# Patient Record
Sex: FEMALE | Race: BLACK OR AFRICAN AMERICAN | NOT HISPANIC OR LATINO | Employment: OTHER | RURAL
[De-identification: names, ages, dates, MRNs, and addresses within clinical notes are randomized per-mention and may not be internally consistent; named-entity substitution may affect disease eponyms.]

---

## 2018-02-01 ENCOUNTER — HISTORICAL (OUTPATIENT)
Dept: ADMINISTRATIVE | Facility: HOSPITAL | Age: 73
End: 2018-02-01

## 2018-02-02 LAB
LAB AP CLINICAL INFORMATION: NORMAL
LAB AP DIAGNOSIS - HISTORICAL: NORMAL
LAB AP GROSS PATHOLOGY - HISTORICAL: NORMAL
LAB AP SPECIMEN SUBMITTED - HISTORICAL: NORMAL

## 2020-03-09 ENCOUNTER — HISTORICAL (OUTPATIENT)
Dept: ADMINISTRATIVE | Facility: HOSPITAL | Age: 75
End: 2020-03-09

## 2020-03-19 ENCOUNTER — HISTORICAL (OUTPATIENT)
Dept: ADMINISTRATIVE | Facility: HOSPITAL | Age: 75
End: 2020-03-19

## 2020-06-02 ENCOUNTER — HISTORICAL (OUTPATIENT)
Dept: ADMINISTRATIVE | Facility: HOSPITAL | Age: 75
End: 2020-06-02

## 2020-06-02 LAB
BASOPHILS # BLD AUTO: 0.04 X10E3/UL (ref 0–0.2)
BASOPHILS NFR BLD AUTO: 0.5 % (ref 0–1)
CRP SERPL-MCNC: 1.89 MG/DL (ref 0–0.8)
EOSINOPHIL # BLD AUTO: 0.14 X10E3/UL (ref 0–0.5)
EOSINOPHIL NFR BLD AUTO: 1.8 % (ref 1–4)
ERYTHROCYTE [DISTWIDTH] IN BLOOD BY AUTOMATED COUNT: 15.8 % (ref 11.5–14.5)
ERYTHROCYTE [SEDIMENTATION RATE] IN BLOOD BY WESTERGREN METHOD: 37 MM/HR (ref 0–30)
HCT VFR BLD AUTO: 38.8 % (ref 38–47)
HGB BLD-MCNC: 12.2 G/DL (ref 12–16)
IMM GRANULOCYTES # BLD AUTO: 0.02 X10E3/UL (ref 0–0.04)
IMM GRANULOCYTES NFR BLD: 0.3 % (ref 0–0.4)
LYMPHOCYTES # BLD AUTO: 2.62 X10E3/UL (ref 1–4.8)
LYMPHOCYTES NFR BLD AUTO: 33.6 % (ref 27–41)
MCH RBC QN AUTO: 26.4 PG (ref 27–31)
MCHC RBC AUTO-ENTMCNC: 31.4 G/DL (ref 32–36)
MCV RBC AUTO: 84 FL (ref 80–96)
MONOCYTES # BLD AUTO: 0.63 X10E3/UL (ref 0–0.8)
MONOCYTES NFR BLD AUTO: 8.1 % (ref 2–6)
MPC BLD CALC-MCNC: 10.1 FL (ref 9.4–12.4)
NEUTROPHILS # BLD AUTO: 4.34 X10E3/UL (ref 1.8–7.7)
NEUTROPHILS NFR BLD AUTO: 55.7 % (ref 53–65)
NRBC # BLD AUTO: 0 X10E3/UL (ref 0–0)
NRBC, AUTO (.00): 0 /100 (ref 0–0)
PLATELET # BLD AUTO: 317 X10E3/UL (ref 150–400)
RBC # BLD AUTO: 4.62 X10E6/UL (ref 4.2–5.4)
WBC # BLD AUTO: 7.79 X10E3/UL (ref 4.5–11)

## 2020-06-18 ENCOUNTER — HISTORICAL (OUTPATIENT)
Dept: ADMINISTRATIVE | Facility: HOSPITAL | Age: 75
End: 2020-06-18

## 2020-06-18 LAB — GLUCOSE SERPL-MCNC: 140 MG/DL (ref 70–105)

## 2020-06-19 LAB
INSULIN SERPL-ACNC: NORMAL U[IU]/ML
LAB AP CLINICAL INFORMATION: NORMAL
LAB AP COMMENTS: NORMAL
LAB AP DIAGNOSIS - HISTORICAL: NORMAL
LAB AP GROSS PATHOLOGY - HISTORICAL: NORMAL
LAB AP SPECIMEN SUBMITTED - HISTORICAL: NORMAL

## 2020-10-05 ENCOUNTER — HISTORICAL (OUTPATIENT)
Dept: ADMINISTRATIVE | Facility: HOSPITAL | Age: 75
End: 2020-10-05

## 2020-10-05 LAB — GLUCOSE SERPL-MCNC: 108 MG/DL (ref 70–105)

## 2020-11-03 ENCOUNTER — HISTORICAL (OUTPATIENT)
Dept: ADMINISTRATIVE | Facility: HOSPITAL | Age: 75
End: 2020-11-03

## 2020-11-30 ENCOUNTER — HISTORICAL (OUTPATIENT)
Dept: ADMINISTRATIVE | Facility: HOSPITAL | Age: 75
End: 2020-11-30

## 2020-12-02 ENCOUNTER — HISTORICAL (OUTPATIENT)
Dept: ADMINISTRATIVE | Facility: HOSPITAL | Age: 75
End: 2020-12-02

## 2020-12-02 LAB — GLUCOSE SERPL-MCNC: 111 MG/DL (ref 70–105)

## 2020-12-03 LAB

## 2021-03-23 RX ORDER — LEVOTHYROXINE SODIUM 50 UG/1
50 TABLET ORAL
COMMUNITY
End: 2022-01-19 | Stop reason: SDUPTHER

## 2021-03-23 RX ORDER — GLIPIZIDE 10 MG/1
10 TABLET ORAL DAILY
COMMUNITY
End: 2021-05-19 | Stop reason: CLARIF

## 2021-03-23 RX ORDER — HYDROCODONE BITARTRATE AND ACETAMINOPHEN 10; 325 MG/1; MG/1
1 TABLET ORAL EVERY 12 HOURS
COMMUNITY
End: 2021-06-02

## 2021-03-23 RX ORDER — METOPROLOL SUCCINATE 25 MG/1
50 TABLET, EXTENDED RELEASE ORAL DAILY
COMMUNITY
End: 2022-01-19

## 2021-03-23 RX ORDER — MECLIZINE HCL 12.5 MG 12.5 MG/1
1-2 TABLET ORAL EVERY 6 HOURS
COMMUNITY
End: 2022-01-19

## 2021-03-23 RX ORDER — AMLODIPINE BESYLATE 10 MG/1
10 TABLET ORAL DAILY
COMMUNITY
End: 2021-06-08 | Stop reason: SDUPTHER

## 2021-03-23 RX ORDER — GABAPENTIN 300 MG/1
300 CAPSULE ORAL 3 TIMES DAILY
COMMUNITY
End: 2021-03-25 | Stop reason: SDUPTHER

## 2021-03-25 ENCOUNTER — HOSPITAL ENCOUNTER (OUTPATIENT)
Dept: RADIOLOGY | Facility: HOSPITAL | Age: 76
Discharge: HOME | End: 2021-03-25
Attending: PHYSICIAN ASSISTANT
Payer: COMMERCIAL

## 2021-03-25 ENCOUNTER — OFFICE VISIT (OUTPATIENT)
Dept: PAIN MEDICINE | Facility: HOSPITAL | Age: 76
End: 2021-03-25
Payer: COMMERCIAL

## 2021-03-25 VITALS
WEIGHT: 179 LBS | SYSTOLIC BLOOD PRESSURE: 140 MMHG | DIASTOLIC BLOOD PRESSURE: 65 MMHG | BODY MASS INDEX: 27.13 KG/M2 | RESPIRATION RATE: 18 BRPM | HEIGHT: 68 IN | HEART RATE: 65 BPM

## 2021-03-25 DIAGNOSIS — E11.40 DIABETIC NEUROPATHY WITH NEUROLOGIC COMPLICATION: Chronic | ICD-10-CM

## 2021-03-25 DIAGNOSIS — M54.17 LUMBOSACRAL RADICULOPATHY: Primary | Chronic | ICD-10-CM

## 2021-03-25 DIAGNOSIS — M79.672 LEFT FOOT PAIN: Chronic | ICD-10-CM

## 2021-03-25 DIAGNOSIS — M54.9 DORSALGIA, UNSPECIFIED: ICD-10-CM

## 2021-03-25 DIAGNOSIS — M25.552 LEFT HIP PAIN: ICD-10-CM

## 2021-03-25 DIAGNOSIS — E11.49 DIABETIC NEUROPATHY WITH NEUROLOGIC COMPLICATION: Chronic | ICD-10-CM

## 2021-03-25 DIAGNOSIS — Z79.899 ENCOUNTER FOR LONG-TERM (CURRENT) USE OF OTHER MEDICATIONS: ICD-10-CM

## 2021-03-25 DIAGNOSIS — G89.4 CHRONIC PAIN SYNDROME: Chronic | ICD-10-CM

## 2021-03-25 DIAGNOSIS — M79.671 RIGHT FOOT PAIN: Chronic | ICD-10-CM

## 2021-03-25 LAB
CTP QC/QA: YES
POC (AMP) AMPHETAMINE: NEGATIVE
POC (BAR) BARBITURATES: NEGATIVE
POC (BUP) BUPRENORPHINE: NEGATIVE
POC (BZO) BENZODIAZEPINES: NEGATIVE
POC (COC) COCAINE: NEGATIVE
POC (MDMA) METHYLENEDIOXYMETHAMPHETAMINE 3,4: NEGATIVE
POC (MET) METHAMPHETAMINE: NEGATIVE
POC (MOP) OPIATES: ABNORMAL
POC (MTD) METHADONE: NEGATIVE
POC (OXY) OXYCODONE: NEGATIVE
POC (PCP) PHENCYCLIDINE: NEGATIVE
POC (TCA) TRICYCLIC ANTIDEPRESSANTS: NEGATIVE
POC TEMPERATURE (URINE): 94
POC THC: NEGATIVE

## 2021-03-25 PROCEDURE — 99999 PR PBB SHADOW E&M-EST. PATIENT-LVL V: ICD-10-PCS | Mod: PBBFAC,,, | Performed by: PHYSICIAN ASSISTANT

## 2021-03-25 PROCEDURE — 99214 PR OFFICE/OUTPT VISIT, EST, LEVL IV, 30-39 MIN: ICD-10-PCS | Mod: S$PBB,,, | Performed by: PHYSICIAN ASSISTANT

## 2021-03-25 PROCEDURE — 73521 XR HIPS BILATERAL 2 VIEW INCL AP PELVIS: ICD-10-PCS | Mod: 26,,, | Performed by: RADIOLOGY

## 2021-03-25 PROCEDURE — 96372 THER/PROPH/DIAG INJ SC/IM: CPT | Mod: PBBFAC | Performed by: PHYSICIAN ASSISTANT

## 2021-03-25 PROCEDURE — 99999 PR PBB SHADOW E&M-EST. PATIENT-LVL V: CPT | Mod: PBBFAC,,, | Performed by: PHYSICIAN ASSISTANT

## 2021-03-25 PROCEDURE — 72110 XR LUMBAR SPINE 5 VIEW WITH FLEX AND EXT: ICD-10-PCS | Mod: 26,,, | Performed by: RADIOLOGY

## 2021-03-25 PROCEDURE — 73521 X-RAY EXAM HIPS BI 2 VIEWS: CPT | Mod: 26,,, | Performed by: RADIOLOGY

## 2021-03-25 PROCEDURE — 73521 X-RAY EXAM HIPS BI 2 VIEWS: CPT | Mod: TC

## 2021-03-25 PROCEDURE — 72110 X-RAY EXAM L-2 SPINE 4/>VWS: CPT | Mod: TC

## 2021-03-25 PROCEDURE — 72110 X-RAY EXAM L-2 SPINE 4/>VWS: CPT | Mod: 26,,, | Performed by: RADIOLOGY

## 2021-03-25 PROCEDURE — 99214 OFFICE O/P EST MOD 30 MIN: CPT | Mod: S$PBB,,, | Performed by: PHYSICIAN ASSISTANT

## 2021-03-25 PROCEDURE — 99215 OFFICE O/P EST HI 40 MIN: CPT | Mod: PBBFAC,25 | Performed by: PHYSICIAN ASSISTANT

## 2021-03-25 RX ORDER — HYDROCODONE BITARTRATE AND ACETAMINOPHEN 10; 325 MG/1; MG/1
1 TABLET ORAL EVERY 8 HOURS
Qty: 90 TABLET | Refills: 0 | Status: SHIPPED | OUTPATIENT
Start: 2021-04-29 | End: 2021-05-29

## 2021-03-25 RX ORDER — HYDROCODONE BITARTRATE AND ACETAMINOPHEN 10; 325 MG/1; MG/1
1 TABLET ORAL EVERY 8 HOURS
Qty: 90 TABLET | Refills: 0 | Status: SHIPPED | OUTPATIENT
Start: 2021-03-30 | End: 2021-04-29

## 2021-03-25 RX ORDER — GABAPENTIN 300 MG/1
300 CAPSULE ORAL 3 TIMES DAILY
Qty: 90 CAPSULE | Refills: 2 | Status: SHIPPED | OUTPATIENT
Start: 2021-03-25 | End: 2021-06-02 | Stop reason: SDUPTHER

## 2021-03-25 RX ORDER — HYDROCODONE BITARTRATE AND ACETAMINOPHEN 10; 325 MG/1; MG/1
1 TABLET ORAL EVERY 8 HOURS
Qty: 90 TABLET | Refills: 0 | Status: SHIPPED | OUTPATIENT
Start: 2021-05-29 | End: 2021-06-17

## 2021-03-25 RX ORDER — KETOROLAC TROMETHAMINE 30 MG/ML
60 INJECTION, SOLUTION INTRAMUSCULAR; INTRAVENOUS
Status: COMPLETED | OUTPATIENT
Start: 2021-03-25 | End: 2021-03-25

## 2021-03-25 RX ADMIN — KETOROLAC TROMETHAMINE 60 MG: 30 INJECTION, SOLUTION INTRAMUSCULAR at 09:03

## 2021-04-06 ENCOUNTER — OFFICE VISIT (OUTPATIENT)
Dept: FAMILY MEDICINE | Facility: CLINIC | Age: 76
End: 2021-04-06
Payer: COMMERCIAL

## 2021-04-06 VITALS
HEIGHT: 68 IN | TEMPERATURE: 99 F | BODY MASS INDEX: 27.46 KG/M2 | DIASTOLIC BLOOD PRESSURE: 88 MMHG | RESPIRATION RATE: 16 BRPM | SYSTOLIC BLOOD PRESSURE: 161 MMHG | OXYGEN SATURATION: 100 % | HEART RATE: 72 BPM | WEIGHT: 181.19 LBS

## 2021-04-06 DIAGNOSIS — N32.89 BLADDER SPASM: ICD-10-CM

## 2021-04-06 DIAGNOSIS — R35.0 URINARY FREQUENCY: Primary | ICD-10-CM

## 2021-04-06 LAB
BILIRUB SERPL-MCNC: NEGATIVE MG/DL
BLOOD URINE, POC: NEGATIVE
COLOR, POC UA: YELLOW
GLUCOSE UR QL STRIP: 100
KETONES UR QL STRIP: NEGATIVE
LEUKOCYTE ESTERASE URINE, POC: NEGATIVE
NITRITE, POC UA: NEGATIVE
PH, POC UA: 6.5
PROTEIN, POC: NEGATIVE
SPECIFIC GRAVITY, POC UA: 1.02
UROBILINOGEN, POC UA: 0.2

## 2021-04-06 PROCEDURE — 81003 URINALYSIS AUTO W/O SCOPE: CPT | Mod: QW,,, | Performed by: NURSE PRACTITIONER

## 2021-04-06 PROCEDURE — 81003 POCT URINALYSIS W/O SCOPE: ICD-10-PCS | Mod: QW,,, | Performed by: NURSE PRACTITIONER

## 2021-04-06 PROCEDURE — 96372 PR INJECTION,THERAP/PROPH/DIAG2ST, IM OR SUBCUT: ICD-10-PCS | Mod: ,,, | Performed by: NURSE PRACTITIONER

## 2021-04-06 PROCEDURE — 99213 PR OFFICE/OUTPT VISIT, EST, LEVL III, 20-29 MIN: ICD-10-PCS | Mod: 25,,, | Performed by: NURSE PRACTITIONER

## 2021-04-06 PROCEDURE — 96372 THER/PROPH/DIAG INJ SC/IM: CPT | Mod: ,,, | Performed by: NURSE PRACTITIONER

## 2021-04-06 PROCEDURE — 99213 OFFICE O/P EST LOW 20 MIN: CPT | Mod: 25,,, | Performed by: NURSE PRACTITIONER

## 2021-04-06 RX ORDER — GLIPIZIDE 10 MG/1
TABLET, FILM COATED, EXTENDED RELEASE ORAL DAILY
COMMUNITY
Start: 2021-02-02 | End: 2021-05-19 | Stop reason: CLARIF

## 2021-04-06 RX ORDER — OXYBUTYNIN CHLORIDE 5 MG/1
TABLET ORAL
COMMUNITY
Start: 2021-01-07 | End: 2021-04-06 | Stop reason: SDUPTHER

## 2021-04-06 RX ORDER — KETOROLAC TROMETHAMINE 30 MG/ML
60 INJECTION, SOLUTION INTRAMUSCULAR; INTRAVENOUS
Status: COMPLETED | OUTPATIENT
Start: 2021-04-06 | End: 2021-04-06

## 2021-04-06 RX ORDER — LANCETS 33 GAUGE
EACH MISCELLANEOUS
COMMUNITY
Start: 2021-01-04 | End: 2021-06-08 | Stop reason: SDUPTHER

## 2021-04-06 RX ORDER — LEVOCETIRIZINE DIHYDROCHLORIDE 5 MG/1
5 TABLET, FILM COATED ORAL NIGHTLY
Qty: 30 TABLET | Refills: 11 | Status: SHIPPED | OUTPATIENT
Start: 2021-04-06 | End: 2021-06-17 | Stop reason: SDUPTHER

## 2021-04-06 RX ORDER — METHENAMINE HIPPURATE 1000 MG/1
1 TABLET ORAL DAILY
COMMUNITY
Start: 2021-03-01 | End: 2021-06-17

## 2021-04-06 RX ORDER — OXYBUTYNIN CHLORIDE 5 MG/1
TABLET ORAL
Qty: 60 TABLET | Refills: 5 | Status: SHIPPED | OUTPATIENT
Start: 2021-04-06 | End: 2022-02-08

## 2021-04-06 RX ORDER — CEFDINIR 300 MG/1
300 CAPSULE ORAL 2 TIMES DAILY
Qty: 20 CAPSULE | Refills: 0 | Status: SHIPPED | OUTPATIENT
Start: 2021-04-06 | End: 2021-05-05 | Stop reason: CLARIF

## 2021-04-06 RX ADMIN — KETOROLAC TROMETHAMINE 60 MG: 30 INJECTION, SOLUTION INTRAMUSCULAR; INTRAVENOUS at 06:04

## 2021-04-08 ENCOUNTER — CLINICAL SUPPORT (OUTPATIENT)
Dept: FAMILY MEDICINE | Facility: CLINIC | Age: 76
End: 2021-04-08
Payer: COMMERCIAL

## 2021-04-08 DIAGNOSIS — R53.83 FATIGUE, UNSPECIFIED TYPE: Primary | ICD-10-CM

## 2021-04-08 PROCEDURE — 96372 THER/PROPH/DIAG INJ SC/IM: CPT | Mod: ,,, | Performed by: NURSE PRACTITIONER

## 2021-04-08 PROCEDURE — 96372 PR INJECTION,THERAP/PROPH/DIAG2ST, IM OR SUBCUT: ICD-10-PCS | Mod: ,,, | Performed by: NURSE PRACTITIONER

## 2021-04-08 RX ORDER — CYANOCOBALAMIN 1000 UG/ML
1000 INJECTION, SOLUTION INTRAMUSCULAR; SUBCUTANEOUS
Status: COMPLETED | OUTPATIENT
Start: 2021-04-08 | End: 2021-04-08

## 2021-04-08 RX ADMIN — CYANOCOBALAMIN 1000 MCG: 1000 INJECTION, SOLUTION INTRAMUSCULAR; SUBCUTANEOUS at 12:04

## 2021-04-19 ENCOUNTER — OFFICE VISIT (OUTPATIENT)
Dept: PAIN MEDICINE | Facility: CLINIC | Age: 76
End: 2021-04-19
Payer: COMMERCIAL

## 2021-04-19 VITALS
BODY MASS INDEX: 27.58 KG/M2 | WEIGHT: 182 LBS | SYSTOLIC BLOOD PRESSURE: 152 MMHG | HEIGHT: 68 IN | HEART RATE: 84 BPM | DIASTOLIC BLOOD PRESSURE: 75 MMHG | RESPIRATION RATE: 18 BRPM

## 2021-04-19 DIAGNOSIS — M79.671 RIGHT FOOT PAIN: Chronic | ICD-10-CM

## 2021-04-19 DIAGNOSIS — M54.17 LUMBOSACRAL RADICULOPATHY: Primary | Chronic | ICD-10-CM

## 2021-04-19 DIAGNOSIS — E11.49 DIABETIC NEUROPATHY WITH NEUROLOGIC COMPLICATION: Chronic | ICD-10-CM

## 2021-04-19 DIAGNOSIS — B37.31 YEAST INFECTION INVOLVING THE VAGINA AND SURROUNDING AREA: ICD-10-CM

## 2021-04-19 DIAGNOSIS — G89.4 CHRONIC PAIN SYNDROME: Chronic | ICD-10-CM

## 2021-04-19 DIAGNOSIS — M79.672 LEFT FOOT PAIN: Chronic | ICD-10-CM

## 2021-04-19 DIAGNOSIS — E11.40 DIABETIC NEUROPATHY WITH NEUROLOGIC COMPLICATION: Chronic | ICD-10-CM

## 2021-04-19 PROCEDURE — 99215 OFFICE O/P EST HI 40 MIN: CPT | Mod: PBBFAC,25 | Performed by: PHYSICIAN ASSISTANT

## 2021-04-19 PROCEDURE — 99214 PR OFFICE/OUTPT VISIT, EST, LEVL IV, 30-39 MIN: ICD-10-PCS | Mod: S$PBB,,, | Performed by: PHYSICIAN ASSISTANT

## 2021-04-19 PROCEDURE — 99999 PR PBB SHADOW E&M-EST. PATIENT-LVL V: ICD-10-PCS | Mod: PBBFAC,,, | Performed by: PHYSICIAN ASSISTANT

## 2021-04-19 PROCEDURE — 99999 PR PBB SHADOW E&M-EST. PATIENT-LVL V: CPT | Mod: PBBFAC,,, | Performed by: PHYSICIAN ASSISTANT

## 2021-04-19 PROCEDURE — 99214 OFFICE O/P EST MOD 30 MIN: CPT | Mod: S$PBB,,, | Performed by: PHYSICIAN ASSISTANT

## 2021-04-19 PROCEDURE — 96372 THER/PROPH/DIAG INJ SC/IM: CPT | Mod: PBBFAC | Performed by: PHYSICIAN ASSISTANT

## 2021-04-19 RX ORDER — FLUCONAZOLE 150 MG/1
150 TABLET ORAL DAILY
Qty: 2 TABLET | Refills: 2 | Status: SHIPPED | OUTPATIENT
Start: 2021-04-19 | End: 2021-04-21

## 2021-04-19 RX ORDER — CLOTRIMAZOLE AND BETAMETHASONE DIPROPIONATE 10; .64 MG/G; MG/G
CREAM TOPICAL 2 TIMES DAILY
Qty: 1 TUBE | Refills: 2 | Status: SHIPPED | OUTPATIENT
Start: 2021-04-19 | End: 2021-05-19

## 2021-04-19 RX ORDER — KETOROLAC TROMETHAMINE 30 MG/ML
60 INJECTION, SOLUTION INTRAMUSCULAR; INTRAVENOUS
Status: COMPLETED | OUTPATIENT
Start: 2021-04-19 | End: 2021-04-19

## 2021-04-19 RX ADMIN — KETOROLAC TROMETHAMINE 60 MG: 30 INJECTION, SOLUTION INTRAMUSCULAR; INTRAVENOUS at 02:04

## 2021-05-05 ENCOUNTER — OFFICE VISIT (OUTPATIENT)
Dept: FAMILY MEDICINE | Facility: CLINIC | Age: 76
End: 2021-05-05
Payer: COMMERCIAL

## 2021-05-05 VITALS
DIASTOLIC BLOOD PRESSURE: 86 MMHG | WEIGHT: 180 LBS | SYSTOLIC BLOOD PRESSURE: 117 MMHG | TEMPERATURE: 98 F | BODY MASS INDEX: 27.28 KG/M2 | HEART RATE: 104 BPM | HEIGHT: 68 IN | RESPIRATION RATE: 17 BRPM | OXYGEN SATURATION: 100 %

## 2021-05-05 DIAGNOSIS — R30.0 DYSURIA: Primary | ICD-10-CM

## 2021-05-05 DIAGNOSIS — N32.89 BLADDER SPASMS: ICD-10-CM

## 2021-05-05 PROCEDURE — 96372 THER/PROPH/DIAG INJ SC/IM: CPT | Mod: ,,, | Performed by: NURSE PRACTITIONER

## 2021-05-05 PROCEDURE — 99213 PR OFFICE/OUTPT VISIT, EST, LEVL III, 20-29 MIN: ICD-10-PCS | Mod: 25,,, | Performed by: NURSE PRACTITIONER

## 2021-05-05 PROCEDURE — 99213 OFFICE O/P EST LOW 20 MIN: CPT | Mod: 25,,, | Performed by: NURSE PRACTITIONER

## 2021-05-05 PROCEDURE — 96372 PR INJECTION,THERAP/PROPH/DIAG2ST, IM OR SUBCUT: ICD-10-PCS | Mod: ,,, | Performed by: NURSE PRACTITIONER

## 2021-05-05 RX ORDER — CEPHALEXIN 500 MG/1
500 CAPSULE ORAL EVERY 12 HOURS
Qty: 14 CAPSULE | Refills: 0 | Status: SHIPPED | OUTPATIENT
Start: 2021-05-05 | End: 2021-06-17

## 2021-05-05 RX ORDER — AMITRIPTYLINE HYDROCHLORIDE 10 MG/1
10 TABLET, FILM COATED ORAL NIGHTLY PRN
Qty: 30 TABLET | Refills: 0 | Status: SHIPPED | OUTPATIENT
Start: 2021-05-05 | End: 2021-06-06

## 2021-05-05 RX ORDER — KETOROLAC TROMETHAMINE 30 MG/ML
30 INJECTION, SOLUTION INTRAMUSCULAR; INTRAVENOUS
Status: COMPLETED | OUTPATIENT
Start: 2021-05-05 | End: 2021-05-05

## 2021-05-05 RX ADMIN — KETOROLAC TROMETHAMINE 30 MG: 30 INJECTION, SOLUTION INTRAMUSCULAR; INTRAVENOUS at 05:05

## 2021-05-07 ENCOUNTER — HOSPITAL ENCOUNTER (OUTPATIENT)
Dept: RADIOLOGY | Facility: HOSPITAL | Age: 76
Discharge: HOME | End: 2021-05-07
Attending: OTOLARYNGOLOGY
Payer: COMMERCIAL

## 2021-05-07 DIAGNOSIS — J32.9 CHRONIC SINUSITIS: ICD-10-CM

## 2021-05-07 PROCEDURE — 70486 CT MAXILLOFACIAL W/O DYE: CPT | Mod: TC

## 2021-05-07 PROCEDURE — 70486 CT MAXILLOFACIAL W/O DYE: CPT | Mod: 26,,, | Performed by: RADIOLOGY

## 2021-05-07 PROCEDURE — 70486 CT MAXILLOFACIAL WITHOUT CONTRAST: ICD-10-PCS | Mod: 26,,, | Performed by: RADIOLOGY

## 2021-05-19 DIAGNOSIS — E11.9 DIABETES MELLITUS WITHOUT COMPLICATION: Primary | ICD-10-CM

## 2021-05-19 RX ORDER — GLIPIZIDE 10 MG/1
10 TABLET, FILM COATED, EXTENDED RELEASE ORAL DAILY
Qty: 90 TABLET | Refills: 1 | Status: SHIPPED | OUTPATIENT
Start: 2021-05-19 | End: 2021-11-29 | Stop reason: SDUPTHER

## 2021-05-29 ENCOUNTER — OFFICE VISIT (OUTPATIENT)
Dept: FAMILY MEDICINE | Facility: CLINIC | Age: 76
End: 2021-05-29
Payer: COMMERCIAL

## 2021-05-29 VITALS
HEART RATE: 72 BPM | OXYGEN SATURATION: 99 % | BODY MASS INDEX: 26.98 KG/M2 | WEIGHT: 178 LBS | HEIGHT: 68 IN | TEMPERATURE: 99 F | RESPIRATION RATE: 18 BRPM | SYSTOLIC BLOOD PRESSURE: 142 MMHG | DIASTOLIC BLOOD PRESSURE: 88 MMHG

## 2021-05-29 DIAGNOSIS — M79.672 BILATERAL FOOT PAIN: Primary | ICD-10-CM

## 2021-05-29 DIAGNOSIS — M79.671 BILATERAL FOOT PAIN: Primary | ICD-10-CM

## 2021-05-29 PROCEDURE — 99213 OFFICE O/P EST LOW 20 MIN: CPT | Mod: 25,,, | Performed by: NURSE PRACTITIONER

## 2021-05-29 PROCEDURE — 96372 THER/PROPH/DIAG INJ SC/IM: CPT | Mod: ,,, | Performed by: NURSE PRACTITIONER

## 2021-05-29 PROCEDURE — 99213 PR OFFICE/OUTPT VISIT, EST, LEVL III, 20-29 MIN: ICD-10-PCS | Mod: 25,,, | Performed by: NURSE PRACTITIONER

## 2021-05-29 PROCEDURE — 96372 PR INJECTION,THERAP/PROPH/DIAG2ST, IM OR SUBCUT: ICD-10-PCS | Mod: ,,, | Performed by: NURSE PRACTITIONER

## 2021-05-29 RX ORDER — DEXAMETHASONE SODIUM PHOSPHATE 4 MG/ML
4 INJECTION, SOLUTION INTRA-ARTICULAR; INTRALESIONAL; INTRAMUSCULAR; INTRAVENOUS; SOFT TISSUE
Status: COMPLETED | OUTPATIENT
Start: 2021-05-29 | End: 2021-05-29

## 2021-05-29 RX ORDER — KETOROLAC TROMETHAMINE 30 MG/ML
30 INJECTION, SOLUTION INTRAMUSCULAR; INTRAVENOUS
Status: COMPLETED | OUTPATIENT
Start: 2021-05-29 | End: 2021-05-29

## 2021-05-29 RX ADMIN — KETOROLAC TROMETHAMINE 30 MG: 30 INJECTION, SOLUTION INTRAMUSCULAR; INTRAVENOUS at 09:05

## 2021-05-29 RX ADMIN — DEXAMETHASONE SODIUM PHOSPHATE 4 MG: 4 INJECTION, SOLUTION INTRA-ARTICULAR; INTRALESIONAL; INTRAMUSCULAR; INTRAVENOUS; SOFT TISSUE at 09:05

## 2021-06-02 ENCOUNTER — OFFICE VISIT (OUTPATIENT)
Dept: PAIN MEDICINE | Facility: CLINIC | Age: 76
End: 2021-06-02
Payer: COMMERCIAL

## 2021-06-02 VITALS
HEART RATE: 74 BPM | RESPIRATION RATE: 18 BRPM | WEIGHT: 180.81 LBS | DIASTOLIC BLOOD PRESSURE: 74 MMHG | SYSTOLIC BLOOD PRESSURE: 131 MMHG | HEIGHT: 68 IN | BODY MASS INDEX: 27.4 KG/M2

## 2021-06-02 DIAGNOSIS — M79.671 BILATERAL FOOT PAIN: ICD-10-CM

## 2021-06-02 DIAGNOSIS — E11.40 DIABETIC NEUROPATHY WITH NEUROLOGIC COMPLICATION: Primary | Chronic | ICD-10-CM

## 2021-06-02 DIAGNOSIS — M54.17 LUMBOSACRAL RADICULOPATHY: Chronic | ICD-10-CM

## 2021-06-02 DIAGNOSIS — Z79.899 ENCOUNTER FOR LONG-TERM (CURRENT) USE OF OTHER MEDICATIONS: ICD-10-CM

## 2021-06-02 DIAGNOSIS — M79.672 BILATERAL FOOT PAIN: ICD-10-CM

## 2021-06-02 DIAGNOSIS — E11.49 DIABETIC NEUROPATHY WITH NEUROLOGIC COMPLICATION: Primary | Chronic | ICD-10-CM

## 2021-06-02 PROCEDURE — 80305 DRUG TEST PRSMV DIR OPT OBS: CPT | Mod: PBBFAC | Performed by: PHYSICIAN ASSISTANT

## 2021-06-02 PROCEDURE — 99214 PR OFFICE/OUTPT VISIT, EST, LEVL IV, 30-39 MIN: ICD-10-PCS | Mod: S$PBB,25,, | Performed by: PHYSICIAN ASSISTANT

## 2021-06-02 PROCEDURE — 96372 THER/PROPH/DIAG INJ SC/IM: CPT | Mod: PBBFAC | Performed by: PHYSICIAN ASSISTANT

## 2021-06-02 PROCEDURE — 99214 OFFICE O/P EST MOD 30 MIN: CPT | Mod: S$PBB,25,, | Performed by: PHYSICIAN ASSISTANT

## 2021-06-02 PROCEDURE — 99215 OFFICE O/P EST HI 40 MIN: CPT | Mod: PBBFAC,25 | Performed by: PHYSICIAN ASSISTANT

## 2021-06-02 RX ORDER — GABAPENTIN 400 MG/1
400 CAPSULE ORAL EVERY 8 HOURS
Qty: 90 CAPSULE | Refills: 2 | Status: SHIPPED | OUTPATIENT
Start: 2021-06-02 | End: 2021-08-30 | Stop reason: SDUPTHER

## 2021-06-02 RX ORDER — DULOXETIN HYDROCHLORIDE 30 MG/1
30 CAPSULE, DELAYED RELEASE ORAL DAILY
Qty: 30 CAPSULE | Refills: 2 | Status: SHIPPED | OUTPATIENT
Start: 2021-06-02 | End: 2021-06-17

## 2021-06-02 RX ORDER — HYDROCODONE BITARTRATE AND ACETAMINOPHEN 10; 325 MG/1; MG/1
1 TABLET ORAL EVERY 8 HOURS
Qty: 90 TABLET | Refills: 0 | Status: SHIPPED | OUTPATIENT
Start: 2021-08-27 | End: 2021-06-17 | Stop reason: SDUPTHER

## 2021-06-02 RX ORDER — HYDROCODONE BITARTRATE AND ACETAMINOPHEN 10; 325 MG/1; MG/1
1 TABLET ORAL EVERY 8 HOURS
Qty: 90 TABLET | Refills: 0 | Status: SHIPPED | OUTPATIENT
Start: 2021-06-28 | End: 2021-07-28

## 2021-06-02 RX ORDER — KETOROLAC TROMETHAMINE 30 MG/ML
60 INJECTION, SOLUTION INTRAMUSCULAR; INTRAVENOUS
Status: COMPLETED | OUTPATIENT
Start: 2021-06-02 | End: 2021-06-02

## 2021-06-02 RX ORDER — HYDROCODONE BITARTRATE AND ACETAMINOPHEN 10; 325 MG/1; MG/1
1 TABLET ORAL EVERY 8 HOURS
Qty: 90 TABLET | Refills: 0 | Status: SHIPPED | OUTPATIENT
Start: 2021-07-28 | End: 2021-06-17 | Stop reason: SDUPTHER

## 2021-06-02 RX ADMIN — KETOROLAC TROMETHAMINE 60 MG: 30 INJECTION, SOLUTION INTRAMUSCULAR at 10:06

## 2021-06-08 DIAGNOSIS — E11.9 DIABETES MELLITUS WITHOUT COMPLICATION: ICD-10-CM

## 2021-06-08 DIAGNOSIS — I10 HYPERTENSION, UNSPECIFIED TYPE: Primary | ICD-10-CM

## 2021-06-08 RX ORDER — AMLODIPINE BESYLATE 10 MG/1
10 TABLET ORAL DAILY
Qty: 30 TABLET | Refills: 5 | Status: SHIPPED | OUTPATIENT
Start: 2021-06-08 | End: 2022-02-08

## 2021-06-08 RX ORDER — LANCETS 33 GAUGE
EACH MISCELLANEOUS
Qty: 100 EACH | Refills: 11 | Status: SHIPPED | OUTPATIENT
Start: 2021-06-08 | End: 2022-06-15 | Stop reason: SDUPTHER

## 2021-06-09 ENCOUNTER — OFFICE VISIT (OUTPATIENT)
Dept: UROLOGY | Facility: CLINIC | Age: 76
End: 2021-06-09
Payer: COMMERCIAL

## 2021-06-09 VITALS
OXYGEN SATURATION: 97 % | DIASTOLIC BLOOD PRESSURE: 80 MMHG | BODY MASS INDEX: 29.99 KG/M2 | TEMPERATURE: 99 F | HEIGHT: 65 IN | WEIGHT: 180 LBS | SYSTOLIC BLOOD PRESSURE: 140 MMHG

## 2021-06-09 DIAGNOSIS — N32.81 OVERACTIVE BLADDER: Primary | ICD-10-CM

## 2021-06-09 DIAGNOSIS — Z87.440 HISTORY OF UTI: ICD-10-CM

## 2021-06-09 LAB
BACTERIA #/AREA URNS HPF: ABNORMAL /HPF
BILIRUB UR QL STRIP: NEGATIVE
CLARITY UR: CLEAR
COLOR UR: YELLOW
GLUCOSE UR STRIP-MCNC: 500 MG/DL
KETONES UR STRIP-SCNC: NEGATIVE MG/DL
LEUKOCYTE ESTERASE UR QL STRIP: NEGATIVE
NITRITE UR QL STRIP: NEGATIVE
PH UR STRIP: 7 PH UNITS
PROT UR QL STRIP: NEGATIVE
RBC # UR STRIP: ABNORMAL /UL
RBC #/AREA URNS HPF: ABNORMAL /HPF
SP GR UR STRIP: 1.01
SQUAMOUS #/AREA URNS LPF: ABNORMAL /LPF
UROBILINOGEN UR STRIP-ACNC: 1 MG/DL
WBC #/AREA URNS HPF: ABNORMAL /HPF

## 2021-06-09 PROCEDURE — 81001 URINALYSIS AUTO W/SCOPE: CPT | Mod: ,,, | Performed by: CLINICAL MEDICAL LABORATORY

## 2021-06-09 PROCEDURE — 81001 URINALYSIS: ICD-10-PCS | Mod: ,,, | Performed by: CLINICAL MEDICAL LABORATORY

## 2021-06-09 PROCEDURE — 99213 OFFICE O/P EST LOW 20 MIN: CPT | Mod: S$PBB,,, | Performed by: UROLOGY

## 2021-06-09 PROCEDURE — 99215 OFFICE O/P EST HI 40 MIN: CPT | Mod: PBBFAC | Performed by: UROLOGY

## 2021-06-09 PROCEDURE — 87077 CULTURE, URINE: ICD-10-PCS | Mod: QW,,, | Performed by: CLINICAL MEDICAL LABORATORY

## 2021-06-09 PROCEDURE — 87086 URINE CULTURE/COLONY COUNT: CPT | Mod: GZ,,, | Performed by: CLINICAL MEDICAL LABORATORY

## 2021-06-09 PROCEDURE — 87186 CULTURE, URINE: ICD-10-PCS | Mod: ,,, | Performed by: CLINICAL MEDICAL LABORATORY

## 2021-06-09 PROCEDURE — 87186 SC STD MICRODIL/AGAR DIL: CPT | Mod: ,,, | Performed by: CLINICAL MEDICAL LABORATORY

## 2021-06-09 PROCEDURE — 87086 CULTURE, URINE: ICD-10-PCS | Mod: GZ,,, | Performed by: CLINICAL MEDICAL LABORATORY

## 2021-06-09 PROCEDURE — 87077 CULTURE AEROBIC IDENTIFY: CPT | Mod: QW,,, | Performed by: CLINICAL MEDICAL LABORATORY

## 2021-06-09 PROCEDURE — 99213 PR OFFICE/OUTPT VISIT, EST, LEVL III, 20-29 MIN: ICD-10-PCS | Mod: S$PBB,,, | Performed by: UROLOGY

## 2021-06-10 ENCOUNTER — TELEPHONE (OUTPATIENT)
Dept: UROLOGY | Facility: CLINIC | Age: 76
End: 2021-06-10

## 2021-06-10 RX ORDER — FLUCONAZOLE 150 MG/1
TABLET ORAL
COMMUNITY
Start: 2021-05-29 | End: 2022-05-11 | Stop reason: ALTCHOICE

## 2021-06-11 ENCOUNTER — TELEPHONE (OUTPATIENT)
Dept: UROLOGY | Facility: CLINIC | Age: 76
End: 2021-06-11

## 2021-06-11 LAB — UA COMPLETE W REFLEX CULTURE PNL UR: ABNORMAL

## 2021-06-14 ENCOUNTER — OFFICE VISIT (OUTPATIENT)
Dept: FAMILY MEDICINE | Facility: CLINIC | Age: 76
End: 2021-06-14
Payer: COMMERCIAL

## 2021-06-14 VITALS
HEART RATE: 63 BPM | SYSTOLIC BLOOD PRESSURE: 146 MMHG | RESPIRATION RATE: 20 BRPM | HEIGHT: 65 IN | DIASTOLIC BLOOD PRESSURE: 83 MMHG | WEIGHT: 178.19 LBS | OXYGEN SATURATION: 98 % | TEMPERATURE: 97 F | BODY MASS INDEX: 29.69 KG/M2

## 2021-06-14 DIAGNOSIS — N32.81 OVERACTIVE BLADDER: Primary | ICD-10-CM

## 2021-06-14 DIAGNOSIS — E11.9 TYPE 2 DIABETES MELLITUS WITHOUT COMPLICATION, WITHOUT LONG-TERM CURRENT USE OF INSULIN: ICD-10-CM

## 2021-06-14 DIAGNOSIS — I10 HYPERTENSION, UNSPECIFIED TYPE: ICD-10-CM

## 2021-06-14 DIAGNOSIS — Z00.00 WELLNESS EXAMINATION: Primary | ICD-10-CM

## 2021-06-14 LAB
25(OH)D3 SERPL-MCNC: 61.8 NG/ML
ALBUMIN SERPL BCP-MCNC: 3.5 G/DL (ref 3.5–5)
ALBUMIN/GLOB SERPL: 0.7 {RATIO}
ALP SERPL-CCNC: 127 U/L (ref 55–142)
ALT SERPL W P-5'-P-CCNC: 22 U/L (ref 13–56)
ANION GAP SERPL CALCULATED.3IONS-SCNC: 9 MMOL/L (ref 7–16)
AST SERPL W P-5'-P-CCNC: 13 U/L (ref 15–37)
BASOPHILS # BLD AUTO: 0.04 K/UL (ref 0–0.2)
BASOPHILS NFR BLD AUTO: 0.5 % (ref 0–1)
BILIRUB SERPL-MCNC: 0.3 MG/DL (ref 0–1.2)
BUN SERPL-MCNC: 10 MG/DL (ref 7–18)
BUN/CREAT SERPL: 11 (ref 6–20)
CALCIUM SERPL-MCNC: 9.5 MG/DL (ref 8.5–10.1)
CHLORIDE SERPL-SCNC: 107 MMOL/L (ref 98–107)
CHOLEST SERPL-MCNC: 229 MG/DL (ref 0–200)
CHOLEST/HDLC SERPL: 4.2 {RATIO}
CO2 SERPL-SCNC: 29 MMOL/L (ref 21–32)
CREAT SERPL-MCNC: 0.87 MG/DL (ref 0.55–1.02)
CREAT UR-MCNC: 97 MG/DL (ref 28–219)
DIFFERENTIAL METHOD BLD: ABNORMAL
EOSINOPHIL # BLD AUTO: 0.16 K/UL (ref 0–0.5)
EOSINOPHIL NFR BLD AUTO: 2 % (ref 1–4)
ERYTHROCYTE [DISTWIDTH] IN BLOOD BY AUTOMATED COUNT: 15.8 % (ref 11.5–14.5)
EST. AVERAGE GLUCOSE BLD GHB EST-MCNC: 144 MG/DL
GLOBULIN SER-MCNC: 4.7 G/DL (ref 2–4)
GLUCOSE SERPL-MCNC: 76 MG/DL (ref 74–106)
HBA1C MFR BLD HPLC: 6.9 % (ref 4.5–6.6)
HCT VFR BLD AUTO: 39.1 % (ref 38–47)
HDLC SERPL-MCNC: 55 MG/DL (ref 40–60)
HGB BLD-MCNC: 12.3 G/DL (ref 12–16)
IMM GRANULOCYTES # BLD AUTO: 0.02 K/UL (ref 0–0.04)
IMM GRANULOCYTES NFR BLD: 0.3 % (ref 0–0.4)
LDLC SERPL CALC-MCNC: 151 MG/DL
LDLC/HDLC SERPL: 2.7 {RATIO}
LYMPHOCYTES # BLD AUTO: 3.35 K/UL (ref 1–4.8)
LYMPHOCYTES NFR BLD AUTO: 42.3 % (ref 27–41)
MAGNESIUM SERPL-MCNC: 2.3 MG/DL (ref 1.7–2.3)
MCH RBC QN AUTO: 26.2 PG (ref 27–31)
MCHC RBC AUTO-ENTMCNC: 31.5 G/DL (ref 32–36)
MCV RBC AUTO: 83.4 FL (ref 80–96)
MICROALBUMIN UR-MCNC: 1 MG/DL (ref 0–2.8)
MICROALBUMIN/CREAT RATIO PNL UR: 10.3 MG/G (ref 0–30)
MONOCYTES # BLD AUTO: 0.55 K/UL (ref 0–0.8)
MONOCYTES NFR BLD AUTO: 6.9 % (ref 2–6)
MPC BLD CALC-MCNC: 10.2 FL (ref 9.4–12.4)
NEUTROPHILS # BLD AUTO: 3.8 K/UL (ref 1.8–7.7)
NEUTROPHILS NFR BLD AUTO: 48 % (ref 53–65)
NONHDLC SERPL-MCNC: 174 MG/DL
NRBC # BLD AUTO: 0 X10E3/UL
NRBC, AUTO (.00): 0 %
PLATELET # BLD AUTO: 368 K/UL (ref 150–400)
POTASSIUM SERPL-SCNC: 4.2 MMOL/L (ref 3.5–5.1)
PROT SERPL-MCNC: 8.2 G/DL (ref 6.4–8.2)
RBC # BLD AUTO: 4.69 M/UL (ref 4.2–5.4)
SODIUM SERPL-SCNC: 141 MMOL/L (ref 136–145)
T4 FREE SERPL-MCNC: 0.94 NG/DL (ref 0.76–1.46)
TRIGL SERPL-MCNC: 115 MG/DL (ref 35–150)
TSH SERPL DL<=0.005 MIU/L-ACNC: 1.45 UIU/ML (ref 0.36–3.74)
VLDLC SERPL-MCNC: 23 MG/DL
WBC # BLD AUTO: 7.92 K/UL (ref 4.5–11)

## 2021-06-14 PROCEDURE — 84443 ASSAY THYROID STIM HORMONE: CPT | Mod: QW,,, | Performed by: CLINICAL MEDICAL LABORATORY

## 2021-06-14 PROCEDURE — 83735 ASSAY OF MAGNESIUM: CPT | Mod: ,,, | Performed by: CLINICAL MEDICAL LABORATORY

## 2021-06-14 PROCEDURE — 82570 MICROALBUMIN / CREATININE RATIO URINE: ICD-10-PCS | Mod: QW,,, | Performed by: CLINICAL MEDICAL LABORATORY

## 2021-06-14 PROCEDURE — 80061 LIPID PANEL: CPT | Mod: QW,,, | Performed by: CLINICAL MEDICAL LABORATORY

## 2021-06-14 PROCEDURE — 82306 VITAMIN D 25 HYDROXY: CPT | Mod: GZ,,, | Performed by: CLINICAL MEDICAL LABORATORY

## 2021-06-14 PROCEDURE — 83036 HEMOGLOBIN GLYCOSYLATED A1C: CPT | Mod: QW,,, | Performed by: CLINICAL MEDICAL LABORATORY

## 2021-06-14 PROCEDURE — 80048 COMPREHENSIVE METABOLIC PANEL: ICD-10-PCS | Mod: QW,,, | Performed by: CLINICAL MEDICAL LABORATORY

## 2021-06-14 PROCEDURE — 80061 LIPID PANEL: ICD-10-PCS | Mod: QW,,, | Performed by: CLINICAL MEDICAL LABORATORY

## 2021-06-14 PROCEDURE — 99214 PR OFFICE/OUTPT VISIT, EST, LEVL IV, 30-39 MIN: ICD-10-PCS | Mod: ,,, | Performed by: FAMILY MEDICINE

## 2021-06-14 PROCEDURE — 82306 VITAMIN D: ICD-10-PCS | Mod: GZ,,, | Performed by: CLINICAL MEDICAL LABORATORY

## 2021-06-14 PROCEDURE — 82043 UR ALBUMIN QUANTITATIVE: CPT | Mod: QW,,, | Performed by: CLINICAL MEDICAL LABORATORY

## 2021-06-14 PROCEDURE — 82043 MICROALBUMIN / CREATININE RATIO URINE: ICD-10-PCS | Mod: QW,,, | Performed by: CLINICAL MEDICAL LABORATORY

## 2021-06-14 PROCEDURE — 80048 BASIC METABOLIC PNL TOTAL CA: CPT | Mod: QW,,, | Performed by: CLINICAL MEDICAL LABORATORY

## 2021-06-14 PROCEDURE — 84439 ASSAY OF FREE THYROXINE: CPT | Mod: ,,, | Performed by: CLINICAL MEDICAL LABORATORY

## 2021-06-14 PROCEDURE — 83036 HEMOGLOBIN A1C: ICD-10-PCS | Mod: QW,,, | Performed by: CLINICAL MEDICAL LABORATORY

## 2021-06-14 PROCEDURE — 84443 TSH: ICD-10-PCS | Mod: QW,,, | Performed by: CLINICAL MEDICAL LABORATORY

## 2021-06-14 PROCEDURE — 82570 ASSAY OF URINE CREATININE: CPT | Mod: QW,,, | Performed by: CLINICAL MEDICAL LABORATORY

## 2021-06-14 PROCEDURE — 99214 OFFICE O/P EST MOD 30 MIN: CPT | Mod: ,,, | Performed by: FAMILY MEDICINE

## 2021-06-14 PROCEDURE — 83735 MAGNESIUM: ICD-10-PCS | Mod: ,,, | Performed by: CLINICAL MEDICAL LABORATORY

## 2021-06-14 PROCEDURE — 84439 T4, FREE: ICD-10-PCS | Mod: ,,, | Performed by: CLINICAL MEDICAL LABORATORY

## 2021-06-17 ENCOUNTER — HOSPITAL ENCOUNTER (EMERGENCY)
Facility: HOSPITAL | Age: 76
Discharge: HOME | End: 2021-06-17
Payer: COMMERCIAL

## 2021-06-17 VITALS
RESPIRATION RATE: 18 BRPM | HEART RATE: 83 BPM | WEIGHT: 179 LBS | DIASTOLIC BLOOD PRESSURE: 77 MMHG | TEMPERATURE: 98 F | BODY MASS INDEX: 27.13 KG/M2 | HEIGHT: 68 IN | SYSTOLIC BLOOD PRESSURE: 135 MMHG | OXYGEN SATURATION: 98 %

## 2021-06-17 DIAGNOSIS — M79.671 BILATERAL FOOT PAIN: ICD-10-CM

## 2021-06-17 DIAGNOSIS — D12.4 ADENOMATOUS POLYP OF DESCENDING COLON: ICD-10-CM

## 2021-06-17 DIAGNOSIS — N32.89 BLADDER SPASMS: ICD-10-CM

## 2021-06-17 DIAGNOSIS — E11.40 DIABETIC NEUROPATHY WITH NEUROLOGIC COMPLICATION: Chronic | ICD-10-CM

## 2021-06-17 DIAGNOSIS — M79.672 BILATERAL FOOT PAIN: ICD-10-CM

## 2021-06-17 DIAGNOSIS — I10 HYPERTENSION: ICD-10-CM

## 2021-06-17 DIAGNOSIS — G89.4 CHRONIC PAIN SYNDROME: Chronic | ICD-10-CM

## 2021-06-17 DIAGNOSIS — M54.17 LUMBOSACRAL RADICULOPATHY: Chronic | ICD-10-CM

## 2021-06-17 DIAGNOSIS — Z00.00 WELLNESS EXAMINATION: ICD-10-CM

## 2021-06-17 DIAGNOSIS — K62.5 RECTAL BLEEDING: Primary | ICD-10-CM

## 2021-06-17 DIAGNOSIS — E11.9 TYPE 2 DIABETES MELLITUS WITHOUT COMPLICATION, WITHOUT LONG-TERM CURRENT USE OF INSULIN: ICD-10-CM

## 2021-06-17 DIAGNOSIS — K64.4 EXTERNAL HEMORRHOID: ICD-10-CM

## 2021-06-17 DIAGNOSIS — K62.5 GASTROINTESTINAL HEMORRHAGE ASSOCIATED WITH ANORECTAL SOURCE: ICD-10-CM

## 2021-06-17 DIAGNOSIS — M25.552 LEFT HIP PAIN: ICD-10-CM

## 2021-06-17 DIAGNOSIS — D12.2 ADENOMATOUS POLYP OF ASCENDING COLON: ICD-10-CM

## 2021-06-17 DIAGNOSIS — R30.0 DYSURIA: ICD-10-CM

## 2021-06-17 DIAGNOSIS — E11.49 DIABETIC NEUROPATHY WITH NEUROLOGIC COMPLICATION: Chronic | ICD-10-CM

## 2021-06-17 DIAGNOSIS — K57.30 COLON, DIVERTICULOSIS: ICD-10-CM

## 2021-06-17 DIAGNOSIS — B37.31 YEAST INFECTION INVOLVING THE VAGINA AND SURROUNDING AREA: ICD-10-CM

## 2021-06-17 DIAGNOSIS — K62.5 RECTAL BLEED: Primary | ICD-10-CM

## 2021-06-17 DIAGNOSIS — M79.671 RIGHT FOOT PAIN: Chronic | ICD-10-CM

## 2021-06-17 LAB
ALBUMIN SERPL BCP-MCNC: 3.1 G/DL (ref 3.5–5)
ALBUMIN/GLOB SERPL: 0.7 {RATIO}
ALP SERPL-CCNC: 116 U/L (ref 55–142)
ALT SERPL W P-5'-P-CCNC: 14 U/L (ref 13–56)
ANION GAP SERPL CALCULATED.3IONS-SCNC: 10 MMOL/L (ref 7–16)
APTT PPP: 33.3 SECONDS (ref 25.2–37.3)
AST SERPL W P-5'-P-CCNC: 13 U/L (ref 15–37)
BASOPHILS # BLD AUTO: 0.03 K/UL (ref 0–0.2)
BASOPHILS NFR BLD AUTO: 0.5 % (ref 0–1)
BILIRUB SERPL-MCNC: 0.3 MG/DL (ref 0–1.2)
BUN SERPL-MCNC: 14 MG/DL (ref 7–18)
BUN/CREAT SERPL: 19 (ref 6–20)
CALCIUM SERPL-MCNC: 8.9 MG/DL (ref 8.5–10.1)
CHLORIDE SERPL-SCNC: 107 MMOL/L (ref 98–107)
CO2 SERPL-SCNC: 29 MMOL/L (ref 21–32)
CREAT SERPL-MCNC: 0.72 MG/DL (ref 0.55–1.02)
DIFFERENTIAL METHOD BLD: ABNORMAL
EOSINOPHIL # BLD AUTO: 0.11 K/UL (ref 0–0.5)
EOSINOPHIL NFR BLD AUTO: 1.9 % (ref 1–4)
ERYTHROCYTE [DISTWIDTH] IN BLOOD BY AUTOMATED COUNT: 15.3 % (ref 11.5–14.5)
GLOBULIN SER-MCNC: 4.3 G/DL (ref 2–4)
GLUCOSE SERPL-MCNC: 173 MG/DL (ref 74–106)
HCT VFR BLD AUTO: 35.4 % (ref 38–47)
HGB BLD-MCNC: 11.4 G/DL (ref 12–16)
IMM GRANULOCYTES # BLD AUTO: 0.01 K/UL (ref 0–0.04)
IMM GRANULOCYTES NFR BLD: 0.2 % (ref 0–0.4)
INR BLD: 1.04 (ref 0.9–1.1)
LYMPHOCYTES # BLD AUTO: 1.96 K/UL (ref 1–4.8)
LYMPHOCYTES NFR BLD AUTO: 34.2 % (ref 27–41)
MCH RBC QN AUTO: 26.5 PG (ref 27–31)
MCHC RBC AUTO-ENTMCNC: 32.2 G/DL (ref 32–36)
MCV RBC AUTO: 82.1 FL (ref 80–96)
MONOCYTES # BLD AUTO: 0.4 K/UL (ref 0–0.8)
MONOCYTES NFR BLD AUTO: 7 % (ref 2–6)
MPC BLD CALC-MCNC: 9.2 FL (ref 9.4–12.4)
NEUTROPHILS # BLD AUTO: 3.22 K/UL (ref 1.8–7.7)
NEUTROPHILS NFR BLD AUTO: 56.2 % (ref 53–65)
NRBC # BLD AUTO: 0 X10E3/UL
NRBC, AUTO (.00): 0 %
PLATELET # BLD AUTO: 264 K/UL (ref 150–400)
POTASSIUM SERPL-SCNC: 4.3 MMOL/L (ref 3.5–5.1)
PROT SERPL-MCNC: 7.4 G/DL (ref 6.4–8.2)
PROTHROMBIN TIME: 13.1 SECONDS (ref 11.7–14.7)
RBC # BLD AUTO: 4.31 M/UL (ref 4.2–5.4)
SODIUM SERPL-SCNC: 142 MMOL/L (ref 136–145)
WBC # BLD AUTO: 5.73 K/UL (ref 4.5–11)

## 2021-06-17 PROCEDURE — 85610 PROTHROMBIN TIME: CPT | Performed by: NURSE PRACTITIONER

## 2021-06-17 PROCEDURE — 36415 COLL VENOUS BLD VENIPUNCTURE: CPT | Performed by: NURSE PRACTITIONER

## 2021-06-17 PROCEDURE — 85730 THROMBOPLASTIN TIME PARTIAL: CPT | Performed by: NURSE PRACTITIONER

## 2021-06-17 PROCEDURE — 99283 EMERGENCY DEPT VISIT LOW MDM: CPT | Mod: ,,, | Performed by: NURSE PRACTITIONER

## 2021-06-17 PROCEDURE — 99283 PR EMERGENCY DEPT VISIT,LEVEL III: ICD-10-PCS | Mod: ,,, | Performed by: NURSE PRACTITIONER

## 2021-06-17 PROCEDURE — 99283 EMERGENCY DEPT VISIT LOW MDM: CPT | Performed by: NURSE PRACTITIONER

## 2021-06-17 PROCEDURE — 80053 COMPREHEN METABOLIC PANEL: CPT | Performed by: NURSE PRACTITIONER

## 2021-06-17 PROCEDURE — 85025 COMPLETE CBC W/AUTO DIFF WBC: CPT | Performed by: NURSE PRACTITIONER

## 2021-06-17 RX ORDER — ASPIRIN 81 MG/1
81 TABLET ORAL DAILY
COMMUNITY
Start: 2021-06-10 | End: 2024-02-22

## 2021-06-17 RX ORDER — OLMESARTAN MEDOXOMIL 5 MG/1
10 TABLET ORAL DAILY
COMMUNITY
Start: 2021-06-10

## 2021-06-18 ENCOUNTER — TELEPHONE (OUTPATIENT)
Dept: UROLOGY | Facility: CLINIC | Age: 76
End: 2021-06-18

## 2021-06-21 LAB — POC OCCULT BLOOD STOOL: POSITIVE

## 2021-06-22 ENCOUNTER — ANESTHESIA EVENT (OUTPATIENT)
Dept: GASTROENTEROLOGY | Facility: HOSPITAL | Age: 76
End: 2021-06-22
Payer: COMMERCIAL

## 2021-06-22 ENCOUNTER — ANESTHESIA (OUTPATIENT)
Dept: GASTROENTEROLOGY | Facility: HOSPITAL | Age: 76
End: 2021-06-22
Payer: COMMERCIAL

## 2021-06-22 ENCOUNTER — HOSPITAL ENCOUNTER (OUTPATIENT)
Dept: GASTROENTEROLOGY | Facility: HOSPITAL | Age: 76
Discharge: HOME | End: 2021-06-22
Attending: INTERNAL MEDICINE
Payer: COMMERCIAL

## 2021-06-22 VITALS
BODY MASS INDEX: 27.22 KG/M2 | OXYGEN SATURATION: 100 % | WEIGHT: 179 LBS | RESPIRATION RATE: 15 BRPM | TEMPERATURE: 97 F | DIASTOLIC BLOOD PRESSURE: 67 MMHG | SYSTOLIC BLOOD PRESSURE: 115 MMHG | HEART RATE: 53 BPM

## 2021-06-22 DIAGNOSIS — K64.4 EXTERNAL HEMORRHOID: ICD-10-CM

## 2021-06-22 DIAGNOSIS — K57.30 COLON, DIVERTICULOSIS: ICD-10-CM

## 2021-06-22 DIAGNOSIS — D12.2 ADENOMATOUS POLYP OF ASCENDING COLON: ICD-10-CM

## 2021-06-22 DIAGNOSIS — D12.4 ADENOMATOUS POLYP OF DESCENDING COLON: ICD-10-CM

## 2021-06-22 DIAGNOSIS — K62.5 GASTROINTESTINAL HEMORRHAGE ASSOCIATED WITH ANORECTAL SOURCE: ICD-10-CM

## 2021-06-22 DIAGNOSIS — K62.5 RECTAL BLEED: ICD-10-CM

## 2021-06-22 LAB
GLUCOSE SERPL-MCNC: 129 MG/DL (ref 70–105)
GLUCOSE SERPL-MCNC: 129 MG/DL (ref 70–110)

## 2021-06-22 PROCEDURE — 88305 TISSUE EXAM BY PATHOLOGIST: CPT | Mod: 26,,, | Performed by: PATHOLOGY

## 2021-06-22 PROCEDURE — 37000009 HC ANESTHESIA EA ADD 15 MINS

## 2021-06-22 PROCEDURE — D9220A PRA ANESTHESIA: Mod: ,,, | Performed by: NURSE ANESTHETIST, CERTIFIED REGISTERED

## 2021-06-22 PROCEDURE — 88305 SURGICAL PATHOLOGY: ICD-10-PCS | Mod: 26,,, | Performed by: PATHOLOGY

## 2021-06-22 PROCEDURE — 27201423 OPTIME MED/SURG SUP & DEVICES STERILE SUPPLY

## 2021-06-22 PROCEDURE — 63600175 PHARM REV CODE 636 W HCPCS: Performed by: NURSE ANESTHETIST, CERTIFIED REGISTERED

## 2021-06-22 PROCEDURE — 45380 COLONOSCOPY AND BIOPSY: CPT

## 2021-06-22 PROCEDURE — 25000003 PHARM REV CODE 250: Performed by: NURSE ANESTHETIST, CERTIFIED REGISTERED

## 2021-06-22 PROCEDURE — 37000008 HC ANESTHESIA 1ST 15 MINUTES

## 2021-06-22 PROCEDURE — 00811 ANES LWR INTST NDSC NOS: CPT

## 2021-06-22 PROCEDURE — 82962 GLUCOSE BLOOD TEST: CPT

## 2021-06-22 PROCEDURE — D9220A PRA ANESTHESIA: ICD-10-PCS | Mod: ,,, | Performed by: NURSE ANESTHETIST, CERTIFIED REGISTERED

## 2021-06-22 PROCEDURE — 88305 TISSUE EXAM BY PATHOLOGIST: CPT | Mod: SUR | Performed by: INTERNAL MEDICINE

## 2021-06-22 PROCEDURE — C1889 IMPLANT/INSERT DEVICE, NOC: HCPCS

## 2021-06-22 RX ORDER — SODIUM CHLORIDE 0.9 % (FLUSH) 0.9 %
10 SYRINGE (ML) INJECTION
Status: DISCONTINUED | OUTPATIENT
Start: 2021-06-22 | End: 2021-06-23 | Stop reason: HOSPADM

## 2021-06-22 RX ORDER — LIDOCAINE HYDROCHLORIDE 20 MG/ML
INJECTION INTRAVENOUS
Status: DISCONTINUED | OUTPATIENT
Start: 2021-06-22 | End: 2021-06-22

## 2021-06-22 RX ORDER — PROPOFOL 10 MG/ML
VIAL (ML) INTRAVENOUS
Status: DISCONTINUED | OUTPATIENT
Start: 2021-06-22 | End: 2021-06-22

## 2021-06-22 RX ADMIN — PROPOFOL 50 MG: 10 INJECTION, EMULSION INTRAVENOUS at 08:06

## 2021-06-22 RX ADMIN — PROPOFOL 100 MG: 10 INJECTION, EMULSION INTRAVENOUS at 08:06

## 2021-06-22 RX ADMIN — LIDOCAINE HYDROCHLORIDE 100 MG: 20 INJECTION, SOLUTION INTRAVENOUS at 08:06

## 2021-06-23 ENCOUNTER — TELEPHONE (OUTPATIENT)
Dept: UROLOGY | Facility: CLINIC | Age: 76
End: 2021-06-23

## 2021-06-23 LAB
ESTROGEN SERPL-MCNC: NORMAL PG/ML
LAB AP GROSS DESCRIPTION: NORMAL
LAB AP LABORATORY NOTES: NORMAL
T3RU NFR SERPL: NORMAL %

## 2021-06-24 ENCOUNTER — HOSPITAL ENCOUNTER (OUTPATIENT)
Facility: HOSPITAL | Age: 76
Discharge: HOME | End: 2021-06-24
Attending: UROLOGY | Admitting: UROLOGY
Payer: COMMERCIAL

## 2021-06-24 ENCOUNTER — ANESTHESIA EVENT (OUTPATIENT)
Dept: SURGERY | Facility: HOSPITAL | Age: 76
End: 2021-06-24
Payer: COMMERCIAL

## 2021-06-24 ENCOUNTER — ANESTHESIA (OUTPATIENT)
Dept: SURGERY | Facility: HOSPITAL | Age: 76
End: 2021-06-24
Payer: COMMERCIAL

## 2021-06-24 VITALS
HEART RATE: 70 BPM | SYSTOLIC BLOOD PRESSURE: 144 MMHG | RESPIRATION RATE: 16 BRPM | DIASTOLIC BLOOD PRESSURE: 73 MMHG | WEIGHT: 178 LBS | HEIGHT: 68 IN | OXYGEN SATURATION: 99 % | TEMPERATURE: 97 F | BODY MASS INDEX: 26.98 KG/M2

## 2021-06-24 DIAGNOSIS — G89.4 CHRONIC PAIN SYNDROME: Primary | ICD-10-CM

## 2021-06-24 DIAGNOSIS — N32.89 BLADDER SPASMS: ICD-10-CM

## 2021-06-24 DIAGNOSIS — I10 HYPERTENSION: ICD-10-CM

## 2021-06-24 LAB
GLUCOSE SERPL-MCNC: 106 MG/DL (ref 70–105)
GLUCOSE SERPL-MCNC: 111 MG/DL (ref 70–105)
GLUCOSE SERPL-MCNC: 147 MG/DL (ref 70–105)

## 2021-06-24 PROCEDURE — 25000003 PHARM REV CODE 250: Performed by: NURSE ANESTHETIST, CERTIFIED REGISTERED

## 2021-06-24 PROCEDURE — 52260 PR CYSTOSCOPY,DIL BLADDER,GEN ANESTH: ICD-10-PCS | Mod: ,,, | Performed by: UROLOGY

## 2021-06-24 PROCEDURE — 93010 EKG 12-LEAD: ICD-10-PCS | Mod: ,,, | Performed by: INTERNAL MEDICINE

## 2021-06-24 PROCEDURE — 36000706: Performed by: UROLOGY

## 2021-06-24 PROCEDURE — 27201423 OPTIME MED/SURG SUP & DEVICES STERILE SUPPLY: Performed by: UROLOGY

## 2021-06-24 PROCEDURE — 25000003 PHARM REV CODE 250: Performed by: ANESTHESIOLOGY

## 2021-06-24 PROCEDURE — 71000015 HC POSTOP RECOV 1ST HR: Performed by: UROLOGY

## 2021-06-24 PROCEDURE — D9220A PRA ANESTHESIA: Mod: CRNA,,, | Performed by: NURSE ANESTHETIST, CERTIFIED REGISTERED

## 2021-06-24 PROCEDURE — 93005 ELECTROCARDIOGRAM TRACING: CPT | Mod: 59

## 2021-06-24 PROCEDURE — 27000177 HC AIRWAY, LARYNGEAL MASK: Performed by: ANESTHESIOLOGY

## 2021-06-24 PROCEDURE — D9220A PRA ANESTHESIA: ICD-10-PCS | Mod: CRNA,,, | Performed by: NURSE ANESTHETIST, CERTIFIED REGISTERED

## 2021-06-24 PROCEDURE — 63600175 PHARM REV CODE 636 W HCPCS: Performed by: NURSE ANESTHETIST, CERTIFIED REGISTERED

## 2021-06-24 PROCEDURE — 52260 CYSTOSCOPY AND TREATMENT: CPT | Mod: ,,, | Performed by: UROLOGY

## 2021-06-24 PROCEDURE — 71000016 HC POSTOP RECOV ADDL HR: Performed by: UROLOGY

## 2021-06-24 PROCEDURE — 36000707: Performed by: UROLOGY

## 2021-06-24 PROCEDURE — 71000033 HC RECOVERY, INTIAL HOUR: Performed by: UROLOGY

## 2021-06-24 PROCEDURE — 37000008 HC ANESTHESIA 1ST 15 MINUTES: Performed by: UROLOGY

## 2021-06-24 PROCEDURE — D9220A PRA ANESTHESIA: ICD-10-PCS | Mod: ANES,,, | Performed by: ANESTHESIOLOGY

## 2021-06-24 PROCEDURE — C1729 CATH, DRAINAGE: HCPCS | Performed by: UROLOGY

## 2021-06-24 PROCEDURE — 37000009 HC ANESTHESIA EA ADD 15 MINS: Performed by: UROLOGY

## 2021-06-24 PROCEDURE — 27000284 HC CANNULA NASAL: Performed by: ANESTHESIOLOGY

## 2021-06-24 PROCEDURE — D9220A PRA ANESTHESIA: Mod: ANES,,, | Performed by: ANESTHESIOLOGY

## 2021-06-24 PROCEDURE — 93010 ELECTROCARDIOGRAM REPORT: CPT | Mod: ,,, | Performed by: INTERNAL MEDICINE

## 2021-06-24 PROCEDURE — 82962 GLUCOSE BLOOD TEST: CPT

## 2021-06-24 PROCEDURE — 27000655: Performed by: ANESTHESIOLOGY

## 2021-06-24 PROCEDURE — 27000716 HC OXISENSOR PROBE, ANY SIZE: Performed by: ANESTHESIOLOGY

## 2021-06-24 RX ORDER — ONDANSETRON 2 MG/ML
INJECTION INTRAMUSCULAR; INTRAVENOUS
Status: DISCONTINUED | OUTPATIENT
Start: 2021-06-24 | End: 2021-06-24

## 2021-06-24 RX ORDER — FENTANYL CITRATE 50 UG/ML
INJECTION, SOLUTION INTRAMUSCULAR; INTRAVENOUS
Status: DISCONTINUED | OUTPATIENT
Start: 2021-06-24 | End: 2021-06-24

## 2021-06-24 RX ORDER — MEPERIDINE HYDROCHLORIDE 25 MG/ML
25 INJECTION INTRAMUSCULAR; INTRAVENOUS; SUBCUTANEOUS EVERY 10 MIN PRN
Status: DISCONTINUED | OUTPATIENT
Start: 2021-06-24 | End: 2021-06-24 | Stop reason: HOSPADM

## 2021-06-24 RX ORDER — HYDROMORPHONE HYDROCHLORIDE 2 MG/ML
0.5 INJECTION, SOLUTION INTRAMUSCULAR; INTRAVENOUS; SUBCUTANEOUS EVERY 5 MIN PRN
Status: DISCONTINUED | OUTPATIENT
Start: 2021-06-24 | End: 2021-06-24 | Stop reason: HOSPADM

## 2021-06-24 RX ORDER — SODIUM CHLORIDE 9 MG/ML
INJECTION, SOLUTION INTRAVENOUS CONTINUOUS
Status: DISCONTINUED | OUTPATIENT
Start: 2021-06-24 | End: 2021-06-24 | Stop reason: HOSPADM

## 2021-06-24 RX ORDER — PROPOFOL 10 MG/ML
INJECTION, EMULSION INTRAVENOUS
Status: DISCONTINUED | OUTPATIENT
Start: 2021-06-24 | End: 2021-06-24

## 2021-06-24 RX ORDER — DIPHENHYDRAMINE HYDROCHLORIDE 50 MG/ML
25 INJECTION INTRAMUSCULAR; INTRAVENOUS EVERY 6 HOURS PRN
Status: DISCONTINUED | OUTPATIENT
Start: 2021-06-24 | End: 2021-06-24 | Stop reason: HOSPADM

## 2021-06-24 RX ORDER — ONDANSETRON 2 MG/ML
4 INJECTION INTRAMUSCULAR; INTRAVENOUS DAILY PRN
Status: DISCONTINUED | OUTPATIENT
Start: 2021-06-24 | End: 2021-06-24 | Stop reason: HOSPADM

## 2021-06-24 RX ORDER — MORPHINE SULFATE 10 MG/ML
4 INJECTION INTRAMUSCULAR; INTRAVENOUS; SUBCUTANEOUS EVERY 5 MIN PRN
Status: DISCONTINUED | OUTPATIENT
Start: 2021-06-24 | End: 2021-06-24 | Stop reason: HOSPADM

## 2021-06-24 RX ORDER — PHENYLEPHRINE HYDROCHLORIDE 10 MG/ML
INJECTION INTRAVENOUS
Status: DISCONTINUED | OUTPATIENT
Start: 2021-06-24 | End: 2021-06-24

## 2021-06-24 RX ORDER — LIDOCAINE HYDROCHLORIDE 20 MG/ML
INJECTION, SOLUTION EPIDURAL; INFILTRATION; INTRACAUDAL; PERINEURAL
Status: DISCONTINUED | OUTPATIENT
Start: 2021-06-24 | End: 2021-06-24

## 2021-06-24 RX ORDER — OXYBUTYNIN CHLORIDE 5 MG/1
5 TABLET, EXTENDED RELEASE ORAL DAILY
Qty: 30 TABLET | Refills: 0 | Status: SHIPPED | OUTPATIENT
Start: 2021-06-24 | End: 2021-06-24

## 2021-06-24 RX ORDER — KETOROLAC TROMETHAMINE 10 MG/1
10 TABLET, FILM COATED ORAL EVERY 6 HOURS PRN
Qty: 15 TABLET | Refills: 0 | Status: SHIPPED | OUTPATIENT
Start: 2021-06-24 | End: 2021-06-29

## 2021-06-24 RX ORDER — OXYCODONE HYDROCHLORIDE 5 MG/1
5 TABLET ORAL
Status: DISCONTINUED | OUTPATIENT
Start: 2021-06-24 | End: 2021-06-24 | Stop reason: HOSPADM

## 2021-06-24 RX ADMIN — PROPOFOL 130 MG: 10 INJECTION, EMULSION INTRAVENOUS at 10:06

## 2021-06-24 RX ADMIN — ONDANSETRON 4 MG: 2 INJECTION INTRAMUSCULAR; INTRAVENOUS at 10:06

## 2021-06-24 RX ADMIN — PHENYLEPHRINE HYDROCHLORIDE 100 MCG: 10 INJECTION INTRAVENOUS at 10:06

## 2021-06-24 RX ADMIN — LIDOCAINE HYDROCHLORIDE 100 MG: 20 INJECTION, SOLUTION INTRAVENOUS at 10:06

## 2021-06-24 RX ADMIN — FENTANYL CITRATE 25 MCG: 50 INJECTION INTRAMUSCULAR; INTRAVENOUS at 10:06

## 2021-06-24 RX ADMIN — CEFAZOLIN 2 G: 1 INJECTION, POWDER, FOR SOLUTION INTRAMUSCULAR; INTRAVENOUS; PARENTERAL at 10:06

## 2021-06-24 RX ADMIN — SODIUM CHLORIDE: 9 INJECTION, SOLUTION INTRAVENOUS at 09:06

## 2021-06-24 RX ADMIN — FENTANYL CITRATE 50 MCG: 50 INJECTION INTRAMUSCULAR; INTRAVENOUS at 10:06

## 2021-06-28 ENCOUNTER — TELEPHONE (OUTPATIENT)
Dept: UROLOGY | Facility: CLINIC | Age: 76
End: 2021-06-28

## 2021-06-30 ENCOUNTER — TELEPHONE (OUTPATIENT)
Dept: GASTROENTEROLOGY | Facility: CLINIC | Age: 76
End: 2021-06-30

## 2021-06-30 ENCOUNTER — OFFICE VISIT (OUTPATIENT)
Dept: NEUROLOGY | Facility: CLINIC | Age: 76
End: 2021-06-30
Payer: COMMERCIAL

## 2021-06-30 VITALS — DIASTOLIC BLOOD PRESSURE: 82 MMHG | SYSTOLIC BLOOD PRESSURE: 138 MMHG

## 2021-06-30 DIAGNOSIS — M54.16 LUMBAR RADICULOPATHY: ICD-10-CM

## 2021-06-30 DIAGNOSIS — R93.7 ABNORMAL MRI, LUMBAR SPINE: ICD-10-CM

## 2021-06-30 DIAGNOSIS — E11.42 DIABETIC POLYNEUROPATHY ASSOCIATED WITH TYPE 2 DIABETES MELLITUS: Primary | ICD-10-CM

## 2021-06-30 PROCEDURE — 99203 OFFICE O/P NEW LOW 30 MIN: CPT | Mod: S$PBB,,, | Performed by: NURSE PRACTITIONER

## 2021-06-30 PROCEDURE — 99203 PR OFFICE/OUTPT VISIT, NEW, LEVL III, 30-44 MIN: ICD-10-PCS | Mod: S$PBB,,, | Performed by: NURSE PRACTITIONER

## 2021-06-30 PROCEDURE — 99214 OFFICE O/P EST MOD 30 MIN: CPT | Mod: PBBFAC | Performed by: NURSE PRACTITIONER

## 2021-06-30 RX ORDER — DULOXETIN HYDROCHLORIDE 30 MG/1
CAPSULE, DELAYED RELEASE ORAL
Qty: 30 CAPSULE | Refills: 2 | Status: SHIPPED | OUTPATIENT
Start: 2021-06-30 | End: 2021-08-31 | Stop reason: SDUPTHER

## 2021-07-06 ENCOUNTER — TELEPHONE (OUTPATIENT)
Dept: GASTROENTEROLOGY | Facility: CLINIC | Age: 76
End: 2021-07-06

## 2021-07-09 DIAGNOSIS — M54.9 DORSALGIA, UNSPECIFIED: ICD-10-CM

## 2021-07-12 ENCOUNTER — HOSPITAL ENCOUNTER (EMERGENCY)
Facility: HOSPITAL | Age: 76
Discharge: HOME | End: 2021-07-13
Attending: EMERGENCY MEDICINE
Payer: COMMERCIAL

## 2021-07-12 DIAGNOSIS — R00.0 TACHYCARDIA: ICD-10-CM

## 2021-07-12 DIAGNOSIS — D12.4 ADENOMATOUS POLYP OF DESCENDING COLON: ICD-10-CM

## 2021-07-12 DIAGNOSIS — M79.671 BILATERAL FOOT PAIN: ICD-10-CM

## 2021-07-12 DIAGNOSIS — D12.2 ADENOMATOUS POLYP OF ASCENDING COLON: ICD-10-CM

## 2021-07-12 DIAGNOSIS — R91.8 PULMONARY NODULES: Primary | ICD-10-CM

## 2021-07-12 DIAGNOSIS — M79.672 BILATERAL FOOT PAIN: ICD-10-CM

## 2021-07-12 DIAGNOSIS — J02.9 ACUTE PHARYNGITIS: ICD-10-CM

## 2021-07-12 DIAGNOSIS — R05.9 COUGH: ICD-10-CM

## 2021-07-12 DIAGNOSIS — N32.89 BLADDER SPASMS: ICD-10-CM

## 2021-07-12 DIAGNOSIS — E04.1 THYROID NODULE: ICD-10-CM

## 2021-07-12 DIAGNOSIS — G89.4 CHRONIC PAIN SYNDROME: Chronic | ICD-10-CM

## 2021-07-12 LAB
ALBUMIN SERPL BCP-MCNC: 3.4 G/DL (ref 3.5–5)
ALBUMIN/GLOB SERPL: 0.7 {RATIO}
ALP SERPL-CCNC: 140 U/L (ref 55–142)
ALT SERPL W P-5'-P-CCNC: 20 U/L (ref 13–56)
ANION GAP SERPL CALCULATED.3IONS-SCNC: 13 MMOL/L (ref 7–16)
AST SERPL W P-5'-P-CCNC: 22 U/L (ref 15–37)
BACTERIA #/AREA URNS HPF: ABNORMAL /HPF
BASOPHILS # BLD AUTO: 0.03 K/UL (ref 0–0.2)
BASOPHILS NFR BLD AUTO: 0.2 % (ref 0–1)
BILIRUB SERPL-MCNC: 0.6 MG/DL (ref 0–1.2)
BILIRUB UR QL STRIP: NEGATIVE
BUN SERPL-MCNC: 9 MG/DL (ref 7–18)
BUN/CREAT SERPL: 11 (ref 6–20)
CALCIUM SERPL-MCNC: 9.2 MG/DL (ref 8.5–10.1)
CHLORIDE SERPL-SCNC: 103 MMOL/L (ref 98–107)
CLARITY UR: CLEAR
CO2 SERPL-SCNC: 24 MMOL/L (ref 21–32)
COLOR UR: YELLOW
CREAT SERPL-MCNC: 0.85 MG/DL (ref 0.55–1.02)
DIFFERENTIAL METHOD BLD: ABNORMAL
EOSINOPHIL # BLD AUTO: 0 K/UL (ref 0–0.5)
EOSINOPHIL NFR BLD AUTO: 0 % (ref 1–4)
ERYTHROCYTE [DISTWIDTH] IN BLOOD BY AUTOMATED COUNT: 15.9 % (ref 11.5–14.5)
FLUAV AG UPPER RESP QL IA.RAPID: NEGATIVE
FLUBV AG UPPER RESP QL IA.RAPID: NEGATIVE
GLOBULIN SER-MCNC: 5.1 G/DL (ref 2–4)
GLUCOSE SERPL-MCNC: 181 MG/DL (ref 74–106)
GLUCOSE UR STRIP-MCNC: 500 MG/DL
HCT VFR BLD AUTO: 41.4 % (ref 38–47)
HETEROPH AB SER QL LA: NEGATIVE
HGB BLD-MCNC: 13.2 G/DL (ref 12–16)
IMM GRANULOCYTES # BLD AUTO: 0.07 K/UL (ref 0–0.04)
IMM GRANULOCYTES NFR BLD: 0.5 % (ref 0–0.4)
KETONES UR STRIP-SCNC: 15 MG/DL
LEUKOCYTE ESTERASE UR QL STRIP: NEGATIVE
LYMPHOCYTES # BLD AUTO: 1.32 K/UL (ref 1–4.8)
LYMPHOCYTES NFR BLD AUTO: 8.9 % (ref 27–41)
MCH RBC QN AUTO: 26.8 PG (ref 27–31)
MCHC RBC AUTO-ENTMCNC: 31.9 G/DL (ref 32–36)
MCV RBC AUTO: 84 FL (ref 80–96)
MONOCYTES # BLD AUTO: 0.76 K/UL (ref 0–0.8)
MONOCYTES NFR BLD AUTO: 5.1 % (ref 2–6)
MPC BLD CALC-MCNC: 9.4 FL (ref 9.4–12.4)
MUCOUS THREADS #/AREA URNS HPF: ABNORMAL /HPF
NEUTROPHILS # BLD AUTO: 12.6 K/UL (ref 1.8–7.7)
NEUTROPHILS NFR BLD AUTO: 85.3 % (ref 53–65)
NITRITE UR QL STRIP: NEGATIVE
NRBC # BLD AUTO: 0 X10E3/UL
NRBC, AUTO (.00): 0 %
PH UR STRIP: 6 PH UNITS
PLATELET # BLD AUTO: 283 K/UL (ref 150–400)
POTASSIUM SERPL-SCNC: 3.6 MMOL/L (ref 3.5–5.1)
PROT SERPL-MCNC: 8.5 G/DL (ref 6.4–8.2)
PROT UR QL STRIP: 30
RAPID GROUP A STREP: NEGATIVE
RBC # BLD AUTO: 4.93 M/UL (ref 4.2–5.4)
RBC # UR STRIP: ABNORMAL /UL
RBC #/AREA URNS HPF: ABNORMAL /HPF
SARS-COV+SARS-COV-2 AG RESP QL IA.RAPID: NEGATIVE
SODIUM SERPL-SCNC: 136 MMOL/L (ref 136–145)
SP GR UR STRIP: 1.02
SQUAMOUS #/AREA URNS LPF: ABNORMAL /LPF
UROBILINOGEN UR STRIP-ACNC: 1 MG/DL
WBC # BLD AUTO: 14.78 K/UL (ref 4.5–11)
WBC #/AREA URNS HPF: ABNORMAL /HPF

## 2021-07-12 PROCEDURE — 80053 COMPREHEN METABOLIC PANEL: CPT | Performed by: EMERGENCY MEDICINE

## 2021-07-12 PROCEDURE — 85025 COMPLETE CBC W/AUTO DIFF WBC: CPT | Performed by: EMERGENCY MEDICINE

## 2021-07-12 PROCEDURE — 99284 PR EMERGENCY DEPT VISIT,LEVEL IV: ICD-10-PCS | Mod: ,,, | Performed by: EMERGENCY MEDICINE

## 2021-07-12 PROCEDURE — 96360 HYDRATION IV INFUSION INIT: CPT | Mod: GZ | Performed by: EMERGENCY MEDICINE

## 2021-07-12 PROCEDURE — 99284 EMERGENCY DEPT VISIT MOD MDM: CPT | Mod: ,,, | Performed by: EMERGENCY MEDICINE

## 2021-07-12 PROCEDURE — 87428 SARSCOV & INF VIR A&B AG IA: CPT | Performed by: EMERGENCY MEDICINE

## 2021-07-12 PROCEDURE — 86308 HETEROPHILE ANTIBODY SCREEN: CPT | Performed by: EMERGENCY MEDICINE

## 2021-07-12 PROCEDURE — 93010 ELECTROCARDIOGRAM REPORT: CPT | Mod: ,,, | Performed by: INTERNAL MEDICINE

## 2021-07-12 PROCEDURE — 81003 URINALYSIS AUTO W/O SCOPE: CPT | Performed by: EMERGENCY MEDICINE

## 2021-07-12 PROCEDURE — 63600175 PHARM REV CODE 636 W HCPCS: Performed by: EMERGENCY MEDICINE

## 2021-07-12 PROCEDURE — 25000003 PHARM REV CODE 250: Performed by: EMERGENCY MEDICINE

## 2021-07-12 PROCEDURE — 36415 COLL VENOUS BLD VENIPUNCTURE: CPT | Performed by: EMERGENCY MEDICINE

## 2021-07-12 PROCEDURE — 93005 ELECTROCARDIOGRAM TRACING: CPT

## 2021-07-12 PROCEDURE — 93010 EKG 12-LEAD: ICD-10-PCS | Mod: ,,, | Performed by: INTERNAL MEDICINE

## 2021-07-12 PROCEDURE — 81001 URINALYSIS AUTO W/SCOPE: CPT | Performed by: EMERGENCY MEDICINE

## 2021-07-12 PROCEDURE — 99285 EMERGENCY DEPT VISIT HI MDM: CPT | Mod: 25 | Performed by: EMERGENCY MEDICINE

## 2021-07-12 PROCEDURE — 25500020 PHARM REV CODE 255: Performed by: EMERGENCY MEDICINE

## 2021-07-12 PROCEDURE — 96361 HYDRATE IV INFUSION ADD-ON: CPT | Mod: GZ | Performed by: EMERGENCY MEDICINE

## 2021-07-12 PROCEDURE — 87880 STREP A ASSAY W/OPTIC: CPT | Performed by: EMERGENCY MEDICINE

## 2021-07-12 RX ORDER — ACETAMINOPHEN 500 MG
1000 TABLET ORAL
Status: COMPLETED | OUTPATIENT
Start: 2021-07-12 | End: 2021-07-12

## 2021-07-12 RX ADMIN — ACETAMINOPHEN 1000 MG: 500 TABLET ORAL at 09:07

## 2021-07-12 RX ADMIN — IOPAMIDOL 100 ML: 755 INJECTION, SOLUTION INTRAVENOUS at 11:07

## 2021-07-12 RX ADMIN — SODIUM CHLORIDE, POTASSIUM CHLORIDE, SODIUM LACTATE AND CALCIUM CHLORIDE 1000 ML: 600; 310; 30; 20 INJECTION, SOLUTION INTRAVENOUS at 10:07

## 2021-07-13 ENCOUNTER — OFFICE VISIT (OUTPATIENT)
Dept: FAMILY MEDICINE | Facility: CLINIC | Age: 76
End: 2021-07-13
Payer: COMMERCIAL

## 2021-07-13 VITALS
SYSTOLIC BLOOD PRESSURE: 158 MMHG | DIASTOLIC BLOOD PRESSURE: 95 MMHG | HEART RATE: 100 BPM | WEIGHT: 172.63 LBS | OXYGEN SATURATION: 95 % | TEMPERATURE: 98 F | RESPIRATION RATE: 24 BRPM | BODY MASS INDEX: 26.16 KG/M2 | HEIGHT: 68 IN

## 2021-07-13 VITALS
OXYGEN SATURATION: 97 % | SYSTOLIC BLOOD PRESSURE: 164 MMHG | HEART RATE: 87 BPM | BODY MASS INDEX: 26.98 KG/M2 | RESPIRATION RATE: 19 BRPM | TEMPERATURE: 98 F | WEIGHT: 178 LBS | DIASTOLIC BLOOD PRESSURE: 85 MMHG | HEIGHT: 68 IN

## 2021-07-13 DIAGNOSIS — J02.9 ACUTE PHARYNGITIS, UNSPECIFIED ETIOLOGY: Primary | ICD-10-CM

## 2021-07-13 DIAGNOSIS — D73.89 LESION OF SPLEEN: ICD-10-CM

## 2021-07-13 PROCEDURE — 99213 OFFICE O/P EST LOW 20 MIN: CPT | Mod: 25,,, | Performed by: NURSE PRACTITIONER

## 2021-07-13 PROCEDURE — 99213 PR OFFICE/OUTPT VISIT, EST, LEVL III, 20-29 MIN: ICD-10-PCS | Mod: 25,,, | Performed by: NURSE PRACTITIONER

## 2021-07-13 PROCEDURE — 96372 PR INJECTION,THERAP/PROPH/DIAG2ST, IM OR SUBCUT: ICD-10-PCS | Mod: ,,, | Performed by: NURSE PRACTITIONER

## 2021-07-13 PROCEDURE — 96372 THER/PROPH/DIAG INJ SC/IM: CPT | Mod: ,,, | Performed by: NURSE PRACTITIONER

## 2021-07-13 RX ORDER — CEFTRIAXONE 1 G/1
1 INJECTION, POWDER, FOR SOLUTION INTRAMUSCULAR; INTRAVENOUS
Status: COMPLETED | OUTPATIENT
Start: 2021-07-13 | End: 2021-07-13

## 2021-07-13 RX ORDER — AMOXICILLIN 400 MG/5ML
400 POWDER, FOR SUSPENSION ORAL EVERY 12 HOURS
Qty: 100 ML | Refills: 0 | Status: SHIPPED | OUTPATIENT
Start: 2021-07-13 | End: 2021-08-03 | Stop reason: ALTCHOICE

## 2021-07-13 RX ADMIN — CEFTRIAXONE 1 G: 1 INJECTION, POWDER, FOR SOLUTION INTRAMUSCULAR; INTRAVENOUS at 02:07

## 2021-07-14 ENCOUNTER — HOSPITAL ENCOUNTER (OUTPATIENT)
Dept: RADIOLOGY | Facility: HOSPITAL | Age: 76
Discharge: HOME | End: 2021-07-14
Attending: ORTHOPAEDIC SURGERY
Payer: COMMERCIAL

## 2021-07-14 ENCOUNTER — OFFICE VISIT (OUTPATIENT)
Dept: SPINE | Facility: CLINIC | Age: 76
End: 2021-07-14
Payer: COMMERCIAL

## 2021-07-14 DIAGNOSIS — M54.9 DORSALGIA, UNSPECIFIED: ICD-10-CM

## 2021-07-14 DIAGNOSIS — R93.7 ABNORMAL MRI, LUMBAR SPINE: ICD-10-CM

## 2021-07-14 PROCEDURE — 72110 X-RAY EXAM L-2 SPINE 4/>VWS: CPT | Mod: 26,,, | Performed by: ORTHOPAEDIC SURGERY

## 2021-07-14 PROCEDURE — 72110 X-RAY EXAM L-2 SPINE 4/>VWS: CPT | Mod: TC

## 2021-07-14 PROCEDURE — 72110 XR LUMBAR SPINE 5 VIEW WITH FLEX AND EXT: ICD-10-PCS | Mod: 26,,, | Performed by: ORTHOPAEDIC SURGERY

## 2021-07-14 PROCEDURE — 99204 PR OFFICE/OUTPT VISIT, NEW, LEVL IV, 45-59 MIN: ICD-10-PCS | Mod: S$PBB,,, | Performed by: ORTHOPAEDIC SURGERY

## 2021-07-14 PROCEDURE — 99204 OFFICE O/P NEW MOD 45 MIN: CPT | Mod: S$PBB,,, | Performed by: ORTHOPAEDIC SURGERY

## 2021-07-14 PROCEDURE — 99214 OFFICE O/P EST MOD 30 MIN: CPT | Mod: PBBFAC | Performed by: ORTHOPAEDIC SURGERY

## 2021-07-21 ENCOUNTER — CLINICAL SUPPORT (OUTPATIENT)
Dept: REHABILITATION | Facility: HOSPITAL | Age: 76
End: 2021-07-21
Attending: ORTHOPAEDIC SURGERY
Payer: COMMERCIAL

## 2021-07-21 DIAGNOSIS — R68.89 DECREASED FUNCTIONAL ACTIVITY TOLERANCE: ICD-10-CM

## 2021-07-21 DIAGNOSIS — R93.7 ABNORMAL MRI, LUMBAR SPINE: ICD-10-CM

## 2021-07-21 DIAGNOSIS — R29.898 WEAKNESS OF BOTH LOWER EXTREMITIES: ICD-10-CM

## 2021-07-21 PROCEDURE — 97161 PT EVAL LOW COMPLEX 20 MIN: CPT

## 2021-07-28 ENCOUNTER — OFFICE VISIT (OUTPATIENT)
Dept: UROLOGY | Facility: CLINIC | Age: 76
End: 2021-07-28
Payer: COMMERCIAL

## 2021-07-28 VITALS
WEIGHT: 177 LBS | SYSTOLIC BLOOD PRESSURE: 160 MMHG | OXYGEN SATURATION: 98 % | TEMPERATURE: 99 F | HEART RATE: 81 BPM | DIASTOLIC BLOOD PRESSURE: 80 MMHG | HEIGHT: 69 IN | BODY MASS INDEX: 26.22 KG/M2

## 2021-07-28 DIAGNOSIS — G89.29 CHRONIC PELVIC PAIN IN FEMALE: Primary | Chronic | ICD-10-CM

## 2021-07-28 DIAGNOSIS — Z87.440 PERSONAL HISTORY UTI: ICD-10-CM

## 2021-07-28 DIAGNOSIS — R10.2 CHRONIC PELVIC PAIN IN FEMALE: Primary | Chronic | ICD-10-CM

## 2021-07-28 PROCEDURE — 99213 OFFICE O/P EST LOW 20 MIN: CPT | Mod: PBBFAC | Performed by: NURSE PRACTITIONER

## 2021-07-28 PROCEDURE — 99214 OFFICE O/P EST MOD 30 MIN: CPT | Mod: S$PBB,,, | Performed by: NURSE PRACTITIONER

## 2021-07-28 PROCEDURE — 99214 PR OFFICE/OUTPT VISIT, EST, LEVL IV, 30-39 MIN: ICD-10-PCS | Mod: S$PBB,,, | Performed by: NURSE PRACTITIONER

## 2021-08-02 ENCOUNTER — CLINICAL SUPPORT (OUTPATIENT)
Dept: REHABILITATION | Facility: HOSPITAL | Age: 76
End: 2021-08-02
Payer: COMMERCIAL

## 2021-08-02 DIAGNOSIS — R29.898 WEAKNESS OF BOTH LOWER EXTREMITIES: ICD-10-CM

## 2021-08-02 DIAGNOSIS — R68.89 DECREASED FUNCTIONAL ACTIVITY TOLERANCE: ICD-10-CM

## 2021-08-02 PROCEDURE — 97012 MECHANICAL TRACTION THERAPY: CPT

## 2021-08-02 PROCEDURE — 97110 THERAPEUTIC EXERCISES: CPT

## 2021-08-03 ENCOUNTER — OFFICE VISIT (OUTPATIENT)
Dept: FAMILY MEDICINE | Facility: CLINIC | Age: 76
End: 2021-08-03
Payer: COMMERCIAL

## 2021-08-03 VITALS
OXYGEN SATURATION: 99 % | BODY MASS INDEX: 26.83 KG/M2 | HEART RATE: 86 BPM | WEIGHT: 177 LBS | RESPIRATION RATE: 17 BRPM | DIASTOLIC BLOOD PRESSURE: 91 MMHG | HEIGHT: 68 IN | SYSTOLIC BLOOD PRESSURE: 144 MMHG

## 2021-08-03 DIAGNOSIS — D73.89 LESION OF SPLEEN: Primary | ICD-10-CM

## 2021-08-03 DIAGNOSIS — K76.9 LIVER DISEASE, UNSPECIFIED: ICD-10-CM

## 2021-08-03 DIAGNOSIS — E04.1 THYROID NODULE: ICD-10-CM

## 2021-08-03 LAB
CREAT SERPL-MCNC: 0.62 MG/DL (ref 0.55–1.02)
T4 FREE SERPL-MCNC: 0.95 NG/DL (ref 0.76–1.46)
TSH SERPL DL<=0.005 MIU/L-ACNC: 1.35 UIU/ML (ref 0.36–3.74)

## 2021-08-03 PROCEDURE — 82565 ASSAY OF CREATININE: CPT | Mod: ,,, | Performed by: CLINICAL MEDICAL LABORATORY

## 2021-08-03 PROCEDURE — 99213 PR OFFICE/OUTPT VISIT, EST, LEVL III, 20-29 MIN: ICD-10-PCS | Mod: ,,, | Performed by: INTERNAL MEDICINE

## 2021-08-03 PROCEDURE — 84443 TSH: ICD-10-PCS | Mod: ,,, | Performed by: CLINICAL MEDICAL LABORATORY

## 2021-08-03 PROCEDURE — 84439 ASSAY OF FREE THYROXINE: CPT | Mod: ,,, | Performed by: CLINICAL MEDICAL LABORATORY

## 2021-08-03 PROCEDURE — 84439 T4, FREE: ICD-10-PCS | Mod: ,,, | Performed by: CLINICAL MEDICAL LABORATORY

## 2021-08-03 PROCEDURE — 99213 OFFICE O/P EST LOW 20 MIN: CPT | Mod: ,,, | Performed by: INTERNAL MEDICINE

## 2021-08-03 PROCEDURE — 84443 ASSAY THYROID STIM HORMONE: CPT | Mod: ,,, | Performed by: CLINICAL MEDICAL LABORATORY

## 2021-08-03 PROCEDURE — 82565 CREATININE, SERUM: ICD-10-PCS | Mod: ,,, | Performed by: CLINICAL MEDICAL LABORATORY

## 2021-08-03 RX ORDER — LEVOCETIRIZINE DIHYDROCHLORIDE 5 MG/1
TABLET, FILM COATED ORAL
COMMUNITY
Start: 2021-07-05 | End: 2022-01-19

## 2021-08-03 RX ORDER — HYDROCODONE BITARTRATE AND ACETAMINOPHEN 10; 325 MG/1; MG/1
1 TABLET ORAL EVERY 8 HOURS
COMMUNITY
Start: 2021-07-28 | End: 2021-08-30 | Stop reason: SDUPTHER

## 2021-08-04 ENCOUNTER — CLINICAL SUPPORT (OUTPATIENT)
Dept: REHABILITATION | Facility: HOSPITAL | Age: 76
End: 2021-08-04
Payer: COMMERCIAL

## 2021-08-04 DIAGNOSIS — R68.89 DECREASED FUNCTIONAL ACTIVITY TOLERANCE: ICD-10-CM

## 2021-08-04 DIAGNOSIS — R29.898 WEAKNESS OF BOTH LOWER EXTREMITIES: ICD-10-CM

## 2021-08-04 PROCEDURE — 97112 NEUROMUSCULAR REEDUCATION: CPT

## 2021-08-04 PROCEDURE — 97110 THERAPEUTIC EXERCISES: CPT

## 2021-08-10 ENCOUNTER — CLINICAL SUPPORT (OUTPATIENT)
Dept: REHABILITATION | Facility: HOSPITAL | Age: 76
End: 2021-08-10
Payer: COMMERCIAL

## 2021-08-10 DIAGNOSIS — R29.898 WEAKNESS OF BOTH LOWER EXTREMITIES: ICD-10-CM

## 2021-08-10 DIAGNOSIS — R68.89 DECREASED FUNCTIONAL ACTIVITY TOLERANCE: ICD-10-CM

## 2021-08-10 PROCEDURE — 97110 THERAPEUTIC EXERCISES: CPT | Mod: CQ

## 2021-08-13 ENCOUNTER — CLINICAL SUPPORT (OUTPATIENT)
Dept: REHABILITATION | Facility: HOSPITAL | Age: 76
End: 2021-08-13
Payer: COMMERCIAL

## 2021-08-13 DIAGNOSIS — R29.898 WEAKNESS OF BOTH LOWER EXTREMITIES: ICD-10-CM

## 2021-08-13 DIAGNOSIS — R68.89 DECREASED FUNCTIONAL ACTIVITY TOLERANCE: ICD-10-CM

## 2021-08-13 PROCEDURE — 97012 MECHANICAL TRACTION THERAPY: CPT

## 2021-08-16 ENCOUNTER — HOSPITAL ENCOUNTER (OUTPATIENT)
Dept: RADIOLOGY | Facility: HOSPITAL | Age: 76
Discharge: HOME | End: 2021-08-16
Attending: INTERNAL MEDICINE
Payer: COMMERCIAL

## 2021-08-16 ENCOUNTER — CLINICAL SUPPORT (OUTPATIENT)
Dept: REHABILITATION | Facility: HOSPITAL | Age: 76
End: 2021-08-16
Payer: COMMERCIAL

## 2021-08-16 DIAGNOSIS — R29.898 WEAKNESS OF BOTH LOWER EXTREMITIES: ICD-10-CM

## 2021-08-16 DIAGNOSIS — E04.1 THYROID NODULE: ICD-10-CM

## 2021-08-16 DIAGNOSIS — R68.89 DECREASED FUNCTIONAL ACTIVITY TOLERANCE: ICD-10-CM

## 2021-08-16 PROCEDURE — 76536 US EXAM OF HEAD AND NECK: CPT | Mod: 26,,, | Performed by: RADIOLOGY

## 2021-08-16 PROCEDURE — 76536 US EXAM OF HEAD AND NECK: CPT | Mod: TC

## 2021-08-16 PROCEDURE — 76536 US SOFT TISSUE HEAD NECK THYROID: ICD-10-PCS | Mod: 26,,, | Performed by: RADIOLOGY

## 2021-08-16 PROCEDURE — 97110 THERAPEUTIC EXERCISES: CPT | Mod: CQ

## 2021-08-16 PROCEDURE — 97012 MECHANICAL TRACTION THERAPY: CPT | Mod: CQ

## 2021-08-18 ENCOUNTER — CLINICAL SUPPORT (OUTPATIENT)
Dept: REHABILITATION | Facility: HOSPITAL | Age: 76
End: 2021-08-18
Payer: COMMERCIAL

## 2021-08-18 DIAGNOSIS — R68.89 DECREASED FUNCTIONAL ACTIVITY TOLERANCE: ICD-10-CM

## 2021-08-18 DIAGNOSIS — R29.898 WEAKNESS OF BOTH LOWER EXTREMITIES: ICD-10-CM

## 2021-08-18 PROCEDURE — 97112 NEUROMUSCULAR REEDUCATION: CPT

## 2021-08-18 PROCEDURE — 97012 MECHANICAL TRACTION THERAPY: CPT

## 2021-08-18 PROCEDURE — 97110 THERAPEUTIC EXERCISES: CPT

## 2021-08-23 ENCOUNTER — OFFICE VISIT (OUTPATIENT)
Dept: FAMILY MEDICINE | Facility: CLINIC | Age: 76
End: 2021-08-23
Payer: COMMERCIAL

## 2021-08-23 ENCOUNTER — TELEPHONE (OUTPATIENT)
Dept: PAIN MEDICINE | Facility: CLINIC | Age: 76
End: 2021-08-23

## 2021-08-23 VITALS
BODY MASS INDEX: 27.03 KG/M2 | SYSTOLIC BLOOD PRESSURE: 182 MMHG | RESPIRATION RATE: 16 BRPM | TEMPERATURE: 98 F | WEIGHT: 178.38 LBS | HEIGHT: 68 IN | HEART RATE: 58 BPM | DIASTOLIC BLOOD PRESSURE: 90 MMHG | OXYGEN SATURATION: 99 %

## 2021-08-23 DIAGNOSIS — I10 ESSENTIAL HYPERTENSION: Primary | ICD-10-CM

## 2021-08-23 DIAGNOSIS — D73.89 LESION OF SPLEEN: ICD-10-CM

## 2021-08-23 PROCEDURE — 99212 OFFICE O/P EST SF 10 MIN: CPT | Mod: ,,, | Performed by: NURSE PRACTITIONER

## 2021-08-23 PROCEDURE — 99212 PR OFFICE/OUTPT VISIT, EST, LEVL II, 10-19 MIN: ICD-10-PCS | Mod: ,,, | Performed by: NURSE PRACTITIONER

## 2021-08-24 ENCOUNTER — OFFICE VISIT (OUTPATIENT)
Dept: FAMILY MEDICINE | Facility: CLINIC | Age: 76
End: 2021-08-24
Payer: COMMERCIAL

## 2021-08-24 VITALS
SYSTOLIC BLOOD PRESSURE: 155 MMHG | BODY MASS INDEX: 26.98 KG/M2 | HEIGHT: 68 IN | DIASTOLIC BLOOD PRESSURE: 85 MMHG | RESPIRATION RATE: 17 BRPM | WEIGHT: 178 LBS | OXYGEN SATURATION: 98 % | HEART RATE: 78 BPM

## 2021-08-24 DIAGNOSIS — K76.89 LIVER CYST: ICD-10-CM

## 2021-08-24 DIAGNOSIS — E04.2 MULTIPLE THYROID NODULES: ICD-10-CM

## 2021-08-24 DIAGNOSIS — D73.89 LESION OF SPLEEN: Primary | ICD-10-CM

## 2021-08-24 PROCEDURE — 99213 OFFICE O/P EST LOW 20 MIN: CPT | Mod: ,,, | Performed by: INTERNAL MEDICINE

## 2021-08-24 PROCEDURE — 99213 PR OFFICE/OUTPT VISIT, EST, LEVL III, 20-29 MIN: ICD-10-PCS | Mod: ,,, | Performed by: INTERNAL MEDICINE

## 2021-08-31 ENCOUNTER — OFFICE VISIT (OUTPATIENT)
Dept: PAIN MEDICINE | Facility: CLINIC | Age: 76
End: 2021-08-31
Payer: COMMERCIAL

## 2021-08-31 VITALS
WEIGHT: 180.38 LBS | HEART RATE: 84 BPM | RESPIRATION RATE: 18 BRPM | SYSTOLIC BLOOD PRESSURE: 156 MMHG | BODY MASS INDEX: 26.72 KG/M2 | DIASTOLIC BLOOD PRESSURE: 79 MMHG | HEIGHT: 69 IN

## 2021-08-31 DIAGNOSIS — Z79.899 ENCOUNTER FOR LONG-TERM (CURRENT) USE OF OTHER MEDICATIONS: Primary | ICD-10-CM

## 2021-08-31 DIAGNOSIS — E11.42 DIABETIC POLYNEUROPATHY ASSOCIATED WITH TYPE 2 DIABETES MELLITUS: ICD-10-CM

## 2021-08-31 DIAGNOSIS — E11.40 DIABETIC NEUROPATHY WITH NEUROLOGIC COMPLICATION: Chronic | ICD-10-CM

## 2021-08-31 DIAGNOSIS — M79.672 BILATERAL FOOT PAIN: ICD-10-CM

## 2021-08-31 DIAGNOSIS — E11.49 DIABETIC NEUROPATHY WITH NEUROLOGIC COMPLICATION: Chronic | ICD-10-CM

## 2021-08-31 DIAGNOSIS — M54.17 LUMBOSACRAL RADICULOPATHY: Chronic | ICD-10-CM

## 2021-08-31 DIAGNOSIS — M79.671 BILATERAL FOOT PAIN: ICD-10-CM

## 2021-08-31 PROCEDURE — 99214 PR OFFICE/OUTPT VISIT, EST, LEVL IV, 30-39 MIN: ICD-10-PCS | Mod: S$PBB,25,, | Performed by: PHYSICIAN ASSISTANT

## 2021-08-31 PROCEDURE — 80305 DRUG TEST PRSMV DIR OPT OBS: CPT | Mod: PBBFAC | Performed by: PHYSICIAN ASSISTANT

## 2021-08-31 PROCEDURE — 99214 OFFICE O/P EST MOD 30 MIN: CPT | Mod: S$PBB,25,, | Performed by: PHYSICIAN ASSISTANT

## 2021-08-31 PROCEDURE — 99214 OFFICE O/P EST MOD 30 MIN: CPT | Mod: PBBFAC | Performed by: PHYSICIAN ASSISTANT

## 2021-08-31 PROCEDURE — 96372 THER/PROPH/DIAG INJ SC/IM: CPT | Mod: PBBFAC | Performed by: PHYSICIAN ASSISTANT

## 2021-08-31 RX ORDER — HYDROCODONE BITARTRATE AND ACETAMINOPHEN 10; 325 MG/1; MG/1
1 TABLET ORAL EVERY 8 HOURS
Qty: 90 TABLET | Refills: 0 | Status: SHIPPED | OUTPATIENT
Start: 2021-10-30 | End: 2021-11-19 | Stop reason: SDUPTHER

## 2021-08-31 RX ORDER — DULOXETIN HYDROCHLORIDE 60 MG/1
60 CAPSULE, DELAYED RELEASE ORAL DAILY
Qty: 30 CAPSULE | Refills: 2 | Status: SHIPPED | OUTPATIENT
Start: 2021-08-31 | End: 2021-11-19 | Stop reason: SDUPTHER

## 2021-08-31 RX ORDER — KETOROLAC TROMETHAMINE 30 MG/ML
60 INJECTION, SOLUTION INTRAMUSCULAR; INTRAVENOUS
Status: COMPLETED | OUTPATIENT
Start: 2021-08-31 | End: 2021-08-31

## 2021-08-31 RX ORDER — GABAPENTIN 400 MG/1
400 CAPSULE ORAL EVERY 8 HOURS
Qty: 90 CAPSULE | Refills: 2 | Status: SHIPPED | OUTPATIENT
Start: 2021-08-31 | End: 2021-11-19 | Stop reason: SDUPTHER

## 2021-08-31 RX ORDER — HYDROCODONE BITARTRATE AND ACETAMINOPHEN 10; 325 MG/1; MG/1
1 TABLET ORAL EVERY 8 HOURS
Qty: 90 TABLET | Refills: 0 | Status: SHIPPED | OUTPATIENT
Start: 2021-09-30 | End: 2021-10-30

## 2021-08-31 RX ORDER — HYDROCODONE BITARTRATE AND ACETAMINOPHEN 10; 325 MG/1; MG/1
1 TABLET ORAL EVERY 8 HOURS
Qty: 90 TABLET | Refills: 0 | Status: SHIPPED | OUTPATIENT
Start: 2021-08-31 | End: 2021-09-30

## 2021-08-31 RX ADMIN — KETOROLAC TROMETHAMINE 60 MG: 30 INJECTION, SOLUTION INTRAMUSCULAR; INTRAVENOUS at 01:08

## 2021-09-13 DIAGNOSIS — E11.9 TYPE 2 DIABETES MELLITUS WITHOUT COMPLICATION, WITHOUT LONG-TERM CURRENT USE OF INSULIN: Primary | ICD-10-CM

## 2021-09-13 PROBLEM — Z00.00 WELLNESS EXAMINATION: Status: RESOLVED | Noted: 2021-06-14 | Resolved: 2021-09-13

## 2021-09-16 ENCOUNTER — DOCUMENTATION ONLY (OUTPATIENT)
Dept: REHABILITATION | Facility: HOSPITAL | Age: 76
End: 2021-09-16

## 2021-09-16 PROBLEM — R29.898 WEAKNESS OF BOTH LOWER EXTREMITIES: Status: RESOLVED | Noted: 2021-07-21 | Resolved: 2021-09-16

## 2021-09-16 PROBLEM — R68.89 DECREASED FUNCTIONAL ACTIVITY TOLERANCE: Status: RESOLVED | Noted: 2021-07-21 | Resolved: 2021-09-16

## 2021-09-28 ENCOUNTER — OFFICE VISIT (OUTPATIENT)
Dept: SURGERY | Facility: CLINIC | Age: 76
End: 2021-09-28
Attending: STUDENT IN AN ORGANIZED HEALTH CARE EDUCATION/TRAINING PROGRAM
Payer: COMMERCIAL

## 2021-09-28 ENCOUNTER — TELEPHONE (OUTPATIENT)
Dept: SURGERY | Facility: CLINIC | Age: 76
End: 2021-09-28

## 2021-09-28 VITALS — WEIGHT: 180 LBS | BODY MASS INDEX: 27.28 KG/M2 | HEIGHT: 68 IN

## 2021-09-28 DIAGNOSIS — D73.89 LESION OF SPLEEN: ICD-10-CM

## 2021-09-28 DIAGNOSIS — R59.1 LYMPHADENOPATHY: Primary | ICD-10-CM

## 2021-09-28 DIAGNOSIS — E04.1 THYROID NODULE: ICD-10-CM

## 2021-09-28 PROCEDURE — 99215 OFFICE O/P EST HI 40 MIN: CPT | Mod: S$PBB,,, | Performed by: STUDENT IN AN ORGANIZED HEALTH CARE EDUCATION/TRAINING PROGRAM

## 2021-09-28 PROCEDURE — 99215 PR OFFICE/OUTPT VISIT, EST, LEVL V, 40-54 MIN: ICD-10-PCS | Mod: S$PBB,,, | Performed by: STUDENT IN AN ORGANIZED HEALTH CARE EDUCATION/TRAINING PROGRAM

## 2021-09-28 PROCEDURE — 99215 OFFICE O/P EST HI 40 MIN: CPT | Mod: PBBFAC | Performed by: STUDENT IN AN ORGANIZED HEALTH CARE EDUCATION/TRAINING PROGRAM

## 2021-09-29 ENCOUNTER — OFFICE VISIT (OUTPATIENT)
Dept: FAMILY MEDICINE | Facility: CLINIC | Age: 76
End: 2021-09-29
Payer: COMMERCIAL

## 2021-09-29 VITALS
DIASTOLIC BLOOD PRESSURE: 80 MMHG | BODY MASS INDEX: 27.22 KG/M2 | SYSTOLIC BLOOD PRESSURE: 148 MMHG | TEMPERATURE: 98 F | OXYGEN SATURATION: 99 % | WEIGHT: 179 LBS | HEART RATE: 76 BPM

## 2021-09-29 DIAGNOSIS — J32.9 SINUSITIS, UNSPECIFIED CHRONICITY, UNSPECIFIED LOCATION: ICD-10-CM

## 2021-09-29 DIAGNOSIS — R53.83 FATIGUE, UNSPECIFIED TYPE: Primary | ICD-10-CM

## 2021-09-29 PROCEDURE — 99213 OFFICE O/P EST LOW 20 MIN: CPT | Mod: 25,,, | Performed by: FAMILY MEDICINE

## 2021-09-29 PROCEDURE — 99051 PR MEDICAL SERVICES, EVE/WKEND/HOLIDAY: ICD-10-PCS | Mod: ,,, | Performed by: FAMILY MEDICINE

## 2021-09-29 PROCEDURE — 96372 PR INJECTION,THERAP/PROPH/DIAG2ST, IM OR SUBCUT: ICD-10-PCS | Mod: ,,, | Performed by: FAMILY MEDICINE

## 2021-09-29 PROCEDURE — 96372 THER/PROPH/DIAG INJ SC/IM: CPT | Mod: ,,, | Performed by: FAMILY MEDICINE

## 2021-09-29 PROCEDURE — 99213 PR OFFICE/OUTPT VISIT, EST, LEVL III, 20-29 MIN: ICD-10-PCS | Mod: 25,,, | Performed by: FAMILY MEDICINE

## 2021-09-29 PROCEDURE — 99051 MED SERV EVE/WKEND/HOLIDAY: CPT | Mod: ,,, | Performed by: FAMILY MEDICINE

## 2021-09-29 RX ORDER — PREDNISONE 10 MG/1
10 TABLET ORAL DAILY
Qty: 5 TABLET | Refills: 0 | Status: SHIPPED | OUTPATIENT
Start: 2021-09-29 | End: 2021-10-04

## 2021-09-29 RX ORDER — DEXAMETHASONE SODIUM PHOSPHATE 4 MG/ML
3 INJECTION, SOLUTION INTRA-ARTICULAR; INTRALESIONAL; INTRAMUSCULAR; INTRAVENOUS; SOFT TISSUE
Status: COMPLETED | OUTPATIENT
Start: 2021-09-29 | End: 2021-09-29

## 2021-09-29 RX ORDER — CEFTRIAXONE 1 G/1
1 INJECTION, POWDER, FOR SOLUTION INTRAMUSCULAR; INTRAVENOUS
Status: COMPLETED | OUTPATIENT
Start: 2021-09-29 | End: 2021-09-29

## 2021-09-29 RX ORDER — AMOXICILLIN 500 MG/1
500 CAPSULE ORAL 2 TIMES DAILY
Qty: 20 CAPSULE | Refills: 0 | Status: SHIPPED | OUTPATIENT
Start: 2021-09-29 | End: 2021-10-09

## 2021-09-29 RX ADMIN — CEFTRIAXONE 1 G: 1 INJECTION, POWDER, FOR SOLUTION INTRAMUSCULAR; INTRAVENOUS at 07:09

## 2021-09-29 RX ADMIN — DEXAMETHASONE SODIUM PHOSPHATE 3 MG: 4 INJECTION, SOLUTION INTRA-ARTICULAR; INTRALESIONAL; INTRAMUSCULAR; INTRAVENOUS; SOFT TISSUE at 07:09

## 2021-09-30 LAB
ALBUMIN SERPL BCP-MCNC: 3.4 G/DL (ref 3.5–5)
ALBUMIN/GLOB SERPL: 0.7 {RATIO}
ALP SERPL-CCNC: 109 U/L (ref 55–142)
ALT SERPL W P-5'-P-CCNC: 19 U/L (ref 13–56)
ANION GAP SERPL CALCULATED.3IONS-SCNC: 7 MMOL/L (ref 7–16)
AST SERPL W P-5'-P-CCNC: 12 U/L (ref 15–37)
BILIRUB SERPL-MCNC: 0.2 MG/DL (ref 0–1.2)
BUN SERPL-MCNC: 17 MG/DL (ref 7–18)
BUN/CREAT SERPL: 22 (ref 6–20)
CALCIUM SERPL-MCNC: 10 MG/DL (ref 8.5–10.1)
CHLORIDE SERPL-SCNC: 107 MMOL/L (ref 98–107)
CO2 SERPL-SCNC: 30 MMOL/L (ref 21–32)
CREAT SERPL-MCNC: 0.78 MG/DL (ref 0.55–1.02)
GLOBULIN SER-MCNC: 4.8 G/DL (ref 2–4)
GLUCOSE SERPL-MCNC: 80 MG/DL (ref 74–106)
POTASSIUM SERPL-SCNC: 3.9 MMOL/L (ref 3.5–5.1)
PROT SERPL-MCNC: 8.2 G/DL (ref 6.4–8.2)
SODIUM SERPL-SCNC: 140 MMOL/L (ref 136–145)

## 2021-09-30 PROCEDURE — 80053 COMPREHENSIVE METABOLIC PANEL: ICD-10-PCS | Mod: ,,, | Performed by: CLINICAL MEDICAL LABORATORY

## 2021-09-30 PROCEDURE — 80053 COMPREHEN METABOLIC PANEL: CPT | Mod: ,,, | Performed by: CLINICAL MEDICAL LABORATORY

## 2021-09-30 PROCEDURE — 36415 PR COLLECTION VENOUS BLOOD,VENIPUNCTURE: ICD-10-PCS | Mod: ,,, | Performed by: CLINICAL MEDICAL LABORATORY

## 2021-09-30 PROCEDURE — 36415 COLL VENOUS BLD VENIPUNCTURE: CPT | Mod: ,,, | Performed by: CLINICAL MEDICAL LABORATORY

## 2021-10-01 ENCOUNTER — TELEPHONE (OUTPATIENT)
Dept: FAMILY MEDICINE | Facility: CLINIC | Age: 76
End: 2021-10-01

## 2021-10-03 ENCOUNTER — TELEPHONE (OUTPATIENT)
Dept: FAMILY MEDICINE | Facility: CLINIC | Age: 76
End: 2021-10-03

## 2021-10-03 DIAGNOSIS — I10 ESSENTIAL HYPERTENSION: Primary | ICD-10-CM

## 2021-10-03 LAB
BASOPHILS # BLD AUTO: 0.03 K/UL (ref 0–0.2)
BASOPHILS NFR BLD AUTO: 0.3 % (ref 0–1)
DIFFERENTIAL METHOD BLD: ABNORMAL
EOSINOPHIL # BLD AUTO: 0.05 K/UL (ref 0–0.5)
EOSINOPHIL NFR BLD AUTO: 0.6 % (ref 1–4)
ERYTHROCYTE [DISTWIDTH] IN BLOOD BY AUTOMATED COUNT: 15.9 % (ref 11.5–14.5)
HCT VFR BLD AUTO: 35.2 % (ref 38–47)
HGB BLD-MCNC: 11.5 G/DL (ref 12–16)
IMM GRANULOCYTES # BLD AUTO: 0.04 K/UL (ref 0–0.04)
IMM GRANULOCYTES NFR BLD: 0.4 % (ref 0–0.4)
LYMPHOCYTES # BLD AUTO: 1.84 K/UL (ref 1–4.8)
LYMPHOCYTES NFR BLD AUTO: 20.3 % (ref 27–41)
MCH RBC QN AUTO: 26.9 PG (ref 27–31)
MCHC RBC AUTO-ENTMCNC: 32.7 G/DL (ref 32–36)
MCV RBC AUTO: 82.4 FL (ref 80–96)
MONOCYTES # BLD AUTO: 0.39 K/UL (ref 0–0.8)
MONOCYTES NFR BLD AUTO: 4.3 % (ref 2–6)
MPC BLD CALC-MCNC: 10.5 FL (ref 9.4–12.4)
NEUTROPHILS # BLD AUTO: 6.73 K/UL (ref 1.8–7.7)
NEUTROPHILS NFR BLD AUTO: 74.1 % (ref 53–65)
NRBC # BLD AUTO: 0 X10E3/UL
NRBC, AUTO (.00): 0 %
PLATELET # BLD AUTO: 333 K/UL (ref 150–400)
RBC # BLD AUTO: 4.27 M/UL (ref 4.2–5.4)
WBC # BLD AUTO: 9.08 K/UL (ref 4.5–11)

## 2021-10-03 PROCEDURE — 85025 COMPLETE CBC W/AUTO DIFF WBC: CPT | Mod: ,,, | Performed by: CLINICAL MEDICAL LABORATORY

## 2021-10-03 PROCEDURE — 85025 CBC WITH DIFFERENTIAL: ICD-10-PCS | Mod: ,,, | Performed by: CLINICAL MEDICAL LABORATORY

## 2021-10-04 ENCOUNTER — HOSPITAL ENCOUNTER (OUTPATIENT)
Dept: RADIOLOGY | Facility: HOSPITAL | Age: 76
Discharge: HOME | End: 2021-10-04
Attending: STUDENT IN AN ORGANIZED HEALTH CARE EDUCATION/TRAINING PROGRAM
Payer: COMMERCIAL

## 2021-10-04 DIAGNOSIS — R59.1 LYMPHADENOPATHY: ICD-10-CM

## 2021-10-04 PROCEDURE — 76882 US LMTD JT/FCL EVL NVASC XTR: CPT | Mod: TC,RT

## 2021-10-04 PROCEDURE — 76536 US EXAM OF HEAD AND NECK: CPT | Mod: 26,,,

## 2021-10-04 PROCEDURE — 76882 US SOFT TISSUE, GROIN LEFT: ICD-10-PCS | Mod: 26,59,LT,

## 2021-10-04 PROCEDURE — 76536 US SOFT TISSUE HEAD NECK THYROID: ICD-10-PCS | Mod: 26,,,

## 2021-10-04 PROCEDURE — 76536 US EXAM OF HEAD AND NECK: CPT | Mod: TC

## 2021-10-04 PROCEDURE — 76882 US LMTD JT/FCL EVL NVASC XTR: CPT | Mod: TC,LT

## 2021-10-04 PROCEDURE — 76882 US LMTD JT/FCL EVL NVASC XTR: CPT | Mod: 26,59,RT,

## 2021-10-04 PROCEDURE — 76882 US LMTD JT/FCL EVL NVASC XTR: CPT | Mod: 26,LT,,

## 2021-10-04 PROCEDURE — 76882 US LMTD JT/FCL EVL NVASC XTR: CPT | Mod: 26,RT,,

## 2021-10-04 PROCEDURE — 76882 US LMTD JT/FCL EVL NVASC XTR: CPT | Mod: 26,59,LT,

## 2021-10-07 ENCOUNTER — TELEPHONE (OUTPATIENT)
Dept: SURGERY | Facility: CLINIC | Age: 76
End: 2021-10-07

## 2021-10-07 DIAGNOSIS — R59.1 LYMPHADENOPATHY: Primary | ICD-10-CM

## 2021-10-12 ENCOUNTER — HOSPITAL ENCOUNTER (OUTPATIENT)
Dept: RADIOLOGY | Facility: HOSPITAL | Age: 76
Discharge: HOME | End: 2021-10-12
Attending: STUDENT IN AN ORGANIZED HEALTH CARE EDUCATION/TRAINING PROGRAM
Payer: COMMERCIAL

## 2021-10-12 VITALS — RESPIRATION RATE: 18 BRPM | OXYGEN SATURATION: 98 % | HEART RATE: 93 BPM

## 2021-10-12 DIAGNOSIS — R59.1 LYMPHADENOPATHY: ICD-10-CM

## 2021-10-12 PROCEDURE — 88305 TISSUE EXAM BY PATHOLOGIST: CPT | Mod: SUR | Performed by: STUDENT IN AN ORGANIZED HEALTH CARE EDUCATION/TRAINING PROGRAM

## 2021-10-12 PROCEDURE — 88300 SURGICAL PATHOLOGY: ICD-10-PCS | Mod: 26,,, | Performed by: PATHOLOGY

## 2021-10-12 PROCEDURE — 10005 FNA BX W/US GDN 1ST LES: CPT

## 2021-10-12 PROCEDURE — 10005 US FINE NEEDLE ASPIRATION BIOPSY, FIRST LESION: ICD-10-PCS | Mod: ,,, | Performed by: STUDENT IN AN ORGANIZED HEALTH CARE EDUCATION/TRAINING PROGRAM

## 2021-10-12 PROCEDURE — 88341 IMHCHEM/IMCYTCHM EA ADD ANTB: CPT | Mod: 26,,, | Performed by: PATHOLOGY

## 2021-10-12 PROCEDURE — 88342 IMHCHEM/IMCYTCHM 1ST ANTB: CPT | Mod: 26,,, | Performed by: PATHOLOGY

## 2021-10-12 PROCEDURE — 88342 SURGICAL PATHOLOGY: ICD-10-PCS | Mod: 26,,, | Performed by: PATHOLOGY

## 2021-10-12 PROCEDURE — 88185 FLOWCYTOMETRY/TC ADD-ON: CPT | Mod: 90,59

## 2021-10-12 PROCEDURE — 88184 FLOWCYTOMETRY/ TC 1 MARKER: CPT | Mod: 90

## 2021-10-12 PROCEDURE — 88305 SURGICAL PATHOLOGY: ICD-10-PCS | Mod: 26,,, | Performed by: PATHOLOGY

## 2021-10-12 PROCEDURE — 88188 FLOWCYTOMETRY/READ 9-15: CPT | Mod: 90

## 2021-10-12 PROCEDURE — 10005 FNA BX W/US GDN 1ST LES: CPT | Mod: ,,, | Performed by: STUDENT IN AN ORGANIZED HEALTH CARE EDUCATION/TRAINING PROGRAM

## 2021-10-12 PROCEDURE — 88305 TISSUE EXAM BY PATHOLOGIST: CPT | Mod: 26,,, | Performed by: PATHOLOGY

## 2021-10-12 PROCEDURE — 88341 SURGICAL PATHOLOGY: ICD-10-PCS | Mod: 26,,, | Performed by: PATHOLOGY

## 2021-10-12 PROCEDURE — 88300 SURGICAL PATH GROSS: CPT | Mod: 26,,, | Performed by: PATHOLOGY

## 2021-10-14 LAB
M LLPT RESULT: NORMAL
PATH REPORT.FINAL DX SPEC: NORMAL
PATH REPORT.MICROSCOPIC SPEC OTHER STN: NORMAL
RELEVANT DIAGNOSTIC TESTS/LABORATORY DATA NOTE: NORMAL

## 2021-10-15 LAB
DHEA SERPL-MCNC: NORMAL
ESTROGEN SERPL-MCNC: NORMAL PG/ML
LAB AP CLINICAL INFORMATION: NORMAL
LAB AP GROSS DESCRIPTION: NORMAL
LAB AP LABORATORY NOTES: NORMAL
T3RU NFR SERPL: NORMAL %

## 2021-10-25 ENCOUNTER — OFFICE VISIT (OUTPATIENT)
Dept: SPINE | Facility: CLINIC | Age: 76
End: 2021-10-25
Payer: COMMERCIAL

## 2021-10-25 ENCOUNTER — TELEPHONE (OUTPATIENT)
Dept: SURGERY | Facility: CLINIC | Age: 76
End: 2021-10-25
Payer: COMMERCIAL

## 2021-10-25 DIAGNOSIS — M51.36 DDD (DEGENERATIVE DISC DISEASE), LUMBAR: Primary | ICD-10-CM

## 2021-10-25 DIAGNOSIS — E04.1 THYROID NODULE: Primary | ICD-10-CM

## 2021-10-25 DIAGNOSIS — M54.16 LUMBAR RADICULOPATHY: ICD-10-CM

## 2021-10-25 DIAGNOSIS — M41.56 SCOLIOSIS OF LUMBAR REGION DUE TO DEGENERATIVE DISEASE OF SPINE IN ADULT: ICD-10-CM

## 2021-10-25 PROCEDURE — 99214 PR OFFICE/OUTPT VISIT, EST, LEVL IV, 30-39 MIN: ICD-10-PCS | Mod: S$PBB,,, | Performed by: ORTHOPAEDIC SURGERY

## 2021-10-25 PROCEDURE — 99214 OFFICE O/P EST MOD 30 MIN: CPT | Mod: S$PBB,,, | Performed by: ORTHOPAEDIC SURGERY

## 2021-10-25 PROCEDURE — 99213 OFFICE O/P EST LOW 20 MIN: CPT | Mod: PBBFAC | Performed by: ORTHOPAEDIC SURGERY

## 2021-11-02 ENCOUNTER — HOSPITAL ENCOUNTER (OUTPATIENT)
Dept: RADIOLOGY | Facility: HOSPITAL | Age: 76
Discharge: HOME | End: 2021-11-02
Attending: STUDENT IN AN ORGANIZED HEALTH CARE EDUCATION/TRAINING PROGRAM
Payer: COMMERCIAL

## 2021-11-02 DIAGNOSIS — E04.1 THYROID NODULE: ICD-10-CM

## 2021-11-02 PROCEDURE — 88305 TISSUE EXAM BY PATHOLOGIST: CPT | Mod: 26,,, | Performed by: PATHOLOGY

## 2021-11-02 PROCEDURE — 10005 US FINE NEEDLE ASPIRATION BIOPSY, FIRST LESION: ICD-10-PCS | Mod: ,,, | Performed by: STUDENT IN AN ORGANIZED HEALTH CARE EDUCATION/TRAINING PROGRAM

## 2021-11-02 PROCEDURE — 88173 CYTOPATH EVAL FNA REPORT: CPT | Mod: 26,,, | Performed by: PATHOLOGY

## 2021-11-02 PROCEDURE — 88305 TISSUE EXAM BY PATHOLOGIST: CPT | Mod: MCY | Performed by: STUDENT IN AN ORGANIZED HEALTH CARE EDUCATION/TRAINING PROGRAM

## 2021-11-02 PROCEDURE — 10005 FNA BX W/US GDN 1ST LES: CPT | Mod: ,,, | Performed by: STUDENT IN AN ORGANIZED HEALTH CARE EDUCATION/TRAINING PROGRAM

## 2021-11-02 PROCEDURE — 88305 CYTOLOGY, FNA: ICD-10-PCS | Mod: 26,,, | Performed by: PATHOLOGY

## 2021-11-02 PROCEDURE — 10005 FNA BX W/US GDN 1ST LES: CPT

## 2021-11-02 PROCEDURE — 88173 CYTOLOGY, FNA: ICD-10-PCS | Mod: 26,,, | Performed by: PATHOLOGY

## 2021-11-03 DIAGNOSIS — E04.1 THYROID NODULE: Primary | ICD-10-CM

## 2021-11-03 LAB
ESTROGEN SERPL-MCNC: NORMAL PG/ML
LAB AP CLINICAL INFORMATION: NORMAL
T3RU NFR SERPL: NORMAL %

## 2021-11-22 ENCOUNTER — OFFICE VISIT (OUTPATIENT)
Dept: UROLOGY | Facility: CLINIC | Age: 76
End: 2021-11-22
Payer: COMMERCIAL

## 2021-11-22 VITALS
OXYGEN SATURATION: 99 % | BODY MASS INDEX: 26.66 KG/M2 | TEMPERATURE: 99 F | SYSTOLIC BLOOD PRESSURE: 140 MMHG | HEIGHT: 69 IN | DIASTOLIC BLOOD PRESSURE: 100 MMHG | HEART RATE: 70 BPM | WEIGHT: 180 LBS

## 2021-11-22 DIAGNOSIS — R10.2 CHRONIC PELVIC PAIN IN FEMALE: Chronic | ICD-10-CM

## 2021-11-22 DIAGNOSIS — N32.89 BLADDER SPASMS: ICD-10-CM

## 2021-11-22 DIAGNOSIS — N35.92 STRICTURE OF FEMALE URETHRA, UNSPECIFIED STRICTURE TYPE: Primary | ICD-10-CM

## 2021-11-22 DIAGNOSIS — G62.9 POLYNEUROPATHY: ICD-10-CM

## 2021-11-22 DIAGNOSIS — R10.2 PELVIC PAIN: ICD-10-CM

## 2021-11-22 DIAGNOSIS — G89.29 CHRONIC PELVIC PAIN IN FEMALE: Chronic | ICD-10-CM

## 2021-11-22 PROBLEM — N35.919 URETHRAL MEATAL STENOSIS: Status: ACTIVE | Noted: 2018-07-11

## 2021-11-22 LAB
BACTERIA #/AREA URNS HPF: ABNORMAL /HPF
BILIRUB UR QL STRIP: NEGATIVE
CLARITY UR: CLEAR
COLOR UR: YELLOW
GLUCOSE UR STRIP-MCNC: NEGATIVE MG/DL
KETONES UR STRIP-SCNC: NEGATIVE MG/DL
LEUKOCYTE ESTERASE UR QL STRIP: NEGATIVE
NITRITE UR QL STRIP: NEGATIVE
PH UR STRIP: 6 PH UNITS
PROT UR QL STRIP: NEGATIVE
RBC # UR STRIP: ABNORMAL /UL
RBC #/AREA URNS HPF: ABNORMAL /HPF
SP GR UR STRIP: 1.02
SQUAMOUS #/AREA URNS LPF: ABNORMAL /LPF
UROBILINOGEN UR STRIP-ACNC: 0.2 MG/DL
WBC #/AREA URNS HPF: ABNORMAL /HPF

## 2021-11-22 PROCEDURE — 51798 US URINE CAPACITY MEASURE: CPT | Mod: PBBFAC | Performed by: NURSE PRACTITIONER

## 2021-11-22 PROCEDURE — 99214 OFFICE O/P EST MOD 30 MIN: CPT | Mod: PBBFAC | Performed by: NURSE PRACTITIONER

## 2021-11-22 PROCEDURE — 81001 URINALYSIS AUTO W/SCOPE: CPT | Mod: ,,, | Performed by: CLINICAL MEDICAL LABORATORY

## 2021-11-22 PROCEDURE — 81001 URINALYSIS, REFLEX TO URINE CULTURE: ICD-10-PCS | Mod: ,,, | Performed by: CLINICAL MEDICAL LABORATORY

## 2021-11-22 PROCEDURE — 99214 OFFICE O/P EST MOD 30 MIN: CPT | Mod: S$PBB,,, | Performed by: NURSE PRACTITIONER

## 2021-11-22 PROCEDURE — 99214 PR OFFICE/OUTPT VISIT, EST, LEVL IV, 30-39 MIN: ICD-10-PCS | Mod: S$PBB,,, | Performed by: NURSE PRACTITIONER

## 2021-11-29 ENCOUNTER — OFFICE VISIT (OUTPATIENT)
Dept: PAIN MEDICINE | Facility: CLINIC | Age: 76
End: 2021-11-29
Payer: COMMERCIAL

## 2021-11-29 VITALS
WEIGHT: 182 LBS | SYSTOLIC BLOOD PRESSURE: 157 MMHG | DIASTOLIC BLOOD PRESSURE: 81 MMHG | RESPIRATION RATE: 17 BRPM | HEART RATE: 87 BPM | BODY MASS INDEX: 27.58 KG/M2 | HEIGHT: 68 IN

## 2021-11-29 DIAGNOSIS — Z79.899 ENCOUNTER FOR LONG-TERM (CURRENT) USE OF OTHER MEDICATIONS: ICD-10-CM

## 2021-11-29 DIAGNOSIS — M79.672 BILATERAL FOOT PAIN: ICD-10-CM

## 2021-11-29 DIAGNOSIS — E11.49 DIABETIC NEUROPATHY WITH NEUROLOGIC COMPLICATION: Chronic | ICD-10-CM

## 2021-11-29 DIAGNOSIS — M54.17 LUMBOSACRAL RADICULOPATHY: Primary | Chronic | ICD-10-CM

## 2021-11-29 DIAGNOSIS — E11.40 DIABETIC NEUROPATHY WITH NEUROLOGIC COMPLICATION: Chronic | ICD-10-CM

## 2021-11-29 DIAGNOSIS — E11.9 DIABETES MELLITUS WITHOUT COMPLICATION: ICD-10-CM

## 2021-11-29 DIAGNOSIS — M79.671 BILATERAL FOOT PAIN: ICD-10-CM

## 2021-11-29 DIAGNOSIS — E11.42 DIABETIC POLYNEUROPATHY ASSOCIATED WITH TYPE 2 DIABETES MELLITUS: ICD-10-CM

## 2021-11-29 LAB
CTP QC/QA: YES
POC (AMP) AMPHETAMINE: NEGATIVE
POC (BAR) BARBITURATES: NEGATIVE
POC (BUP) BUPRENORPHINE: NEGATIVE
POC (BZO) BENZODIAZEPINES: NEGATIVE
POC (COC) COCAINE: NEGATIVE
POC (MDMA) METHYLENEDIOXYMETHAMPHETAMINE 3,4: NEGATIVE
POC (MET) METHAMPHETAMINE: NEGATIVE
POC (MOP) OPIATES: ABNORMAL
POC (MTD) METHADONE: NEGATIVE
POC (OXY) OXYCODONE: NEGATIVE
POC (PCP) PHENCYCLIDINE: NEGATIVE
POC (TCA) TRICYCLIC ANTIDEPRESSANTS: NEGATIVE
POC TEMPERATURE (URINE): 96
POC THC: NEGATIVE

## 2021-11-29 PROCEDURE — 96372 THER/PROPH/DIAG INJ SC/IM: CPT | Mod: PBBFAC | Performed by: PHYSICIAN ASSISTANT

## 2021-11-29 PROCEDURE — 80305 DRUG TEST PRSMV DIR OPT OBS: CPT | Mod: PBBFAC | Performed by: PHYSICIAN ASSISTANT

## 2021-11-29 PROCEDURE — 99214 OFFICE O/P EST MOD 30 MIN: CPT | Mod: S$PBB,25,, | Performed by: PHYSICIAN ASSISTANT

## 2021-11-29 PROCEDURE — 99214 PR OFFICE/OUTPT VISIT, EST, LEVL IV, 30-39 MIN: ICD-10-PCS | Mod: S$PBB,25,, | Performed by: PHYSICIAN ASSISTANT

## 2021-11-29 PROCEDURE — 99214 OFFICE O/P EST MOD 30 MIN: CPT | Mod: PBBFAC,25 | Performed by: PHYSICIAN ASSISTANT

## 2021-11-29 RX ORDER — HYDROCODONE BITARTRATE AND ACETAMINOPHEN 10; 325 MG/1; MG/1
1 TABLET ORAL EVERY 8 HOURS
Qty: 90 TABLET | Refills: 0 | Status: SHIPPED | OUTPATIENT
Start: 2022-01-28 | End: 2021-12-06 | Stop reason: SDUPTHER

## 2021-11-29 RX ORDER — GABAPENTIN 400 MG/1
400 CAPSULE ORAL EVERY 8 HOURS
Qty: 90 CAPSULE | Refills: 2 | Status: SHIPPED | OUTPATIENT
Start: 2021-11-29 | End: 2022-02-22

## 2021-11-29 RX ORDER — HYDROCODONE BITARTRATE AND ACETAMINOPHEN 10; 325 MG/1; MG/1
1 TABLET ORAL EVERY 8 HOURS
Qty: 90 TABLET | Refills: 0 | Status: SHIPPED | OUTPATIENT
Start: 2021-12-29 | End: 2021-12-06 | Stop reason: SDUPTHER

## 2021-11-29 RX ORDER — DULOXETIN HYDROCHLORIDE 60 MG/1
60 CAPSULE, DELAYED RELEASE ORAL DAILY
Qty: 30 CAPSULE | Refills: 2 | Status: SHIPPED | OUTPATIENT
Start: 2021-11-29 | End: 2022-02-22

## 2021-11-29 RX ORDER — HYDROCODONE BITARTRATE AND ACETAMINOPHEN 10; 325 MG/1; MG/1
1 TABLET ORAL EVERY 8 HOURS
Qty: 90 TABLET | Refills: 0 | Status: SHIPPED | OUTPATIENT
Start: 2021-11-29 | End: 2021-12-06 | Stop reason: SDUPTHER

## 2021-11-29 RX ORDER — KETOROLAC TROMETHAMINE 30 MG/ML
60 INJECTION, SOLUTION INTRAMUSCULAR; INTRAVENOUS
Status: COMPLETED | OUTPATIENT
Start: 2021-11-29 | End: 2021-11-29

## 2021-11-29 RX ORDER — GLIPIZIDE 10 MG/1
10 TABLET, FILM COATED, EXTENDED RELEASE ORAL DAILY
Qty: 90 TABLET | Refills: 1 | Status: SHIPPED | OUTPATIENT
Start: 2021-11-29 | End: 2021-12-08 | Stop reason: SDUPTHER

## 2021-11-29 RX ADMIN — KETOROLAC TROMETHAMINE 60 MG: 30 INJECTION, SOLUTION INTRAMUSCULAR at 01:11

## 2021-12-02 ENCOUNTER — TELEPHONE (OUTPATIENT)
Dept: UROLOGY | Facility: CLINIC | Age: 76
End: 2021-12-02
Payer: COMMERCIAL

## 2021-12-06 ENCOUNTER — HOSPITAL ENCOUNTER (EMERGENCY)
Facility: HOSPITAL | Age: 76
Discharge: HOME | End: 2021-12-06
Attending: EMERGENCY MEDICINE
Payer: COMMERCIAL

## 2021-12-06 ENCOUNTER — OFFICE VISIT (OUTPATIENT)
Dept: FAMILY MEDICINE | Facility: CLINIC | Age: 76
End: 2021-12-06
Payer: COMMERCIAL

## 2021-12-06 VITALS
SYSTOLIC BLOOD PRESSURE: 174 MMHG | DIASTOLIC BLOOD PRESSURE: 92 MMHG | OXYGEN SATURATION: 98 % | RESPIRATION RATE: 15 BRPM | BODY MASS INDEX: 27.28 KG/M2 | WEIGHT: 180 LBS | HEIGHT: 68 IN | HEART RATE: 75 BPM

## 2021-12-06 VITALS
DIASTOLIC BLOOD PRESSURE: 110 MMHG | RESPIRATION RATE: 18 BRPM | OXYGEN SATURATION: 100 % | HEART RATE: 81 BPM | WEIGHT: 180 LBS | BODY MASS INDEX: 27.28 KG/M2 | TEMPERATURE: 98 F | SYSTOLIC BLOOD PRESSURE: 180 MMHG | HEIGHT: 68 IN

## 2021-12-06 DIAGNOSIS — M79.671 RIGHT FOOT PAIN: Chronic | ICD-10-CM

## 2021-12-06 DIAGNOSIS — E11.49 DIABETIC NEUROPATHY WITH NEUROLOGIC COMPLICATION: Chronic | ICD-10-CM

## 2021-12-06 DIAGNOSIS — K64.4 EXTERNAL HEMORRHOID: ICD-10-CM

## 2021-12-06 DIAGNOSIS — N32.89 BLADDER SPASMS: ICD-10-CM

## 2021-12-06 DIAGNOSIS — R03.0 ELEVATED BLOOD PRESSURE READING: ICD-10-CM

## 2021-12-06 DIAGNOSIS — Z87.440 PERSONAL HISTORY UTI: ICD-10-CM

## 2021-12-06 DIAGNOSIS — G89.29 CHRONIC PELVIC PAIN IN FEMALE: Chronic | ICD-10-CM

## 2021-12-06 DIAGNOSIS — E11.40 DIABETIC NEUROPATHY WITH NEUROLOGIC COMPLICATION: Chronic | ICD-10-CM

## 2021-12-06 DIAGNOSIS — M54.17 LUMBOSACRAL RADICULOPATHY: Chronic | ICD-10-CM

## 2021-12-06 DIAGNOSIS — G89.4 CHRONIC PAIN SYNDROME: Chronic | ICD-10-CM

## 2021-12-06 DIAGNOSIS — K57.30 COLON, DIVERTICULOSIS: ICD-10-CM

## 2021-12-06 DIAGNOSIS — R42 DIZZINESS: ICD-10-CM

## 2021-12-06 DIAGNOSIS — D73.89 LESION OF SPLEEN: ICD-10-CM

## 2021-12-06 DIAGNOSIS — R73.9 HYPERGLYCEMIA: ICD-10-CM

## 2021-12-06 DIAGNOSIS — M79.671 BILATERAL FOOT PAIN: ICD-10-CM

## 2021-12-06 DIAGNOSIS — R30.0 DYSURIA: ICD-10-CM

## 2021-12-06 DIAGNOSIS — D12.2 ADENOMATOUS POLYP OF ASCENDING COLON: ICD-10-CM

## 2021-12-06 DIAGNOSIS — M25.552 LEFT HIP PAIN: ICD-10-CM

## 2021-12-06 DIAGNOSIS — M79.672 BILATERAL FOOT PAIN: ICD-10-CM

## 2021-12-06 DIAGNOSIS — D12.4 ADENOMATOUS POLYP OF DESCENDING COLON: ICD-10-CM

## 2021-12-06 DIAGNOSIS — K62.5 GASTROINTESTINAL HEMORRHAGE ASSOCIATED WITH ANORECTAL SOURCE: ICD-10-CM

## 2021-12-06 DIAGNOSIS — G62.9 POLYNEUROPATHY: ICD-10-CM

## 2021-12-06 DIAGNOSIS — R10.2 CHRONIC PELVIC PAIN IN FEMALE: Chronic | ICD-10-CM

## 2021-12-06 DIAGNOSIS — I10 HYPERTENSION: Primary | ICD-10-CM

## 2021-12-06 DIAGNOSIS — E11.9 TYPE 2 DIABETES MELLITUS WITHOUT COMPLICATION, WITHOUT LONG-TERM CURRENT USE OF INSULIN: ICD-10-CM

## 2021-12-06 DIAGNOSIS — N35.919 URETHRAL MEATAL STENOSIS: ICD-10-CM

## 2021-12-06 DIAGNOSIS — J02.9 ACUTE PHARYNGITIS: ICD-10-CM

## 2021-12-06 DIAGNOSIS — R73.9 HYPERGLYCEMIA: Primary | ICD-10-CM

## 2021-12-06 DIAGNOSIS — B37.31 YEAST INFECTION INVOLVING THE VAGINA AND SURROUNDING AREA: ICD-10-CM

## 2021-12-06 LAB
ANION GAP SERPL CALCULATED.3IONS-SCNC: 10 MMOL/L (ref 7–16)
BASOPHILS # BLD AUTO: 0.03 K/UL (ref 0–0.2)
BASOPHILS NFR BLD AUTO: 0.4 % (ref 0–1)
BILIRUB UR QL STRIP: NEGATIVE
BUN SERPL-MCNC: 8 MG/DL (ref 7–18)
BUN/CREAT SERPL: 10 (ref 6–20)
CALCIUM SERPL-MCNC: 9 MG/DL (ref 8.5–10.1)
CHLORIDE SERPL-SCNC: 107 MMOL/L (ref 98–107)
CLARITY UR: CLEAR
CO2 SERPL-SCNC: 28 MMOL/L (ref 21–32)
COLOR UR: YELLOW
CREAT SERPL-MCNC: 0.81 MG/DL (ref 0.55–1.02)
DIFFERENTIAL METHOD BLD: ABNORMAL
EOSINOPHIL # BLD AUTO: 0.13 K/UL (ref 0–0.5)
EOSINOPHIL NFR BLD AUTO: 1.7 % (ref 1–4)
ERYTHROCYTE [DISTWIDTH] IN BLOOD BY AUTOMATED COUNT: 15.1 % (ref 11.5–14.5)
GLUCOSE SERPL-MCNC: 199 MG/DL (ref 74–106)
GLUCOSE SERPL-MCNC: 214 MG/DL (ref 70–105)
GLUCOSE SERPL-MCNC: 250 MG/DL (ref 70–110)
GLUCOSE UR STRIP-MCNC: 500 MG/DL
HCT VFR BLD AUTO: 37.6 % (ref 38–47)
HGB BLD-MCNC: 12.2 G/DL (ref 12–16)
IMM GRANULOCYTES # BLD AUTO: 0.03 K/UL (ref 0–0.04)
IMM GRANULOCYTES NFR BLD: 0.4 % (ref 0–0.4)
KETONES UR STRIP-SCNC: NEGATIVE MG/DL
LEUKOCYTE ESTERASE UR QL STRIP: NEGATIVE
LYMPHOCYTES # BLD AUTO: 2.48 K/UL (ref 1–4.8)
LYMPHOCYTES NFR BLD AUTO: 33.3 % (ref 27–41)
MCH RBC QN AUTO: 26.9 PG (ref 27–31)
MCHC RBC AUTO-ENTMCNC: 32.4 G/DL (ref 32–36)
MCV RBC AUTO: 82.8 FL (ref 80–96)
MONOCYTES # BLD AUTO: 0.48 K/UL (ref 0–0.8)
MONOCYTES NFR BLD AUTO: 6.5 % (ref 2–6)
MPC BLD CALC-MCNC: 9.7 FL (ref 9.4–12.4)
NEUTROPHILS # BLD AUTO: 4.29 K/UL (ref 1.8–7.7)
NEUTROPHILS NFR BLD AUTO: 57.7 % (ref 53–65)
NITRITE UR QL STRIP: NEGATIVE
NRBC # BLD AUTO: 0 X10E3/UL
NRBC, AUTO (.00): 0 %
PH UR STRIP: 7.5 PH UNITS
PLATELET # BLD AUTO: 258 K/UL (ref 150–400)
POTASSIUM SERPL-SCNC: 4.2 MMOL/L (ref 3.5–5.1)
PROT UR QL STRIP: NEGATIVE
RBC # BLD AUTO: 4.54 M/UL (ref 4.2–5.4)
RBC # UR STRIP: NEGATIVE /UL
SODIUM SERPL-SCNC: 141 MMOL/L (ref 136–145)
SP GR UR STRIP: 1.02
TROPONIN I SERPL HS-MCNC: 4.3 PG/ML
UROBILINOGEN UR STRIP-ACNC: 0.2 MG/DL
WBC # BLD AUTO: 7.44 K/UL (ref 4.5–11)

## 2021-12-06 PROCEDURE — 93005 ELECTROCARDIOGRAM TRACING: CPT

## 2021-12-06 PROCEDURE — 36415 COLL VENOUS BLD VENIPUNCTURE: CPT | Performed by: EMERGENCY MEDICINE

## 2021-12-06 PROCEDURE — 80048 BASIC METABOLIC PNL TOTAL CA: CPT | Performed by: EMERGENCY MEDICINE

## 2021-12-06 PROCEDURE — 93010 ELECTROCARDIOGRAM REPORT: CPT | Mod: ,,, | Performed by: HOSPITALIST

## 2021-12-06 PROCEDURE — 81003 URINALYSIS AUTO W/O SCOPE: CPT | Performed by: EMERGENCY MEDICINE

## 2021-12-06 PROCEDURE — 4010F PR ACE/ARB THEARPY RXD/TAKEN: ICD-10-PCS | Mod: ,,, | Performed by: NURSE PRACTITIONER

## 2021-12-06 PROCEDURE — 99212 OFFICE O/P EST SF 10 MIN: CPT | Mod: ,,, | Performed by: NURSE PRACTITIONER

## 2021-12-06 PROCEDURE — 3066F PR DOCUMENTATION OF TREATMENT FOR NEPHROPATHY: ICD-10-PCS | Mod: ,,, | Performed by: NURSE PRACTITIONER

## 2021-12-06 PROCEDURE — 4010F ACE/ARB THERAPY RXD/TAKEN: CPT | Mod: ,,, | Performed by: NURSE PRACTITIONER

## 2021-12-06 PROCEDURE — 82962 GLUCOSE BLOOD TEST: CPT

## 2021-12-06 PROCEDURE — 99284 EMERGENCY DEPT VISIT MOD MDM: CPT

## 2021-12-06 PROCEDURE — 3061F NEG MICROALBUMINURIA REV: CPT | Mod: ,,, | Performed by: NURSE PRACTITIONER

## 2021-12-06 PROCEDURE — 99212 PR OFFICE/OUTPT VISIT, EST, LEVL II, 10-19 MIN: ICD-10-PCS | Mod: ,,, | Performed by: NURSE PRACTITIONER

## 2021-12-06 PROCEDURE — 25000003 PHARM REV CODE 250: Performed by: EMERGENCY MEDICINE

## 2021-12-06 PROCEDURE — 99283 PR EMERGENCY DEPT VISIT,LEVEL III: ICD-10-PCS | Mod: ,,, | Performed by: EMERGENCY MEDICINE

## 2021-12-06 PROCEDURE — 3066F NEPHROPATHY DOC TX: CPT | Mod: ,,, | Performed by: NURSE PRACTITIONER

## 2021-12-06 PROCEDURE — 85025 COMPLETE CBC W/AUTO DIFF WBC: CPT | Performed by: EMERGENCY MEDICINE

## 2021-12-06 PROCEDURE — 93010 EKG 12-LEAD: ICD-10-PCS | Mod: ,,, | Performed by: HOSPITALIST

## 2021-12-06 PROCEDURE — 84484 ASSAY OF TROPONIN QUANT: CPT | Performed by: EMERGENCY MEDICINE

## 2021-12-06 PROCEDURE — 3061F PR NEG MICROALBUMINURIA RESULT DOCUMENTED/REVIEW: ICD-10-PCS | Mod: ,,, | Performed by: NURSE PRACTITIONER

## 2021-12-06 PROCEDURE — 99283 EMERGENCY DEPT VISIT LOW MDM: CPT | Mod: ,,, | Performed by: EMERGENCY MEDICINE

## 2021-12-06 RX ORDER — CLONIDINE HYDROCHLORIDE 0.1 MG/1
0.1 TABLET ORAL
Status: COMPLETED | OUTPATIENT
Start: 2021-12-06 | End: 2021-12-06

## 2021-12-06 RX ADMIN — CLONIDINE HYDROCHLORIDE 0.1 MG: 0.1 TABLET ORAL at 04:12

## 2021-12-08 ENCOUNTER — OFFICE VISIT (OUTPATIENT)
Dept: FAMILY MEDICINE | Facility: CLINIC | Age: 76
End: 2021-12-08
Payer: COMMERCIAL

## 2021-12-08 VITALS
WEIGHT: 179 LBS | HEIGHT: 68 IN | DIASTOLIC BLOOD PRESSURE: 85 MMHG | OXYGEN SATURATION: 99 % | RESPIRATION RATE: 20 BRPM | SYSTOLIC BLOOD PRESSURE: 151 MMHG | BODY MASS INDEX: 27.13 KG/M2 | HEART RATE: 93 BPM

## 2021-12-08 DIAGNOSIS — G89.4 CHRONIC PAIN SYNDROME: Chronic | ICD-10-CM

## 2021-12-08 DIAGNOSIS — E11.9 DIABETES MELLITUS WITHOUT COMPLICATION: Chronic | ICD-10-CM

## 2021-12-08 DIAGNOSIS — E11.9 TYPE 2 DIABETES MELLITUS WITHOUT COMPLICATION, WITHOUT LONG-TERM CURRENT USE OF INSULIN: Chronic | ICD-10-CM

## 2021-12-08 DIAGNOSIS — I10 ESSENTIAL HYPERTENSION: Primary | Chronic | ICD-10-CM

## 2021-12-08 LAB
EST. AVERAGE GLUCOSE BLD GHB EST-MCNC: 170 MG/DL
HBA1C MFR BLD HPLC: 7.7 % (ref 4.5–6.6)

## 2021-12-08 PROCEDURE — 4010F ACE/ARB THERAPY RXD/TAKEN: CPT | Mod: ,,, | Performed by: INTERNAL MEDICINE

## 2021-12-08 PROCEDURE — 99214 OFFICE O/P EST MOD 30 MIN: CPT | Mod: ,,, | Performed by: INTERNAL MEDICINE

## 2021-12-08 PROCEDURE — 83036 HEMOGLOBIN A1C: ICD-10-PCS | Mod: ,,, | Performed by: CLINICAL MEDICAL LABORATORY

## 2021-12-08 PROCEDURE — 3066F NEPHROPATHY DOC TX: CPT | Mod: ,,, | Performed by: INTERNAL MEDICINE

## 2021-12-08 PROCEDURE — 4010F PR ACE/ARB THEARPY RXD/TAKEN: ICD-10-PCS | Mod: ,,, | Performed by: INTERNAL MEDICINE

## 2021-12-08 PROCEDURE — 3061F NEG MICROALBUMINURIA REV: CPT | Mod: ,,, | Performed by: INTERNAL MEDICINE

## 2021-12-08 PROCEDURE — 3066F PR DOCUMENTATION OF TREATMENT FOR NEPHROPATHY: ICD-10-PCS | Mod: ,,, | Performed by: INTERNAL MEDICINE

## 2021-12-08 PROCEDURE — 3061F PR NEG MICROALBUMINURIA RESULT DOCUMENTED/REVIEW: ICD-10-PCS | Mod: ,,, | Performed by: INTERNAL MEDICINE

## 2021-12-08 PROCEDURE — 83036 HEMOGLOBIN GLYCOSYLATED A1C: CPT | Mod: ,,, | Performed by: CLINICAL MEDICAL LABORATORY

## 2021-12-08 PROCEDURE — 99214 PR OFFICE/OUTPT VISIT, EST, LEVL IV, 30-39 MIN: ICD-10-PCS | Mod: ,,, | Performed by: INTERNAL MEDICINE

## 2021-12-08 RX ORDER — GLIPIZIDE 10 MG/1
10 TABLET, FILM COATED, EXTENDED RELEASE ORAL
Qty: 30 TABLET | Refills: 3 | Status: SHIPPED | OUTPATIENT
Start: 2021-12-08 | End: 2022-03-02 | Stop reason: SDUPTHER

## 2021-12-08 RX ORDER — HYDROCODONE BITARTRATE AND ACETAMINOPHEN 10; 325 MG/1; MG/1
1 TABLET ORAL EVERY 6 HOURS PRN
COMMUNITY
End: 2022-02-21 | Stop reason: SDUPTHER

## 2021-12-08 RX ORDER — METOPROLOL SUCCINATE 50 MG/1
50 TABLET, EXTENDED RELEASE ORAL DAILY
Qty: 30 TABLET | Refills: 3 | Status: SHIPPED | OUTPATIENT
Start: 2021-12-08 | End: 2022-04-04

## 2021-12-08 RX ORDER — GLIPIZIDE 5 MG/1
5 TABLET, FILM COATED, EXTENDED RELEASE ORAL NIGHTLY
Qty: 30 TABLET | Refills: 3 | Status: SHIPPED | OUTPATIENT
Start: 2021-12-08 | End: 2022-02-08

## 2021-12-08 RX ORDER — INSULIN PUMP SYRINGE, 3 ML
EACH MISCELLANEOUS
Qty: 1 EACH | Refills: 0 | Status: SHIPPED | OUTPATIENT
Start: 2021-12-08

## 2021-12-14 ENCOUNTER — OFFICE VISIT (OUTPATIENT)
Dept: DERMATOLOGY | Facility: CLINIC | Age: 76
End: 2021-12-14
Payer: COMMERCIAL

## 2021-12-14 VITALS — HEIGHT: 68 IN | WEIGHT: 179 LBS | RESPIRATION RATE: 18 BRPM | BODY MASS INDEX: 27.13 KG/M2

## 2021-12-14 DIAGNOSIS — D48.9 NEOPLASM OF UNCERTAIN BEHAVIOR: Primary | ICD-10-CM

## 2021-12-14 DIAGNOSIS — L82.1 DERMATOSIS PAPULOSA NIGRA: ICD-10-CM

## 2021-12-14 PROCEDURE — 11102 PR TANGENTIAL BIOPSY, SKIN, SINGLE LESION: ICD-10-PCS | Mod: ,,, | Performed by: STUDENT IN AN ORGANIZED HEALTH CARE EDUCATION/TRAINING PROGRAM

## 2021-12-14 PROCEDURE — 88305 TISSUE EXAM BY PATHOLOGIST: CPT | Mod: SUR | Performed by: STUDENT IN AN ORGANIZED HEALTH CARE EDUCATION/TRAINING PROGRAM

## 2021-12-14 PROCEDURE — 99202 OFFICE O/P NEW SF 15 MIN: CPT | Mod: 25,,, | Performed by: STUDENT IN AN ORGANIZED HEALTH CARE EDUCATION/TRAINING PROGRAM

## 2021-12-14 PROCEDURE — 3061F NEG MICROALBUMINURIA REV: CPT | Mod: CPTII,,, | Performed by: STUDENT IN AN ORGANIZED HEALTH CARE EDUCATION/TRAINING PROGRAM

## 2021-12-14 PROCEDURE — 99202 PR OFFICE/OUTPT VISIT, NEW, LEVL II, 15-29 MIN: ICD-10-PCS | Mod: 25,,, | Performed by: STUDENT IN AN ORGANIZED HEALTH CARE EDUCATION/TRAINING PROGRAM

## 2021-12-14 PROCEDURE — 4010F PR ACE/ARB THEARPY RXD/TAKEN: ICD-10-PCS | Mod: CPTII,,, | Performed by: STUDENT IN AN ORGANIZED HEALTH CARE EDUCATION/TRAINING PROGRAM

## 2021-12-14 PROCEDURE — 88305 PATHOLOGY, DERMATOLOGY: ICD-10-PCS | Mod: 26,,, | Performed by: PATHOLOGY

## 2021-12-14 PROCEDURE — 11102 TANGNTL BX SKIN SINGLE LES: CPT | Mod: ,,, | Performed by: STUDENT IN AN ORGANIZED HEALTH CARE EDUCATION/TRAINING PROGRAM

## 2021-12-14 PROCEDURE — 3061F PR NEG MICROALBUMINURIA RESULT DOCUMENTED/REVIEW: ICD-10-PCS | Mod: CPTII,,, | Performed by: STUDENT IN AN ORGANIZED HEALTH CARE EDUCATION/TRAINING PROGRAM

## 2021-12-14 PROCEDURE — 4010F ACE/ARB THERAPY RXD/TAKEN: CPT | Mod: CPTII,,, | Performed by: STUDENT IN AN ORGANIZED HEALTH CARE EDUCATION/TRAINING PROGRAM

## 2021-12-14 PROCEDURE — 88305 TISSUE EXAM BY PATHOLOGIST: CPT | Mod: 26,,, | Performed by: PATHOLOGY

## 2021-12-14 PROCEDURE — 3066F PR DOCUMENTATION OF TREATMENT FOR NEPHROPATHY: ICD-10-PCS | Mod: CPTII,,, | Performed by: STUDENT IN AN ORGANIZED HEALTH CARE EDUCATION/TRAINING PROGRAM

## 2021-12-14 PROCEDURE — 3066F NEPHROPATHY DOC TX: CPT | Mod: CPTII,,, | Performed by: STUDENT IN AN ORGANIZED HEALTH CARE EDUCATION/TRAINING PROGRAM

## 2021-12-16 ENCOUNTER — OFFICE VISIT (OUTPATIENT)
Dept: UROLOGY | Facility: CLINIC | Age: 76
End: 2021-12-16
Payer: COMMERCIAL

## 2021-12-16 VITALS
BODY MASS INDEX: 27.58 KG/M2 | WEIGHT: 182 LBS | HEART RATE: 105 BPM | DIASTOLIC BLOOD PRESSURE: 84 MMHG | HEIGHT: 68 IN | SYSTOLIC BLOOD PRESSURE: 116 MMHG

## 2021-12-16 DIAGNOSIS — G89.29 CHRONIC PELVIC PAIN IN FEMALE: ICD-10-CM

## 2021-12-16 DIAGNOSIS — N34.3 URETHRAL SYNDROME: Primary | ICD-10-CM

## 2021-12-16 DIAGNOSIS — R10.2 CHRONIC PELVIC PAIN IN FEMALE: ICD-10-CM

## 2021-12-16 DIAGNOSIS — N39.0 URINARY TRACT INFECTION WITH HEMATURIA, SITE UNSPECIFIED: ICD-10-CM

## 2021-12-16 DIAGNOSIS — N32.81 OVERACTIVE BLADDER: ICD-10-CM

## 2021-12-16 DIAGNOSIS — R31.9 URINARY TRACT INFECTION WITH HEMATURIA, SITE UNSPECIFIED: ICD-10-CM

## 2021-12-16 LAB
ESTROGEN SERPL-MCNC: NORMAL PG/ML
LAB AP GROSS DESCRIPTION: NORMAL
LAB AP LABORATORY NOTES: NORMAL
LAB AP SPEC A DDX: NORMAL
LAB AP SPEC A MORPHOLOGY: NORMAL
LAB AP SPEC A PROCEDURE: NORMAL
T3RU NFR SERPL: NORMAL %

## 2021-12-16 PROCEDURE — 87086 CULTURE, URINE: ICD-10-PCS | Mod: ,,, | Performed by: CLINICAL MEDICAL LABORATORY

## 2021-12-16 PROCEDURE — 4010F PR ACE/ARB THEARPY RXD/TAKEN: ICD-10-PCS | Mod: CPTII,,, | Performed by: UROLOGY

## 2021-12-16 PROCEDURE — 53660 DILATION OF URETHRA: CPT | Mod: S$PBB,,, | Performed by: UROLOGY

## 2021-12-16 PROCEDURE — 53660 DILATION OF URETHRA: CPT | Mod: PBBFAC | Performed by: UROLOGY

## 2021-12-16 PROCEDURE — 3066F NEPHROPATHY DOC TX: CPT | Mod: CPTII,,, | Performed by: UROLOGY

## 2021-12-16 PROCEDURE — 3061F NEG MICROALBUMINURIA REV: CPT | Mod: CPTII,,, | Performed by: UROLOGY

## 2021-12-16 PROCEDURE — 3061F PR NEG MICROALBUMINURIA RESULT DOCUMENTED/REVIEW: ICD-10-PCS | Mod: CPTII,,, | Performed by: UROLOGY

## 2021-12-16 PROCEDURE — 53660 PR DIL URETHRA,FEMALE,INITIAL: ICD-10-PCS | Mod: S$PBB,,, | Performed by: UROLOGY

## 2021-12-16 PROCEDURE — 25000003 PHARM REV CODE 250

## 2021-12-16 PROCEDURE — 4010F ACE/ARB THERAPY RXD/TAKEN: CPT | Mod: CPTII,,, | Performed by: UROLOGY

## 2021-12-16 PROCEDURE — 99214 PR OFFICE/OUTPT VISIT, EST, LEVL IV, 30-39 MIN: ICD-10-PCS | Mod: S$PBB,25,, | Performed by: UROLOGY

## 2021-12-16 PROCEDURE — 99215 OFFICE O/P EST HI 40 MIN: CPT | Mod: PBBFAC | Performed by: UROLOGY

## 2021-12-16 PROCEDURE — 87086 URINE CULTURE/COLONY COUNT: CPT | Mod: ,,, | Performed by: CLINICAL MEDICAL LABORATORY

## 2021-12-16 PROCEDURE — 3066F PR DOCUMENTATION OF TREATMENT FOR NEPHROPATHY: ICD-10-PCS | Mod: CPTII,,, | Performed by: UROLOGY

## 2021-12-16 PROCEDURE — 99214 OFFICE O/P EST MOD 30 MIN: CPT | Mod: S$PBB,25,, | Performed by: UROLOGY

## 2021-12-18 LAB — UA COMPLETE W REFLEX CULTURE PNL UR: NO GROWTH

## 2022-01-19 ENCOUNTER — OFFICE VISIT (OUTPATIENT)
Dept: FAMILY MEDICINE | Facility: CLINIC | Age: 77
End: 2022-01-19
Payer: COMMERCIAL

## 2022-01-19 VITALS
HEART RATE: 77 BPM | WEIGHT: 182 LBS | HEIGHT: 68 IN | OXYGEN SATURATION: 98 % | SYSTOLIC BLOOD PRESSURE: 149 MMHG | DIASTOLIC BLOOD PRESSURE: 93 MMHG | BODY MASS INDEX: 27.58 KG/M2 | RESPIRATION RATE: 18 BRPM | TEMPERATURE: 97 F

## 2022-01-19 DIAGNOSIS — E11.9 TYPE 2 DIABETES MELLITUS WITHOUT COMPLICATION, WITHOUT LONG-TERM CURRENT USE OF INSULIN: Primary | Chronic | ICD-10-CM

## 2022-01-19 DIAGNOSIS — I10 PRIMARY HYPERTENSION: Chronic | ICD-10-CM

## 2022-01-19 LAB
ANION GAP SERPL CALCULATED.3IONS-SCNC: 8 MMOL/L (ref 7–16)
BUN SERPL-MCNC: 13 MG/DL (ref 7–18)
BUN/CREAT SERPL: 16 (ref 6–20)
CALCIUM SERPL-MCNC: 8.8 MG/DL (ref 8.5–10.1)
CHLORIDE SERPL-SCNC: 104 MMOL/L (ref 98–107)
CO2 SERPL-SCNC: 28 MMOL/L (ref 21–32)
CREAT SERPL-MCNC: 0.79 MG/DL (ref 0.55–1.02)
GLUCOSE SERPL-MCNC: 174 MG/DL (ref 74–106)
POTASSIUM SERPL-SCNC: 4 MMOL/L (ref 3.5–5.1)
SODIUM SERPL-SCNC: 136 MMOL/L (ref 136–145)

## 2022-01-19 PROCEDURE — 3288F FALL RISK ASSESSMENT DOCD: CPT | Mod: ,,, | Performed by: INTERNAL MEDICINE

## 2022-01-19 PROCEDURE — 1101F PR PT FALLS ASSESS DOC 0-1 FALLS W/OUT INJ PAST YR: ICD-10-PCS | Mod: ,,, | Performed by: INTERNAL MEDICINE

## 2022-01-19 PROCEDURE — 3288F PR FALLS RISK ASSESSMENT DOCUMENTED: ICD-10-PCS | Mod: ,,, | Performed by: INTERNAL MEDICINE

## 2022-01-19 PROCEDURE — 3051F PR MOST RECENT HEMOGLOBIN A1C LEVEL 7.0 - < 8.0%: ICD-10-PCS | Mod: ,,, | Performed by: INTERNAL MEDICINE

## 2022-01-19 PROCEDURE — 1101F PT FALLS ASSESS-DOCD LE1/YR: CPT | Mod: ,,, | Performed by: INTERNAL MEDICINE

## 2022-01-19 PROCEDURE — 1159F PR MEDICATION LIST DOCUMENTED IN MEDICAL RECORD: ICD-10-PCS | Mod: ,,, | Performed by: INTERNAL MEDICINE

## 2022-01-19 PROCEDURE — 3051F HG A1C>EQUAL 7.0%<8.0%: CPT | Mod: ,,, | Performed by: INTERNAL MEDICINE

## 2022-01-19 PROCEDURE — 80048 BASIC METABOLIC PNL TOTAL CA: CPT | Mod: ,,, | Performed by: CLINICAL MEDICAL LABORATORY

## 2022-01-19 PROCEDURE — 3077F SYST BP >= 140 MM HG: CPT | Mod: ,,, | Performed by: INTERNAL MEDICINE

## 2022-01-19 PROCEDURE — 80048 BASIC METABOLIC PANEL: ICD-10-PCS | Mod: ,,, | Performed by: CLINICAL MEDICAL LABORATORY

## 2022-01-19 PROCEDURE — 3080F PR MOST RECENT DIASTOLIC BLOOD PRESSURE >= 90 MM HG: ICD-10-PCS | Mod: ,,, | Performed by: INTERNAL MEDICINE

## 2022-01-19 PROCEDURE — 99214 PR OFFICE/OUTPT VISIT, EST, LEVL IV, 30-39 MIN: ICD-10-PCS | Mod: ,,, | Performed by: INTERNAL MEDICINE

## 2022-01-19 PROCEDURE — 1159F MED LIST DOCD IN RCRD: CPT | Mod: ,,, | Performed by: INTERNAL MEDICINE

## 2022-01-19 PROCEDURE — 3077F PR MOST RECENT SYSTOLIC BLOOD PRESSURE >= 140 MM HG: ICD-10-PCS | Mod: ,,, | Performed by: INTERNAL MEDICINE

## 2022-01-19 PROCEDURE — 99214 OFFICE O/P EST MOD 30 MIN: CPT | Mod: ,,, | Performed by: INTERNAL MEDICINE

## 2022-01-19 PROCEDURE — 3080F DIAST BP >= 90 MM HG: CPT | Mod: ,,, | Performed by: INTERNAL MEDICINE

## 2022-01-19 PROCEDURE — 1160F RVW MEDS BY RX/DR IN RCRD: CPT | Mod: ,,, | Performed by: INTERNAL MEDICINE

## 2022-01-19 PROCEDURE — 1160F PR REVIEW ALL MEDS BY PRESCRIBER/CLIN PHARMACIST DOCUMENTED: ICD-10-PCS | Mod: ,,, | Performed by: INTERNAL MEDICINE

## 2022-01-19 RX ORDER — LEVOTHYROXINE SODIUM 50 UG/1
50 TABLET ORAL
Qty: 90 TABLET | Refills: 1 | Status: SHIPPED | OUTPATIENT
Start: 2022-01-19 | End: 2022-01-26

## 2022-01-19 NOTE — PATIENT INSTRUCTIONS
Lorraine was seen today for follow-up, hypertension and medication refill.    Diagnoses and all orders for this visit:    Type 2 diabetes mellitus without complication, without long-term current use of insulin  Comments:  Referral and consult.  Orders:  -     Ambulatory referral/consult to Diabetes Education; Future    Primary hypertension  Comments:  Discussed importance of diet and exercise.  Orders:  -     Basic Metabolic Panel; Future  -     Basic Metabolic Panel    Other orders  The following orders have not been finalized:  -     levothyroxine (SYNTHROID) 50 MCG tablet

## 2022-01-19 NOTE — PROGRESS NOTES
Subjective:       Patient ID: Lorraine De Jesus is a 77 y.o. female.    Chief Complaint: Follow-up, Hypertension, and Medication Refill    Patient is here for follow up evaluation. Blood pressure is 149/93 today. Discussed importance of diet and exercise. Patient states she feels ok and at home blood sugars have been around 200 with it being 159 yesterday morning. Last A1C was 7.7. Will refer to Dr Morse. Will order in house labs today. Will print diabetic diet guidelines for patient. Follow up in 6 weeks.       Current Medications:    Current Outpatient Medications:     amLODIPine (NORVASC) 10 MG tablet, Take 1 tablet (10 mg total) by mouth once daily. (Patient not taking: Reported on 12/16/2021), Disp: 30 tablet, Rfl: 5    aspirin (ECOTRIN) 81 MG EC tablet, Take 81 mg by mouth once daily., Disp: , Rfl:     blood sugar diagnostic (ONETOUCH VERIO TEST STRIPS) Strp, 1 strip by Misc.(Non-Drug; Combo Route) route 2 (two) times daily., Disp: 200 strip, Rfl: 4    blood sugar diagnostic Strp, 1 strip by Misc.(Non-Drug; Combo Route) route 3 (three) times daily., Disp: 200 strip, Rfl: 3    blood-glucose meter kit, Use as instructed, Disp: 1 each, Rfl: 0    DULoxetine (CYMBALTA) 60 MG capsule, Take 1 capsule (60 mg total) by mouth once daily. Take one capsule at bedtime, Disp: 30 capsule, Rfl: 2    fluconazole (DIFLUCAN) 150 MG Tab, , Disp: , Rfl:     gabapentin (NEURONTIN) 400 MG capsule, Take 1 capsule (400 mg total) by mouth every 8 (eight) hours., Disp: 90 capsule, Rfl: 2    glipiZIDE (GLUCOTROL) 10 MG TR24, Take 1 tablet (10 mg total) by mouth daily with breakfast., Disp: 30 tablet, Rfl: 3    glipiZIDE (GLUCOTROL) 5 MG TR24, Take 1 tablet (5 mg total) by mouth every evening. (Patient not taking: Reported on 12/16/2021), Disp: 30 tablet, Rfl: 3    HYDROcodone-acetaminophen (NORCO)  mg per tablet, Take 1 tablet by mouth every 6 (six) hours as needed., Disp: , Rfl:     lancets (ACCU-CHEK FASTCLIX LANCET DRUM  MISC), Take 1 each by mouth once daily., Disp: , Rfl:     levothyroxine (SYNTHROID) 50 MCG tablet, Take 50 mcg by mouth before breakfast., Disp: , Rfl:     metoprolol succinate (TOPROL-XL) 50 MG 24 hr tablet, Take 1 tablet (50 mg total) by mouth once daily., Disp: 30 tablet, Rfl: 3    olmesartan (BENICAR) 5 MG Tab, Take 5 mg by mouth once daily., Disp: , Rfl:     ONETOUCH DELICA PLUS LANCET 33 gauge Misc, Check blood sugar TID, Disp: 100 each, Rfl: 11    oxybutynin (DITROPAN) 5 MG Tab, TAKE 1 TABLET BY MOUTH TWICE DAILY AS NEEDED FOR BLADDER SPASMS, Disp: 60 tablet, Rfl: 5    oxybutynin (DITROPAN-XL) 5 MG TR24, TAKE 1 TABLET(5 MG) BY MOUTH EVERY DAY, Disp: 90 tablet, Rfl: 3    Last Labs:     No visits with results within 1 Month(s) from this visit.   Latest known visit with results is:   Office Visit on 12/16/2021   Component Date Value    Culture, Urine 12/16/2021 No Growth        Last Imaging:  US FNA Biopsy w/ Imaging 1st Lesion  Narrative: EXAMINATION:  US FINE NEEDLE ASPIRATION BIOPSY, FIRST LESION    CLINICAL HISTORY:  thyroid nodule;Nontoxic single thyroid nodule    TECHNIQUE:  US FINE NEEDLE ASPIRATION BIOPSY, FIRST LESION    COMPARISON:  Comparisons were reviewed, if available.    FINDINGS:  FNA right thyroid    Performed by Dr.J Pina    A formal timeout was called all staff present agreed to patient and procedure.  The neck was prepped with ChloraPrep and sterile field was established.  1% lidocaine was used as local anesthetic.  Under ultrasound guidance 6 fine-needle aspirations were taken from the right thyroid nodule.  The puncture site was then cleaned and bandaged.    The patient tolerated the procedure well there no immediate postprocedure complications.  Impression: As above.    Electronically signed by: Louie Pina  Date:    11/02/2021  Time:    09:55         Review of Systems   All other systems reviewed and are negative.        Objective:      Physical Exam  Vitals reviewed.    Constitutional:       Appearance: Normal appearance. She is normal weight.   HENT:      Right Ear: Tympanic membrane normal.      Left Ear: Tympanic membrane normal.      Nose: Nose normal.      Mouth/Throat:      Mouth: Mucous membranes are moist.      Pharynx: Oropharynx is clear.   Cardiovascular:      Rate and Rhythm: Normal rate and regular rhythm.      Pulses: Normal pulses.      Heart sounds: Normal heart sounds.   Pulmonary:      Effort: Pulmonary effort is normal.   Abdominal:      General: Abdomen is flat. Bowel sounds are normal.      Palpations: Abdomen is soft.   Musculoskeletal:         General: Normal range of motion.      Cervical back: Normal range of motion and neck supple.   Skin:     General: Skin is warm and dry.   Neurological:      General: No focal deficit present.      Mental Status: She is alert and oriented to person, place, and time. Mental status is at baseline.         Assessment:       1. Type 2 diabetes mellitus without complication, without long-term current use of insulin  Ambulatory referral/consult to Diabetes Education    Referral and consult.   2. Primary hypertension  Basic Metabolic Panel    Basic Metabolic Panel    Discussed importance of diet and exercise.        Plan:         Lorraine was seen today for follow-up, hypertension and medication refill.    Diagnoses and all orders for this visit:    Type 2 diabetes mellitus without complication, without long-term current use of insulin  Comments:  Referral and consult.  Orders:  -     Ambulatory referral/consult to Diabetes Education; Future    Primary hypertension  Comments:  Discussed importance of diet and exercise.  Orders:  -     Basic Metabolic Panel; Future  -     Basic Metabolic Panel    Other orders  The following orders have not been finalized:  -     levothyroxine (SYNTHROID) 50 MCG tablet    Follow up in 6 weeks.

## 2022-01-26 RX ORDER — LEVOTHYROXINE SODIUM 75 UG/1
75 TABLET ORAL
Qty: 90 TABLET | Refills: 1 | Status: SHIPPED | OUTPATIENT
Start: 2022-01-26 | End: 2022-06-15 | Stop reason: SDUPTHER

## 2022-01-26 NOTE — TELEPHONE ENCOUNTER
Called to inform pt that synthroid has been increased to 75 mcg daily per order from Dr. Chen, that she can take one and a half tablet to make her 75 mcg dose. voiced understanding.

## 2022-02-08 ENCOUNTER — OFFICE VISIT (OUTPATIENT)
Dept: DIABETES SERVICES | Facility: CLINIC | Age: 77
End: 2022-02-08
Payer: COMMERCIAL

## 2022-02-08 VITALS
BODY MASS INDEX: 27.56 KG/M2 | SYSTOLIC BLOOD PRESSURE: 130 MMHG | RESPIRATION RATE: 16 BRPM | HEIGHT: 68 IN | HEART RATE: 93 BPM | WEIGHT: 181.81 LBS | DIASTOLIC BLOOD PRESSURE: 76 MMHG | OXYGEN SATURATION: 95 %

## 2022-02-08 DIAGNOSIS — I10 ESSENTIAL HYPERTENSION: ICD-10-CM

## 2022-02-08 DIAGNOSIS — E11.9 TYPE 2 DIABETES MELLITUS WITHOUT COMPLICATION, WITHOUT LONG-TERM CURRENT USE OF INSULIN: Primary | ICD-10-CM

## 2022-02-08 DIAGNOSIS — G89.4 CHRONIC PAIN SYNDROME: Chronic | ICD-10-CM

## 2022-02-08 LAB — GLUCOSE SERPL-MCNC: 164 MG/DL (ref 70–110)

## 2022-02-08 PROCEDURE — 3051F HG A1C>EQUAL 7.0%<8.0%: CPT | Mod: CPTII,,, | Performed by: NURSE PRACTITIONER

## 2022-02-08 PROCEDURE — 3078F DIAST BP <80 MM HG: CPT | Mod: CPTII,,, | Performed by: NURSE PRACTITIONER

## 2022-02-08 PROCEDURE — 3288F FALL RISK ASSESSMENT DOCD: CPT | Mod: CPTII,,, | Performed by: NURSE PRACTITIONER

## 2022-02-08 PROCEDURE — 3051F PR MOST RECENT HEMOGLOBIN A1C LEVEL 7.0 - < 8.0%: ICD-10-PCS | Mod: CPTII,,, | Performed by: NURSE PRACTITIONER

## 2022-02-08 PROCEDURE — 1160F RVW MEDS BY RX/DR IN RCRD: CPT | Mod: CPTII,,, | Performed by: NURSE PRACTITIONER

## 2022-02-08 PROCEDURE — 3078F PR MOST RECENT DIASTOLIC BLOOD PRESSURE < 80 MM HG: ICD-10-PCS | Mod: CPTII,,, | Performed by: NURSE PRACTITIONER

## 2022-02-08 PROCEDURE — 1159F PR MEDICATION LIST DOCUMENTED IN MEDICAL RECORD: ICD-10-PCS | Mod: CPTII,,, | Performed by: NURSE PRACTITIONER

## 2022-02-08 PROCEDURE — 1159F MED LIST DOCD IN RCRD: CPT | Mod: CPTII,,, | Performed by: NURSE PRACTITIONER

## 2022-02-08 PROCEDURE — 1101F PT FALLS ASSESS-DOCD LE1/YR: CPT | Mod: CPTII,,, | Performed by: NURSE PRACTITIONER

## 2022-02-08 PROCEDURE — 3075F PR MOST RECENT SYSTOLIC BLOOD PRESS GE 130-139MM HG: ICD-10-PCS | Mod: CPTII,,, | Performed by: NURSE PRACTITIONER

## 2022-02-08 PROCEDURE — 82962 GLUCOSE BLOOD TEST: CPT | Mod: PBBFAC | Performed by: NURSE PRACTITIONER

## 2022-02-08 PROCEDURE — 99213 OFFICE O/P EST LOW 20 MIN: CPT | Mod: S$PBB,,, | Performed by: NURSE PRACTITIONER

## 2022-02-08 PROCEDURE — 3075F SYST BP GE 130 - 139MM HG: CPT | Mod: CPTII,,, | Performed by: NURSE PRACTITIONER

## 2022-02-08 PROCEDURE — 1101F PR PT FALLS ASSESS DOC 0-1 FALLS W/OUT INJ PAST YR: ICD-10-PCS | Mod: CPTII,,, | Performed by: NURSE PRACTITIONER

## 2022-02-08 PROCEDURE — 1160F PR REVIEW ALL MEDS BY PRESCRIBER/CLIN PHARMACIST DOCUMENTED: ICD-10-PCS | Mod: CPTII,,, | Performed by: NURSE PRACTITIONER

## 2022-02-08 PROCEDURE — 99215 OFFICE O/P EST HI 40 MIN: CPT | Mod: PBBFAC | Performed by: NURSE PRACTITIONER

## 2022-02-08 PROCEDURE — 99213 PR OFFICE/OUTPT VISIT, EST, LEVL III, 20-29 MIN: ICD-10-PCS | Mod: S$PBB,,, | Performed by: NURSE PRACTITIONER

## 2022-02-08 PROCEDURE — 3288F PR FALLS RISK ASSESSMENT DOCUMENTED: ICD-10-PCS | Mod: CPTII,,, | Performed by: NURSE PRACTITIONER

## 2022-02-08 RX ORDER — CLONIDINE HYDROCHLORIDE 0.1 MG/1
TABLET ORAL
COMMUNITY
Start: 2021-12-14

## 2022-02-08 RX ORDER — DAPAGLIFLOZIN 10 MG/1
10 TABLET, FILM COATED ORAL DAILY
Qty: 90 TABLET | Refills: 1 | Status: SHIPPED | OUTPATIENT
Start: 2022-02-08 | End: 2022-08-04

## 2022-02-08 NOTE — PATIENT INSTRUCTIONS
Pt is advised to monitor and document glucose fasting when you wake up before you eat and 2 hours after meal and bring in meter to next visit.      Ensure to take medications as directed.      Follow diabetic diet as directed.      Work to achieve normal body weight.     Ensure to exercise 4-5 times per week for 20 minutes. Snacks with 0-5 grams carbs   Hard Boiled Egg   Crystal Light, Vitamin Water, Powerade Zero   Herbal tea, unsweetened   8 oz unsweetened almond milk   2 tsp peanut butter on celery   ½ cup sugar-free Jell-O   1 sugar-free popsicle   Non starchy vegetables such as carrots or celery sticks with lowfat dressing   ½ oz lowfat cheese or string cheese   1 closed handful of nuts or tbsp of seeds, unsalted    Snacks with 15 gram carbs  . 1 small piece of fruit or . banana or . cup light canned fruit  . 3 georges cracker squares  . 3 cups popcorn  . 5 Vanilla Wafers  . 1/2 cup low fat, no added sugar ice cream or frozen yogurt  . 1/2 turkey, ham, or chicken sandwich  . 1.2 cup fruit with 1/2 cup of cottage cheese  . 4-6 unsalted wheat crackers with 1 oz low fat cheese or 1 tbsp peanut butter  . 30 goldfish crackers  . 7-8 mini rice cakes  . 1/3 cup hummus dip with raw vegetables  . 1/2 whole wheat bella, 1 tbsp hummus  . Mini pizza (. whole wheat English muffin, low-fat cheese, tomato sauce)  . 100 calorie snack pack  . 4-6 oz light yogurt  . 1/2 cup sugar-free pudding

## 2022-02-08 NOTE — PROGRESS NOTES
"Subjective:       Patient ID: Lorraine De Jesus is a 77 y.o. female.    Chief Complaint: Diabetes Mellitus (Pt here for follow up visit, reports her sugars have been going up, checks sugar BID.)    Here today for reevaluation of DM.  Has been lost to follow up since 2/25/19.  She has lost her  of 58 years in the last few years.  She is seeing dr dumont now and was seen in December and he increased glipizide from 5mg to 10mg. She states that she was seeing Dr Manzo and he told her "not to see me, that he would do the checking"    Review of Systems   Constitutional: Negative for activity change, appetite change, diaphoresis and fatigue.   HENT: Negative for nasal congestion, facial swelling and sinus pressure/congestion.    Eyes: Negative for visual disturbance.   Respiratory: Negative for shortness of breath and wheezing.    Cardiovascular: Negative for chest pain and leg swelling.   Gastrointestinal: Negative for constipation, diarrhea, nausea and vomiting.   Endocrine: Negative for polydipsia, polyphagia and polyuria.   Genitourinary: Negative for dysuria, frequency and urgency.   Musculoskeletal: Negative for gait problem and myalgias.   Integumentary:  Negative for color change, rash and wound.   Neurological: Negative for dizziness, syncope, weakness, headaches, disturbances in coordination and coordination difficulties.   Hematological: Does not bruise/bleed easily.   Psychiatric/Behavioral: Negative for self-injury, sleep disturbance and suicidal ideas. The patient is not nervous/anxious.          Objective:      Physical Exam  Vitals and nursing note reviewed.   Constitutional:       Appearance: Normal appearance.   HENT:      Head: Normocephalic.   Cardiovascular:      Rate and Rhythm: Normal rate.   Pulmonary:      Effort: Pulmonary effort is normal.   Musculoskeletal:         General: Normal range of motion.   Skin:     General: Skin is warm and dry.   Neurological:      General: No focal deficit " present.      Mental Status: She is alert and oriented to person, place, and time.   Psychiatric:         Mood and Affect: Mood normal.         Behavior: Behavior normal.         Thought Content: Thought content normal.         Judgment: Judgment normal.         Assessment:       Problem List Items Addressed This Visit        Neuro    Chronic pain syndrome (Chronic)       Cardiac/Vascular    Essential hypertension       Endocrine    Type 2 diabetes mellitus without complication, without long-term current use of insulin - Primary    Relevant Orders    POCT Glucose, Hand-Held Device (Completed)          Plan:       Problem List Items Addressed This Visit        Neuro    Chronic pain syndrome (Chronic)       Cardiac/Vascular    Essential hypertension       Endocrine    Type 2 diabetes mellitus without complication, without long-term current use of insulin - Primary    Relevant Orders    POCT Glucose, Hand-Held Device (Completed)             Start farxiga 10mg daily.  Check glucose daily alternating between fasting and 2 hours pp.

## 2022-02-21 NOTE — PROGRESS NOTES
Disclaimer:  This note has been generated using voice recognition software.  There may be type of graft focal areas that have been missed during a proof reading      Subjective:      Patient ID: Lorraine De Jesus is a 77 y.o. female.    Chief Complaint: Back Pain and Foot Pain      Pain  This is a chronic problem. The current episode started more than 1 year ago. The problem occurs daily. The problem has been waxing and waning. Associated symptoms include arthralgias and neck pain. Pertinent negatives include no change in bowel habit, chest pain, coughing, diaphoresis, fatigue, rash, sore throat, vertigo or vomiting.     Review of Systems   Constitutional: Negative for activity change, appetite change, diaphoresis, fatigue and unexpected weight change.   HENT: Negative for drooling, ear discharge, ear pain, facial swelling, nosebleeds, sore throat, trouble swallowing, voice change and goiter.    Eyes: Negative for photophobia, pain, discharge, redness and visual disturbance.   Respiratory: Negative for apnea, cough, choking, chest tightness, shortness of breath, wheezing and stridor.    Cardiovascular: Negative for chest pain, palpitations and leg swelling.   Gastrointestinal: Negative for abdominal distention, change in bowel habit, diarrhea, rectal pain, vomiting, fecal incontinence and change in bowel habit.   Endocrine: Negative for cold intolerance, heat intolerance, polydipsia, polyphagia and polyuria.   Genitourinary: Negative for bladder incontinence, dysuria, flank pain, frequency and hot flashes.   Musculoskeletal: Positive for arthralgias, back pain, leg pain and neck pain.   Integumentary:  Negative for color change, pallor and rash.   Allergic/Immunologic: Negative for immunocompromised state.   Neurological: Negative for dizziness, vertigo, seizures, syncope, facial asymmetry, speech difficulty, light-headedness, disturbances in coordination, memory loss and coordination difficulties.   Hematological:  "Negative for adenopathy. Does not bruise/bleed easily.   Psychiatric/Behavioral: Negative for agitation, behavioral problems, confusion, decreased concentration, dysphoric mood, hallucinations, self-injury and suicidal ideas. The patient is not nervous/anxious and is not hyperactive.             Objective:  Vitals:    02/22/22 1245   BP: (!) 168/78   Pulse: 81   Resp: 18   SpO2: 98%   Weight: 81.2 kg (179 lb)   Height: 5' 8" (1.727 m)   PainSc:   7         Physical Exam  Vitals and nursing note reviewed. Exam conducted with a chaperone present.   Constitutional:       General: She is awake.      Appearance: Normal appearance. She is not ill-appearing or diaphoretic.   HENT:      Head: Normocephalic and atraumatic.      Nose: Nose normal.      Mouth/Throat:      Mouth: Mucous membranes are moist.      Pharynx: Oropharynx is clear.   Eyes:      Conjunctiva/sclera: Conjunctivae normal.      Pupils: Pupils are equal, round, and reactive to light.   Cardiovascular:      Rate and Rhythm: Normal rate.   Pulmonary:      Effort: Pulmonary effort is normal. No respiratory distress.   Abdominal:      Palpations: Abdomen is soft.   Musculoskeletal:      Cervical back: Normal range of motion and neck supple.      Thoracic back: Tenderness present.      Lumbar back: Tenderness present. Decreased range of motion.   Skin:     General: Skin is warm and dry.   Neurological:      General: No focal deficit present.      Mental Status: She is alert and oriented to person, place, and time. Mental status is at baseline.      Cranial Nerves: Cranial nerves are intact. No cranial nerve deficit (II-XII).   Psychiatric:         Mood and Affect: Mood normal.         Behavior: Behavior normal. Behavior is cooperative.         Thought Content: Thought content normal.           Orders Placed This Encounter   Procedures    POCT Urine Drug Screen Presump     Interpretive Information:     Negative:  No drug detected at the cut off level. "   Positive:  This result represents presumptive positive for the   tested drug, other substances may yield a positive response other   than the analyte of interest. This result should be utilized for   diagnostic purpose only. Confirmation testing will be performed upon physician request only.           US FNA Biopsy w/ Imaging 1st Lesion  Narrative: EXAMINATION:  US FINE NEEDLE ASPIRATION BIOPSY, FIRST LESION    CLINICAL HISTORY:  thyroid nodule;Nontoxic single thyroid nodule    TECHNIQUE:  US FINE NEEDLE ASPIRATION BIOPSY, FIRST LESION    COMPARISON:  Comparisons were reviewed, if available.    FINDINGS:  FNA right thyroid    Performed by Dr.J Pina    A formal timeout was called all staff present agreed to patient and procedure.  The neck was prepped with ChloraPrep and sterile field was established.  1% lidocaine was used as local anesthetic.  Under ultrasound guidance 6 fine-needle aspirations were taken from the right thyroid nodule.  The puncture site was then cleaned and bandaged.    The patient tolerated the procedure well there no immediate postprocedure complications.  Impression: As above.    Electronically signed by: Louie Pina  Date:    11/02/2021  Time:    09:55       Office Visit on 02/08/2022   Component Date Value Ref Range Status    POC Glucose 02/08/2022 164 (A) 70 - 110 MG/DL Final   Office Visit on 01/19/2022   Component Date Value Ref Range Status    Sodium 01/19/2022 136  136 - 145 mmol/L Final    Potassium 01/19/2022 4.0  3.5 - 5.1 mmol/L Final    Chloride 01/19/2022 104  98 - 107 mmol/L Final    CO2 01/19/2022 28  21 - 32 mmol/L Final    Anion Gap 01/19/2022 8  7 - 16 mmol/L Final    Glucose 01/19/2022 174 (A) 74 - 106 mg/dL Final    BUN 01/19/2022 13  7 - 18 mg/dL Final    Creatinine 01/19/2022 0.79  0.55 - 1.02 mg/dL Final    BUN/Creatinine Ratio 01/19/2022 16  6 - 20 Final    Calcium 01/19/2022 8.8  8.5 - 10.1 mg/dL Final    eGFR 01/19/2022 75  >=60 mL/min/1.73m²  Final   Office Visit on 12/16/2021   Component Date Value Ref Range Status    Culture, Urine 12/16/2021 No Growth   Final   Office Visit on 12/14/2021   Component Date Value Ref Range Status    Final Diagnosis 12/14/2021    Final                    Value:This result contains rich text formatting which cannot be displayed here.    Specimen 1 Procedure 12/14/2021 Shave Biopsy   Final    Specimen 1 Morphology 12/14/2021 Brown papule   Final    Specimen 1 DDx 12/14/2021 DPN   Final    Microscopic Description 12/14/2021    Final                    Value:This result contains rich text formatting which cannot be displayed here.    Gross Description 12/14/2021    Final                    Value:This result contains rich text formatting which cannot be displayed here.    Laboratory Notes 12/14/2021    Final                    Value:This result contains rich text formatting which cannot be displayed here.   Office Visit on 12/08/2021   Component Date Value Ref Range Status    Hemoglobin A1C 12/08/2021 7.7 (A) 4.5 - 6.6 % Final    Estimated Average Glucose 12/08/2021 170  mg/dL Final   Admission on 12/06/2021, Discharged on 12/06/2021   Component Date Value Ref Range Status    Sodium 12/06/2021 141  136 - 145 mmol/L Final    Potassium 12/06/2021 4.2  3.5 - 5.1 mmol/L Final    Chloride 12/06/2021 107  98 - 107 mmol/L Final    CO2 12/06/2021 28  21 - 32 mmol/L Final    Anion Gap 12/06/2021 10  7 - 16 mmol/L Final    Glucose 12/06/2021 199 (A) 74 - 106 mg/dL Final    BUN 12/06/2021 8  7 - 18 mg/dL Final    Creatinine 12/06/2021 0.81  0.55 - 1.02 mg/dL Final    BUN/Creatinine Ratio 12/06/2021 10  6 - 20 Final    Calcium 12/06/2021 9.0  8.5 - 10.1 mg/dL Final    eGFR  12/06/2021 89  >=60 mL/min/1.73m² Final    Troponin I High Sensitivity 12/06/2021 4.3  <=60.4 pg/mL Final    Color, UA 12/06/2021 Yellow  Colorless, Straw, Yellow, Dark Yellow Final    Clarity, UA 12/06/2021 Clear  Clear Final     pH, UA 12/06/2021 7.5  5.0, 5.5, 6.0, 6.5, 7.0, 7.5, 8.0 pH Units Final    Leukocytes, UA 12/06/2021 Negative  Negative Final    Nitrites, UA 12/06/2021 Negative  Negative Final    Protein, UA 12/06/2021 Negative  Negative Final    Glucose, UA 12/06/2021 500  (A) Negative mg/dL Final    Ketones, UA 12/06/2021 Negative  Negative, Trace mg/dL Final    Urobilinogen, UA 12/06/2021 0.2  0.2, 1.0 mg/dL Final    Bilirubin, UA 12/06/2021 Negative  Negative Final    Blood, UA 12/06/2021 Negative  Negative Final    Specific [place], UA 12/06/2021 1.020  <=1.005, 1.010, 1.015, 1.020, 1.025, 1.030 Final    WBC 12/06/2021 7.44  4.50 - 11.00 K/uL Final    RBC 12/06/2021 4.54  4.20 - 5.40 M/uL Final    Hemoglobin 12/06/2021 12.2  12.0 - 16.0 g/dL Final    Hematocrit 12/06/2021 37.6 (A) 38.0 - 47.0 % Final    MCV 12/06/2021 82.8  80.0 - 96.0 fL Final    MCH 12/06/2021 26.9 (A) 27.0 - 31.0 pg Final    MCHC 12/06/2021 32.4  32.0 - 36.0 g/dL Final    RDW 12/06/2021 15.1 (A) 11.5 - 14.5 % Final    Platelet Count 12/06/2021 258  150 - 400 K/uL Final    MPV 12/06/2021 9.7  9.4 - 12.4 fL Final    Neutrophils % 12/06/2021 57.7  53.0 - 65.0 % Final    Lymphocytes % 12/06/2021 33.3  27.0 - 41.0 % Final    Monocytes % 12/06/2021 6.5 (A) 2.0 - 6.0 % Final    Eosinophils % 12/06/2021 1.7  1.0 - 4.0 % Final    Basophils % 12/06/2021 0.4  0.0 - 1.0 % Final    Immature Granulocytes % 12/06/2021 0.4  0.0 - 0.4 % Final    nRBC, Auto 12/06/2021 0.0  <=0.0 % Final    Neutrophils, Abs 12/06/2021 4.29  1.80 - 7.70 K/uL Final    Lymphocytes, Absolute 12/06/2021 2.48  1.00 - 4.80 K/uL Final    Monocytes, Absolute 12/06/2021 0.48  0.00 - 0.80 K/uL Final    Eosinophils, Absolute 12/06/2021 0.13  0.00 - 0.50 K/uL Final    Basophils, Absolute 12/06/2021 0.03  0.00 - 0.20 K/uL Final    Immature Granulocytes, Absolute 12/06/2021 0.03  0.00 - 0.04 K/uL Final    nRBC, Absolute 12/06/2021 0.00  <=0.00 x10e3/uL Final    Diff  Type 12/06/2021 Auto   Final    POC Glucose 12/06/2021 214 (A) 70 - 105 mg/dL Final   Office Visit on 12/06/2021   Component Date Value Ref Range Status    POC Glucose 12/06/2021 250 (A) 70 - 110 MG/DL Final   Office Visit on 11/29/2021   Component Date Value Ref Range Status    POC Amphetamines 11/29/2021 Negative  Negative, Inconclusive Final    POC Barbiturates 11/29/2021 Negative  Negative, Inconclusive Final    POC Benzodiazepines 11/29/2021 Negative  Negative, Inconclusive Final    POC Cocaine 11/29/2021 Negative  Negative, Inconclusive Final    POC THC 11/29/2021 Negative  Negative, Inconclusive Final    POC Methadone 11/29/2021 Negative  Negative, Inconclusive Final    POC Methamphetamine 11/29/2021 Negative  Negative, Inconclusive Final    POC Opiates 11/29/2021 Presumptive Positive (A) Negative, Inconclusive Final    POC Oxycodone 11/29/2021 Negative  Negative, Inconclusive Final    POC Phencyclidine 11/29/2021 Negative  Negative, Inconclusive Final    POC Methylenedioxymethamphetamine * 11/29/2021 Negative  Negative, Inconclusive Final    POC Tricyclic Antidepressants 11/29/2021 Negative  Negative, Inconclusive Final    POC Buprenorphine 11/29/2021 Negative   Final     Acceptable 11/29/2021 Yes   Final    POC Temperature (Urine) 11/29/2021 96   Final   Office Visit on 11/22/2021   Component Date Value Ref Range Status    Color, UA 11/22/2021 Yellow  Colorless, Straw, Yellow, Dark Yellow Final    Clarity, UA 11/22/2021 Clear  Clear Final    pH, UA 11/22/2021 6.0  5.0, 5.5, 6.0, 6.5, 7.0, 7.5, 8.0 pH Units Final    Leukocytes, UA 11/22/2021 Negative  Negative Final    Nitrites, UA 11/22/2021 Negative  Negative Final    Protein, UA 11/22/2021 Negative  Negative Final    Glucose, UA 11/22/2021 Negative  Negative mg/dL Final    Ketones, UA 11/22/2021 Negative  Negative, Trace mg/dL Final    Urobilinogen, UA 11/22/2021 0.2  0.2, 1.0 mg/dL Final    Bilirubin, UA  11/22/2021 Negative  Negative Final    Blood, UA 11/22/2021 Large (A) Negative Final    Specific [place], UA 11/22/2021 1.025  <=1.005, 1.010, 1.015, 1.020, 1.025, 1.030 Final    WBC, UA 11/22/2021 0-5  None Seen, 0-5 /hpf Final    RBC, UA 11/22/2021 25-50 (A) None Seen, 0-3 /hpf Final    Bacteria, UA 11/22/2021 Few (A) None Seen, None Seen To Occasional, Rare /hpf Final    Squamous Epithelial Cells, UA 11/22/2021 Few (A) None Seen, Rare, None Seen To Occasional /lpf Final   Hospital Outpatient Visit on 11/02/2021   Component Date Value Ref Range Status    Final Diagnosis 11/02/2021    Final                    Value:This result contains rich text formatting which cannot be displayed here.    Clinical Information 11/02/2021    Final                    Value:This result contains rich text formatting which cannot be displayed here.    Microscopic Description 11/02/2021    Final                    Value:This result contains rich text formatting which cannot be displayed here.   There may be more visits with results that are not included.         Assessment:      1. Lumbosacral radiculopathy    2. Diabetic neuropathy with neurologic complication    3. Bilateral foot pain    4. Encounter for long-term (current) use of other medications            A's of Opioid Risk Assessment  Activity:Patient can perform ADL.   Analgesia:Patients pain is partially controlled by current medication. Patient has tried OTC medications such as Tylenol and Ibuprofen with out relief.   Adverse Effects: Patient denies constipation or sedation.  Aberrant Behavior:  reviewed with no aberrant drug seeking/taking behavior.  Overdose reversal drug naloxone discussed    Drug screen reviewed      Requested Prescriptions     Signed Prescriptions Disp Refills    gabapentin (NEURONTIN) 400 MG capsule 90 capsule 2     Sig: Take 1 capsule (400 mg total) by mouth every 8 (eight) hours.    DULoxetine (CYMBALTA) 60 MG capsule 30 capsule 2      Sig: Take 1 capsule (60 mg total) by mouth once daily. Take one capsule at bedtime    HYDROcodone-acetaminophen (NORCO)  mg per tablet 120 tablet 0     Sig: Take 1 tablet by mouth every 6 (six) hours.    HYDROcodone-acetaminophen (NORCO)  mg per tablet 120 tablet 0     Sig: Take 1 tablet by mouth every 6 (six) hours.    HYDROcodone-acetaminophen (NORCO)  mg per tablet 120 tablet 0     Sig: Take 1 tablet by mouth every 6 (six) hours.         Plan:    Follows Neurology [facility]    Complaint increasing neuropathy type symptoms lower extremities    She states Cymbalta is helping    Again today she has complaint of back and joint pain    She states she is considering lumbar procedures    Would like to continue with conservative management current medications for now    Continue activity as directed    Follow-up 3 months    Dr. Brown, August 2021    Bring original prescription medication bottles/container/box with labels to each visit

## 2022-02-22 ENCOUNTER — OFFICE VISIT (OUTPATIENT)
Dept: PAIN MEDICINE | Facility: CLINIC | Age: 77
End: 2022-02-22
Payer: COMMERCIAL

## 2022-02-22 VITALS
DIASTOLIC BLOOD PRESSURE: 78 MMHG | OXYGEN SATURATION: 98 % | SYSTOLIC BLOOD PRESSURE: 168 MMHG | BODY MASS INDEX: 27.13 KG/M2 | WEIGHT: 179 LBS | HEART RATE: 81 BPM | RESPIRATION RATE: 18 BRPM | HEIGHT: 68 IN

## 2022-02-22 DIAGNOSIS — M79.672 BILATERAL FOOT PAIN: ICD-10-CM

## 2022-02-22 DIAGNOSIS — M79.671 BILATERAL FOOT PAIN: ICD-10-CM

## 2022-02-22 DIAGNOSIS — E11.40 DIABETIC NEUROPATHY WITH NEUROLOGIC COMPLICATION: Chronic | ICD-10-CM

## 2022-02-22 DIAGNOSIS — Z79.899 ENCOUNTER FOR LONG-TERM (CURRENT) USE OF OTHER MEDICATIONS: ICD-10-CM

## 2022-02-22 DIAGNOSIS — M54.17 LUMBOSACRAL RADICULOPATHY: Primary | Chronic | ICD-10-CM

## 2022-02-22 DIAGNOSIS — E11.49 DIABETIC NEUROPATHY WITH NEUROLOGIC COMPLICATION: Chronic | ICD-10-CM

## 2022-02-22 PROCEDURE — 99214 OFFICE O/P EST MOD 30 MIN: CPT | Mod: S$PBB,,, | Performed by: PHYSICIAN ASSISTANT

## 2022-02-22 PROCEDURE — 3078F DIAST BP <80 MM HG: CPT | Mod: CPTII,,, | Performed by: PHYSICIAN ASSISTANT

## 2022-02-22 PROCEDURE — 3288F FALL RISK ASSESSMENT DOCD: CPT | Mod: CPTII,,, | Performed by: PHYSICIAN ASSISTANT

## 2022-02-22 PROCEDURE — 1159F MED LIST DOCD IN RCRD: CPT | Mod: CPTII,,, | Performed by: PHYSICIAN ASSISTANT

## 2022-02-22 PROCEDURE — 1125F AMNT PAIN NOTED PAIN PRSNT: CPT | Mod: CPTII,,, | Performed by: PHYSICIAN ASSISTANT

## 2022-02-22 PROCEDURE — 80305 DRUG TEST PRSMV DIR OPT OBS: CPT | Mod: PBBFAC | Performed by: PHYSICIAN ASSISTANT

## 2022-02-22 PROCEDURE — 3051F HG A1C>EQUAL 7.0%<8.0%: CPT | Mod: CPTII,,, | Performed by: PHYSICIAN ASSISTANT

## 2022-02-22 PROCEDURE — 3078F PR MOST RECENT DIASTOLIC BLOOD PRESSURE < 80 MM HG: ICD-10-PCS | Mod: CPTII,,, | Performed by: PHYSICIAN ASSISTANT

## 2022-02-22 PROCEDURE — 3288F PR FALLS RISK ASSESSMENT DOCUMENTED: ICD-10-PCS | Mod: CPTII,,, | Performed by: PHYSICIAN ASSISTANT

## 2022-02-22 PROCEDURE — 1159F PR MEDICATION LIST DOCUMENTED IN MEDICAL RECORD: ICD-10-PCS | Mod: CPTII,,, | Performed by: PHYSICIAN ASSISTANT

## 2022-02-22 PROCEDURE — 3077F SYST BP >= 140 MM HG: CPT | Mod: CPTII,,, | Performed by: PHYSICIAN ASSISTANT

## 2022-02-22 PROCEDURE — 3051F PR MOST RECENT HEMOGLOBIN A1C LEVEL 7.0 - < 8.0%: ICD-10-PCS | Mod: CPTII,,, | Performed by: PHYSICIAN ASSISTANT

## 2022-02-22 PROCEDURE — 1101F PR PT FALLS ASSESS DOC 0-1 FALLS W/OUT INJ PAST YR: ICD-10-PCS | Mod: CPTII,,, | Performed by: PHYSICIAN ASSISTANT

## 2022-02-22 PROCEDURE — 99215 OFFICE O/P EST HI 40 MIN: CPT | Mod: PBBFAC | Performed by: PHYSICIAN ASSISTANT

## 2022-02-22 PROCEDURE — 99214 PR OFFICE/OUTPT VISIT, EST, LEVL IV, 30-39 MIN: ICD-10-PCS | Mod: S$PBB,,, | Performed by: PHYSICIAN ASSISTANT

## 2022-02-22 PROCEDURE — 1125F PR PAIN SEVERITY QUANTIFIED, PAIN PRESENT: ICD-10-PCS | Mod: CPTII,,, | Performed by: PHYSICIAN ASSISTANT

## 2022-02-22 PROCEDURE — 3077F PR MOST RECENT SYSTOLIC BLOOD PRESSURE >= 140 MM HG: ICD-10-PCS | Mod: CPTII,,, | Performed by: PHYSICIAN ASSISTANT

## 2022-02-22 PROCEDURE — 1101F PT FALLS ASSESS-DOCD LE1/YR: CPT | Mod: CPTII,,, | Performed by: PHYSICIAN ASSISTANT

## 2022-02-22 RX ORDER — DULOXETIN HYDROCHLORIDE 60 MG/1
60 CAPSULE, DELAYED RELEASE ORAL DAILY
Qty: 30 CAPSULE | Refills: 2 | Status: SHIPPED | OUTPATIENT
Start: 2022-02-22 | End: 2022-05-18 | Stop reason: SDUPTHER

## 2022-02-22 RX ORDER — HYDROCODONE BITARTRATE AND ACETAMINOPHEN 10; 325 MG/1; MG/1
1 TABLET ORAL EVERY 6 HOURS
Qty: 120 TABLET | Refills: 0 | Status: SHIPPED | OUTPATIENT
Start: 2022-03-29 | End: 2022-03-02

## 2022-02-22 RX ORDER — HYDROCODONE BITARTRATE AND ACETAMINOPHEN 10; 325 MG/1; MG/1
1 TABLET ORAL EVERY 6 HOURS
Qty: 120 TABLET | Refills: 0 | Status: SHIPPED | OUTPATIENT
Start: 2022-02-27 | End: 2022-03-02

## 2022-02-22 RX ORDER — HYDROCODONE BITARTRATE AND ACETAMINOPHEN 10; 325 MG/1; MG/1
1 TABLET ORAL EVERY 6 HOURS
Qty: 120 TABLET | Refills: 0 | Status: SHIPPED | OUTPATIENT
Start: 2022-04-28 | End: 2022-07-13 | Stop reason: SDUPTHER

## 2022-02-22 RX ORDER — GABAPENTIN 400 MG/1
400 CAPSULE ORAL EVERY 8 HOURS
Qty: 90 CAPSULE | Refills: 2 | Status: SHIPPED | OUTPATIENT
Start: 2022-02-22 | End: 2022-03-02

## 2022-03-02 ENCOUNTER — OFFICE VISIT (OUTPATIENT)
Dept: FAMILY MEDICINE | Facility: CLINIC | Age: 77
End: 2022-03-02
Payer: COMMERCIAL

## 2022-03-02 VITALS
SYSTOLIC BLOOD PRESSURE: 144 MMHG | DIASTOLIC BLOOD PRESSURE: 82 MMHG | OXYGEN SATURATION: 96 % | RESPIRATION RATE: 18 BRPM | WEIGHT: 178.63 LBS | HEART RATE: 72 BPM | HEIGHT: 68 IN | BODY MASS INDEX: 27.07 KG/M2 | TEMPERATURE: 98 F

## 2022-03-02 DIAGNOSIS — J02.9 SORE THROAT: Primary | ICD-10-CM

## 2022-03-02 DIAGNOSIS — J02.0 STREP THROAT: ICD-10-CM

## 2022-03-02 DIAGNOSIS — E11.9 DIABETES MELLITUS WITHOUT COMPLICATION: ICD-10-CM

## 2022-03-02 DIAGNOSIS — J06.9 UPPER RESPIRATORY TRACT INFECTION, UNSPECIFIED TYPE: ICD-10-CM

## 2022-03-02 DIAGNOSIS — R53.83 OTHER FATIGUE: ICD-10-CM

## 2022-03-02 LAB
ANION GAP SERPL CALCULATED.3IONS-SCNC: 7 MMOL/L (ref 7–16)
BASOPHILS # BLD AUTO: 0.04 K/UL (ref 0–0.2)
BASOPHILS NFR BLD AUTO: 0.8 % (ref 0–1)
BUN SERPL-MCNC: 17 MG/DL (ref 7–18)
BUN/CREAT SERPL: 20 (ref 6–20)
CALCIUM SERPL-MCNC: 9.3 MG/DL (ref 8.5–10.1)
CHLORIDE SERPL-SCNC: 107 MMOL/L (ref 98–107)
CO2 SERPL-SCNC: 28 MMOL/L (ref 21–32)
CREAT SERPL-MCNC: 0.86 MG/DL (ref 0.55–1.02)
CTP QC/QA: YES
DIFFERENTIAL METHOD BLD: ABNORMAL
EOSINOPHIL # BLD AUTO: 0.11 K/UL (ref 0–0.5)
EOSINOPHIL NFR BLD AUTO: 2.2 % (ref 1–4)
ERYTHROCYTE [DISTWIDTH] IN BLOOD BY AUTOMATED COUNT: 15.3 % (ref 11.5–14.5)
EST. AVERAGE GLUCOSE BLD GHB EST-MCNC: 154 MG/DL
GLUCOSE SERPL-MCNC: 140 MG/DL (ref 74–106)
HBA1C MFR BLD HPLC: 7.2 % (ref 4.5–6.6)
HCT VFR BLD AUTO: 42.4 % (ref 38–47)
HGB BLD-MCNC: 13.3 G/DL (ref 12–16)
IMM GRANULOCYTES # BLD AUTO: 0.01 K/UL (ref 0–0.04)
IMM GRANULOCYTES NFR BLD: 0.2 % (ref 0–0.4)
LYMPHOCYTES # BLD AUTO: 1.73 K/UL (ref 1–4.8)
LYMPHOCYTES NFR BLD AUTO: 34.2 % (ref 27–41)
MCH RBC QN AUTO: 26.9 PG (ref 27–31)
MCHC RBC AUTO-ENTMCNC: 31.4 G/DL (ref 32–36)
MCV RBC AUTO: 85.7 FL (ref 80–96)
MONOCYTES # BLD AUTO: 0.33 K/UL (ref 0–0.8)
MONOCYTES NFR BLD AUTO: 6.5 % (ref 2–6)
MPC BLD CALC-MCNC: 10.1 FL (ref 9.4–12.4)
NEUTROPHILS # BLD AUTO: 2.84 K/UL (ref 1.8–7.7)
NEUTROPHILS NFR BLD AUTO: 56.1 % (ref 53–65)
NRBC # BLD AUTO: 0 X10E3/UL
NRBC, AUTO (.00): 0 %
PLATELET # BLD AUTO: 313 K/UL (ref 150–400)
POTASSIUM SERPL-SCNC: 4 MMOL/L (ref 3.5–5.1)
RBC # BLD AUTO: 4.95 M/UL (ref 4.2–5.4)
S PYO RRNA THROAT QL PROBE: NEGATIVE
SODIUM SERPL-SCNC: 138 MMOL/L (ref 136–145)
TSH SERPL DL<=0.005 MIU/L-ACNC: 0.72 UIU/ML (ref 0.36–3.74)
VIT B12 SERPL-MCNC: 1051 PG/ML (ref 193–986)
WBC # BLD AUTO: 5.06 K/UL (ref 4.5–11)

## 2022-03-02 PROCEDURE — 3079F DIAST BP 80-89 MM HG: CPT | Mod: ,,, | Performed by: INTERNAL MEDICINE

## 2022-03-02 PROCEDURE — 1160F PR REVIEW ALL MEDS BY PRESCRIBER/CLIN PHARMACIST DOCUMENTED: ICD-10-PCS | Mod: ,,, | Performed by: INTERNAL MEDICINE

## 2022-03-02 PROCEDURE — 3077F SYST BP >= 140 MM HG: CPT | Mod: ,,, | Performed by: INTERNAL MEDICINE

## 2022-03-02 PROCEDURE — 87880 POCT RAPID STREP A: ICD-10-PCS | Mod: QW,,, | Performed by: INTERNAL MEDICINE

## 2022-03-02 PROCEDURE — 4010F ACE/ARB THERAPY RXD/TAKEN: CPT | Mod: ,,, | Performed by: INTERNAL MEDICINE

## 2022-03-02 PROCEDURE — 3051F PR MOST RECENT HEMOGLOBIN A1C LEVEL 7.0 - < 8.0%: ICD-10-PCS | Mod: ,,, | Performed by: INTERNAL MEDICINE

## 2022-03-02 PROCEDURE — 85025 COMPLETE CBC W/AUTO DIFF WBC: CPT | Mod: ,,, | Performed by: CLINICAL MEDICAL LABORATORY

## 2022-03-02 PROCEDURE — 1159F MED LIST DOCD IN RCRD: CPT | Mod: ,,, | Performed by: INTERNAL MEDICINE

## 2022-03-02 PROCEDURE — 3288F PR FALLS RISK ASSESSMENT DOCUMENTED: ICD-10-PCS | Mod: ,,, | Performed by: INTERNAL MEDICINE

## 2022-03-02 PROCEDURE — 80048 BASIC METABOLIC PANEL: ICD-10-PCS | Mod: ,,, | Performed by: CLINICAL MEDICAL LABORATORY

## 2022-03-02 PROCEDURE — 85025 CBC WITH DIFFERENTIAL: ICD-10-PCS | Mod: ,,, | Performed by: CLINICAL MEDICAL LABORATORY

## 2022-03-02 PROCEDURE — 4010F PR ACE/ARB THEARPY RXD/TAKEN: ICD-10-PCS | Mod: ,,, | Performed by: INTERNAL MEDICINE

## 2022-03-02 PROCEDURE — 83036 HEMOGLOBIN GLYCOSYLATED A1C: CPT | Mod: ,,, | Performed by: CLINICAL MEDICAL LABORATORY

## 2022-03-02 PROCEDURE — 99214 OFFICE O/P EST MOD 30 MIN: CPT | Mod: ,,, | Performed by: INTERNAL MEDICINE

## 2022-03-02 PROCEDURE — 3051F HG A1C>EQUAL 7.0%<8.0%: CPT | Mod: ,,, | Performed by: INTERNAL MEDICINE

## 2022-03-02 PROCEDURE — 99214 PR OFFICE/OUTPT VISIT, EST, LEVL IV, 30-39 MIN: ICD-10-PCS | Mod: ,,, | Performed by: INTERNAL MEDICINE

## 2022-03-02 PROCEDURE — 84443 TSH: ICD-10-PCS | Mod: ,,, | Performed by: CLINICAL MEDICAL LABORATORY

## 2022-03-02 PROCEDURE — 82607 VITAMIN B12: ICD-10-PCS | Mod: GZ,,, | Performed by: CLINICAL MEDICAL LABORATORY

## 2022-03-02 PROCEDURE — 3288F FALL RISK ASSESSMENT DOCD: CPT | Mod: ,,, | Performed by: INTERNAL MEDICINE

## 2022-03-02 PROCEDURE — 3077F PR MOST RECENT SYSTOLIC BLOOD PRESSURE >= 140 MM HG: ICD-10-PCS | Mod: ,,, | Performed by: INTERNAL MEDICINE

## 2022-03-02 PROCEDURE — 82607 VITAMIN B-12: CPT | Mod: GZ,,, | Performed by: CLINICAL MEDICAL LABORATORY

## 2022-03-02 PROCEDURE — 1159F PR MEDICATION LIST DOCUMENTED IN MEDICAL RECORD: ICD-10-PCS | Mod: ,,, | Performed by: INTERNAL MEDICINE

## 2022-03-02 PROCEDURE — 83036 HEMOGLOBIN A1C: ICD-10-PCS | Mod: ,,, | Performed by: CLINICAL MEDICAL LABORATORY

## 2022-03-02 PROCEDURE — 84443 ASSAY THYROID STIM HORMONE: CPT | Mod: ,,, | Performed by: CLINICAL MEDICAL LABORATORY

## 2022-03-02 PROCEDURE — 1160F RVW MEDS BY RX/DR IN RCRD: CPT | Mod: ,,, | Performed by: INTERNAL MEDICINE

## 2022-03-02 PROCEDURE — 3079F PR MOST RECENT DIASTOLIC BLOOD PRESSURE 80-89 MM HG: ICD-10-PCS | Mod: ,,, | Performed by: INTERNAL MEDICINE

## 2022-03-02 PROCEDURE — 80048 BASIC METABOLIC PNL TOTAL CA: CPT | Mod: ,,, | Performed by: CLINICAL MEDICAL LABORATORY

## 2022-03-02 PROCEDURE — 1101F PT FALLS ASSESS-DOCD LE1/YR: CPT | Mod: ,,, | Performed by: INTERNAL MEDICINE

## 2022-03-02 PROCEDURE — 1101F PR PT FALLS ASSESS DOC 0-1 FALLS W/OUT INJ PAST YR: ICD-10-PCS | Mod: ,,, | Performed by: INTERNAL MEDICINE

## 2022-03-02 PROCEDURE — 87880 STREP A ASSAY W/OPTIC: CPT | Mod: QW,,, | Performed by: INTERNAL MEDICINE

## 2022-03-02 NOTE — PROGRESS NOTES
Subjective:       Patient ID: Lorraine De Jesus is a 77 y.o. female.    Chief Complaint: Fatigue (Also decrease in appetite), Medication Refill, and Diabetes (Already seeing LORIN Morse)    Patient is here today for check up evaluation. Patient is complaining of lack of energy. Advised patient to take multivitamin a day and she states she does. Will order lab work to evaluation fatigue. Patient pressure is increased today. States her pressure is high at home as well. Patient is currently taking Toprol XL, Benicar, and takes Clonidine PRN. Recommended to increased Benicar but patient requesting to wait. Will recheck pressure in 3 weeks and advised patient to keep pressure diary. Diet and exercise discussed. Patient also endorses a sore throat. Denies coughing and is a non smoker. Redness seen on exam. Will check for strep. Will prescribe Amoxil 500 mg PO BID for 5 days.       Current Medications:    Current Outpatient Medications:     aspirin (ECOTRIN) 81 MG EC tablet, Take 81 mg by mouth once daily., Disp: , Rfl:     blood sugar diagnostic (ONETOUCH VERIO TEST STRIPS) Strp, 1 strip by Misc.(Non-Drug; Combo Route) route 2 (two) times daily., Disp: 200 strip, Rfl: 4    blood sugar diagnostic Strp, 1 strip by Misc.(Non-Drug; Combo Route) route 3 (three) times daily., Disp: 200 strip, Rfl: 3    blood-glucose meter kit, Use as instructed, Disp: 1 each, Rfl: 0    cloNIDine (CATAPRES) 0.1 MG tablet, TAKE 1 TABLET BY MOUTH EVERY 4 HOURS AS NEEDED FOR BLOOD PRESSURE GREATER THAN 170/90, Disp: , Rfl:     dapagliflozin (FARXIGA) 10 mg tablet, Take 1 tablet (10 mg total) by mouth once daily., Disp: 90 tablet, Rfl: 1    DULoxetine (CYMBALTA) 60 MG capsule, Take 1 capsule (60 mg total) by mouth once daily. Take one capsule at bedtime, Disp: 30 capsule, Rfl: 2    DULoxetine (CYMBALTA) 60 MG capsule, TAKE 1 CAPSULE(60 MG) BY MOUTH EVERY DAY AT BEDTIME, Disp: 30 capsule, Rfl: 2    fluconazole (DIFLUCAN) 150 MG Tab, , Disp: , Rfl:      gabapentin (NEURONTIN) 400 MG capsule, TAKE 1 CAPSULE(400 MG) BY MOUTH EVERY 8 HOURS, Disp: 90 capsule, Rfl: 2    glipiZIDE (GLUCOTROL) 10 MG TR24, Take 1 tablet (10 mg total) by mouth daily with breakfast., Disp: 30 tablet, Rfl: 3    [START ON 4/28/2022] HYDROcodone-acetaminophen (NORCO)  mg per tablet, Take 1 tablet by mouth every 6 (six) hours., Disp: 120 tablet, Rfl: 0    lancets (ACCU-CHEK FASTCLIX LANCET DRUM MISC), Take 1 each by mouth once daily., Disp: , Rfl:     levothyroxine (SYNTHROID) 75 MCG tablet, Take 1 tablet (75 mcg total) by mouth before breakfast., Disp: 90 tablet, Rfl: 1    metoprolol succinate (TOPROL-XL) 50 MG 24 hr tablet, Take 1 tablet (50 mg total) by mouth once daily., Disp: 30 tablet, Rfl: 3    olmesartan (BENICAR) 5 MG Tab, Take 5 mg by mouth once daily., Disp: , Rfl:     ONETOUCH DELICA PLUS LANCET 33 gauge Misc, Check blood sugar TID, Disp: 100 each, Rfl: 11    oxybutynin (DITROPAN-XL) 5 MG TR24, TAKE 1 TABLET(5 MG) BY MOUTH EVERY DAY, Disp: 90 tablet, Rfl: 3    Last Labs:     Office Visit on 03/02/2022   Component Date Value    Rapid Strep A Screen 03/02/2022 Negative      Acceptab* 03/02/2022 Yes     Sodium 03/02/2022 138     Potassium 03/02/2022 4.0     Chloride 03/02/2022 107     CO2 03/02/2022 28     Anion Gap 03/02/2022 7     Glucose 03/02/2022 140 (A)    BUN 03/02/2022 17     Creatinine 03/02/2022 0.86     BUN/Creatinine Ratio 03/02/2022 20     Calcium 03/02/2022 9.3     eGFR 03/02/2022 68     Hemoglobin A1C 03/02/2022 7.2 (A)    Estimated Average Glucose 03/02/2022 154     Vitamin B12 03/02/2022 1,051 (A)    TSH 03/02/2022 0.722     WBC 03/02/2022 5.06     RBC 03/02/2022 4.95     Hemoglobin 03/02/2022 13.3     Hematocrit 03/02/2022 42.4     MCV 03/02/2022 85.7     MCH 03/02/2022 26.9 (A)    MCHC 03/02/2022 31.4 (A)    RDW 03/02/2022 15.3 (A)    Platelet Count 03/02/2022 313     MPV 03/02/2022 10.1     Neutrophils %  03/02/2022 56.1     Lymphocytes % 03/02/2022 34.2     Monocytes % 03/02/2022 6.5 (A)    Eosinophils % 03/02/2022 2.2     Basophils % 03/02/2022 0.8     Immature Granulocytes % 03/02/2022 0.2     nRBC, Auto 03/02/2022 0.0     Neutrophils, Abs 03/02/2022 2.84     Lymphocytes, Absolute 03/02/2022 1.73     Monocytes, Absolute 03/02/2022 0.33     Eosinophils, Absolute 03/02/2022 0.11     Basophils, Absolute 03/02/2022 0.04     Immature Granulocytes, A* 03/02/2022 0.01     nRBC, Absolute 03/02/2022 0.00     Diff Type 03/02/2022 Auto    Office Visit on 02/22/2022   Component Date Value    POC Amphetamines 02/22/2022 Negative     POC Barbiturates 02/22/2022 Negative     POC Benzodiazepines 02/22/2022 Negative     POC Cocaine 02/22/2022 Negative     POC THC 02/22/2022 Negative     POC Methadone 02/22/2022 Negative     POC Methamphetamine 02/22/2022 Negative     POC Opiates 02/22/2022 Presumptive Positive (A)    POC Oxycodone 02/22/2022 Negative     POC Phencyclidine 02/22/2022 Negative     POC Methylenedioxymetham* 02/22/2022 Negative     POC Tricyclic Antidepres* 02/22/2022 Negative     POC Buprenorphine 02/22/2022 Negative      Acceptab* 02/22/2022 Yes     POC Temperature (Urine) 02/22/2022 96    Office Visit on 02/08/2022   Component Date Value    POC Glucose 02/08/2022 164 (A)       Last Imaging:  US FNA Biopsy w/ Imaging 1st Lesion  Narrative: EXAMINATION:  US FINE NEEDLE ASPIRATION BIOPSY, FIRST LESION    CLINICAL HISTORY:  thyroid nodule;Nontoxic single thyroid nodule    TECHNIQUE:  US FINE NEEDLE ASPIRATION BIOPSY, FIRST LESION    COMPARISON:  Comparisons were reviewed, if available.    FINDINGS:  FNA right thyroid    Performed by Dr.J Pina    A formal timeout was called all staff present agreed to patient and procedure.  The neck was prepped with ChloraPrep and sterile field was established.  1% lidocaine was used as local anesthetic.  Under ultrasound guidance 6  fine-needle aspirations were taken from the right thyroid nodule.  The puncture site was then cleaned and bandaged.    The patient tolerated the procedure well there no immediate postprocedure complications.  Impression: As above.    Electronically signed by: Louie Pina  Date:    11/02/2021  Time:    09:55         Review of Systems   Constitutional: Positive for activity change and fatigue.   HENT: Positive for sore throat.    All other systems reviewed and are negative.        Objective:      Physical Exam  Vitals reviewed.   Constitutional:       Appearance: Normal appearance.   HENT:      Mouth/Throat:      Tonsils: Tonsillar exudate present.   Cardiovascular:      Rate and Rhythm: Normal rate and regular rhythm.      Pulses: Normal pulses.      Heart sounds: Normal heart sounds.   Pulmonary:      Effort: Pulmonary effort is normal.      Breath sounds: Normal breath sounds.   Abdominal:      General: Abdomen is flat. Bowel sounds are normal.      Palpations: Abdomen is soft.   Musculoskeletal:         General: Normal range of motion.      Cervical back: Normal range of motion and neck supple.   Skin:     General: Skin is warm and dry.   Neurological:      General: No focal deficit present.      Mental Status: She is alert and oriented to person, place, and time. Mental status is at baseline.         Assessment:       1. Sore throat  POCT rapid strep A   2. Diabetes mellitus without complication  Basic Metabolic Panel    CBC Auto Differential    Hemoglobin A1C    Basic Metabolic Panel    CBC Auto Differential    Hemoglobin A1C    A1C drawn today   3. Strep throat     4. Upper respiratory tract infection, unspecified type     5. Other fatigue  Vitamin B12    TSH    Vitamin B12    TSH        Plan:         Lorraine was seen today for fatigue, medication refill and diabetes.    Diagnoses and all orders for this visit:    Sore throat  -     POCT rapid strep A    Diabetes mellitus without complication  Comments:  A1C  drawn today  Orders:  -     Basic Metabolic Panel; Future  -     CBC Auto Differential; Future  -     Hemoglobin A1C; Future  -     Basic Metabolic Panel  -     CBC Auto Differential  -     Hemoglobin A1C    Strep throat    Upper respiratory tract infection, unspecified type    Other fatigue  -     Vitamin B12; Future  -     TSH; Future  -     Vitamin B12  -     TSH    Other orders  The following orders have not been finalized:  -     glipiZIDE (GLUCOTROL) 10 MG TR24  -     amoxicillin (AMOXIL) 500 MG Tab

## 2022-03-02 NOTE — PATIENT INSTRUCTIONS
Lorraine was seen today for fatigue, medication refill and diabetes.    Diagnoses and all orders for this visit:    Sore throat  -     POCT rapid strep A    Diabetes mellitus without complication  Comments:  A1C drawn today  Orders:  -     Basic Metabolic Panel; Future  -     CBC Auto Differential; Future  -     Hemoglobin A1C; Future  -     Basic Metabolic Panel  -     CBC Auto Differential  -     Hemoglobin A1C    Strep throat    Upper respiratory tract infection, unspecified type    Other fatigue  -     Vitamin B12; Future  -     TSH; Future  -     Vitamin B12  -     TSH    Other orders  The following orders have not been finalized:  -     glipiZIDE (GLUCOTROL) 10 MG TR24  -     amoxicillin (AMOXIL) 500 MG Tab

## 2022-03-03 RX ORDER — GLIPIZIDE 10 MG/1
10 TABLET, FILM COATED, EXTENDED RELEASE ORAL
Qty: 30 TABLET | Refills: 3 | Status: SHIPPED | OUTPATIENT
Start: 2022-03-03 | End: 2022-05-11 | Stop reason: SDUPTHER

## 2022-03-03 RX ORDER — AMOXICILLIN 500 MG/1
500 TABLET, FILM COATED ORAL EVERY 12 HOURS
Qty: 10 TABLET | Refills: 0 | Status: SHIPPED | OUTPATIENT
Start: 2022-03-03 | End: 2022-03-08

## 2022-03-15 ENCOUNTER — OFFICE VISIT (OUTPATIENT)
Dept: FAMILY MEDICINE | Facility: CLINIC | Age: 77
End: 2022-03-15
Payer: COMMERCIAL

## 2022-03-15 VITALS
WEIGHT: 180.81 LBS | OXYGEN SATURATION: 100 % | BODY MASS INDEX: 27.4 KG/M2 | DIASTOLIC BLOOD PRESSURE: 81 MMHG | RESPIRATION RATE: 18 BRPM | TEMPERATURE: 98 F | HEART RATE: 83 BPM | SYSTOLIC BLOOD PRESSURE: 137 MMHG | HEIGHT: 68 IN

## 2022-03-15 DIAGNOSIS — M54.17 LUMBOSACRAL RADICULOPATHY: Chronic | ICD-10-CM

## 2022-03-15 DIAGNOSIS — M79.672 BILATERAL FOOT PAIN: ICD-10-CM

## 2022-03-15 DIAGNOSIS — G62.9 NEUROPATHY: ICD-10-CM

## 2022-03-15 DIAGNOSIS — E11.49 DIABETIC NEUROPATHY WITH NEUROLOGIC COMPLICATION: Chronic | ICD-10-CM

## 2022-03-15 DIAGNOSIS — I10 PRIMARY HYPERTENSION: Primary | ICD-10-CM

## 2022-03-15 DIAGNOSIS — M79.671 BILATERAL FOOT PAIN: ICD-10-CM

## 2022-03-15 DIAGNOSIS — E11.40 DIABETIC NEUROPATHY WITH NEUROLOGIC COMPLICATION: Chronic | ICD-10-CM

## 2022-03-15 PROCEDURE — 3288F FALL RISK ASSESSMENT DOCD: CPT | Mod: ,,, | Performed by: INTERNAL MEDICINE

## 2022-03-15 PROCEDURE — 4010F ACE/ARB THERAPY RXD/TAKEN: CPT | Mod: ,,, | Performed by: INTERNAL MEDICINE

## 2022-03-15 PROCEDURE — 1160F RVW MEDS BY RX/DR IN RCRD: CPT | Mod: ,,, | Performed by: INTERNAL MEDICINE

## 2022-03-15 PROCEDURE — 1125F AMNT PAIN NOTED PAIN PRSNT: CPT | Mod: ,,, | Performed by: INTERNAL MEDICINE

## 2022-03-15 PROCEDURE — 1159F MED LIST DOCD IN RCRD: CPT | Mod: ,,, | Performed by: INTERNAL MEDICINE

## 2022-03-15 PROCEDURE — 3079F PR MOST RECENT DIASTOLIC BLOOD PRESSURE 80-89 MM HG: ICD-10-PCS | Mod: ,,, | Performed by: INTERNAL MEDICINE

## 2022-03-15 PROCEDURE — 3075F PR MOST RECENT SYSTOLIC BLOOD PRESS GE 130-139MM HG: ICD-10-PCS | Mod: ,,, | Performed by: INTERNAL MEDICINE

## 2022-03-15 PROCEDURE — 3075F SYST BP GE 130 - 139MM HG: CPT | Mod: ,,, | Performed by: INTERNAL MEDICINE

## 2022-03-15 PROCEDURE — 1160F PR REVIEW ALL MEDS BY PRESCRIBER/CLIN PHARMACIST DOCUMENTED: ICD-10-PCS | Mod: ,,, | Performed by: INTERNAL MEDICINE

## 2022-03-15 PROCEDURE — 3288F PR FALLS RISK ASSESSMENT DOCUMENTED: ICD-10-PCS | Mod: ,,, | Performed by: INTERNAL MEDICINE

## 2022-03-15 PROCEDURE — 1101F PT FALLS ASSESS-DOCD LE1/YR: CPT | Mod: ,,, | Performed by: INTERNAL MEDICINE

## 2022-03-15 PROCEDURE — 3051F HG A1C>EQUAL 7.0%<8.0%: CPT | Mod: ,,, | Performed by: INTERNAL MEDICINE

## 2022-03-15 PROCEDURE — 99214 OFFICE O/P EST MOD 30 MIN: CPT | Mod: ,,, | Performed by: INTERNAL MEDICINE

## 2022-03-15 PROCEDURE — 1159F PR MEDICATION LIST DOCUMENTED IN MEDICAL RECORD: ICD-10-PCS | Mod: ,,, | Performed by: INTERNAL MEDICINE

## 2022-03-15 PROCEDURE — 3051F PR MOST RECENT HEMOGLOBIN A1C LEVEL 7.0 - < 8.0%: ICD-10-PCS | Mod: ,,, | Performed by: INTERNAL MEDICINE

## 2022-03-15 PROCEDURE — 1101F PR PT FALLS ASSESS DOC 0-1 FALLS W/OUT INJ PAST YR: ICD-10-PCS | Mod: ,,, | Performed by: INTERNAL MEDICINE

## 2022-03-15 PROCEDURE — 1125F PR PAIN SEVERITY QUANTIFIED, PAIN PRESENT: ICD-10-PCS | Mod: ,,, | Performed by: INTERNAL MEDICINE

## 2022-03-15 PROCEDURE — 4010F PR ACE/ARB THEARPY RXD/TAKEN: ICD-10-PCS | Mod: ,,, | Performed by: INTERNAL MEDICINE

## 2022-03-15 PROCEDURE — 3079F DIAST BP 80-89 MM HG: CPT | Mod: ,,, | Performed by: INTERNAL MEDICINE

## 2022-03-15 PROCEDURE — 99214 PR OFFICE/OUTPT VISIT, EST, LEVL IV, 30-39 MIN: ICD-10-PCS | Mod: ,,, | Performed by: INTERNAL MEDICINE

## 2022-03-16 RX ORDER — GABAPENTIN 400 MG/1
CAPSULE ORAL
Qty: 90 CAPSULE | Refills: 2 | Status: SHIPPED | OUTPATIENT
Start: 2022-03-16 | End: 2022-05-18 | Stop reason: SDUPTHER

## 2022-03-16 NOTE — PROGRESS NOTES
Subjective:       Patient ID: Lorraine De Jesus is a 77 y.o. female.    Chief Complaint: Follow-up (Abn Labs)    Patient is here today for check up evaluation. Patient recent Vit B level checked and is high. Glucose level is high but A1C is decreased from 7.7 to 7.2. All other labs reviewed and normal. Patient reports no new complaints and is doing well. Will follow in 3 months.       Current Medications:    Current Outpatient Medications:     aspirin (ECOTRIN) 81 MG EC tablet, Take 81 mg by mouth once daily., Disp: , Rfl:     blood sugar diagnostic (ONETOUCH VERIO TEST STRIPS) Strp, 1 strip by Misc.(Non-Drug; Combo Route) route 2 (two) times daily., Disp: 200 strip, Rfl: 4    blood sugar diagnostic Strp, 1 strip by Misc.(Non-Drug; Combo Route) route 3 (three) times daily., Disp: 200 strip, Rfl: 3    blood-glucose meter kit, Use as instructed, Disp: 1 each, Rfl: 0    cloNIDine (CATAPRES) 0.1 MG tablet, TAKE 1 TABLET BY MOUTH EVERY 4 HOURS AS NEEDED FOR BLOOD PRESSURE GREATER THAN 170/90, Disp: , Rfl:     dapagliflozin (FARXIGA) 10 mg tablet, Take 1 tablet (10 mg total) by mouth once daily., Disp: 90 tablet, Rfl: 1    DULoxetine (CYMBALTA) 60 MG capsule, Take 1 capsule (60 mg total) by mouth once daily. Take one capsule at bedtime, Disp: 30 capsule, Rfl: 2    DULoxetine (CYMBALTA) 60 MG capsule, TAKE 1 CAPSULE(60 MG) BY MOUTH EVERY DAY AT BEDTIME, Disp: 30 capsule, Rfl: 2    fluconazole (DIFLUCAN) 150 MG Tab, , Disp: , Rfl:     gabapentin (NEURONTIN) 400 MG capsule, TAKE 1 CAPSULE(400 MG) BY MOUTH EVERY 8 HOURS, Disp: 90 capsule, Rfl: 2    glipiZIDE (GLUCOTROL) 10 MG TR24, Take 1 tablet (10 mg total) by mouth daily with breakfast., Disp: 30 tablet, Rfl: 3    [START ON 4/28/2022] HYDROcodone-acetaminophen (NORCO)  mg per tablet, Take 1 tablet by mouth every 6 (six) hours., Disp: 120 tablet, Rfl: 0    lancets (ACCU-CHEK FASTCLIX LANCET DRUM MISC), Take 1 each by mouth once daily., Disp: , Rfl:      levothyroxine (SYNTHROID) 75 MCG tablet, Take 1 tablet (75 mcg total) by mouth before breakfast., Disp: 90 tablet, Rfl: 1    metoprolol succinate (TOPROL-XL) 50 MG 24 hr tablet, Take 1 tablet (50 mg total) by mouth once daily., Disp: 30 tablet, Rfl: 3    olmesartan (BENICAR) 5 MG Tab, Take 5 mg by mouth once daily., Disp: , Rfl:     ONETOUCH DELICA PLUS LANCET 33 gauge Misc, Check blood sugar TID, Disp: 100 each, Rfl: 11    oxybutynin (DITROPAN-XL) 5 MG TR24, TAKE 1 TABLET(5 MG) BY MOUTH EVERY DAY, Disp: 90 tablet, Rfl: 3    Last Labs:     Office Visit on 03/02/2022   Component Date Value    Rapid Strep A Screen 03/02/2022 Negative      Acceptab* 03/02/2022 Yes     Sodium 03/02/2022 138     Potassium 03/02/2022 4.0     Chloride 03/02/2022 107     CO2 03/02/2022 28     Anion Gap 03/02/2022 7     Glucose 03/02/2022 140 (A)    BUN 03/02/2022 17     Creatinine 03/02/2022 0.86     BUN/Creatinine Ratio 03/02/2022 20     Calcium 03/02/2022 9.3     eGFR 03/02/2022 68     Hemoglobin A1C 03/02/2022 7.2 (A)    Estimated Average Glucose 03/02/2022 154     Vitamin B12 03/02/2022 1,051 (A)    TSH 03/02/2022 0.722     WBC 03/02/2022 5.06     RBC 03/02/2022 4.95     Hemoglobin 03/02/2022 13.3     Hematocrit 03/02/2022 42.4     MCV 03/02/2022 85.7     MCH 03/02/2022 26.9 (A)    MCHC 03/02/2022 31.4 (A)    RDW 03/02/2022 15.3 (A)    Platelet Count 03/02/2022 313     MPV 03/02/2022 10.1     Neutrophils % 03/02/2022 56.1     Lymphocytes % 03/02/2022 34.2     Monocytes % 03/02/2022 6.5 (A)    Eosinophils % 03/02/2022 2.2     Basophils % 03/02/2022 0.8     Immature Granulocytes % 03/02/2022 0.2     nRBC, Auto 03/02/2022 0.0     Neutrophils, Abs 03/02/2022 2.84     Lymphocytes, Absolute 03/02/2022 1.73     Monocytes, Absolute 03/02/2022 0.33     Eosinophils, Absolute 03/02/2022 0.11     Basophils, Absolute 03/02/2022 0.04     Immature Granulocytes, A* 03/02/2022 0.01     nRBC,  Absolute 03/02/2022 0.00     Diff Type 03/02/2022 Auto    Office Visit on 02/22/2022   Component Date Value    POC Amphetamines 02/22/2022 Negative     POC Barbiturates 02/22/2022 Negative     POC Benzodiazepines 02/22/2022 Negative     POC Cocaine 02/22/2022 Negative     POC THC 02/22/2022 Negative     POC Methadone 02/22/2022 Negative     POC Methamphetamine 02/22/2022 Negative     POC Opiates 02/22/2022 Presumptive Positive (A)    POC Oxycodone 02/22/2022 Negative     POC Phencyclidine 02/22/2022 Negative     POC Methylenedioxymetham* 02/22/2022 Negative     POC Tricyclic Antidepres* 02/22/2022 Negative     POC Buprenorphine 02/22/2022 Negative      Acceptab* 02/22/2022 Yes     POC Temperature (Urine) 02/22/2022 96        Last Imaging:  US FNA Biopsy w/ Imaging 1st Lesion  Narrative: EXAMINATION:  US FINE NEEDLE ASPIRATION BIOPSY, FIRST LESION    CLINICAL HISTORY:  thyroid nodule;Nontoxic single thyroid nodule    TECHNIQUE:  US FINE NEEDLE ASPIRATION BIOPSY, FIRST LESION    COMPARISON:  Comparisons were reviewed, if available.    FINDINGS:  FNA right thyroid    Performed by Dr.J Pina    A formal timeout was called all staff present agreed to patient and procedure.  The neck was prepped with ChloraPrep and sterile field was established.  1% lidocaine was used as local anesthetic.  Under ultrasound guidance 6 fine-needle aspirations were taken from the right thyroid nodule.  The puncture site was then cleaned and bandaged.    The patient tolerated the procedure well there no immediate postprocedure complications.  Impression: As above.    Electronically signed by: Louie Pina  Date:    11/02/2021  Time:    09:55         Review of Systems   All other systems reviewed and are negative.        Objective:      Physical Exam  Vitals reviewed.   Constitutional:       Appearance: Normal appearance.   Cardiovascular:      Rate and Rhythm: Normal rate and regular rhythm.      Pulses:  Normal pulses.      Heart sounds: Normal heart sounds.   Pulmonary:      Effort: Pulmonary effort is normal.      Breath sounds: Normal breath sounds.   Abdominal:      General: Abdomen is flat. Bowel sounds are normal.      Palpations: Abdomen is soft.   Musculoskeletal:         General: Normal range of motion.      Cervical back: Normal range of motion and neck supple.   Skin:     General: Skin is warm and dry.   Neurological:      General: No focal deficit present.      Mental Status: She is alert and oriented to person, place, and time. Mental status is at baseline.         Assessment:       1. Primary hypertension     2. Diabetic neuropathy with neurologic complication  gabapentin (NEURONTIN) 400 MG capsule   3. Lumbosacral radiculopathy  gabapentin (NEURONTIN) 400 MG capsule   4. Bilateral foot pain  gabapentin (NEURONTIN) 400 MG capsule   5. Neuropathy          Plan:         Lorraine was seen today for follow-up.    Diagnoses and all orders for this visit:    Primary hypertension    Diabetic neuropathy with neurologic complication  -     gabapentin (NEURONTIN) 400 MG capsule; TAKE 1 CAPSULE(400 MG) BY MOUTH EVERY 8 HOURS    Lumbosacral radiculopathy  -     gabapentin (NEURONTIN) 400 MG capsule; TAKE 1 CAPSULE(400 MG) BY MOUTH EVERY 8 HOURS    Bilateral foot pain  -     gabapentin (NEURONTIN) 400 MG capsule; TAKE 1 CAPSULE(400 MG) BY MOUTH EVERY 8 HOURS    Neuropathy

## 2022-03-16 NOTE — PATIENT INSTRUCTIONS
Lorraine was seen today for follow-up.    Diagnoses and all orders for this visit:    Primary hypertension    Diabetic neuropathy with neurologic complication  -     gabapentin (NEURONTIN) 400 MG capsule; TAKE 1 CAPSULE(400 MG) BY MOUTH EVERY 8 HOURS    Lumbosacral radiculopathy  -     gabapentin (NEURONTIN) 400 MG capsule; TAKE 1 CAPSULE(400 MG) BY MOUTH EVERY 8 HOURS    Bilateral foot pain  -     gabapentin (NEURONTIN) 400 MG capsule; TAKE 1 CAPSULE(400 MG) BY MOUTH EVERY 8 HOURS    Neuropathy

## 2022-05-11 ENCOUNTER — OFFICE VISIT (OUTPATIENT)
Dept: DIABETES SERVICES | Facility: CLINIC | Age: 77
End: 2022-05-11
Payer: COMMERCIAL

## 2022-05-11 VITALS
BODY MASS INDEX: 26.65 KG/M2 | HEART RATE: 79 BPM | OXYGEN SATURATION: 95 % | HEIGHT: 68 IN | WEIGHT: 175.81 LBS | DIASTOLIC BLOOD PRESSURE: 72 MMHG | SYSTOLIC BLOOD PRESSURE: 130 MMHG | RESPIRATION RATE: 16 BRPM

## 2022-05-11 DIAGNOSIS — E11.49 DIABETIC NEUROPATHY WITH NEUROLOGIC COMPLICATION: Chronic | ICD-10-CM

## 2022-05-11 DIAGNOSIS — E11.9 TYPE 2 DIABETES MELLITUS WITHOUT COMPLICATION, WITHOUT LONG-TERM CURRENT USE OF INSULIN: Primary | ICD-10-CM

## 2022-05-11 DIAGNOSIS — E11.40 DIABETIC NEUROPATHY WITH NEUROLOGIC COMPLICATION: Chronic | ICD-10-CM

## 2022-05-11 DIAGNOSIS — I10 ESSENTIAL HYPERTENSION: ICD-10-CM

## 2022-05-11 DIAGNOSIS — G89.4 CHRONIC PAIN SYNDROME: Chronic | ICD-10-CM

## 2022-05-11 LAB — GLUCOSE SERPL-MCNC: 148 MG/DL (ref 70–110)

## 2022-05-11 PROCEDURE — 4010F PR ACE/ARB THEARPY RXD/TAKEN: ICD-10-PCS | Mod: CPTII,,, | Performed by: NURSE PRACTITIONER

## 2022-05-11 PROCEDURE — 4010F ACE/ARB THERAPY RXD/TAKEN: CPT | Mod: CPTII,,, | Performed by: NURSE PRACTITIONER

## 2022-05-11 PROCEDURE — 1160F PR REVIEW ALL MEDS BY PRESCRIBER/CLIN PHARMACIST DOCUMENTED: ICD-10-PCS | Mod: CPTII,,, | Performed by: NURSE PRACTITIONER

## 2022-05-11 PROCEDURE — 99215 OFFICE O/P EST HI 40 MIN: CPT | Mod: PBBFAC | Performed by: NURSE PRACTITIONER

## 2022-05-11 PROCEDURE — 1159F PR MEDICATION LIST DOCUMENTED IN MEDICAL RECORD: ICD-10-PCS | Mod: CPTII,,, | Performed by: NURSE PRACTITIONER

## 2022-05-11 PROCEDURE — 99213 PR OFFICE/OUTPT VISIT, EST, LEVL III, 20-29 MIN: ICD-10-PCS | Mod: S$PBB,,, | Performed by: NURSE PRACTITIONER

## 2022-05-11 PROCEDURE — 1160F RVW MEDS BY RX/DR IN RCRD: CPT | Mod: CPTII,,, | Performed by: NURSE PRACTITIONER

## 2022-05-11 PROCEDURE — 82962 GLUCOSE BLOOD TEST: CPT | Mod: PBBFAC | Performed by: NURSE PRACTITIONER

## 2022-05-11 PROCEDURE — 1159F MED LIST DOCD IN RCRD: CPT | Mod: CPTII,,, | Performed by: NURSE PRACTITIONER

## 2022-05-11 PROCEDURE — 1101F PT FALLS ASSESS-DOCD LE1/YR: CPT | Mod: CPTII,,, | Performed by: NURSE PRACTITIONER

## 2022-05-11 PROCEDURE — 3288F FALL RISK ASSESSMENT DOCD: CPT | Mod: CPTII,,, | Performed by: NURSE PRACTITIONER

## 2022-05-11 PROCEDURE — 3051F PR MOST RECENT HEMOGLOBIN A1C LEVEL 7.0 - < 8.0%: ICD-10-PCS | Mod: CPTII,,, | Performed by: NURSE PRACTITIONER

## 2022-05-11 PROCEDURE — 1101F PR PT FALLS ASSESS DOC 0-1 FALLS W/OUT INJ PAST YR: ICD-10-PCS | Mod: CPTII,,, | Performed by: NURSE PRACTITIONER

## 2022-05-11 PROCEDURE — 3051F HG A1C>EQUAL 7.0%<8.0%: CPT | Mod: CPTII,,, | Performed by: NURSE PRACTITIONER

## 2022-05-11 PROCEDURE — 3075F SYST BP GE 130 - 139MM HG: CPT | Mod: CPTII,,, | Performed by: NURSE PRACTITIONER

## 2022-05-11 PROCEDURE — 99213 OFFICE O/P EST LOW 20 MIN: CPT | Mod: S$PBB,,, | Performed by: NURSE PRACTITIONER

## 2022-05-11 PROCEDURE — 3078F DIAST BP <80 MM HG: CPT | Mod: CPTII,,, | Performed by: NURSE PRACTITIONER

## 2022-05-11 PROCEDURE — 3288F PR FALLS RISK ASSESSMENT DOCUMENTED: ICD-10-PCS | Mod: CPTII,,, | Performed by: NURSE PRACTITIONER

## 2022-05-11 PROCEDURE — 3078F PR MOST RECENT DIASTOLIC BLOOD PRESSURE < 80 MM HG: ICD-10-PCS | Mod: CPTII,,, | Performed by: NURSE PRACTITIONER

## 2022-05-11 PROCEDURE — 3075F PR MOST RECENT SYSTOLIC BLOOD PRESS GE 130-139MM HG: ICD-10-PCS | Mod: CPTII,,, | Performed by: NURSE PRACTITIONER

## 2022-05-11 RX ORDER — AMLODIPINE BESYLATE 10 MG/1
10 TABLET ORAL DAILY
COMMUNITY
Start: 2022-03-06 | End: 2022-08-10

## 2022-05-11 RX ORDER — GLIPIZIDE 10 MG/1
10 TABLET, FILM COATED, EXTENDED RELEASE ORAL
Qty: 90 TABLET | Refills: 1 | Status: SHIPPED | OUTPATIENT
Start: 2022-05-11 | End: 2022-12-29

## 2022-05-11 NOTE — PATIENT INSTRUCTIONS
Pt is advised to monitor and document glucose fasting when you wake up before you eat and 2 hours after meal and bring in meter to next visit.      Ensure to take medications as directed.      Follow diabetic diet as directed.      Work to achieve normal body weight.     Ensure to exercise 4-5 times per week for 20 minutes.  Snacks with 0-5 grams carbs   Hard Boiled Egg   Crystal Light, Vitamin Water, Powerade Zero   Herbal tea, unsweetened   8 oz unsweetened almond milk   2 tsp peanut butter on celery   ½ cup sugar-free Jell-O   1 sugar-free popsicle   Non starchy vegetables such as carrots or celery sticks with lowfat dressing   ½ oz lowfat cheese or string cheese   1 closed handful of nuts or tbsp of seeds, unsalted    Snacks with 15 gram carbs  . 1 small piece of fruit or . banana or . cup light canned fruit  . 3 georges cracker squares  . 3 cups popcorn  . 5 Vanilla Wafers  . 1/2 cup low fat, no added sugar ice cream or frozen yogurt  . 1/2 turkey, ham, or chicken sandwich  . 1.2 cup fruit with 1/2 cup of cottage cheese  . 4-6 unsalted wheat crackers with 1 oz low fat cheese or 1 tbsp peanut butter  . 30 goldfish crackers  . 7-8 mini rice cakes  . 1/3 cup hummus dip with raw vegetables  . 1/2 whole wheat bella, 1 tbsp hummus  . Mini pizza (. whole wheat English muffin, low-fat cheese, tomato sauce)  . 100 calorie snack pack  . 4-6 oz light yogurt  . 1/2 cup sugar-free pudding

## 2022-05-11 NOTE — PROGRESS NOTES
Subjective:       Patient ID: Lorraine De Jesus is a 77 y.o. female.    Chief Complaint: No chief complaint on file.    Pt is here today for reevaluation of DM.  Lab Results       Component                Value               Date                       HGBA1C                   7.2 (H)             03/02/2022            CMP  Sodium       Date                     Value               Ref Range           Status                03/02/2022               138                 136 - 145 mmol*     Final            ----------  Potassium       Date                     Value               Ref Range           Status                03/02/2022               4.0                 3.5 - 5.1 mmol*     Final            ----------  Chloride       Date                     Value               Ref Range           Status                03/02/2022               107                 98 - 107 mmol/L     Final            ----------  CO2       Date                     Value               Ref Range           Status                03/02/2022               28                  21 - 32 mmol/L      Final            ----------  Glucose       Date                     Value               Ref Range           Status                03/02/2022               140 (H)             74 - 106 mg/dL      Final            ----------  BUN       Date                     Value               Ref Range           Status                03/02/2022               17                  7 - 18 mg/dL        Final            ----------  Creatinine       Date                     Value               Ref Range           Status                03/02/2022               0.86                0.55 - 1.02 mg*     Final            ----------  Calcium       Date                     Value               Ref Range           Status                03/02/2022               9.3                 8.5 - 10.1 mg/*     Final            ----------  Total Protein       Date                     Value               Ref  Range           Status                09/30/2021               8.2                 6.4 - 8.2 g/dL      Final            ----------  Albumin       Date                     Value               Ref Range           Status                09/30/2021               3.4 (L)             3.5 - 5.0 g/dL      Final            ----------  Bilirubin, Total       Date                     Value               Ref Range           Status                09/30/2021               0.2                 >0.0 - 1.2 mg/*     Final            ----------  Alk Phos       Date                     Value               Ref Range           Status                09/30/2021               109                 55 - 142 U/L        Final            ----------  AST       Date                     Value               Ref Range           Status                09/30/2021               12 (L)              15 - 37 U/L         Final            ----------  ALT       Date                     Value               Ref Range           Status                09/30/2021               19                  13 - 56 U/L         Final            ----------  Anion Gap       Date                     Value               Ref Range           Status                03/02/2022               7                   7 - 16 mmol/L       Final            ----------  eGFR        Date                     Value               Ref Range           Status                12/06/2021               89                  >=60 mL/min/1.*     Final            ----------  eGFR       Date                     Value               Ref Range           Status                03/02/2022               68                  >=60 mL/min/1.*     Final            ----------  Lab Results       Component                Value               Date                       CHOL                     229 (H)             06/14/2021            Lab Results       Component                Value               Date                        HDL                      55                  06/14/2021            Lab Results       Component                Value               Date                       LDLCALC                  151                 06/14/2021            Lab Results       Component                Value               Date                       TRIG                     115                 06/14/2021            Lab Results       Component                Value               Date                       CHOLHDL                  4.2                 06/14/2021            Lab Results       Component                Value               Date                       MICROALBUR               1.0                 06/14/2021                Review of Systems   Constitutional: Negative for activity change, appetite change, diaphoresis and fatigue.   HENT: Negative for nasal congestion, facial swelling and sinus pressure/congestion.    Eyes: Negative for visual disturbance.   Respiratory: Negative for shortness of breath and wheezing.    Cardiovascular: Negative for chest pain and leg swelling.   Gastrointestinal: Negative for constipation, diarrhea, nausea and vomiting.   Endocrine: Negative for polydipsia, polyphagia and polyuria.   Genitourinary: Negative for dysuria, frequency and urgency.   Musculoskeletal: Negative for gait problem and myalgias.   Integumentary:  Negative for color change, rash and wound.   Neurological: Negative for dizziness, syncope, weakness, headaches, disturbances in coordination and coordination difficulties.   Hematological: Does not bruise/bleed easily.   Psychiatric/Behavioral: Negative for self-injury, sleep disturbance and suicidal ideas. The patient is not nervous/anxious.          Objective:      Physical Exam  Vitals and nursing note reviewed.   Constitutional:       Appearance: Normal appearance.   HENT:      Head: Normocephalic.   Cardiovascular:      Rate and Rhythm: Normal rate.   Pulmonary:      Effort: Pulmonary effort is  normal.   Musculoskeletal:         General: Normal range of motion.   Skin:     General: Skin is warm and dry.   Neurological:      General: No focal deficit present.      Mental Status: She is alert and oriented to person, place, and time.   Psychiatric:         Mood and Affect: Mood normal.         Behavior: Behavior normal.         Thought Content: Thought content normal.         Judgment: Judgment normal.         Assessment:       Problem List Items Addressed This Visit        Neuro    Chronic pain syndrome (Chronic)    Diabetic neuropathy with neurologic complication (Chronic)       Cardiac/Vascular    Essential hypertension       Endocrine    Type 2 diabetes mellitus without complication, without long-term current use of insulin - Primary          Plan:       Problem List Items Addressed This Visit        Neuro    Chronic pain syndrome (Chronic)    Diabetic neuropathy with neurologic complication (Chronic)       Cardiac/Vascular    Essential hypertension       Endocrine    Type 2 diabetes mellitus without complication, without long-term current use of insulin - Primary             Ensure to take glipizde 10mg daily, continue farxiga 10mg daily and decrease carbs in diet. Exercise 5 times a week 20min each.

## 2022-05-18 ENCOUNTER — OFFICE VISIT (OUTPATIENT)
Dept: PAIN MEDICINE | Facility: CLINIC | Age: 77
End: 2022-05-18
Payer: COMMERCIAL

## 2022-05-18 VITALS
HEART RATE: 79 BPM | OXYGEN SATURATION: 98 % | DIASTOLIC BLOOD PRESSURE: 91 MMHG | BODY MASS INDEX: 26.37 KG/M2 | SYSTOLIC BLOOD PRESSURE: 155 MMHG | RESPIRATION RATE: 18 BRPM | WEIGHT: 174 LBS | HEIGHT: 68 IN

## 2022-05-18 DIAGNOSIS — Z79.899 ENCOUNTER FOR LONG-TERM (CURRENT) USE OF OTHER MEDICATIONS: ICD-10-CM

## 2022-05-18 DIAGNOSIS — M79.671 BILATERAL FOOT PAIN: ICD-10-CM

## 2022-05-18 DIAGNOSIS — M79.672 BILATERAL FOOT PAIN: ICD-10-CM

## 2022-05-18 DIAGNOSIS — M54.17 LUMBOSACRAL RADICULOPATHY: Chronic | ICD-10-CM

## 2022-05-18 DIAGNOSIS — E11.49 DIABETIC NEUROPATHY WITH NEUROLOGIC COMPLICATION: Chronic | ICD-10-CM

## 2022-05-18 DIAGNOSIS — M54.17 LUMBOSACRAL RADICULOPATHY: Primary | Chronic | ICD-10-CM

## 2022-05-18 DIAGNOSIS — E11.40 DIABETIC NEUROPATHY WITH NEUROLOGIC COMPLICATION: Chronic | ICD-10-CM

## 2022-05-18 PROCEDURE — 99212 PR OFFICE/OUTPT VISIT, EST, LEVL II, 10-19 MIN: ICD-10-PCS | Mod: S$PBB,,, | Performed by: PAIN MEDICINE

## 2022-05-18 PROCEDURE — 3288F FALL RISK ASSESSMENT DOCD: CPT | Mod: CPTII,,, | Performed by: PAIN MEDICINE

## 2022-05-18 PROCEDURE — 1159F PR MEDICATION LIST DOCUMENTED IN MEDICAL RECORD: ICD-10-PCS | Mod: CPTII,,, | Performed by: PAIN MEDICINE

## 2022-05-18 PROCEDURE — 3080F PR MOST RECENT DIASTOLIC BLOOD PRESSURE >= 90 MM HG: ICD-10-PCS | Mod: CPTII,,, | Performed by: PAIN MEDICINE

## 2022-05-18 PROCEDURE — 1159F MED LIST DOCD IN RCRD: CPT | Mod: CPTII,,, | Performed by: PAIN MEDICINE

## 2022-05-18 PROCEDURE — 3077F PR MOST RECENT SYSTOLIC BLOOD PRESSURE >= 140 MM HG: ICD-10-PCS | Mod: CPTII,,, | Performed by: PAIN MEDICINE

## 2022-05-18 PROCEDURE — 1101F PR PT FALLS ASSESS DOC 0-1 FALLS W/OUT INJ PAST YR: ICD-10-PCS | Mod: CPTII,,, | Performed by: PAIN MEDICINE

## 2022-05-18 PROCEDURE — 4010F PR ACE/ARB THEARPY RXD/TAKEN: ICD-10-PCS | Mod: CPTII,,, | Performed by: PAIN MEDICINE

## 2022-05-18 PROCEDURE — 1125F AMNT PAIN NOTED PAIN PRSNT: CPT | Mod: CPTII,,, | Performed by: PAIN MEDICINE

## 2022-05-18 PROCEDURE — 1101F PT FALLS ASSESS-DOCD LE1/YR: CPT | Mod: CPTII,,, | Performed by: PAIN MEDICINE

## 2022-05-18 PROCEDURE — 99215 OFFICE O/P EST HI 40 MIN: CPT | Mod: PBBFAC | Performed by: PAIN MEDICINE

## 2022-05-18 PROCEDURE — 3080F DIAST BP >= 90 MM HG: CPT | Mod: CPTII,,, | Performed by: PAIN MEDICINE

## 2022-05-18 PROCEDURE — 3077F SYST BP >= 140 MM HG: CPT | Mod: CPTII,,, | Performed by: PAIN MEDICINE

## 2022-05-18 PROCEDURE — 80305 DRUG TEST PRSMV DIR OPT OBS: CPT | Mod: PBBFAC | Performed by: PAIN MEDICINE

## 2022-05-18 PROCEDURE — 3051F PR MOST RECENT HEMOGLOBIN A1C LEVEL 7.0 - < 8.0%: ICD-10-PCS | Mod: CPTII,,, | Performed by: PAIN MEDICINE

## 2022-05-18 PROCEDURE — 3288F PR FALLS RISK ASSESSMENT DOCUMENTED: ICD-10-PCS | Mod: CPTII,,, | Performed by: PAIN MEDICINE

## 2022-05-18 PROCEDURE — 99212 OFFICE O/P EST SF 10 MIN: CPT | Mod: S$PBB,,, | Performed by: PAIN MEDICINE

## 2022-05-18 PROCEDURE — 4010F ACE/ARB THERAPY RXD/TAKEN: CPT | Mod: CPTII,,, | Performed by: PAIN MEDICINE

## 2022-05-18 PROCEDURE — 1125F PR PAIN SEVERITY QUANTIFIED, PAIN PRESENT: ICD-10-PCS | Mod: CPTII,,, | Performed by: PAIN MEDICINE

## 2022-05-18 PROCEDURE — 3051F HG A1C>EQUAL 7.0%<8.0%: CPT | Mod: CPTII,,, | Performed by: PAIN MEDICINE

## 2022-05-18 RX ORDER — HYDROCODONE BITARTRATE AND ACETAMINOPHEN 10; 325 MG/1; MG/1
1 TABLET ORAL EVERY 6 HOURS PRN
Qty: 120 TABLET | Refills: 0 | Status: SHIPPED | OUTPATIENT
Start: 2022-05-28 | End: 2022-06-27

## 2022-05-18 RX ORDER — DULOXETIN HYDROCHLORIDE 60 MG/1
60 CAPSULE, DELAYED RELEASE ORAL DAILY
Qty: 30 CAPSULE | Refills: 2 | Status: SHIPPED | OUTPATIENT
Start: 2022-05-18 | End: 2023-06-07

## 2022-05-18 RX ORDER — GABAPENTIN 400 MG/1
CAPSULE ORAL
Qty: 90 CAPSULE | Refills: 2 | Status: SHIPPED | OUTPATIENT
Start: 2022-05-18 | End: 2022-07-13 | Stop reason: SDUPTHER

## 2022-05-18 RX ORDER — HYDROCODONE BITARTRATE AND ACETAMINOPHEN 10; 325 MG/1; MG/1
1 TABLET ORAL EVERY 6 HOURS PRN
Qty: 120 TABLET | Refills: 0 | Status: SHIPPED | OUTPATIENT
Start: 2022-06-27 | End: 2022-07-13 | Stop reason: SDUPTHER

## 2022-05-18 RX ORDER — HYDROCODONE BITARTRATE AND ACETAMINOPHEN 10; 325 MG/1; MG/1
1 TABLET ORAL EVERY 6 HOURS
Qty: 120 TABLET | Refills: 0 | Status: CANCELLED | OUTPATIENT
Start: 2022-05-18

## 2022-05-18 NOTE — PROGRESS NOTES
She Disclaimer: This note has been generated using voice-recognition software. There may be typographical errors that have been missed during proof-reading        Patient ID: Lorraine De Jesus is a 77 y.o. female.      Chief Complaint: No chief complaint on file.      77-year-old female returns for re-evaluation of bilateral lower extremity neuropathy.  Patient suffers from chronic pain for several years.  Lumbar sympathetic blocks in the past provided transient relief.  She has deferred a spinal cord stimulator.  She has escalated on her opiates over the years and now requires Norco 4 times a day along with gabapentin and Cymbalta.  She remains hesitant to receive repeat nerve block injections or proceed with the spinal cord stimulator trial is recommended for her intractable pain.  She returns today for medication refills                 A's of Opioid Risk Assessment  Activity:Patient can npartially perform ADL.   Analgesia:Patients pain is not controlled by current medication.   Adverse Effects: Patient denies constipation or sedation.  Aberrant Behavior:  reviewed with no aberrant drug seeking/taking behavior.      Patient denies any suicidal or homicidal ideations    Physical Therapy/Home Exercise: not applicable      US FNA Biopsy w/ Imaging 1st Lesion  Narrative: EXAMINATION:  US FINE NEEDLE ASPIRATION BIOPSY, FIRST LESION    CLINICAL HISTORY:  thyroid nodule;Nontoxic single thyroid nodule    TECHNIQUE:  US FINE NEEDLE ASPIRATION BIOPSY, FIRST LESION    COMPARISON:  Comparisons were reviewed, if available.    FINDINGS:  FNA right thyroid    Performed by Dr.J Pina    A formal timeout was called all staff present agreed to patient and procedure.  The neck was prepped with ChloraPrep and sterile field was established.  1% lidocaine was used as local anesthetic.  Under ultrasound guidance 6 fine-needle aspirations were taken from the right thyroid nodule.  The puncture site was then cleaned and  bandaged.    The patient tolerated the procedure well there no immediate postprocedure complications.  Impression: As above.    Electronically signed by: Louie Pina  Date:    2021  Time:    09:55      Review of Systems   Constitutional: Negative.    HENT: Negative.    Eyes: Negative.    Respiratory: Negative.    Cardiovascular: Negative.    Gastrointestinal: Negative.    Endocrine: Negative.    Genitourinary: Negative.    Musculoskeletal: Negative.    Integumentary:  Negative.   Neurological: Positive for numbness (BLE).   Hematological: Negative.    Psychiatric/Behavioral: Negative.              Past Medical History:   Diagnosis Date    Adenomatous polyp of ascending colon 2021    Adenomatous polyp of descending colon 2021    Age related osteoporosis 10/28/2020    Benign paroxysmal vertigo     Chronic pain syndrome     Chronic pelvic pain in female 2021    Ditropan XL 5mg    Colon, diverticulosis 2021    Depressive disorder 2019    Essential hypertension 2012    External hemorrhoid 2021    Gastrointestinal hemorrhage associated with anorectal source 2021    GERD (gastroesophageal reflux disease) 2013    Hyperlipidemia 2012    Hypothyroidism 2019    Joint pain     Nonrheumatic tricuspid (valve) insufficiency 2018    Personal history UTI 2021    Controlled on Hiprex    Polyneuropathy 2018    PVD (peripheral vascular disease) 10/30/2018    Temporal arteritis     Type 2 diabetes mellitus 2014    Urethral meatal stenosis 2018    history of known urethral stenosis, and was taught to perform monthly self dilation at home.     Past Surgical History:   Procedure Laterality Date     left lumbar sympathetic nerve block Left 2020    X3  DR LANDEROS    CARDIAC CATHETERIZATION  10/14/2009     SECTION      x 2    COLONOSCOPY  2009    CYSTOSCOPY WITH HYDRODISTENSION OF BLADDER N/A 2021     Procedure: CYSTOSCOPY, WITH BLADDER HYDRODISTENSION;  Surgeon: Dequan Recio MD;  Location: [facility];  Service: Urology;  Laterality: N/A;    CYSTOSCOPY WITH URETHRAL DILATION  02/25/2021    Dr Recio    HYSTERECTOMY  1980's    LUMBAR SYMPATHETIC NERVE BLOCK Left 10/05/2020    Left Sympathetic Nerve Block - Dr Brown - 10/5/20, 9/21/20, 1/20/20. 1/6/20, 10/9/19, 9/25/19 and 7/11/18    right lumbar sympathetic nerve block Right 2020    X4 DR BROWN    THYROIDECTOMY      1990's     Social History     Socioeconomic History    Marital status:    Tobacco Use    Smoking status: Never Smoker    Smokeless tobacco: Never Used   Substance and Sexual Activity    Alcohol use: Never    Drug use: Never    Sexual activity: Not Currently     Family History   Problem Relation Age of Onset    Cancer Mother     Hypertension Mother     Cancer Father     Cancer Sister     Diabetes Sister     Hyperlipidemia Sister     Hypertension Sister     Cancer Brother     Diabetes Sister     No Known Problems Sister     Cancer Brother     No Known Problems Brother     Cancer Brother      Review of patient's allergies indicates:   Allergen Reactions    Lipitor [atorvastatin] Other (See Comments)     Muscle aching    Pravachol [pravastatin] Other (See Comments)     Muscle aching     has a current medication list which includes the following prescription(s): amlodipine, aspirin, blood sugar diagnostic, blood sugar diagnostic, blood-glucose meter, clonidine, farxiga, duloxetine, gabapentin, glipizide, hydrocodone-acetaminophen, [START ON 5/28/2022] hydrocodone-acetaminophen, [START ON 6/27/2022] hydrocodone-acetaminophen, lancets, levocetirizine, levothyroxine, metoprolol succinate, olmesartan, onetouch delica plus lancet, and oxybutynin.      Objective:  There were no vitals filed for this visit.     Physical Exam  Vitals and nursing note reviewed.   Constitutional:       General: She is not in acute distress.      Appearance: Normal appearance. She is not ill-appearing, toxic-appearing or diaphoretic.   HENT:      Head: Normocephalic and atraumatic.      Nose: Nose normal.      Mouth/Throat:      Mouth: Mucous membranes are moist.   Eyes:      Extraocular Movements: Extraocular movements intact.      Pupils: Pupils are equal, round, and reactive to light.   Cardiovascular:      Rate and Rhythm: Normal rate and regular rhythm.      Heart sounds: Normal heart sounds.   Pulmonary:      Effort: Pulmonary effort is normal. No respiratory distress.      Breath sounds: Normal breath sounds. No stridor. No wheezing or rhonchi.   Abdominal:      General: Bowel sounds are normal.      Palpations: Abdomen is soft.   Musculoskeletal:         General: No swelling, tenderness or deformity. Normal range of motion.      Cervical back: Normal and normal range of motion. No spasms or tenderness. No pain with movement. Normal range of motion.      Thoracic back: Normal.      Lumbar back: No spasms, tenderness or bony tenderness. Normal range of motion. Negative right straight leg raise test and negative left straight leg raise test. No scoliosis.      Right lower leg: No edema.      Left lower leg: No edema.   Skin:     General: Skin is warm.   Neurological:      General: No focal deficit present.      Mental Status: She is alert and oriented to person, place, and time. Mental status is at baseline.      Cranial Nerves: Cranial nerves are intact. No cranial nerve deficit.      Sensory: Sensation is intact. No sensory deficit.      Motor: No weakness.      Coordination: Coordination normal.      Gait: Gait normal.      Deep Tendon Reflexes: Reflexes are normal and symmetric.   Psychiatric:         Mood and Affect: Mood normal.         Behavior: Behavior normal.           Assessment:      1. Diabetic neuropathy with neurologic complication    2. Lumbosacral radiculopathy    3. Bilateral foot pain          Plan:  1. reviewed  2.Addiction,  Dependency, Tolerance, Opioid abuse-misuse, Death, Diversion Discussed. Overdose reversal drug Naloxone discussed.  3.Refill/Continue medications for pain control and function       Requested Prescriptions     Signed Prescriptions Disp Refills    HYDROcodone-acetaminophen (NORCO)  mg per tablet 120 tablet 0     Sig: Take 1 tablet by mouth every 6 (six) hours as needed for Pain.    HYDROcodone-acetaminophen (NORCO)  mg per tablet 120 tablet 0     Sig: Take 1 tablet by mouth every 6 (six) hours as needed for Pain.    gabapentin (NEURONTIN) 400 MG capsule 90 capsule 2     Sig: TAKE 1 CAPSULE(400 MG) BY MOUTH EVERY 8 HOURS    DULoxetine (CYMBALTA) 60 MG capsule 30 capsule 2     Sig: Take 1 capsule (60 mg total) by mouth once daily. Take one capsule at bedtime     4.Patient defers nerve block injections, physical therapy or surgical consultation     5.Follow with MARYBEL Bustillo in 2 months for re-evaluation and medication refill         report:  Reviewed and consistent with medication use as prescribed.      The total time spent for evaluation and management on 05/18/2022 including reviewing separately obtained history, performing a medically appropriate exam and evaluation, documenting clinical information in the health record, independently interpreting results and communicating them to the patient/family/caregiver, and ordering medications/tests/procedures was between 15-29 minutes.    The above plan and management options were discussed at length with patient. Patient is in agreement with the above and verbalized understanding. It will be communicated with the referring physician via electronic record, fax, or mail.

## 2022-05-19 NOTE — PROGRESS NOTES
She Disclaimer: This note has been generated using voice-recognition software. There may be typographical errors that have been missed during proof-reading        Patient ID: Lorraine De Jesus is a 77 y.o. female.      Chief Complaint: Leg Pain      77-year-old female returns  for re-evaluation of chronic bilateral lower extremity neuropathy.  Her pain has worsened over time.  She received lumbar sympathetic nerve blocks in the past with transient relief.  She has refused a trial spinal cord stimulator and has remained on opiates for pain.  She returns today for re-evaluation and medication refill.  She will consider lumbar sympathetic blocks in the future if her pain remains intractable.  She still remains somewhat hesitant to receive a stimulator trial.          Pain Assessment  Pain Assessment: 0-10  Pain Score:   7  Pain Location: Leg  Pain Orientation: Right, Left  Pain Descriptors: Aching  Pain Intervention(s): Home medication, Rest      A's of Opioid Risk Assessment  Activity:Patient can not perform ADL.   Analgesia:Patients pain is not controlled by current medication.   Adverse Effects: Patient denies constipation or sedation.  Aberrant Behavior:  reviewed with no aberrant drug seeking/taking behavior.      Patient denies any suicidal or homicidal ideations              Review of Systems   Constitutional: Negative.    HENT: Negative.    Eyes: Negative.    Respiratory: Negative.    Cardiovascular: Negative.    Gastrointestinal: Negative.    Endocrine: Negative.    Genitourinary: Negative.    Musculoskeletal: Negative.    Integumentary:  Negative.   Neurological: Positive for numbness (BLE).   Hematological: Negative.    Psychiatric/Behavioral: Negative.              Past Medical History:   Diagnosis Date    Adenomatous polyp of ascending colon 6/22/2021    Adenomatous polyp of descending colon 6/22/2021    Age related osteoporosis 10/28/2020    Benign paroxysmal vertigo     Chronic pain syndrome      Chronic pelvic pain in female 2021    Ditropan XL 5mg    Colon, diverticulosis 2021    Depressive disorder 2019    Essential hypertension 2012    External hemorrhoid 2021    Gastrointestinal hemorrhage associated with anorectal source 2021    GERD (gastroesophageal reflux disease) 2013    Hyperlipidemia 2012    Hypothyroidism 2019    Joint pain     Nonrheumatic tricuspid (valve) insufficiency 2018    Personal history UTI 2021    Controlled on Hiprex    Polyneuropathy 2018    PVD (peripheral vascular disease) 10/30/2018    Temporal arteritis     Type 2 diabetes mellitus 2014    Urethral meatal stenosis 2018    history of known urethral stenosis, and was taught to perform monthly self dilation at home.     Past Surgical History:   Procedure Laterality Date     left lumbar sympathetic nerve block Left 2020    X3  DR BROWN    CARDIAC CATHETERIZATION  10/14/2009     SECTION      x 2    COLONOSCOPY  2009    CYSTOSCOPY WITH HYDRODISTENSION OF BLADDER N/A 2021    Procedure: CYSTOSCOPY, WITH BLADDER HYDRODISTENSION;  Surgeon: Dequan Recio MD;  Location: [facility];  Service: Urology;  Laterality: N/A;    CYSTOSCOPY WITH URETHRAL DILATION  2021    Dr Recio    HYSTERECTOMY      LUMBAR SYMPATHETIC NERVE BLOCK Left 10/05/2020    Left Sympathetic Nerve Block - Dr Brown - 10/5/20, 20, 20. 20, 10/9/19, 19 and 18    right lumbar sympathetic nerve block Right 2020    X4 DR BROWN    THYROIDECTOMY           Social History     Socioeconomic History    Marital status:    Tobacco Use    Smoking status: Never Smoker    Smokeless tobacco: Never Used   Substance and Sexual Activity    Alcohol use: Never    Drug use: Never    Sexual activity: Not Currently     Family History   Problem Relation Age of Onset    Cancer Mother     Hypertension Mother      Cancer Father     Cancer Sister     Diabetes Sister     Hyperlipidemia Sister     Hypertension Sister     Cancer Brother     Diabetes Sister     No Known Problems Sister     Cancer Brother     No Known Problems Brother     Cancer Brother      Review of patient's allergies indicates:   Allergen Reactions    Lipitor [atorvastatin] Other (See Comments)     Muscle aching    Pravachol [pravastatin] Other (See Comments)     Muscle aching     has a current medication list which includes the following prescription(s): amlodipine, aspirin, blood sugar diagnostic, blood sugar diagnostic, blood-glucose meter, clonidine, farxiga, glipizide, hydrocodone-acetaminophen, lancets, levocetirizine, levothyroxine, metoprolol succinate, olmesartan, onetouch delica plus lancet, oxybutynin, duloxetine, gabapentin, [START ON 5/28/2022] hydrocodone-acetaminophen, and [START ON 6/27/2022] hydrocodone-acetaminophen.      Objective:  Vitals:    05/18/22 1256   BP: (!) 155/91   Pulse: 79   Resp: 18        Physical Exam  Vitals and nursing note reviewed.   Constitutional:       General: She is not in acute distress.     Appearance: Normal appearance. She is not ill-appearing, toxic-appearing or diaphoretic.   HENT:      Head: Normocephalic and atraumatic.      Nose: Nose normal.      Mouth/Throat:      Mouth: Mucous membranes are moist.   Eyes:      Extraocular Movements: Extraocular movements intact.      Pupils: Pupils are equal, round, and reactive to light.   Cardiovascular:      Rate and Rhythm: Normal rate and regular rhythm.      Heart sounds: Normal heart sounds.   Pulmonary:      Effort: Pulmonary effort is normal. No respiratory distress.      Breath sounds: Normal breath sounds. No stridor. No wheezing or rhonchi.   Abdominal:      General: Bowel sounds are normal.      Palpations: Abdomen is soft.   Musculoskeletal:         General: No swelling, tenderness or deformity. Normal range of motion.      Cervical back: Normal  and normal range of motion. No spasms or tenderness. No pain with movement. Normal range of motion.      Thoracic back: Normal.      Lumbar back: No spasms, tenderness or bony tenderness. Normal range of motion. Negative right straight leg raise test and negative left straight leg raise test. No scoliosis.      Right lower leg: No edema.      Left lower leg: No edema.   Skin:     General: Skin is warm.   Neurological:      General: No focal deficit present.      Mental Status: She is alert and oriented to person, place, and time. Mental status is at baseline.      Cranial Nerves: Cranial nerves are intact. No cranial nerve deficit.      Sensory: Sensation is intact. No sensory deficit.      Motor: No weakness.      Coordination: Coordination normal.      Gait: Gait normal.      Deep Tendon Reflexes: Reflexes are normal and symmetric.   Psychiatric:         Mood and Affect: Mood normal.         Behavior: Behavior normal.      Comments: Patient moderate discomfort           Assessment:      1. Lumbosacral radiculopathy    2. Diabetic neuropathy with neurologic complication    3. Bilateral foot pain    4. Encounter for long-term (current) use of other medications          Plan:  1. reviewed  2.Addiction, Dependency, Tolerance, Opioid abuse-misuse, Death, Diversion Discussed. Overdose reversal drug Naloxone discussed.  3.Refill/Continue medications for pain control and function       Requested Prescriptions      No prescriptions requested or ordered in this encounter     4.Urine drug screen point of care obtained and consistent with prescribed medications and medication refill date    Orders Placed This Encounter   Procedures    POCT Urine Drug Screen Presump     Interpretive Information:     Negative:  No drug detected at the cut off level.   Positive:  This result represents presumptive positive for the   tested drug, other substances may yield a positive response other   than the analyte of interest. This result  should be utilized for   diagnostic purpose only. Confirmation testing will be performed upon physician request only.         5.Follow with MARYBEL Bustillo in 2 months for re-evaluation and medication refill  6. Patient defers nerve block injections, physical therapy or surgical consultation           report:  Reviewed and consistent with medication use as prescribed.      The total time spent for evaluation and management on 05/19/2022 including reviewing separately obtained history, performing a medically appropriate exam and evaluation, documenting clinical information in the health record, independently interpreting results and communicating them to the patient/family/caregiver, and ordering medications/tests/procedures was between 15-29 minutes.    The above plan and management options were discussed at length with patient. Patient is in agreement with the above and verbalized understanding. It will be communicated with the referring physician via electronic record, fax, or mail.

## 2022-06-15 ENCOUNTER — OFFICE VISIT (OUTPATIENT)
Dept: FAMILY MEDICINE | Facility: CLINIC | Age: 77
End: 2022-06-15
Payer: COMMERCIAL

## 2022-06-15 VITALS
WEIGHT: 176.19 LBS | RESPIRATION RATE: 20 BRPM | SYSTOLIC BLOOD PRESSURE: 134 MMHG | HEART RATE: 87 BPM | OXYGEN SATURATION: 98 % | BODY MASS INDEX: 26.7 KG/M2 | TEMPERATURE: 97 F | DIASTOLIC BLOOD PRESSURE: 90 MMHG | HEIGHT: 68 IN

## 2022-06-15 DIAGNOSIS — R10.12 CONTINUOUS LEFT UPPER QUADRANT PAIN: ICD-10-CM

## 2022-06-15 DIAGNOSIS — R16.1 SPLENOMEGALY: ICD-10-CM

## 2022-06-15 DIAGNOSIS — R10.9 CONTINUOUS SEVERE ABDOMINAL PAIN: ICD-10-CM

## 2022-06-15 DIAGNOSIS — J30.89 SEASONAL ALLERGIC RHINITIS DUE TO OTHER ALLERGIC TRIGGER: Primary | ICD-10-CM

## 2022-06-15 DIAGNOSIS — J35.1 ENLARGED TONSILS: ICD-10-CM

## 2022-06-15 DIAGNOSIS — E11.9 DIABETES MELLITUS WITHOUT COMPLICATION: ICD-10-CM

## 2022-06-15 DIAGNOSIS — E04.2 MULTIPLE THYROID NODULES: ICD-10-CM

## 2022-06-15 PROCEDURE — 3288F FALL RISK ASSESSMENT DOCD: CPT | Mod: ,,, | Performed by: INTERNAL MEDICINE

## 2022-06-15 PROCEDURE — 3080F PR MOST RECENT DIASTOLIC BLOOD PRESSURE >= 90 MM HG: ICD-10-PCS | Mod: ,,, | Performed by: INTERNAL MEDICINE

## 2022-06-15 PROCEDURE — 1101F PR PT FALLS ASSESS DOC 0-1 FALLS W/OUT INJ PAST YR: ICD-10-PCS | Mod: ,,, | Performed by: INTERNAL MEDICINE

## 2022-06-15 PROCEDURE — 3080F DIAST BP >= 90 MM HG: CPT | Mod: ,,, | Performed by: INTERNAL MEDICINE

## 2022-06-15 PROCEDURE — 4010F ACE/ARB THERAPY RXD/TAKEN: CPT | Mod: ,,, | Performed by: INTERNAL MEDICINE

## 2022-06-15 PROCEDURE — 3075F SYST BP GE 130 - 139MM HG: CPT | Mod: ,,, | Performed by: INTERNAL MEDICINE

## 2022-06-15 PROCEDURE — 4010F PR ACE/ARB THEARPY RXD/TAKEN: ICD-10-PCS | Mod: ,,, | Performed by: INTERNAL MEDICINE

## 2022-06-15 PROCEDURE — 1159F PR MEDICATION LIST DOCUMENTED IN MEDICAL RECORD: ICD-10-PCS | Mod: ,,, | Performed by: INTERNAL MEDICINE

## 2022-06-15 PROCEDURE — 1160F RVW MEDS BY RX/DR IN RCRD: CPT | Mod: ,,, | Performed by: INTERNAL MEDICINE

## 2022-06-15 PROCEDURE — 99214 OFFICE O/P EST MOD 30 MIN: CPT | Mod: ,,, | Performed by: INTERNAL MEDICINE

## 2022-06-15 PROCEDURE — 3051F HG A1C>EQUAL 7.0%<8.0%: CPT | Mod: ,,, | Performed by: INTERNAL MEDICINE

## 2022-06-15 PROCEDURE — 3051F PR MOST RECENT HEMOGLOBIN A1C LEVEL 7.0 - < 8.0%: ICD-10-PCS | Mod: ,,, | Performed by: INTERNAL MEDICINE

## 2022-06-15 PROCEDURE — 3288F PR FALLS RISK ASSESSMENT DOCUMENTED: ICD-10-PCS | Mod: ,,, | Performed by: INTERNAL MEDICINE

## 2022-06-15 PROCEDURE — 1101F PT FALLS ASSESS-DOCD LE1/YR: CPT | Mod: ,,, | Performed by: INTERNAL MEDICINE

## 2022-06-15 PROCEDURE — 3075F PR MOST RECENT SYSTOLIC BLOOD PRESS GE 130-139MM HG: ICD-10-PCS | Mod: ,,, | Performed by: INTERNAL MEDICINE

## 2022-06-15 PROCEDURE — 1160F PR REVIEW ALL MEDS BY PRESCRIBER/CLIN PHARMACIST DOCUMENTED: ICD-10-PCS | Mod: ,,, | Performed by: INTERNAL MEDICINE

## 2022-06-15 PROCEDURE — 99214 PR OFFICE/OUTPT VISIT, EST, LEVL IV, 30-39 MIN: ICD-10-PCS | Mod: ,,, | Performed by: INTERNAL MEDICINE

## 2022-06-15 PROCEDURE — 1159F MED LIST DOCD IN RCRD: CPT | Mod: ,,, | Performed by: INTERNAL MEDICINE

## 2022-06-15 RX ORDER — FLUTICASONE PROPIONATE 50 MCG
2 SPRAY, SUSPENSION (ML) NASAL DAILY
Qty: 18.2 ML | Refills: 1 | Status: SHIPPED | OUTPATIENT
Start: 2022-06-15 | End: 2022-06-15

## 2022-06-15 RX ORDER — LANCETS 33 GAUGE
EACH MISCELLANEOUS
Qty: 100 EACH | Refills: 11 | Status: SHIPPED | OUTPATIENT
Start: 2022-06-15

## 2022-06-15 RX ORDER — LEVOTHYROXINE SODIUM 75 UG/1
75 TABLET ORAL
Qty: 90 TABLET | Refills: 1 | Status: SHIPPED | OUTPATIENT
Start: 2022-06-15 | End: 2022-07-21

## 2022-06-15 RX ORDER — LORATADINE 10 MG/1
10 TABLET ORAL DAILY
Qty: 30 TABLET | Refills: 0 | Status: SHIPPED | OUTPATIENT
Start: 2022-06-15 | End: 2022-12-05

## 2022-06-15 NOTE — PATIENT INSTRUCTIONS
Lorraine was seen today for follow-up, medication refill and hypertension.    Diagnoses and all orders for this visit:    Seasonal allergic rhinitis due to other allergic trigger  -     Ambulatory referral/consult to ENT; Future    Diabetes mellitus without complication  The following orders have not been finalized:  -     ONETOUCH DELICA PLUS LANCET 33 gauge Misc    Splenomegaly  -     CT Abdomen Pelvis  Without Contrast; Future    Multiple thyroid nodules  -     US Soft Tissue Head Neck Thyroid; Future    Enlarged tonsils    Other orders  The following orders have not been finalized:  -     levothyroxine (SYNTHROID) 75 MCG tablet  -     fluticasone propionate (FLONASE) 50 mcg/actuation nasal spray  -     loratadine (CLARITIN) 10 mg tablet

## 2022-06-15 NOTE — PROGRESS NOTES
Subjective:       Patient ID: Lorraine De Jesus is a 77 y.o. female.    Chief Complaint: Follow-up, Medication Refill, and Hypertension    Patient is here today for check up evaluation. Patient is complaining of a sore throat and drainage. States has been using Flonase 1 spray per nostril daily. Advised to increase to 2 sprays per nostril daily. Tonsils enlarged and red on exam. Will also prescribe Claritin 10 mg PO QD #30 and refer to ENT. Patient is concerned of her thyroid nodules. Last US done in August 2021 and would like a repeat. Will order. Patient also has tenderness of her LUQ with known history of Splenomegaly. Will order CT of abdomen/pelvis. Will follow in 1 month.    Follow-up  Associated symptoms include abdominal pain, congestion and a sore throat.   Medication Refill  Associated symptoms include abdominal pain, congestion and a sore throat.   Hypertension        Current Medications:    Current Outpatient Medications:     amLODIPine (NORVASC) 10 MG tablet, Take 10 mg by mouth once daily., Disp: , Rfl:     aspirin (ECOTRIN) 81 MG EC tablet, Take 81 mg by mouth once daily., Disp: , Rfl:     blood sugar diagnostic Strp, 1 strip by Misc.(Non-Drug; Combo Route) route 3 (three) times daily., Disp: 200 strip, Rfl: 3    blood-glucose meter kit, Use as instructed, Disp: 1 each, Rfl: 0    cloNIDine (CATAPRES) 0.1 MG tablet, TAKE 1 TABLET BY MOUTH EVERY 4 HOURS AS NEEDED FOR BLOOD PRESSURE GREATER THAN 170/90, Disp: , Rfl:     dapagliflozin (FARXIGA) 10 mg tablet, Take 1 tablet (10 mg total) by mouth once daily., Disp: 90 tablet, Rfl: 1    DULoxetine (CYMBALTA) 60 MG capsule, Take 1 capsule (60 mg total) by mouth once daily. Take one capsule at bedtime, Disp: 30 capsule, Rfl: 2    gabapentin (NEURONTIN) 400 MG capsule, TAKE 1 CAPSULE(400 MG) BY MOUTH EVERY 8 HOURS, Disp: 90 capsule, Rfl: 2    glipiZIDE (GLUCOTROL) 10 MG TR24, Take 1 tablet (10 mg total) by mouth daily with breakfast., Disp: 90 tablet, Rfl:  1    HYDROcodone-acetaminophen (NORCO)  mg per tablet, Take 1 tablet by mouth every 6 (six) hours., Disp: 120 tablet, Rfl: 0    HYDROcodone-acetaminophen (NORCO)  mg per tablet, Take 1 tablet by mouth every 6 (six) hours as needed for Pain., Disp: 120 tablet, Rfl: 0    [START ON 6/27/2022] HYDROcodone-acetaminophen (NORCO)  mg per tablet, Take 1 tablet by mouth every 6 (six) hours as needed for Pain., Disp: 120 tablet, Rfl: 0    lancets (ACCU-CHEK FASTCLIX LANCET DRUM MIS), Take 1 each by mouth once daily., Disp: , Rfl:     levocetirizine (XYZAL) 5 MG tablet, TAKE 1 TABLET(5 MG) BY MOUTH EVERY EVENING, Disp: 30 tablet, Rfl: 11    levothyroxine (SYNTHROID) 75 MCG tablet, Take 1 tablet (75 mcg total) by mouth before breakfast., Disp: 90 tablet, Rfl: 1    metoprolol succinate (TOPROL-XL) 50 MG 24 hr tablet, TAKE 1 TABLET(50 MG) BY MOUTH EVERY DAY, Disp: 30 tablet, Rfl: 3    olmesartan (BENICAR) 5 MG Tab, Take 5 mg by mouth once daily., Disp: , Rfl:     ONETOUCH DELICA PLUS LANCET 33 gauge Misc, Check blood sugar TID, Disp: 100 each, Rfl: 11    ONETOUCH VERIO TEST STRIPS Strp, TEST BLOOD GLUCOSE LEVELS TWICE DAILY, Disp: 200 strip, Rfl: 4    oxybutynin (DITROPAN-XL) 5 MG TR24, TAKE 1 TABLET(5 MG) BY MOUTH EVERY DAY, Disp: 90 tablet, Rfl: 3    Last Labs:     Office Visit on 05/18/2022   Component Date Value    POC Amphetamines 05/18/2022 Negative     POC Barbiturates 05/18/2022 Negative     POC Benzodiazepines 05/18/2022 Negative     POC Cocaine 05/18/2022 Negative     POC THC 05/18/2022 Negative     POC Methadone 05/18/2022 Negative     POC Methamphetamine 05/18/2022 Negative     POC Opiates 05/18/2022 Presumptive Positive (A)    POC Oxycodone 05/18/2022 Negative     POC Phencyclidine 05/18/2022 Negative     POC Methylenedioxymetham* 05/18/2022 Negative     POC Tricyclic Antidepres* 05/18/2022 Negative     POC Buprenorphine 05/18/2022 Negative      Acceptab*  05/18/2022 Yes     POC Temperature (Urine) 05/18/2022 94        Last Imaging:  US FNA Biopsy w/ Imaging 1st Lesion  Narrative: EXAMINATION:  US FINE NEEDLE ASPIRATION BIOPSY, FIRST LESION    CLINICAL HISTORY:  thyroid nodule;Nontoxic single thyroid nodule    TECHNIQUE:  US FINE NEEDLE ASPIRATION BIOPSY, FIRST LESION    COMPARISON:  Comparisons were reviewed, if available.    FINDINGS:  FNA right thyroid    Performed by Dr.J Pina    A formal timeout was called all staff present agreed to patient and procedure.  The neck was prepped with ChloraPrep and sterile field was established.  1% lidocaine was used as local anesthetic.  Under ultrasound guidance 6 fine-needle aspirations were taken from the right thyroid nodule.  The puncture site was then cleaned and bandaged.    The patient tolerated the procedure well there no immediate postprocedure complications.  Impression: As above.    Electronically signed by: Louie Pina  Date:    11/02/2021  Time:    09:55         Review of Systems   HENT: Positive for nasal congestion, postnasal drip, sinus pressure/congestion and sore throat.    Gastrointestinal: Positive for abdominal pain.   All other systems reviewed and are negative.        Objective:      Physical Exam  Vitals reviewed.   Constitutional:       Appearance: Normal appearance.   HENT:      Mouth/Throat:      Pharynx: Posterior oropharyngeal erythema present.   Cardiovascular:      Rate and Rhythm: Normal rate and regular rhythm.      Pulses: Normal pulses.      Heart sounds: Normal heart sounds.   Pulmonary:      Effort: Pulmonary effort is normal.      Breath sounds: Normal breath sounds.   Abdominal:      General: Abdomen is flat. Bowel sounds are normal.      Palpations: Abdomen is soft.      Tenderness: There is abdominal tenderness in the left upper quadrant.   Musculoskeletal:         General: Normal range of motion.      Cervical back: Normal range of motion and neck supple.   Skin:     General:  Skin is warm and dry.   Neurological:      General: No focal deficit present.      Mental Status: She is alert and oriented to person, place, and time. Mental status is at baseline.         Assessment:       1. Seasonal allergic rhinitis due to other allergic trigger  Ambulatory referral/consult to ENT   2. Diabetes mellitus without complication     3. Splenomegaly  CT Abdomen Pelvis  Without Contrast   4. Multiple thyroid nodules  US Soft Tissue Head Neck Thyroid   5. Enlarged tonsils          Plan:         Lorraine was seen today for follow-up, medication refill and hypertension.    Diagnoses and all orders for this visit:    Seasonal allergic rhinitis due to other allergic trigger  -     Ambulatory referral/consult to ENT; Future    Diabetes mellitus without complication  The following orders have not been finalized:  -     ONETOUCH DELICA PLUS LANCET 33 gauge Misc    Splenomegaly  -     CT Abdomen Pelvis  Without Contrast; Future    Multiple thyroid nodules  -     US Soft Tissue Head Neck Thyroid; Future    Enlarged tonsils    Other orders  The following orders have not been finalized:  -     levothyroxine (SYNTHROID) 75 MCG tablet  -     fluticasone propionate (FLONASE) 50 mcg/actuation nasal spray  -     loratadine (CLARITIN) 10 mg tablet

## 2022-06-22 ENCOUNTER — HOSPITAL ENCOUNTER (OUTPATIENT)
Dept: RADIOLOGY | Facility: HOSPITAL | Age: 77
Discharge: HOME | End: 2022-06-22
Attending: INTERNAL MEDICINE
Payer: COMMERCIAL

## 2022-06-22 DIAGNOSIS — E04.2 MULTIPLE THYROID NODULES: ICD-10-CM

## 2022-06-22 PROCEDURE — 76536 US EXAM OF HEAD AND NECK: CPT | Mod: TC

## 2022-06-22 PROCEDURE — 76536 US SOFT TISSUE HEAD NECK THYROID: ICD-10-PCS | Mod: 26,,, | Performed by: STUDENT IN AN ORGANIZED HEALTH CARE EDUCATION/TRAINING PROGRAM

## 2022-06-22 PROCEDURE — 76536 US EXAM OF HEAD AND NECK: CPT | Mod: 26,,, | Performed by: STUDENT IN AN ORGANIZED HEALTH CARE EDUCATION/TRAINING PROGRAM

## 2022-06-23 ENCOUNTER — TELEPHONE (OUTPATIENT)
Dept: FAMILY MEDICINE | Facility: CLINIC | Age: 77
End: 2022-06-23
Payer: COMMERCIAL

## 2022-06-23 NOTE — TELEPHONE ENCOUNTER
----- Message from Chelsie Harden sent at 6/23/2022  3:49 PM CDT -----  Regarding: Test results  Pt wanting results from her test yesterday.     [phone]    Called patient re: above message. Patient says she had a US of neck yesterday. Informed her Dr. Chen not here today but when he checks results, he'll let us know if she needs to come in to go over results. Voiced understanding.

## 2022-06-25 ENCOUNTER — OFFICE VISIT (OUTPATIENT)
Dept: FAMILY MEDICINE | Facility: CLINIC | Age: 77
End: 2022-06-25
Payer: COMMERCIAL

## 2022-06-25 ENCOUNTER — HOSPITAL ENCOUNTER (EMERGENCY)
Facility: HOSPITAL | Age: 77
Discharge: HOME | End: 2022-06-25
Payer: COMMERCIAL

## 2022-06-25 VITALS
DIASTOLIC BLOOD PRESSURE: 100 MMHG | HEART RATE: 97 BPM | HEIGHT: 68 IN | TEMPERATURE: 99 F | OXYGEN SATURATION: 99 % | SYSTOLIC BLOOD PRESSURE: 159 MMHG | RESPIRATION RATE: 20 BRPM | BODY MASS INDEX: 25.43 KG/M2 | WEIGHT: 167.81 LBS

## 2022-06-25 VITALS
TEMPERATURE: 99 F | RESPIRATION RATE: 20 BRPM | DIASTOLIC BLOOD PRESSURE: 79 MMHG | BODY MASS INDEX: 25.31 KG/M2 | HEART RATE: 80 BPM | OXYGEN SATURATION: 100 % | HEIGHT: 68 IN | SYSTOLIC BLOOD PRESSURE: 160 MMHG | WEIGHT: 167 LBS

## 2022-06-25 DIAGNOSIS — R42 LIGHTHEADED: ICD-10-CM

## 2022-06-25 DIAGNOSIS — R51.9 NONINTRACTABLE HEADACHE, UNSPECIFIED CHRONICITY PATTERN, UNSPECIFIED HEADACHE TYPE: ICD-10-CM

## 2022-06-25 DIAGNOSIS — R53.83 FATIGUE, UNSPECIFIED TYPE: Primary | ICD-10-CM

## 2022-06-25 DIAGNOSIS — E86.0 DEHYDRATION: Primary | ICD-10-CM

## 2022-06-25 LAB
ANION GAP SERPL CALCULATED.3IONS-SCNC: 20 MMOL/L (ref 7–16)
ANISOCYTOSIS BLD QL SMEAR: ABNORMAL
BASOPHILS # BLD AUTO: 0.02 K/UL (ref 0–0.2)
BASOPHILS NFR BLD AUTO: 0.3 % (ref 0–1)
BILIRUB UR QL STRIP: NEGATIVE
BUN SERPL-MCNC: 17 MG/DL (ref 7–18)
BUN/CREAT SERPL: 22 (ref 6–20)
CALCIUM SERPL-MCNC: 9.3 MG/DL (ref 8.5–10.1)
CHLORIDE SERPL-SCNC: 98 MMOL/L (ref 98–107)
CLARITY UR: CLEAR
CO2 SERPL-SCNC: 21 MMOL/L (ref 21–32)
COLOR UR: ABNORMAL
CREAT SERPL-MCNC: 0.76 MG/DL (ref 0.55–1.02)
DIFFERENTIAL METHOD BLD: ABNORMAL
EOSINOPHIL # BLD AUTO: 0 K/UL (ref 0–0.5)
EOSINOPHIL NFR BLD AUTO: 0 % (ref 1–4)
ERYTHROCYTE [DISTWIDTH] IN BLOOD BY AUTOMATED COUNT: 15.3 % (ref 11.5–14.5)
GLUCOSE SERPL-MCNC: 176 MG/DL (ref 74–106)
GLUCOSE UR STRIP-MCNC: 500 MG/DL
HCT VFR BLD AUTO: 41.8 % (ref 38–47)
HGB BLD-MCNC: 14.2 G/DL (ref 12–16)
IMM GRANULOCYTES # BLD AUTO: 0.03 K/UL (ref 0–0.04)
IMM GRANULOCYTES NFR BLD: 0.4 % (ref 0–0.4)
KETONES UR STRIP-SCNC: 40 MG/DL
LEUKOCYTE ESTERASE UR QL STRIP: NEGATIVE
LYMPHOCYTES # BLD AUTO: 0.51 K/UL (ref 1–4.8)
LYMPHOCYTES NFR BLD AUTO: 6.8 % (ref 27–41)
LYMPHOCYTES NFR BLD MANUAL: 8 % (ref 27–41)
MCH RBC QN AUTO: 26.8 PG (ref 27–31)
MCHC RBC AUTO-ENTMCNC: 34 G/DL (ref 32–36)
MCV RBC AUTO: 78.9 FL (ref 80–96)
MONOCYTES # BLD AUTO: 0.46 K/UL (ref 0–0.8)
MONOCYTES NFR BLD AUTO: 6.2 % (ref 2–6)
MONOCYTES NFR BLD MANUAL: 6 % (ref 2–6)
MPC BLD CALC-MCNC: 9.2 FL (ref 9.4–12.4)
NEUTROPHILS # BLD AUTO: 6.44 K/UL (ref 1.8–7.7)
NEUTROPHILS NFR BLD AUTO: 86.3 % (ref 53–65)
NEUTS BAND NFR BLD MANUAL: 4 % (ref 1–5)
NEUTS SEG NFR BLD MANUAL: 82 % (ref 50–62)
NITRITE UR QL STRIP: NEGATIVE
NRBC # BLD AUTO: 0 X10E3/UL
NRBC, AUTO (.00): 0 %
PH UR STRIP: 5 PH UNITS
PLATELET # BLD AUTO: 263 K/UL (ref 150–400)
PLATELET MORPHOLOGY: NORMAL
POTASSIUM SERPL-SCNC: 4.3 MMOL/L (ref 3.5–5.1)
PROT UR QL STRIP: ABNORMAL
RBC # BLD AUTO: 5.3 M/UL (ref 4.2–5.4)
RBC # UR STRIP: NEGATIVE /UL
SODIUM SERPL-SCNC: 135 MMOL/L (ref 136–145)
SP GR UR STRIP: 1.02
UROBILINOGEN UR STRIP-ACNC: 0.2 MG/DL
WBC # BLD AUTO: 7.46 K/UL (ref 4.5–11)

## 2022-06-25 PROCEDURE — 1160F PR REVIEW ALL MEDS BY PRESCRIBER/CLIN PHARMACIST DOCUMENTED: ICD-10-PCS | Mod: ,,, | Performed by: FAMILY MEDICINE

## 2022-06-25 PROCEDURE — 3051F HG A1C>EQUAL 7.0%<8.0%: CPT | Mod: ,,, | Performed by: FAMILY MEDICINE

## 2022-06-25 PROCEDURE — 36415 COLL VENOUS BLD VENIPUNCTURE: CPT | Performed by: NURSE PRACTITIONER

## 2022-06-25 PROCEDURE — 99283 PR EMERGENCY DEPT VISIT,LEVEL III: ICD-10-PCS | Mod: ,,, | Performed by: NURSE PRACTITIONER

## 2022-06-25 PROCEDURE — 1160F RVW MEDS BY RX/DR IN RCRD: CPT | Mod: ,,, | Performed by: FAMILY MEDICINE

## 2022-06-25 PROCEDURE — 3080F DIAST BP >= 90 MM HG: CPT | Mod: ,,, | Performed by: FAMILY MEDICINE

## 2022-06-25 PROCEDURE — 3051F PR MOST RECENT HEMOGLOBIN A1C LEVEL 7.0 - < 8.0%: ICD-10-PCS | Mod: ,,, | Performed by: FAMILY MEDICINE

## 2022-06-25 PROCEDURE — 93010 EKG 12-LEAD: ICD-10-PCS | Mod: ,,, | Performed by: STUDENT IN AN ORGANIZED HEALTH CARE EDUCATION/TRAINING PROGRAM

## 2022-06-25 PROCEDURE — 80048 BASIC METABOLIC PNL TOTAL CA: CPT | Performed by: NURSE PRACTITIONER

## 2022-06-25 PROCEDURE — 99285 EMERGENCY DEPT VISIT HI MDM: CPT | Mod: 25

## 2022-06-25 PROCEDURE — 96360 HYDRATION IV INFUSION INIT: CPT

## 2022-06-25 PROCEDURE — 96361 HYDRATE IV INFUSION ADD-ON: CPT

## 2022-06-25 PROCEDURE — 1159F PR MEDICATION LIST DOCUMENTED IN MEDICAL RECORD: ICD-10-PCS | Mod: ,,, | Performed by: FAMILY MEDICINE

## 2022-06-25 PROCEDURE — 99213 OFFICE O/P EST LOW 20 MIN: CPT | Mod: ,,, | Performed by: FAMILY MEDICINE

## 2022-06-25 PROCEDURE — 4010F PR ACE/ARB THEARPY RXD/TAKEN: ICD-10-PCS | Mod: ,,, | Performed by: FAMILY MEDICINE

## 2022-06-25 PROCEDURE — 99213 PR OFFICE/OUTPT VISIT, EST, LEVL III, 20-29 MIN: ICD-10-PCS | Mod: ,,, | Performed by: FAMILY MEDICINE

## 2022-06-25 PROCEDURE — 1159F MED LIST DOCD IN RCRD: CPT | Mod: ,,, | Performed by: FAMILY MEDICINE

## 2022-06-25 PROCEDURE — 81003 URINALYSIS AUTO W/O SCOPE: CPT | Performed by: NURSE PRACTITIONER

## 2022-06-25 PROCEDURE — 85025 COMPLETE CBC W/AUTO DIFF WBC: CPT | Performed by: NURSE PRACTITIONER

## 2022-06-25 PROCEDURE — 93010 ELECTROCARDIOGRAM REPORT: CPT | Mod: ,,, | Performed by: STUDENT IN AN ORGANIZED HEALTH CARE EDUCATION/TRAINING PROGRAM

## 2022-06-25 PROCEDURE — 3080F PR MOST RECENT DIASTOLIC BLOOD PRESSURE >= 90 MM HG: ICD-10-PCS | Mod: ,,, | Performed by: FAMILY MEDICINE

## 2022-06-25 PROCEDURE — 3077F SYST BP >= 140 MM HG: CPT | Mod: ,,, | Performed by: FAMILY MEDICINE

## 2022-06-25 PROCEDURE — 4010F ACE/ARB THERAPY RXD/TAKEN: CPT | Mod: ,,, | Performed by: FAMILY MEDICINE

## 2022-06-25 PROCEDURE — 93005 ELECTROCARDIOGRAM TRACING: CPT

## 2022-06-25 PROCEDURE — 99283 EMERGENCY DEPT VISIT LOW MDM: CPT | Mod: ,,, | Performed by: NURSE PRACTITIONER

## 2022-06-25 PROCEDURE — 25000003 PHARM REV CODE 250: Performed by: NURSE PRACTITIONER

## 2022-06-25 PROCEDURE — 3077F PR MOST RECENT SYSTOLIC BLOOD PRESSURE >= 140 MM HG: ICD-10-PCS | Mod: ,,, | Performed by: FAMILY MEDICINE

## 2022-06-25 RX ORDER — ACETAMINOPHEN 325 MG/1
650 TABLET ORAL
Status: COMPLETED | OUTPATIENT
Start: 2022-06-25 | End: 2022-06-25

## 2022-06-25 RX ADMIN — SODIUM CHLORIDE 1000 ML: 9 INJECTION, SOLUTION INTRAVENOUS at 11:06

## 2022-06-25 RX ADMIN — ACETAMINOPHEN 650 MG: 325 TABLET ORAL at 02:06

## 2022-06-25 RX ADMIN — SODIUM CHLORIDE 1000 ML: 9 INJECTION, SOLUTION INTRAVENOUS at 10:06

## 2022-06-25 NOTE — DISCHARGE INSTRUCTIONS
Drink plenty of fluids. Follow up with your primary care provider in 2 days. Return to the emergency department for any increase in symptoms or for any other new or worrisome symptoms.

## 2022-06-25 NOTE — ED PROVIDER NOTES
Encounter Date: 6/25/2022       History     Chief Complaint   Patient presents with    Dizziness    Headache     77 year old female presents to the emergency department to be evaluated for headache, feeling lightheaded when she stands, nausea. Denies any vomiting, diarrhea, abdominal pain, constipation, chest pain, shortness of breath, palpitations. Denies any recent fall or head injury.     The history is provided by the patient.   Dizziness  This is a new problem. Associated symptoms include headaches. Pertinent negatives include no chest pain, no abdominal pain and no shortness of breath.   Headache   This is a new problem. The current episode started yesterday. The pain is located in the frontal region. Associated symptoms include dizziness. Pertinent negatives include no abdominal pain, abnormal behavior, anorexia, back pain, blurred vision, coughing, drainage, facial sweating, fever, hearing loss, insomnia, loss of balance, muscle aches, nausea, neck pain, numbness, phonophobia, photophobia, rhinorrhea, scalp tenderness, seizures, sinus pressure, sore throat, swollen glands, tingling, tinnitus, visual change, vomiting, weakness or weight loss.     Review of patient's allergies indicates:   Allergen Reactions    Lipitor [atorvastatin] Other (See Comments)     Muscle aching    Pravachol [pravastatin] Other (See Comments)     Muscle aching     Past Medical History:   Diagnosis Date    Adenomatous polyp of ascending colon 6/22/2021    Adenomatous polyp of descending colon 6/22/2021    Age related osteoporosis 10/28/2020    Benign paroxysmal vertigo     Chronic pain syndrome     Chronic pelvic pain in female 7/28/2021    Ditropan XL 5mg    Colon, diverticulosis 6/22/2021    Depressive disorder 08/12/2019    Essential hypertension 01/16/2012    External hemorrhoid 6/22/2021    Gastrointestinal hemorrhage associated with anorectal source 6/22/2021    GERD (gastroesophageal reflux disease) 11/21/2013     Hyperlipidemia 2012    Hypothyroidism 2019    Joint pain     Nonrheumatic tricuspid (valve) insufficiency 2018    Personal history UTI 2021    Controlled on [employer]    Polyneuropathy 2018    PVD (peripheral vascular disease) 10/30/2018    Temporal arteritis     Type 2 diabetes mellitus 2014    Urethral meatal stenosis 2018    history of known urethral stenosis, and was taught to perform monthly self dilation at home.     Past Surgical History:   Procedure Laterality Date     left lumbar sympathetic nerve block Left 2020    X3  DR BROWN    CARDIAC CATHETERIZATION  10/14/2009     SECTION      x 2    COLONOSCOPY  2009    CYSTOSCOPY WITH HYDRODISTENSION OF BLADDER N/A 2021    Procedure: CYSTOSCOPY, WITH BLADDER HYDRODISTENSION;  Surgeon: Dequan Recio MD;  Location: [facility];  Service: Urology;  Laterality: N/A;    CYSTOSCOPY WITH URETHRAL DILATION  2021    Dr Recio    HYSTERECTOMY      LUMBAR SYMPATHETIC NERVE BLOCK Left 10/05/2020    Left Sympathetic Nerve Block - Dr Brown - 10/5/20, 20, 20. 20, 10/9/19, 19 and 18    right lumbar sympathetic nerve block Right 2020    X4 DR BROWN    THYROIDECTOMY           Family History   Problem Relation Age of Onset    Cancer Mother     Hypertension Mother     Cancer Father     Cancer Sister     Diabetes Sister     Hyperlipidemia Sister     Hypertension Sister     Cancer Brother     Diabetes Sister     No Known Problems Sister     Cancer Brother     No Known Problems Brother     Cancer Brother      Social History     Tobacco Use    Smoking status: Never Smoker    Smokeless tobacco: Never Used   Substance Use Topics    Alcohol use: Never    Drug use: Never     Review of Systems   Constitutional: Negative for fever and weight loss.   HENT: Negative for hearing loss, rhinorrhea, sinus pressure, sore throat and tinnitus.    Eyes: Negative  for blurred vision and photophobia.   Respiratory: Negative for cough and shortness of breath.    Cardiovascular: Negative for chest pain.   Gastrointestinal: Negative for abdominal pain, anorexia, nausea and vomiting.   Musculoskeletal: Negative for back pain and neck pain.   Neurological: Positive for dizziness and headaches. Negative for tingling, seizures, weakness, numbness and loss of balance.   Psychiatric/Behavioral: The patient does not have insomnia.    All other systems reviewed and are negative.      Physical Exam     Initial Vitals [06/25/22 0943]   BP Pulse Resp Temp SpO2   139/85 98 16 98.8 °F (37.1 °C) 95 %      MAP       --         Physical Exam    Vitals reviewed.  Constitutional: She appears well-developed and well-nourished.   HENT:   Head: Normocephalic and atraumatic.   Eyes: EOM are normal. Pupils are equal, round, and reactive to light.   Neck: Neck supple.   Cardiovascular: Normal rate and regular rhythm.   Pulmonary/Chest: Breath sounds normal.   Abdominal: Abdomen is soft. Bowel sounds are normal.   Musculoskeletal:         General: Normal range of motion.      Cervical back: Neck supple.     Neurological: She is alert and oriented to person, place, and time. She has normal strength. No cranial nerve deficit or sensory deficit. GCS score is 15. GCS eye subscore is 4. GCS verbal subscore is 5. GCS motor subscore is 6.   Skin: Skin is warm and dry. Capillary refill takes less than 2 seconds.   Psychiatric: She has a normal mood and affect.         Medical Screening Exam   See Full Note    ED Course   Procedures  Labs Reviewed   BASIC METABOLIC PANEL - Abnormal; Notable for the following components:       Result Value    Sodium 135 (*)     Anion Gap 20 (*)     Glucose 176 (*)     BUN/Creatinine Ratio 22 (*)     All other components within normal limits   URINALYSIS - Abnormal; Notable for the following components:    Protein, UA Trace (*)     Glucose,   (*)     Ketones, UA 40  (*)      All other components within normal limits   CBC WITH DIFFERENTIAL - Abnormal; Notable for the following components:    MCV 78.9 (*)     MCH 26.8 (*)     RDW 15.3 (*)     MPV 9.2 (*)     Neutrophils % 86.3 (*)     Lymphocytes % 6.8 (*)     Monocytes % 6.2 (*)     Eosinophils % 0.0 (*)     Lymphocytes, Absolute 0.51 (*)     All other components within normal limits   MANUAL DIFFERENTIAL - Abnormal; Notable for the following components:    Segmented Neutrophils, Man % 82 (*)     Lymphocytes, Man % 8 (*)     All other components within normal limits   CBC W/ AUTO DIFFERENTIAL    Narrative:     The following orders were created for panel order CBC auto differential.  Procedure                               Abnormality         Status                     ---------                               -----------         ------                     CBC with Differential[[id number]]        Abnormal            Final result               Manual Differential[[id number]]          Abnormal            Final result                 Please view results for these tests on the individual orders.   EXTRA TUBES    Narrative:     The following orders were created for panel order EXTRA TUBES.  Procedure                               Abnormality         Status                     ---------                               -----------         ------                     Light Blue Top Hold[[id number]]                              In process                 Light Green Top Hold[[id number]]                             In process                   Please view results for these tests on the individual orders.   LIGHT BLUE TOP HOLD   LIGHT GREEN TOP HOLD          Imaging Results          CT Head Without Contrast (Final result)  Result time 06/25/22 14:13:57    Final result by Louie Pina DO (06/25/22 14:13:57)                 Impression:      No acute intracranial findings.      Electronically signed by: Louie  Silvana  Date:    06/25/2022  Time:    14:13             Narrative:    EXAMINATION:  CT HEAD WITHOUT CONTRAST    CLINICAL HISTORY:  headache;    TECHNIQUE:  Multiplanar CT imaging from the vertex to skull the skull base was performed without contrast.    COMPARISON:  2019    FINDINGS:  Global brain parenchymal volume loss.    There are similar appearing hypoattenuations scattered throughout the periventricular white matter of the frontal and parietal lobes, which are nonspecific although can be seen in chronic small vessel ischemic change    There is no CT evidence of acute intracranial hemorrhage or large territorial infarct. No epidural/subdural hematomas.    There is no evidence of hydrocephalus, midline shift or mass effect.    Scalp, paranasal sinuses, and mastoid air cells are normal.                                 Medications   acetaminophen tablet 650 mg (has no administration in time range)   sodium chloride 0.9% bolus 1,000 mL (0 mLs Intravenous Stopped 6/25/22 1115)   sodium chloride 0.9% bolus 1,000 mL (0 mLs Intravenous Stopped 6/25/22 1253)                       Clinical Impression:   Final diagnoses:  [R42] Lightheaded  [E86.0] Dehydration (Primary)  [R51.9] Nonintractable headache, unspecified chronicity pattern, unspecified headache type          ED Disposition Condition    Discharge Stable        ED Prescriptions     None        Follow-up Information    None          OLGA Alaniz  06/25/22 6068

## 2022-06-25 NOTE — PROGRESS NOTES
Subjective:       Patient ID: Lorraine De Jesus is a 77 y.o. female.    Chief Complaint: Headache and Fatigue (Started yesterday had biopsy on throat wed. )    Pt with headache, generalized fatigue and weakness that started yesterday. No chest pain but reports near syncopal episodes.     Review of Systems   Constitutional: Positive for fatigue. Negative for activity change, appetite change, chills, diaphoresis, fever and unexpected weight change.   HENT: Negative for nasal congestion, dental problem, drooling, ear discharge, ear pain, facial swelling, hearing loss, mouth sores, nosebleeds, postnasal drip, rhinorrhea, sinus pressure/congestion, sneezing, sore throat, tinnitus, trouble swallowing, voice change and goiter.    Eyes: Negative for photophobia, discharge, itching and visual disturbance.   Respiratory: Negative for apnea, cough, choking, chest tightness, shortness of breath, wheezing and stridor.    Cardiovascular: Negative for chest pain, palpitations, leg swelling and claudication.   Gastrointestinal: Negative for abdominal distention, abdominal pain, anal bleeding, blood in stool, change in bowel habit, constipation, diarrhea, nausea, vomiting and change in bowel habit.   Endocrine: Negative for cold intolerance, heat intolerance, polydipsia, polyphagia and polyuria.   Genitourinary: Negative for bladder incontinence, decreased urine volume, difficulty urinating, dysuria, enuresis, flank pain, frequency, hematuria, nocturia, pelvic pain and urgency.   Musculoskeletal: Negative for arthralgias, back pain, gait problem, joint swelling, leg pain, myalgias, neck pain, neck stiffness and joint deformity.   Integumentary:  Negative for pallor, rash, wound, breast mass and breast tenderness.   Allergic/Immunologic: Negative for environmental allergies, food allergies and immunocompromised state.   Neurological: Positive for light-headedness and headaches. Negative for dizziness, vertigo, tremors, seizures, syncope,  facial asymmetry, speech difficulty, weakness, numbness, disturbances in coordination, memory loss and coordination difficulties.   Hematological: Negative for adenopathy. Does not bruise/bleed easily.   Psychiatric/Behavioral: Negative for agitation, behavioral problems, confusion, decreased concentration, dysphoric mood, hallucinations, self-injury, sleep disturbance and suicidal ideas. The patient is not nervous/anxious and is not hyperactive.    Breast: Negative for mass and tenderness        Objective:      Physical Exam  Vitals reviewed.   Constitutional:       General: She is in acute distress.      Appearance: Normal appearance. She is ill-appearing and diaphoretic.   HENT:      Head: Normocephalic and atraumatic.      Right Ear: Tympanic membrane, ear canal and external ear normal.      Left Ear: Tympanic membrane, ear canal and external ear normal.      Nose: Nose normal.      Mouth/Throat:      Mouth: Mucous membranes are moist.      Pharynx: Oropharynx is clear.   Eyes:      Extraocular Movements: Extraocular movements intact.      Conjunctiva/sclera: Conjunctivae normal.      Pupils: Pupils are equal, round, and reactive to light.   Cardiovascular:      Rate and Rhythm: Normal rate and regular rhythm.      Pulses: Normal pulses.      Heart sounds: Normal heart sounds.   Pulmonary:      Effort: Pulmonary effort is normal.      Breath sounds: Normal breath sounds.   Abdominal:      General: Bowel sounds are normal.      Palpations: Abdomen is soft.   Musculoskeletal:         General: Normal range of motion.      Cervical back: Normal range of motion and neck supple.   Skin:     General: Skin is warm.   Neurological:      General: No focal deficit present.      Mental Status: She is alert. Mental status is at baseline.   Psychiatric:         Mood and Affect: Mood normal.         Behavior: Behavior normal.         Thought Content: Thought content normal.         Judgment: Judgment normal.          Assessment:       1. Fatigue, unspecified type        Plan:     Fatigue, unspecified type       Will send to ED due to near syncopal episodes and weakness.

## 2022-06-27 ENCOUNTER — TELEPHONE (OUTPATIENT)
Dept: EMERGENCY MEDICINE | Facility: HOSPITAL | Age: 77
End: 2022-06-27
Payer: COMMERCIAL

## 2022-06-28 ENCOUNTER — OFFICE VISIT (OUTPATIENT)
Dept: OTOLARYNGOLOGY | Facility: CLINIC | Age: 77
End: 2022-06-28
Payer: COMMERCIAL

## 2022-06-28 VITALS — HEIGHT: 68 IN | BODY MASS INDEX: 25.31 KG/M2 | WEIGHT: 167 LBS

## 2022-06-28 DIAGNOSIS — J02.9 PHARYNGITIS, UNSPECIFIED ETIOLOGY: ICD-10-CM

## 2022-06-28 DIAGNOSIS — J35.1 ENLARGED TONSILS: Primary | ICD-10-CM

## 2022-06-28 PROCEDURE — 99214 PR OFFICE/OUTPT VISIT, EST, LEVL IV, 30-39 MIN: ICD-10-PCS | Mod: S$PBB,,, | Performed by: OTOLARYNGOLOGY

## 2022-06-28 PROCEDURE — 1159F PR MEDICATION LIST DOCUMENTED IN MEDICAL RECORD: ICD-10-PCS | Mod: CPTII,,, | Performed by: OTOLARYNGOLOGY

## 2022-06-28 PROCEDURE — 4010F ACE/ARB THERAPY RXD/TAKEN: CPT | Mod: CPTII,,, | Performed by: OTOLARYNGOLOGY

## 2022-06-28 PROCEDURE — 3051F PR MOST RECENT HEMOGLOBIN A1C LEVEL 7.0 - < 8.0%: ICD-10-PCS | Mod: CPTII,,, | Performed by: OTOLARYNGOLOGY

## 2022-06-28 PROCEDURE — 1159F MED LIST DOCD IN RCRD: CPT | Mod: CPTII,,, | Performed by: OTOLARYNGOLOGY

## 2022-06-28 PROCEDURE — 99214 OFFICE O/P EST MOD 30 MIN: CPT | Mod: PBBFAC | Performed by: OTOLARYNGOLOGY

## 2022-06-28 PROCEDURE — 4010F PR ACE/ARB THEARPY RXD/TAKEN: ICD-10-PCS | Mod: CPTII,,, | Performed by: OTOLARYNGOLOGY

## 2022-06-28 PROCEDURE — 99214 OFFICE O/P EST MOD 30 MIN: CPT | Mod: S$PBB,,, | Performed by: OTOLARYNGOLOGY

## 2022-06-28 PROCEDURE — 1160F RVW MEDS BY RX/DR IN RCRD: CPT | Mod: CPTII,,, | Performed by: OTOLARYNGOLOGY

## 2022-06-28 PROCEDURE — 3051F HG A1C>EQUAL 7.0%<8.0%: CPT | Mod: CPTII,,, | Performed by: OTOLARYNGOLOGY

## 2022-06-28 PROCEDURE — 1160F PR REVIEW ALL MEDS BY PRESCRIBER/CLIN PHARMACIST DOCUMENTED: ICD-10-PCS | Mod: CPTII,,, | Performed by: OTOLARYNGOLOGY

## 2022-06-28 RX ORDER — CLINDAMYCIN HYDROCHLORIDE 300 MG/1
300 CAPSULE ORAL 2 TIMES DAILY
Qty: 28 CAPSULE | Refills: 0 | Status: SHIPPED | OUTPATIENT
Start: 2022-06-28 | End: 2022-08-10 | Stop reason: ALTCHOICE

## 2022-06-28 NOTE — PROGRESS NOTES
Subjective:       Patient ID: Lorraine De Jesus is a 77 y.o. female.    Chief Complaint: Sore Throat (Patient is here for enlarged tonsils.)  hurts to swallow   HPI  Review of Systems   HENT: Positive for ear pain, sore throat and trouble swallowing.    All other systems reviewed and are negative.      Objective:      Physical Exam  General: NAD  Head: Normocephalic, atraumatic, no facial asymmetry/normal strength,  Ears: Both auricules normal in appearance, w/o deformities tympanic membranes normal external auditory canals normal  Nose: External nose w/o deformities normal turbinates no drainage or inflammation  Oral Cavity: Lips, gums, floor of mouth, tongue hard palate, and buccal mucosa without mass/lesion  Oropharynx: Mucosa pink and moist, soft palate, posterior pharynx and oropharyngeal wall red swollen   Neck: Supple, symmetric, trachea midline, no palpable mass/lesion, no palpable cervical lymphadenopathy  Skin: Warm and dry, no concerning lesions  Respiratory: Respirations even, unlabored    Assessment:       1. Enlarged tonsils    2. Pharyngitis, unspecified etiology        Plan:       Cleocin f/u 2 weeks does not need tonsillectomy

## 2022-07-13 ENCOUNTER — OFFICE VISIT (OUTPATIENT)
Dept: PAIN MEDICINE | Facility: CLINIC | Age: 77
End: 2022-07-13
Payer: COMMERCIAL

## 2022-07-13 ENCOUNTER — HOSPITAL ENCOUNTER (OUTPATIENT)
Dept: RADIOLOGY | Facility: HOSPITAL | Age: 77
Discharge: HOME | End: 2022-07-13
Attending: PHYSICIAN ASSISTANT
Payer: COMMERCIAL

## 2022-07-13 VITALS
WEIGHT: 173 LBS | HEIGHT: 68 IN | SYSTOLIC BLOOD PRESSURE: 135 MMHG | HEART RATE: 63 BPM | OXYGEN SATURATION: 98 % | DIASTOLIC BLOOD PRESSURE: 63 MMHG | BODY MASS INDEX: 26.22 KG/M2 | RESPIRATION RATE: 18 BRPM

## 2022-07-13 DIAGNOSIS — E11.40 DIABETIC NEUROPATHY WITH NEUROLOGIC COMPLICATION: Chronic | ICD-10-CM

## 2022-07-13 DIAGNOSIS — G89.29 CHRONIC PAIN OF LEFT KNEE: ICD-10-CM

## 2022-07-13 DIAGNOSIS — M25.562 CHRONIC PAIN OF LEFT KNEE: Primary | Chronic | ICD-10-CM

## 2022-07-13 DIAGNOSIS — G89.29 CHRONIC PAIN OF LEFT KNEE: Primary | Chronic | ICD-10-CM

## 2022-07-13 DIAGNOSIS — M25.562 CHRONIC PAIN OF LEFT KNEE: ICD-10-CM

## 2022-07-13 DIAGNOSIS — M79.671 BILATERAL FOOT PAIN: ICD-10-CM

## 2022-07-13 DIAGNOSIS — M79.672 BILATERAL FOOT PAIN: ICD-10-CM

## 2022-07-13 DIAGNOSIS — M54.17 LUMBOSACRAL RADICULOPATHY: Chronic | ICD-10-CM

## 2022-07-13 DIAGNOSIS — E11.49 DIABETIC NEUROPATHY WITH NEUROLOGIC COMPLICATION: Chronic | ICD-10-CM

## 2022-07-13 PROCEDURE — 99215 OFFICE O/P EST HI 40 MIN: CPT | Mod: PBBFAC,25 | Performed by: PHYSICIAN ASSISTANT

## 2022-07-13 PROCEDURE — 73560 XR KNEE 1 OR 2 VIEW LEFT: ICD-10-PCS | Mod: 26,LT,, | Performed by: RADIOLOGY

## 2022-07-13 PROCEDURE — 1101F PT FALLS ASSESS-DOCD LE1/YR: CPT | Mod: CPTII,,, | Performed by: PHYSICIAN ASSISTANT

## 2022-07-13 PROCEDURE — 4010F ACE/ARB THERAPY RXD/TAKEN: CPT | Mod: CPTII,,, | Performed by: PHYSICIAN ASSISTANT

## 2022-07-13 PROCEDURE — 3051F HG A1C>EQUAL 7.0%<8.0%: CPT | Mod: CPTII,,, | Performed by: PHYSICIAN ASSISTANT

## 2022-07-13 PROCEDURE — 99214 OFFICE O/P EST MOD 30 MIN: CPT | Mod: 25,S$PBB,, | Performed by: PHYSICIAN ASSISTANT

## 2022-07-13 PROCEDURE — 20610 LARGE JOINT ASPIRATION/INJECTION: L KNEE: ICD-10-PCS | Mod: S$PBB,LT,, | Performed by: PHYSICIAN ASSISTANT

## 2022-07-13 PROCEDURE — 1125F PR PAIN SEVERITY QUANTIFIED, PAIN PRESENT: ICD-10-PCS | Mod: CPTII,,, | Performed by: PHYSICIAN ASSISTANT

## 2022-07-13 PROCEDURE — 4010F PR ACE/ARB THEARPY RXD/TAKEN: ICD-10-PCS | Mod: CPTII,,, | Performed by: PHYSICIAN ASSISTANT

## 2022-07-13 PROCEDURE — 3078F PR MOST RECENT DIASTOLIC BLOOD PRESSURE < 80 MM HG: ICD-10-PCS | Mod: CPTII,,, | Performed by: PHYSICIAN ASSISTANT

## 2022-07-13 PROCEDURE — 73560 X-RAY EXAM OF KNEE 1 OR 2: CPT | Mod: 26,LT,, | Performed by: RADIOLOGY

## 2022-07-13 PROCEDURE — 99214 PR OFFICE/OUTPT VISIT, EST, LEVL IV, 30-39 MIN: ICD-10-PCS | Mod: 25,S$PBB,, | Performed by: PHYSICIAN ASSISTANT

## 2022-07-13 PROCEDURE — 3288F PR FALLS RISK ASSESSMENT DOCUMENTED: ICD-10-PCS | Mod: CPTII,,, | Performed by: PHYSICIAN ASSISTANT

## 2022-07-13 PROCEDURE — 73560 X-RAY EXAM OF KNEE 1 OR 2: CPT | Mod: TC,LT

## 2022-07-13 PROCEDURE — 3075F SYST BP GE 130 - 139MM HG: CPT | Mod: CPTII,,, | Performed by: PHYSICIAN ASSISTANT

## 2022-07-13 PROCEDURE — 1159F MED LIST DOCD IN RCRD: CPT | Mod: CPTII,,, | Performed by: PHYSICIAN ASSISTANT

## 2022-07-13 PROCEDURE — 1101F PR PT FALLS ASSESS DOC 0-1 FALLS W/OUT INJ PAST YR: ICD-10-PCS | Mod: CPTII,,, | Performed by: PHYSICIAN ASSISTANT

## 2022-07-13 PROCEDURE — 3075F PR MOST RECENT SYSTOLIC BLOOD PRESS GE 130-139MM HG: ICD-10-PCS | Mod: CPTII,,, | Performed by: PHYSICIAN ASSISTANT

## 2022-07-13 PROCEDURE — 3051F PR MOST RECENT HEMOGLOBIN A1C LEVEL 7.0 - < 8.0%: ICD-10-PCS | Mod: CPTII,,, | Performed by: PHYSICIAN ASSISTANT

## 2022-07-13 PROCEDURE — 20610 DRAIN/INJ JOINT/BURSA W/O US: CPT | Mod: PBBFAC | Performed by: PHYSICIAN ASSISTANT

## 2022-07-13 PROCEDURE — 3078F DIAST BP <80 MM HG: CPT | Mod: CPTII,,, | Performed by: PHYSICIAN ASSISTANT

## 2022-07-13 PROCEDURE — 1159F PR MEDICATION LIST DOCUMENTED IN MEDICAL RECORD: ICD-10-PCS | Mod: CPTII,,, | Performed by: PHYSICIAN ASSISTANT

## 2022-07-13 PROCEDURE — 3288F FALL RISK ASSESSMENT DOCD: CPT | Mod: CPTII,,, | Performed by: PHYSICIAN ASSISTANT

## 2022-07-13 PROCEDURE — 1125F AMNT PAIN NOTED PAIN PRSNT: CPT | Mod: CPTII,,, | Performed by: PHYSICIAN ASSISTANT

## 2022-07-13 RX ORDER — HYDROCODONE BITARTRATE AND ACETAMINOPHEN 10; 325 MG/1; MG/1
1 TABLET ORAL EVERY 6 HOURS
Qty: 120 TABLET | Refills: 0 | Status: SHIPPED | OUTPATIENT
Start: 2022-08-27 | End: 2022-07-13

## 2022-07-13 RX ORDER — HYDROCODONE BITARTRATE AND ACETAMINOPHEN 10; 325 MG/1; MG/1
1 TABLET ORAL EVERY 6 HOURS PRN
Qty: 120 TABLET | Refills: 0 | Status: SHIPPED | OUTPATIENT
Start: 2022-09-25 | End: 2022-10-25

## 2022-07-13 RX ORDER — HYDROCODONE BITARTRATE AND ACETAMINOPHEN 10; 325 MG/1; MG/1
1 TABLET ORAL EVERY 6 HOURS PRN
Qty: 120 TABLET | Refills: 0 | Status: SHIPPED | OUTPATIENT
Start: 2022-08-26 | End: 2022-09-25

## 2022-07-13 RX ORDER — BUPIVACAINE HYDROCHLORIDE 2.5 MG/ML
2 INJECTION, SOLUTION EPIDURAL; INFILTRATION; INTRACAUDAL
Status: DISCONTINUED | OUTPATIENT
Start: 2022-07-13 | End: 2022-07-13 | Stop reason: HOSPADM

## 2022-07-13 RX ORDER — TRIAMCINOLONE ACETONIDE 40 MG/ML
40 INJECTION, SUSPENSION INTRA-ARTICULAR; INTRAMUSCULAR
Status: DISCONTINUED | OUTPATIENT
Start: 2022-07-13 | End: 2022-07-13 | Stop reason: HOSPADM

## 2022-07-13 RX ORDER — HYDROCODONE BITARTRATE AND ACETAMINOPHEN 10; 325 MG/1; MG/1
1 TABLET ORAL EVERY 6 HOURS
Qty: 120 TABLET | Refills: 0 | Status: SHIPPED | OUTPATIENT
Start: 2022-07-27 | End: 2022-09-29 | Stop reason: SDUPTHER

## 2022-07-13 RX ORDER — HYDROCODONE BITARTRATE AND ACETAMINOPHEN 10; 325 MG/1; MG/1
1 TABLET ORAL EVERY 6 HOURS PRN
Qty: 120 TABLET | Refills: 0 | Status: SHIPPED | OUTPATIENT
Start: 2022-08-27 | End: 2022-07-13

## 2022-07-13 RX ORDER — GABAPENTIN 400 MG/1
CAPSULE ORAL
Qty: 90 CAPSULE | Refills: 2 | Status: SHIPPED | OUTPATIENT
Start: 2022-07-13 | End: 2022-09-29 | Stop reason: SDUPTHER

## 2022-07-13 RX ORDER — HYDROCODONE BITARTRATE AND ACETAMINOPHEN 10; 325 MG/1; MG/1
1 TABLET ORAL EVERY 6 HOURS PRN
Qty: 120 TABLET | Refills: 0 | Status: SHIPPED | OUTPATIENT
Start: 2022-09-25 | End: 2022-07-13

## 2022-07-13 RX ADMIN — BUPIVACAINE HYDROCHLORIDE 2 ML: 2.5 INJECTION, SOLUTION EPIDURAL; INFILTRATION; INTRACAUDAL; PERINEURAL at 01:07

## 2022-07-13 RX ADMIN — TRIAMCINOLONE ACETONIDE 40 MG: 40 INJECTION, SUSPENSION INTRA-ARTICULAR; INTRAMUSCULAR at 01:07

## 2022-07-13 NOTE — PROGRESS NOTES
Subjective:      Patient ID: Lorraine De Jesus is a 77 y.o. female.    Chief Complaint: Foot Pain, Joint Pain, Back Pain, and Knee Pain      Pain  This is a chronic problem. The current episode started more than 1 year ago. The problem occurs daily. The problem has been unchanged. Associated symptoms include arthralgias and neck pain. Pertinent negatives include no change in bowel habit, chest pain, coughing, diaphoresis, fatigue, rash, sore throat, vertigo or vomiting.     Review of Systems   Constitutional: Negative for activity change, appetite change, diaphoresis, fatigue and unexpected weight change.   HENT: Negative for drooling, ear discharge, ear pain, facial swelling, nosebleeds, sore throat, trouble swallowing, voice change and goiter.    Eyes: Negative for photophobia, pain, discharge, redness and visual disturbance.   Respiratory: Negative for apnea, cough, choking, chest tightness, shortness of breath, wheezing and stridor.    Cardiovascular: Negative for chest pain, palpitations and leg swelling.   Gastrointestinal: Negative for abdominal distention, change in bowel habit, diarrhea, rectal pain, vomiting, fecal incontinence and change in bowel habit.   Endocrine: Negative for cold intolerance, heat intolerance, polydipsia, polyphagia and polyuria.   Genitourinary: Negative for bladder incontinence, dysuria, flank pain, frequency and hot flashes.   Musculoskeletal: Positive for arthralgias, back pain, leg pain and neck pain.   Integumentary:  Negative for color change, pallor and rash.   Allergic/Immunologic: Negative for immunocompromised state.   Neurological: Negative for dizziness, vertigo, seizures, syncope, facial asymmetry, speech difficulty, light-headedness, disturbances in coordination, memory loss and coordination difficulties.   Hematological: Negative for adenopathy. Does not bruise/bleed easily.   Psychiatric/Behavioral: Negative for agitation, behavioral problems, confusion, decreased  "concentration, dysphoric mood, hallucinations, self-injury and suicidal ideas. The patient is not nervous/anxious and is not hyperactive.             Objective:  Vitals:    07/13/22 1327   BP: 135/63   Pulse: 63   Resp: 18   SpO2: 98%   Weight: 78.5 kg (173 lb)   Height: 5' 8" (1.727 m)   PainSc:   7         Physical Exam  Vitals and nursing note reviewed. Exam conducted with a chaperone present.   Constitutional:       General: She is awake.      Appearance: Normal appearance. She is not ill-appearing or diaphoretic.   HENT:      Head: Normocephalic and atraumatic.      Nose: Nose normal.      Mouth/Throat:      Mouth: Mucous membranes are moist.      Pharynx: Oropharynx is clear.   Eyes:      Conjunctiva/sclera: Conjunctivae normal.      Pupils: Pupils are equal, round, and reactive to light.   Cardiovascular:      Rate and Rhythm: Normal rate.   Pulmonary:      Effort: Pulmonary effort is normal. No respiratory distress.   Abdominal:      Palpations: Abdomen is soft.   Musculoskeletal:      Cervical back: Normal range of motion and neck supple.      Thoracic back: Tenderness present.      Lumbar back: Tenderness present. Decreased range of motion.   Skin:     General: Skin is warm and dry.   Neurological:      General: No focal deficit present.      Mental Status: She is alert and oriented to person, place, and time. Mental status is at baseline.      Cranial Nerves: No cranial nerve deficit (II-XII).   Psychiatric:         Mood and Affect: Mood normal.         Behavior: Behavior normal. Behavior is cooperative.         Thought Content: Thought content normal.           Orders Placed This Encounter   Procedures    Large Joint Aspiration/Injection: L knee     This order was created via procedure documentation    X-Ray Knee 3 View Left     Standing Status:   Future     Standing Expiration Date:   9/13/2022     Order Specific Question:   May the Radiologist modify the order per protocol to meet the clinical needs " of the patient?     Answer:   Yes     Order Specific Question:   Release to patient     Answer:   Immediate        CT Head Without Contrast  Narrative: EXAMINATION:  CT HEAD WITHOUT CONTRAST    CLINICAL HISTORY:  headache;    TECHNIQUE:  Multiplanar CT imaging from the vertex to skull the skull base was performed without contrast.    COMPARISON:  2019    FINDINGS:  Global brain parenchymal volume loss.    There are similar appearing hypoattenuations scattered throughout the periventricular white matter of the frontal and parietal lobes, which are nonspecific although can be seen in chronic small vessel ischemic change    There is no CT evidence of acute intracranial hemorrhage or large territorial infarct. No epidural/subdural hematomas.    There is no evidence of hydrocephalus, midline shift or mass effect.    Scalp, paranasal sinuses, and mastoid air cells are normal.  Impression: No acute intracranial findings.    Electronically signed by: Louie Pina  Date:    06/25/2022  Time:    14:13       Admission on 06/25/2022, Discharged on 06/25/2022   Component Date Value Ref Range Status    Sodium 06/25/2022 135 (A) 136 - 145 mmol/L Final    Potassium 06/25/2022 4.3  3.5 - 5.1 mmol/L Final    Chloride 06/25/2022 98  98 - 107 mmol/L Final    CO2 06/25/2022 21  21 - 32 mmol/L Final    Anion Gap 06/25/2022 20 (A) 7 - 16 mmol/L Final    Glucose 06/25/2022 176 (A) 74 - 106 mg/dL Final    BUN 06/25/2022 17  7 - 18 mg/dL Final    Creatinine 06/25/2022 0.76  0.55 - 1.02 mg/dL Final    BUN/Creatinine Ratio 06/25/2022 22 (A) 6 - 20 Final    Calcium 06/25/2022 9.3  8.5 - 10.1 mg/dL Final    eGFR 06/25/2022 79  >=60 mL/min/1.73m² Final    Color, UA 06/25/2022 Straw  Colorless, Straw, Yellow, Dark Yellow Final    Clarity, UA 06/25/2022 Clear  Clear Final    pH, UA 06/25/2022 5.0  5.0, 5.5, 6.0, 6.5, 7.0, 7.5, 8.0 pH Units Final    Leukocytes, UA 06/25/2022 Negative  Negative Final    Nitrites, UA 06/25/2022  Negative  Negative Final    Protein, UA 06/25/2022 Trace (A) Negative Final    Glucose, UA 06/25/2022 500  (A) Negative mg/dL Final    Ketones, UA 06/25/2022 40  (A) Negative, Trace mg/dL Final    Urobilinogen, UA 06/25/2022 0.2  0.2, 1.0 mg/dL Final    Bilirubin, UA 06/25/2022 Negative  Negative Final    Blood, UA 06/25/2022 Negative  Negative Final    Specific Gravity, UA 06/25/2022 1.025  <=1.005, 1.010, 1.015, 1.020, 1.025, 1.030 Final    WBC 06/25/2022 7.46  4.50 - 11.00 K/uL Final    RBC 06/25/2022 5.30  4.20 - 5.40 M/uL Final    Hemoglobin 06/25/2022 14.2  12.0 - 16.0 g/dL Final    Hematocrit 06/25/2022 41.8  38.0 - 47.0 % Final    MCV 06/25/2022 78.9 (A) 80.0 - 96.0 fL Final    MCH 06/25/2022 26.8 (A) 27.0 - 31.0 pg Final    MCHC 06/25/2022 34.0  32.0 - 36.0 g/dL Final    RDW 06/25/2022 15.3 (A) 11.5 - 14.5 % Final    Platelet Count 06/25/2022 263  150 - 400 K/uL Final    MPV 06/25/2022 9.2 (A) 9.4 - 12.4 fL Final    Neutrophils % 06/25/2022 86.3 (A) 53.0 - 65.0 % Final    Lymphocytes % 06/25/2022 6.8 (A) 27.0 - 41.0 % Final    Monocytes % 06/25/2022 6.2 (A) 2.0 - 6.0 % Final    Eosinophils % 06/25/2022 0.0 (A) 1.0 - 4.0 % Final    Basophils % 06/25/2022 0.3  0.0 - 1.0 % Final    Immature Granulocytes % 06/25/2022 0.4  0.0 - 0.4 % Final    nRBC, Auto 06/25/2022 0.0  <=0.0 % Final    Neutrophils, Abs 06/25/2022 6.44  1.80 - 7.70 K/uL Final    Lymphocytes, Absolute 06/25/2022 0.51 (A) 1.00 - 4.80 K/uL Final    Monocytes, Absolute 06/25/2022 0.46  0.00 - 0.80 K/uL Final    Eosinophils, Absolute 06/25/2022 0.00  0.00 - 0.50 K/uL Final    Basophils, Absolute 06/25/2022 0.02  0.00 - 0.20 K/uL Final    Immature Granulocytes, Absolute 06/25/2022 0.03  0.00 - 0.04 K/uL Final    nRBC, Absolute 06/25/2022 0.00  <=0.00 x10e3/uL Final    Diff Type 06/25/2022 Manual   Final    Segmented Neutrophils, Man % 06/25/2022 82 (A) 50 - 62 % Final    Bands, Man % 06/25/2022 4  1 - 5 % Final     Lymphocytes, Man % 06/25/2022 8 (A) 27 - 41 % Final    Monocytes, Man % 06/25/2022 6  2 - 6 % Final    Platelet Morphology 06/25/2022 Normal  Normal Final    Anisocytosis 06/25/2022 1+   Final   Office Visit on 05/18/2022   Component Date Value Ref Range Status    POC Amphetamines 05/18/2022 Negative  Negative, Inconclusive Final    POC Barbiturates 05/18/2022 Negative  Negative, Inconclusive Final    POC Benzodiazepines 05/18/2022 Negative  Negative, Inconclusive Final    POC Cocaine 05/18/2022 Negative  Negative, Inconclusive Final    POC THC 05/18/2022 Negative  Negative, Inconclusive Final    POC Methadone 05/18/2022 Negative  Negative, Inconclusive Final    POC Methamphetamine 05/18/2022 Negative  Negative, Inconclusive Final    POC Opiates 05/18/2022 Presumptive Positive (A) Negative, Inconclusive Final    POC Oxycodone 05/18/2022 Negative  Negative, Inconclusive Final    POC Phencyclidine 05/18/2022 Negative  Negative, Inconclusive Final    POC Methylenedioxymethamphetamine * 05/18/2022 Negative  Negative, Inconclusive Final    POC Tricyclic Antidepressants 05/18/2022 Negative  Negative, Inconclusive Final    POC Buprenorphine 05/18/2022 Negative   Final     Acceptable 05/18/2022 Yes   Final    POC Temperature (Urine) 05/18/2022 94   Final   Office Visit on 05/11/2022   Component Date Value Ref Range Status    POC Glucose 05/11/2022 148 (A) 70 - 110 MG/DL Final   Office Visit on 03/02/2022   Component Date Value Ref Range Status    Rapid Strep A Screen 03/02/2022 Negative  Negative Final     Acceptable 03/02/2022 Yes   Final    Sodium 03/02/2022 138  136 - 145 mmol/L Final    Potassium 03/02/2022 4.0  3.5 - 5.1 mmol/L Final    Chloride 03/02/2022 107  98 - 107 mmol/L Final    CO2 03/02/2022 28  21 - 32 mmol/L Final    Anion Gap 03/02/2022 7  7 - 16 mmol/L Final    Glucose 03/02/2022 140 (A) 74 - 106 mg/dL Final    BUN 03/02/2022 17  7 - 18 mg/dL Final     Creatinine 03/02/2022 0.86  0.55 - 1.02 mg/dL Final    BUN/Creatinine Ratio 03/02/2022 20  6 - 20 Final    Calcium 03/02/2022 9.3  8.5 - 10.1 mg/dL Final    eGFR 03/02/2022 68  >=60 mL/min/1.73m² Final    Hemoglobin A1C 03/02/2022 7.2 (A) 4.5 - 6.6 % Final    Estimated Average Glucose 03/02/2022 154  mg/dL Final    Vitamin B12 03/02/2022 1,051 (A) 193 - 986 pg/mL Final    TSH 03/02/2022 0.722  0.358 - 3.740 uIU/mL Final    WBC 03/02/2022 5.06  4.50 - 11.00 K/uL Final    RBC 03/02/2022 4.95  4.20 - 5.40 M/uL Final    Hemoglobin 03/02/2022 13.3  12.0 - 16.0 g/dL Final    Hematocrit 03/02/2022 42.4  38.0 - 47.0 % Final    MCV 03/02/2022 85.7  80.0 - 96.0 fL Final    MCH 03/02/2022 26.9 (A) 27.0 - 31.0 pg Final    MCHC 03/02/2022 31.4 (A) 32.0 - 36.0 g/dL Final    RDW 03/02/2022 15.3 (A) 11.5 - 14.5 % Final    Platelet Count 03/02/2022 313  150 - 400 K/uL Final    MPV 03/02/2022 10.1  9.4 - 12.4 fL Final    Neutrophils % 03/02/2022 56.1  53.0 - 65.0 % Final    Lymphocytes % 03/02/2022 34.2  27.0 - 41.0 % Final    Monocytes % 03/02/2022 6.5 (A) 2.0 - 6.0 % Final    Eosinophils % 03/02/2022 2.2  1.0 - 4.0 % Final    Basophils % 03/02/2022 0.8  0.0 - 1.0 % Final    Immature Granulocytes % 03/02/2022 0.2  0.0 - 0.4 % Final    nRBC, Auto 03/02/2022 0.0  <=0.0 % Final    Neutrophils, Abs 03/02/2022 2.84  1.80 - 7.70 K/uL Final    Lymphocytes, Absolute 03/02/2022 1.73  1.00 - 4.80 K/uL Final    Monocytes, Absolute 03/02/2022 0.33  0.00 - 0.80 K/uL Final    Eosinophils, Absolute 03/02/2022 0.11  0.00 - 0.50 K/uL Final    Basophils, Absolute 03/02/2022 0.04  0.00 - 0.20 K/uL Final    Immature Granulocytes, Absolute 03/02/2022 0.01  0.00 - 0.04 K/uL Final    nRBC, Absolute 03/02/2022 0.00  <=0.00 x10e3/uL Final    Diff Type 03/02/2022 Auto   Final   Office Visit on 02/22/2022   Component Date Value Ref Range Status    POC Amphetamines 02/22/2022 Negative  Negative, Inconclusive Final    POC  Barbiturates 02/22/2022 Negative  Negative, Inconclusive Final    POC Benzodiazepines 02/22/2022 Negative  Negative, Inconclusive Final    POC Cocaine 02/22/2022 Negative  Negative, Inconclusive Final    POC THC 02/22/2022 Negative  Negative, Inconclusive Final    POC Methadone 02/22/2022 Negative  Negative, Inconclusive Final    POC Methamphetamine 02/22/2022 Negative  Negative, Inconclusive Final    POC Opiates 02/22/2022 Presumptive Positive (A) Negative, Inconclusive Final    POC Oxycodone 02/22/2022 Negative  Negative, Inconclusive Final    POC Phencyclidine 02/22/2022 Negative  Negative, Inconclusive Final    POC Methylenedioxymethamphetamine * 02/22/2022 Negative  Negative, Inconclusive Final    POC Tricyclic Antidepressants 02/22/2022 Negative  Negative, Inconclusive Final    POC Buprenorphine 02/22/2022 Negative   Final     Acceptable 02/22/2022 Yes   Final    POC Temperature (Urine) 02/22/2022 96   Final   Office Visit on 02/08/2022   Component Date Value Ref Range Status    POC Glucose 02/08/2022 164 (A) 70 - 110 MG/DL Final   Office Visit on 01/19/2022   Component Date Value Ref Range Status    Sodium 01/19/2022 136  136 - 145 mmol/L Final    Potassium 01/19/2022 4.0  3.5 - 5.1 mmol/L Final    Chloride 01/19/2022 104  98 - 107 mmol/L Final    CO2 01/19/2022 28  21 - 32 mmol/L Final    Anion Gap 01/19/2022 8  7 - 16 mmol/L Final    Glucose 01/19/2022 174 (A) 74 - 106 mg/dL Final    BUN 01/19/2022 13  7 - 18 mg/dL Final    Creatinine 01/19/2022 0.79  0.55 - 1.02 mg/dL Final    BUN/Creatinine Ratio 01/19/2022 16  6 - 20 Final    Calcium 01/19/2022 8.8  8.5 - 10.1 mg/dL Final    eGFR 01/19/2022 75  >=60 mL/min/1.73m² Final         Assessment:      1. Chronic pain of left knee    2. Diabetic neuropathy with neurologic complication    3. Lumbosacral radiculopathy    4. Bilateral foot pain            A's of Opioid Risk Assessment  Activity:Patient can perform ADL.    Analgesia:Patients pain is partially controlled by current medication. Patient has tried OTC medications such as Tylenol and Ibuprofen with out relief.   Adverse Effects: Patient denies constipation or sedation.  Aberrant Behavior:  reviewed with no aberrant drug seeking/taking behavior.  Overdose reversal drug naloxone discussed    Drug screen reviewed      Requested Prescriptions     Signed Prescriptions Disp Refills    gabapentin (NEURONTIN) 400 MG capsule 90 capsule 2     Sig: TAKE 1 CAPSULE(400 MG) BY MOUTH EVERY 8 HOURS    HYDROcodone-acetaminophen (NORCO)  mg per tablet 120 tablet 0     Sig: Take 1 tablet by mouth every 6 (six) hours.    HYDROcodone-acetaminophen (NORCO)  mg per tablet 120 tablet 0     Sig: Take 1 tablet by mouth every 6 (six) hours as needed for Pain.    HYDROcodone-acetaminophen (NORCO)  mg per tablet 120 tablet 0     Sig: Take 1 tablet by mouth every 6 (six) hours as needed for Pain.         Plan:    Follows Neurology [facility] neuropathy symptoms lower extremities    Complaint left knee pain requesting left knee injection she states she has had these injections in the past it does help with her discomfort    Otherwise she states current medications helping control her chronic back and joint pain    X-ray left knee today    Would like to continue with conservative management otherwise    Continue home exercise program as directed    Continue current medication    Follow-up 3 months    Dr. Brown, May 2023    Bring original prescription medication bottles/container/box with labels to each visit    Large Joint Aspiration/Injection: L knee    Date/Time: 7/13/2022 1:30 PM  Performed by: MARYBEL Cortes  Authorized by: MARYBEL Cortes     Consent Done?:  Yes (Written)  Indications:  Arthritis and pain  Site marked: the procedure site was marked    Timeout: prior to procedure the correct patient, procedure, and site was verified    Prep: patient was prepped  and draped in usual sterile fashion      Details:  Needle Size:  22 G  Approach:  Lateral  Location:  Knee  Site:  L knee  Medications:  2 mL BUPivacaine (PF) 0.25% (2.5 mg/ml) 0.25 % (2.5 mg/mL); 40 mg triamcinolone acetonide 40 mg/mL  Aspirate amount (mL):  0  Patient tolerance:  Patient tolerated the procedure well with no immediate complications     Bupivacaine 0.25% 25 mg/ 10 ml , 2 cc     Tolerated procedure well    No complication noted    Band-Aid was applied

## 2022-07-13 NOTE — PROCEDURES
Large Joint Aspiration/Injection: L knee    Date/Time: 7/13/2022 1:30 PM  Performed by: MARYBEL Cortes  Authorized by: MARYBEL Cortes     Consent Done?:  Yes (Written)  Indications:  Arthritis and pain  Site marked: the procedure site was marked    Timeout: prior to procedure the correct patient, procedure, and site was verified    Prep: patient was prepped and draped in usual sterile fashion      Details:  Needle Size:  22 G  Approach:  Lateral  Location:  Knee  Site:  L knee  Medications:  2 mL BUPivacaine (PF) 0.25% (2.5 mg/ml) 0.25 % (2.5 mg/mL); 40 mg triamcinolone acetonide 40 mg/mL  Aspirate amount (mL):  0  Patient tolerance:  Patient tolerated the procedure well with no immediate complications     Bupivacaine 0.25% 25 mg/ 10 ml , 2 cc     Tolerated procedure well    No complication noted    Band-Aid was applied

## 2022-07-20 ENCOUNTER — OFFICE VISIT (OUTPATIENT)
Dept: FAMILY MEDICINE | Facility: CLINIC | Age: 77
End: 2022-07-20
Payer: COMMERCIAL

## 2022-07-20 VITALS
BODY MASS INDEX: 26.43 KG/M2 | WEIGHT: 174.38 LBS | HEIGHT: 68 IN | HEART RATE: 60 BPM | RESPIRATION RATE: 20 BRPM | TEMPERATURE: 97 F | DIASTOLIC BLOOD PRESSURE: 92 MMHG | SYSTOLIC BLOOD PRESSURE: 146 MMHG | OXYGEN SATURATION: 99 %

## 2022-07-20 DIAGNOSIS — E04.2 MULTIPLE THYROID NODULES: Primary | ICD-10-CM

## 2022-07-20 DIAGNOSIS — E11.9 TYPE 2 DIABETES MELLITUS WITHOUT COMPLICATION, WITHOUT LONG-TERM CURRENT USE OF INSULIN: ICD-10-CM

## 2022-07-20 DIAGNOSIS — M17.12 PRIMARY OSTEOARTHRITIS OF LEFT KNEE: ICD-10-CM

## 2022-07-20 DIAGNOSIS — F32.A DEPRESSION, UNSPECIFIED DEPRESSION TYPE: ICD-10-CM

## 2022-07-20 DIAGNOSIS — R63.4 WEIGHT LOSS: ICD-10-CM

## 2022-07-20 DIAGNOSIS — R63.0 POOR APPETITE: ICD-10-CM

## 2022-07-20 LAB
T4 FREE SERPL-MCNC: 1.04 NG/DL (ref 0.76–1.46)
TSH SERPL DL<=0.005 MIU/L-ACNC: 2.95 UIU/ML (ref 0.36–3.74)

## 2022-07-20 PROCEDURE — 99214 OFFICE O/P EST MOD 30 MIN: CPT | Mod: ,,, | Performed by: INTERNAL MEDICINE

## 2022-07-20 PROCEDURE — 3080F PR MOST RECENT DIASTOLIC BLOOD PRESSURE >= 90 MM HG: ICD-10-PCS | Mod: ,,, | Performed by: INTERNAL MEDICINE

## 2022-07-20 PROCEDURE — 3077F SYST BP >= 140 MM HG: CPT | Mod: ,,, | Performed by: INTERNAL MEDICINE

## 2022-07-20 PROCEDURE — 84443 TSH: ICD-10-PCS | Mod: ,,, | Performed by: CLINICAL MEDICAL LABORATORY

## 2022-07-20 PROCEDURE — 84439 T4, FREE: ICD-10-PCS | Mod: ,,, | Performed by: CLINICAL MEDICAL LABORATORY

## 2022-07-20 PROCEDURE — 1101F PT FALLS ASSESS-DOCD LE1/YR: CPT | Mod: ,,, | Performed by: INTERNAL MEDICINE

## 2022-07-20 PROCEDURE — 3051F HG A1C>EQUAL 7.0%<8.0%: CPT | Mod: ,,, | Performed by: INTERNAL MEDICINE

## 2022-07-20 PROCEDURE — 84443 ASSAY THYROID STIM HORMONE: CPT | Mod: ,,, | Performed by: CLINICAL MEDICAL LABORATORY

## 2022-07-20 PROCEDURE — 84439 ASSAY OF FREE THYROXINE: CPT | Mod: ,,, | Performed by: CLINICAL MEDICAL LABORATORY

## 2022-07-20 PROCEDURE — 1160F RVW MEDS BY RX/DR IN RCRD: CPT | Mod: ,,, | Performed by: INTERNAL MEDICINE

## 2022-07-20 PROCEDURE — 1159F MED LIST DOCD IN RCRD: CPT | Mod: ,,, | Performed by: INTERNAL MEDICINE

## 2022-07-20 PROCEDURE — 3288F PR FALLS RISK ASSESSMENT DOCUMENTED: ICD-10-PCS | Mod: ,,, | Performed by: INTERNAL MEDICINE

## 2022-07-20 PROCEDURE — 3077F PR MOST RECENT SYSTOLIC BLOOD PRESSURE >= 140 MM HG: ICD-10-PCS | Mod: ,,, | Performed by: INTERNAL MEDICINE

## 2022-07-20 PROCEDURE — 3288F FALL RISK ASSESSMENT DOCD: CPT | Mod: ,,, | Performed by: INTERNAL MEDICINE

## 2022-07-20 PROCEDURE — 1101F PR PT FALLS ASSESS DOC 0-1 FALLS W/OUT INJ PAST YR: ICD-10-PCS | Mod: ,,, | Performed by: INTERNAL MEDICINE

## 2022-07-20 PROCEDURE — 3080F DIAST BP >= 90 MM HG: CPT | Mod: ,,, | Performed by: INTERNAL MEDICINE

## 2022-07-20 PROCEDURE — 4010F ACE/ARB THERAPY RXD/TAKEN: CPT | Mod: ,,, | Performed by: INTERNAL MEDICINE

## 2022-07-20 PROCEDURE — 1160F PR REVIEW ALL MEDS BY PRESCRIBER/CLIN PHARMACIST DOCUMENTED: ICD-10-PCS | Mod: ,,, | Performed by: INTERNAL MEDICINE

## 2022-07-20 PROCEDURE — 3051F PR MOST RECENT HEMOGLOBIN A1C LEVEL 7.0 - < 8.0%: ICD-10-PCS | Mod: ,,, | Performed by: INTERNAL MEDICINE

## 2022-07-20 PROCEDURE — 1159F PR MEDICATION LIST DOCUMENTED IN MEDICAL RECORD: ICD-10-PCS | Mod: ,,, | Performed by: INTERNAL MEDICINE

## 2022-07-20 PROCEDURE — 4010F PR ACE/ARB THEARPY RXD/TAKEN: ICD-10-PCS | Mod: ,,, | Performed by: INTERNAL MEDICINE

## 2022-07-20 PROCEDURE — 99214 PR OFFICE/OUTPT VISIT, EST, LEVL IV, 30-39 MIN: ICD-10-PCS | Mod: ,,, | Performed by: INTERNAL MEDICINE

## 2022-07-20 RX ORDER — CYPROHEPTADINE HYDROCHLORIDE 4 MG/1
4 TABLET ORAL
Qty: 120 TABLET | Refills: 2 | Status: SHIPPED | OUTPATIENT
Start: 2022-07-20 | End: 2022-12-05

## 2022-07-22 NOTE — PATIENT INSTRUCTIONS
Lorraine was seen today for follow-up, results and hypertension.    Diagnoses and all orders for this visit:    Multiple thyroid nodules  -     T4, Free; Future  -     TSH; Future  -     Ambulatory referral/consult to Endocrinology; Future  -     US Soft Tissue Head Neck Thyroid; Future  -     T4, Free  -     TSH    Poor appetite    Depression, unspecified depression type    Weight loss    Type 2 diabetes mellitus without complication, without long-term current use of insulin    Primary osteoarthritis of left knee    Other orders  -     cyproheptadine (PERIACTIN) 4 mg tablet; Take 1 tablet (4 mg total) by mouth 4 (four) times daily before meals and nightly.

## 2022-07-22 NOTE — PROGRESS NOTES
Subjective:       Patient ID: Lorraine De Jesus is a 77 y.o. female.    Chief Complaint: Follow-up, Results (Ultrasound Report), and Hypertension    Patient is here today for check up evaluation. Patient recent US of Thyroid shows nodules and cysts but not worsening. States was recommended to have a yearly US and follow up. Will seen to refer to Endocrinologist and has Thyroid scan done. Will order thyroid labs as well. Patient does have history of previous Left sided thyroid removed 10+ years ago. Recent CT of brain reviewed with normal results. Recent labs reviewed and does show glucose is slightly high. States glucose at home fluctuates. Last A1C done in March was at 7.2. Will continue to keep a watch on it and advised to continue to watch her diet. Patient states she has started to notice her poor appetite and weight loss lately. Will prescribe PeriActin 4 mg PO qAC and qHS. Will follow in 2 months.       Current Medications:    Current Outpatient Medications:     amLODIPine (NORVASC) 10 MG tablet, Take 10 mg by mouth once daily., Disp: , Rfl:     aspirin (ECOTRIN) 81 MG EC tablet, Take 81 mg by mouth once daily., Disp: , Rfl:     blood sugar diagnostic Strp, 1 strip by Misc.(Non-Drug; Combo Route) route 3 (three) times daily., Disp: 200 strip, Rfl: 3    blood-glucose meter kit, Use as instructed, Disp: 1 each, Rfl: 0    clindamycin (CLEOCIN) 300 MG capsule, Take 1 capsule (300 mg total) by mouth 2 (two) times a day., Disp: 28 capsule, Rfl: 0    cloNIDine (CATAPRES) 0.1 MG tablet, TAKE 1 TABLET BY MOUTH EVERY 4 HOURS AS NEEDED FOR BLOOD PRESSURE GREATER THAN 170/90, Disp: , Rfl:     dapagliflozin (FARXIGA) 10 mg tablet, Take 1 tablet (10 mg total) by mouth once daily., Disp: 90 tablet, Rfl: 1    DULoxetine (CYMBALTA) 60 MG capsule, Take 1 capsule (60 mg total) by mouth once daily. Take one capsule at bedtime, Disp: 30 capsule, Rfl: 2    fluticasone propionate (FLONASE) 50 mcg/actuation nasal spray, SHAKE  LIQUID AND USE 2 SPRAYS(100 MCG) IN EACH NOSTRIL EVERY DAY, Disp: 48 g, Rfl: 1    gabapentin (NEURONTIN) 400 MG capsule, TAKE 1 CAPSULE(400 MG) BY MOUTH EVERY 8 HOURS, Disp: 90 capsule, Rfl: 2    glipiZIDE (GLUCOTROL) 10 MG TR24, Take 1 tablet (10 mg total) by mouth daily with breakfast., Disp: 90 tablet, Rfl: 1    [START ON 7/27/2022] HYDROcodone-acetaminophen (NORCO)  mg per tablet, Take 1 tablet by mouth every 6 (six) hours., Disp: 120 tablet, Rfl: 0    [START ON 8/26/2022] HYDROcodone-acetaminophen (NORCO)  mg per tablet, Take 1 tablet by mouth every 6 (six) hours as needed for Pain., Disp: 120 tablet, Rfl: 0    [START ON 9/25/2022] HYDROcodone-acetaminophen (NORCO)  mg per tablet, Take 1 tablet by mouth every 6 (six) hours as needed for Pain., Disp: 120 tablet, Rfl: 0    lancets (ACCU-CHEK FASTCLIX LANCET DRUM [facility]), Take 1 each by mouth once daily., Disp: , Rfl:     levocetirizine (XYZAL) 5 MG tablet, TAKE 1 TABLET(5 MG) BY MOUTH EVERY EVENING, Disp: 30 tablet, Rfl: 11    metoprolol succinate (TOPROL-XL) 50 MG 24 hr tablet, TAKE 1 TABLET(50 MG) BY MOUTH EVERY DAY, Disp: 30 tablet, Rfl: 3    olmesartan (BENICAR) 5 MG Tab, Take 5 mg by mouth once daily., Disp: , Rfl:     ONETOUCH DELICA PLUS LANCET 33 gauge Misc, Check blood sugar TID, Disp: 100 each, Rfl: 11    ONETOUCH VERIO TEST STRIPS Strp, TEST BLOOD GLUCOSE LEVELS TWICE DAILY, Disp: 200 strip, Rfl: 4    oxybutynin (DITROPAN-XL) 5 MG TR24, TAKE 1 TABLET(5 MG) BY MOUTH EVERY DAY, Disp: 90 tablet, Rfl: 3    cyproheptadine (PERIACTIN) 4 mg tablet, Take 1 tablet (4 mg total) by mouth 4 (four) times daily before meals and nightly., Disp: 120 tablet, Rfl: 2    levothyroxine (SYNTHROID) 75 MCG tablet, TAKE 1 TABLET(75 MCG) BY MOUTH BEFORE BREAKFAST, Disp: 90 tablet, Rfl: 1    loratadine (CLARITIN) 10 mg tablet, Take 1 tablet (10 mg total) by mouth once daily., Disp: 30 tablet, Rfl: 0    Last Labs:     Office Visit on 07/20/2022    Component Date Value    Free T4 07/20/2022 1.04     TSH 07/20/2022 2.950    Admission on 06/25/2022, Discharged on 06/25/2022   Component Date Value    Sodium 06/25/2022 135 (A)    Potassium 06/25/2022 4.3     Chloride 06/25/2022 98     CO2 06/25/2022 21     Anion Gap 06/25/2022 20 (A)    Glucose 06/25/2022 176 (A)    BUN 06/25/2022 17     Creatinine 06/25/2022 0.76     BUN/Creatinine Ratio 06/25/2022 22 (A)    Calcium 06/25/2022 9.3     eGFR 06/25/2022 79     Color, UA 06/25/2022 Straw     Clarity, UA 06/25/2022 Clear     pH, UA 06/25/2022 5.0     Leukocytes, UA 06/25/2022 Negative     Nitrites, UA 06/25/2022 Negative     Protein, UA 06/25/2022 Trace (A)    Glucose, UA 06/25/2022 500  (A)    Ketones, UA 06/25/2022 40  (A)    Urobilinogen, UA 06/25/2022 0.2     Bilirubin, UA 06/25/2022 Negative     Blood, UA 06/25/2022 Negative     Specific Gravity, UA 06/25/2022 1.025     WBC 06/25/2022 7.46     RBC 06/25/2022 5.30     Hemoglobin 06/25/2022 14.2     Hematocrit 06/25/2022 41.8     MCV 06/25/2022 78.9 (A)    MCH 06/25/2022 26.8 (A)    MCHC 06/25/2022 34.0     RDW 06/25/2022 15.3 (A)    Platelet Count 06/25/2022 263     MPV 06/25/2022 9.2 (A)    Neutrophils % 06/25/2022 86.3 (A)    Lymphocytes % 06/25/2022 6.8 (A)    Monocytes % 06/25/2022 6.2 (A)    Eosinophils % 06/25/2022 0.0 (A)    Basophils % 06/25/2022 0.3     Immature Granulocytes % 06/25/2022 0.4     nRBC, Auto 06/25/2022 0.0     Neutrophils, Abs 06/25/2022 6.44     Lymphocytes, Absolute 06/25/2022 0.51 (A)    Monocytes, Absolute 06/25/2022 0.46     Eosinophils, Absolute 06/25/2022 0.00     Basophils, Absolute 06/25/2022 0.02     Immature Granulocytes, A* 06/25/2022 0.03     nRBC, Absolute 06/25/2022 0.00     Diff Type 06/25/2022 Manual     Segmented Neutrophils, M* 06/25/2022 82 (A)    Bands, Man % 06/25/2022 4     Lymphocytes, Man % 06/25/2022 8 (A)    Monocytes, Man % 06/25/2022 6     Platelet  Morphology 06/25/2022 Normal     Anisocytosis 06/25/2022 1+        Last Imaging:  X-Ray Knee 1 or 2 View Left  Narrative: EXAMINATION:  XR KNEE 1 OR 2 VIEW LEFT    CLINICAL HISTORY:  Pain in left knee    COMPARISON:  None available    FINDINGS:  No evidence of fracture seen.  The alignment of the joints appears normal.  Mild medial compartment degenerative change is present.  No soft tissue abnormality is seen.  Impression: Mild knee osteoarthrosis.    Electronically signed by: Wai Jackson  Date:    07/13/2022  Time:    14:56         Review of Systems   Constitutional: Positive for appetite change and unexpected weight change.   Musculoskeletal: Positive for arthralgias.   All other systems reviewed and are negative.        Objective:      Physical Exam  Vitals reviewed.   Constitutional:       Appearance: Normal appearance.   Cardiovascular:      Rate and Rhythm: Normal rate and regular rhythm.      Pulses: Normal pulses.      Heart sounds: Normal heart sounds.   Pulmonary:      Effort: Pulmonary effort is normal.      Breath sounds: Normal breath sounds.   Abdominal:      General: Abdomen is flat. Bowel sounds are normal.      Palpations: Abdomen is soft.   Musculoskeletal:         General: Normal range of motion.      Cervical back: Normal range of motion and neck supple.   Skin:     General: Skin is warm and dry.   Neurological:      General: No focal deficit present.      Mental Status: She is alert and oriented to person, place, and time. Mental status is at baseline.         Assessment:       1. Multiple thyroid nodules  T4, Free    TSH    Ambulatory referral/consult to Endocrinology    US Soft Tissue Head Neck Thyroid    T4, Free    TSH   2. Poor appetite     3. Depression, unspecified depression type     4. Weight loss     5. Type 2 diabetes mellitus without complication, without long-term current use of insulin     6. Primary osteoarthritis of left knee          Plan:         Lorraine was seen today for  follow-up, results and hypertension.    Diagnoses and all orders for this visit:    Multiple thyroid nodules  -     T4, Free; Future  -     TSH; Future  -     Ambulatory referral/consult to Endocrinology; Future  -     US Soft Tissue Head Neck Thyroid; Future  -     T4, Free  -     TSH    Poor appetite    Depression, unspecified depression type    Weight loss    Type 2 diabetes mellitus without complication, without long-term current use of insulin    Primary osteoarthritis of left knee    Other orders  -     cyproheptadine (PERIACTIN) 4 mg tablet; Take 1 tablet (4 mg total) by mouth 4 (four) times daily before meals and nightly.

## 2022-08-10 ENCOUNTER — OFFICE VISIT (OUTPATIENT)
Dept: DIABETES SERVICES | Facility: CLINIC | Age: 77
End: 2022-08-10
Payer: COMMERCIAL

## 2022-08-10 VITALS
BODY MASS INDEX: 26.43 KG/M2 | SYSTOLIC BLOOD PRESSURE: 130 MMHG | HEIGHT: 68 IN | OXYGEN SATURATION: 98 % | RESPIRATION RATE: 16 BRPM | DIASTOLIC BLOOD PRESSURE: 90 MMHG | WEIGHT: 174.38 LBS | HEART RATE: 72 BPM

## 2022-08-10 DIAGNOSIS — E78.5 HYPERLIPIDEMIA, UNSPECIFIED HYPERLIPIDEMIA TYPE: ICD-10-CM

## 2022-08-10 DIAGNOSIS — M54.17 LUMBOSACRAL RADICULOPATHY: Chronic | ICD-10-CM

## 2022-08-10 DIAGNOSIS — G89.4 CHRONIC PAIN SYNDROME: Chronic | ICD-10-CM

## 2022-08-10 DIAGNOSIS — I10 ESSENTIAL HYPERTENSION: ICD-10-CM

## 2022-08-10 DIAGNOSIS — E11.9 TYPE 2 DIABETES MELLITUS WITHOUT COMPLICATION, WITHOUT LONG-TERM CURRENT USE OF INSULIN: Primary | ICD-10-CM

## 2022-08-10 LAB
GLUCOSE SERPL-MCNC: 120 MG/DL (ref 70–110)
HBA1C MFR BLD: 6.3 % (ref 4.5–6.6)

## 2022-08-10 PROCEDURE — 99213 OFFICE O/P EST LOW 20 MIN: CPT | Mod: S$PBB,,, | Performed by: NURSE PRACTITIONER

## 2022-08-10 PROCEDURE — 3051F PR MOST RECENT HEMOGLOBIN A1C LEVEL 7.0 - < 8.0%: ICD-10-PCS | Mod: CPTII,,, | Performed by: NURSE PRACTITIONER

## 2022-08-10 PROCEDURE — 3075F PR MOST RECENT SYSTOLIC BLOOD PRESS GE 130-139MM HG: ICD-10-PCS | Mod: CPTII,,, | Performed by: NURSE PRACTITIONER

## 2022-08-10 PROCEDURE — 1160F PR REVIEW ALL MEDS BY PRESCRIBER/CLIN PHARMACIST DOCUMENTED: ICD-10-PCS | Mod: CPTII,,, | Performed by: NURSE PRACTITIONER

## 2022-08-10 PROCEDURE — 1101F PT FALLS ASSESS-DOCD LE1/YR: CPT | Mod: CPTII,,, | Performed by: NURSE PRACTITIONER

## 2022-08-10 PROCEDURE — 3080F PR MOST RECENT DIASTOLIC BLOOD PRESSURE >= 90 MM HG: ICD-10-PCS | Mod: CPTII,,, | Performed by: NURSE PRACTITIONER

## 2022-08-10 PROCEDURE — 99213 PR OFFICE/OUTPT VISIT, EST, LEVL III, 20-29 MIN: ICD-10-PCS | Mod: S$PBB,,, | Performed by: NURSE PRACTITIONER

## 2022-08-10 PROCEDURE — 4010F ACE/ARB THERAPY RXD/TAKEN: CPT | Mod: CPTII,,, | Performed by: NURSE PRACTITIONER

## 2022-08-10 PROCEDURE — 3288F FALL RISK ASSESSMENT DOCD: CPT | Mod: CPTII,,, | Performed by: NURSE PRACTITIONER

## 2022-08-10 PROCEDURE — 1160F RVW MEDS BY RX/DR IN RCRD: CPT | Mod: CPTII,,, | Performed by: NURSE PRACTITIONER

## 2022-08-10 PROCEDURE — 83036 HEMOGLOBIN GLYCOSYLATED A1C: CPT | Mod: PBBFAC | Performed by: NURSE PRACTITIONER

## 2022-08-10 PROCEDURE — 4010F PR ACE/ARB THEARPY RXD/TAKEN: ICD-10-PCS | Mod: CPTII,,, | Performed by: NURSE PRACTITIONER

## 2022-08-10 PROCEDURE — 99215 OFFICE O/P EST HI 40 MIN: CPT | Mod: PBBFAC | Performed by: NURSE PRACTITIONER

## 2022-08-10 PROCEDURE — 1159F MED LIST DOCD IN RCRD: CPT | Mod: CPTII,,, | Performed by: NURSE PRACTITIONER

## 2022-08-10 PROCEDURE — 3288F PR FALLS RISK ASSESSMENT DOCUMENTED: ICD-10-PCS | Mod: CPTII,,, | Performed by: NURSE PRACTITIONER

## 2022-08-10 PROCEDURE — 1101F PR PT FALLS ASSESS DOC 0-1 FALLS W/OUT INJ PAST YR: ICD-10-PCS | Mod: CPTII,,, | Performed by: NURSE PRACTITIONER

## 2022-08-10 PROCEDURE — 82962 GLUCOSE BLOOD TEST: CPT | Mod: PBBFAC | Performed by: NURSE PRACTITIONER

## 2022-08-10 PROCEDURE — 3080F DIAST BP >= 90 MM HG: CPT | Mod: CPTII,,, | Performed by: NURSE PRACTITIONER

## 2022-08-10 PROCEDURE — 3075F SYST BP GE 130 - 139MM HG: CPT | Mod: CPTII,,, | Performed by: NURSE PRACTITIONER

## 2022-08-10 PROCEDURE — 3051F HG A1C>EQUAL 7.0%<8.0%: CPT | Mod: CPTII,,, | Performed by: NURSE PRACTITIONER

## 2022-08-10 PROCEDURE — 1159F PR MEDICATION LIST DOCUMENTED IN MEDICAL RECORD: ICD-10-PCS | Mod: CPTII,,, | Performed by: NURSE PRACTITIONER

## 2022-08-10 NOTE — PATIENT INSTRUCTIONS
Check bp daily and call in 2 weeks with readings, if remains elevated we will restart norvasc.     Low sodium diet    Increase water intake    No change in DM meds at  this time.     Monitor and document glucose daily alternating between fasting and two hours pp and bring in meter to next visit.    Exercise 4-5 times per week.    Work to obtain normal weight.   Take all medications as directed.  Snacks with 0-5 grams carbs   Hard Boiled Egg   Crystal Light, Vitamin Water, Powerade Zero   Herbal tea, unsweetened   8 oz unsweetened almond milk   2 tsp peanut butter on celery   ½ cup sugar-free Jell-O   1 sugar-free popsicle   Non starchy vegetables such as carrots or celery sticks with lowfat dressing   ½ oz lowfat cheese or string cheese   1 closed handful of nuts or tbsp of seeds, unsalted    Snacks with 15 gram carbs  . 1 small piece of fruit or . banana or . cup light canned fruit  . 3 georges cracker squares  . 3 cups popcorn  . 5 Vanilla Wafers  . 1/2 cup low fat, no added sugar ice cream or frozen yogurt  . 1/2 turkey, ham, or chicken sandwich  . 1.2 cup fruit with 1/2 cup of cottage cheese  . 4-6 unsalted wheat crackers with 1 oz low fat cheese or 1 tbsp peanut butter  . 30 goldfish crackers  . 7-8 mini rice cakes  . 1/3 cup hummus dip with raw vegetables  . 1/2 whole wheat bella, 1 tbsp hummus  . Mini pizza (. whole wheat English muffin, low-fat cheese, tomato sauce)  . 100 calorie snack pack  . 4-6 oz light yogurt  . 1/2 cup sugar-free pudding

## 2022-08-10 NOTE — PROGRESS NOTES
"Subjective:       Patient ID: Lorraine De Jesus is a 77 y.o. female.    Chief Complaint: Diabetes Mellitus (Pt here for follow up visit and A1c, denies complaints or problems, checks sugar 1 x day.)    Here today for routine evaluation and med refill.  She is doing very well with the exception of elevated blood pressure. She reports that she has not taken the norvasc in the last few months, concern over "taking too many bp pills"    Hemoglobin A1C       Date                     Value               Ref Range           Status                08/10/2022               6.3                 4.5 - 6.6 %         Final                 03/02/2022               7.2 (H)             4.5 - 6.6 %         Final        12/08/2021               7.7 (H)             4.5 - 6.6 %         Final          06/14/2021               6.9 (H)             4.5 - 6.6 %         Final                ----------  Lab Results       Component                Value               Date                       MICROALBUR               1.0                 06/14/2021            Lab Results       Component                Value               Date                       CHOL                     229 (H)             06/14/2021            Lab Results       Component                Value               Date                       HDL                      55                  06/14/2021            Lab Results       Component                Value               Date                       LDLCALC                  151                 06/14/2021            Lab Results       Component                Value               Date                       TRIG                     115                 06/14/2021            Lab Results       Component                Value               Date                       CHOLHDL                  4.2                 06/14/2021            CMP  Sodium       Date                     Value               Ref Range           Status                06/25/2022         "       135 (L)             136 - 145 mmol*     Final            ----------  Potassium       Date                     Value               Ref Range           Status                06/25/2022               4.3                 3.5 - 5.1 mmol*     Final            ----------  Chloride       Date                     Value               Ref Range           Status                06/25/2022               98                  98 - 107 mmol/L     Final            ----------  CO2       Date                     Value               Ref Range           Status                06/25/2022               21                  21 - 32 mmol/L      Final            ----------  Glucose       Date                     Value               Ref Range           Status                06/25/2022               176 (H)             74 - 106 mg/dL      Final            ----------  BUN       Date                     Value               Ref Range           Status                06/25/2022               17                  7 - 18 mg/dL        Final            ----------  Creatinine       Date                     Value               Ref Range           Status                06/25/2022               0.76                0.55 - 1.02 mg*     Final            ----------  Calcium       Date                     Value               Ref Range           Status                06/25/2022               9.3                 8.5 - 10.1 mg/*     Final            ----------  Total Protein       Date                     Value               Ref Range           Status                09/30/2021               8.2                 6.4 - 8.2 g/dL      Final            ----------  Albumin       Date                     Value               Ref Range           Status                09/30/2021               3.4 (L)             3.5 - 5.0 g/dL      Final            ----------  Bilirubin, Total       Date                     Value               Ref Range           Status                 09/30/2021               0.2                 >0.0 - 1.2 mg/*     Final            ----------  Alk Phos       Date                     Value               Ref Range           Status                09/30/2021               109                 55 - 142 U/L        Final            ----------  AST       Date                     Value               Ref Range           Status                09/30/2021               12 (L)              15 - 37 U/L         Final            ----------  ALT       Date                     Value               Ref Range           Status                09/30/2021               19                  13 - 56 U/L         Final            ----------  Anion Gap       Date                     Value               Ref Range           Status                06/25/2022               20 (H)              7 - 16 mmol/L       Final            ----------  eGFR        Date                     Value               Ref Range           Status                12/06/2021               89                  >=60 mL/min/1.*     Final            ----------  eGFR       Date                     Value               Ref Range           Status                06/25/2022               79                  >=60 mL/min/1.*     Final            ----------      Review of Systems   Constitutional: Negative for activity change, appetite change, diaphoresis and fatigue.   HENT: Negative for nasal congestion, facial swelling and sinus pressure/congestion.    Eyes: Negative for visual disturbance.   Respiratory: Negative for shortness of breath and wheezing.    Cardiovascular: Negative for chest pain and leg swelling.   Gastrointestinal: Negative for constipation, diarrhea, nausea and vomiting.   Endocrine: Negative for polydipsia, polyphagia and polyuria.   Genitourinary: Negative for dysuria, frequency and urgency.   Musculoskeletal: Negative for gait problem and myalgias.   Integumentary:  Negative for color change, rash and  wound.   Neurological: Negative for dizziness, syncope, weakness, headaches, disturbances in coordination and coordination difficulties.   Hematological: Does not bruise/bleed easily.   Psychiatric/Behavioral: Negative for self-injury, sleep disturbance and suicidal ideas. The patient is not nervous/anxious.          Objective:      Physical Exam  Vitals and nursing note reviewed.   Constitutional:       Appearance: Normal appearance.   HENT:      Head: Normocephalic.   Cardiovascular:      Rate and Rhythm: Normal rate and regular rhythm.      Pulses:           Dorsalis pedis pulses are 2+ on the right side and 2+ on the left side.        Posterior tibial pulses are 2+ on the right side and 2+ on the left side.      Heart sounds: Normal heart sounds.   Pulmonary:      Effort: Pulmonary effort is normal.      Breath sounds: Normal breath sounds.   Musculoskeletal:         General: Normal range of motion.   Feet:      Right foot:      Protective Sensation: 6 sites tested. 6 sites sensed.      Skin integrity: Skin integrity normal.      Left foot:      Protective Sensation: 6 sites tested. 6 sites sensed.      Skin integrity: Skin integrity normal.   Skin:     General: Skin is warm and dry.   Neurological:      General: No focal deficit present.      Mental Status: She is alert and oriented to person, place, and time.   Psychiatric:         Mood and Affect: Mood normal.         Behavior: Behavior normal.         Thought Content: Thought content normal.         Judgment: Judgment normal.         Assessment:       Problem List Items Addressed This Visit        Neuro    Chronic pain syndrome (Chronic)    Lumbosacral radiculopathy (Chronic)       Cardiac/Vascular    Essential hypertension    Hyperlipidemia       Endocrine    Type 2 diabetes mellitus without complication, without long-term current use of insulin - Primary    Relevant Orders    Hemoglobin A1C, POCT (Completed)    POCT Glucose, Hand-Held Device (Completed)           Plan:       Problem List Items Addressed This Visit        Neuro    Chronic pain syndrome (Chronic)    Lumbosacral radiculopathy (Chronic)       Cardiac/Vascular    Essential hypertension    Hyperlipidemia       Endocrine    Type 2 diabetes mellitus without complication, without long-term current use of insulin - Primary    Relevant Orders    Hemoglobin A1C, POCT (Completed)    POCT Glucose, Hand-Held Device (Completed)           will have fasting labs today  Recheck bp and call in2 weeks with readings, we will restart norvasc at that time if needed.

## 2022-08-18 RX ORDER — EZETIMIBE 10 MG/1
10 TABLET ORAL DAILY
Qty: 90 TABLET | Refills: 3 | Status: SHIPPED | OUTPATIENT
Start: 2022-08-18 | End: 2023-08-21

## 2022-09-14 NOTE — PROGRESS NOTES
[facility]     PATIENT NAME: Lorraine De Jesus   : 1945    AGE: 77 y.o. DATE: 2022   MRN: [id number]        Reason for Visit / Chief Complaint: Medicare AWV (Subsequent [employer] AWV visit )        Lorraine De Jesus presents for a Subsequent [employer] AWV today. She is an established pt of Dr. Chen. The pt reports she is doing well and offers no complaints.     Review of Systems   Constitutional:  Negative for chills, fever and malaise/fatigue.   HENT:  Negative for congestion and sore throat.    Eyes:  Negative for blurred vision and discharge.   Respiratory:  Negative for cough and shortness of breath.    Cardiovascular:  Negative for chest pain, palpitations and leg swelling.   Gastrointestinal:  Negative for abdominal pain and heartburn.   Genitourinary:  Negative for dysuria and flank pain.   Musculoskeletal:  Negative for falls and myalgias.   Skin:  Negative for itching and rash.   Neurological:  Negative for dizziness and weakness.   Psychiatric/Behavioral:  Negative for depression.       The following components were reviewed and updated:      Medical/Social/Family History:  Past Medical History:   Diagnosis Date    Adenomatous polyp of ascending colon 2021    Adenomatous polyp of descending colon 2021    Age related osteoporosis 10/28/2020    Benign paroxysmal vertigo     Chronic pain syndrome     Chronic pelvic pain in female 2021    Ditropan XL 5mg    Colon, diverticulosis 2021    Depressive disorder 2019    Essential hypertension 2012    External hemorrhoid 2021    Gastrointestinal hemorrhage associated with anorectal source 2021    GERD (gastroesophageal reflux disease) 2013    Hyperlipidemia 2012    Hypothyroidism 2019    Joint pain     Nonrheumatic tricuspid (valve) insufficiency 2018    Personal history UTI 2021    Controlled on Hiprex    Polyneuropathy 2018    PVD (peripheral  vascular disease) 10/30/2018    Temporal arteritis     Type 2 diabetes mellitus 2014    Urethral meatal stenosis 2018    history of known urethral stenosis, and was taught to perform monthly self dilation at home.        Family History   Problem Relation Age of Onset    Cancer Mother     Hypertension Mother     Cancer Father     Cancer Sister     Diabetes Sister     Hyperlipidemia Sister     Hypertension Sister     Cancer Brother     Diabetes Sister     No Known Problems Sister     Cancer Brother     No Known Problems Brother     Cancer Brother         Past Surgical History:   Procedure Laterality Date     left lumbar sympathetic nerve block Left 2020    X3  DR BROWN    CARDIAC CATHETERIZATION  10/14/2009     SECTION      x 2    COLONOSCOPY  2009    CYSTOSCOPY WITH HYDRODISTENSION OF BLADDER N/A 2021    Procedure: CYSTOSCOPY, WITH BLADDER HYDRODISTENSION;  Surgeon: Dequan Recio MD;  Location: [facility];  Service: Urology;  Laterality: N/A;    CYSTOSCOPY WITH URETHRAL DILATION  2021    Dr Recio    HYSTERECTOMY      LUMBAR SYMPATHETIC NERVE BLOCK Left 10/05/2020    Left Sympathetic Nerve Block - Dr Brown - 10/5/20, 20, 20. 20, 10/9/19, 19 and 18    right lumbar sympathetic nerve block Right 2020    X4 DR BROWN    THYROIDECTOMY             Social History     Tobacco Use   Smoking Status Never   Smokeless Tobacco Never       Social History     Substance and Sexual Activity   Alcohol Use Never         Allergies and Current Medications     Review of patient's allergies indicates:   Allergen Reactions    Lipitor [atorvastatin] Other (See Comments)     Muscle aching    Pravachol [pravastatin] Other (See Comments)     Muscle aching       Current Outpatient Medications:     aspirin (ECOTRIN) 81 MG EC tablet, Take 81 mg by mouth once daily., Disp: , Rfl:     blood sugar diagnostic Strp, 1 strip by Misc.(Non-Drug; Combo Route) route 3 (three)  times daily., Disp: 200 strip, Rfl: 3    blood-glucose meter kit, Use as instructed, Disp: 1 each, Rfl: 0    cloNIDine (CATAPRES) 0.1 MG tablet, TAKE 1 TABLET BY MOUTH EVERY 4 HOURS AS NEEDED FOR BLOOD PRESSURE GREATER THAN 170/90, Disp: , Rfl:     DULoxetine (CYMBALTA) 60 MG capsule, Take 1 capsule (60 mg total) by mouth once daily. Take one capsule at bedtime, Disp: 30 capsule, Rfl: 2    ezetimibe (ZETIA) 10 mg tablet, Take 1 tablet (10 mg total) by mouth once daily., Disp: 90 tablet, Rfl: 3    FARXIGA 10 mg tablet, TAKE 1 TABLET(10 MG) BY MOUTH EVERY DAY, Disp: 90 tablet, Rfl: 1    fluticasone propionate (FLONASE) 50 mcg/actuation nasal spray, SHAKE LIQUID AND USE 2 SPRAYS(100 MCG) IN EACH NOSTRIL EVERY DAY, Disp: 48 g, Rfl: 1    gabapentin (NEURONTIN) 400 MG capsule, TAKE 1 CAPSULE(400 MG) BY MOUTH EVERY 8 HOURS, Disp: 90 capsule, Rfl: 2    glipiZIDE (GLUCOTROL) 10 MG TR24, Take 1 tablet (10 mg total) by mouth daily with breakfast., Disp: 90 tablet, Rfl: 1    HYDROcodone-acetaminophen (NORCO)  mg per tablet, Take 1 tablet by mouth every 6 (six) hours as needed for Pain., Disp: 120 tablet, Rfl: 0    lancets (ACCU-CHEK FASTCLIX LANCET DRUM MIS), Take 1 each by mouth once daily., Disp: , Rfl:     levocetirizine (XYZAL) 5 MG tablet, TAKE 1 TABLET(5 MG) BY MOUTH EVERY EVENING, Disp: 30 tablet, Rfl: 11    levothyroxine (SYNTHROID) 75 MCG tablet, TAKE 1 TABLET(75 MCG) BY MOUTH BEFORE BREAKFAST, Disp: 90 tablet, Rfl: 1    metoprolol succinate (TOPROL-XL) 50 MG 24 hr tablet, TAKE 1 TABLET(50 MG) BY MOUTH EVERY DAY, Disp: 30 tablet, Rfl: 3    olmesartan (BENICAR) 5 MG Tab, Take 5 mg by mouth once daily., Disp: , Rfl:     ONETOUCH DELICA PLUS LANCET 33 gauge Misc, Check blood sugar TID, Disp: 100 each, Rfl: 11    ONETOUCH VERIO TEST STRIPS Strp, TEST BLOOD GLUCOSE LEVELS TWICE DAILY, Disp: 200 strip, Rfl: 4    oxybutynin (DITROPAN-XL) 5 MG TR24, TAKE 1 TABLET(5 MG) BY MOUTH EVERY DAY, Disp: 90 tablet, Rfl: 3     cyproheptadine (PERIACTIN) 4 mg tablet, Take 1 tablet (4 mg total) by mouth 4 (four) times daily before meals and nightly. (Patient not taking: No sig reported), Disp: 120 tablet, Rfl: 2    HYDROcodone-acetaminophen (NORCO)  mg per tablet, Take 1 tablet by mouth every 6 (six) hours. (Patient not taking: Reported on 9/19/2022), Disp: 120 tablet, Rfl: 0    [START ON 9/25/2022] HYDROcodone-acetaminophen (NORCO)  mg per tablet, Take 1 tablet by mouth every 6 (six) hours as needed for Pain. (Patient not taking: Reported on 9/19/2022), Disp: 120 tablet, Rfl: 0    loratadine (CLARITIN) 10 mg tablet, Take 1 tablet (10 mg total) by mouth once daily. (Patient not taking: Reported on 9/19/2022), Disp: 30 tablet, Rfl: 0      Health Risk Assessment   Fall Risk:  not at risk   Obesity: BMI Body mass index is 27.37 kg/m².   Advance Directive: no but information given   Depression: PHQ9- 3   HTN:  DASH diet, exercise, weight management, med compliance, home BP monitoring, and follow-up discussed.   T2DM: diabetic diet, glucose monitoring, activity level, weight management, med compliance, and follow-up discussed.  STI: not at risk   Statin Use: no      Health Maintenance   Last eye exam: states saw Dr. Phoenix 1/22 [facility] faxed   Last CV screen with lipids: 8/10/22   Diabetes screening with fasting glucose or A1c: 8/10/22   Colonoscopy: 6/22/21 Dr. Gonzalez - repeat in 3 years   Flu Vaccine: declined   Pneumonia vaccines: declined   COVID vaccine: (moderna) 3/22/21, 2/22/21   Hep B vaccine: na   DEXA: 11/3/20   Last pap/pelvic: referral sent (history of hysterectomy)  Last Mammogram: states had 8/2022 at [facility] faxed  AAA screening: na   HIV Screening: not at risk  Hepatitis C Screen: drawn this visit  Low Dose CT Scan: na    Health Maintenance Topics with due status: Not Due       Topic Last Completion Date    DEXA Scan 11/03/2020    Colorectal Cancer Screening 06/22/2021    Diabetes Urine Screening 08/10/2022    Foot  Exam 08/10/2022    Lipid Panel 08/10/2022    Hemoglobin A1c 08/10/2022     Health Maintenance Due   Topic Date Due    Hepatitis C Screening  Never done    Eye Exam  Never done    Low Dose Statin  Never done    COVID-19 Vaccine (3 - Booster for Moderna series) 08/22/2021         Lab results available in Epic or see dates from [facility] above:   Lab Results   Component Value Date    CHOL 226 (H) 08/10/2022    CHOL 229 (H) 06/14/2021     Lab Results   Component Value Date    HDL 56 08/10/2022    HDL 55 06/14/2021     Lab Results   Component Value Date    LDLCALC 152 08/10/2022    LDLCALC 151 06/14/2021     Lab Results   Component Value Date    TRIG 88 08/10/2022    TRIG 115 06/14/2021     Lab Results   Component Value Date    CHOLHDL 4.0 08/10/2022    CHOLHDL 4.2 06/14/2021       Lab Results   Component Value Date    HGBA1C 6.3 08/10/2022       Sodium   Date Value Ref Range Status   06/25/2022 135 (L) 136 - 145 mmol/L Final     Potassium   Date Value Ref Range Status   06/25/2022 4.3 3.5 - 5.1 mmol/L Final     Chloride   Date Value Ref Range Status   06/25/2022 98 98 - 107 mmol/L Final     CO2   Date Value Ref Range Status   06/25/2022 21 21 - 32 mmol/L Final     Glucose   Date Value Ref Range Status   06/25/2022 176 (H) 74 - 106 mg/dL Final     BUN   Date Value Ref Range Status   06/25/2022 17 7 - 18 mg/dL Final     Creatinine   Date Value Ref Range Status   06/25/2022 0.76 0.55 - 1.02 mg/dL Final     Calcium   Date Value Ref Range Status   06/25/2022 9.3 8.5 - 10.1 mg/dL Final     Total Protein   Date Value Ref Range Status   09/30/2021 8.2 6.4 - 8.2 g/dL Final     Albumin   Date Value Ref Range Status   09/30/2021 3.4 (L) 3.5 - 5.0 g/dL Final     Bilirubin, Total   Date Value Ref Range Status   09/30/2021 0.2 >0.0 - 1.2 mg/dL Final     Alk Phos   Date Value Ref Range Status   09/30/2021 109 55 - 142 U/L Final     AST   Date Value Ref Range Status   09/30/2021 12 (L) 15 - 37 U/L Final     ALT   Date Value Ref Range Status  "  09/30/2021 19 13 - 56 U/L Final     Anion Gap   Date Value Ref Range Status   06/25/2022 20 (H) 7 - 16 mmol/L Final     eGFR    Date Value Ref Range Status   12/06/2021 89 >=60 mL/min/1.73m² Final     eGFR   Date Value Ref Range Status   06/25/2022 79 >=60 mL/min/1.73m² Final         Incontinence  Bowel: no  Bladder: no      Care Team  Dr. Chen -PCP                 Dr. Brown- pain treatment                 Dr. Phoenix - optometry    **See Completed Assessments for Annual Wellness visit within the encounter summary    The following assessments were completed & reviewed:  Depression Screening  Cognitive function Screening  Timed Get Up Test  Whisper Test  Vision Screen  Health Risk Assessment  Checklist of ADLs and IADLs        Objective  Vitals:    09/19/22 1041 09/19/22 1043   BP: (!) 150/100 (!) 150/90   Pulse: 62    Resp: 16    Temp: 97.9 °F (36.6 °C)    TempSrc: Oral    SpO2: 98%    Weight: 81.6 kg (180 lb)    Height: 5' 8" (1.727 m)    PainSc:   1       Body mass index is 27.37 kg/m².  Ideal body weight: 63.9 kg (140 lb 14 oz)       Physical Exam      Assessment:     1. Encounter for subsequent annual wellness visit (AWV) in Medicare patient    2. Essential hypertension    3. Hyperlipidemia, unspecified hyperlipidemia type    4. Type 2 diabetes mellitus without complication, without long-term current use of insulin    5. Gastroesophageal reflux disease, unspecified whether esophagitis present    6. DDD (degenerative disc disease), lumbar    7. Screening for cervical cancer    8. BMI 27.0-27.9,adult         Plan:    Referrals:   Referral to GYN for pap and pelvic test  The pt declined vaccines     Advised to call office if does not hear from anyone with referral appt within 2-3 weeks to check on status of referral. Voiced understanding.      Discussed and provided with a screening schedule and personal prevention plan in accordance with USPSTF age appropriate recommendations and Medicare " screening guidelines.   Education, counseling, and referrals were provided as needed.  After Visit Summary printed and given to patient which includes written education and a list of any referrals if indicated.     Education including diet, exercise, falls, preventive health care for older adults and advanced directives discussed with patient and patient verbalized understanding.      F/u plan for yearly AWV.    Signature: Leona HERNANDEZ-BC

## 2022-09-15 ENCOUNTER — PROCEDURE VISIT (OUTPATIENT)
Dept: UROLOGY | Facility: CLINIC | Age: 77
End: 2022-09-15
Payer: COMMERCIAL

## 2022-09-15 ENCOUNTER — OFFICE VISIT (OUTPATIENT)
Dept: UROLOGY | Facility: CLINIC | Age: 77
End: 2022-09-15
Payer: COMMERCIAL

## 2022-09-15 VITALS
TEMPERATURE: 99 F | WEIGHT: 177 LBS | OXYGEN SATURATION: 97 % | BODY MASS INDEX: 26.83 KG/M2 | HEIGHT: 68 IN | DIASTOLIC BLOOD PRESSURE: 70 MMHG | HEART RATE: 76 BPM | SYSTOLIC BLOOD PRESSURE: 124 MMHG

## 2022-09-15 DIAGNOSIS — N35.92 STRICTURE OF FEMALE URETHRA, UNSPECIFIED STRICTURE TYPE: ICD-10-CM

## 2022-09-15 DIAGNOSIS — Z87.440 PERSONAL HISTORY UTI: ICD-10-CM

## 2022-09-15 DIAGNOSIS — R10.2 CHRONIC PELVIC PAIN IN FEMALE: ICD-10-CM

## 2022-09-15 DIAGNOSIS — G89.29 CHRONIC PELVIC PAIN IN FEMALE: ICD-10-CM

## 2022-09-15 DIAGNOSIS — N35.92 STRICTURE OF FEMALE URETHRA, UNSPECIFIED STRICTURE TYPE: Primary | ICD-10-CM

## 2022-09-15 DIAGNOSIS — G89.29 CHRONIC PELVIC PAIN IN FEMALE: Chronic | ICD-10-CM

## 2022-09-15 DIAGNOSIS — N34.3 URETHRAL SYNDROME: Primary | ICD-10-CM

## 2022-09-15 DIAGNOSIS — N32.89 BLADDER SPASMS: ICD-10-CM

## 2022-09-15 DIAGNOSIS — R10.2 CHRONIC PELVIC PAIN IN FEMALE: Chronic | ICD-10-CM

## 2022-09-15 DIAGNOSIS — G62.9 POLYNEUROPATHY: ICD-10-CM

## 2022-09-15 PROCEDURE — 53660 DILATION OF URETHRA: CPT | Mod: PBBFAC | Performed by: UROLOGY

## 2022-09-15 PROCEDURE — 1160F PR REVIEW ALL MEDS BY PRESCRIBER/CLIN PHARMACIST DOCUMENTED: ICD-10-PCS | Mod: CPTII,,, | Performed by: NURSE PRACTITIONER

## 2022-09-15 PROCEDURE — 99214 OFFICE O/P EST MOD 30 MIN: CPT | Mod: PBBFAC,25 | Performed by: NURSE PRACTITIONER

## 2022-09-15 PROCEDURE — 99214 PR OFFICE/OUTPT VISIT, EST, LEVL IV, 30-39 MIN: ICD-10-PCS | Mod: S$PBB,25,, | Performed by: NURSE PRACTITIONER

## 2022-09-15 PROCEDURE — 1125F AMNT PAIN NOTED PAIN PRSNT: CPT | Mod: CPTII,,, | Performed by: NURSE PRACTITIONER

## 2022-09-15 PROCEDURE — 3066F NEPHROPATHY DOC TX: CPT | Mod: CPTII,,, | Performed by: NURSE PRACTITIONER

## 2022-09-15 PROCEDURE — 3066F PR DOCUMENTATION OF TREATMENT FOR NEPHROPATHY: ICD-10-PCS | Mod: CPTII,,, | Performed by: NURSE PRACTITIONER

## 2022-09-15 PROCEDURE — 3074F PR MOST RECENT SYSTOLIC BLOOD PRESSURE < 130 MM HG: ICD-10-PCS | Mod: CPTII,,, | Performed by: NURSE PRACTITIONER

## 2022-09-15 PROCEDURE — 1101F PR PT FALLS ASSESS DOC 0-1 FALLS W/OUT INJ PAST YR: ICD-10-PCS | Mod: CPTII,,, | Performed by: NURSE PRACTITIONER

## 2022-09-15 PROCEDURE — 1125F PR PAIN SEVERITY QUANTIFIED, PAIN PRESENT: ICD-10-PCS | Mod: CPTII,,, | Performed by: NURSE PRACTITIONER

## 2022-09-15 PROCEDURE — 99214 OFFICE O/P EST MOD 30 MIN: CPT | Mod: S$PBB,25,, | Performed by: NURSE PRACTITIONER

## 2022-09-15 PROCEDURE — 3078F DIAST BP <80 MM HG: CPT | Mod: CPTII,,, | Performed by: NURSE PRACTITIONER

## 2022-09-15 PROCEDURE — 3288F FALL RISK ASSESSMENT DOCD: CPT | Mod: CPTII,,, | Performed by: NURSE PRACTITIONER

## 2022-09-15 PROCEDURE — 3051F PR MOST RECENT HEMOGLOBIN A1C LEVEL 7.0 - < 8.0%: ICD-10-PCS | Mod: CPTII,,, | Performed by: NURSE PRACTITIONER

## 2022-09-15 PROCEDURE — 4010F ACE/ARB THERAPY RXD/TAKEN: CPT | Mod: CPTII,,, | Performed by: NURSE PRACTITIONER

## 2022-09-15 PROCEDURE — 3061F PR NEG MICROALBUMINURIA RESULT DOCUMENTED/REVIEW: ICD-10-PCS | Mod: CPTII,,, | Performed by: NURSE PRACTITIONER

## 2022-09-15 PROCEDURE — 1159F MED LIST DOCD IN RCRD: CPT | Mod: CPTII,,, | Performed by: NURSE PRACTITIONER

## 2022-09-15 PROCEDURE — 4010F PR ACE/ARB THEARPY RXD/TAKEN: ICD-10-PCS | Mod: CPTII,,, | Performed by: NURSE PRACTITIONER

## 2022-09-15 PROCEDURE — 53660 PR DIL URETHRA,FEMALE,INITIAL: ICD-10-PCS | Mod: S$PBB,,, | Performed by: UROLOGY

## 2022-09-15 PROCEDURE — 96372 THER/PROPH/DIAG INJ SC/IM: CPT | Mod: PBBFAC | Performed by: NURSE PRACTITIONER

## 2022-09-15 PROCEDURE — 53660 DILATION OF URETHRA: CPT | Mod: S$PBB,,, | Performed by: UROLOGY

## 2022-09-15 PROCEDURE — 1159F PR MEDICATION LIST DOCUMENTED IN MEDICAL RECORD: ICD-10-PCS | Mod: CPTII,,, | Performed by: NURSE PRACTITIONER

## 2022-09-15 PROCEDURE — 3074F SYST BP LT 130 MM HG: CPT | Mod: CPTII,,, | Performed by: NURSE PRACTITIONER

## 2022-09-15 PROCEDURE — 1101F PT FALLS ASSESS-DOCD LE1/YR: CPT | Mod: CPTII,,, | Performed by: NURSE PRACTITIONER

## 2022-09-15 PROCEDURE — 3051F HG A1C>EQUAL 7.0%<8.0%: CPT | Mod: CPTII,,, | Performed by: NURSE PRACTITIONER

## 2022-09-15 PROCEDURE — 3061F NEG MICROALBUMINURIA REV: CPT | Mod: CPTII,,, | Performed by: NURSE PRACTITIONER

## 2022-09-15 PROCEDURE — 51798 US URINE CAPACITY MEASURE: CPT | Mod: PBBFAC | Performed by: NURSE PRACTITIONER

## 2022-09-15 PROCEDURE — 3288F PR FALLS RISK ASSESSMENT DOCUMENTED: ICD-10-PCS | Mod: CPTII,,, | Performed by: NURSE PRACTITIONER

## 2022-09-15 PROCEDURE — 3078F PR MOST RECENT DIASTOLIC BLOOD PRESSURE < 80 MM HG: ICD-10-PCS | Mod: CPTII,,, | Performed by: NURSE PRACTITIONER

## 2022-09-15 PROCEDURE — 1160F RVW MEDS BY RX/DR IN RCRD: CPT | Mod: CPTII,,, | Performed by: NURSE PRACTITIONER

## 2022-09-15 RX ORDER — LIDOCAINE HYDROCHLORIDE 20 MG/ML
JELLY TOPICAL
Status: COMPLETED | OUTPATIENT
Start: 2022-09-15 | End: 2022-09-15

## 2022-09-15 RX ORDER — KETOROLAC TROMETHAMINE 30 MG/ML
30 INJECTION, SOLUTION INTRAMUSCULAR; INTRAVENOUS
Status: COMPLETED | OUTPATIENT
Start: 2022-09-15 | End: 2022-09-15

## 2022-09-15 RX ADMIN — KETOROLAC TROMETHAMINE 30 MG: 30 INJECTION, SOLUTION INTRAMUSCULAR at 02:09

## 2022-09-15 RX ADMIN — LIDOCAINE HYDROCHLORIDE: 20 JELLY TOPICAL at 03:09

## 2022-09-15 NOTE — PROGRESS NOTES
Subjective:       Patient ID: Lorraine De Jesus is a 77 y.o. female.    Chief Complaint: Other (Pt here and says she has bladder spasms)    UROLOGY HISTORY PER DR. COFFMAN:  6/9/2021  here for repeat dilation. patient has chronic pain that is improved with urination. serial dilations over the years. typically she does well for a few months but was dilated just over 30 days ago.        Cystoscopy: After informed consent was confirmed. preprocedural abx were administered and the patient was taken to the cystoscopy suite and prepped and draped in standard fashion. The urethra was injected with lidocaine. Cystoscopy revealed no urethral lesions, moderately obstructing prostate, large bladder with no trabeculations or mucosal lesions. Ureteral orifices were identified bilaterally and were noted to be in orthotopic position demonstrate a clear reflux bilaterally. Retroflexion maneuver was performed to evaluate the trigone. Patient tolerated procedure well.         dilated up to 28 F without issue. teaching on self dilation today. sent home with sample. instructions not to introduce dilator farther then 1in into the urethra. discussed risk of injury to bladder. infection. pain.     recommend q 1 month self dilation. teaching performed by RN staff.      start hiprex 1gm daily, pyridium 100mg daily prn.     follow up 3 months for symptom check.    06/09/2021)--------------------------------------------------------------------------     Ms. De Jesus has been transferred to my care in Dr. Coffman's absence for f/u from above-mentioned cystoscopy with Hydrostatic Dilatation.  She states that she is doing much better since procedure, compliant with Hiprex, and free of s/s of infection to date.  Adds that her incontinence and bladder spasms are controlled on Oxybutynin 5mg daily.  (7/8/2021)----------------------------------------------------------------------------------     Ms. De Jesus is here today for c/o bladder pressure.  She has a  history of known urethral stenosis, and   She denies fever, constitutional symptoms or sensation of infection today.  PVR  0 ml.     -  Urethral stenosis:  Not performing self dilatations, states unable. Though taught to perform monthly self dilation at last visit, states she is unable to do this.  Reports she was symptom free following last cystoscopy with dilatation, but strainiing and pain at voiding onset have returned over the last 2 weeks.  -  Personal history UTI:  Hiprex started in June of this year.  -  Chronic pelvic pain in female:  R/t bladder spasms.  Controlled last visit with oxybutynine 5mg daily.  Discussed increasing Ditropan to BID  [November 22, 2021]  -------------------------------------------------------------------------------------------------------------------------------------------------------------------------------------------------------------------------  The above notes are notes of Dr. Recio and Ms. Shaw.  Patient sent to me for urethral dilatation.  She has been having problems with urethral syndrome since she was in her 40s.  Last urethral dilatation done June 24th and she feels she needs another today.  Pressure and discomfort like she usually has.  She is familiar with urethral dilatations and wishes to proceed again.   ------------------------------------------------------------- [December 16, 2021]-----------------------------------------------------------------------------------------------------------------------    Ms. De Jesus is a very pleasant 78 yo AA female, who has returned for c/o severe exacerbation of chronic pelvic pain and difficulty emptying.  Patient states increase of straining to urinate, causing bladder spasms from bladder around to back.  PVR 47 l.  Requests repeat urethral dilatations.  Dr. Singleton to room to evaluate.  Patient to be worked into his schedule this after noon for procedure.    She was last seen by Dr. Singleton 10 months prior for urethral dilatation  "and following POC:  "Urethral dilatation performed as described.  Urine sent for culture since she has lots of amorphous crystals that could mask infection.  Patient given Cipro 500 mg b.i.d. for 3 days to cover instrumentation.  She is to call when she needs another urethral dilatation"  [9/15/2022]-----------------------------------------------------------------------------------------     Review of Systems   Constitutional: Negative.    Respiratory: Negative.     Cardiovascular: Negative.    Gastrointestinal: Negative.    Genitourinary:  Positive for difficulty urinating, dysuria, frequency, pelvic pain and urgency. Negative for decreased urine volume, dyspareunia, enuresis, flank pain, genital sores, hematuria, menstrual problem, vaginal bleeding, vaginal discharge and vaginal pain.        Straining, especially at start of voiding, causing bladder spasms from bladder around to back x 5 days.   Musculoskeletal: Negative.    Skin: Negative.    Allergic/Immunologic: Negative.    Neurological: Negative.    Psychiatric/Behavioral: Negative.       Objective:      Physical Exam  Vitals and nursing note reviewed.   Constitutional:       General: She is not in acute distress.     Appearance: Normal appearance. She is normal weight. She is not ill-appearing, toxic-appearing or diaphoretic.   Eyes:      General: No scleral icterus.  Cardiovascular:      Rate and Rhythm: Normal rate.   Pulmonary:      Effort: Pulmonary effort is normal.   Abdominal:      General: Bowel sounds are normal. There is no distension.      Palpations: Abdomen is soft.      Tenderness: There is no abdominal tenderness. There is no right CVA tenderness or left CVA tenderness.      Comments: Pelvic tenderness   Genitourinary:     General: Normal vulva.      Vagina: No vaginal discharge.      Comments: Pelvic tenderness with palpation  Musculoskeletal:         General: Normal range of motion.      Right lower leg: No edema.      Left lower leg: No " edema.   Skin:     General: Skin is warm and dry.      Coloration: Skin is not jaundiced or pale.      Findings: No bruising, erythema, lesion or rash.   Neurological:      Mental Status: She is alert and oriented to person, place, and time. Mental status is at baseline.   Psychiatric:         Mood and Affect: Mood normal.         Behavior: Behavior normal.         Thought Content: Thought content normal.         Judgment: Judgment normal.       Assessment:       1. 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 Bladder spasms    3. Personal history UTI    4. Polyneuropathy    5. Chronic pelvic pain in female          Plan:       Urethral meatal stenosis  Patient worked in for Urethral dilatation    Bladder spasms  Recent exacerbation started 5 days ago    Chronic pelvic pain in female  Toradol 30 mg IM now  Amitriptyline contraindicated for age

## 2022-09-15 NOTE — PROCEDURES
Procedures    Procedures     UROLOGY HISTORY PER DR. COFFMAN:  6/9/2021  here for repeat dilation. patient has chronic pain that is improved with urination. serial dilations over the years. typically she does well for a few months but was dilated just over 30 days ago.        Cystoscopy: After informed consent was confirmed. preprocedural abx were administered and the patient was taken to the cystoscopy suite and prepped and draped in standard fashion. The urethra was injected with lidocaine. Cystoscopy revealed no urethral lesions, moderately obstructing prostate, large bladder with no trabeculations or mucosal lesions. Ureteral orifices were identified bilaterally and were noted to be in orthotopic position demonstrate a clear reflux bilaterally. Retroflexion maneuver was performed to evaluate the trigone. Patient tolerated procedure well.         dilated up to 28 F without issue. teaching on self dilation today. sent home with sample. instructions not to introduce dilator farther then 1in into the urethra. discussed risk of injury to bladder. infection. pain.     recommend q 1 month self dilation. teaching performed by RN staff.      start hiprex 1gm daily, pyridium 100mg daily prn.     follow up 3 months for symptom check.    06/09/2021)--------------------------------------------------------------------------     Ms. De Jesus has been transferred to my care in Dr. Coffman's absence for f/u from above-mentioned cystoscopy with Hydrostatic Dilatation.  She states that she is doing much better since procedure, compliant with Hiprex, and free of s/s of infection to date.  Adds that her incontinence and bladder spasms are controlled on Oxybutynin 5mg daily.  (7/8/2021)----------------------------------------------------------------------------------     Ms. De Jesus is here today for c/o bladder pressure.  She has a history of known urethral stenosis, and   She denies fever, constitutional symptoms or sensation of infection  "today.  PVR  0 ml.     -  Urethral stenosis:  Not performing self dilatations, states unable. Though taught to perform monthly self dilation at last visit, states she is unable to do this.  Reports she was symptom free following last cystoscopy with dilatation, but strainiing and pain at voiding onset have returned over the last 2 weeks.  -  Personal history UTI:  Hiprex started in June of this year.  -  Chronic pelvic pain in female:  R/t bladder spasms.  Controlled last visit with oxybutynine 5mg daily.  Discussed increasing Ditropan to BID  [November 22, 2021]  -------------------------------------------------------------------------------------------------------------------------------------------------------------------------------------------------------------------------  The above notes are notes of Dr. Recio and Ms. Shaw.  Patient sent to me for urethral dilatation.  She has been having problems with urethral syndrome since she was in her 40s.  Last urethral dilatation done June 24th and she feels she needs another today.  Pressure and discomfort like she usually has.  She is familiar with urethral dilatations and wishes to proceed again.   ------------------------------------------------------------- [December 16, 2021]-----------------------------------------------------------------------------------------------------------------------     Ms. De Jesus is a very pleasant 78 yo AA female, who has returned for c/o severe exacerbation of chronic pelvic pain and difficulty emptying.  Patient states increase of straining to urinate, causing bladder spasms from bladder around to back.  PVR 47 l.  Requests repeat urethral dilatations.  Dr. Singleton to room to evaluate.  Patient to be worked into his schedule this after noon for procedure.     She was last seen by Dr. Singleton 10 months prior for urethral dilatation and following POC:  "Urethral dilatation performed as described.  Urine sent for culture since she has lots " "of amorphous crystals that could mask infection.  Patient given Cipro 500 mg b.i.d. for 3 days to cover instrumentation.  She is to call when she needs another urethral dilatation"  --------------------------------------------------------------------------------------------------------------------------------------  The above notes are notes of Ms. Shaw.  Patient benefited from the urethral dilatation previously and requested another 1 be done today.  ---------------------------  ----------     Urethral dilatation     The patient was placed in the dorsal lithotomy position and prepared for urethral dilatation.  Urethra was anesthetized with lidocaine jelly.  No sedation was given.  Urethral dilatation was begun with Golden sounds starting with the 18 Cambodian Golden sound.  She was dilated through 28 Cambodian successfully.  The residual in the bladder was approximately 100 mL.  She tolerated well.  "

## 2022-09-15 NOTE — PATIENT INSTRUCTIONS
Urethral dilatation as described.  Patient given Cipro 500 mg b.i.d. for 3 days to cover instrumentation.  Urine sent for culture.  I will see again as needed for urethral dilatation etc.  
Statement Selected

## 2022-09-19 ENCOUNTER — OFFICE VISIT (OUTPATIENT)
Dept: FAMILY MEDICINE | Facility: CLINIC | Age: 77
End: 2022-09-19
Payer: COMMERCIAL

## 2022-09-19 VITALS
RESPIRATION RATE: 16 BRPM | HEART RATE: 62 BPM | HEIGHT: 68 IN | DIASTOLIC BLOOD PRESSURE: 90 MMHG | BODY MASS INDEX: 27.28 KG/M2 | WEIGHT: 180 LBS | OXYGEN SATURATION: 98 % | SYSTOLIC BLOOD PRESSURE: 150 MMHG | TEMPERATURE: 98 F

## 2022-09-19 DIAGNOSIS — Z00.00 ENCOUNTER FOR SUBSEQUENT ANNUAL WELLNESS VISIT (AWV) IN MEDICARE PATIENT: Primary | ICD-10-CM

## 2022-09-19 DIAGNOSIS — I10 ESSENTIAL HYPERTENSION: ICD-10-CM

## 2022-09-19 DIAGNOSIS — Z11.59 SCREENING FOR VIRAL DISEASE: ICD-10-CM

## 2022-09-19 DIAGNOSIS — K21.9 GASTROESOPHAGEAL REFLUX DISEASE, UNSPECIFIED WHETHER ESOPHAGITIS PRESENT: ICD-10-CM

## 2022-09-19 DIAGNOSIS — M51.36 DDD (DEGENERATIVE DISC DISEASE), LUMBAR: ICD-10-CM

## 2022-09-19 DIAGNOSIS — E11.9 TYPE 2 DIABETES MELLITUS WITHOUT COMPLICATION, WITHOUT LONG-TERM CURRENT USE OF INSULIN: ICD-10-CM

## 2022-09-19 DIAGNOSIS — Z12.4 SCREENING FOR CERVICAL CANCER: ICD-10-CM

## 2022-09-19 DIAGNOSIS — E78.5 HYPERLIPIDEMIA, UNSPECIFIED HYPERLIPIDEMIA TYPE: ICD-10-CM

## 2022-09-19 PROBLEM — M51.369 DDD (DEGENERATIVE DISC DISEASE), LUMBAR: Status: ACTIVE | Noted: 2022-09-19

## 2022-09-19 LAB — HCV AB SER QL: NORMAL

## 2022-09-19 PROCEDURE — 3066F PR DOCUMENTATION OF TREATMENT FOR NEPHROPATHY: ICD-10-PCS | Mod: ,,, | Performed by: NURSE PRACTITIONER

## 2022-09-19 PROCEDURE — 1159F MED LIST DOCD IN RCRD: CPT | Mod: ,,, | Performed by: NURSE PRACTITIONER

## 2022-09-19 PROCEDURE — 3288F PR FALLS RISK ASSESSMENT DOCUMENTED: ICD-10-PCS | Mod: ,,, | Performed by: NURSE PRACTITIONER

## 2022-09-19 PROCEDURE — 1159F PR MEDICATION LIST DOCUMENTED IN MEDICAL RECORD: ICD-10-PCS | Mod: ,,, | Performed by: NURSE PRACTITIONER

## 2022-09-19 PROCEDURE — 86803 HEPATITIS C ANTIBODY: ICD-10-PCS | Mod: ,,, | Performed by: CLINICAL MEDICAL LABORATORY

## 2022-09-19 PROCEDURE — 3061F PR NEG MICROALBUMINURIA RESULT DOCUMENTED/REVIEW: ICD-10-PCS | Mod: ,,, | Performed by: NURSE PRACTITIONER

## 2022-09-19 PROCEDURE — 3080F DIAST BP >= 90 MM HG: CPT | Mod: ,,, | Performed by: NURSE PRACTITIONER

## 2022-09-19 PROCEDURE — 1101F PR PT FALLS ASSESS DOC 0-1 FALLS W/OUT INJ PAST YR: ICD-10-PCS | Mod: ,,, | Performed by: NURSE PRACTITIONER

## 2022-09-19 PROCEDURE — 4010F PR ACE/ARB THEARPY RXD/TAKEN: ICD-10-PCS | Mod: ,,, | Performed by: NURSE PRACTITIONER

## 2022-09-19 PROCEDURE — 3066F NEPHROPATHY DOC TX: CPT | Mod: ,,, | Performed by: NURSE PRACTITIONER

## 2022-09-19 PROCEDURE — 3051F PR MOST RECENT HEMOGLOBIN A1C LEVEL 7.0 - < 8.0%: ICD-10-PCS | Mod: ,,, | Performed by: NURSE PRACTITIONER

## 2022-09-19 PROCEDURE — G0439 PR MEDICARE ANNUAL WELLNESS SUBSEQUENT VISIT: ICD-10-PCS | Mod: ,,, | Performed by: NURSE PRACTITIONER

## 2022-09-19 PROCEDURE — 3061F NEG MICROALBUMINURIA REV: CPT | Mod: ,,, | Performed by: NURSE PRACTITIONER

## 2022-09-19 PROCEDURE — 86803 HEPATITIS C AB TEST: CPT | Mod: ,,, | Performed by: CLINICAL MEDICAL LABORATORY

## 2022-09-19 PROCEDURE — 1125F PR PAIN SEVERITY QUANTIFIED, PAIN PRESENT: ICD-10-PCS | Mod: ,,, | Performed by: NURSE PRACTITIONER

## 2022-09-19 PROCEDURE — 3080F PR MOST RECENT DIASTOLIC BLOOD PRESSURE >= 90 MM HG: ICD-10-PCS | Mod: ,,, | Performed by: NURSE PRACTITIONER

## 2022-09-19 PROCEDURE — 1101F PT FALLS ASSESS-DOCD LE1/YR: CPT | Mod: ,,, | Performed by: NURSE PRACTITIONER

## 2022-09-19 PROCEDURE — G0439 PPPS, SUBSEQ VISIT: HCPCS | Mod: ,,, | Performed by: NURSE PRACTITIONER

## 2022-09-19 PROCEDURE — 3288F FALL RISK ASSESSMENT DOCD: CPT | Mod: ,,, | Performed by: NURSE PRACTITIONER

## 2022-09-19 PROCEDURE — 1160F RVW MEDS BY RX/DR IN RCRD: CPT | Mod: ,,, | Performed by: NURSE PRACTITIONER

## 2022-09-19 PROCEDURE — 1160F PR REVIEW ALL MEDS BY PRESCRIBER/CLIN PHARMACIST DOCUMENTED: ICD-10-PCS | Mod: ,,, | Performed by: NURSE PRACTITIONER

## 2022-09-19 PROCEDURE — 3051F HG A1C>EQUAL 7.0%<8.0%: CPT | Mod: ,,, | Performed by: NURSE PRACTITIONER

## 2022-09-19 PROCEDURE — 3077F PR MOST RECENT SYSTOLIC BLOOD PRESSURE >= 140 MM HG: ICD-10-PCS | Mod: ,,, | Performed by: NURSE PRACTITIONER

## 2022-09-19 PROCEDURE — 1125F AMNT PAIN NOTED PAIN PRSNT: CPT | Mod: ,,, | Performed by: NURSE PRACTITIONER

## 2022-09-19 PROCEDURE — 3077F SYST BP >= 140 MM HG: CPT | Mod: ,,, | Performed by: NURSE PRACTITIONER

## 2022-09-19 PROCEDURE — 4010F ACE/ARB THERAPY RXD/TAKEN: CPT | Mod: ,,, | Performed by: NURSE PRACTITIONER

## 2022-09-19 NOTE — PATIENT INSTRUCTIONS
Counseling and Referral of Other Preventative  (Italic type indicates deductible and co-insurance are waived)    Patient Name: Lorraine De Jesus  Today's Date: 9/19/2022    Health Maintenance         Date Due Completion Date    Hepatitis C Screening Never done ---drawn this visit    Pneumococcal Vaccines (Age 65+) (1 - PCV) Never done ---declined    Eye Exam Never done ---    TETANUS VACCINE Never done ---declined    Low Dose Statin Never done ---    Shingles Vaccine (1 of 2) Never done ---declined    COVID-19 Vaccine (3 - Booster for Moderna series) 08/22/2021 3/22/2021    Influenza Vaccine (1) Never done ---    Hemoglobin A1c 02/10/2023 8/10/2022    Diabetes Urine Screening 08/10/2023 8/10/2022    Foot Exam 08/10/2023 8/10/2022    Lipid Panel 08/10/2023 8/10/2022    DEXA Scan 11/03/2023 11/3/2020    Colorectal Cancer Screening 06/22/2024 6/22/2021          No orders of the defined types were placed in this encounter.

## 2022-09-27 ENCOUNTER — OFFICE VISIT (OUTPATIENT)
Dept: FAMILY MEDICINE | Facility: CLINIC | Age: 77
End: 2022-09-27
Payer: COMMERCIAL

## 2022-09-27 VITALS
WEIGHT: 179.19 LBS | SYSTOLIC BLOOD PRESSURE: 140 MMHG | DIASTOLIC BLOOD PRESSURE: 80 MMHG | RESPIRATION RATE: 20 BRPM | TEMPERATURE: 98 F | HEART RATE: 77 BPM | HEIGHT: 68 IN | OXYGEN SATURATION: 98 % | BODY MASS INDEX: 27.16 KG/M2

## 2022-09-27 DIAGNOSIS — E11.9 TYPE 2 DIABETES MELLITUS WITHOUT COMPLICATION, WITHOUT LONG-TERM CURRENT USE OF INSULIN: ICD-10-CM

## 2022-09-27 DIAGNOSIS — R16.1 SPLENOMEGALY: ICD-10-CM

## 2022-09-27 DIAGNOSIS — E04.2 MULTIPLE THYROID NODULES: ICD-10-CM

## 2022-09-27 DIAGNOSIS — I10 ESSENTIAL HYPERTENSION: Primary | ICD-10-CM

## 2022-09-27 DIAGNOSIS — R53.83 FATIGUE, UNSPECIFIED TYPE: ICD-10-CM

## 2022-09-27 DIAGNOSIS — G62.9 NEUROPATHY: ICD-10-CM

## 2022-09-27 LAB
ANION GAP SERPL CALCULATED.3IONS-SCNC: 11 MMOL/L (ref 7–16)
BASOPHILS # BLD AUTO: 0.02 K/UL (ref 0–0.2)
BASOPHILS NFR BLD AUTO: 0.3 % (ref 0–1)
BUN SERPL-MCNC: 17 MG/DL (ref 7–18)
BUN/CREAT SERPL: 22 (ref 6–20)
CALCIUM SERPL-MCNC: 8.9 MG/DL (ref 8.5–10.1)
CHLORIDE SERPL-SCNC: 108 MMOL/L (ref 98–107)
CO2 SERPL-SCNC: 27 MMOL/L (ref 21–32)
CREAT SERPL-MCNC: 0.78 MG/DL (ref 0.55–1.02)
DIFFERENTIAL METHOD BLD: ABNORMAL
EGFR (NO RACE VARIABLE) (RUSH/TITUS): 79 ML/MIN/1.73M²
EOSINOPHIL # BLD AUTO: 0.27 K/UL (ref 0–0.5)
EOSINOPHIL NFR BLD AUTO: 4.2 % (ref 1–4)
ERYTHROCYTE [DISTWIDTH] IN BLOOD BY AUTOMATED COUNT: 15.9 % (ref 11.5–14.5)
GLUCOSE SERPL-MCNC: 137 MG/DL (ref 74–106)
HCT VFR BLD AUTO: 40.5 % (ref 38–47)
HGB BLD-MCNC: 13 G/DL (ref 12–16)
IMM GRANULOCYTES # BLD AUTO: 0.01 K/UL (ref 0–0.04)
IMM GRANULOCYTES NFR BLD: 0.2 % (ref 0–0.4)
LYMPHOCYTES # BLD AUTO: 1.92 K/UL (ref 1–4.8)
LYMPHOCYTES NFR BLD AUTO: 30.2 % (ref 27–41)
MCH RBC QN AUTO: 26.7 PG (ref 27–31)
MCHC RBC AUTO-ENTMCNC: 32.1 G/DL (ref 32–36)
MCV RBC AUTO: 83.3 FL (ref 80–96)
MONOCYTES # BLD AUTO: 0.36 K/UL (ref 0–0.8)
MONOCYTES NFR BLD AUTO: 5.7 % (ref 2–6)
MPC BLD CALC-MCNC: 10.4 FL (ref 9.4–12.4)
NEUTROPHILS # BLD AUTO: 3.78 K/UL (ref 1.8–7.7)
NEUTROPHILS NFR BLD AUTO: 59.4 % (ref 53–65)
NRBC # BLD AUTO: 0 X10E3/UL
NRBC, AUTO (.00): 0 %
PLATELET # BLD AUTO: 275 K/UL (ref 150–400)
POTASSIUM SERPL-SCNC: 4.1 MMOL/L (ref 3.5–5.1)
RBC # BLD AUTO: 4.86 M/UL (ref 4.2–5.4)
SODIUM SERPL-SCNC: 142 MMOL/L (ref 136–145)
TSH SERPL DL<=0.005 MIU/L-ACNC: 1.47 UIU/ML (ref 0.36–3.74)
WBC # BLD AUTO: 6.36 K/UL (ref 4.5–11)

## 2022-09-27 PROCEDURE — 3061F PR NEG MICROALBUMINURIA RESULT DOCUMENTED/REVIEW: ICD-10-PCS | Mod: ,,, | Performed by: INTERNAL MEDICINE

## 2022-09-27 PROCEDURE — 3079F PR MOST RECENT DIASTOLIC BLOOD PRESSURE 80-89 MM HG: ICD-10-PCS | Mod: ,,, | Performed by: INTERNAL MEDICINE

## 2022-09-27 PROCEDURE — 1160F RVW MEDS BY RX/DR IN RCRD: CPT | Mod: ,,, | Performed by: INTERNAL MEDICINE

## 2022-09-27 PROCEDURE — 3288F PR FALLS RISK ASSESSMENT DOCUMENTED: ICD-10-PCS | Mod: ,,, | Performed by: INTERNAL MEDICINE

## 2022-09-27 PROCEDURE — 1160F PR REVIEW ALL MEDS BY PRESCRIBER/CLIN PHARMACIST DOCUMENTED: ICD-10-PCS | Mod: ,,, | Performed by: INTERNAL MEDICINE

## 2022-09-27 PROCEDURE — 85025 COMPLETE CBC W/AUTO DIFF WBC: CPT | Mod: ,,, | Performed by: CLINICAL MEDICAL LABORATORY

## 2022-09-27 PROCEDURE — 3077F PR MOST RECENT SYSTOLIC BLOOD PRESSURE >= 140 MM HG: ICD-10-PCS | Mod: ,,, | Performed by: INTERNAL MEDICINE

## 2022-09-27 PROCEDURE — 99214 OFFICE O/P EST MOD 30 MIN: CPT | Mod: ,,, | Performed by: INTERNAL MEDICINE

## 2022-09-27 PROCEDURE — 80048 BASIC METABOLIC PANEL: ICD-10-PCS | Mod: ,,, | Performed by: CLINICAL MEDICAL LABORATORY

## 2022-09-27 PROCEDURE — 3061F NEG MICROALBUMINURIA REV: CPT | Mod: ,,, | Performed by: INTERNAL MEDICINE

## 2022-09-27 PROCEDURE — 1159F MED LIST DOCD IN RCRD: CPT | Mod: ,,, | Performed by: INTERNAL MEDICINE

## 2022-09-27 PROCEDURE — 99214 PR OFFICE/OUTPT VISIT, EST, LEVL IV, 30-39 MIN: ICD-10-PCS | Mod: ,,, | Performed by: INTERNAL MEDICINE

## 2022-09-27 PROCEDURE — 3288F FALL RISK ASSESSMENT DOCD: CPT | Mod: ,,, | Performed by: INTERNAL MEDICINE

## 2022-09-27 PROCEDURE — 84443 TSH: ICD-10-PCS | Mod: ,,, | Performed by: CLINICAL MEDICAL LABORATORY

## 2022-09-27 PROCEDURE — 4010F PR ACE/ARB THEARPY RXD/TAKEN: ICD-10-PCS | Mod: ,,, | Performed by: INTERNAL MEDICINE

## 2022-09-27 PROCEDURE — 3077F SYST BP >= 140 MM HG: CPT | Mod: ,,, | Performed by: INTERNAL MEDICINE

## 2022-09-27 PROCEDURE — 3051F HG A1C>EQUAL 7.0%<8.0%: CPT | Mod: ,,, | Performed by: INTERNAL MEDICINE

## 2022-09-27 PROCEDURE — 4010F ACE/ARB THERAPY RXD/TAKEN: CPT | Mod: ,,, | Performed by: INTERNAL MEDICINE

## 2022-09-27 PROCEDURE — 3079F DIAST BP 80-89 MM HG: CPT | Mod: ,,, | Performed by: INTERNAL MEDICINE

## 2022-09-27 PROCEDURE — 1159F PR MEDICATION LIST DOCUMENTED IN MEDICAL RECORD: ICD-10-PCS | Mod: ,,, | Performed by: INTERNAL MEDICINE

## 2022-09-27 PROCEDURE — 80048 BASIC METABOLIC PNL TOTAL CA: CPT | Mod: ,,, | Performed by: CLINICAL MEDICAL LABORATORY

## 2022-09-27 PROCEDURE — 1101F PR PT FALLS ASSESS DOC 0-1 FALLS W/OUT INJ PAST YR: ICD-10-PCS | Mod: ,,, | Performed by: INTERNAL MEDICINE

## 2022-09-27 PROCEDURE — 84443 ASSAY THYROID STIM HORMONE: CPT | Mod: ,,, | Performed by: CLINICAL MEDICAL LABORATORY

## 2022-09-27 PROCEDURE — 85025 CBC WITH DIFFERENTIAL: ICD-10-PCS | Mod: ,,, | Performed by: CLINICAL MEDICAL LABORATORY

## 2022-09-27 PROCEDURE — 3066F NEPHROPATHY DOC TX: CPT | Mod: ,,, | Performed by: INTERNAL MEDICINE

## 2022-09-27 PROCEDURE — 3051F PR MOST RECENT HEMOGLOBIN A1C LEVEL 7.0 - < 8.0%: ICD-10-PCS | Mod: ,,, | Performed by: INTERNAL MEDICINE

## 2022-09-27 PROCEDURE — 3066F PR DOCUMENTATION OF TREATMENT FOR NEPHROPATHY: ICD-10-PCS | Mod: ,,, | Performed by: INTERNAL MEDICINE

## 2022-09-27 PROCEDURE — 1101F PT FALLS ASSESS-DOCD LE1/YR: CPT | Mod: ,,, | Performed by: INTERNAL MEDICINE

## 2022-09-28 NOTE — PROGRESS NOTES
"Subjective:       Patient ID: Lorraine De Jesus is a 77 y.o. female.    Chief Complaint: Follow-up (Follow up on thyroid), Fatigue, and Depression (Reports cymbalta not helping)    Patient is here today for check up evaluation. Patient recent labs reviewed and within normal limits. Patient reports she "just doesn't feel well anymore." States still have not followed with the Endocrinologist and has not had her Thyroid scan done. Will resend referral and order Thyroid scan with the third attempt today. Patient states she follows with pain management for pain and numbness of her bilateral feet. Will refer to PT as well. Patient also reports concern of her spleen. States she never had the CT of her abdomen that was discussed and ordered previously from her concerns in June. States she was told a year ago when being evaluated at the ER for abdominal pain that she may have cancer of the spleen. Reviewed past notes and results in her chart and shows she had an MRI that shows hemangiomas but no cancer. This was also seen to have been discussed with patient in detail in August. As patient is still concerned, will refer to Dr Reynolds in GI and Dr Diggs. Will follow in 2 months.     Current Medications:    Current Outpatient Medications:     aspirin (ECOTRIN) 81 MG EC tablet, Take 81 mg by mouth once daily., Disp: , Rfl:     blood sugar diagnostic Strp, 1 strip by Misc.(Non-Drug; Combo Route) route 3 (three) times daily., Disp: 200 strip, Rfl: 3    blood-glucose meter kit, Use as instructed, Disp: 1 each, Rfl: 0    cloNIDine (CATAPRES) 0.1 MG tablet, TAKE 1 TABLET BY MOUTH EVERY 4 HOURS AS NEEDED FOR BLOOD PRESSURE GREATER THAN 170/90, Disp: , Rfl:     cyproheptadine (PERIACTIN) 4 mg tablet, Take 1 tablet (4 mg total) by mouth 4 (four) times daily before meals and nightly. (Patient not taking: No sig reported), Disp: 120 tablet, Rfl: 2    DULoxetine (CYMBALTA) 60 MG capsule, Take 1 capsule (60 mg total) by mouth once daily. Take " one capsule at bedtime, Disp: 30 capsule, Rfl: 2    ezetimibe (ZETIA) 10 mg tablet, Take 1 tablet (10 mg total) by mouth once daily., Disp: 90 tablet, Rfl: 3    FARXIGA 10 mg tablet, TAKE 1 TABLET(10 MG) BY MOUTH EVERY DAY, Disp: 90 tablet, Rfl: 1    fluticasone propionate (FLONASE) 50 mcg/actuation nasal spray, SHAKE LIQUID AND USE 2 SPRAYS(100 MCG) IN EACH NOSTRIL EVERY DAY, Disp: 48 g, Rfl: 1    gabapentin (NEURONTIN) 400 MG capsule, TAKE 1 CAPSULE(400 MG) BY MOUTH EVERY 8 HOURS, Disp: 90 capsule, Rfl: 2    glipiZIDE (GLUCOTROL) 10 MG TR24, Take 1 tablet (10 mg total) by mouth daily with breakfast., Disp: 90 tablet, Rfl: 1    HYDROcodone-acetaminophen (NORCO)  mg per tablet, Take 1 tablet by mouth every 6 (six) hours. (Patient not taking: Reported on 9/19/2022), Disp: 120 tablet, Rfl: 0    HYDROcodone-acetaminophen (NORCO)  mg per tablet, Take 1 tablet by mouth every 6 (six) hours as needed for Pain. (Patient not taking: Reported on 9/19/2022), Disp: 120 tablet, Rfl: 0    lancets (ACCU-CHEK FASTCLIX LANCET [place] [facility] [place] Take 1 each by mouth once daily., Disp: , Rfl:     levocetirizine (XYZAL) 5 MG tablet, TAKE 1 TABLET(5 MG) BY MOUTH EVERY EVENING, Disp: 30 tablet, Rfl: 11    levothyroxine (SYNTHROID) 75 MCG tablet, TAKE 1 TABLET(75 MCG) BY MOUTH BEFORE BREAKFAST, Disp: 90 tablet, Rfl: 1    loratadine (CLARITIN) 10 mg tablet, Take 1 tablet (10 mg total) by mouth once daily. (Patient not taking: Reported on 9/19/2022), Disp: 30 tablet, Rfl: 0    metoprolol succinate (TOPROL-XL) 50 MG 24 hr tablet, TAKE 1 TABLET(50 MG) BY MOUTH EVERY DAY, Disp: 30 tablet, Rfl: 3    olmesartan (BENICAR) 5 MG Tab, Take 5 mg by mouth once daily., Disp: , Rfl:     ONETOUCH DELICA PLUS LANCET 33 gauge Misc, Check blood sugar TID, Disp: 100 each, Rfl: 11    ONETOUCH VERIO TEST STRIPS Strp, TEST BLOOD GLUCOSE LEVELS TWICE DAILY, Disp: 200 strip, Rfl: 4    oxybutynin (DITROPAN-XL) 5 MG TR24, TAKE 1 TABLET(5 MG) BY MOUTH EVERY  DAY, Disp: 90 tablet, Rfl: 3    Last Labs:     Office Visit on 09/27/2022   Component Date Value    Sodium 09/27/2022 142     Potassium 09/27/2022 4.1     Chloride 09/27/2022 108 (H)     CO2 09/27/2022 27     Anion Gap 09/27/2022 11     Glucose 09/27/2022 137 (H)     BUN 09/27/2022 17     Creatinine 09/27/2022 0.78     BUN/Creatinine Ratio 09/27/2022 22 (H)     Calcium 09/27/2022 8.9     eGFR 09/27/2022 79     TSH 09/27/2022 1.470     WBC 09/27/2022 6.36     RBC 09/27/2022 4.86     Hemoglobin 09/27/2022 13.0     Hematocrit 09/27/2022 40.5     MCV 09/27/2022 83.3     MCH 09/27/2022 26.7 (L)     MCHC 09/27/2022 32.1     RDW 09/27/2022 15.9 (H)     Platelet Count 09/27/2022 275     MPV 09/27/2022 10.4     Neutrophils % 09/27/2022 59.4     Lymphocytes % 09/27/2022 30.2     Monocytes % 09/27/2022 5.7     Eosinophils % 09/27/2022 4.2 (H)     Basophils % 09/27/2022 0.3     Immature Granulocytes % 09/27/2022 0.2     nRBC, Auto 09/27/2022 0.0     Neutrophils, Abs 09/27/2022 3.78     Lymphocytes, Absolute 09/27/2022 1.92     Monocytes, Absolute 09/27/2022 0.36     Eosinophils, Absolute 09/27/2022 0.27     Basophils, Absolute 09/27/2022 0.02     Immature Granulocytes, A* 09/27/2022 0.01     nRBC, Absolute 09/27/2022 0.00     Diff Type 09/27/2022 Auto    Office Visit on 09/19/2022   Component Date Value    Hepatitis C Ab 09/19/2022 Non-Reactive        Last Imaging:  X-Ray Knee 1 or 2 View Left  Narrative: EXAMINATION:  XR KNEE 1 OR 2 VIEW LEFT    CLINICAL HISTORY:  Pain in left knee    COMPARISON:  None available    FINDINGS:  No evidence of fracture seen.  The alignment of the joints appears normal.  Mild medial compartment degenerative change is present.  No soft tissue abnormality is seen.  Impression: Mild knee osteoarthrosis.    Electronically signed by: Wai Jackson  Date:    07/13/2022  Time:    14:56         Review of Systems   Gastrointestinal:  Positive for abdominal pain.   Musculoskeletal:  Positive for  arthralgias.   Neurological:  Positive for numbness.   All other systems reviewed and are negative.      Objective:      Physical Exam  Vitals reviewed.   Constitutional:       Appearance: Normal appearance.   Cardiovascular:      Rate and Rhythm: Normal rate and regular rhythm.      Pulses: Normal pulses.      Heart sounds: Normal heart sounds.   Pulmonary:      Effort: Pulmonary effort is normal.      Breath sounds: Normal breath sounds.   Abdominal:      General: Abdomen is flat. Bowel sounds are normal.      Palpations: Abdomen is soft.   Musculoskeletal:         General: Normal range of motion.      Cervical back: Normal range of motion and neck supple.   Skin:     General: Skin is warm and dry.   Neurological:      General: No focal deficit present.      Mental Status: She is alert and oriented to person, place, and time. Mental status is at baseline.       Assessment:       1. Essential hypertension  Basic Metabolic Panel    CBC Auto Differential    Basic Metabolic Panel    CBC Auto Differential      2. Multiple thyroid nodules  CBC Auto Differential    TSH    NM Thyroid Uptake and Scan    CBC Auto Differential    TSH      3. Fatigue, unspecified type  Basic Metabolic Panel    CBC Auto Differential    Basic Metabolic Panel    CBC Auto Differential      4. Neuropathy        5. Splenomegaly  Basic Metabolic Panel    Ambulatory referral/consult to Oncology    Ambulatory referral/consult to Gastroenterology    Basic Metabolic Panel      6. Type 2 diabetes mellitus without complication, without long-term current use of insulin             Plan:         Lorraine was seen today for follow-up, fatigue and depression.    Diagnoses and all orders for this visit:    Essential hypertension  -     Basic Metabolic Panel; Future  -     CBC Auto Differential; Future  -     Basic Metabolic Panel  -     CBC Auto Differential    Multiple thyroid nodules  -     CBC Auto Differential; Future  -     TSH; Future  -     NM Thyroid  Uptake and Scan; Future  -     CBC Auto Differential  -     TSH    Fatigue, unspecified type  -     Basic Metabolic Panel; Future  -     CBC Auto Differential; Future  -     Basic Metabolic Panel  -     CBC Auto Differential    Neuropathy    Splenomegaly  -     Basic Metabolic Panel; Future  -     Ambulatory referral/consult to Oncology; Future  -     Ambulatory referral/consult to Gastroenterology; Future  -     Basic Metabolic Panel    Type 2 diabetes mellitus without complication, without long-term current use of insulin

## 2022-09-28 NOTE — PATIENT INSTRUCTIONS
Lorraine was seen today for follow-up, fatigue and depression.    Diagnoses and all orders for this visit:    Essential hypertension  -     Basic Metabolic Panel; Future  -     CBC Auto Differential; Future  -     Basic Metabolic Panel  -     CBC Auto Differential    Multiple thyroid nodules  -     CBC Auto Differential; Future  -     TSH; Future  -     NM Thyroid Uptake and Scan; Future  -     CBC Auto Differential  -     TSH    Fatigue, unspecified type  -     Basic Metabolic Panel; Future  -     CBC Auto Differential; Future  -     Basic Metabolic Panel  -     CBC Auto Differential    Neuropathy    Splenomegaly  -     Basic Metabolic Panel; Future  -     Ambulatory referral/consult to Oncology; Future  -     Ambulatory referral/consult to Gastroenterology; Future  -     Basic Metabolic Panel    Type 2 diabetes mellitus without complication, without long-term current use of insulin

## 2022-09-29 NOTE — PROGRESS NOTES
Subjective:         Patient ID: Lorraine De Jesus is a 77 y.o. female.    Chief Complaint: Foot Pain (bilateral), Low-back Pain, and Leg Pain (bilateral)      Pain  This is a chronic problem. The current episode started more than 1 year ago. The problem occurs daily. The problem has been waxing and waning. Associated symptoms include arthralgias and neck pain. Pertinent negatives include no abdominal pain, anorexia, change in bowel habit, chest pain, chills, coughing, diaphoresis, fatigue, fever, rash, sore throat, vertigo or vomiting.   Review of Systems   Constitutional:  Negative for activity change, appetite change, chills, diaphoresis, fatigue, fever and unexpected weight change.   HENT:  Negative for drooling, ear discharge, ear pain, facial swelling, nosebleeds, sore throat, trouble swallowing, voice change and goiter.    Eyes:  Negative for photophobia, pain, discharge, redness and visual disturbance.   Respiratory:  Negative for apnea, cough, choking, chest tightness, shortness of breath, wheezing and stridor.    Cardiovascular:  Negative for chest pain, palpitations and leg swelling.   Gastrointestinal:  Negative for abdominal distention, abdominal pain, anorexia, change in bowel habit, diarrhea, rectal pain, vomiting, fecal incontinence and change in bowel habit.   Endocrine: Negative for cold intolerance, heat intolerance, polydipsia, polyphagia and polyuria.   Genitourinary:  Negative for bladder incontinence, dysuria, flank pain, frequency and hot flashes.   Musculoskeletal:  Positive for arthralgias, back pain, leg pain and neck pain.   Integumentary:  Negative for color change, pallor and rash.   Allergic/Immunologic: Negative for immunocompromised state.   Neurological:  Negative for dizziness, vertigo, seizures, syncope, facial asymmetry, speech difficulty, light-headedness, coordination difficulties, memory loss and coordination difficulties.   Hematological:  Negative for adenopathy. Does not  bruise/bleed easily.   Psychiatric/Behavioral:  Negative for agitation, behavioral problems, confusion, decreased concentration, dysphoric mood, hallucinations, self-injury and suicidal ideas. The patient is not nervous/anxious and is not hyperactive.          Past Medical History:   Diagnosis Date    Adenomatous polyp of ascending colon 2021    Adenomatous polyp of descending colon 2021    Age related osteoporosis 10/28/2020    Benign paroxysmal vertigo     Chronic pain syndrome     Chronic pelvic pain in female 2021    Ditropan XL 5mg    Colon, diverticulosis 2021    Depressive disorder 2019    Essential hypertension 2012    External hemorrhoid 2021    Gastrointestinal hemorrhage associated with anorectal source 2021    GERD (gastroesophageal reflux disease) 2013    Hyperlipidemia 2012    Hypothyroidism 2019    Joint pain     Nonrheumatic tricuspid (valve) insufficiency 2018    Personal history UTI 2021    Controlled on Hiprex    Polyneuropathy 2018    PVD (peripheral vascular disease) 10/30/2018    Temporal arteritis     Type 2 diabetes mellitus 2014    Urethral meatal stenosis 2018    history of known urethral stenosis, and was taught to perform monthly self dilation at home.     Past Surgical History:   Procedure Laterality Date     left lumbar sympathetic nerve block Left 2020    X3  DR BROWN    CARDIAC CATHETERIZATION  10/14/2009     SECTION      x 2    COLONOSCOPY  2009    CYSTOSCOPY WITH HYDRODISTENSION OF BLADDER N/A 2021    Procedure: CYSTOSCOPY, WITH BLADDER HYDRODISTENSION;  Surgeon: Dequan Recio MD;  Location: [facility];  Service: Urology;  Laterality: N/A;    CYSTOSCOPY WITH URETHRAL DILATION  2021    Dr Recio    HYSTERECTOMY      LUMBAR SYMPATHETIC NERVE BLOCK Left 10/05/2020    Left Sympathetic Nerve Block - Dr Brown - 10/5/20, 20, 20. 20, 10/9/19, 19 and  "7/11/18    right lumbar sympathetic nerve block Right 2020    X4 DR LANDEROS    THYROIDECTOMY      1990's     Social History     Socioeconomic History    Marital status:    Tobacco Use    Smoking status: Never    Smokeless tobacco: Never   Substance and Sexual Activity    Alcohol use: Never    Drug use: Never    Sexual activity: Not Currently     Family History   Problem Relation Age of Onset    Cancer Mother     Hypertension Mother     Cancer Father     Cancer Sister     Diabetes Sister     Hyperlipidemia Sister     Hypertension Sister     Cancer Brother     Diabetes Sister     No Known Problems Sister     Cancer Brother     No Known Problems Brother     Cancer Brother      Review of patient's allergies indicates:   Allergen Reactions    Lipitor [atorvastatin] Other (See Comments)     Muscle aching    Pravachol [pravastatin] Other (See Comments)     Muscle aching        Objective:  Vitals:    10/03/22 0919   BP: (!) 167/65   Pulse: 87   Resp: 18   Weight: 81.6 kg (180 lb)   Height: 5' 8" (1.727 m)   PainSc:   9         Physical Exam  Vitals and nursing note reviewed. Exam conducted with a chaperone present.   Constitutional:       General: She is awake. She is not in acute distress.     Appearance: Normal appearance. She is not ill-appearing, toxic-appearing or diaphoretic.   HENT:      Head: Normocephalic and atraumatic.      Nose: Nose normal.      Mouth/Throat:      Mouth: Mucous membranes are moist.      Pharynx: Oropharynx is clear.   Eyes:      Conjunctiva/sclera: Conjunctivae normal.      Pupils: Pupils are equal, round, and reactive to light.   Cardiovascular:      Rate and Rhythm: Normal rate.   Pulmonary:      Effort: Pulmonary effort is normal. No respiratory distress.   Abdominal:      Palpations: Abdomen is soft.   Musculoskeletal:      Cervical back: Normal range of motion and neck supple.      Thoracic back: Tenderness present.      Lumbar back: Tenderness present. Decreased range of motion. "   Skin:     General: Skin is warm and dry.   Neurological:      General: No focal deficit present.      Mental Status: She is alert and oriented to person, place, and time. Mental status is at baseline.      Cranial Nerves: No cranial nerve deficit (II-XII).   Psychiatric:         Mood and Affect: Mood normal.         Behavior: Behavior normal. Behavior is cooperative.         Thought Content: Thought content normal.         X-Ray Knee 1 or 2 View Left  Narrative: EXAMINATION:  XR KNEE 1 OR 2 VIEW LEFT    CLINICAL HISTORY:  Pain in left knee    COMPARISON:  None available    FINDINGS:  No evidence of fracture seen.  The alignment of the joints appears normal.  Mild medial compartment degenerative change is present.  No soft tissue abnormality is seen.  Impression: Mild knee osteoarthrosis.    Electronically signed by: Wai Jackson  Date:    07/13/2022  Time:    14:56       Office Visit on 09/27/2022   Component Date Value Ref Range Status    Sodium 09/27/2022 142  136 - 145 mmol/L Final    Potassium 09/27/2022 4.1  3.5 - 5.1 mmol/L Final    Chloride 09/27/2022 108 (H)  98 - 107 mmol/L Final    CO2 09/27/2022 27  21 - 32 mmol/L Final    Anion Gap 09/27/2022 11  7 - 16 mmol/L Final    Glucose 09/27/2022 137 (H)  74 - 106 mg/dL Final    BUN 09/27/2022 17  7 - 18 mg/dL Final    Creatinine 09/27/2022 0.78  0.55 - 1.02 mg/dL Final    BUN/Creatinine Ratio 09/27/2022 22 (H)  6 - 20 Final    Calcium 09/27/2022 8.9  8.5 - 10.1 mg/dL Final    eGFR 09/27/2022 79  >=60 mL/min/1.73m² Final    TSH 09/27/2022 1.470  0.358 - 3.740 uIU/mL Final    WBC 09/27/2022 6.36  4.50 - 11.00 K/uL Final    RBC 09/27/2022 4.86  4.20 - 5.40 M/uL Final    Hemoglobin 09/27/2022 13.0  12.0 - 16.0 g/dL Final    Hematocrit 09/27/2022 40.5  38.0 - 47.0 % Final    MCV 09/27/2022 83.3  80.0 - 96.0 fL Final    MCH 09/27/2022 26.7 (L)  27.0 - 31.0 pg Final    MCHC 09/27/2022 32.1  32.0 - 36.0 g/dL Final    RDW 09/27/2022 15.9 (H)  11.5 - 14.5 % Final     Platelet Count 09/27/2022 275  150 - 400 K/uL Final    MPV 09/27/2022 10.4  9.4 - 12.4 fL Final    Neutrophils % 09/27/2022 59.4  53.0 - 65.0 % Final    Lymphocytes % 09/27/2022 30.2  27.0 - 41.0 % Final    Monocytes % 09/27/2022 5.7  2.0 - 6.0 % Final    Eosinophils % 09/27/2022 4.2 (H)  1.0 - 4.0 % Final    Basophils % 09/27/2022 0.3  0.0 - 1.0 % Final    Immature Granulocytes % 09/27/2022 0.2  0.0 - 0.4 % Final    nRBC, Auto 09/27/2022 0.0  <=0.0 % Final    Neutrophils, Abs 09/27/2022 3.78  1.80 - 7.70 K/uL Final    Lymphocytes, Absolute 09/27/2022 1.92  1.00 - 4.80 K/uL Final    Monocytes, Absolute 09/27/2022 0.36  0.00 - 0.80 K/uL Final    Eosinophils, Absolute 09/27/2022 0.27  0.00 - 0.50 K/uL Final    Basophils, Absolute 09/27/2022 0.02  0.00 - 0.20 K/uL Final    Immature Granulocytes, Absolute 09/27/2022 0.01  0.00 - 0.04 K/uL Final    nRBC, Absolute 09/27/2022 0.00  <=0.00 x10e3/uL Final    Diff Type 09/27/2022 Auto   Final   Office Visit on 09/19/2022   Component Date Value Ref Range Status    Hepatitis C Ab 09/19/2022 Non-Reactive  Non-Reactive Final   Lab Visit on 08/10/2022   Component Date Value Ref Range Status    Triglycerides 08/10/2022 88  35 - 150 mg/dL Final    Cholesterol 08/10/2022 226 (H)  0 - 200 mg/dL Final    HDL Cholesterol 08/10/2022 56  40 - 60 mg/dL Final    Cholesterol/HDL Ratio (Risk Factor) 08/10/2022 4.0   Final    Non-HDL 08/10/2022 170  mg/dL Final    LDL Calculated 08/10/2022 152  mg/dL Final    LDL/HDL 08/10/2022 2.7   Final    VLDL 08/10/2022 18  mg/dL Final    Creatinine, Urine 08/10/2022 80  28 - 219 mg/dL Final    Microalbumin 08/10/2022 1.1  0.0 - 2.8 mg/dL Final    Microalbumin/Creatinine Ratio 08/10/2022 13.8  0.0 - 30.0 mg/g Final   Office Visit on 08/10/2022   Component Date Value Ref Range Status    Hemoglobin A1C 08/10/2022 6.3  4.5 - 6.6 % Final    POC Glucose 08/10/2022 120 (A)  70 - 110 MG/DL Final   Office Visit on 07/20/2022   Component Date Value Ref Range  Status    Free T4 07/20/2022 1.04  0.76 - 1.46 ng/dL Final    TSH 07/20/2022 2.950  0.358 - 3.740 uIU/mL Final   Admission on 06/25/2022, Discharged on 06/25/2022   Component Date Value Ref Range Status    Sodium 06/25/2022 135 (L)  136 - 145 mmol/L Final    Potassium 06/25/2022 4.3  3.5 - 5.1 mmol/L Final    Chloride 06/25/2022 98  98 - 107 mmol/L Final    CO2 06/25/2022 21  21 - 32 mmol/L Final    Anion Gap 06/25/2022 20 (H)  7 - 16 mmol/L Final    Glucose 06/25/2022 176 (H)  74 - 106 mg/dL Final    BUN 06/25/2022 17  7 - 18 mg/dL Final    Creatinine 06/25/2022 0.76  0.55 - 1.02 mg/dL Final    BUN/Creatinine Ratio 06/25/2022 22 (H)  6 - 20 Final    Calcium 06/25/2022 9.3  8.5 - 10.1 mg/dL Final    eGFR 06/25/2022 79  >=60 mL/min/1.73m² Final    Color, UA 06/25/2022 Straw  Colorless, Straw, Yellow, Dark Yellow Final    Clarity, UA 06/25/2022 Clear  Clear Final    pH, UA 06/25/2022 5.0  5.0, 5.5, 6.0, 6.5, 7.0, 7.5, 8.0 pH Units Final    Leukocytes, UA 06/25/2022 Negative  Negative Final    Nitrites, UA 06/25/2022 Negative  Negative Final    Protein, UA 06/25/2022 Trace (A)  Negative Final    Glucose, UA 06/25/2022 500 (A)  Negative mg/dL Final    Ketones, UA 06/25/2022 40 (A)  Negative, Trace mg/dL Final    Urobilinogen, UA 06/25/2022 0.2  0.2, 1.0 mg/dL Final    Bilirubin, UA 06/25/2022 Negative  Negative Final    Blood, UA 06/25/2022 Negative  Negative Final    Specific Gravity, UA 06/25/2022 1.025  <=1.005, 1.010, 1.015, 1.020, 1.025, 1.030 Final    WBC 06/25/2022 7.46  4.50 - 11.00 K/uL Final    RBC 06/25/2022 5.30  4.20 - 5.40 M/uL Final    Hemoglobin 06/25/2022 14.2  12.0 - 16.0 g/dL Final    Hematocrit 06/25/2022 41.8  38.0 - 47.0 % Final    MCV 06/25/2022 78.9 (L)  80.0 - 96.0 fL Final    MCH 06/25/2022 26.8 (L)  27.0 - 31.0 pg Final    MCHC 06/25/2022 34.0  32.0 - 36.0 g/dL Final    RDW 06/25/2022 15.3 (H)  11.5 - 14.5 % Final    Platelet Count 06/25/2022 263  150 - 400 K/uL Final    MPV 06/25/2022 9.2 (L)   9.4 - 12.4 fL Final    Neutrophils % 06/25/2022 86.3 (H)  53.0 - 65.0 % Final    Lymphocytes % 06/25/2022 6.8 (L)  27.0 - 41.0 % Final    Monocytes % 06/25/2022 6.2 (H)  2.0 - 6.0 % Final    Eosinophils % 06/25/2022 0.0 (L)  1.0 - 4.0 % Final    Basophils % 06/25/2022 0.3  0.0 - 1.0 % Final    Immature Granulocytes % 06/25/2022 0.4  0.0 - 0.4 % Final    nRBC, Auto 06/25/2022 0.0  <=0.0 % Final    Neutrophils, Abs 06/25/2022 6.44  1.80 - 7.70 K/uL Final    Lymphocytes, Absolute 06/25/2022 0.51 (L)  1.00 - 4.80 K/uL Final    Monocytes, Absolute 06/25/2022 0.46  0.00 - 0.80 K/uL Final    Eosinophils, Absolute 06/25/2022 0.00  0.00 - 0.50 K/uL Final    Basophils, Absolute 06/25/2022 0.02  0.00 - 0.20 K/uL Final    Immature Granulocytes, Absolute 06/25/2022 0.03  0.00 - 0.04 K/uL Final    nRBC, Absolute 06/25/2022 0.00  <=0.00 x10e3/uL Final    Diff Type 06/25/2022 Manual   Final    Segmented Neutrophils, Man % 06/25/2022 82 (H)  50 - 62 % Final    Bands, Man % 06/25/2022 4  1 - 5 % Final    Lymphocytes, Man % 06/25/2022 8 (L)  27 - 41 % Final    Monocytes, Man % 06/25/2022 6  2 - 6 % Final    Platelet Morphology 06/25/2022 Normal  Normal Final    Anisocytosis 06/25/2022 1+   Final   Office Visit on 05/18/2022   Component Date Value Ref Range Status    POC Amphetamines 05/18/2022 Negative  Negative, Inconclusive Final    POC Barbiturates 05/18/2022 Negative  Negative, Inconclusive Final    POC Benzodiazepines 05/18/2022 Negative  Negative, Inconclusive Final    POC Cocaine 05/18/2022 Negative  Negative, Inconclusive Final    POC THC 05/18/2022 Negative  Negative, Inconclusive Final    POC Methadone 05/18/2022 Negative  Negative, Inconclusive Final    POC Methamphetamine 05/18/2022 Negative  Negative, Inconclusive Final    POC Opiates 05/18/2022 Presumptive Positive (A)  Negative, Inconclusive Final    POC Oxycodone 05/18/2022 Negative  Negative, Inconclusive Final    POC Phencyclidine 05/18/2022 Negative  Negative,  Inconclusive Final    POC Methylenedioxymethamphetamine * 05/18/2022 Negative  Negative, Inconclusive Final    POC Tricyclic Antidepressants 05/18/2022 Negative  Negative, Inconclusive Final    POC Buprenorphine 05/18/2022 Negative   Final     Acceptable 05/18/2022 Yes   Final    POC Temperature (Urine) 05/18/2022 94   Final   Office Visit on 05/11/2022   Component Date Value Ref Range Status    POC Glucose 05/11/2022 148 (A)  70 - 110 MG/DL Final         Orders Placed This Encounter   Procedures    POCT Urine Drug Screen Presump     Interpretive Information:     Negative:  No drug detected at the cut off level.   Positive:  This result represents presumptive positive for the   tested drug, other substances may yield a positive response other   than the analyte of interest. This result should be utilized for   diagnostic purpose only. Confirmation testing will be performed upon physician request only.          Requested Prescriptions     Pending Prescriptions Disp Refills    gabapentin (NEURONTIN) 400 MG capsule 90 capsule 2     Sig: TAKE 1 CAPSULE(400 MG) BY MOUTH EVERY 8 HOURS    HYDROcodone-acetaminophen (NORCO)  mg per tablet 120 tablet 0     Sig: Take 1 tablet by mouth every 6 (six) hours.    HYDROcodone-acetaminophen (NORCO)  mg per tablet 120 tablet 0     Sig: Take 1 tablet by mouth every 6 (six) hours as needed for Pain.       Assessment:     1. Lumbosacral radiculopathy    2. Diabetic neuropathy with neurologic complication    3. Bilateral foot pain    4. Encounter for long-term (current) use of other medications         A's of Opioid Risk Assessment  Activity:Patient can perform ADL.   Analgesia:Patients pain is partially controlled by current medication. Patient has tried OTC medications such as Tylenol and Ibuprofen with out relief.   Adverse Effects: Patient denies constipation or sedation.  Aberrant Behavior:  reviewed with no aberrant drug seeking/taking  behavior.  Overdose reversal drug naloxone discussed    Drug screen reviewed      Plan:    Follows Neurology [facility] neuropathy symptoms lower extremities    Complaint back pain buttock and leg pain radicular nature pain numbness and tingling bilateral lower extremities worse with standing walking    She is known lumbar stenosis    Continues with left knee pain    Requesting Toradol injection     Toradol 60 mg IM, tolerated well    X-ray left knee degenerative changes no fracture noted    MRI lumbar spine [facility] March 25, 2021 spinal stenosis L5/S1 disc bulge facet joint arthropathy multiple level degenerative changes    Requesting lumbar procedure for radicular-type symptoms     Considering left genicular nerve block    Continue home exercise program as directed    Continue current medication    Indications for this procedure for this specific patient include the following     - Injections being provided as part of a comprehensive pain management program.    - Pt has failed 6 weeks of conservative therapy including oral meds, PT and or home exercise program which has been discussed with the patient   - Injection being provided for suspected radicular pain.    - Pain scale of greater than or equal to 3/10 with functional impairment  - No evidence of local or systemic infection, bleeding tendency or unstable medical condition.    - Pain is causing significant functional limitation resulting in diminished quality of life and impaired age appropriate ADL's.   - Repeat injections are done no sooner than 7 days after the previous injection  - Epidural done for suspected radicular pain along dermatome of nerve   - Epidural done to differentiate level of radicular nerve root pain   - Repeat injections done only when pt reports 50% improvement in pain from previous injections    -increased level of function  - patient is aware if they take any NSAIDs/anticoagulant prior to procedure procedure will be postponed,  this was listed on the preop instructions and highlighted, list of NSAIDs/anticoagulant reviewed with patient to include methotrexate  - Injection done at L5/S1 level(s) which is consistent with patient's dermatomal pain complaint  The planned medically necessary  surgical procedure is performed in a hospital outpatient department and not in an ambulatory surgical center due to:     -there is no geographically assessable ambulatory surgery center that has the  necessary equipment and fluoroscopy needed for the procedure     -there is no geographically assessable ambulatory surgical center available at which the physician has privileges     -an ASC's  specific  guideline regarding the individuals weight or health conditions that prevent the use of an ASC    Monitor anesthesia request is medically indicated for the scheduled nerve block procedure due to:  1- needle phobia and anxiety, placing  the patient at risk during the provided service.  2-patient has an ASA class greater than 3 and requires constant presence of an anesthesiologist during the procedure,   3-patient has severe problems hard to lie still  4-patient suffers from chronic pain and is unable to function due to  diminished ADLs    Schedule lumbar L5/S1 TORIE # 1, lumbar radiculopathy    Dr. Brown    Bring original prescription medication bottles/container/box with labels to each visit    Pill count    Physical therapy    Massage therapy declines

## 2022-09-29 NOTE — H&P (VIEW-ONLY)
Subjective:         Patient ID: Lorraine De Jesus is a 77 y.o. female.    Chief Complaint: Foot Pain (bilateral), Low-back Pain, and Leg Pain (bilateral)      Pain  This is a chronic problem. The current episode started more than 1 year ago. The problem occurs daily. The problem has been waxing and waning. Associated symptoms include arthralgias and neck pain. Pertinent negatives include no abdominal pain, anorexia, change in bowel habit, chest pain, chills, coughing, diaphoresis, fatigue, fever, rash, sore throat, vertigo or vomiting.   Review of Systems   Constitutional:  Negative for activity change, appetite change, chills, diaphoresis, fatigue, fever and unexpected weight change.   HENT:  Negative for drooling, ear discharge, ear pain, facial swelling, nosebleeds, sore throat, trouble swallowing, voice change and goiter.    Eyes:  Negative for photophobia, pain, discharge, redness and visual disturbance.   Respiratory:  Negative for apnea, cough, choking, chest tightness, shortness of breath, wheezing and stridor.    Cardiovascular:  Negative for chest pain, palpitations and leg swelling.   Gastrointestinal:  Negative for abdominal distention, abdominal pain, anorexia, change in bowel habit, diarrhea, rectal pain, vomiting, fecal incontinence and change in bowel habit.   Endocrine: Negative for cold intolerance, heat intolerance, polydipsia, polyphagia and polyuria.   Genitourinary:  Negative for bladder incontinence, dysuria, flank pain, frequency and hot flashes.   Musculoskeletal:  Positive for arthralgias, back pain, leg pain and neck pain.   Integumentary:  Negative for color change, pallor and rash.   Allergic/Immunologic: Negative for immunocompromised state.   Neurological:  Negative for dizziness, vertigo, seizures, syncope, facial asymmetry, speech difficulty, light-headedness, coordination difficulties, memory loss and coordination difficulties.   Hematological:  Negative for adenopathy. Does not  bruise/bleed easily.   Psychiatric/Behavioral:  Negative for agitation, behavioral problems, confusion, decreased concentration, dysphoric mood, hallucinations, self-injury and suicidal ideas. The patient is not nervous/anxious and is not hyperactive.          Past Medical History:   Diagnosis Date    Adenomatous polyp of ascending colon 2021    Adenomatous polyp of descending colon 2021    Age related osteoporosis 10/28/2020    Benign paroxysmal vertigo     Chronic pain syndrome     Chronic pelvic pain in female 2021    Ditropan XL 5mg    Colon, diverticulosis 2021    Depressive disorder 2019    Essential hypertension 2012    External hemorrhoid 2021    Gastrointestinal hemorrhage associated with anorectal source 2021    GERD (gastroesophageal reflux disease) 2013    Hyperlipidemia 2012    Hypothyroidism 2019    Joint pain     Nonrheumatic tricuspid (valve) insufficiency 2018    Personal history UTI 2021    Controlled on Hiprex    Polyneuropathy 2018    PVD (peripheral vascular disease) 10/30/2018    Temporal arteritis     Type 2 diabetes mellitus 2014    Urethral meatal stenosis 2018    history of known urethral stenosis, and was taught to perform monthly self dilation at home.     Past Surgical History:   Procedure Laterality Date     left lumbar sympathetic nerve block Left 2020    X3  DR BROWN    CARDIAC CATHETERIZATION  10/14/2009     SECTION      x 2    COLONOSCOPY  2009    CYSTOSCOPY WITH HYDRODISTENSION OF BLADDER N/A 2021    Procedure: CYSTOSCOPY, WITH BLADDER HYDRODISTENSION;  Surgeon: Dequan Recio MD;  Location: [facility];  Service: Urology;  Laterality: N/A;    CYSTOSCOPY WITH URETHRAL DILATION  2021    Dr Recio    HYSTERECTOMY      LUMBAR SYMPATHETIC NERVE BLOCK Left 10/05/2020    Left Sympathetic Nerve Block - Dr Brown - 10/5/20, 20, 20. 20, 10/9/19, 19 and  "7/11/18    right lumbar sympathetic nerve block Right 2020    X4 DR LANDEROS    THYROIDECTOMY      1990's     Social History     Socioeconomic History    Marital status:    Tobacco Use    Smoking status: Never    Smokeless tobacco: Never   Substance and Sexual Activity    Alcohol use: Never    Drug use: Never    Sexual activity: Not Currently     Family History   Problem Relation Age of Onset    Cancer Mother     Hypertension Mother     Cancer Father     Cancer Sister     Diabetes Sister     Hyperlipidemia Sister     Hypertension Sister     Cancer Brother     Diabetes Sister     No Known Problems Sister     Cancer Brother     No Known Problems Brother     Cancer Brother      Review of patient's allergies indicates:   Allergen Reactions    Lipitor [atorvastatin] Other (See Comments)     Muscle aching    Pravachol [pravastatin] Other (See Comments)     Muscle aching        Objective:  Vitals:    10/03/22 0919   BP: (!) 167/65   Pulse: 87   Resp: 18   Weight: 81.6 kg (180 lb)   Height: 5' 8" (1.727 m)   PainSc:   9         Physical Exam  Vitals and nursing note reviewed. Exam conducted with a chaperone present.   Constitutional:       General: She is awake. She is not in acute distress.     Appearance: Normal appearance. She is not ill-appearing, toxic-appearing or diaphoretic.   HENT:      Head: Normocephalic and atraumatic.      Nose: Nose normal.      Mouth/Throat:      Mouth: Mucous membranes are moist.      Pharynx: Oropharynx is clear.   Eyes:      Conjunctiva/sclera: Conjunctivae normal.      Pupils: Pupils are equal, round, and reactive to light.   Cardiovascular:      Rate and Rhythm: Normal rate.   Pulmonary:      Effort: Pulmonary effort is normal. No respiratory distress.   Abdominal:      Palpations: Abdomen is soft.   Musculoskeletal:      Cervical back: Normal range of motion and neck supple.      Thoracic back: Tenderness present.      Lumbar back: Tenderness present. Decreased range of motion. "   Skin:     General: Skin is warm and dry.   Neurological:      General: No focal deficit present.      Mental Status: She is alert and oriented to person, place, and time. Mental status is at baseline.      Cranial Nerves: No cranial nerve deficit (II-XII).   Psychiatric:         Mood and Affect: Mood normal.         Behavior: Behavior normal. Behavior is cooperative.         Thought Content: Thought content normal.         X-Ray Knee 1 or 2 View Left  Narrative: EXAMINATION:  XR KNEE 1 OR 2 VIEW LEFT    CLINICAL HISTORY:  Pain in left knee    COMPARISON:  None available    FINDINGS:  No evidence of fracture seen.  The alignment of the joints appears normal.  Mild medial compartment degenerative change is present.  No soft tissue abnormality is seen.  Impression: Mild knee osteoarthrosis.    Electronically signed by: Wai Jackson  Date:    07/13/2022  Time:    14:56       Office Visit on 09/27/2022   Component Date Value Ref Range Status    Sodium 09/27/2022 142  136 - 145 mmol/L Final    Potassium 09/27/2022 4.1  3.5 - 5.1 mmol/L Final    Chloride 09/27/2022 108 (H)  98 - 107 mmol/L Final    CO2 09/27/2022 27  21 - 32 mmol/L Final    Anion Gap 09/27/2022 11  7 - 16 mmol/L Final    Glucose 09/27/2022 137 (H)  74 - 106 mg/dL Final    BUN 09/27/2022 17  7 - 18 mg/dL Final    Creatinine 09/27/2022 0.78  0.55 - 1.02 mg/dL Final    BUN/Creatinine Ratio 09/27/2022 22 (H)  6 - 20 Final    Calcium 09/27/2022 8.9  8.5 - 10.1 mg/dL Final    eGFR 09/27/2022 79  >=60 mL/min/1.73m² Final    TSH 09/27/2022 1.470  0.358 - 3.740 uIU/mL Final    WBC 09/27/2022 6.36  4.50 - 11.00 K/uL Final    RBC 09/27/2022 4.86  4.20 - 5.40 M/uL Final    Hemoglobin 09/27/2022 13.0  12.0 - 16.0 g/dL Final    Hematocrit 09/27/2022 40.5  38.0 - 47.0 % Final    MCV 09/27/2022 83.3  80.0 - 96.0 fL Final    MCH 09/27/2022 26.7 (L)  27.0 - 31.0 pg Final    MCHC 09/27/2022 32.1  32.0 - 36.0 g/dL Final    RDW 09/27/2022 15.9 (H)  11.5 - 14.5 % Final     Platelet Count 09/27/2022 275  150 - 400 K/uL Final    MPV 09/27/2022 10.4  9.4 - 12.4 fL Final    Neutrophils % 09/27/2022 59.4  53.0 - 65.0 % Final    Lymphocytes % 09/27/2022 30.2  27.0 - 41.0 % Final    Monocytes % 09/27/2022 5.7  2.0 - 6.0 % Final    Eosinophils % 09/27/2022 4.2 (H)  1.0 - 4.0 % Final    Basophils % 09/27/2022 0.3  0.0 - 1.0 % Final    Immature Granulocytes % 09/27/2022 0.2  0.0 - 0.4 % Final    nRBC, Auto 09/27/2022 0.0  <=0.0 % Final    Neutrophils, Abs 09/27/2022 3.78  1.80 - 7.70 K/uL Final    Lymphocytes, Absolute 09/27/2022 1.92  1.00 - 4.80 K/uL Final    Monocytes, Absolute 09/27/2022 0.36  0.00 - 0.80 K/uL Final    Eosinophils, Absolute 09/27/2022 0.27  0.00 - 0.50 K/uL Final    Basophils, Absolute 09/27/2022 0.02  0.00 - 0.20 K/uL Final    Immature Granulocytes, Absolute 09/27/2022 0.01  0.00 - 0.04 K/uL Final    nRBC, Absolute 09/27/2022 0.00  <=0.00 x10e3/uL Final    Diff Type 09/27/2022 Auto   Final   Office Visit on 09/19/2022   Component Date Value Ref Range Status    Hepatitis C Ab 09/19/2022 Non-Reactive  Non-Reactive Final   Lab Visit on 08/10/2022   Component Date Value Ref Range Status    Triglycerides 08/10/2022 88  35 - 150 mg/dL Final    Cholesterol 08/10/2022 226 (H)  0 - 200 mg/dL Final    HDL Cholesterol 08/10/2022 56  40 - 60 mg/dL Final    Cholesterol/HDL Ratio (Risk Factor) 08/10/2022 4.0   Final    Non-HDL 08/10/2022 170  mg/dL Final    LDL Calculated 08/10/2022 152  mg/dL Final    LDL/HDL 08/10/2022 2.7   Final    VLDL 08/10/2022 18  mg/dL Final    Creatinine, Urine 08/10/2022 80  28 - 219 mg/dL Final    Microalbumin 08/10/2022 1.1  0.0 - 2.8 mg/dL Final    Microalbumin/Creatinine Ratio 08/10/2022 13.8  0.0 - 30.0 mg/g Final   Office Visit on 08/10/2022   Component Date Value Ref Range Status    Hemoglobin A1C 08/10/2022 6.3  4.5 - 6.6 % Final    POC Glucose 08/10/2022 120 (A)  70 - 110 MG/DL Final   Office Visit on 07/20/2022   Component Date Value Ref Range  Status    Free T4 07/20/2022 1.04  0.76 - 1.46 ng/dL Final    TSH 07/20/2022 2.950  0.358 - 3.740 uIU/mL Final   Admission on 06/25/2022, Discharged on 06/25/2022   Component Date Value Ref Range Status    Sodium 06/25/2022 135 (L)  136 - 145 mmol/L Final    Potassium 06/25/2022 4.3  3.5 - 5.1 mmol/L Final    Chloride 06/25/2022 98  98 - 107 mmol/L Final    CO2 06/25/2022 21  21 - 32 mmol/L Final    Anion Gap 06/25/2022 20 (H)  7 - 16 mmol/L Final    Glucose 06/25/2022 176 (H)  74 - 106 mg/dL Final    BUN 06/25/2022 17  7 - 18 mg/dL Final    Creatinine 06/25/2022 0.76  0.55 - 1.02 mg/dL Final    BUN/Creatinine Ratio 06/25/2022 22 (H)  6 - 20 Final    Calcium 06/25/2022 9.3  8.5 - 10.1 mg/dL Final    eGFR 06/25/2022 79  >=60 mL/min/1.73m² Final    Color, UA 06/25/2022 Straw  Colorless, Straw, Yellow, Dark Yellow Final    Clarity, UA 06/25/2022 Clear  Clear Final    pH, UA 06/25/2022 5.0  5.0, 5.5, 6.0, 6.5, 7.0, 7.5, 8.0 pH Units Final    Leukocytes, UA 06/25/2022 Negative  Negative Final    Nitrites, UA 06/25/2022 Negative  Negative Final    Protein, UA 06/25/2022 Trace (A)  Negative Final    Glucose, UA 06/25/2022 500 (A)  Negative mg/dL Final    Ketones, UA 06/25/2022 40 (A)  Negative, Trace mg/dL Final    Urobilinogen, UA 06/25/2022 0.2  0.2, 1.0 mg/dL Final    Bilirubin, UA 06/25/2022 Negative  Negative Final    Blood, UA 06/25/2022 Negative  Negative Final    Specific Gravity, UA 06/25/2022 1.025  <=1.005, 1.010, 1.015, 1.020, 1.025, 1.030 Final    WBC 06/25/2022 7.46  4.50 - 11.00 K/uL Final    RBC 06/25/2022 5.30  4.20 - 5.40 M/uL Final    Hemoglobin 06/25/2022 14.2  12.0 - 16.0 g/dL Final    Hematocrit 06/25/2022 41.8  38.0 - 47.0 % Final    MCV 06/25/2022 78.9 (L)  80.0 - 96.0 fL Final    MCH 06/25/2022 26.8 (L)  27.0 - 31.0 pg Final    MCHC 06/25/2022 34.0  32.0 - 36.0 g/dL Final    RDW 06/25/2022 15.3 (H)  11.5 - 14.5 % Final    Platelet Count 06/25/2022 263  150 - 400 K/uL Final    MPV 06/25/2022 9.2 (L)   9.4 - 12.4 fL Final    Neutrophils % 06/25/2022 86.3 (H)  53.0 - 65.0 % Final    Lymphocytes % 06/25/2022 6.8 (L)  27.0 - 41.0 % Final    Monocytes % 06/25/2022 6.2 (H)  2.0 - 6.0 % Final    Eosinophils % 06/25/2022 0.0 (L)  1.0 - 4.0 % Final    Basophils % 06/25/2022 0.3  0.0 - 1.0 % Final    Immature Granulocytes % 06/25/2022 0.4  0.0 - 0.4 % Final    nRBC, Auto 06/25/2022 0.0  <=0.0 % Final    Neutrophils, Abs 06/25/2022 6.44  1.80 - 7.70 K/uL Final    Lymphocytes, Absolute 06/25/2022 0.51 (L)  1.00 - 4.80 K/uL Final    Monocytes, Absolute 06/25/2022 0.46  0.00 - 0.80 K/uL Final    Eosinophils, Absolute 06/25/2022 0.00  0.00 - 0.50 K/uL Final    Basophils, Absolute 06/25/2022 0.02  0.00 - 0.20 K/uL Final    Immature Granulocytes, Absolute 06/25/2022 0.03  0.00 - 0.04 K/uL Final    nRBC, Absolute 06/25/2022 0.00  <=0.00 x10e3/uL Final    Diff Type 06/25/2022 Manual   Final    Segmented Neutrophils, Man % 06/25/2022 82 (H)  50 - 62 % Final    Bands, Man % 06/25/2022 4  1 - 5 % Final    Lymphocytes, Man % 06/25/2022 8 (L)  27 - 41 % Final    Monocytes, Man % 06/25/2022 6  2 - 6 % Final    Platelet Morphology 06/25/2022 Normal  Normal Final    Anisocytosis 06/25/2022 1+   Final   Office Visit on 05/18/2022   Component Date Value Ref Range Status    POC Amphetamines 05/18/2022 Negative  Negative, Inconclusive Final    POC Barbiturates 05/18/2022 Negative  Negative, Inconclusive Final    POC Benzodiazepines 05/18/2022 Negative  Negative, Inconclusive Final    POC Cocaine 05/18/2022 Negative  Negative, Inconclusive Final    POC THC 05/18/2022 Negative  Negative, Inconclusive Final    POC Methadone 05/18/2022 Negative  Negative, Inconclusive Final    POC Methamphetamine 05/18/2022 Negative  Negative, Inconclusive Final    POC Opiates 05/18/2022 Presumptive Positive (A)  Negative, Inconclusive Final    POC Oxycodone 05/18/2022 Negative  Negative, Inconclusive Final    POC Phencyclidine 05/18/2022 Negative  Negative,  Inconclusive Final    POC Methylenedioxymethamphetamine * 05/18/2022 Negative  Negative, Inconclusive Final    POC Tricyclic Antidepressants 05/18/2022 Negative  Negative, Inconclusive Final    POC Buprenorphine 05/18/2022 Negative   Final     Acceptable 05/18/2022 Yes   Final    POC Temperature (Urine) 05/18/2022 94   Final   Office Visit on 05/11/2022   Component Date Value Ref Range Status    POC Glucose 05/11/2022 148 (A)  70 - 110 MG/DL Final         Orders Placed This Encounter   Procedures    POCT Urine Drug Screen Presump     Interpretive Information:     Negative:  No drug detected at the cut off level.   Positive:  This result represents presumptive positive for the   tested drug, other substances may yield a positive response other   than the analyte of interest. This result should be utilized for   diagnostic purpose only. Confirmation testing will be performed upon physician request only.          Requested Prescriptions     Pending Prescriptions Disp Refills    gabapentin (NEURONTIN) 400 MG capsule 90 capsule 2     Sig: TAKE 1 CAPSULE(400 MG) BY MOUTH EVERY 8 HOURS    HYDROcodone-acetaminophen (NORCO)  mg per tablet 120 tablet 0     Sig: Take 1 tablet by mouth every 6 (six) hours.    HYDROcodone-acetaminophen (NORCO)  mg per tablet 120 tablet 0     Sig: Take 1 tablet by mouth every 6 (six) hours as needed for Pain.       Assessment:     1. Lumbosacral radiculopathy    2. Diabetic neuropathy with neurologic complication    3. Bilateral foot pain    4. Encounter for long-term (current) use of other medications         A's of Opioid Risk Assessment  Activity:Patient can perform ADL.   Analgesia:Patients pain is partially controlled by current medication. Patient has tried OTC medications such as Tylenol and Ibuprofen with out relief.   Adverse Effects: Patient denies constipation or sedation.  Aberrant Behavior:  reviewed with no aberrant drug seeking/taking  behavior.  Overdose reversal drug naloxone discussed    Drug screen reviewed      Plan:    Follows Neurology [facility] neuropathy symptoms lower extremities    Complaint back pain buttock and leg pain radicular nature pain numbness and tingling bilateral lower extremities worse with standing walking    She is known lumbar stenosis    Continues with left knee pain    Requesting Toradol injection     Toradol 60 mg IM, tolerated well    X-ray left knee degenerative changes no fracture noted    MRI lumbar spine [facility] March 25, 2021 spinal stenosis L5/S1 disc bulge facet joint arthropathy multiple level degenerative changes    Requesting lumbar procedure for radicular-type symptoms     Considering left genicular nerve block    Continue home exercise program as directed    Continue current medication    Indications for this procedure for this specific patient include the following     - Injections being provided as part of a comprehensive pain management program.    - Pt has failed 6 weeks of conservative therapy including oral meds, PT and or home exercise program which has been discussed with the patient   - Injection being provided for suspected radicular pain.    - Pain scale of greater than or equal to 3/10 with functional impairment  - No evidence of local or systemic infection, bleeding tendency or unstable medical condition.    - Pain is causing significant functional limitation resulting in diminished quality of life and impaired age appropriate ADL's.   - Repeat injections are done no sooner than 7 days after the previous injection  - Epidural done for suspected radicular pain along dermatome of nerve   - Epidural done to differentiate level of radicular nerve root pain   - Repeat injections done only when pt reports 50% improvement in pain from previous injections    -increased level of function  - patient is aware if they take any NSAIDs/anticoagulant prior to procedure procedure will be postponed,  this was listed on the preop instructions and highlighted, list of NSAIDs/anticoagulant reviewed with patient to include methotrexate  - Injection done at L5/S1 level(s) which is consistent with patient's dermatomal pain complaint  The planned medically necessary  surgical procedure is performed in a hospital outpatient department and not in an ambulatory surgical center due to:     -there is no geographically assessable ambulatory surgery center that has the  necessary equipment and fluoroscopy needed for the procedure     -there is no geographically assessable ambulatory surgical center available at which the physician has privileges     -an ASC's  specific  guideline regarding the individuals weight or health conditions that prevent the use of an ASC    Monitor anesthesia request is medically indicated for the scheduled nerve block procedure due to:  1- needle phobia and anxiety, placing  the patient at risk during the provided service.  2-patient has an ASA class greater than 3 and requires constant presence of an anesthesiologist during the procedure,   3-patient has severe problems hard to lie still  4-patient suffers from chronic pain and is unable to function due to  diminished ADLs    Schedule lumbar L5/S1 TORIE # 1, lumbar radiculopathy    Dr. Brown    Bring original prescription medication bottles/container/box with labels to each visit    Pill count    Physical therapy    Massage therapy declines

## 2022-10-03 ENCOUNTER — OFFICE VISIT (OUTPATIENT)
Dept: PAIN MEDICINE | Facility: CLINIC | Age: 77
End: 2022-10-03
Payer: COMMERCIAL

## 2022-10-03 VITALS
HEIGHT: 68 IN | HEART RATE: 87 BPM | SYSTOLIC BLOOD PRESSURE: 167 MMHG | RESPIRATION RATE: 18 BRPM | DIASTOLIC BLOOD PRESSURE: 65 MMHG | BODY MASS INDEX: 27.28 KG/M2 | WEIGHT: 180 LBS

## 2022-10-03 DIAGNOSIS — E11.49 DIABETIC NEUROPATHY WITH NEUROLOGIC COMPLICATION: Chronic | ICD-10-CM

## 2022-10-03 DIAGNOSIS — Z79.899 ENCOUNTER FOR LONG-TERM (CURRENT) USE OF OTHER MEDICATIONS: ICD-10-CM

## 2022-10-03 DIAGNOSIS — M79.672 BILATERAL FOOT PAIN: ICD-10-CM

## 2022-10-03 DIAGNOSIS — M79.671 BILATERAL FOOT PAIN: ICD-10-CM

## 2022-10-03 DIAGNOSIS — E11.40 DIABETIC NEUROPATHY WITH NEUROLOGIC COMPLICATION: Chronic | ICD-10-CM

## 2022-10-03 DIAGNOSIS — M54.17 LUMBOSACRAL RADICULOPATHY: Primary | Chronic | ICD-10-CM

## 2022-10-03 PROCEDURE — 1101F PT FALLS ASSESS-DOCD LE1/YR: CPT | Mod: CPTII,,, | Performed by: PHYSICIAN ASSISTANT

## 2022-10-03 PROCEDURE — 3061F PR NEG MICROALBUMINURIA RESULT DOCUMENTED/REVIEW: ICD-10-PCS | Mod: CPTII,,, | Performed by: PHYSICIAN ASSISTANT

## 2022-10-03 PROCEDURE — 1159F PR MEDICATION LIST DOCUMENTED IN MEDICAL RECORD: ICD-10-PCS | Mod: CPTII,,, | Performed by: PHYSICIAN ASSISTANT

## 2022-10-03 PROCEDURE — 99214 OFFICE O/P EST MOD 30 MIN: CPT | Mod: S$PBB,25,, | Performed by: PHYSICIAN ASSISTANT

## 2022-10-03 PROCEDURE — 3061F NEG MICROALBUMINURIA REV: CPT | Mod: CPTII,,, | Performed by: PHYSICIAN ASSISTANT

## 2022-10-03 PROCEDURE — 3051F PR MOST RECENT HEMOGLOBIN A1C LEVEL 7.0 - < 8.0%: ICD-10-PCS | Mod: CPTII,,, | Performed by: PHYSICIAN ASSISTANT

## 2022-10-03 PROCEDURE — 3078F PR MOST RECENT DIASTOLIC BLOOD PRESSURE < 80 MM HG: ICD-10-PCS | Mod: CPTII,,, | Performed by: PHYSICIAN ASSISTANT

## 2022-10-03 PROCEDURE — 4010F ACE/ARB THERAPY RXD/TAKEN: CPT | Mod: CPTII,,, | Performed by: PHYSICIAN ASSISTANT

## 2022-10-03 PROCEDURE — 4010F PR ACE/ARB THEARPY RXD/TAKEN: ICD-10-PCS | Mod: CPTII,,, | Performed by: PHYSICIAN ASSISTANT

## 2022-10-03 PROCEDURE — 1125F AMNT PAIN NOTED PAIN PRSNT: CPT | Mod: CPTII,,, | Performed by: PHYSICIAN ASSISTANT

## 2022-10-03 PROCEDURE — 3077F PR MOST RECENT SYSTOLIC BLOOD PRESSURE >= 140 MM HG: ICD-10-PCS | Mod: CPTII,,, | Performed by: PHYSICIAN ASSISTANT

## 2022-10-03 PROCEDURE — 80305 DRUG TEST PRSMV DIR OPT OBS: CPT | Mod: PBBFAC | Performed by: PHYSICIAN ASSISTANT

## 2022-10-03 PROCEDURE — 3051F HG A1C>EQUAL 7.0%<8.0%: CPT | Mod: CPTII,,, | Performed by: PHYSICIAN ASSISTANT

## 2022-10-03 PROCEDURE — 3077F SYST BP >= 140 MM HG: CPT | Mod: CPTII,,, | Performed by: PHYSICIAN ASSISTANT

## 2022-10-03 PROCEDURE — 3066F PR DOCUMENTATION OF TREATMENT FOR NEPHROPATHY: ICD-10-PCS | Mod: CPTII,,, | Performed by: PHYSICIAN ASSISTANT

## 2022-10-03 PROCEDURE — 1159F MED LIST DOCD IN RCRD: CPT | Mod: CPTII,,, | Performed by: PHYSICIAN ASSISTANT

## 2022-10-03 PROCEDURE — 96372 THER/PROPH/DIAG INJ SC/IM: CPT | Mod: PBBFAC | Performed by: PHYSICIAN ASSISTANT

## 2022-10-03 PROCEDURE — 3288F PR FALLS RISK ASSESSMENT DOCUMENTED: ICD-10-PCS | Mod: CPTII,,, | Performed by: PHYSICIAN ASSISTANT

## 2022-10-03 PROCEDURE — 99215 OFFICE O/P EST HI 40 MIN: CPT | Mod: PBBFAC,25 | Performed by: PHYSICIAN ASSISTANT

## 2022-10-03 PROCEDURE — 1101F PR PT FALLS ASSESS DOC 0-1 FALLS W/OUT INJ PAST YR: ICD-10-PCS | Mod: CPTII,,, | Performed by: PHYSICIAN ASSISTANT

## 2022-10-03 PROCEDURE — 3288F FALL RISK ASSESSMENT DOCD: CPT | Mod: CPTII,,, | Performed by: PHYSICIAN ASSISTANT

## 2022-10-03 PROCEDURE — 3066F NEPHROPATHY DOC TX: CPT | Mod: CPTII,,, | Performed by: PHYSICIAN ASSISTANT

## 2022-10-03 PROCEDURE — 1125F PR PAIN SEVERITY QUANTIFIED, PAIN PRESENT: ICD-10-PCS | Mod: CPTII,,, | Performed by: PHYSICIAN ASSISTANT

## 2022-10-03 PROCEDURE — 3078F DIAST BP <80 MM HG: CPT | Mod: CPTII,,, | Performed by: PHYSICIAN ASSISTANT

## 2022-10-03 PROCEDURE — 99214 PR OFFICE/OUTPT VISIT, EST, LEVL IV, 30-39 MIN: ICD-10-PCS | Mod: S$PBB,25,, | Performed by: PHYSICIAN ASSISTANT

## 2022-10-03 RX ORDER — LEVOTHYROXINE SODIUM 75 UG/1
75 TABLET ORAL
Qty: 90 TABLET | Refills: 1 | Status: SHIPPED | OUTPATIENT
Start: 2022-10-03 | End: 2023-06-27 | Stop reason: SDUPTHER

## 2022-10-03 RX ORDER — HYDROCODONE BITARTRATE AND ACETAMINOPHEN 10; 325 MG/1; MG/1
1 TABLET ORAL EVERY 6 HOURS PRN
Qty: 120 TABLET | Refills: 0 | Status: CANCELLED | OUTPATIENT
Start: 2022-10-03 | End: 2022-11-02

## 2022-10-03 RX ORDER — KETOROLAC TROMETHAMINE 30 MG/ML
60 INJECTION, SOLUTION INTRAMUSCULAR; INTRAVENOUS
Status: DISCONTINUED | OUTPATIENT
Start: 2022-10-03 | End: 2022-10-03

## 2022-10-03 RX ORDER — HYDROCODONE BITARTRATE AND ACETAMINOPHEN 10; 325 MG/1; MG/1
1 TABLET ORAL EVERY 6 HOURS
Qty: 120 TABLET | Refills: 0 | Status: SHIPPED | OUTPATIENT
Start: 2022-10-26 | End: 2022-11-09 | Stop reason: SDUPTHER

## 2022-10-03 RX ORDER — GABAPENTIN 400 MG/1
CAPSULE ORAL
Qty: 90 CAPSULE | Refills: 2 | Status: SHIPPED | OUTPATIENT
Start: 2022-10-03 | End: 2023-02-13 | Stop reason: SDUPTHER

## 2022-10-03 RX ADMIN — KETOROLAC TROMETHAMINE 60 MG: 30 INJECTION, SOLUTION INTRAMUSCULAR; INTRAVENOUS at 10:10

## 2022-10-03 NOTE — PATIENT INSTRUCTIONS
COVID SCREENING TO BE DONE 48-72 HOURS PRIOR TO PROCEDURE IF PT HAS NOT RECEIVED BOTH COVID VACCINATIONS OR HAS BEEN VACCINATED WITHIN THE LAST 2 WEEKS. VACCINATION CARD MUST BE PROVIDED IF PT HAS BEEN VACCINATED OR PT MUST HAVE COVID SCREENING IN ORDER TO HAVE PROCEDURE.  IF YOUR PROCEDURE IS ON A Tuesday, GET COVID TESTING ON THE Friday PRIOR TO PROCEDURE.  IF YOUR PROCEDURE IS ON A Thursday GET COVID TESTING ON THE Monday OR Tuesday PRIOR TO PROCEDURE.    IF YOUR PRIMARY CARE DOCTOR ORDERS YOUR COVID TESTING YOU MUST BRING YOUR RESULTS WITH YOU TO YOUR PROCEDURE.    Procedure Instructions:    Nothing to eat or drink for 8 hours or after midnight including gum, candy, mints, or tobacco products.  If you are scheduled for 1:30 or later nothing to eat or drink after 5 a.m. the morning of the procedure, including gum, candy, mints, or tobacco products.  Must have a  at least 18 yrs of age to stay present at all times  No Diabetic medications the morning of procedure, check blood sugar the morning of procedure, if it is greater than 200 call the office at [phone]  If you are started on antibiotics or have been prescribed antibiotics, have a fever, or have any other type of infection call to reschedule procedure.  If you take blood pressure medications you can take it at your regular scheduled time.        HOLD ASPIRIN AND ASPIRIN PRODUCTS  (ASPIRIN, BC POWDER ETC. ) FOR 7 DAYS  PRIOR TO PROCEDURE  HOLD NSAIDS( ibuprofen, mobic, meloxicam, advil, diclofenac, methotrexate, aleve etc....)  FOR 3 DAYS   PRIOR TO PROCEDURE   Hold aspirin starting 10/24

## 2022-10-05 ENCOUNTER — HOSPITAL ENCOUNTER (OUTPATIENT)
Dept: RADIOLOGY | Facility: HOSPITAL | Age: 77
Discharge: HOME | End: 2022-10-05
Attending: INTERNAL MEDICINE
Payer: COMMERCIAL

## 2022-10-05 DIAGNOSIS — E04.2 MULTIPLE THYROID NODULES: ICD-10-CM

## 2022-10-05 DIAGNOSIS — E04.2 MULTIPLE THYROID NODULES: Primary | ICD-10-CM

## 2022-10-05 NOTE — PROGRESS NOTES
Pt's appt for thyroid ultrasound was cancelled today due to imaging attaching wrong order and not scheduling for most recent request for NM thyroid scan and uptake. Order was entered on 9/27/2022. Previous order for thyroid ultrasound was entered on 7/2/2022. New appt will be scheduled for NM thyroid scan and uptake and pt will be notified.

## 2022-10-20 ENCOUNTER — OFFICE VISIT (OUTPATIENT)
Dept: GASTROENTEROLOGY | Facility: CLINIC | Age: 77
End: 2022-10-20
Payer: COMMERCIAL

## 2022-10-20 ENCOUNTER — TELEPHONE (OUTPATIENT)
Dept: GASTROENTEROLOGY | Facility: CLINIC | Age: 77
End: 2022-10-20
Payer: COMMERCIAL

## 2022-10-20 VITALS
HEART RATE: 70 BPM | HEIGHT: 68 IN | DIASTOLIC BLOOD PRESSURE: 89 MMHG | SYSTOLIC BLOOD PRESSURE: 168 MMHG | WEIGHT: 179 LBS | BODY MASS INDEX: 27.13 KG/M2 | OXYGEN SATURATION: 96 %

## 2022-10-20 DIAGNOSIS — D18.03 HEMANGIOMA OF SPLEEN: Primary | ICD-10-CM

## 2022-10-20 DIAGNOSIS — R77.9 ELEVATED BLOOD PROTEIN: Primary | ICD-10-CM

## 2022-10-20 PROCEDURE — 1101F PT FALLS ASSESS-DOCD LE1/YR: CPT | Mod: CPTII,,, | Performed by: NURSE PRACTITIONER

## 2022-10-20 PROCEDURE — 1159F MED LIST DOCD IN RCRD: CPT | Mod: CPTII,,, | Performed by: NURSE PRACTITIONER

## 2022-10-20 PROCEDURE — 3079F DIAST BP 80-89 MM HG: CPT | Mod: CPTII,,, | Performed by: NURSE PRACTITIONER

## 2022-10-20 PROCEDURE — 99214 PR OFFICE/OUTPT VISIT, EST, LEVL IV, 30-39 MIN: ICD-10-PCS | Mod: ,,, | Performed by: NURSE PRACTITIONER

## 2022-10-20 PROCEDURE — 3061F PR NEG MICROALBUMINURIA RESULT DOCUMENTED/REVIEW: ICD-10-PCS | Mod: CPTII,,, | Performed by: NURSE PRACTITIONER

## 2022-10-20 PROCEDURE — 3288F PR FALLS RISK ASSESSMENT DOCUMENTED: ICD-10-PCS | Mod: CPTII,,, | Performed by: NURSE PRACTITIONER

## 2022-10-20 PROCEDURE — 3051F PR MOST RECENT HEMOGLOBIN A1C LEVEL 7.0 - < 8.0%: ICD-10-PCS | Mod: CPTII,,, | Performed by: NURSE PRACTITIONER

## 2022-10-20 PROCEDURE — 1160F RVW MEDS BY RX/DR IN RCRD: CPT | Mod: CPTII,,, | Performed by: NURSE PRACTITIONER

## 2022-10-20 PROCEDURE — 3077F SYST BP >= 140 MM HG: CPT | Mod: CPTII,,, | Performed by: NURSE PRACTITIONER

## 2022-10-20 PROCEDURE — 3288F FALL RISK ASSESSMENT DOCD: CPT | Mod: CPTII,,, | Performed by: NURSE PRACTITIONER

## 2022-10-20 PROCEDURE — 3079F PR MOST RECENT DIASTOLIC BLOOD PRESSURE 80-89 MM HG: ICD-10-PCS | Mod: CPTII,,, | Performed by: NURSE PRACTITIONER

## 2022-10-20 PROCEDURE — 1160F PR REVIEW ALL MEDS BY PRESCRIBER/CLIN PHARMACIST DOCUMENTED: ICD-10-PCS | Mod: CPTII,,, | Performed by: NURSE PRACTITIONER

## 2022-10-20 PROCEDURE — 3061F NEG MICROALBUMINURIA REV: CPT | Mod: CPTII,,, | Performed by: NURSE PRACTITIONER

## 2022-10-20 PROCEDURE — 3051F HG A1C>EQUAL 7.0%<8.0%: CPT | Mod: CPTII,,, | Performed by: NURSE PRACTITIONER

## 2022-10-20 PROCEDURE — 3077F PR MOST RECENT SYSTOLIC BLOOD PRESSURE >= 140 MM HG: ICD-10-PCS | Mod: CPTII,,, | Performed by: NURSE PRACTITIONER

## 2022-10-20 PROCEDURE — 4010F ACE/ARB THERAPY RXD/TAKEN: CPT | Mod: CPTII,,, | Performed by: NURSE PRACTITIONER

## 2022-10-20 PROCEDURE — 1159F PR MEDICATION LIST DOCUMENTED IN MEDICAL RECORD: ICD-10-PCS | Mod: CPTII,,, | Performed by: NURSE PRACTITIONER

## 2022-10-20 PROCEDURE — 1101F PR PT FALLS ASSESS DOC 0-1 FALLS W/OUT INJ PAST YR: ICD-10-PCS | Mod: CPTII,,, | Performed by: NURSE PRACTITIONER

## 2022-10-20 PROCEDURE — 3066F NEPHROPATHY DOC TX: CPT | Mod: CPTII,,, | Performed by: NURSE PRACTITIONER

## 2022-10-20 PROCEDURE — 4010F PR ACE/ARB THEARPY RXD/TAKEN: ICD-10-PCS | Mod: CPTII,,, | Performed by: NURSE PRACTITIONER

## 2022-10-20 PROCEDURE — 99214 OFFICE O/P EST MOD 30 MIN: CPT | Mod: ,,, | Performed by: NURSE PRACTITIONER

## 2022-10-20 PROCEDURE — 3066F PR DOCUMENTATION OF TREATMENT FOR NEPHROPATHY: ICD-10-PCS | Mod: CPTII,,, | Performed by: NURSE PRACTITIONER

## 2022-10-20 NOTE — PROGRESS NOTES
Lorraine De Jesus is a 77 y.o. female here for No chief complaint on file.        PCP: Neo Manzo  Referring Provider: No referring provider defined for this encounter.     HPI:  Presents for what the patient states is a problem with her spleen. MRI abdomen on 7/30/21 shows splenic hemangioma. Liver is read as normal. Denies history of liver disease. CBC 9/27/22, reviewed. Platelets are normal. No anemia. She denies any GI complaints. Colonoscopy 6/22/21, grade II hemorrhoids, hyperplastic polyps. Recommendation to repeat in 5 years.        ROS:  Review of Systems   Constitutional:  Negative for appetite change, fatigue, fever and unexpected weight change.   HENT:  Negative for trouble swallowing.    Respiratory:  Negative for shortness of breath.    Cardiovascular:  Negative for chest pain.   Gastrointestinal:  Negative for abdominal pain, blood in stool, change in bowel habit, constipation, diarrhea, nausea, vomiting, reflux and change in bowel habit.   Musculoskeletal:  Negative for gait problem.   Integumentary:  Negative for pallor.   Allergic/Immunologic: Negative for immunocompromised state.   Neurological:  Negative for dizziness and light-headedness.   Hematological:  Does not bruise/bleed easily.   Psychiatric/Behavioral:  The patient is not nervous/anxious.         PMHX:  has a past medical history of Adenomatous polyp of ascending colon (6/22/2021), Adenomatous polyp of descending colon (6/22/2021), Age related osteoporosis (10/28/2020), Benign paroxysmal vertigo, Chronic pain syndrome, Chronic pelvic pain in female (7/28/2021), Colon, diverticulosis (6/22/2021), Depressive disorder (08/12/2019), Essential hypertension (01/16/2012), External hemorrhoid (6/22/2021), Gastrointestinal hemorrhage associated with anorectal source (6/22/2021), GERD (gastroesophageal reflux disease) (11/21/2013), Hyperlipidemia (01/16/2012), Hypothyroidism (02/25/2019), Joint pain, Nonrheumatic tricuspid (valve)  insufficiency (2018), Personal history UTI (2021), Polyneuropathy (2018), PVD (peripheral vascular disease) (10/30/2018), Temporal arteritis, Type 2 diabetes mellitus (2014), and Urethral meatal stenosis (2018).    PSHX:  has a past surgical history that includes Hysterectomy (); Thyroidectomy; Cardiac catheterization (10/14/2009);  section; Colonoscopy (2009); Cystoscopy with urethral dilation (2021); Lumbar sympathetic nerve block (Left, 10/05/2020); Cystoscopy with hydrodistension of bladder (N/A, 2021); right lumbar sympathetic nerve block (Right, ); and  left lumbar sympathetic nerve block (Left, ).    PFHX: family history includes Cancer in her brother, brother, brother, father, mother, and sister; Diabetes in her sister and sister; Hyperlipidemia in her sister; Hypertension in her mother and sister; No Known Problems in her brother and sister.    PSlHX:  reports that she has never smoked. She has never used smokeless tobacco. She reports that she does not drink alcohol and does not use drugs.        Review of patient's allergies indicates:   Allergen Reactions    Lipitor [atorvastatin] Other (See Comments)     Muscle aching    Pravachol [pravastatin] Other (See Comments)     Muscle aching       Medication List with Changes/Refills   Current Medications    ASPIRIN (ECOTRIN) 81 MG EC TABLET    Take 81 mg by mouth once daily.    BLOOD SUGAR DIAGNOSTIC STRP    1 strip by Misc.(Non-Drug; Combo Route) route 3 (three) times daily.    BLOOD-GLUCOSE METER KIT    Use as instructed    CLONIDINE (CATAPRES) 0.1 MG TABLET    TAKE 1 TABLET BY MOUTH EVERY 4 HOURS AS NEEDED FOR BLOOD PRESSURE GREATER THAN 170/90    CYPROHEPTADINE (PERIACTIN) 4 MG TABLET    Take 1 tablet (4 mg total) by mouth 4 (four) times daily before meals and nightly.    DULOXETINE (CYMBALTA) 60 MG CAPSULE    Take 1 capsule (60 mg total) by mouth once daily. Take one capsule at bedtime     "EZETIMIBE (ZETIA) 10 MG TABLET    Take 1 tablet (10 mg total) by mouth once daily.    FARXIGA 10 MG TABLET    TAKE 1 TABLET(10 MG) BY MOUTH EVERY DAY    FLUTICASONE PROPIONATE (FLONASE) 50 MCG/ACTUATION NASAL SPRAY    SHAKE LIQUID AND USE 2 SPRAYS(100 MCG) IN EACH NOSTRIL EVERY DAY    GABAPENTIN (NEURONTIN) 400 MG CAPSULE    TAKE 1 CAPSULE(400 MG) BY MOUTH EVERY 8 HOURS    GLIPIZIDE (GLUCOTROL) 10 MG TR24    Take 1 tablet (10 mg total) by mouth daily with breakfast.    HYDROCODONE-ACETAMINOPHEN (NORCO)  MG PER TABLET    Take 1 tablet by mouth every 6 (six) hours as needed for Pain.    HYDROCODONE-ACETAMINOPHEN (NORCO)  MG PER TABLET    Take 1 tablet by mouth every 6 (six) hours.    LANCETS (ACCU-CHEK FASTCLIX LANCET DRUM MIS)    Take 1 each by mouth once daily.    LEVOCETIRIZINE (XYZAL) 5 MG TABLET    TAKE 1 TABLET(5 MG) BY MOUTH EVERY EVENING    LEVOTHYROXINE (SYNTHROID) 75 MCG TABLET    Take 1 tablet (75 mcg total) by mouth before breakfast.    LORATADINE (CLARITIN) 10 MG TABLET    Take 1 tablet (10 mg total) by mouth once daily.    METOPROLOL SUCCINATE (TOPROL-XL) 50 MG 24 HR TABLET    TAKE 1 TABLET(50 MG) BY MOUTH EVERY DAY    OLMESARTAN (BENICAR) 5 MG TAB    Take 5 mg by mouth once daily.    ONETOUCH DELICA PLUS LANCET 33 GAUGE MISC    Check blood sugar TID    ONETOUCH VERIO TEST STRIPS STRP    TEST BLOOD GLUCOSE LEVELS TWICE DAILY    OXYBUTYNIN (DITROPAN-XL) 5 MG TR24    TAKE 1 TABLET(5 MG) BY MOUTH EVERY DAY        Objective Findings:  Vital Signs:  BP (!) 168/89   Pulse 70   Ht 5' 8" (1.727 m)   Wt 81.2 kg (179 lb)   LMP  (LMP Unknown)   SpO2 96%   BMI 27.22 kg/m²  Body mass index is 27.22 kg/m².    Physical Exam:  Physical Exam  Vitals and nursing note reviewed.   Constitutional:       General: She is not in acute distress.     Appearance: Normal appearance.   Cardiovascular:      Rate and Rhythm: Normal rate.   Pulmonary:      Effort: Pulmonary effort is normal.      Breath sounds: No " wheezing, rhonchi or rales.   Abdominal:      General: Bowel sounds are normal. There is no distension.      Palpations: Abdomen is soft. There is no mass.      Tenderness: There is no abdominal tenderness. There is no guarding or rebound.      Hernia: No hernia is present.   Skin:     General: Skin is warm and dry.      Coloration: Skin is not jaundiced or pale.   Neurological:      Mental Status: She is alert and oriented to person, place, and time.   Psychiatric:         Mood and Affect: Mood normal.        Labs:  Lab Results   Component Value Date    WBC 6.36 09/27/2022    HGB 13.0 09/27/2022    HCT 40.5 09/27/2022    MCV 83.3 09/27/2022    RDW 15.9 (H) 09/27/2022     09/27/2022    LYMPH 30.2 09/27/2022    LYMPH 1.92 09/27/2022    MONO 5.7 09/27/2022    EOS 0.27 09/27/2022    BASO 0.02 09/27/2022     Lab Results   Component Value Date     09/27/2022    K 4.1 09/27/2022     (H) 09/27/2022    CO2 27 09/27/2022     (H) 09/27/2022    BUN 17 09/27/2022    CREATININE 0.78 09/27/2022    CALCIUM 8.9 09/27/2022    PROT 8.2 09/30/2021    ALBUMIN 3.4 (L) 09/30/2021    BILITOT 0.2 09/30/2021    ALKPHOS 109 09/30/2021    AST 12 (L) 09/30/2021    ALT 19 09/30/2021         Imaging: No results found.      Assessment:  Lorraine De Jesus is a 77 y.o. female here with:  1. Hemangioma of spleen          Recommendations:  1. Abdominal ultrasound  2. Hepatic function    Follow up if symptoms worsen or fail to improve.      Order summary:  Orders Placed This Encounter    US Abdomen Complete    Hepatic Function Panel       Thank you for allowing me to participate in the care of Lorraine De Jesus.      FERMIN Mortensen

## 2022-10-20 NOTE — TELEPHONE ENCOUNTER
Results called to patient. Instructed to report to lab asap for more lab work. Patient verbalized understanding.        ----- Message from OLGA Calvo sent at 10/20/2022  2:30 PM CDT -----  Draw KELLI

## 2022-10-25 ENCOUNTER — TELEPHONE (OUTPATIENT)
Dept: GASTROENTEROLOGY | Facility: CLINIC | Age: 77
End: 2022-10-25
Payer: COMMERCIAL

## 2022-10-25 NOTE — TELEPHONE ENCOUNTER
Results called to patient. Verbalized understanding.                ----- Message from OLGA Calvo sent at 10/25/2022  7:50 AM CDT -----  SPEP is normal

## 2022-10-27 ENCOUNTER — HOSPITAL ENCOUNTER (OUTPATIENT)
Facility: HOSPITAL | Age: 77
Discharge: HOME | End: 2022-10-27
Attending: PAIN MEDICINE | Admitting: PAIN MEDICINE
Payer: COMMERCIAL

## 2022-10-27 ENCOUNTER — ANESTHESIA (OUTPATIENT)
Dept: PAIN MEDICINE | Facility: HOSPITAL | Age: 77
End: 2022-10-27
Payer: COMMERCIAL

## 2022-10-27 ENCOUNTER — ANESTHESIA EVENT (OUTPATIENT)
Dept: PAIN MEDICINE | Facility: HOSPITAL | Age: 77
End: 2022-10-27
Payer: COMMERCIAL

## 2022-10-27 VITALS
SYSTOLIC BLOOD PRESSURE: 110 MMHG | RESPIRATION RATE: 14 BRPM | OXYGEN SATURATION: 100 % | HEART RATE: 74 BPM | TEMPERATURE: 97 F | BODY MASS INDEX: 27.28 KG/M2 | DIASTOLIC BLOOD PRESSURE: 83 MMHG | HEIGHT: 68 IN | WEIGHT: 180 LBS

## 2022-10-27 DIAGNOSIS — M54.16 LUMBAR RADICULOPATHY: ICD-10-CM

## 2022-10-27 DIAGNOSIS — M54.17 LUMBOSACRAL RADICULOPATHY: Primary | Chronic | ICD-10-CM

## 2022-10-27 LAB — GLUCOSE SERPL-MCNC: 132 MG/DL (ref 70–105)

## 2022-10-27 PROCEDURE — 25000003 PHARM REV CODE 250: Performed by: NURSE ANESTHETIST, CERTIFIED REGISTERED

## 2022-10-27 PROCEDURE — D9220A PRA ANESTHESIA: ICD-10-PCS | Mod: ,,, | Performed by: NURSE ANESTHETIST, CERTIFIED REGISTERED

## 2022-10-27 PROCEDURE — 01992 ANES DX/THER NRV BLK&INJ PRN: CPT | Performed by: PAIN MEDICINE

## 2022-10-27 PROCEDURE — 63600175 PHARM REV CODE 636 W HCPCS: Performed by: PAIN MEDICINE

## 2022-10-27 PROCEDURE — 37000009 HC ANESTHESIA EA ADD 15 MINS: Performed by: PAIN MEDICINE

## 2022-10-27 PROCEDURE — 25500020 PHARM REV CODE 255: Performed by: PAIN MEDICINE

## 2022-10-27 PROCEDURE — 27000284 HC CANNULA NASAL: Performed by: NURSE ANESTHETIST, CERTIFIED REGISTERED

## 2022-10-27 PROCEDURE — 62323 PR INJ LUMBAR/SACRAL, W/IMAGING GUIDANCE: ICD-10-PCS | Mod: ,,, | Performed by: PAIN MEDICINE

## 2022-10-27 PROCEDURE — 62323 NJX INTERLAMINAR LMBR/SAC: CPT | Mod: ,,, | Performed by: PAIN MEDICINE

## 2022-10-27 PROCEDURE — 82962 GLUCOSE BLOOD TEST: CPT

## 2022-10-27 PROCEDURE — 25000003 PHARM REV CODE 250: Performed by: PAIN MEDICINE

## 2022-10-27 PROCEDURE — D9220A PRA ANESTHESIA: Mod: ,,, | Performed by: NURSE ANESTHETIST, CERTIFIED REGISTERED

## 2022-10-27 PROCEDURE — 62323 NJX INTERLAMINAR LMBR/SAC: CPT | Performed by: PAIN MEDICINE

## 2022-10-27 PROCEDURE — 63600175 PHARM REV CODE 636 W HCPCS: Performed by: NURSE ANESTHETIST, CERTIFIED REGISTERED

## 2022-10-27 PROCEDURE — 37000008 HC ANESTHESIA 1ST 15 MINUTES: Performed by: PAIN MEDICINE

## 2022-10-27 RX ORDER — LIDOCAINE HYDROCHLORIDE 20 MG/ML
INJECTION, SOLUTION EPIDURAL; INFILTRATION; INTRACAUDAL; PERINEURAL
Status: DISCONTINUED | OUTPATIENT
Start: 2022-10-27 | End: 2022-10-27

## 2022-10-27 RX ORDER — SODIUM CHLORIDE 9 MG/ML
INJECTION, SOLUTION INTRAVENOUS CONTINUOUS
Status: DISCONTINUED | OUTPATIENT
Start: 2022-10-27 | End: 2022-10-27 | Stop reason: HOSPADM

## 2022-10-27 RX ORDER — PROPOFOL 10 MG/ML
VIAL (ML) INTRAVENOUS
Status: DISCONTINUED | OUTPATIENT
Start: 2022-10-27 | End: 2022-10-27

## 2022-10-27 RX ORDER — IOPAMIDOL 612 MG/ML
INJECTION, SOLUTION INTRATHECAL CODE/TRAUMA/SEDATION MEDICATION
Status: DISCONTINUED | OUTPATIENT
Start: 2022-10-27 | End: 2022-10-27 | Stop reason: HOSPADM

## 2022-10-27 RX ORDER — TRIAMCINOLONE ACETONIDE 40 MG/ML
INJECTION, SUSPENSION INTRA-ARTICULAR; INTRAMUSCULAR CODE/TRAUMA/SEDATION MEDICATION
Status: DISCONTINUED | OUTPATIENT
Start: 2022-10-27 | End: 2022-10-27 | Stop reason: HOSPADM

## 2022-10-27 RX ADMIN — SODIUM CHLORIDE: 9 INJECTION, SOLUTION INTRAVENOUS at 11:10

## 2022-10-27 RX ADMIN — LIDOCAINE HYDROCHLORIDE 100 MG: 20 INJECTION, SOLUTION INTRAVENOUS at 11:10

## 2022-10-27 RX ADMIN — PROPOFOL 50 MG: 10 INJECTION, EMULSION INTRAVENOUS at 11:10

## 2022-10-27 NOTE — TRANSFER OF CARE
"Anesthesia Transfer of Care Note    Patient: Lorraine De Jesus    Procedure(s) Performed: Procedure(s) (LRB):  Injection, Steroid, Epidural, L5/S1 (N/A)    Patient location: PACU    Anesthesia Type: general    Transport from OR: Transported from OR on room air with adequate spontaneous ventilation    Post pain: adequate analgesia    Post assessment: no apparent anesthetic complications    Post vital signs: stable    Level of consciousness: responds to stimulation    Nausea/Vomiting: no nausea/vomiting    Complications: none    Transfer of care protocol was followed      Last vitals:   Visit Vitals  BP (!) 85/49 (BP Location: Left arm, Patient Position: Lying)   Pulse 82   Temp 36.1 °C (97 °F) (Oral)   Resp 16   Ht 5' 8" (1.727 m)   Wt 81.6 kg (180 lb)   LMP  (LMP Unknown)   SpO2 97%   BMI 27.37 kg/m²     "

## 2022-10-27 NOTE — ANESTHESIA PREPROCEDURE EVALUATION
10/27/2022  Lorraine De Jesus is a 77 y.o., female.      Pre-op Assessment    I have reviewed the Patient Summary Reports.     I have reviewed the Nursing Notes. I have reviewed the NPO Status.   I have reviewed the Medications.     Review of Systems  Anesthesia Hx:  No problems with previous Anesthesia    Cardiovascular:   Hypertension    Hepatic/GI:   GERD    Musculoskeletal:   Arthritis   Lumbar Spine Disorders   Neurological:   Neuromuscular Disease,  Pain Syndrome  Chronic Pain Syndrome   Endocrine:   Diabetes Hypothyroidism    Psych:   Psychiatric History        Past Medical History:   Diagnosis Date    Adenomatous polyp of ascending colon 2021    Adenomatous polyp of descending colon 2021    Age related osteoporosis 10/28/2020    Benign paroxysmal vertigo     Chronic pain syndrome     Chronic pelvic pain in female 2021    Ditropan XL 5mg    Colon, diverticulosis 2021    Depressive disorder 2019    Essential hypertension 2012    External hemorrhoid 2021    Gastrointestinal hemorrhage associated with anorectal source 2021    GERD (gastroesophageal reflux disease) 2013    Hyperlipidemia 2012    Hypothyroidism 2019    Joint pain     Nonrheumatic tricuspid (valve) insufficiency 2018    Personal history UTI 2021    Controlled on [employer]    Polyneuropathy 2018    PVD (peripheral vascular disease) 10/30/2018    Temporal arteritis     Type 2 diabetes mellitus 2014    Urethral meatal stenosis 2018    history of known urethral stenosis, and was taught to perform monthly self dilation at home.     Past Surgical History:   Procedure Laterality Date     left lumbar sympathetic nerve block Left 2020    X3  DR LANDEROS    CARDIAC CATHETERIZATION  10/14/2009     SECTION      x 2    COLONOSCOPY  2009     CYSTOSCOPY WITH HYDRODISTENSION OF BLADDER N/A 06/24/2021    Procedure: CYSTOSCOPY, WITH BLADDER HYDRODISTENSION;  Surgeon: Dequan Recio MD;  Location: [facility];  Service: Urology;  Laterality: N/A;    CYSTOSCOPY WITH URETHRAL DILATION  02/25/2021    Dr Recio    HYSTERECTOMY  1980's    LUMBAR SYMPATHETIC NERVE BLOCK Left 10/05/2020    Left Sympathetic Nerve Block - Dr Brown - 10/5/20, 9/21/20, 1/20/20. 1/6/20, 10/9/19, 9/25/19 and 7/11/18    right lumbar sympathetic nerve block Right 2020    X4 DR BROWN    THYROIDECTOMY      1990's       Physical Exam  General: Well nourished, Cooperative, Alert and Oriented    Airway:  Mallampati: II       Chest/Lungs:  Clear to auscultation    Heart:  Rate: Normal        Anesthesia Plan  Type of Anesthesia, risks & benefits discussed:    Anesthesia Type: Gen Natural Airway, MAC  Intra-op Monitoring Plan: Standard ASA Monitors  Post Op Pain Control Plan: multimodal analgesia and IV/PO Opioids PRN  Induction:  IV  Informed Consent: Informed consent signed with the Patient and all parties understand the risks and agree with anesthesia plan.  All questions answered. Patient consented to blood products? Yes  ASA Score: 3  Day of Surgery Review of History & Physical: I have interviewed and examined the patient. I have reviewed the patient's H&P dated: There are no significant changes.     Ready For Surgery From Anesthesia Perspective.     .

## 2022-10-27 NOTE — OP NOTE
"Procedure Note    Procedure Date: 10/27/2022    Procedure Performed:  Lumbar interlaminar epidural steroid injection under fluoroscopy at L5-S1    Indications: Patient failed conservative therapy.      Pre-op diagnosis: Lumbar Radiculopathy    Post-op diagnosis: same    Physician: Belkis Brown MD    Anesthesia: MAC    Medications injected: Kenalog 40mg,  2 mL sterile preservative-free normal saline.    Local anesthetic used: 1% Lidocaine, 3 ml    Estimated Blood Loss: None    Complications:  None    Technique:  The patient was interviewed in the holding area and Risks/Benefits were discussed, including, but not limited to, the possibility of new or different pain, bleeding or infection.   All questions were answered.  The patient understood and accepted risks.  Consent was verified and signed.   A time-out was taken to identify patient and procedure prior to starting the procedure. The patient was placed in the prone position on the fluoroscopy table. The area of the lumbar spine was prepped with Chloraprep and draped in a sterile manner. The L5-S1  interspace was identified and marked under AP fluoroscopy. The skin and subcutaneous tissues overlying the targeted interspace were anesthetized with 3-5 mL of 1% lidocaine using a 25G 1.5" needle.  A 20G  3.5" Tuohy epidural needle was directed toward the interspace under fluoroscopic guidance until the ligamentum flavum was engaged. From this point, a loss of resistance technique with a pulsator syringe a was used to identify entrance of the needle into the epidural space. Once loss of resistance was observed 3mL of Isovue contrast solution was injected. An appropriate epidurogram was noted.  A 3mL mixture consisting of saline and 40 mg of kenalog was injected slowly and without resistance.  The needle was  removed and a sterile Band-Aid dressing was applied to the puncture site.  The patient tolerated the procedure well and was transferred to the .AC. in stable " condition.  The patient was monitored after the procedure and was given post-procedure and discharge instructions to follow at home. The patient was discharged in a stable condition and accompanied by an adult .    Epidurogram:5 mL allotment of Isovue M 300 contrast revealed excellent delineation from L4-S1. There were no filling defects or obstruction to dye flow noted.  There was no  intravascular or intrathecal spread noted with dye flow.

## 2022-10-27 NOTE — BRIEF OP NOTE
Discharge Note  Short Stay    Admit Date: 10/27/2022    Discharge Date: 10/27/2022    Attending Physician: Belkis Brown     Discharge Provider: Belkis Brown    Diagnoses: Lumbosacral radiculopathy    Discharged Condition: Good    Final Diagnoses: Lumbosacral radiculopathy [M54.17]    Disposition: Home or Self Care    Hospital Course: No complications, uneventful    Outcome of Hospitalization, Treatment, Procedure, or Surgery:  Patient was admitted for outpatient interventional pain management procedure. The patient tolerated the procedure well with no complications.    Follow up/Patient Instructions:  Follow up as scheduled in Pain Management office in 3-4 weeks.  Patient has received instructions and follow up date and time.    Medications:  Continue previous medications

## 2022-10-27 NOTE — ANESTHESIA POSTPROCEDURE EVALUATION
Anesthesia Post Evaluation    Patient: Lorraine De Jesus    Procedure(s) Performed: Procedure(s) (LRB):  Injection, Steroid, Epidural, L5/S1 (N/A)    Final Anesthesia Type: general      Patient location during evaluation: PACU  Patient participation: Yes- Able to Participate  Level of consciousness: awake and alert  Post-procedure vital signs: reviewed and stable  Pain management: adequate  Airway patency: patent    PONV status at discharge: No PONV  Anesthetic complications: no      Cardiovascular status: blood pressure returned to baseline and hemodynamically stable  Respiratory status: spontaneous ventilation and unassisted  Hydration status: euvolemic  Follow-up not needed.          Vitals Value Taken Time   BP 97/56 10/27/22 1139   Temp 36.1 °C (97 °F) 10/27/22 1129   Pulse 82 10/27/22 1139   Resp 15 10/27/22 1139   SpO2 98 % 10/27/22 1139   Vitals shown include unvalidated device data.      No case tracking events are documented in the log.      Pain/Toby Score: Toby Score: 9 (10/27/2022 11:25 AM)

## 2022-10-27 NOTE — DISCHARGE SUMMARY
[facility] - Pain Management  Discharge Note  Short Stay    Procedure(s) (LRB):  Injection, Steroid, Epidural, L5/S1 (N/A)      OUTCOME: Patient tolerated treatment/procedure well without complication and is now ready for discharge.    DISPOSITION: Home or Self Care    FINAL DIAGNOSIS:  Lumbosacral radiculopathy    FOLLOWUP: In clinic    DISCHARGE INSTRUCTIONS:  See nurse's notes     TIME SPENT ON DISCHARGE: 5 minutes

## 2022-10-27 NOTE — PLAN OF CARE
REFER TO WRITTEN DOCUMENT AND RECOVERY INFORMATION.    D/CD PATIENT VIAA WHEELCHAIR AT 1216.    INFORMED PATIENT IF UNABLE TO VOID IN 8 HOURS, GO TO ER. NOTIFY MD OF REDNESS OR DRAINAGE FROM INJECTION SITE OR FEVER OVER 3-4 DAY. REST AND DRINK PLENTY OF FLUIDS FOR THE REMAINDER OF THE DAY. NO LIFTING OVER 5 LBS FOR THE REMAINDER OF THE DAY. CONTINUE REGULAR MEDICATIONS AS PRESCRIBED. MAY TAKE PAIN MEDICATION AS PRESCRIBED.     PAIN IMPROVED  100%

## 2022-10-28 ENCOUNTER — TELEPHONE (OUTPATIENT)
Dept: GASTROENTEROLOGY | Facility: CLINIC | Age: 77
End: 2022-10-28
Payer: COMMERCIAL

## 2022-10-28 ENCOUNTER — HOSPITAL ENCOUNTER (OUTPATIENT)
Dept: RADIOLOGY | Facility: HOSPITAL | Age: 77
Discharge: HOME | End: 2022-10-28
Attending: NURSE PRACTITIONER
Payer: COMMERCIAL

## 2022-10-28 DIAGNOSIS — D18.03 HEMANGIOMA OF SPLEEN: ICD-10-CM

## 2022-10-28 PROCEDURE — 76700 US EXAM ABDOM COMPLETE: CPT | Mod: 26,,, | Performed by: RADIOLOGY

## 2022-10-28 PROCEDURE — 76700 US ABDOMEN COMPLETE: ICD-10-PCS | Mod: 26,,, | Performed by: RADIOLOGY

## 2022-10-28 PROCEDURE — 76700 US EXAM ABDOM COMPLETE: CPT | Mod: TC

## 2022-10-28 NOTE — TELEPHONE ENCOUNTER
Results called to patient. Verbalized understanding.          ----- Message from OLGA Calvo sent at 10/28/2022 10:08 AM CDT -----  Hemangioma of the spleen that was seen on MRI, not seen on the ultrasound. Ultrasound is normal.

## 2022-11-09 NOTE — PROGRESS NOTES
Subjective:         Patient ID: Lorraine De Jesus is a 77 y.o. female.    Chief Complaint: Low-back Pain      Pain  This is a chronic problem. The current episode started more than 1 year ago. The problem occurs daily. The problem has been unchanged. Associated symptoms include arthralgias and neck pain. Pertinent negatives include no anorexia, change in bowel habit, chest pain, chills, coughing, diaphoresis, fever, sore throat, vertigo or vomiting.   Review of Systems   Constitutional:  Negative for activity change, appetite change, chills, diaphoresis, fever and unexpected weight change.   HENT:  Negative for drooling, ear discharge, ear pain, facial swelling, nosebleeds, sore throat, trouble swallowing, voice change and goiter.    Eyes:  Negative for photophobia, pain, discharge, redness and visual disturbance.   Respiratory:  Negative for apnea, cough, choking, chest tightness, shortness of breath, wheezing and stridor.    Cardiovascular:  Negative for chest pain, palpitations and leg swelling.   Gastrointestinal:  Negative for abdominal distention, anorexia, change in bowel habit, diarrhea, rectal pain, vomiting, fecal incontinence and change in bowel habit.   Endocrine: Negative for cold intolerance, heat intolerance, polydipsia, polyphagia and polyuria.   Genitourinary:  Negative for bladder incontinence, dysuria, flank pain, frequency and hot flashes.   Musculoskeletal:  Positive for arthralgias, back pain, leg pain and neck pain.   Integumentary:  Negative for color change and pallor.   Allergic/Immunologic: Negative for immunocompromised state.   Neurological:  Negative for dizziness, vertigo, seizures, syncope, facial asymmetry, speech difficulty, light-headedness, coordination difficulties, memory loss and coordination difficulties.   Hematological:  Negative for adenopathy. Does not bruise/bleed easily.   Psychiatric/Behavioral:  Negative for agitation, behavioral problems, confusion, decreased  concentration, dysphoric mood, hallucinations, self-injury and suicidal ideas. The patient is not nervous/anxious and is not hyperactive.          Past Medical History:   Diagnosis Date    Adenomatous polyp of ascending colon 2021    Adenomatous polyp of descending colon 2021    Age related osteoporosis 10/28/2020    Benign paroxysmal vertigo     Chronic pain syndrome     Chronic pelvic pain in female 2021    Ditropan XL 5mg    Colon, diverticulosis 2021    Depressive disorder 2019    Essential hypertension 2012    External hemorrhoid 2021    Gastrointestinal hemorrhage associated with anorectal source 2021    GERD (gastroesophageal reflux disease) 2013    Hyperlipidemia 2012    Hypothyroidism 2019    Joint pain     Nonrheumatic tricuspid (valve) insufficiency 2018    Personal history UTI 2021    Controlled on Hiprex    Polyneuropathy 2018    PVD (peripheral vascular disease) 10/30/2018    Temporal arteritis     Type 2 diabetes mellitus 2014    Urethral meatal stenosis 2018    history of known urethral stenosis, and was taught to perform monthly self dilation at home.     Past Surgical History:   Procedure Laterality Date     left lumbar sympathetic nerve block Left 2020    X3  DR BROWN    CARDIAC CATHETERIZATION  10/14/2009     SECTION      x 2    COLONOSCOPY  2009    CYSTOSCOPY WITH HYDRODISTENSION OF BLADDER N/A 2021    Procedure: CYSTOSCOPY, WITH BLADDER HYDRODISTENSION;  Surgeon: Dequan Recio MD;  Location: [facility] OR;  Service: Urology;  Laterality: N/A;    CYSTOSCOPY WITH URETHRAL DILATION  2021    Dr Recio    EPIDURAL STEROID INJECTION N/A 10/27/2022    Procedure: Injection, Steroid, Epidural, L5/S1;  Surgeon: Belkis Brown MD;  Location: [facility] PAIN [facility];  Service: Pain Management;  Laterality: N/A;    HYSTERECTOMY      LUMBAR SYMPATHETIC NERVE BLOCK Left 10/05/2020    Left  "Sympathetic Nerve Block - Dr Brown - 10/5/20, 9/21/20, 1/20/20. 1/6/20, 10/9/19, 9/25/19 and 7/11/18    right lumbar sympathetic nerve block Right 2020    X4 DR BROWN    THYROIDECTOMY      1990's     Social History     Socioeconomic History    Marital status:    Tobacco Use    Smoking status: Never    Smokeless tobacco: Never   Substance and Sexual Activity    Alcohol use: Never    Drug use: Never    Sexual activity: Not Currently     Family History   Problem Relation Age of Onset    Cancer Mother     Hypertension Mother     Cancer Father     Cancer Sister     Diabetes Sister     Hyperlipidemia Sister     Hypertension Sister     Cancer Brother     Diabetes Sister     No Known Problems Sister     Cancer Brother     No Known Problems Brother     Cancer Brother      Review of patient's allergies indicates:   Allergen Reactions    Lipitor [atorvastatin] Other (See Comments)     Muscle aching    Pravachol [pravastatin] Other (See Comments)     Muscle aching        Objective:  Vitals:    11/10/22 0902   BP: (!) 137/95   Resp: 18   Weight: 80.7 kg (178 lb)   Height: 5' 8" (1.727 m)   PainSc:   7         Physical Exam  Vitals and nursing note reviewed. Exam conducted with a chaperone present.   Constitutional:       General: She is awake. She is not in acute distress.     Appearance: Normal appearance. She is not ill-appearing, toxic-appearing or diaphoretic.   HENT:      Head: Normocephalic and atraumatic.      Nose: Nose normal.      Mouth/Throat:      Mouth: Mucous membranes are moist.      Pharynx: Oropharynx is clear.   Eyes:      Conjunctiva/sclera: Conjunctivae normal.      Pupils: Pupils are equal, round, and reactive to light.   Cardiovascular:      Rate and Rhythm: Normal rate.   Pulmonary:      Effort: Pulmonary effort is normal. No respiratory distress.   Abdominal:      Palpations: Abdomen is soft.   Musculoskeletal:      Cervical back: Normal range of motion and neck supple.      Thoracic back: " Tenderness present.      Lumbar back: Tenderness present. Decreased range of motion.   Skin:     General: Skin is warm and dry.   Neurological:      General: No focal deficit present.      Mental Status: She is alert and oriented to person, place, and time. Mental status is at baseline.      Cranial Nerves: No cranial nerve deficit (II-XII).   Psychiatric:         Mood and Affect: Mood normal.         Behavior: Behavior normal. Behavior is cooperative.         Thought Content: Thought content normal.         US Abdomen Complete  Narrative: EXAMINATION:  US ABDOMEN COMPLETE    CLINICAL HISTORY:  Hemangioma of intra-abdominal structures    COMPARISON:  MRI abdomen July 30 2021    TECHNIQUE:  Multiple longitudinal and transverse real-time sonographic images of the abdomen are obtained.    FINDINGS:  The liver measures 17.1 cm and demonstrates normal  echogenicity without focal abnormality on submitted images. The gallbladder demonstrates no significant abnormal distention, abnormal intraluminal echoes, wall thickening, or pericholecystic fluid. The common duct measures 0.39 cm in diameter. There is no evidence of intrahepatic ductal dilatation.    The right and left kidneys measure 9.5 cm and 9.6 cm, respectively. There is no hydronephrosis.    The spleen measures 8.1 cm without focal abnormality.  Hemangiomas suggested on comparison MRI not visualized with certainty on this exam.    Evaluation of the pancreas limited secondary to bowel gas.    IVC and aorta: Visualized portions grossly unremarkable.    No evidence of ascites.  Impression: No convincing sonographic evidence of significant abdominal abnormality.    Point of Service: [facility]    Electronically signed by: Vadim Swanson  Date:    10/28/2022  Time:    09:20       Admission on 10/27/2022, Discharged on 10/27/2022   Component Date Value Ref Range Status    POC Glucose 10/27/2022 132 (H)  70 - 105 mg/dL Final   Lab Visit on 10/21/2022    Component Date Value Ref Range Status    Total Protein 10/21/2022 7.5  6.4 - 8.2 g/dL Final    Albumin, PE 10/21/2022 4.43  3.5 - 5.2 g/dL Final    Alpha-1-Globulin 10/21/2022 0.2  0.1 - 0.4 g/dL Final    Alpha-2-Globulin 10/21/2022 0.7  0.4 - 1.3 g/dL Final    Beta, PE 10/21/2022 0.9  0.5 - 1.5 g/dL Final    Gamma, PE 10/21/2022 1.3  0.5 - 1.8 g/dL Final    Pathologist Interp, Pro Elect, Blo* 10/21/2022 Normal. No monoclonal bands identified   Final   Lab Visit on 10/20/2022   Component Date Value Ref Range Status    Total Protein 10/20/2022 8.5 (H)  6.4 - 8.2 g/dL Final    Albumin 10/20/2022 3.5  3.5 - 5.0 g/dL Final    Bilirubin, Total 10/20/2022 0.4  >0.0 - 1.2 mg/dL Final    Bilirubin, Direct 10/20/2022 0.1  0.0 - 0.2 mg/dL Final    AST 10/20/2022 16  15 - 37 U/L Final    ALT 10/20/2022 27  13 - 56 U/L Final    Alk Phos 10/20/2022 116  55 - 142 U/L Final   Office Visit on 10/03/2022   Component Date Value Ref Range Status    POC Amphetamines 10/03/2022 Negative  Negative, Inconclusive Final    POC Barbiturates 10/03/2022 Negative  Negative, Inconclusive Final    POC Benzodiazepines 10/03/2022 Negative  Negative, Inconclusive Final    POC Cocaine 10/03/2022 Negative  Negative, Inconclusive Final    POC THC 10/03/2022 Negative  Negative, Inconclusive Final    POC Methadone 10/03/2022 Negative  Negative, Inconclusive Final    POC Methamphetamine 10/03/2022 Negative  Negative, Inconclusive Final    POC Opiates 10/03/2022 Presumptive Positive (A)  Negative, Inconclusive Final    POC Oxycodone 10/03/2022 Negative  Negative, Inconclusive Final    POC Phencyclidine 10/03/2022 Negative  Negative, Inconclusive Final    POC Methylenedioxymethamphetamine * 10/03/2022 Negative  Negative, Inconclusive Final    POC Tricyclic Antidepressants 10/03/2022 Negative  Negative, Inconclusive Final    POC Buprenorphine 10/03/2022 Negative   Final     Acceptable 10/03/2022 Yes   Final    POC Temperature (Urine)  10/03/2022 94   Final   Office Visit on 09/27/2022   Component Date Value Ref Range Status    Sodium 09/27/2022 142  136 - 145 mmol/L Final    Potassium 09/27/2022 4.1  3.5 - 5.1 mmol/L Final    Chloride 09/27/2022 108 (H)  98 - 107 mmol/L Final    CO2 09/27/2022 27  21 - 32 mmol/L Final    Anion Gap 09/27/2022 11  7 - 16 mmol/L Final    Glucose 09/27/2022 137 (H)  74 - 106 mg/dL Final    BUN 09/27/2022 17  7 - 18 mg/dL Final    Creatinine 09/27/2022 0.78  0.55 - 1.02 mg/dL Final    BUN/Creatinine Ratio 09/27/2022 22 (H)  6 - 20 Final    Calcium 09/27/2022 8.9  8.5 - 10.1 mg/dL Final    eGFR 09/27/2022 79  >=60 mL/min/1.73m² Final    TSH 09/27/2022 1.470  0.358 - 3.740 uIU/mL Final    WBC 09/27/2022 6.36  4.50 - 11.00 K/uL Final    RBC 09/27/2022 4.86  4.20 - 5.40 M/uL Final    Hemoglobin 09/27/2022 13.0  12.0 - 16.0 g/dL Final    Hematocrit 09/27/2022 40.5  38.0 - 47.0 % Final    MCV 09/27/2022 83.3  80.0 - 96.0 fL Final    MCH 09/27/2022 26.7 (L)  27.0 - 31.0 pg Final    MCHC 09/27/2022 32.1  32.0 - 36.0 g/dL Final    RDW 09/27/2022 15.9 (H)  11.5 - 14.5 % Final    Platelet Count 09/27/2022 275  150 - 400 K/uL Final    MPV 09/27/2022 10.4  9.4 - 12.4 fL Final    Neutrophils % 09/27/2022 59.4  53.0 - 65.0 % Final    Lymphocytes % 09/27/2022 30.2  27.0 - 41.0 % Final    Monocytes % 09/27/2022 5.7  2.0 - 6.0 % Final    Eosinophils % 09/27/2022 4.2 (H)  1.0 - 4.0 % Final    Basophils % 09/27/2022 0.3  0.0 - 1.0 % Final    Immature Granulocytes % 09/27/2022 0.2  0.0 - 0.4 % Final    nRBC, Auto 09/27/2022 0.0  <=0.0 % Final    Neutrophils, Abs 09/27/2022 3.78  1.80 - 7.70 K/uL Final    Lymphocytes, Absolute 09/27/2022 1.92  1.00 - 4.80 K/uL Final    Monocytes, Absolute 09/27/2022 0.36  0.00 - 0.80 K/uL Final    Eosinophils, Absolute 09/27/2022 0.27  0.00 - 0.50 K/uL Final    Basophils, Absolute 09/27/2022 0.02  0.00 - 0.20 K/uL Final    Immature Granulocytes, Absolute 09/27/2022 0.01  0.00 - 0.04 K/uL Final    nRBC,  Absolute 09/27/2022 0.00  <=0.00 x10e3/uL Final    Diff Type 09/27/2022 Auto   Final   Office Visit on 09/19/2022   Component Date Value Ref Range Status    Hepatitis C Ab 09/19/2022 Non-Reactive  Non-Reactive Final   Lab Visit on 08/10/2022   Component Date Value Ref Range Status    Triglycerides 08/10/2022 88  35 - 150 mg/dL Final    Cholesterol 08/10/2022 226 (H)  0 - 200 mg/dL Final    HDL Cholesterol 08/10/2022 56  40 - 60 mg/dL Final    Cholesterol/HDL Ratio (Risk Factor) 08/10/2022 4.0   Final    Non-HDL 08/10/2022 170  mg/dL Final    LDL Calculated 08/10/2022 152  mg/dL Final    LDL/HDL 08/10/2022 2.7   Final    VLDL 08/10/2022 18  mg/dL Final    Creatinine, Urine 08/10/2022 80  28 - 219 mg/dL Final    Microalbumin 08/10/2022 1.1  0.0 - 2.8 mg/dL Final    Microalbumin/Creatinine Ratio 08/10/2022 13.8  0.0 - 30.0 mg/g Final   Office Visit on 08/10/2022   Component Date Value Ref Range Status    Hemoglobin A1C 08/10/2022 6.3  4.5 - 6.6 % Final    POC Glucose 08/10/2022 120 (A)  70 - 110 MG/DL Final   Office Visit on 07/20/2022   Component Date Value Ref Range Status    Free T4 07/20/2022 1.04  0.76 - 1.46 ng/dL Final    TSH 07/20/2022 2.950  0.358 - 3.740 uIU/mL Final   Admission on 06/25/2022, Discharged on 06/25/2022   Component Date Value Ref Range Status    Sodium 06/25/2022 135 (L)  136 - 145 mmol/L Final    Potassium 06/25/2022 4.3  3.5 - 5.1 mmol/L Final    Chloride 06/25/2022 98  98 - 107 mmol/L Final    CO2 06/25/2022 21  21 - 32 mmol/L Final    Anion Gap 06/25/2022 20 (H)  7 - 16 mmol/L Final    Glucose 06/25/2022 176 (H)  74 - 106 mg/dL Final    BUN 06/25/2022 17  7 - 18 mg/dL Final    Creatinine 06/25/2022 0.76  0.55 - 1.02 mg/dL Final    BUN/Creatinine Ratio 06/25/2022 22 (H)  6 - 20 Final    Calcium 06/25/2022 9.3  8.5 - 10.1 mg/dL Final    eGFR 06/25/2022 79  >=60 mL/min/1.73m² Final    Color, UA 06/25/2022 Straw  Colorless, Straw, Yellow, Dark Yellow Final    Clarity, UA 06/25/2022 Clear  Clear  Final    pH, UA 06/25/2022 5.0  5.0, 5.5, 6.0, 6.5, 7.0, 7.5, 8.0 pH Units Final    Leukocytes, UA 06/25/2022 Negative  Negative Final    Nitrites, UA 06/25/2022 Negative  Negative Final    Protein, UA 06/25/2022 Trace (A)  Negative Final    Glucose, UA 06/25/2022 500 (A)  Negative mg/dL Final    Ketones, UA 06/25/2022 40 (A)  Negative, Trace mg/dL Final    Urobilinogen, UA 06/25/2022 0.2  0.2, 1.0 mg/dL Final    Bilirubin, UA 06/25/2022 Negative  Negative Final    Blood, UA 06/25/2022 Negative  Negative Final    Specific Gravity, UA 06/25/2022 1.025  <=1.005, 1.010, 1.015, 1.020, 1.025, 1.030 Final    WBC 06/25/2022 7.46  4.50 - 11.00 K/uL Final    RBC 06/25/2022 5.30  4.20 - 5.40 M/uL Final    Hemoglobin 06/25/2022 14.2  12.0 - 16.0 g/dL Final    Hematocrit 06/25/2022 41.8  38.0 - 47.0 % Final    MCV 06/25/2022 78.9 (L)  80.0 - 96.0 fL Final    MCH 06/25/2022 26.8 (L)  27.0 - 31.0 pg Final    MCHC 06/25/2022 34.0  32.0 - 36.0 g/dL Final    RDW 06/25/2022 15.3 (H)  11.5 - 14.5 % Final    Platelet Count 06/25/2022 263  150 - 400 K/uL Final    MPV 06/25/2022 9.2 (L)  9.4 - 12.4 fL Final    Neutrophils % 06/25/2022 86.3 (H)  53.0 - 65.0 % Final    Lymphocytes % 06/25/2022 6.8 (L)  27.0 - 41.0 % Final    Monocytes % 06/25/2022 6.2 (H)  2.0 - 6.0 % Final    Eosinophils % 06/25/2022 0.0 (L)  1.0 - 4.0 % Final    Basophils % 06/25/2022 0.3  0.0 - 1.0 % Final    Immature Granulocytes % 06/25/2022 0.4  0.0 - 0.4 % Final    nRBC, Auto 06/25/2022 0.0  <=0.0 % Final    Neutrophils, Abs 06/25/2022 6.44  1.80 - 7.70 K/uL Final    Lymphocytes, Absolute 06/25/2022 0.51 (L)  1.00 - 4.80 K/uL Final    Monocytes, Absolute 06/25/2022 0.46  0.00 - 0.80 K/uL Final    Eosinophils, Absolute 06/25/2022 0.00  0.00 - 0.50 K/uL Final    Basophils, Absolute 06/25/2022 0.02  0.00 - 0.20 K/uL Final    Immature Granulocytes, Absolute 06/25/2022 0.03  0.00 - 0.04 K/uL Final    nRBC, Absolute 06/25/2022 0.00  <=0.00 x10e3/uL Final    Diff Type  06/25/2022 Manual   Final    Segmented Neutrophils, Man % 06/25/2022 82 (H)  50 - 62 % Final    Bands, Man % 06/25/2022 4  1 - 5 % Final    Lymphocytes, Man % 06/25/2022 8 (L)  27 - 41 % Final    Monocytes, Man % 06/25/2022 6  2 - 6 % Final    Platelet Morphology 06/25/2022 Normal  Normal Final    Anisocytosis 06/25/2022 1+   Final   There may be more visits with results that are not included.         No orders of the defined types were placed in this encounter.      Requested Prescriptions     Signed Prescriptions Disp Refills    HYDROcodone-acetaminophen (NORCO)  mg per tablet 120 tablet 0     Sig: Take 1 tablet by mouth every 6 (six) hours.    HYDROcodone-acetaminophen (NORCO)  mg per tablet 120 tablet 0     Sig: Take 1 tablet by mouth every 6 (six) hours.    HYDROcodone-acetaminophen (NORCO)  mg per tablet 120 tablet 0     Sig: Take 1 tablet by mouth every 6 (six) hours.       Assessment:     1. Lumbosacral radiculopathy    2. Diabetic neuropathy with neurologic complication    3. Bilateral foot pain         A's of Opioid Risk Assessment  Activity:Patient can perform ADL.   Analgesia:Patients pain is partially controlled by current medication. Patient has tried OTC medications such as Tylenol and Ibuprofen with out relief.   Adverse Effects: Patient denies constipation or sedation.  Aberrant Behavior:  reviewed with no aberrant drug seeking/taking behavior.  Overdose reversal drug naloxone discussed    Drug screen reviewed      Plan:    Follows Neurology [facility] neuropathy symptoms lower extremities    Follow-up after lumbar L5/S1 TORIE # 1 October 27, 2022, she states she had 80% relief after procedure, she states procedure did help improve her level function    Continues Duo joint pain knee pain considering repeating lumbar procedure    X-ray left knee degenerative changes no fracture noted    MRI lumbar spine [facility] March 25, 2021 spinal stenosis L5/S1 disc bulge facet joint  arthropathy multiple level degenerative changes    Considering left genicular nerve block    Continue home exercise program as directed    Continue current medication    Follow-up 3 months    Dr. Brown October 2023    Bring original prescription medication bottles/container/box with labels to each visit    Pill count    Physical therapy    Massage therapy declines

## 2022-11-10 ENCOUNTER — OFFICE VISIT (OUTPATIENT)
Dept: PAIN MEDICINE | Facility: CLINIC | Age: 77
End: 2022-11-10
Payer: COMMERCIAL

## 2022-11-10 VITALS
BODY MASS INDEX: 26.98 KG/M2 | RESPIRATION RATE: 18 BRPM | WEIGHT: 178 LBS | HEIGHT: 68 IN | SYSTOLIC BLOOD PRESSURE: 137 MMHG | DIASTOLIC BLOOD PRESSURE: 95 MMHG

## 2022-11-10 DIAGNOSIS — M54.17 LUMBOSACRAL RADICULOPATHY: Primary | Chronic | ICD-10-CM

## 2022-11-10 DIAGNOSIS — E11.49 DIABETIC NEUROPATHY WITH NEUROLOGIC COMPLICATION: Chronic | ICD-10-CM

## 2022-11-10 DIAGNOSIS — E11.40 DIABETIC NEUROPATHY WITH NEUROLOGIC COMPLICATION: Chronic | ICD-10-CM

## 2022-11-10 DIAGNOSIS — M79.672 BILATERAL FOOT PAIN: ICD-10-CM

## 2022-11-10 DIAGNOSIS — M79.671 BILATERAL FOOT PAIN: ICD-10-CM

## 2022-11-10 PROCEDURE — 99214 OFFICE O/P EST MOD 30 MIN: CPT | Mod: S$PBB,,, | Performed by: PHYSICIAN ASSISTANT

## 2022-11-10 PROCEDURE — 1159F PR MEDICATION LIST DOCUMENTED IN MEDICAL RECORD: ICD-10-PCS | Mod: CPTII,,, | Performed by: PHYSICIAN ASSISTANT

## 2022-11-10 PROCEDURE — 3051F HG A1C>EQUAL 7.0%<8.0%: CPT | Mod: CPTII,,, | Performed by: PHYSICIAN ASSISTANT

## 2022-11-10 PROCEDURE — 3080F PR MOST RECENT DIASTOLIC BLOOD PRESSURE >= 90 MM HG: ICD-10-PCS | Mod: CPTII,,, | Performed by: PHYSICIAN ASSISTANT

## 2022-11-10 PROCEDURE — 3051F PR MOST RECENT HEMOGLOBIN A1C LEVEL 7.0 - < 8.0%: ICD-10-PCS | Mod: CPTII,,, | Performed by: PHYSICIAN ASSISTANT

## 2022-11-10 PROCEDURE — 1101F PR PT FALLS ASSESS DOC 0-1 FALLS W/OUT INJ PAST YR: ICD-10-PCS | Mod: CPTII,,, | Performed by: PHYSICIAN ASSISTANT

## 2022-11-10 PROCEDURE — 3288F PR FALLS RISK ASSESSMENT DOCUMENTED: ICD-10-PCS | Mod: CPTII,,, | Performed by: PHYSICIAN ASSISTANT

## 2022-11-10 PROCEDURE — 3066F NEPHROPATHY DOC TX: CPT | Mod: CPTII,,, | Performed by: PHYSICIAN ASSISTANT

## 2022-11-10 PROCEDURE — 3061F NEG MICROALBUMINURIA REV: CPT | Mod: CPTII,,, | Performed by: PHYSICIAN ASSISTANT

## 2022-11-10 PROCEDURE — 3080F DIAST BP >= 90 MM HG: CPT | Mod: CPTII,,, | Performed by: PHYSICIAN ASSISTANT

## 2022-11-10 PROCEDURE — 4010F ACE/ARB THERAPY RXD/TAKEN: CPT | Mod: CPTII,,, | Performed by: PHYSICIAN ASSISTANT

## 2022-11-10 PROCEDURE — 1101F PT FALLS ASSESS-DOCD LE1/YR: CPT | Mod: CPTII,,, | Performed by: PHYSICIAN ASSISTANT

## 2022-11-10 PROCEDURE — 1125F PR PAIN SEVERITY QUANTIFIED, PAIN PRESENT: ICD-10-PCS | Mod: CPTII,,, | Performed by: PHYSICIAN ASSISTANT

## 2022-11-10 PROCEDURE — 3061F PR NEG MICROALBUMINURIA RESULT DOCUMENTED/REVIEW: ICD-10-PCS | Mod: CPTII,,, | Performed by: PHYSICIAN ASSISTANT

## 2022-11-10 PROCEDURE — 99214 PR OFFICE/OUTPT VISIT, EST, LEVL IV, 30-39 MIN: ICD-10-PCS | Mod: S$PBB,,, | Performed by: PHYSICIAN ASSISTANT

## 2022-11-10 PROCEDURE — 1159F MED LIST DOCD IN RCRD: CPT | Mod: CPTII,,, | Performed by: PHYSICIAN ASSISTANT

## 2022-11-10 PROCEDURE — 3066F PR DOCUMENTATION OF TREATMENT FOR NEPHROPATHY: ICD-10-PCS | Mod: CPTII,,, | Performed by: PHYSICIAN ASSISTANT

## 2022-11-10 PROCEDURE — 1125F AMNT PAIN NOTED PAIN PRSNT: CPT | Mod: CPTII,,, | Performed by: PHYSICIAN ASSISTANT

## 2022-11-10 PROCEDURE — 99215 OFFICE O/P EST HI 40 MIN: CPT | Mod: PBBFAC | Performed by: PHYSICIAN ASSISTANT

## 2022-11-10 PROCEDURE — 3075F SYST BP GE 130 - 139MM HG: CPT | Mod: CPTII,,, | Performed by: PHYSICIAN ASSISTANT

## 2022-11-10 PROCEDURE — 3075F PR MOST RECENT SYSTOLIC BLOOD PRESS GE 130-139MM HG: ICD-10-PCS | Mod: CPTII,,, | Performed by: PHYSICIAN ASSISTANT

## 2022-11-10 PROCEDURE — 3288F FALL RISK ASSESSMENT DOCD: CPT | Mod: CPTII,,, | Performed by: PHYSICIAN ASSISTANT

## 2022-11-10 PROCEDURE — 4010F PR ACE/ARB THEARPY RXD/TAKEN: ICD-10-PCS | Mod: CPTII,,, | Performed by: PHYSICIAN ASSISTANT

## 2022-11-10 RX ORDER — HYDROCODONE BITARTRATE AND ACETAMINOPHEN 10; 325 MG/1; MG/1
1 TABLET ORAL EVERY 6 HOURS
Qty: 120 TABLET | Refills: 0 | Status: SHIPPED | OUTPATIENT
Start: 2023-01-24 | End: 2022-12-05

## 2022-11-10 RX ORDER — HYDROCODONE BITARTRATE AND ACETAMINOPHEN 10; 325 MG/1; MG/1
1 TABLET ORAL EVERY 6 HOURS
Qty: 120 TABLET | Refills: 0 | Status: SHIPPED | OUTPATIENT
Start: 2022-12-23 | End: 2022-12-05

## 2022-11-10 RX ORDER — HYDROCODONE BITARTRATE AND ACETAMINOPHEN 10; 325 MG/1; MG/1
1 TABLET ORAL EVERY 6 HOURS
Qty: 120 TABLET | Refills: 0 | Status: SHIPPED | OUTPATIENT
Start: 2022-11-23 | End: 2022-12-05

## 2022-11-16 ENCOUNTER — OFFICE VISIT (OUTPATIENT)
Dept: OBSTETRICS AND GYNECOLOGY | Facility: CLINIC | Age: 77
End: 2022-11-16
Payer: COMMERCIAL

## 2022-11-16 VITALS — SYSTOLIC BLOOD PRESSURE: 130 MMHG | DIASTOLIC BLOOD PRESSURE: 72 MMHG

## 2022-11-16 DIAGNOSIS — R10.30 LOWER ABDOMINAL PAIN: Primary | ICD-10-CM

## 2022-11-16 DIAGNOSIS — N95.2 ATROPHIC VAGINITIS: ICD-10-CM

## 2022-11-16 DIAGNOSIS — N76.3 CHRONIC VULVITIS: ICD-10-CM

## 2022-11-16 LAB
BACTERIA #/AREA URNS HPF: ABNORMAL /HPF
BILIRUB UR QL STRIP: NEGATIVE
CLARITY UR: CLEAR
COLOR UR: ABNORMAL
GLUCOSE UR STRIP-MCNC: >1000 MG/DL
KETONES UR STRIP-SCNC: NEGATIVE MG/DL
LEUKOCYTE ESTERASE UR QL STRIP: NEGATIVE
NITRITE UR QL STRIP: NEGATIVE
PH UR STRIP: 6 PH UNITS
PROT UR QL STRIP: NEGATIVE
RBC # UR STRIP: NEGATIVE /UL
RBC #/AREA URNS HPF: 0 /HPF
SP GR UR STRIP: 1.03
SQUAMOUS #/AREA URNS LPF: ABNORMAL /HPF
UROBILINOGEN UR STRIP-ACNC: NORMAL MG/DL
WBC #/AREA URNS HPF: 3 /HPF

## 2022-11-16 PROCEDURE — 99203 PR OFFICE/OUTPT VISIT, NEW, LEVL III, 30-44 MIN: ICD-10-PCS | Mod: S$PBB,,, | Performed by: OBSTETRICS & GYNECOLOGY

## 2022-11-16 PROCEDURE — 3051F HG A1C>EQUAL 7.0%<8.0%: CPT | Mod: CPTII,,, | Performed by: OBSTETRICS & GYNECOLOGY

## 2022-11-16 PROCEDURE — 1159F PR MEDICATION LIST DOCUMENTED IN MEDICAL RECORD: ICD-10-PCS | Mod: CPTII,,, | Performed by: OBSTETRICS & GYNECOLOGY

## 2022-11-16 PROCEDURE — 3075F SYST BP GE 130 - 139MM HG: CPT | Mod: CPTII,,, | Performed by: OBSTETRICS & GYNECOLOGY

## 2022-11-16 PROCEDURE — 3078F DIAST BP <80 MM HG: CPT | Mod: CPTII,,, | Performed by: OBSTETRICS & GYNECOLOGY

## 2022-11-16 PROCEDURE — 3061F NEG MICROALBUMINURIA REV: CPT | Mod: CPTII,,, | Performed by: OBSTETRICS & GYNECOLOGY

## 2022-11-16 PROCEDURE — 99214 OFFICE O/P EST MOD 30 MIN: CPT | Mod: PBBFAC | Performed by: OBSTETRICS & GYNECOLOGY

## 2022-11-16 PROCEDURE — 3061F PR NEG MICROALBUMINURIA RESULT DOCUMENTED/REVIEW: ICD-10-PCS | Mod: CPTII,,, | Performed by: OBSTETRICS & GYNECOLOGY

## 2022-11-16 PROCEDURE — 81001 URINALYSIS AUTO W/SCOPE: CPT | Mod: ,,, | Performed by: CLINICAL MEDICAL LABORATORY

## 2022-11-16 PROCEDURE — 3051F PR MOST RECENT HEMOGLOBIN A1C LEVEL 7.0 - < 8.0%: ICD-10-PCS | Mod: CPTII,,, | Performed by: OBSTETRICS & GYNECOLOGY

## 2022-11-16 PROCEDURE — 87086 URINE CULTURE/COLONY COUNT: CPT | Mod: ,,, | Performed by: CLINICAL MEDICAL LABORATORY

## 2022-11-16 PROCEDURE — 3066F NEPHROPATHY DOC TX: CPT | Mod: CPTII,,, | Performed by: OBSTETRICS & GYNECOLOGY

## 2022-11-16 PROCEDURE — 81001 URINALYSIS: ICD-10-PCS | Mod: ,,, | Performed by: CLINICAL MEDICAL LABORATORY

## 2022-11-16 PROCEDURE — 3075F PR MOST RECENT SYSTOLIC BLOOD PRESS GE 130-139MM HG: ICD-10-PCS | Mod: CPTII,,, | Performed by: OBSTETRICS & GYNECOLOGY

## 2022-11-16 PROCEDURE — 87086 CULTURE, URINE: ICD-10-PCS | Mod: ,,, | Performed by: CLINICAL MEDICAL LABORATORY

## 2022-11-16 PROCEDURE — 3078F PR MOST RECENT DIASTOLIC BLOOD PRESSURE < 80 MM HG: ICD-10-PCS | Mod: CPTII,,, | Performed by: OBSTETRICS & GYNECOLOGY

## 2022-11-16 PROCEDURE — 4010F PR ACE/ARB THEARPY RXD/TAKEN: ICD-10-PCS | Mod: CPTII,,, | Performed by: OBSTETRICS & GYNECOLOGY

## 2022-11-16 PROCEDURE — 1159F MED LIST DOCD IN RCRD: CPT | Mod: CPTII,,, | Performed by: OBSTETRICS & GYNECOLOGY

## 2022-11-16 PROCEDURE — 99203 OFFICE O/P NEW LOW 30 MIN: CPT | Mod: S$PBB,,, | Performed by: OBSTETRICS & GYNECOLOGY

## 2022-11-16 PROCEDURE — 3066F PR DOCUMENTATION OF TREATMENT FOR NEPHROPATHY: ICD-10-PCS | Mod: CPTII,,, | Performed by: OBSTETRICS & GYNECOLOGY

## 2022-11-16 PROCEDURE — 4010F ACE/ARB THERAPY RXD/TAKEN: CPT | Mod: CPTII,,, | Performed by: OBSTETRICS & GYNECOLOGY

## 2022-11-16 RX ORDER — ESTRADIOL 0.1 MG/G
CREAM VAGINAL
Qty: 42.5 G | Refills: 1 | Status: SHIPPED | OUTPATIENT
Start: 2022-11-16 | End: 2023-06-07

## 2022-11-16 RX ORDER — TRIAMCINOLONE ACETONIDE 1 MG/G
OINTMENT TOPICAL
Qty: 15 G | Refills: 0 | Status: SHIPPED | OUTPATIENT
Start: 2022-11-16 | End: 2023-05-23 | Stop reason: SDUPTHER

## 2022-11-16 NOTE — PROGRESS NOTES
Subjective:       Patient ID: Lorraine De Jesus is a 77 y.o. female.    Chief Complaint: Abdominal Pain (C/o lower stomach pain, and labia irritation x 1 week.)    Presents with chief complaint suprapubic pain with some frequency of urination.      She also complains of some labial irritation.      No other specific complaints.    Further discussion in the office reveals history of dyspareunia.    Discussed the use of Estrace vaginal cream    Review of Systems      Objective:      Physical Exam  Abdominal:      Comments: Abdomen soft nondistended.  Mild tenderness to palpation suprapubically.         Genitourinary:     Comments: External genitalia reveals no lesions.  There is generalized dryness and thinness of the epidermis along the doreen clitoral area.  However no lesions no redness noted.    Speculum exam revealed vaginal vault normal to appearance.  Bimanual exam including rectovaginal exam unremarkable uterus and ovaries absent colonoscopy done June 2021 by Dr. Gonzalez.    Following examination mini cath specimen was obtained by catheterization after Betadine prep.      Assessment:       1. Lower abdominal pain    2. Chronic vulvitis    3. Atrophic vaginitis          Plan:       Patient Instructions   Discussed caution regarding over cleansing.      Prescribed triamcinolone ointment 0.1% to be used b.i.d. times 10 days to 2 weeks long labia.    Will follow-up urine culture and treat if positive growth    Estrace vaginal cream 1/2 applicator full twice monthly as needed for vaginal dryness.    She understands to have follow-up in my office if symptoms persist.

## 2022-11-16 NOTE — PATIENT INSTRUCTIONS
Discussed caution regarding over cleansing.      Prescribed triamcinolone ointment 0.1% to be used b.i.d. times 10 days to 2 weeks long labia.    Will follow-up urine culture and treat if positive growth    Estrace vaginal cream 1/2 applicator full twice monthly as needed for vaginal dryness.

## 2022-11-18 LAB — UA COMPLETE W REFLEX CULTURE PNL UR: NO GROWTH

## 2022-11-28 ENCOUNTER — OFFICE VISIT (OUTPATIENT)
Dept: FAMILY MEDICINE | Facility: CLINIC | Age: 77
End: 2022-11-28
Payer: COMMERCIAL

## 2022-11-28 VITALS
HEIGHT: 68 IN | DIASTOLIC BLOOD PRESSURE: 70 MMHG | HEART RATE: 98 BPM | TEMPERATURE: 97 F | SYSTOLIC BLOOD PRESSURE: 130 MMHG | BODY MASS INDEX: 27.22 KG/M2 | RESPIRATION RATE: 20 BRPM | OXYGEN SATURATION: 98 % | WEIGHT: 179.63 LBS

## 2022-11-28 DIAGNOSIS — R53.83 OTHER FATIGUE: ICD-10-CM

## 2022-11-28 DIAGNOSIS — E04.1 THYROID NODULE: ICD-10-CM

## 2022-11-28 DIAGNOSIS — M25.552 LEFT HIP PAIN: Primary | ICD-10-CM

## 2022-11-28 DIAGNOSIS — E11.9 TYPE 2 DIABETES MELLITUS WITHOUT COMPLICATION, WITHOUT LONG-TERM CURRENT USE OF INSULIN: ICD-10-CM

## 2022-11-28 LAB
BASOPHILS # BLD AUTO: 0.03 K/UL (ref 0–0.2)
BASOPHILS NFR BLD AUTO: 0.4 % (ref 0–1)
BILIRUB UR QL STRIP: NEGATIVE
CLARITY UR: CLEAR
COLOR UR: ABNORMAL
DIFFERENTIAL METHOD BLD: ABNORMAL
EOSINOPHIL # BLD AUTO: 0.14 K/UL (ref 0–0.5)
EOSINOPHIL NFR BLD AUTO: 2.1 % (ref 1–4)
ERYTHROCYTE [DISTWIDTH] IN BLOOD BY AUTOMATED COUNT: 15.5 % (ref 11.5–14.5)
EST. AVERAGE GLUCOSE BLD GHB EST-MCNC: 154 MG/DL
GLUCOSE UR STRIP-MCNC: >1000 MG/DL
HBA1C MFR BLD HPLC: 7.2 % (ref 4.5–6.6)
HCT VFR BLD AUTO: 42.8 % (ref 38–47)
HGB BLD-MCNC: 13.8 G/DL (ref 12–16)
IMM GRANULOCYTES # BLD AUTO: 0.02 K/UL (ref 0–0.04)
IMM GRANULOCYTES NFR BLD: 0.3 % (ref 0–0.4)
KETONES UR STRIP-SCNC: NEGATIVE MG/DL
LEUKOCYTE ESTERASE UR QL STRIP: NEGATIVE
LYMPHOCYTES # BLD AUTO: 2.14 K/UL (ref 1–4.8)
LYMPHOCYTES NFR BLD AUTO: 31.4 % (ref 27–41)
MCH RBC QN AUTO: 27 PG (ref 27–31)
MCHC RBC AUTO-ENTMCNC: 32.2 G/DL (ref 32–36)
MCV RBC AUTO: 83.6 FL (ref 80–96)
MONOCYTES # BLD AUTO: 0.56 K/UL (ref 0–0.8)
MONOCYTES NFR BLD AUTO: 8.2 % (ref 2–6)
MPC BLD CALC-MCNC: 10.4 FL (ref 9.4–12.4)
NEUTROPHILS # BLD AUTO: 3.93 K/UL (ref 1.8–7.7)
NEUTROPHILS NFR BLD AUTO: 57.6 % (ref 53–65)
NITRITE UR QL STRIP: NEGATIVE
NRBC # BLD AUTO: 0 X10E3/UL
NRBC, AUTO (.00): 0 %
PH UR STRIP: 6 PH UNITS
PLATELET # BLD AUTO: 279 K/UL (ref 150–400)
PROT UR QL STRIP: NEGATIVE
RBC # BLD AUTO: 5.12 M/UL (ref 4.2–5.4)
RBC # UR STRIP: NEGATIVE /UL
SP GR UR STRIP: 1.04
TSH SERPL DL<=0.005 MIU/L-ACNC: 1.58 UIU/ML (ref 0.36–3.74)
UROBILINOGEN UR STRIP-ACNC: NORMAL MG/DL
VIT B12 SERPL-MCNC: 1110 PG/ML (ref 193–986)
WBC # BLD AUTO: 6.82 K/UL (ref 4.5–11)

## 2022-11-28 PROCEDURE — 3288F PR FALLS RISK ASSESSMENT DOCUMENTED: ICD-10-PCS | Mod: ,,, | Performed by: INTERNAL MEDICINE

## 2022-11-28 PROCEDURE — 4010F ACE/ARB THERAPY RXD/TAKEN: CPT | Mod: ,,, | Performed by: INTERNAL MEDICINE

## 2022-11-28 PROCEDURE — 3066F NEPHROPATHY DOC TX: CPT | Mod: ,,, | Performed by: INTERNAL MEDICINE

## 2022-11-28 PROCEDURE — 99214 OFFICE O/P EST MOD 30 MIN: CPT | Mod: 25,,, | Performed by: INTERNAL MEDICINE

## 2022-11-28 PROCEDURE — 3078F DIAST BP <80 MM HG: CPT | Mod: ,,, | Performed by: INTERNAL MEDICINE

## 2022-11-28 PROCEDURE — 1160F RVW MEDS BY RX/DR IN RCRD: CPT | Mod: ,,, | Performed by: INTERNAL MEDICINE

## 2022-11-28 PROCEDURE — 3075F PR MOST RECENT SYSTOLIC BLOOD PRESS GE 130-139MM HG: ICD-10-PCS | Mod: ,,, | Performed by: INTERNAL MEDICINE

## 2022-11-28 PROCEDURE — 99214 PR OFFICE/OUTPT VISIT, EST, LEVL IV, 30-39 MIN: ICD-10-PCS | Mod: 25,,, | Performed by: INTERNAL MEDICINE

## 2022-11-28 PROCEDURE — 3061F NEG MICROALBUMINURIA REV: CPT | Mod: ,,, | Performed by: INTERNAL MEDICINE

## 2022-11-28 PROCEDURE — 85025 COMPLETE CBC W/AUTO DIFF WBC: CPT | Mod: ,,, | Performed by: CLINICAL MEDICAL LABORATORY

## 2022-11-28 PROCEDURE — 81003 URINALYSIS AUTO W/O SCOPE: CPT | Mod: QW,,, | Performed by: CLINICAL MEDICAL LABORATORY

## 2022-11-28 PROCEDURE — 83036 HEMOGLOBIN GLYCOSYLATED A1C: CPT | Mod: ,,, | Performed by: CLINICAL MEDICAL LABORATORY

## 2022-11-28 PROCEDURE — 3061F PR NEG MICROALBUMINURIA RESULT DOCUMENTED/REVIEW: ICD-10-PCS | Mod: ,,, | Performed by: INTERNAL MEDICINE

## 2022-11-28 PROCEDURE — 84443 TSH: ICD-10-PCS | Mod: ,,, | Performed by: CLINICAL MEDICAL LABORATORY

## 2022-11-28 PROCEDURE — 3051F PR MOST RECENT HEMOGLOBIN A1C LEVEL 7.0 - < 8.0%: ICD-10-PCS | Mod: ,,, | Performed by: INTERNAL MEDICINE

## 2022-11-28 PROCEDURE — 81003 URINALYSIS, REFLEX TO URINE CULTURE: ICD-10-PCS | Mod: QW,,, | Performed by: CLINICAL MEDICAL LABORATORY

## 2022-11-28 PROCEDURE — 1159F MED LIST DOCD IN RCRD: CPT | Mod: ,,, | Performed by: INTERNAL MEDICINE

## 2022-11-28 PROCEDURE — 3288F FALL RISK ASSESSMENT DOCD: CPT | Mod: ,,, | Performed by: INTERNAL MEDICINE

## 2022-11-28 PROCEDURE — 1125F PR PAIN SEVERITY QUANTIFIED, PAIN PRESENT: ICD-10-PCS | Mod: ,,, | Performed by: INTERNAL MEDICINE

## 2022-11-28 PROCEDURE — 3051F HG A1C>EQUAL 7.0%<8.0%: CPT | Mod: ,,, | Performed by: INTERNAL MEDICINE

## 2022-11-28 PROCEDURE — 83036 HEMOGLOBIN A1C: ICD-10-PCS | Mod: ,,, | Performed by: CLINICAL MEDICAL LABORATORY

## 2022-11-28 PROCEDURE — 1159F PR MEDICATION LIST DOCUMENTED IN MEDICAL RECORD: ICD-10-PCS | Mod: ,,, | Performed by: INTERNAL MEDICINE

## 2022-11-28 PROCEDURE — 1160F PR REVIEW ALL MEDS BY PRESCRIBER/CLIN PHARMACIST DOCUMENTED: ICD-10-PCS | Mod: ,,, | Performed by: INTERNAL MEDICINE

## 2022-11-28 PROCEDURE — 4010F PR ACE/ARB THEARPY RXD/TAKEN: ICD-10-PCS | Mod: ,,, | Performed by: INTERNAL MEDICINE

## 2022-11-28 PROCEDURE — 1101F PR PT FALLS ASSESS DOC 0-1 FALLS W/OUT INJ PAST YR: ICD-10-PCS | Mod: ,,, | Performed by: INTERNAL MEDICINE

## 2022-11-28 PROCEDURE — 1101F PT FALLS ASSESS-DOCD LE1/YR: CPT | Mod: ,,, | Performed by: INTERNAL MEDICINE

## 2022-11-28 PROCEDURE — 3078F PR MOST RECENT DIASTOLIC BLOOD PRESSURE < 80 MM HG: ICD-10-PCS | Mod: ,,, | Performed by: INTERNAL MEDICINE

## 2022-11-28 PROCEDURE — 85025 CBC WITH DIFFERENTIAL: ICD-10-PCS | Mod: ,,, | Performed by: CLINICAL MEDICAL LABORATORY

## 2022-11-28 PROCEDURE — 3066F PR DOCUMENTATION OF TREATMENT FOR NEPHROPATHY: ICD-10-PCS | Mod: ,,, | Performed by: INTERNAL MEDICINE

## 2022-11-28 PROCEDURE — 96372 THER/PROPH/DIAG INJ SC/IM: CPT | Mod: ,,, | Performed by: INTERNAL MEDICINE

## 2022-11-28 PROCEDURE — 82607 VITAMIN B-12: CPT | Mod: GZ,,, | Performed by: CLINICAL MEDICAL LABORATORY

## 2022-11-28 PROCEDURE — 84443 ASSAY THYROID STIM HORMONE: CPT | Mod: ,,, | Performed by: CLINICAL MEDICAL LABORATORY

## 2022-11-28 PROCEDURE — 1125F AMNT PAIN NOTED PAIN PRSNT: CPT | Mod: ,,, | Performed by: INTERNAL MEDICINE

## 2022-11-28 PROCEDURE — 3075F SYST BP GE 130 - 139MM HG: CPT | Mod: ,,, | Performed by: INTERNAL MEDICINE

## 2022-11-28 PROCEDURE — 82607 VITAMIN B12: ICD-10-PCS | Mod: GZ,,, | Performed by: CLINICAL MEDICAL LABORATORY

## 2022-11-28 PROCEDURE — 96372 PR INJECTION,THERAP/PROPH/DIAG2ST, IM OR SUBCUT: ICD-10-PCS | Mod: ,,, | Performed by: INTERNAL MEDICINE

## 2022-11-28 RX ORDER — AMLODIPINE BESYLATE 5 MG/1
5 TABLET ORAL
COMMUNITY
Start: 2022-11-01

## 2022-11-28 RX ORDER — KETOROLAC TROMETHAMINE 30 MG/ML
15 INJECTION, SOLUTION INTRAMUSCULAR; INTRAVENOUS
Status: COMPLETED | OUTPATIENT
Start: 2022-11-28 | End: 2022-11-28

## 2022-11-28 RX ADMIN — KETOROLAC TROMETHAMINE 15 MG: 30 INJECTION, SOLUTION INTRAMUSCULAR; INTRAVENOUS at 11:11

## 2022-11-28 NOTE — PATIENT INSTRUCTIONS
Lorraine was seen today for groin pain.    Diagnoses and all orders for this visit:    Left hip pain  -     ketorolac injection 15 mg  -     Urinalysis, Reflex to Urine Culture; Future  -     X-Ray Hip 2 or 3 views Left (with Pelvis when performed); Future  -     Urinalysis, Reflex to Urine Culture  -     CT Pelvis Without Contrast; Future    Type 2 diabetes mellitus without complication, without long-term current use of insulin  -     Hemoglobin A1C; Future  -     Hemoglobin A1C    Other fatigue  -     CBC Auto Differential; Future  -     Vitamin B12; Future  -     TSH; Future  -     CBC Auto Differential  -     Vitamin B12  -     TSH    Thyroid nodule  -     TSH; Future  -     TSH  -     Ambulatory referral/consult to Endocrinology; Future

## 2022-11-28 NOTE — PROGRESS NOTES
"Subjective:       Patient ID: Lorraine De Jesus is a 77 y.o. female.    Chief Complaint: Groin Pain    Patient is here today for a follow up evaluation. Patient blood pressure is stable today on intake, 130/70. Patient has a complaint of pain of the left side groin area. Patient states the pain is on and off. Patient states she feel as if her left leg is "about to give out". Will order X-ray of left hip. Will order Toradol 15mg IM. Will order CT of Pelvis for left groin pain. Patient is concerned about Thyroid Scan/ Ultrasound. Recent US reviewed of Thyroid, results show multiple thyroid nodules. Patient states she could not follow with Endocrinologist in [place], [place] due to insurance issue. Will refer to Endocrinologist,  in [place], [place]. Patient also has a complaint of tiredness and weakness. Medications discussed with patient. Discussed with patient that medications Norco and Neurontin can cause tiredness and drowsiness. Will order lab work today and urine today. Will follow with patient in 6 weeks.     Current Medications:    Current Outpatient Medications:     amLODIPine (NORVASC) 5 MG tablet, Take 5 mg by mouth., Disp: , Rfl:     aspirin (ECOTRIN) 81 MG EC tablet, Take 81 mg by mouth once daily., Disp: , Rfl:     blood sugar diagnostic Strp, 1 strip by Misc.(Non-Drug; Combo Route) route 3 (three) times daily., Disp: 200 strip, Rfl: 3    blood-glucose meter kit, Use as instructed, Disp: 1 each, Rfl: 0    cloNIDine (CATAPRES) 0.1 MG tablet, TAKE 1 TABLET BY MOUTH EVERY 4 HOURS AS NEEDED FOR BLOOD PRESSURE GREATER THAN 170/90, Disp: , Rfl:     DULoxetine (CYMBALTA) 60 MG capsule, Take 1 capsule (60 mg total) by mouth once daily. Take one capsule at bedtime, Disp: 30 capsule, Rfl: 2    estradioL (ESTRACE) 0.01 % (0.1 mg/gram) vaginal cream, 1/2 applicatorful vaginally once or twice monthly as needed for vaginal dryness, Disp: 42.5 g, Rfl: 1    ezetimibe (ZETIA) 10 mg tablet, Take 1 tablet (10 mg total) by " mouth once daily., Disp: 90 tablet, Rfl: 3    FARXIGA 10 mg tablet, TAKE 1 TABLET(10 MG) BY MOUTH EVERY DAY, Disp: 90 tablet, Rfl: 1    fluticasone propionate (FLONASE) 50 mcg/actuation nasal spray, SHAKE LIQUID AND USE 2 SPRAYS(100 MCG) IN EACH NOSTRIL EVERY DAY, Disp: 48 g, Rfl: 1    gabapentin (NEURONTIN) 400 MG capsule, TAKE 1 CAPSULE(400 MG) BY MOUTH EVERY 8 HOURS, Disp: 90 capsule, Rfl: 2    glipiZIDE (GLUCOTROL) 10 MG TR24, Take 1 tablet (10 mg total) by mouth daily with breakfast., Disp: 90 tablet, Rfl: 1    [START ON 1/24/2023] HYDROcodone-acetaminophen (NORCO)  mg per tablet, Take 1 tablet by mouth every 6 (six) hours., Disp: 120 tablet, Rfl: 0    lancets (ACCU-CHEK FASTCLIX LANCET DRUM [place]), Take 1 each by mouth once daily., Disp: , Rfl:     levocetirizine (XYZAL) 5 MG tablet, TAKE 1 TABLET(5 MG) BY MOUTH EVERY EVENING, Disp: 30 tablet, Rfl: 11    levothyroxine (SYNTHROID) 75 MCG tablet, Take 1 tablet (75 mcg total) by mouth before breakfast., Disp: 90 tablet, Rfl: 1    metoprolol succinate (TOPROL-XL) 50 MG 24 hr tablet, TAKE 1 TABLET(50 MG) BY MOUTH EVERY DAY, Disp: 30 tablet, Rfl: 3    olmesartan (BENICAR) 5 MG Tab, Take 5 mg by mouth once daily., Disp: , Rfl:     ONETOUCH DELICA PLUS LANCET 33 gauge Misc, Check blood sugar TID, Disp: 100 each, Rfl: 11    ONETOUCH VERIO TEST STRIPS Strp, TEST BLOOD GLUCOSE LEVELS TWICE DAILY, Disp: 200 strip, Rfl: 4    oxybutynin (DITROPAN-XL) 5 MG TR24, TAKE 1 TABLET(5 MG) BY MOUTH EVERY DAY, Disp: 90 tablet, Rfl: 3    triamcinolone acetonide 0.1% (KENALOG) 0.1 % ointment, Apply to vulva thinly twice daily for 2 weeks, Disp: 15 g, Rfl: 0    cyproheptadine (PERIACTIN) 4 mg tablet, Take 1 tablet (4 mg total) by mouth 4 (four) times daily before meals and nightly. (Patient not taking: Reported on 11/28/2022), Disp: 120 tablet, Rfl: 2    HYDROcodone-acetaminophen (NORCO)  mg per tablet, Take 1 tablet by mouth every 6 (six) hours. (Patient not taking: Reported  on 11/28/2022), Disp: 120 tablet, Rfl: 0    [START ON 12/23/2022] HYDROcodone-acetaminophen (NORCO)  mg per tablet, Take 1 tablet by mouth every 6 (six) hours. (Patient not taking: Reported on 11/28/2022), Disp: 120 tablet, Rfl: 0    loratadine (CLARITIN) 10 mg tablet, Take 1 tablet (10 mg total) by mouth once daily. (Patient not taking: Reported on 9/19/2022), Disp: 30 tablet, Rfl: 0  No current facility-administered medications for this visit.    Last Labs:     Office Visit on 11/16/2022   Component Date Value    Culture, Urine 11/16/2022 No Growth     Color, UA 11/16/2022 Light-Yellow     Clarity, UA 11/16/2022 Clear     pH, UA 11/16/2022 6.0     Leukocytes, UA 11/16/2022 Negative     Nitrites, UA 11/16/2022 Negative     Protein, UA 11/16/2022 Negative     Glucose, UA 11/16/2022 >1000     Ketones, UA 11/16/2022 Negative     Urobilinogen, UA 11/16/2022 Normal     Bilirubin, UA 11/16/2022 Negative     Blood, UA 11/16/2022 Negative     Specific Gravity, UA 11/16/2022 1.033 (H)     WBC, UA 11/16/2022 3     RBC, UA 11/16/2022 0     Bacteria, UA 11/16/2022 Occasional (A)     Squamous Epithelial Cell* 11/16/2022 Occasional (A)        Last Imaging:  X-Ray Hip 2 or 3 views Left (with Pelvis when performed)  Narrative: EXAMINATION:  XR HIP WITH PELVIS WHEN PERFORMED, 2 OR 3 VIEWS LEFT    CLINICAL HISTORY:  .  Pain in left hip    COMPARISON:  March 25, 2021    TECHNIQUE:  AP and frog-leg lateral views left hip to include AP pelvis.  Three total views    FINDINGS:  There is no acute fracture or dislocation.  Left femoral head is well conjugated.  There is mild to moderate osteophyte formation and moderate joint space narrowing of the left hip.  There is mild osteophyte formation and mild to moderate joint space narrowing of the right hip.  Suspect osteopenia  Impression: Moderate osteoarthritis left hip    No acute fracture    Suspect osteopenia    Electronically signed by: Tevin  Jean-Claude  Date:    11/28/2022  Time:    12:31         Review of Systems   Musculoskeletal:         Left groin pain   All other systems reviewed and are negative.      Objective:      Physical Exam  Vitals reviewed.   Constitutional:       Appearance: Normal appearance. She is normal weight.   Cardiovascular:      Rate and Rhythm: Normal rate and regular rhythm.      Pulses: Normal pulses.      Heart sounds: Normal heart sounds.   Pulmonary:      Effort: Pulmonary effort is normal.      Breath sounds: Normal breath sounds.   Abdominal:      General: Abdomen is flat. Bowel sounds are normal.      Palpations: Abdomen is soft.   Musculoskeletal:         General: Normal range of motion.      Cervical back: Normal range of motion and neck supple.   Skin:     General: Skin is warm and dry.   Neurological:      General: No focal deficit present.      Mental Status: She is alert and oriented to person, place, and time. Mental status is at baseline.       Assessment:       1. Left hip pain  ketorolac injection 15 mg    Urinalysis, Reflex to Urine Culture    X-Ray Hip 2 or 3 views Left (with Pelvis when performed)    Urinalysis, Reflex to Urine Culture    CT Pelvis Without Contrast      2. Type 2 diabetes mellitus without complication, without long-term current use of insulin  Hemoglobin A1C    Hemoglobin A1C      3. Other fatigue  CBC Auto Differential    Vitamin B12    TSH    CBC Auto Differential    Vitamin B12    TSH      4. Thyroid nodule  TSH    TSH    Ambulatory referral/consult to Endocrinology           Plan:         Lorraine was seen today for groin pain.    Diagnoses and all orders for this visit:    Left hip pain  -     ketorolac injection 15 mg  -     Urinalysis, Reflex to Urine Culture; Future  -     X-Ray Hip 2 or 3 views Left (with Pelvis when performed); Future  -     Urinalysis, Reflex to Urine Culture  -     CT Pelvis Without Contrast; Future    Type 2 diabetes mellitus without complication, without long-term  current use of insulin  -     Hemoglobin A1C; Future  -     Hemoglobin A1C    Other fatigue  -     CBC Auto Differential; Future  -     Vitamin B12; Future  -     TSH; Future  -     CBC Auto Differential  -     Vitamin B12  -     TSH    Thyroid nodule  -     TSH; Future  -     TSH  -     Ambulatory referral/consult to Endocrinology; Future

## 2022-12-05 ENCOUNTER — OFFICE VISIT (OUTPATIENT)
Dept: FAMILY MEDICINE | Facility: CLINIC | Age: 77
End: 2022-12-05
Payer: COMMERCIAL

## 2022-12-05 VITALS
HEART RATE: 96 BPM | HEIGHT: 68 IN | DIASTOLIC BLOOD PRESSURE: 87 MMHG | SYSTOLIC BLOOD PRESSURE: 152 MMHG | RESPIRATION RATE: 17 BRPM | OXYGEN SATURATION: 99 % | WEIGHT: 176 LBS | BODY MASS INDEX: 26.67 KG/M2 | TEMPERATURE: 99 F

## 2022-12-05 DIAGNOSIS — J02.9 ACUTE PHARYNGITIS, UNSPECIFIED ETIOLOGY: Primary | ICD-10-CM

## 2022-12-05 DIAGNOSIS — N76.4 LEFT GENITAL LABIAL ABSCESS: ICD-10-CM

## 2022-12-05 PROCEDURE — 99213 OFFICE O/P EST LOW 20 MIN: CPT | Mod: ,,, | Performed by: NURSE PRACTITIONER

## 2022-12-05 PROCEDURE — 1125F AMNT PAIN NOTED PAIN PRSNT: CPT | Mod: ,,, | Performed by: NURSE PRACTITIONER

## 2022-12-05 PROCEDURE — 3077F PR MOST RECENT SYSTOLIC BLOOD PRESSURE >= 140 MM HG: ICD-10-PCS | Mod: ,,, | Performed by: NURSE PRACTITIONER

## 2022-12-05 PROCEDURE — 4010F ACE/ARB THERAPY RXD/TAKEN: CPT | Mod: ,,, | Performed by: NURSE PRACTITIONER

## 2022-12-05 PROCEDURE — 3061F PR NEG MICROALBUMINURIA RESULT DOCUMENTED/REVIEW: ICD-10-PCS | Mod: ,,, | Performed by: NURSE PRACTITIONER

## 2022-12-05 PROCEDURE — 3051F PR MOST RECENT HEMOGLOBIN A1C LEVEL 7.0 - < 8.0%: ICD-10-PCS | Mod: ,,, | Performed by: NURSE PRACTITIONER

## 2022-12-05 PROCEDURE — 3079F PR MOST RECENT DIASTOLIC BLOOD PRESSURE 80-89 MM HG: ICD-10-PCS | Mod: ,,, | Performed by: NURSE PRACTITIONER

## 2022-12-05 PROCEDURE — 3079F DIAST BP 80-89 MM HG: CPT | Mod: ,,, | Performed by: NURSE PRACTITIONER

## 2022-12-05 PROCEDURE — 3066F NEPHROPATHY DOC TX: CPT | Mod: ,,, | Performed by: NURSE PRACTITIONER

## 2022-12-05 PROCEDURE — 99213 PR OFFICE/OUTPT VISIT, EST, LEVL III, 20-29 MIN: ICD-10-PCS | Mod: ,,, | Performed by: NURSE PRACTITIONER

## 2022-12-05 PROCEDURE — 3051F HG A1C>EQUAL 7.0%<8.0%: CPT | Mod: ,,, | Performed by: NURSE PRACTITIONER

## 2022-12-05 PROCEDURE — 1159F MED LIST DOCD IN RCRD: CPT | Mod: ,,, | Performed by: NURSE PRACTITIONER

## 2022-12-05 PROCEDURE — 3077F SYST BP >= 140 MM HG: CPT | Mod: ,,, | Performed by: NURSE PRACTITIONER

## 2022-12-05 PROCEDURE — 4010F PR ACE/ARB THEARPY RXD/TAKEN: ICD-10-PCS | Mod: ,,, | Performed by: NURSE PRACTITIONER

## 2022-12-05 PROCEDURE — 3061F NEG MICROALBUMINURIA REV: CPT | Mod: ,,, | Performed by: NURSE PRACTITIONER

## 2022-12-05 PROCEDURE — 1159F PR MEDICATION LIST DOCUMENTED IN MEDICAL RECORD: ICD-10-PCS | Mod: ,,, | Performed by: NURSE PRACTITIONER

## 2022-12-05 PROCEDURE — 1125F PR PAIN SEVERITY QUANTIFIED, PAIN PRESENT: ICD-10-PCS | Mod: ,,, | Performed by: NURSE PRACTITIONER

## 2022-12-05 PROCEDURE — 3066F PR DOCUMENTATION OF TREATMENT FOR NEPHROPATHY: ICD-10-PCS | Mod: ,,, | Performed by: NURSE PRACTITIONER

## 2022-12-05 RX ORDER — CLINDAMYCIN HYDROCHLORIDE 300 MG/1
300 CAPSULE ORAL EVERY 8 HOURS
Qty: 21 CAPSULE | Refills: 0 | Status: SHIPPED | OUTPATIENT
Start: 2022-12-05 | End: 2022-12-12

## 2022-12-05 RX ORDER — FLUCONAZOLE 150 MG/1
150 TABLET ORAL WEEKLY
Qty: 2 TABLET | Refills: 0 | Status: SHIPPED | OUTPATIENT
Start: 2022-12-05 | End: 2022-12-06

## 2022-12-05 NOTE — PROGRESS NOTES
Subjective:       Patient ID: Lorraine De Jesus is a 77 y.o. female.    Chief Complaint: Vaginal Pain (Sharp pain on left side of vagina. Was seen 3 wks ago for same thing. Uti was ruled out. Given kenalog cream. ) and Sore Throat (Sore throat  started yesterday. No fever. )    1. Left labial pain  2. Sore throat    Vaginal Pain  Associated symptoms include a sore throat. Pertinent negatives include no abdominal pain, back pain, fever, headaches, nausea, rash or vomiting.   Sore Throat   Pertinent negatives include no abdominal pain, congestion, coughing, ear pain, headaches, neck pain, shortness of breath or vomiting.   Review of Systems   Constitutional:  Negative for appetite change, fatigue and fever.   HENT:  Positive for sore throat. Negative for nasal congestion and ear pain.    Eyes:  Negative for pain, discharge and itching.   Respiratory:  Negative for cough and shortness of breath.    Cardiovascular:  Negative for chest pain and leg swelling.   Gastrointestinal:  Negative for abdominal pain, change in bowel habit, nausea, vomiting and change in bowel habit.   Genitourinary:  Positive for vaginal pain.   Musculoskeletal:  Negative for back pain, gait problem and neck pain.   Integumentary:  Negative for rash and wound.   Allergic/Immunologic: Negative for immunocompromised state.   Neurological:  Negative for dizziness, weakness and headaches.   All other systems reviewed and are negative.      Objective:      Physical Exam  Vitals and nursing note reviewed.   Constitutional:       General: She is not in acute distress.     Appearance: Normal appearance. She is not ill-appearing, toxic-appearing or diaphoretic.   HENT:      Head: Normocephalic.      Right Ear: Tympanic membrane, ear canal and external ear normal.      Left Ear: Tympanic membrane, ear canal and external ear normal.      Nose: Nose normal. No congestion or rhinorrhea.      Mouth/Throat:      Mouth: Mucous membranes are moist.      Pharynx:  Posterior oropharyngeal erythema present. No oropharyngeal exudate.   Eyes:      General: No scleral icterus.        Right eye: No discharge.         Left eye: No discharge.      Extraocular Movements: Extraocular movements intact.      Conjunctiva/sclera: Conjunctivae normal.      Pupils: Pupils are equal, round, and reactive to light.   Cardiovascular:      Rate and Rhythm: Normal rate and regular rhythm.      Pulses: Normal pulses.      Heart sounds: Normal heart sounds. No murmur heard.  Pulmonary:      Effort: Pulmonary effort is normal. No respiratory distress.      Breath sounds: Normal breath sounds. No wheezing, rhonchi or rales.   Abdominal:      General: Bowel sounds are normal.      Palpations: Abdomen is soft.      Tenderness: There is no abdominal tenderness. There is no right CVA tenderness or left CVA tenderness.   Genitourinary:     Labia:         Right: No rash, tenderness, lesion or injury.         Left: Tenderness present. No rash, lesion or injury.       Vagina: No vaginal discharge.       Musculoskeletal:         General: Normal range of motion.      Cervical back: Neck supple. No tenderness.   Lymphadenopathy:      Cervical: No cervical adenopathy.   Skin:     General: Skin is warm and dry.      Capillary Refill: Capillary refill takes less than 2 seconds.      Findings: No rash.   Neurological:      Mental Status: She is alert and oriented to person, place, and time.   Psychiatric:         Mood and Affect: Mood normal.         Behavior: Behavior normal.         Thought Content: Thought content normal.         Judgment: Judgment normal.          Assessment:       1. Acute pharyngitis, unspecified etiology    2. Left genital labial abscess        Plan:   Acute pharyngitis, unspecified etiology  -     clindamycin (CLEOCIN) 300 MG capsule; Take 1 capsule (300 mg total) by mouth every 8 (eight) hours. for 21 doses  Dispense: 21 capsule; Refill: 0  -     fluconazole (DIFLUCAN) 150 MG Tab; Take 1  tablet (150 mg total) by mouth once a week. for 1 day  Dispense: 2 tablet; Refill: 0    Left genital labial abscess  -     clindamycin (CLEOCIN) 300 MG capsule; Take 1 capsule (300 mg total) by mouth every 8 (eight) hours. for 21 doses  Dispense: 21 capsule; Refill: 0       Risks, benefits, and side effects were discussed with the patient. All questions were answered to the fullest satisfaction of the patient, and pt verbalized understanding and agreement to treatment plan. Pt was to call with any new or worsening symptoms, or present to the ER

## 2022-12-09 ENCOUNTER — OFFICE VISIT (OUTPATIENT)
Dept: DERMATOLOGY | Facility: CLINIC | Age: 77
End: 2022-12-09
Payer: COMMERCIAL

## 2022-12-09 DIAGNOSIS — L72.0 EPIDERMAL CYST: ICD-10-CM

## 2022-12-09 DIAGNOSIS — L82.0 INFLAMED SEBORRHEIC KERATOSIS: Primary | ICD-10-CM

## 2022-12-09 PROCEDURE — 99212 OFFICE O/P EST SF 10 MIN: CPT | Mod: 25,,, | Performed by: STUDENT IN AN ORGANIZED HEALTH CARE EDUCATION/TRAINING PROGRAM

## 2022-12-09 PROCEDURE — 3066F PR DOCUMENTATION OF TREATMENT FOR NEPHROPATHY: ICD-10-PCS | Mod: CPTII,,, | Performed by: STUDENT IN AN ORGANIZED HEALTH CARE EDUCATION/TRAINING PROGRAM

## 2022-12-09 PROCEDURE — 99212 PR OFFICE/OUTPT VISIT, EST, LEVL II, 10-19 MIN: ICD-10-PCS | Mod: 25,,, | Performed by: STUDENT IN AN ORGANIZED HEALTH CARE EDUCATION/TRAINING PROGRAM

## 2022-12-09 PROCEDURE — 1160F PR REVIEW ALL MEDS BY PRESCRIBER/CLIN PHARMACIST DOCUMENTED: ICD-10-PCS | Mod: CPTII,,, | Performed by: STUDENT IN AN ORGANIZED HEALTH CARE EDUCATION/TRAINING PROGRAM

## 2022-12-09 PROCEDURE — 3051F HG A1C>EQUAL 7.0%<8.0%: CPT | Mod: CPTII,,, | Performed by: STUDENT IN AN ORGANIZED HEALTH CARE EDUCATION/TRAINING PROGRAM

## 2022-12-09 PROCEDURE — 1160F RVW MEDS BY RX/DR IN RCRD: CPT | Mod: CPTII,,, | Performed by: STUDENT IN AN ORGANIZED HEALTH CARE EDUCATION/TRAINING PROGRAM

## 2022-12-09 PROCEDURE — 4010F ACE/ARB THERAPY RXD/TAKEN: CPT | Mod: CPTII,,, | Performed by: STUDENT IN AN ORGANIZED HEALTH CARE EDUCATION/TRAINING PROGRAM

## 2022-12-09 PROCEDURE — 1159F MED LIST DOCD IN RCRD: CPT | Mod: CPTII,,, | Performed by: STUDENT IN AN ORGANIZED HEALTH CARE EDUCATION/TRAINING PROGRAM

## 2022-12-09 PROCEDURE — 3061F NEG MICROALBUMINURIA REV: CPT | Mod: CPTII,,, | Performed by: STUDENT IN AN ORGANIZED HEALTH CARE EDUCATION/TRAINING PROGRAM

## 2022-12-09 PROCEDURE — 17110 PR DESTRUCTION BENIGN LESIONS UP TO 14: ICD-10-PCS | Mod: ,,, | Performed by: STUDENT IN AN ORGANIZED HEALTH CARE EDUCATION/TRAINING PROGRAM

## 2022-12-09 PROCEDURE — 3061F PR NEG MICROALBUMINURIA RESULT DOCUMENTED/REVIEW: ICD-10-PCS | Mod: CPTII,,, | Performed by: STUDENT IN AN ORGANIZED HEALTH CARE EDUCATION/TRAINING PROGRAM

## 2022-12-09 PROCEDURE — 3051F PR MOST RECENT HEMOGLOBIN A1C LEVEL 7.0 - < 8.0%: ICD-10-PCS | Mod: CPTII,,, | Performed by: STUDENT IN AN ORGANIZED HEALTH CARE EDUCATION/TRAINING PROGRAM

## 2022-12-09 PROCEDURE — 17110 DESTRUCTION B9 LES UP TO 14: CPT | Mod: ,,, | Performed by: STUDENT IN AN ORGANIZED HEALTH CARE EDUCATION/TRAINING PROGRAM

## 2022-12-09 PROCEDURE — 4010F PR ACE/ARB THEARPY RXD/TAKEN: ICD-10-PCS | Mod: CPTII,,, | Performed by: STUDENT IN AN ORGANIZED HEALTH CARE EDUCATION/TRAINING PROGRAM

## 2022-12-09 PROCEDURE — 1159F PR MEDICATION LIST DOCUMENTED IN MEDICAL RECORD: ICD-10-PCS | Mod: CPTII,,, | Performed by: STUDENT IN AN ORGANIZED HEALTH CARE EDUCATION/TRAINING PROGRAM

## 2022-12-09 PROCEDURE — 3066F NEPHROPATHY DOC TX: CPT | Mod: CPTII,,, | Performed by: STUDENT IN AN ORGANIZED HEALTH CARE EDUCATION/TRAINING PROGRAM

## 2022-12-09 NOTE — PROGRESS NOTES
[facility]  Santiago Sheikh MD    [address] [place] [address]  [phone]    Fax: [phone]    Patient Name: Lorraine De Jesus  Medical Record Number: [id number]  PCP: Neo Manzo DO  Age: 77 y.o. : 1945  Contact: [phone] (home)     CC: skin lesions  History of Present Illness:     Lorraine De Jesus is a 77 y.o.  female with no history of skin cancer  who presents to clinic today for skin lesions on bilateral periorbital region.  This has been present for over three months. Symptoms include irritation. Previous treatments include none. Other concerns today are hyperpigmented skin lesion on the right cheek.       The patient has no other concerns today.    Review of Systems:     Unremarkable other than mentioned above.     Physical Exam:     General: Relaxed, oriented, alert    Skin examination of the scalp, face, neck, chest, back, abdomen, upper extremities and lower extremities were normal except for as listed below      Assessment and Plan:     1. Irritated Seborrheic Keratoses (L82.0)  Stuck-on inflamed papules with crust located on the left cheek, left lower eyelid, right upper and lower eyelid  Associated diagnoses: Pruritus and Cutaneous Inflammation    Plan: Liquid Nitrogen.  A total of 4 lesions were treated with liquid nitrogen, located on the above listed location.  This procedure was medically necessary because the lesions that were treated were: irritated and itchy. The  patient's consent was obtained including but not limited to risks of crusting, scabbing, blistering, scarring, darker  or lighter pigmentary change, recurrence, incomplete removal and infection.      2. Epidermal Cyst  - subcutaneous cyst with prominent follicular pore located on the right cheek    Plan:   Epidermal Cysts can be excised if necessary.          Return to clinic as needed.     AVS printed with patient instructions     Santiago Sheikh MD   Mohs Surgery/Dermatologic  Oncology  Dermatology

## 2022-12-19 PROBLEM — Z00.00 ENCOUNTER FOR SUBSEQUENT ANNUAL WELLNESS VISIT (AWV) IN MEDICARE PATIENT: Status: RESOLVED | Noted: 2021-06-14 | Resolved: 2022-12-19

## 2022-12-19 PROBLEM — Z87.440 PERSONAL HISTORY UTI: Status: RESOLVED | Noted: 2021-07-28 | Resolved: 2022-12-19

## 2023-01-11 NOTE — PATIENT INSTRUCTIONS
WOUND CARE INSTRUCTIONS    1. Leave your pressure bandage on for 24 hours (unless told to keep it on for 48 hours). You will not need to perform any wound care until this bandage is removed. Please do NOT get the bandage wet.  2. When you initially begin wound care, you may let the water hit the pressure bandage to loosen it from your skin. The bandage should be removed before bathing/showering.  3. Wash your hands thoroughly before starting wound care. Do not use the same cloth/rag/sponge you would use to wash the remainder of your body as this may introduce bacteria from other areas of your body and possibly cause infection at the surgical site.  4. Please clean the surgery site once to twice daily with a mild liquid soap (i.e. Dove, Cetaphil, Baby shampoo).   5. Dry the area with a fresh Q-tip or clean gauze.  6. Perform Vinegar soak to the area to help prevent infection. Soak the affected area for 5-10 minutes once daily, then pat dry. To make a quart of the vinegar soak, mix 3 tablespoons white vinegar with 1 quart of luke-warm water.   7. Apply a generous amount of Vaseline or Aquaphor to the wound/sutures. Do not use Neosporin, or any antibacterial ointment as this is likely to cause an allergic reaction to the site. If you are not sure of the sanitary condition of any Vaseline/Aquaphor you may have at home, please purchase a new jar or tube. DO NOT DOUBLE-DIP Q-tips into the ointment and DO NOT USE YOUR FINGERS. This is essential in helping to prevent cross contamination and infection.   8. Cut a non-stick bandage pad to fit the area and then use bandaging tape to hold in place. Paper tape is a good option for very sensitive skin types.  9. You will be using mild soap, clean tap water, vinegar soak, Vaseline/Aquaphor, and a bandage daily for 1 week  10.  After surgery, you may restart all your medications that were stopped (if applicable).      If your surgical site is on your forehead, or close to the eye  area, you will want to use ice packs. Please apply ice packs every hour for 20 minutes while awake. Sleep elevated for the next two nights as this will help decrease the amount of bruising and swelling you will notice the evening after surgery and into the next morning.   For surgical areas on your arms/legs, try to keep the area elevated above the level of your heart as much as possible. This will help to decrease swelling. Frequent gentle rubbing of your fingers or toes in that area will prevent numbness and stiffness.   If located on your arm/hand, we ask that you do not lift anything heavier than a gallon of milk for two weeks. Keep the arm/hand elevated to help decrease swelling in the wrist and fingers. Do not wear jewelry as impending swelling could cause discomfort.  For surgical areas on your head/neck, do not bend over or stoop down. Do not drop your head, as this increases blood to the surgical area and can induce bleeding. Refrain from use of hair care products, hair coloring, or permanents until sutures have been removed and/or the surgical site has completely healed.    BATHING: Begin bathing/showering once pressure bandage comes off. Do not let direct water pressure hit the surgery site. It is okay if it gets wet, just let the water roll over.    PAIN: Tylenol (Acetaminophen) or NSAIDs such as ibuprofen (Advil) or naproxen (Aleve) are adequate for pain relief in most cases, if you are able to take those medications. Alternating Tylenol (acetaminophen) and NSAIDs at 3 hour intervals works well as these medications work differently. For instance, take 1 g of Tylenol at hour 0, then 400-600 mg of Advil at hour 3 if needed, then 1 g of Tylenol at hour 6 if needed, then 400-600 mg of Advil at hour 9 if needed. Do not exceed the daily limit for either medication, which can be found on the bottle. We try to avoid narcotic medications as much as possible. If you are still having severe pain not managed by the  above methods, please call our clinic.    SIGNS OF POSSIBLE INFECTION: Significant redness surrounding the surgery site that is warm to the touch, persistent or worsening pain, fever or flu-like symptoms, increased swelling to the area, thick yellow discharge, and/or foul odor. Please call our office as soon as possible if you experience any of these symptoms as you may have an infection.     BLEEDING: A mild amount of blood on the bandage is expected. Soaking through the bandage is not normal. If this occurs, remove the soiled bandage and apply uninterrupted pressure for 20 minutes by the clock. If this does not stop the bleeding, hold pressure for another 20 minutes with an ice pack. If bleeding stops, apply a bandage per wound care instructions.     IF THE BLEEDING PERSISTS, PLEASE CALL OUR OFFICE.     Normal office hours:   After hours:     If you have concerns about how your wound is healing and would like to send us a photo, please send us a [facility] message, or call for instructions on how to securely send an email.

## 2023-01-12 ENCOUNTER — PROCEDURE VISIT (OUTPATIENT)
Dept: DERMATOLOGY | Facility: CLINIC | Age: 78
End: 2023-01-12
Payer: COMMERCIAL

## 2023-01-12 VITALS — SYSTOLIC BLOOD PRESSURE: 138 MMHG | HEART RATE: 97 BPM | DIASTOLIC BLOOD PRESSURE: 79 MMHG

## 2023-01-12 DIAGNOSIS — L72.0 EPIDERMAL CYST: Primary | ICD-10-CM

## 2023-01-12 DIAGNOSIS — L82.0 INFLAMED SEBORRHEIC KERATOSIS: ICD-10-CM

## 2023-01-12 PROCEDURE — 88304 PATHOLOGY, DERMATOLOGY: ICD-10-PCS | Mod: 26,,, | Performed by: PATHOLOGY

## 2023-01-12 PROCEDURE — 17110 PR DESTRUCTION BENIGN LESIONS UP TO 14: ICD-10-PCS | Mod: XS,,, | Performed by: STUDENT IN AN ORGANIZED HEALTH CARE EDUCATION/TRAINING PROGRAM

## 2023-01-12 PROCEDURE — 17110 DESTRUCTION B9 LES UP TO 14: CPT | Mod: XS,,, | Performed by: STUDENT IN AN ORGANIZED HEALTH CARE EDUCATION/TRAINING PROGRAM

## 2023-01-12 PROCEDURE — 12051 PR INTERMED WOUND REPAIR FACE/EAR/EYELID/NOSE/LIP/MUC MEBR, 2.5CM OR LESS: ICD-10-PCS | Mod: ,,, | Performed by: STUDENT IN AN ORGANIZED HEALTH CARE EDUCATION/TRAINING PROGRAM

## 2023-01-12 PROCEDURE — 99499 UNLISTED E&M SERVICE: CPT | Mod: ,,, | Performed by: STUDENT IN AN ORGANIZED HEALTH CARE EDUCATION/TRAINING PROGRAM

## 2023-01-12 PROCEDURE — 99499 NO LOS: ICD-10-PCS | Mod: ,,, | Performed by: STUDENT IN AN ORGANIZED HEALTH CARE EDUCATION/TRAINING PROGRAM

## 2023-01-12 PROCEDURE — 88304 TISSUE EXAM BY PATHOLOGIST: CPT | Mod: TC,SUR | Performed by: STUDENT IN AN ORGANIZED HEALTH CARE EDUCATION/TRAINING PROGRAM

## 2023-01-12 PROCEDURE — 11441 PR EXC SKIN BENIG 0.6-1 CM FACE,FACIAL: ICD-10-PCS | Mod: ,,, | Performed by: STUDENT IN AN ORGANIZED HEALTH CARE EDUCATION/TRAINING PROGRAM

## 2023-01-12 PROCEDURE — 11441 EXC FACE-MM B9+MARG 0.6-1 CM: CPT | Mod: ,,, | Performed by: STUDENT IN AN ORGANIZED HEALTH CARE EDUCATION/TRAINING PROGRAM

## 2023-01-12 PROCEDURE — 12051 INTMD RPR FACE/MM 2.5 CM/<: CPT | Mod: ,,, | Performed by: STUDENT IN AN ORGANIZED HEALTH CARE EDUCATION/TRAINING PROGRAM

## 2023-01-12 PROCEDURE — 88304 TISSUE EXAM BY PATHOLOGIST: CPT | Mod: 26,,, | Performed by: PATHOLOGY

## 2023-01-12 NOTE — PROGRESS NOTES
[facility]    Santiago Sheikh MD    Elliptical Excision with Intermediate Closure    Tumor Type: cyst  Location:  L cheek   Derm-Path Accession #:  [id number]  Lesion Size:  0.5 x 0.6 cm   Surgical Margins: 0 cm   Post op size: 0.5 x 0.6 cm   Level of Defect:  fat  Repair Type:  Intermediate linear   Repair Length:  1 cm   Sutures: 6-0 prolene, 5-0 moncryl    Primary Surgeon: RONDA Sheikh MD      INDICATIONS:  The risks of bleeding, infection, discomfort, incomplete removal, and scar formation were explained to the patient.  All questions were answered.  After informed consent, confirmation of site and identity, and appropriate instructions, the patient underwent the procedure as follows:    PROCEDURE:  With the patient in a supine position, the lesion was outlined with the above margins. An ellipse was designed around the lesion to conform to relaxed skin tension lines in an effort to minimize scarring and deformity.  The patient was then placed in a supine position.  The lesion and surrounding skin were prepped with chlorhexidine, draped, and anesthetized with 1% lidocaine with epinephrine 1:100,100 buffered with 1:10 sodium bicarbonate.  Using a #15 blade, the skin was excised along premarked lines.  The resulting defect extended through deep subcutaneous tissue.  Wound margins were undermined to limit functional deformity/impairment of adjacent structures.  Bleeding vessels were controlled with  monopolar  electrodessication .  The dermis and subcutaneous tissue were closed with buried vertical mattress sutures.  Epidermal approximation was meticulously refined with simple running sutures, resulting in a linear closure with little to no wound tension.  Blood loss was estimated to be less than 5cc.  The area was coated with petrolatum and covered with a non-adherent dressing followed by gauze and tape.  Postoperative instructions were reviewed per protocol.  The patient left alert and fully  oriented.              Santiago Sheikh MD

## 2023-01-12 NOTE — PROGRESS NOTES
Excision Consult Note    Lorraine De Jesus is a 78 y.o. female who is referred by Dr. Sheikh for evaluation of a EIC on the left cheek. She also has lesions on her face and around eyes that get irritated and itch.     Recurrent skin cancer: No    Preoperative Risk Factors:  Current Anticoagulants: No  Endocarditis / Rheumatic Fever hx: No  Immunocompromised: No  Prosthetic joint: No  Congenital heart defect: No  Prosthetic heart valve: No  Diabetic: Yes  Transplant: No  Pacemaker: No  Defibrillator:  No  Prior problem with local anesthesia: No  Tobacco History: No]  Clindamycin Allergy: No  Pregnant: no     Transmissible Diseases:  HIV No  Hepatitis B or C  No      Exam:  Limited skin exam is normal except for a cyst  located on the left cheek  .    Pathologic Findings:  Accession # n/[id number]  Diagnosis: EIC    Assessment and Plan:  Treatment Options : Given the indications and high cure rate, the patient has agreed to proceed with excision  Risks and Benefits : The rationale for excision was explained to the patient. The risks and benefits to therapy were discussed in detail. Specifically, the risks of infection, scarring, bleeding, dehiscence, hematoma, prolonged wound healing, incomplete removal, allergy to anesthesia, nerve injury, inability to clear the lesion and recurrence were addressed. The treatment site was clearly identified and confirmed by the patient.    Plan:   1) EIC of R cheek   -  Excision    2) Irritated Seborrheic Keratoses (L82.0)  Stuck-on inflamed papules with crust located on the bilateral periorbital area   Associated diagnoses: Pruritus and Cutaneous Inflammation    Plan: Liquid Nitrogen.  A total of 6 lesions were treated with liquid nitrogen, located on the above listed location.  This procedure was medically necessary because the lesions that were treated were: irritated and itchy. The  patient's consent was obtained including but not limited to risks of crusting, scabbing, blistering, scarring,  darker  or lighter pigmentary change, recurrence, incomplete removal and infection.      Santiago Sheikh MD   Mohs Surgery/Dermatologic Oncology

## 2023-01-17 LAB
ESTROGEN SERPL-MCNC: NORMAL PG/ML
INSULIN SERPL-ACNC: NORMAL U[IU]/ML
LAB AP GROSS DESCRIPTION: NORMAL
LAB AP LABORATORY NOTES: NORMAL
LAB AP SPEC A DDX: NORMAL
LAB AP SPEC A MORPHOLOGY: NORMAL
LAB AP SPEC A PROCEDURE: NORMAL
T3RU NFR SERPL: NORMAL %

## 2023-01-19 ENCOUNTER — CLINICAL SUPPORT (OUTPATIENT)
Dept: DERMATOLOGY | Facility: CLINIC | Age: 78
End: 2023-01-19
Payer: COMMERCIAL

## 2023-01-19 DIAGNOSIS — Z48.02 VISIT FOR SUTURE REMOVAL: Primary | ICD-10-CM

## 2023-01-19 NOTE — PROGRESS NOTES
Elliptical Excision with Intermediate Closure     Tumor Type: cyst  Location:  L cheek   Derm-Path Accession #:  [id number]  Lesion Size:  0.5 x 0.6 cm   Surgical Margins: 0 cm   Post op size: 0.5 x 0.6 cm   Level of Defect:  fat  Repair Type:  Intermediate linear   Repair Length:  1 cm   Sutures: 6-0 prolene, 5-0 moncryl     Primary Surgeon: RONDA Sheikh MD    Patient is here for 7 day SR. No s/s of infection patient denies pain incision healing well SR tolerated well. Patient is to return to clinic as needed.         Aileen Sanchez, JOHN/IVC

## 2023-01-27 ENCOUNTER — OFFICE VISIT (OUTPATIENT)
Dept: FAMILY MEDICINE | Facility: CLINIC | Age: 78
End: 2023-01-27
Payer: COMMERCIAL

## 2023-01-27 VITALS
BODY MASS INDEX: 27.05 KG/M2 | DIASTOLIC BLOOD PRESSURE: 90 MMHG | TEMPERATURE: 99 F | RESPIRATION RATE: 18 BRPM | HEIGHT: 69 IN | WEIGHT: 182.63 LBS | SYSTOLIC BLOOD PRESSURE: 124 MMHG | OXYGEN SATURATION: 99 % | HEART RATE: 99 BPM

## 2023-01-27 DIAGNOSIS — R30.0 DYSURIA: ICD-10-CM

## 2023-01-27 DIAGNOSIS — N30.00 ACUTE CYSTITIS WITHOUT HEMATURIA: Primary | ICD-10-CM

## 2023-01-27 PROBLEM — E66.9 OBESE: Status: ACTIVE | Noted: 2023-01-27

## 2023-01-27 PROBLEM — G47.33 OBSTRUCTIVE SLEEP APNEA: Status: ACTIVE | Noted: 2023-01-27

## 2023-01-27 PROBLEM — E03.9 HYPOTHYROIDISM: Status: ACTIVE | Noted: 2023-01-27

## 2023-01-27 LAB
BILIRUB SERPL-MCNC: NEGATIVE MG/DL
BLOOD URINE, POC: NEGATIVE
COLOR, POC UA: NORMAL
GLUCOSE UR QL STRIP: NORMAL
KETONES UR QL STRIP: NEGATIVE
LEUKOCYTE ESTERASE URINE, POC: NEGATIVE
NITRITE, POC UA: POSITIVE
PH, POC UA: 5.5
PROTEIN, POC: NEGATIVE
SPECIFIC GRAVITY, POC UA: 1.01
UROBILINOGEN, POC UA: 0.2

## 2023-01-27 PROCEDURE — 3080F PR MOST RECENT DIASTOLIC BLOOD PRESSURE >= 90 MM HG: ICD-10-PCS | Mod: ,,, | Performed by: NURSE PRACTITIONER

## 2023-01-27 PROCEDURE — 1159F PR MEDICATION LIST DOCUMENTED IN MEDICAL RECORD: ICD-10-PCS | Mod: ,,, | Performed by: NURSE PRACTITIONER

## 2023-01-27 PROCEDURE — 1159F MED LIST DOCD IN RCRD: CPT | Mod: ,,, | Performed by: NURSE PRACTITIONER

## 2023-01-27 PROCEDURE — 3074F PR MOST RECENT SYSTOLIC BLOOD PRESSURE < 130 MM HG: ICD-10-PCS | Mod: ,,, | Performed by: NURSE PRACTITIONER

## 2023-01-27 PROCEDURE — 1160F RVW MEDS BY RX/DR IN RCRD: CPT | Mod: ,,, | Performed by: NURSE PRACTITIONER

## 2023-01-27 PROCEDURE — 81003 POCT URINALYSIS W/O SCOPE: ICD-10-PCS | Mod: QW,,, | Performed by: NURSE PRACTITIONER

## 2023-01-27 PROCEDURE — 3074F SYST BP LT 130 MM HG: CPT | Mod: ,,, | Performed by: NURSE PRACTITIONER

## 2023-01-27 PROCEDURE — 99213 PR OFFICE/OUTPT VISIT, EST, LEVL III, 20-29 MIN: ICD-10-PCS | Mod: ,,, | Performed by: NURSE PRACTITIONER

## 2023-01-27 PROCEDURE — 3080F DIAST BP >= 90 MM HG: CPT | Mod: ,,, | Performed by: NURSE PRACTITIONER

## 2023-01-27 PROCEDURE — 1160F PR REVIEW ALL MEDS BY PRESCRIBER/CLIN PHARMACIST DOCUMENTED: ICD-10-PCS | Mod: ,,, | Performed by: NURSE PRACTITIONER

## 2023-01-27 PROCEDURE — 81003 URINALYSIS AUTO W/O SCOPE: CPT | Mod: QW,,, | Performed by: NURSE PRACTITIONER

## 2023-01-27 PROCEDURE — 99213 OFFICE O/P EST LOW 20 MIN: CPT | Mod: ,,, | Performed by: NURSE PRACTITIONER

## 2023-01-27 RX ORDER — HYDROCODONE BITARTRATE AND ACETAMINOPHEN 10; 325 MG/1; MG/1
1 TABLET ORAL 4 TIMES DAILY PRN
COMMUNITY
Start: 2023-01-26 | End: 2023-02-13 | Stop reason: SDUPTHER

## 2023-01-27 RX ORDER — OLMESARTAN MEDOXOMIL 40 MG/1
40 TABLET ORAL
COMMUNITY
Start: 2022-12-26 | End: 2023-02-13

## 2023-01-27 RX ORDER — CEFUROXIME AXETIL 250 MG/1
250 TABLET ORAL 2 TIMES DAILY
Qty: 20 TABLET | Refills: 0 | Status: SHIPPED | OUTPATIENT
Start: 2023-01-27 | End: 2023-02-06

## 2023-01-27 RX ORDER — CEFTRIAXONE 1 G/1
1 INJECTION, POWDER, FOR SOLUTION INTRAMUSCULAR; INTRAVENOUS
Status: COMPLETED | OUTPATIENT
Start: 2023-01-27 | End: 2023-01-27

## 2023-01-27 RX ADMIN — CEFTRIAXONE 1 G: 1 INJECTION, POWDER, FOR SOLUTION INTRAMUSCULAR; INTRAVENOUS at 04:01

## 2023-01-27 NOTE — PROGRESS NOTES
"Subjective:       Patient ID: Lorraine De Jesus is a 78 y.o. female.    Chief Complaint: Urinary Tract Infection (Possible uti)    Presents to clinic with c/o dysuria for 2-3 days. No fever. No flank pain. No N/V/D.    Review of Systems   Constitutional: Negative.    Respiratory: Negative.     Cardiovascular: Negative.    Gastrointestinal:  Positive for abdominal pain. Negative for diarrhea, nausea and vomiting.   Genitourinary:  Positive for dysuria, frequency and urgency. Negative for flank pain and hematuria.        Reviewed family, medical, surgical, and social history.    Objective:      BP (!) 124/90 (BP Location: Right arm, Patient Position: Sitting, BP Method: Medium (Manual))   Pulse 99   Temp 98.6 °F (37 °C) (Oral)   Resp 18   Ht 5' 8.5" (1.74 m)   Wt 82.8 kg (182 lb 9.6 oz)   LMP  (LMP Unknown)   SpO2 99%   BMI 27.36 kg/m²   Physical Exam  Vitals and nursing note reviewed.   Constitutional:       General: She is not in acute distress.     Appearance: Normal appearance. She is normal weight. She is not ill-appearing, toxic-appearing or diaphoretic.   HENT:      Head: Normocephalic.      Mouth/Throat:      Mouth: Mucous membranes are moist.   Cardiovascular:      Rate and Rhythm: Normal rate and regular rhythm.      Heart sounds: Normal heart sounds.   Pulmonary:      Effort: Pulmonary effort is normal.      Breath sounds: Normal breath sounds.   Abdominal:      General: There is no distension.      Palpations: There is no mass.      Tenderness: There is no abdominal tenderness. There is no right CVA tenderness, left CVA tenderness, guarding or rebound.      Hernia: No hernia is present.   Musculoskeletal:      Cervical back: Normal range of motion and neck supple.   Skin:     General: Skin is warm and dry.      Capillary Refill: Capillary refill takes less than 2 seconds.   Neurological:      Mental Status: She is alert and oriented to person, place, and time.   Psychiatric:         Mood and Affect: " Mood normal.         Behavior: Behavior normal.         Thought Content: Thought content normal.         Judgment: Judgment normal.          Office Visit on 01/27/2023   Component Date Value Ref Range Status    Color, UA 01/27/2023 Orange   Final    Spec Grav UA 01/27/2023 1.010   Final    pH, UA 01/27/2023 5.5   Final    WBC, UA 01/27/2023 negative   Final    Nitrite, UA 01/27/2023 positive   Final    Protein, POC 01/27/2023 negative   Final    Glucose, UA 01/27/2023 >=1000 mg   Final    Ketones, UA 01/27/2023 negative   Final    Bilirubin, POC 01/27/2023 negative   Final    Urobilinogen, UA 01/27/2023 0.2   Final    Blood, UA 01/27/2023 negative   Final      Assessment:       1. Acute cystitis without hematuria    2. Dysuria          Plan:       Acute cystitis without hematuria  -     cefTRIAXone injection 1 g  -     cefUROXime (CEFTIN) 250 MG tablet; Take 1 tablet (250 mg total) by mouth 2 (two) times daily. for 10 days  Dispense: 20 tablet; Refill: 0  -     Urine culture; Future; Expected date: 01/27/2023    Dysuria  -     POCT URINALYSIS W/O SCOPE    Drink plenty of fluids  RTC PRN          Risks, benefits, and side effects were discussed with the patient. All questions were answered to the fullest satisfaction of the patient, and pt verbalized understanding and agreement to treatment plan. Pt was to call with any new or worsening symptoms, or present to the ER.

## 2023-02-02 RX ORDER — DAPAGLIFLOZIN 10 MG/1
10 TABLET, FILM COATED ORAL DAILY
Qty: 90 TABLET | Refills: 1 | Status: SHIPPED | OUTPATIENT
Start: 2023-02-02 | End: 2023-06-07

## 2023-02-13 ENCOUNTER — OFFICE VISIT (OUTPATIENT)
Dept: PAIN MEDICINE | Facility: CLINIC | Age: 78
End: 2023-02-13
Payer: COMMERCIAL

## 2023-02-13 ENCOUNTER — OFFICE VISIT (OUTPATIENT)
Dept: DIABETES SERVICES | Facility: CLINIC | Age: 78
End: 2023-02-13
Payer: COMMERCIAL

## 2023-02-13 VITALS
DIASTOLIC BLOOD PRESSURE: 78 MMHG | RESPIRATION RATE: 16 BRPM | HEIGHT: 68 IN | HEART RATE: 71 BPM | WEIGHT: 181 LBS | BODY MASS INDEX: 27.43 KG/M2 | SYSTOLIC BLOOD PRESSURE: 159 MMHG

## 2023-02-13 VITALS
HEART RATE: 75 BPM | WEIGHT: 180.19 LBS | BODY MASS INDEX: 26.69 KG/M2 | OXYGEN SATURATION: 99 % | HEIGHT: 69 IN | SYSTOLIC BLOOD PRESSURE: 134 MMHG | DIASTOLIC BLOOD PRESSURE: 86 MMHG | RESPIRATION RATE: 14 BRPM

## 2023-02-13 DIAGNOSIS — E11.49 DIABETIC NEUROPATHY WITH NEUROLOGIC COMPLICATION: Chronic | ICD-10-CM

## 2023-02-13 DIAGNOSIS — I10 ESSENTIAL HYPERTENSION: ICD-10-CM

## 2023-02-13 DIAGNOSIS — M54.17 LUMBOSACRAL RADICULOPATHY: Primary | Chronic | ICD-10-CM

## 2023-02-13 DIAGNOSIS — E78.5 HYPERLIPIDEMIA, UNSPECIFIED HYPERLIPIDEMIA TYPE: ICD-10-CM

## 2023-02-13 DIAGNOSIS — Z79.899 ENCOUNTER FOR LONG-TERM (CURRENT) USE OF OTHER MEDICATIONS: ICD-10-CM

## 2023-02-13 DIAGNOSIS — E03.9 HYPOTHYROIDISM, UNSPECIFIED TYPE: ICD-10-CM

## 2023-02-13 DIAGNOSIS — I73.9 PVD (PERIPHERAL VASCULAR DISEASE): ICD-10-CM

## 2023-02-13 DIAGNOSIS — E11.40 DIABETIC NEUROPATHY WITH NEUROLOGIC COMPLICATION: Chronic | ICD-10-CM

## 2023-02-13 DIAGNOSIS — G47.33 OBSTRUCTIVE SLEEP APNEA: ICD-10-CM

## 2023-02-13 DIAGNOSIS — E11.9 TYPE 2 DIABETES MELLITUS WITHOUT COMPLICATION, WITHOUT LONG-TERM CURRENT USE OF INSULIN: Primary | ICD-10-CM

## 2023-02-13 DIAGNOSIS — G89.4 CHRONIC PAIN SYNDROME: Chronic | ICD-10-CM

## 2023-02-13 DIAGNOSIS — M79.672 BILATERAL FOOT PAIN: ICD-10-CM

## 2023-02-13 DIAGNOSIS — M79.671 BILATERAL FOOT PAIN: ICD-10-CM

## 2023-02-13 PROBLEM — R03.0 ELEVATED BLOOD PRESSURE READING: Status: RESOLVED | Noted: 2021-12-06 | Resolved: 2023-02-13

## 2023-02-13 LAB
CTP QC/QA: YES
GLUCOSE SERPL-MCNC: 129 MG/DL (ref 70–110)
HBA1C MFR BLD: 7.9 % (ref 4.5–6.6)
POC (AMP) AMPHETAMINE: NEGATIVE
POC (BAR) BARBITURATES: NEGATIVE
POC (BUP) BUPRENORPHINE: NEGATIVE
POC (BZO) BENZODIAZEPINES: NEGATIVE
POC (COC) COCAINE: NEGATIVE
POC (MDMA) METHYLENEDIOXYMETHAMPHETAMINE 3,4: NEGATIVE
POC (MET) METHAMPHETAMINE: NEGATIVE
POC (MOP) OPIATES: ABNORMAL
POC (MTD) METHADONE: NEGATIVE
POC (OXY) OXYCODONE: NEGATIVE
POC (PCP) PHENCYCLIDINE: NEGATIVE
POC (TCA) TRICYCLIC ANTIDEPRESSANTS: NEGATIVE
POC TEMPERATURE (URINE): 94
POC THC: NEGATIVE

## 2023-02-13 PROCEDURE — 3077F SYST BP >= 140 MM HG: CPT | Mod: CPTII,,, | Performed by: PHYSICIAN ASSISTANT

## 2023-02-13 PROCEDURE — 1101F PR PT FALLS ASSESS DOC 0-1 FALLS W/OUT INJ PAST YR: ICD-10-PCS | Mod: CPTII,,, | Performed by: PHYSICIAN ASSISTANT

## 2023-02-13 PROCEDURE — 3078F PR MOST RECENT DIASTOLIC BLOOD PRESSURE < 80 MM HG: ICD-10-PCS | Mod: CPTII,,, | Performed by: PHYSICIAN ASSISTANT

## 2023-02-13 PROCEDURE — 1101F PT FALLS ASSESS-DOCD LE1/YR: CPT | Mod: CPTII,,, | Performed by: NURSE PRACTITIONER

## 2023-02-13 PROCEDURE — 3288F PR FALLS RISK ASSESSMENT DOCUMENTED: ICD-10-PCS | Mod: CPTII,,, | Performed by: NURSE PRACTITIONER

## 2023-02-13 PROCEDURE — 99215 OFFICE O/P EST HI 40 MIN: CPT | Mod: PBBFAC | Performed by: NURSE PRACTITIONER

## 2023-02-13 PROCEDURE — 82962 GLUCOSE BLOOD TEST: CPT | Mod: PBBFAC | Performed by: NURSE PRACTITIONER

## 2023-02-13 PROCEDURE — 3075F SYST BP GE 130 - 139MM HG: CPT | Mod: CPTII,,, | Performed by: NURSE PRACTITIONER

## 2023-02-13 PROCEDURE — 3288F FALL RISK ASSESSMENT DOCD: CPT | Mod: CPTII,,, | Performed by: NURSE PRACTITIONER

## 2023-02-13 PROCEDURE — 1160F RVW MEDS BY RX/DR IN RCRD: CPT | Mod: CPTII,,, | Performed by: NURSE PRACTITIONER

## 2023-02-13 PROCEDURE — 99213 OFFICE O/P EST LOW 20 MIN: CPT | Mod: PBBFAC,25,27 | Performed by: PHYSICIAN ASSISTANT

## 2023-02-13 PROCEDURE — 3288F FALL RISK ASSESSMENT DOCD: CPT | Mod: CPTII,,, | Performed by: PHYSICIAN ASSISTANT

## 2023-02-13 PROCEDURE — 1160F PR REVIEW ALL MEDS BY PRESCRIBER/CLIN PHARMACIST DOCUMENTED: ICD-10-PCS | Mod: CPTII,,, | Performed by: NURSE PRACTITIONER

## 2023-02-13 PROCEDURE — 3079F PR MOST RECENT DIASTOLIC BLOOD PRESSURE 80-89 MM HG: ICD-10-PCS | Mod: CPTII,,, | Performed by: NURSE PRACTITIONER

## 2023-02-13 PROCEDURE — 3288F PR FALLS RISK ASSESSMENT DOCUMENTED: ICD-10-PCS | Mod: CPTII,,, | Performed by: PHYSICIAN ASSISTANT

## 2023-02-13 PROCEDURE — 3075F PR MOST RECENT SYSTOLIC BLOOD PRESS GE 130-139MM HG: ICD-10-PCS | Mod: CPTII,,, | Performed by: NURSE PRACTITIONER

## 2023-02-13 PROCEDURE — 1159F PR MEDICATION LIST DOCUMENTED IN MEDICAL RECORD: ICD-10-PCS | Mod: CPTII,,, | Performed by: NURSE PRACTITIONER

## 2023-02-13 PROCEDURE — 99214 PR OFFICE/OUTPT VISIT, EST, LEVL IV, 30-39 MIN: ICD-10-PCS | Mod: S$PBB,,, | Performed by: NURSE PRACTITIONER

## 2023-02-13 PROCEDURE — 83036 HEMOGLOBIN GLYCOSYLATED A1C: CPT | Mod: PBBFAC | Performed by: NURSE PRACTITIONER

## 2023-02-13 PROCEDURE — 1125F AMNT PAIN NOTED PAIN PRSNT: CPT | Mod: CPTII,,, | Performed by: PHYSICIAN ASSISTANT

## 2023-02-13 PROCEDURE — 1101F PT FALLS ASSESS-DOCD LE1/YR: CPT | Mod: CPTII,,, | Performed by: PHYSICIAN ASSISTANT

## 2023-02-13 PROCEDURE — 80305 DRUG TEST PRSMV DIR OPT OBS: CPT | Mod: PBBFAC | Performed by: PHYSICIAN ASSISTANT

## 2023-02-13 PROCEDURE — 99214 PR OFFICE/OUTPT VISIT, EST, LEVL IV, 30-39 MIN: ICD-10-PCS | Mod: S$PBB,25,, | Performed by: PHYSICIAN ASSISTANT

## 2023-02-13 PROCEDURE — 3079F DIAST BP 80-89 MM HG: CPT | Mod: CPTII,,, | Performed by: NURSE PRACTITIONER

## 2023-02-13 PROCEDURE — 1159F MED LIST DOCD IN RCRD: CPT | Mod: CPTII,,, | Performed by: NURSE PRACTITIONER

## 2023-02-13 PROCEDURE — 99214 OFFICE O/P EST MOD 30 MIN: CPT | Mod: S$PBB,,, | Performed by: NURSE PRACTITIONER

## 2023-02-13 PROCEDURE — 1125F PR PAIN SEVERITY QUANTIFIED, PAIN PRESENT: ICD-10-PCS | Mod: CPTII,,, | Performed by: PHYSICIAN ASSISTANT

## 2023-02-13 PROCEDURE — 96372 THER/PROPH/DIAG INJ SC/IM: CPT | Mod: PBBFAC | Performed by: PHYSICIAN ASSISTANT

## 2023-02-13 PROCEDURE — 1101F PR PT FALLS ASSESS DOC 0-1 FALLS W/OUT INJ PAST YR: ICD-10-PCS | Mod: CPTII,,, | Performed by: NURSE PRACTITIONER

## 2023-02-13 PROCEDURE — 3077F PR MOST RECENT SYSTOLIC BLOOD PRESSURE >= 140 MM HG: ICD-10-PCS | Mod: CPTII,,, | Performed by: PHYSICIAN ASSISTANT

## 2023-02-13 PROCEDURE — 99214 OFFICE O/P EST MOD 30 MIN: CPT | Mod: S$PBB,25,, | Performed by: PHYSICIAN ASSISTANT

## 2023-02-13 PROCEDURE — 3078F DIAST BP <80 MM HG: CPT | Mod: CPTII,,, | Performed by: PHYSICIAN ASSISTANT

## 2023-02-13 RX ORDER — HYDROCODONE BITARTRATE AND ACETAMINOPHEN 10; 325 MG/1; MG/1
1 TABLET ORAL EVERY 8 HOURS PRN
Qty: 90 TABLET | Refills: 0 | Status: SHIPPED | OUTPATIENT
Start: 2023-02-25 | End: 2023-05-22 | Stop reason: SDUPTHER

## 2023-02-13 RX ORDER — KETOROLAC TROMETHAMINE 30 MG/ML
60 INJECTION, SOLUTION INTRAMUSCULAR; INTRAVENOUS
Status: COMPLETED | OUTPATIENT
Start: 2023-02-13 | End: 2023-02-13

## 2023-02-13 RX ORDER — HYDROCODONE BITARTRATE AND ACETAMINOPHEN 10; 325 MG/1; MG/1
1 TABLET ORAL EVERY 8 HOURS PRN
Qty: 90 TABLET | Refills: 0 | Status: SHIPPED | OUTPATIENT
Start: 2023-03-27 | End: 2023-05-22 | Stop reason: SDUPTHER

## 2023-02-13 RX ORDER — GABAPENTIN 400 MG/1
CAPSULE ORAL
Qty: 90 CAPSULE | Refills: 2 | Status: SHIPPED | OUTPATIENT
Start: 2023-02-13 | End: 2023-05-22 | Stop reason: SDUPTHER

## 2023-02-13 RX ORDER — HYDROCODONE BITARTRATE AND ACETAMINOPHEN 10; 325 MG/1; MG/1
1 TABLET ORAL EVERY 8 HOURS PRN
Qty: 90 TABLET | Refills: 0 | Status: SHIPPED | OUTPATIENT
Start: 2023-04-26 | End: 2023-05-22 | Stop reason: SDUPTHER

## 2023-02-13 RX ORDER — NALOXONE HYDROCHLORIDE 4 MG/.1ML
1 SPRAY NASAL ONCE
Qty: 1 EACH | Refills: 0 | Status: SHIPPED | OUTPATIENT
Start: 2023-02-13 | End: 2023-02-13

## 2023-02-13 RX ADMIN — KETOROLAC TROMETHAMINE 60 MG: 30 INJECTION, SOLUTION INTRAMUSCULAR at 11:02

## 2023-02-13 NOTE — PROGRESS NOTES
Subjective:       Patient ID: Lorraine De Jesus is a 78 y.o. female.    Chief Complaint: General Diabetes Follow-up (Here for fu and A1c  checks sugar 2 times a day  c/o glucose and bp staying high )    Here today for routine evaluation and med refill.  Her a1c is elevated today from previous check.  She reports that she is checking glucose fasting and 2 hour pp and most readings are in the 160-190 range.     Hemoglobin A1C       Date                     Value               Ref Range           Status                02/13/2023               7.9 (A)             4.5 - 6.6 %         Final                 11/28/2022               7.2 (H)             4.5 - 6.6 %         Final                 08/10/2022               6.3                 4.5 - 6.6 %         Final                 03/02/2022               7.2 (H)             4.5 - 6.6 %         Final                 12/08/2021               7.7 (H)             4.5 - 6.6 %         Final                Lab Results       Component                Value               Date                       MICROALBUR               1.1                 08/10/2022            Lab Results       Component                Value               Date                       CHOL                     226 (H)             08/10/2022                 CHOL                     229 (H)             06/14/2021            Lab Results       Component                Value               Date                       HDL                      56                  08/10/2022                 HDL                      55                  06/14/2021            Lab Results       Component                Value               Date                       LDLCALC                  152                 08/10/2022                 LDLCALC                  151                 06/14/2021            Lab Results       Component                Value               Date                       TRIG                     88                  08/10/2022                  TRIG                     115                 06/14/2021            Lab Results       Component                Value               Date                       CHOLHDL                  4.0                 08/10/2022                 CHOLHDL                  4.2                 06/14/2021            CMP  Sodium       Date                     Value               Ref Range           Status                09/27/2022               142                 136 - 145 mmol*     Final            ----------  Potassium       Date                     Value               Ref Range           Status                09/27/2022               4.1                 3.5 - 5.1 mmol*     Final            ----------  Chloride       Date                     Value               Ref Range           Status                09/27/2022               108 (H)             98 - 107 mmol/L     Final            ----------  CO2       Date                     Value               Ref Range           Status                09/27/2022               27                  21 - 32 mmol/L      Final            ----------  Glucose       Date                     Value               Ref Range           Status                09/27/2022               137 (H)             74 - 106 mg/dL      Final            ----------  BUN       Date                     Value               Ref Range           Status                09/27/2022               17                  7 - 18 mg/dL        Final            ----------  Creatinine       Date                     Value               Ref Range           Status                09/27/2022               0.78                0.55 - 1.02 mg*     Final            ----------  Calcium       Date                     Value               Ref Range           Status                09/27/2022               8.9                 8.5 - 10.1 mg/*     Final            ----------  Total Protein       Date                     Value               Ref Range            Status                10/21/2022               7.5                 6.4 - 8.2 g/dL      Final            ----------  Albumin       Date                     Value               Ref Range           Status                10/20/2022               3.5                 3.5 - 5.0 g/dL      Final            ----------  Bilirubin, Total       Date                     Value               Ref Range           Status                10/20/2022               0.4                 >0.0 - 1.2 mg/*     Final            ----------  Alk Phos       Date                     Value               Ref Range           Status                10/20/2022               116                 55 - 142 U/L        Final            ----------  AST       Date                     Value               Ref Range           Status                10/20/2022               16                  15 - 37 U/L         Final            ----------  ALT       Date                     Value               Ref Range           Status                10/20/2022               27                  13 - 56 U/L         Final            ----------  Anion Gap       Date                     Value               Ref Range           Status                09/27/2022               11                  7 - 16 mmol/L       Final            ----------  eGFR       Date                     Value               Ref Range           Status                09/27/2022               79                  >=60 mL/min/1.*     Final            ----------      Review of Systems   Constitutional:  Negative for activity change, appetite change, diaphoresis and fatigue.   HENT:  Negative for nasal congestion, facial swelling and sinus pressure/congestion.    Eyes:  Negative for visual disturbance.   Respiratory:  Negative for shortness of breath and wheezing.    Cardiovascular:  Negative for chest pain and leg swelling.   Gastrointestinal:  Negative for constipation, diarrhea, nausea and vomiting.   Endocrine:  Negative for polydipsia, polyphagia and polyuria.   Genitourinary:  Negative for dysuria, frequency and urgency.   Musculoskeletal:  Negative for gait problem and myalgias.   Integumentary:  Negative for color change, rash and wound.   Neurological:  Negative for dizziness, syncope, weakness, headaches, coordination difficulties and coordination difficulties.   Hematological:  Does not bruise/bleed easily.   Psychiatric/Behavioral:  Negative for self-injury, sleep disturbance and suicidal ideas. The patient is not nervous/anxious.        Objective:      Physical Exam  Vitals and nursing note reviewed.   Constitutional:       Appearance: Normal appearance.   HENT:      Head: Normocephalic.   Cardiovascular:      Rate and Rhythm: Normal rate and regular rhythm.      Pulses:           Dorsalis pedis pulses are 2+ on the right side and 2+ on the left side.        Posterior tibial pulses are 2+ on the right side and 2+ on the left side.      Heart sounds: Normal heart sounds.   Pulmonary:      Effort: Pulmonary effort is normal.      Breath sounds: Normal breath sounds.   Musculoskeletal:         General: Normal range of motion.   Feet:      Right foot:      Protective Sensation: 6 sites tested.  6 sites sensed.      Skin integrity: Skin integrity normal.      Left foot:      Protective Sensation: 6 sites tested.  6 sites sensed.      Skin integrity: Skin integrity normal.   Skin:     General: Skin is warm and dry.   Neurological:      General: No focal deficit present.      Mental Status: She is alert and oriented to person, place, and time.   Psychiatric:         Mood and Affect: Mood normal.         Behavior: Behavior normal.         Thought Content: Thought content normal.         Judgment: Judgment normal.       Assessment:       Problem List Items Addressed This Visit          Neuro    Chronic pain syndrome (Chronic)    Diabetic neuropathy with neurologic complication (Chronic)       Cardiac/Vascular    Essential  hypertension    Hyperlipidemia    PVD (peripheral vascular disease)       Endocrine    BMI 27.0-27.9,adult    Hypothyroidism    Type 2 diabetes mellitus without complication - Primary    Relevant Orders    Hemoglobin A1C, POCT (Completed)    POCT Glucose, Hand-Held Device (Completed)       Other    Obstructive sleep apnea         Plan:       Problem List Items Addressed This Visit          Neuro    Chronic pain syndrome (Chronic)    Diabetic neuropathy with neurologic complication (Chronic)       Cardiac/Vascular    Essential hypertension    Hyperlipidemia    PVD (peripheral vascular disease)       Endocrine    BMI 27.0-27.9,adult    Hypothyroidism    Type 2 diabetes mellitus without complication - Primary    Relevant Orders    Hemoglobin A1C, POCT (Completed)    POCT Glucose, Hand-Held Device (Completed)       Other    Obstructive sleep apnea     Ensure to take meds as directed.  Improve diet as discussed.

## 2023-02-13 NOTE — PROGRESS NOTES
Subjective:         Patient ID: Lorraine De Jesus is a 78 y.o. female.    Chief Complaint: Low-back Pain and Leg Pain      Pain  This is a chronic problem. The current episode started more than 1 year ago. The problem occurs daily. The problem has been waxing and waning. Associated symptoms include arthralgias and neck pain. Pertinent negatives include no anorexia, chest pain, chills, coughing, diaphoresis, fever, sore throat, vertigo or vomiting.   Review of Systems   Constitutional:  Negative for activity change, appetite change, chills, diaphoresis, fever and unexpected weight change.   HENT:  Negative for drooling, ear discharge, ear pain, facial swelling, nosebleeds, sore throat, trouble swallowing, voice change and goiter.    Eyes:  Negative for photophobia, pain, discharge, redness and visual disturbance.   Respiratory:  Negative for apnea, cough, choking, chest tightness, shortness of breath, wheezing and stridor.    Cardiovascular:  Negative for chest pain, palpitations and leg swelling.   Gastrointestinal:  Negative for abdominal distention, anorexia, diarrhea, rectal pain, vomiting and fecal incontinence.   Endocrine: Negative for cold intolerance, heat intolerance, polydipsia, polyphagia and polyuria.   Genitourinary:  Negative for bladder incontinence, dysuria, flank pain, frequency and hot flashes.   Musculoskeletal:  Positive for arthralgias, back pain, leg pain and neck pain.   Integumentary:  Negative for color change and pallor.   Allergic/Immunologic: Negative for immunocompromised state.   Neurological:  Negative for dizziness, vertigo, seizures, syncope, facial asymmetry, speech difficulty, light-headedness, coordination difficulties, memory loss and coordination difficulties.   Hematological:  Negative for adenopathy. Does not bruise/bleed easily.   Psychiatric/Behavioral:  Negative for agitation, behavioral problems, confusion, decreased concentration, dysphoric mood, hallucinations, self-injury  and suicidal ideas. The patient is not nervous/anxious and is not hyperactive.          Past Medical History:   Diagnosis Date    Adenomatous polyp of ascending colon 2021    Adenomatous polyp of descending colon 2021    Age related osteoporosis 10/28/2020    Benign paroxysmal vertigo     Chronic pain syndrome     Chronic pelvic pain in female 2021    Ditropan XL 5mg    Colon, diverticulosis 2021    Depressive disorder 2019    External hemorrhoid 2021    Gastrointestinal hemorrhage associated with anorectal source 2021    GERD (gastroesophageal reflux disease) 2013    Hyperlipidemia 2012    Hypothyroidism 2019    Joint pain     Nonrheumatic tricuspid (valve) insufficiency 2018    Personal history UTI 2021    Controlled on Hiprex    Polyneuropathy 2018    PVD (peripheral vascular disease) 10/30/2018    Temporal arteritis     Type 2 diabetes mellitus 2014    Urethral meatal stenosis 2018    history of known urethral stenosis, and was taught to perform monthly self dilation at home.     Past Surgical History:   Procedure Laterality Date     left lumbar sympathetic nerve block Left 2020    X3  DR BROWN    CARDIAC CATHETERIZATION  10/14/2009     SECTION      x 2    COLONOSCOPY  2009    CYSTOSCOPY WITH HYDRODISTENSION OF BLADDER N/A 2021    Procedure: CYSTOSCOPY, WITH BLADDER HYDRODISTENSION;  Surgeon: Dequan Recio MD;  Location: [facility] OR;  Service: Urology;  Laterality: N/A;    CYSTOSCOPY WITH URETHRAL DILATION  2021    Dr Recio    EPIDURAL STEROID INJECTION N/A 10/27/2022    Procedure: Injection, Steroid, Epidural, L5/S1;  Surgeon: Belkis Brown MD;  Location: [facility];  Service: Pain Management;  Laterality: N/A;    HYSTERECTOMY      LUMBAR SYMPATHETIC NERVE BLOCK Left 10/05/2020    Left Sympathetic Nerve Block - Dr Brown - 10/5/20, 20, 20. 20, 10/9/19, 19 and  "7/11/18    right lumbar sympathetic nerve block Right 2020    X4 DR LANDEROS    THYROIDECTOMY      1990's     Social History     Socioeconomic History    Marital status:    Tobacco Use    Smoking status: Never    Smokeless tobacco: Never   Substance and Sexual Activity    Alcohol use: Never    Drug use: Never    Sexual activity: Not Currently     Family History   Problem Relation Age of Onset    Cancer Mother     Hypertension Mother     Cancer Father     Cancer Sister     Diabetes Sister     Hyperlipidemia Sister     Hypertension Sister     Cancer Brother     Diabetes Sister     No Known Problems Sister     Cancer Brother     No Known Problems Brother     Cancer Brother      Review of patient's allergies indicates:   Allergen Reactions    Lipitor [atorvastatin] Other (See Comments)     Muscle aching    Pravachol [pravastatin] Other (See Comments)     Muscle aching        Objective:  Vitals:    02/13/23 1059   BP: (!) 159/78   Pulse: 71   Resp: 16   Weight: 82.1 kg (181 lb)   Height: 5' 8" (1.727 m)   PainSc:   9         Physical Exam  Vitals and nursing note reviewed. Exam conducted with a chaperone present.   Constitutional:       General: She is awake. She is not in acute distress.     Appearance: Normal appearance. She is not ill-appearing, toxic-appearing or diaphoretic.   HENT:      Head: Normocephalic and atraumatic.      Nose: Nose normal.      Mouth/Throat:      Mouth: Mucous membranes are moist.      Pharynx: Oropharynx is clear.   Eyes:      Conjunctiva/sclera: Conjunctivae normal.      Pupils: Pupils are equal, round, and reactive to light.   Cardiovascular:      Rate and Rhythm: Normal rate.   Pulmonary:      Effort: Pulmonary effort is normal. No respiratory distress.   Abdominal:      Palpations: Abdomen is soft.   Musculoskeletal:      Cervical back: Normal range of motion and neck supple.      Thoracic back: Tenderness present.      Lumbar back: Tenderness present. Decreased range of motion. "   Skin:     General: Skin is warm and dry.   Neurological:      General: No focal deficit present.      Mental Status: She is alert and oriented to person, place, and time. Mental status is at baseline.      Cranial Nerves: No cranial nerve deficit (II-XII).   Psychiatric:         Mood and Affect: Mood normal.         Behavior: Behavior normal. Behavior is cooperative.         Thought Content: Thought content normal.         X-Ray Hip 2 or 3 views Left (with Pelvis when performed)  Narrative: EXAMINATION:  XR HIP WITH PELVIS WHEN PERFORMED, 2 OR 3 VIEWS LEFT    CLINICAL HISTORY:  .  Pain in left hip    COMPARISON:  March 25, 2021    TECHNIQUE:  AP and frog-leg lateral views left hip to include AP pelvis.  Three total views    FINDINGS:  There is no acute fracture or dislocation.  Left femoral head is well conjugated.  There is mild to moderate osteophyte formation and moderate joint space narrowing of the left hip.  There is mild osteophyte formation and mild to moderate joint space narrowing of the right hip.  Suspect osteopenia  Impression: Moderate osteoarthritis left hip    No acute fracture    Suspect osteopenia    Electronically signed by: Tevin Bermeo  Date:    11/28/2022  Time:    12:31       Office Visit on 02/13/2023   Component Date Value Ref Range Status    Hemoglobin A1C 02/13/2023 7.9 (A)  4.5 - 6.6 % Final    POC Glucose 02/13/2023 129 (A)  70 - 110 MG/DL Final   Office Visit on 01/27/2023   Component Date Value Ref Range Status    Color, UA 01/27/2023 Orange   Final    Spec Grav UA 01/27/2023 1.010   Final    pH, UA 01/27/2023 5.5   Final    WBC, UA 01/27/2023 negative   Final    Nitrite, UA 01/27/2023 positive   Final    Protein, POC 01/27/2023 negative   Final    Glucose, UA 01/27/2023 >=1000 mg   Final    Ketones, UA 01/27/2023 negative   Final    Bilirubin, POC 01/27/2023 negative   Final    Urobilinogen, UA 01/27/2023 0.2   Final    Blood, UA 01/27/2023 negative   Final   Procedure visit on  01/12/2023   Component Date Value Ref Range Status    Final Diagnosis 01/12/2023    Final                    Value:This result contains rich text formatting which cannot be displayed here.    Specimen A Procedure 01/12/2023 Excision   Final    Specimen A Morphology 01/12/2023 cyst   Final    Specimen A DDx 01/12/2023 EIC   Final    Microscopic Description 01/12/2023    Final                    Value:This result contains rich text formatting which cannot be displayed here.    Gross Description 01/12/2023    Final                    Value:This result contains rich text formatting which cannot be displayed here.    Laboratory Notes 01/12/2023    Final                    Value:This result contains rich text formatting which cannot be displayed here.    Case Report 01/12/2023    Final                    Value:Surgical Pathology                                Case: [id number]                                   Authorizing Provider:  Evelia Sheikh MD        Collected:           01/12/2023 01:00 PM          Ordering Location:     [facility] Received:            01/13/2023 09:18 AM          Pathologist:           Brad Kenney MD                                                            Specimen:    Face, left cheek                                                                          Office Visit on 11/28/2022   Component Date Value Ref Range Status    Hemoglobin A1C 11/28/2022 7.2 (H)  4.5 - 6.6 % Final    Estimated Average Glucose 11/28/2022 154  mg/dL Final    Vitamin B12 11/28/2022 1,110 (H)  193 - 986 pg/mL Final    TSH 11/28/2022 1.580  0.358 - 3.740 uIU/mL Final    Color, UA 11/28/2022 Light-Yellow  Colorless, Straw, Yellow, Light Yellow, Dark Yellow Final    Clarity, UA 11/28/2022 Clear  Clear Final    pH, UA 11/28/2022 6.0  5.0 to 8.0 pH Units Final    Leukocytes, UA 11/28/2022 Negative  Negative Final    Nitrites, UA 11/28/2022 Negative  Negative Final    Protein, UA 11/28/2022 Negative   Negative Final    Glucose, UA 11/28/2022 >1000  Normal mg/dL Final    Ketones, UA 11/28/2022 Negative  Negative mg/dL Final    Urobilinogen, UA 11/28/2022 Normal  0.2, 1.0, Normal mg/dL Final    Bilirubin, UA 11/28/2022 Negative  Negative Final    Blood, UA 11/28/2022 Negative  Negative Final    Specific Gravity, UA 11/28/2022 1.036 (H)  <=1.030 Final    WBC 11/28/2022 6.82  4.50 - 11.00 K/uL Final    RBC 11/28/2022 5.12  4.20 - 5.40 M/uL Final    Hemoglobin 11/28/2022 13.8  12.0 - 16.0 g/dL Final    Hematocrit 11/28/2022 42.8  38.0 - 47.0 % Final    MCV 11/28/2022 83.6  80.0 - 96.0 fL Final    MCH 11/28/2022 27.0  27.0 - 31.0 pg Final    MCHC 11/28/2022 32.2  32.0 - 36.0 g/dL Final    RDW 11/28/2022 15.5 (H)  11.5 - 14.5 % Final    Platelet Count 11/28/2022 279  150 - 400 K/uL Final    MPV 11/28/2022 10.4  9.4 - 12.4 fL Final    Neutrophils % 11/28/2022 57.6  53.0 - 65.0 % Final    Lymphocytes % 11/28/2022 31.4  27.0 - 41.0 % Final    Monocytes % 11/28/2022 8.2 (H)  2.0 - 6.0 % Final    Eosinophils % 11/28/2022 2.1  1.0 - 4.0 % Final    Basophils % 11/28/2022 0.4  0.0 - 1.0 % Final    Immature Granulocytes % 11/28/2022 0.3  0.0 - 0.4 % Final    nRBC, Auto 11/28/2022 0.0  <=0.0 % Final    Neutrophils, Abs 11/28/2022 3.93  1.80 - 7.70 K/uL Final    Lymphocytes, Absolute 11/28/2022 2.14  1.00 - 4.80 K/uL Final    Monocytes, Absolute 11/28/2022 0.56  0.00 - 0.80 K/uL Final    Eosinophils, Absolute 11/28/2022 0.14  0.00 - 0.50 K/uL Final    Basophils, Absolute 11/28/2022 0.03  0.00 - 0.20 K/uL Final    Immature Granulocytes, Absolute 11/28/2022 0.02  0.00 - 0.04 K/uL Final    nRBC, Absolute 11/28/2022 0.00  <=0.00 x10e3/uL Final    Diff Type 11/28/2022 Auto   Final   Office Visit on 11/16/2022   Component Date Value Ref Range Status    Culture, Urine 11/16/2022 No Growth   Final    Color, UA 11/16/2022 Light-Yellow  Colorless, Straw, Yellow, Light Yellow, Dark Yellow Final    Clarity, UA 11/16/2022 Clear  Clear Final     pH, UA 11/16/2022 6.0  5.0 to 8.0 pH Units Final    Leukocytes, UA 11/16/2022 Negative  Negative Final    Nitrites, UA 11/16/2022 Negative  Negative Final    Protein, UA 11/16/2022 Negative  Negative Final    Glucose, UA 11/16/2022 >1000  Normal mg/dL Final    Ketones, UA 11/16/2022 Negative  Negative mg/dL Final    Urobilinogen, UA 11/16/2022 Normal  0.2, 1.0, Normal mg/dL Final    Bilirubin, UA 11/16/2022 Negative  Negative Final    Blood, UA 11/16/2022 Negative  Negative Final    Specific [place], UA 11/16/2022 1.033 (H)  <=1.030 Final    WBC, UA 11/16/2022 3  <=5 /hpf Final    RBC, UA 11/16/2022 0  <=3 /hpf Final    Bacteria, UA 11/16/2022 Occasional (A)  None Seen /hpf Final    Squamous Epithelial Cells, UA 11/16/2022 Occasional (A)  None Seen /HPF Final   Admission on 10/27/2022, Discharged on 10/27/2022   Component Date Value Ref Range Status    POC Glucose 10/27/2022 132 (H)  70 - 105 mg/dL Final   Lab Visit on 10/21/2022   Component Date Value Ref Range Status    Total Protein 10/21/2022 7.5  6.4 - 8.2 g/dL Final    Albumin, PE 10/21/2022 4.43  3.5 - 5.2 g/dL Final    Alpha-1-Globulin 10/21/2022 0.2  0.1 - 0.4 g/dL Final    Alpha-2-Globulin 10/21/2022 0.7  0.4 - 1.3 g/dL Final    Beta, PE 10/21/2022 0.9  0.5 - 1.5 g/dL Final    Gamma, PE 10/21/2022 1.3  0.5 - 1.8 g/dL Final    Pathologist Interp, Pro Elect, Blo* 10/21/2022 Normal. No monoclonal bands identified   Final   Lab Visit on 10/20/2022   Component Date Value Ref Range Status    Total Protein 10/20/2022 8.5 (H)  6.4 - 8.2 g/dL Final    Albumin 10/20/2022 3.5  3.5 - 5.0 g/dL Final    Bilirubin, Total 10/20/2022 0.4  >0.0 - 1.2 mg/dL Final    Bilirubin, Direct 10/20/2022 0.1  0.0 - 0.2 mg/dL Final    AST 10/20/2022 16  15 - 37 U/L Final    ALT 10/20/2022 27  13 - 56 U/L Final    Alk Phos 10/20/2022 116  55 - 142 U/L Final   Office Visit on 10/03/2022   Component Date Value Ref Range Status    POC Amphetamines 10/03/2022 Negative  Negative,  Inconclusive Final    POC Barbiturates 10/03/2022 Negative  Negative, Inconclusive Final    POC Benzodiazepines 10/03/2022 Negative  Negative, Inconclusive Final    POC Cocaine 10/03/2022 Negative  Negative, Inconclusive Final    POC THC 10/03/2022 Negative  Negative, Inconclusive Final    POC Methadone 10/03/2022 Negative  Negative, Inconclusive Final    POC Methamphetamine 10/03/2022 Negative  Negative, Inconclusive Final    POC Opiates 10/03/2022 Presumptive Positive (A)  Negative, Inconclusive Final    POC Oxycodone 10/03/2022 Negative  Negative, Inconclusive Final    POC Phencyclidine 10/03/2022 Negative  Negative, Inconclusive Final    POC Methylenedioxymethamphetamine * 10/03/2022 Negative  Negative, Inconclusive Final    POC Tricyclic Antidepressants 10/03/2022 Negative  Negative, Inconclusive Final    POC Buprenorphine 10/03/2022 Negative   Final     Acceptable 10/03/2022 Yes   Final    POC Temperature (Urine) 10/03/2022 94   Final   Office Visit on 09/27/2022   Component Date Value Ref Range Status    Sodium 09/27/2022 142  136 - 145 mmol/L Final    Potassium 09/27/2022 4.1  3.5 - 5.1 mmol/L Final    Chloride 09/27/2022 108 (H)  98 - 107 mmol/L Final    CO2 09/27/2022 27  21 - 32 mmol/L Final    Anion Gap 09/27/2022 11  7 - 16 mmol/L Final    Glucose 09/27/2022 137 (H)  74 - 106 mg/dL Final    BUN 09/27/2022 17  7 - 18 mg/dL Final    Creatinine 09/27/2022 0.78  0.55 - 1.02 mg/dL Final    BUN/Creatinine Ratio 09/27/2022 22 (H)  6 - 20 Final    Calcium 09/27/2022 8.9  8.5 - 10.1 mg/dL Final    eGFR 09/27/2022 79  >=60 mL/min/1.73m² Final    TSH 09/27/2022 1.470  0.358 - 3.740 uIU/mL Final    WBC 09/27/2022 6.36  4.50 - 11.00 K/uL Final    RBC 09/27/2022 4.86  4.20 - 5.40 M/uL Final    Hemoglobin 09/27/2022 13.0  12.0 - 16.0 g/dL Final    Hematocrit 09/27/2022 40.5  38.0 - 47.0 % Final    MCV 09/27/2022 83.3  80.0 - 96.0 fL Final    MCH 09/27/2022 26.7 (L)  27.0 - 31.0 pg Final    MCHC  09/27/2022 32.1  32.0 - 36.0 g/dL Final    RDW 09/27/2022 15.9 (H)  11.5 - 14.5 % Final    Platelet Count 09/27/2022 275  150 - 400 K/uL Final    MPV 09/27/2022 10.4  9.4 - 12.4 fL Final    Neutrophils % 09/27/2022 59.4  53.0 - 65.0 % Final    Lymphocytes % 09/27/2022 30.2  27.0 - 41.0 % Final    Monocytes % 09/27/2022 5.7  2.0 - 6.0 % Final    Eosinophils % 09/27/2022 4.2 (H)  1.0 - 4.0 % Final    Basophils % 09/27/2022 0.3  0.0 - 1.0 % Final    Immature Granulocytes % 09/27/2022 0.2  0.0 - 0.4 % Final    nRBC, Auto 09/27/2022 0.0  <=0.0 % Final    Neutrophils, Abs 09/27/2022 3.78  1.80 - 7.70 K/uL Final    Lymphocytes, Absolute 09/27/2022 1.92  1.00 - 4.80 K/uL Final    Monocytes, Absolute 09/27/2022 0.36  0.00 - 0.80 K/uL Final    Eosinophils, Absolute 09/27/2022 0.27  0.00 - 0.50 K/uL Final    Basophils, Absolute 09/27/2022 0.02  0.00 - 0.20 K/uL Final    Immature Granulocytes, Absolute 09/27/2022 0.01  0.00 - 0.04 K/uL Final    nRBC, Absolute 09/27/2022 0.00  <=0.00 x10e3/uL Final    Diff Type 09/27/2022 Auto   Final   There may be more visits with results that are not included.         No orders of the defined types were placed in this encounter.      Requested Prescriptions     Signed Prescriptions Disp Refills    gabapentin (NEURONTIN) 400 MG capsule 90 capsule 2     Sig: TAKE 1 CAPSULE(400 MG) BY MOUTH EVERY 8 HOURS    HYDROcodone-acetaminophen (NORCO)  mg per tablet 90 tablet 0     Sig: Take 1 tablet by mouth every 8 (eight) hours as needed for Pain.    HYDROcodone-acetaminophen (NORCO)  mg per tablet 90 tablet 0     Sig: Take 1 tablet by mouth every 8 (eight) hours as needed for Pain.    HYDROcodone-acetaminophen (NORCO)  mg per tablet 90 tablet 0     Sig: Take 1 tablet by mouth every 8 (eight) hours as needed for Pain.       Assessment:     1. Lumbosacral radiculopathy    2. Diabetic neuropathy with neurologic complication    3. Bilateral foot pain         A's of Opioid Risk  Assessment  Activity:Patient can perform ADL.   Analgesia:Patients pain is partially controlled by current medication. Patient has tried OTC medications such as Tylenol and Ibuprofen with out relief.   Adverse Effects: Patient denies constipation or sedation.  Aberrant Behavior:  reviewed with no aberrant drug seeking/taking behavior.  Overdose reversal drug naloxone discussed    Drug screen reviewed    X-ray left knee degenerative changes no fracture noted    MRI lumbar spine [facility] March 25, 2021 spinal stenosis L5/S1 disc bulge facet joint arthropathy multiple level degenerative changes      Plan:    Narcan February 2023    Follows Neurology [facility] neuropathy symptoms lower extremities    She had lumbar L5/S1 TORIE # 1 October 27, 2022, she states she had 80% relief after procedure, she states procedure did help improve her level function    Complaint back pain buttock pain joint pain     Requesting Toradol injection    Toradol 60 mg IM, tolerated well     Considering repeating lumbar procedure    Continue home exercise program as directed    Continue current medication    Follow-up 3 months    Dr. Brown October 2023    Bring original prescription medication bottles/container/box with labels to each visit    Pill count    Physical therapy    Massage therapy declines

## 2023-02-13 NOTE — PATIENT INSTRUCTIONS
Call in 2 weeks with readings.  Do not drink juice.    Pt is advised to monitor and document glucose fasting when you wake up before you eat and 2 hours after meal and bring in meter to next visit.      Ensure to take medications as directed.      Follow diabetic diet as directed.      Work to achieve normal body weight.     Ensure to exercise 4-5 times per week for 20 minutes.      Low Carbohydrate snacks/quick meals    Ham and cheese rollups - slice of ham wrapped around a string cheese/cheese slice. (0 gm carb)  Cucumber boats (stuffed with chicken salad or tuna salad - no fruit or sweet pickle in salad) (0 gm carb)  Celery and Peanut Butter (2 stalks with 1 Tbsp PNB = 6 gm carb)  Nuts - mixed (1/4 cup = 6 gm carb)  Sunflower seeds- without shell (1/4 cup = 7 gm carb) In the shell (1 cup = 3.3 gm carb)  Deviled eggs (no sweet pickles) - (0 gm carb)  Hard boiled eggs - (0 gm carb)  Guacamole with raw vegetables (mashed avocado with lime juice and seasoning to taste) (1 cup raw veg = 5 gm carb)  Ranch dressing with raw vegetables -(2 Tbsp Ranch = 2 gm carb, ½ cup raw veggies = 2.5 gm carb  Lasagna rolls- Slice zucchini thinly lengthwise and microwave for 1-2 minutes. Fill with ricotta, parmesan cheese. Roll and top with low carb tomato sauce. (5 rolls = 5 gm carb)  Crust less pizza -pepperoni or [place] ocasio topped with cheese and veggies +1 tbsp tomato sauce, microwave (1 gm carb)  Beef jerky - read label for lower carb jerky  Olives - Black (5 olives = 1 gm carb) Green (5 olives = 1 gm carb)  Dill pickles (1 whole dill pickle = 2 gm carb)  Sugar free jell-o (0 gm carb)  [place] Chicken Marinate chicken strips in 2 Tbsp sugar free maple syrup, 1 Tbsp lime juice, 1 Tbsp fresh cilantro. [place] or cook in skillet sprayed with oil. (0 gm carb)  Chicken nachos - Heat oven to 350. Rub 5-6 chicken tenders with 1 Tbsp Abdoul Taco seasoning then drizzle with vegetable oil. Bake at 350 for 3-4 min and then flip and cook  3-4 min. Top with grated cheese, jalopenos, ocasio, green onion. Place back in oven at temp of 425 for 3-4 minutes. Serve with side of 2 Tbsp ranch dressing or sour cream. (3 gm carb)  Egg Mcmuffin: using egg (or egg white for a healthier version) as the bread put one slice of cheese with 1 slice Romanian ocasio or sausage cecilia (1 gm carb per sandwich)  Southern Okra - Wash and dry fresh okra. Toss with olive oil, salt and pepper and roast in oven 10-20 minutes. (1 cup = 5 gm carb)  Crispy green beans - Robert 5 lbs fresh green beans. Toss with 1/3 cup melted coconut oil and sprinkle with 4 tsp salt and 1 tsp onion and garlic powder. Bake at 170-175 for 8 hours or dehydrate overnight in . (1 cup = 5 gm carb)  Salt and vinegar zucchini chips - Thinly slice zucchini as thin as possible. In small bowl whisk 2 Tbsp olive oil, 2 Tbsp white vinegar, and 2 tsp sea salt. Add zucchini and dehydrate or bake on 170 for 3-4 hours till crispy. (½ cup = 3 gm carb). Make in large batches and keep in air tight container up to 1 week   Zucchini Abdoul chips - Thinly slice zucchini as thin as possible. Heat oil in fryer to 350 and drop zucchini in hot oil working in batches of about 20 chips at a time. Remove, drain and sprinkle with Abdoul Taco seasoning. (1/2 cup = 3 gm carb). Make in large batches and keep in air tight container up to 1 week.  Abdoul Taco Seasoning - 2 Tbsp chili powder, ½ tsp garlic powder, ½ tsp onion powder, ½ tsp crushed red pepper flakes, ½ tsp dried oregano, 3 tsp ground cumin, 2 tsp sea salt, 2 tsp black pepper. Makes 20 servings (0 gm carb)  [place] milk (1 cup almond milk = 0.3 gm carb)  Cheese chips - Preheat oven 400. On a nonstick baking sheet add cheese slices (Cheddar, Swiss, Provolone, Josr Damien), bake 10-12 minutes or until browned. Cool completely before removal. (2 slices = 1 gm carb). Store in air tight container up to 1 week.   Breakfast balls - mix together 2 lbs pork sausage, 1 lb  ground beef, 3 eggs, 2 Tbsp dried onion flakes, ½ tsp black pepper, ½ lb sharp cheddar cheese, shredded. Form into 4 dozen 1 balls. Bake on cookie sheet for 25 min at 375. Cool and may be frozen as well individually. (0 gm carb)  Meat muffins - spray muffin tin with cooking spray. Line muffin tin with 1 slice ham. Add 1 raw egg. Top with grated cheese and salt and pepper. Bake 10-12 min. (0 gm carb)  Pork rinds/Cracklings (0 gm carb)  Gummy Bears - Add 1 packet of Sugar free jello (flavor of your choice), 1 packet unflavored gelatin and 1/3 cup cold water to small sauce pan. Stir well and heat over low till it become liquid and dissolved (2-3 minutes). Pour into mold and refrigerate 30-40 minutes. Add only ¼ cup if you want them more firm. (0 gm carb)  Cheese and meat kabobs (0 gm carb)  Garlic parmesan cheese crisps - Preheat oven to 350. On a nonstick baking sheet, place 1 Tbsp grated parmesan cheese, sprinkle with garlic and basil. Bake about 5 minutes or until outside edges become romero brown. Cool completely before removal (5 crisps = 1 gm carb)  Antipasti - Pepperoni, salami, green pepper, jalapeno pepper, mushrooms, onion, black olives, cheddar and provolone cheese cubes. Toss with Italian salad dressing. (1/2 cup = 5 gm carb)  Ruffin chicken wings - (0 gm carb)  Cheetos- preheat oven to 300. Chop ½ cup freshly shredded cheddar cheese that is frozen and place in  or  and chop into tiny pieces. In a mixing bowl, whip 3 egg whites and 1/8 tsp cream of tartar until VERY stiff peaks form. Sprinkle cheese on top of egg whites and then fold cheese into egg whites very carefully. Place mixture into large ziplock bag and cut hole in corner. Gently squeeze onto greased nonstick cookie pan in cheestos like shapes. Sprinkle with parmesan cheese. Bake for 30 minutes. Turn over off and leave in there for another 30 minutes. (1 cup = 1 gm carb)  Cheesy cauliflower tots - preheat oven to 400. Spray  mini muffin tin with nonstick spray. Chop ½ large head of cauliflower into small pieces. Place in microwavable bowl and cover. Microwave 2 minutes. Drain cauliflower. Place in  and pulse till finely chopped. To this add, 1/3 cup grated sharp cheddar, ¼ cup grated parmesan cheese, 2 Tbsp. almond flour, ¼ tsp salt, ½ tsp all purpose seasoning and 1 egg. Mix well. Place 1 Tbsp of mixture in each mini muffin tin. Bake 15 minutes. Remove and flip, bake another 15 minutes. Makes 24 tots. (10 tots = 5 gm carb)  Quest protein bars (carbs vary)  Damien snacks - Preheat oven 350. 1 cup shredded Pepperjack paper. Scoop 1 Tbsp cheese. Place on non stick pan and press slightly flat. Top with 1 slice jalapeno pepper. Bake for 10-12 minutes till brown and crispy. Cool completely before removal. (10 crisps = 1 gm carb)  Snake bite (aka jalapeno poppers) - remove seeds and membrane from jalapenos, fill with cream cheese, wrap in ocasio. Stick a toothpick through to hold ocasio in place. Bake 15-20 min at 400 (4 bites = 2 gm carb)  5 minute PNB mousse - Beat together 4 oz softened cream cheese, 2 Tbsp natural PNB (no sugar added), ½ tsp vanilla extract and 1/3 cup Splenda. Fold in 1 cup Reddiwhip. Place in container of your choice and refrigerate up to 1 week. Top your serving with 1Tbsp Sugar free chocolate syrup. (1/2 cup = 4 gm carb)  BLT - Fill a leaf of mahin lettuce leaf with 1 Tbsp LF alvarenga, 2 slices ocasio, sliced tomato. Sprinkle salt and pepper. (0 gm carb)  Cinnamon and coconut bombs - in microwave safe bowl, mix 1 cup coconut butter, 1 cup coconut milk (full fat canned, not from box), 1 tsp vanilla extract, ½ tsp nutmeg and cinnamon, 1 tsp stevia powder extract. Melt until combined. Place bowl in fridge until hard enough to roll into 10-12 balls, about 30 minutes. Roll balls in 1 cup shredded coconut. Store in refrigerator (1 ball = 1 gm carb)

## 2023-03-09 DIAGNOSIS — Z71.89 COMPLEX CARE COORDINATION: ICD-10-CM

## 2023-03-16 ENCOUNTER — OFFICE VISIT (OUTPATIENT)
Dept: DERMATOLOGY | Facility: CLINIC | Age: 78
End: 2023-03-16
Payer: COMMERCIAL

## 2023-03-16 DIAGNOSIS — L82.0 SEBORRHEIC KERATOSES, INFLAMED: Primary | ICD-10-CM

## 2023-03-16 PROCEDURE — 99499 NO LOS: ICD-10-PCS | Mod: ,,, | Performed by: STUDENT IN AN ORGANIZED HEALTH CARE EDUCATION/TRAINING PROGRAM

## 2023-03-16 PROCEDURE — 1159F MED LIST DOCD IN RCRD: CPT | Mod: CPTII,,, | Performed by: STUDENT IN AN ORGANIZED HEALTH CARE EDUCATION/TRAINING PROGRAM

## 2023-03-16 PROCEDURE — 99499 UNLISTED E&M SERVICE: CPT | Mod: ,,, | Performed by: STUDENT IN AN ORGANIZED HEALTH CARE EDUCATION/TRAINING PROGRAM

## 2023-03-16 PROCEDURE — 17110 PR DESTRUCTION BENIGN LESIONS UP TO 14: ICD-10-PCS | Mod: ,,, | Performed by: STUDENT IN AN ORGANIZED HEALTH CARE EDUCATION/TRAINING PROGRAM

## 2023-03-16 PROCEDURE — 17110 DESTRUCTION B9 LES UP TO 14: CPT | Mod: ,,, | Performed by: STUDENT IN AN ORGANIZED HEALTH CARE EDUCATION/TRAINING PROGRAM

## 2023-03-16 PROCEDURE — 1159F PR MEDICATION LIST DOCUMENTED IN MEDICAL RECORD: ICD-10-PCS | Mod: CPTII,,, | Performed by: STUDENT IN AN ORGANIZED HEALTH CARE EDUCATION/TRAINING PROGRAM

## 2023-03-16 NOTE — PROGRESS NOTES
[facility]  Santiago Sheikh MD    [address] [place] [address], [place] [address]  [phone]    Fax: [phone]    Patient Name: Lorraine De Jesus  Medical Record Number: [id number]  PCP: Mateo Chen MD  Age: 78 y.o. : 1945  Contact: [phone] (home)     History of Present Illness:     Lorraine De Jesus is a 78 y.o.  female here for follow up of EIC of L cheek. S/P excision on 2023 with a well healed scar. Today, she is concerned with previously treated ISKs that have persisted following treatment and are pruritic.     The patient has no other concerns today.    Review of Systems:     Unremarkable other than mentioned above.     Physical Exam:     General: Relaxed, oriented, alert    Skin examination of the scalp, face, neck, chest, back, abdomen, upper extremities and lower extremities were normal except for as listed below      Assessment and Plan:     1. 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  A total of 6 lesions were treated with liquid nitrogen, located on the above listed location.  This procedure was medically necessary because the lesions that were treated were: irritated and itchy. The  patient's consent was obtained including but not limited to risks of crusting, scabbing, blistering, scarring, darker  or lighter pigmentary change, recurrence, incomplete removal and infection.          Return to clinic in PRN.     AVS printed with patient instructions     Santiago Sheikh MD   Mohs Surgery/Dermatologic Oncology  Dermatology

## 2023-03-22 RX ORDER — GLIPIZIDE 10 MG/1
10 TABLET, FILM COATED, EXTENDED RELEASE ORAL 2 TIMES DAILY
Qty: 180 TABLET | Refills: 1 | Status: SHIPPED | OUTPATIENT
Start: 2023-03-22 | End: 2023-06-07 | Stop reason: SDUPTHER

## 2023-05-14 NOTE — PROGRESS NOTES
Subjective:         Patient ID: Lorraine De Jesus is a 78 y.o. female.    Chief Complaint: Foot Pain and Leg Pain      Pain  This is a chronic problem. The current episode started more than 1 year ago. The problem occurs daily. The problem has been unchanged. Associated symptoms include arthralgias and neck pain. Pertinent negatives include no anorexia, chest pain, chills, coughing, diaphoresis, fever, sore throat, vertigo or vomiting.   Review of Systems   Constitutional:  Negative for activity change, appetite change, chills, diaphoresis, fever and unexpected weight change.   HENT:  Negative for drooling, ear discharge, ear pain, facial swelling, nosebleeds, sore throat, trouble swallowing, voice change and goiter.    Eyes:  Negative for photophobia, pain, discharge, redness and visual disturbance.   Respiratory:  Negative for apnea, cough, choking, chest tightness, shortness of breath, wheezing and stridor.    Cardiovascular:  Negative for chest pain, palpitations and leg swelling.   Gastrointestinal:  Negative for abdominal distention, anorexia, diarrhea, rectal pain, vomiting and fecal incontinence.   Endocrine: Negative for cold intolerance, heat intolerance, polydipsia, polyphagia and polyuria.   Genitourinary:  Negative for bladder incontinence, dysuria, flank pain, frequency and hot flashes.   Musculoskeletal:  Positive for arthralgias, back pain, leg pain and neck pain.   Integumentary:  Negative for color change and pallor.   Allergic/Immunologic: Negative for immunocompromised state.   Neurological:  Negative for dizziness, vertigo, seizures, syncope, facial asymmetry, speech difficulty, light-headedness, coordination difficulties, memory loss and coordination difficulties.   Hematological:  Negative for adenopathy. Does not bruise/bleed easily.   Psychiatric/Behavioral:  Negative for agitation, behavioral problems, confusion, decreased concentration, dysphoric mood, hallucinations, self-injury and  suicidal ideas. The patient is not nervous/anxious and is not hyperactive.          Past Medical History:   Diagnosis Date    Adenomatous polyp of ascending colon 2021    Adenomatous polyp of descending colon 2021    Age related osteoporosis 10/28/2020    Benign paroxysmal vertigo     Chronic pain syndrome     Chronic pelvic pain in female 2021    Ditropan XL 5mg    Colon, diverticulosis 2021    Depressive disorder 2019    External hemorrhoid 2021    Gastrointestinal hemorrhage associated with anorectal source 2021    GERD (gastroesophageal reflux disease) 2013    Hyperlipidemia 2012    Hypothyroidism 2019    Joint pain     Nonrheumatic tricuspid (valve) insufficiency 2018    Personal history UTI 2021    Controlled on Hiprex    Polyneuropathy 2018    PVD (peripheral vascular disease) 10/30/2018    Temporal arteritis     Type 2 diabetes mellitus 2014    Urethral meatal stenosis 2018    history of known urethral stenosis, and was taught to perform monthly self dilation at home.     Past Surgical History:   Procedure Laterality Date     left lumbar sympathetic nerve block Left 2020    X3  DR BROWN    CARDIAC CATHETERIZATION  10/14/2009     SECTION      x 2    COLONOSCOPY  2009    CYSTOSCOPY WITH HYDRODISTENSION OF BLADDER N/A 2021    Procedure: CYSTOSCOPY, WITH BLADDER HYDRODISTENSION;  Surgeon: Dequan Recio MD;  Location: [facility] OR;  Service: Urology;  Laterality: N/A;    CYSTOSCOPY WITH URETHRAL DILATION  2021    Dr Recio    EPIDURAL STEROID INJECTION N/A 10/27/2022    Procedure: Injection, Steroid, Epidural, L5/S1;  Surgeon: Belkis Brown MD;  Location: [facility];  Service: Pain Management;  Laterality: N/A;    HYSTERECTOMY      LUMBAR SYMPATHETIC NERVE BLOCK Left 10/05/2020    Left Sympathetic Nerve Block - Dr Brown - 10/5/20, 20, 20. 20, 10/9/19, 19 and 18     "right lumbar sympathetic nerve block Right 2020    X4 DR LANDEROS    THYROIDECTOMY      1990's     Social History     Socioeconomic History    Marital status:    Tobacco Use    Smoking status: Never    Smokeless tobacco: Never   Substance and Sexual Activity    Alcohol use: Never    Drug use: Never    Sexual activity: Not Currently     Family History   Problem Relation Age of Onset    Cancer Mother     Hypertension Mother     Cancer Father     Cancer Sister     Diabetes Sister     Hyperlipidemia Sister     Hypertension Sister     Cancer Brother     Diabetes Sister     No Known Problems Sister     Cancer Brother     No Known Problems Brother     Cancer Brother      Review of patient's allergies indicates:   Allergen Reactions    Lipitor [atorvastatin] Other (See Comments)     Muscle aching    Pravachol [pravastatin] Other (See Comments)     Muscle aching        Objective:  Vitals:    05/22/23 0857   BP: 122/64   Pulse: 68   Resp: 18   Weight: 81.2 kg (179 lb)   Height: 5' 8" (1.727 m)   PainSc:   9         Physical Exam  Vitals and nursing note reviewed. Exam conducted with a chaperone present.   Constitutional:       General: She is awake. She is not in acute distress.     Appearance: Normal appearance. She is not ill-appearing, toxic-appearing or diaphoretic.   HENT:      Head: Normocephalic and atraumatic.      Nose: Nose normal.      Mouth/Throat:      Mouth: Mucous membranes are moist.      Pharynx: Oropharynx is clear.   Eyes:      Conjunctiva/sclera: Conjunctivae normal.      Pupils: Pupils are equal, round, and reactive to light.   Cardiovascular:      Rate and Rhythm: Normal rate.   Pulmonary:      Effort: Pulmonary effort is normal. No respiratory distress.   Abdominal:      Palpations: Abdomen is soft.   Musculoskeletal:      Cervical back: Normal range of motion and neck supple.      Thoracic back: Tenderness present.      Lumbar back: Tenderness present. Decreased range of motion.   Skin:     " General: Skin is warm and dry.   Neurological:      General: No focal deficit present.      Mental Status: She is alert and oriented to person, place, and time. Mental status is at baseline.      Cranial Nerves: No cranial nerve deficit (II-XII).   Psychiatric:         Mood and Affect: Mood normal.         Behavior: Behavior normal. Behavior is cooperative.         Thought Content: Thought content normal.         X-Ray Hip 2 or 3 views Left (with Pelvis when performed)  Narrative: EXAMINATION:  XR HIP WITH PELVIS WHEN PERFORMED, 2 OR 3 VIEWS LEFT    CLINICAL HISTORY:  .  Pain in left hip    COMPARISON:  March 25, 2021    TECHNIQUE:  AP and frog-leg lateral views left hip to include AP pelvis.  Three total views    FINDINGS:  There is no acute fracture or dislocation.  Left femoral head is well conjugated.  There is mild to moderate osteophyte formation and moderate joint space narrowing of the left hip.  There is mild osteophyte formation and mild to moderate joint space narrowing of the right hip.  Suspect osteopenia  Impression: Moderate osteoarthritis left hip    No acute fracture    Suspect osteopenia    Electronically signed by: Tevin Bermeo  Date:    11/28/2022  Time:    12:31       Office Visit on 02/13/2023   Component Date Value Ref Range Status    POC Amphetamines 02/13/2023 Negative  Negative, Inconclusive Final    POC Barbiturates 02/13/2023 Negative  Negative, Inconclusive Final    POC Benzodiazepines 02/13/2023 Negative  Negative, Inconclusive Final    POC Cocaine 02/13/2023 Negative  Negative, Inconclusive Final    POC THC 02/13/2023 Negative  Negative, Inconclusive Final    POC Methadone 02/13/2023 Negative  Negative, Inconclusive Final    POC Methamphetamine 02/13/2023 Negative  Negative, Inconclusive Final    POC Opiates 02/13/2023 Presumptive Positive (A)  Negative, Inconclusive Final    POC Oxycodone 02/13/2023 Negative  Negative, Inconclusive Final    POC Phencyclidine 02/13/2023 Negative   Negative, Inconclusive Final    POC Methylenedioxymethamphetamine * 02/13/2023 Negative  Negative, Inconclusive Final    POC Tricyclic Antidepressants 02/13/2023 Negative  Negative, Inconclusive Final    POC Buprenorphine 02/13/2023 Negative   Final     Acceptable 02/13/2023 Yes   Final    POC Temperature (Urine) 02/13/2023 94   Final   Office Visit on 02/13/2023   Component Date Value Ref Range Status    Hemoglobin A1C 02/13/2023 7.9 (A)  4.5 - 6.6 % Final    POC Glucose 02/13/2023 129 (A)  70 - 110 MG/DL Final   Office Visit on 01/27/2023   Component Date Value Ref Range Status    Color, UA 01/27/2023 Orange   Final    Spec Grav UA 01/27/2023 1.010   Final    pH, UA 01/27/2023 5.5   Final    WBC, UA 01/27/2023 negative   Final    Nitrite, UA 01/27/2023 positive   Final    Protein, POC 01/27/2023 negative   Final    Glucose, UA 01/27/2023 >=1000 mg   Final    Ketones, UA 01/27/2023 negative   Final    Bilirubin, POC 01/27/2023 negative   Final    Urobilinogen, UA 01/27/2023 0.2   Final    Blood, UA 01/27/2023 negative   Final   Procedure visit on 01/12/2023   Component Date Value Ref Range Status    Final Diagnosis 01/12/2023    Final                    Value:This result contains rich text formatting which cannot be displayed here.    Specimen A Procedure 01/12/2023 Excision   Final    Specimen A Morphology 01/12/2023 cyst   Final    Specimen A DDx 01/12/2023 EIC   Final    Microscopic Description 01/12/2023    Final                    Value:This result contains rich text formatting which cannot be displayed here.    Gross Description 01/12/2023    Final                    Value:This result contains rich text formatting which cannot be displayed here.    Laboratory Notes 01/12/2023    Final                    Value:This result contains rich text formatting which cannot be displayed here.    Case Report 01/12/2023    Final                    Value:Surgical Pathology                                 Case: [id number]                                   Authorizing Provider:  Evelia Sheikh MD        Collected:           01/12/2023 01:00 PM          Ordering Location:     [facility] Received:            01/13/2023 09:18 AM          Pathologist:           Brad Kenney MD                                                            Specimen:    Face, left cheek                                                                          Office Visit on 11/28/2022   Component Date Value Ref Range Status    Hemoglobin A1C 11/28/2022 7.2 (H)  4.5 - 6.6 % Final    Estimated Average Glucose 11/28/2022 154  mg/dL Final    Vitamin B12 11/28/2022 1,110 (H)  193 - 986 pg/mL Final    TSH 11/28/2022 1.580  0.358 - 3.740 uIU/mL Final    Color, UA 11/28/2022 Light-Yellow  Colorless, Straw, Yellow, Light Yellow, Dark Yellow Final    Clarity, UA 11/28/2022 Clear  Clear Final    pH, UA 11/28/2022 6.0  5.0 to 8.0 pH Units Final    Leukocytes, UA 11/28/2022 Negative  Negative Final    Nitrites, UA 11/28/2022 Negative  Negative Final    Protein, UA 11/28/2022 Negative  Negative Final    Glucose, UA 11/28/2022 >1000  Normal mg/dL Final    Ketones, UA 11/28/2022 Negative  Negative mg/dL Final    Urobilinogen, UA 11/28/2022 Normal  0.2, 1.0, Normal mg/dL Final    Bilirubin, UA 11/28/2022 Negative  Negative Final    Blood, UA 11/28/2022 Negative  Negative Final    Specific Gravity, UA 11/28/2022 1.036 (H)  <=1.030 Final    WBC 11/28/2022 6.82  4.50 - 11.00 K/uL Final    RBC 11/28/2022 5.12  4.20 - 5.40 M/uL Final    Hemoglobin 11/28/2022 13.8  12.0 - 16.0 g/dL Final    Hematocrit 11/28/2022 42.8  38.0 - 47.0 % Final    MCV 11/28/2022 83.6  80.0 - 96.0 fL Final    MCH 11/28/2022 27.0  27.0 - 31.0 pg Final    MCHC 11/28/2022 32.2  32.0 - 36.0 g/dL Final    RDW 11/28/2022 15.5 (H)  11.5 - 14.5 % Final    Platelet Count 11/28/2022 279  150 - 400 K/uL Final    MPV 11/28/2022 10.4  9.4 - 12.4 fL Final    Neutrophils % 11/28/2022  57.6  53.0 - 65.0 % Final    Lymphocytes % 11/28/2022 31.4  27.0 - 41.0 % Final    Monocytes % 11/28/2022 8.2 (H)  2.0 - 6.0 % Final    Eosinophils % 11/28/2022 2.1  1.0 - 4.0 % Final    Basophils % 11/28/2022 0.4  0.0 - 1.0 % Final    Immature Granulocytes % 11/28/2022 0.3  0.0 - 0.4 % Final    nRBC, Auto 11/28/2022 0.0  <=0.0 % Final    Neutrophils, Abs 11/28/2022 3.93  1.80 - 7.70 K/uL Final    Lymphocytes, Absolute 11/28/2022 2.14  1.00 - 4.80 K/uL Final    Monocytes, Absolute 11/28/2022 0.56  0.00 - 0.80 K/uL Final    Eosinophils, Absolute 11/28/2022 0.14  0.00 - 0.50 K/uL Final    Basophils, Absolute 11/28/2022 0.03  0.00 - 0.20 K/uL Final    Immature Granulocytes, Absolute 11/28/2022 0.02  0.00 - 0.04 K/uL Final    nRBC, Absolute 11/28/2022 0.00  <=0.00 x10e3/uL Final    Diff Type 11/28/2022 Auto   Final         Orders Placed This Encounter   Procedures    POCT Urine Drug Screen Presump     Interpretive Information:     Negative:  No drug detected at the cut off level.   Positive:  This result represents presumptive positive for the   tested drug, other substances may yield a positive response other   than the analyte of interest. This result should be utilized for   diagnostic purpose only. Confirmation testing will be performed upon physician request only.            Requested Prescriptions     Signed Prescriptions Disp Refills    gabapentin (NEURONTIN) 400 MG capsule 90 capsule 2     Sig: TAKE 1 CAPSULE(400 MG) BY MOUTH EVERY 8 HOURS    HYDROcodone-acetaminophen (NORCO)  mg per tablet 120 tablet 0     Sig: Take 1 tablet by mouth every 6 (six) hours as needed for Pain.    HYDROcodone-acetaminophen (NORCO)  mg per tablet 120 tablet 0     Sig: Take 1 tablet by mouth every 6 (six) hours as needed for Pain.    HYDROcodone-acetaminophen (NORCO)  mg per tablet 120 tablet 0     Sig: Take 1 tablet by mouth every 6 (six) hours as needed for Pain.       Assessment:     1. Lumbosacral radiculopathy     2. Encounter for long-term (current) use of other medications    3. Diabetic neuropathy with neurologic complication    4. Bilateral foot pain         A's of Opioid Risk Assessment  Activity:Patient can perform ADL.   Analgesia:Patients pain is partially controlled by current medication. Patient has tried OTC medications such as Tylenol and Ibuprofen with out relief.   Adverse Effects: Patient denies constipation or sedation.  Aberrant Behavior:  reviewed with no aberrant drug seeking/taking behavior.  Overdose reversal drug naloxone discussed    Drug screen reviewed    X-ray left knee degenerative changes no fracture noted    MRI lumbar spine [facility] March 25, 2021 spinal stenosis L5/S1 disc bulge facet joint arthropathy multiple level degenerative changes      Plan:    Narcan February 2023    Follows Neurology [facility] neuropathy symptoms lower extremities    She had lumbar L5/S1 TORIE # 1 October 27, 2022, she states she had 80% relief after procedure, she states procedure did help improve her level function    Complaint increasing back pain buttock and leg pain increasing neuropathy symptoms    She tried to wean her Norco 3 tablets a day     She states she would like to have her 4th tablet for Norco 3 tablets today is no longer controlling her discomfort     Increase Norco 10 1 p.o. q.6 hours    Requesting Toradol injection    Toradol 30 mg IM, tolerated well     Considering repeating lumbar procedure    Continue home exercise program as directed    Continue current medication    Follow-up 3 months    Dr. Brown October 2023    Bring original prescription medication bottles/container/box with labels to each visit

## 2023-05-22 ENCOUNTER — OFFICE VISIT (OUTPATIENT)
Dept: PAIN MEDICINE | Facility: CLINIC | Age: 78
End: 2023-05-22
Payer: COMMERCIAL

## 2023-05-22 VITALS
WEIGHT: 179 LBS | SYSTOLIC BLOOD PRESSURE: 122 MMHG | DIASTOLIC BLOOD PRESSURE: 64 MMHG | RESPIRATION RATE: 18 BRPM | HEIGHT: 68 IN | HEART RATE: 68 BPM | BODY MASS INDEX: 27.13 KG/M2

## 2023-05-22 DIAGNOSIS — E11.49 DIABETIC NEUROPATHY WITH NEUROLOGIC COMPLICATION: Chronic | ICD-10-CM

## 2023-05-22 DIAGNOSIS — M54.17 LUMBOSACRAL RADICULOPATHY: Primary | Chronic | ICD-10-CM

## 2023-05-22 DIAGNOSIS — M79.671 BILATERAL FOOT PAIN: ICD-10-CM

## 2023-05-22 DIAGNOSIS — M79.672 BILATERAL FOOT PAIN: ICD-10-CM

## 2023-05-22 DIAGNOSIS — Z79.899 ENCOUNTER FOR LONG-TERM (CURRENT) USE OF OTHER MEDICATIONS: ICD-10-CM

## 2023-05-22 DIAGNOSIS — E11.40 DIABETIC NEUROPATHY WITH NEUROLOGIC COMPLICATION: Chronic | ICD-10-CM

## 2023-05-22 LAB
CTP QC/QA: YES
POC (AMP) AMPHETAMINE: NEGATIVE
POC (BAR) BARBITURATES: NEGATIVE
POC (BUP) BUPRENORPHINE: NEGATIVE
POC (BZO) BENZODIAZEPINES: NEGATIVE
POC (COC) COCAINE: NEGATIVE
POC (MDMA) METHYLENEDIOXYMETHAMPHETAMINE 3,4: NEGATIVE
POC (MET) METHAMPHETAMINE: NEGATIVE
POC (MOP) OPIATES: ABNORMAL
POC (MTD) METHADONE: NEGATIVE
POC (OXY) OXYCODONE: NEGATIVE
POC (PCP) PHENCYCLIDINE: NEGATIVE
POC (TCA) TRICYCLIC ANTIDEPRESSANTS: NEGATIVE
POC TEMPERATURE (URINE): 90
POC THC: NEGATIVE

## 2023-05-22 PROCEDURE — 3288F PR FALLS RISK ASSESSMENT DOCUMENTED: ICD-10-PCS | Mod: CPTII,,, | Performed by: PHYSICIAN ASSISTANT

## 2023-05-22 PROCEDURE — 3078F DIAST BP <80 MM HG: CPT | Mod: CPTII,,, | Performed by: PHYSICIAN ASSISTANT

## 2023-05-22 PROCEDURE — 99214 PR OFFICE/OUTPT VISIT, EST, LEVL IV, 30-39 MIN: ICD-10-PCS | Mod: S$PBB,25,, | Performed by: PHYSICIAN ASSISTANT

## 2023-05-22 PROCEDURE — 3074F SYST BP LT 130 MM HG: CPT | Mod: CPTII,,, | Performed by: PHYSICIAN ASSISTANT

## 2023-05-22 PROCEDURE — 99215 OFFICE O/P EST HI 40 MIN: CPT | Mod: PBBFAC,25 | Performed by: PHYSICIAN ASSISTANT

## 2023-05-22 PROCEDURE — 80305 DRUG TEST PRSMV DIR OPT OBS: CPT | Mod: PBBFAC | Performed by: PHYSICIAN ASSISTANT

## 2023-05-22 PROCEDURE — 1125F PR PAIN SEVERITY QUANTIFIED, PAIN PRESENT: ICD-10-PCS | Mod: CPTII,,, | Performed by: PHYSICIAN ASSISTANT

## 2023-05-22 PROCEDURE — 1125F AMNT PAIN NOTED PAIN PRSNT: CPT | Mod: CPTII,,, | Performed by: PHYSICIAN ASSISTANT

## 2023-05-22 PROCEDURE — 99214 OFFICE O/P EST MOD 30 MIN: CPT | Mod: S$PBB,25,, | Performed by: PHYSICIAN ASSISTANT

## 2023-05-22 PROCEDURE — 3074F PR MOST RECENT SYSTOLIC BLOOD PRESSURE < 130 MM HG: ICD-10-PCS | Mod: CPTII,,, | Performed by: PHYSICIAN ASSISTANT

## 2023-05-22 PROCEDURE — 3078F PR MOST RECENT DIASTOLIC BLOOD PRESSURE < 80 MM HG: ICD-10-PCS | Mod: CPTII,,, | Performed by: PHYSICIAN ASSISTANT

## 2023-05-22 PROCEDURE — 1159F PR MEDICATION LIST DOCUMENTED IN MEDICAL RECORD: ICD-10-PCS | Mod: CPTII,,, | Performed by: PHYSICIAN ASSISTANT

## 2023-05-22 PROCEDURE — 3288F FALL RISK ASSESSMENT DOCD: CPT | Mod: CPTII,,, | Performed by: PHYSICIAN ASSISTANT

## 2023-05-22 PROCEDURE — 1101F PT FALLS ASSESS-DOCD LE1/YR: CPT | Mod: CPTII,,, | Performed by: PHYSICIAN ASSISTANT

## 2023-05-22 PROCEDURE — 1159F MED LIST DOCD IN RCRD: CPT | Mod: CPTII,,, | Performed by: PHYSICIAN ASSISTANT

## 2023-05-22 PROCEDURE — 96372 THER/PROPH/DIAG INJ SC/IM: CPT | Mod: PBBFAC | Performed by: PHYSICIAN ASSISTANT

## 2023-05-22 PROCEDURE — 1101F PR PT FALLS ASSESS DOC 0-1 FALLS W/OUT INJ PAST YR: ICD-10-PCS | Mod: CPTII,,, | Performed by: PHYSICIAN ASSISTANT

## 2023-05-22 RX ORDER — HYDROCODONE BITARTRATE AND ACETAMINOPHEN 10; 325 MG/1; MG/1
1 TABLET ORAL EVERY 6 HOURS PRN
Qty: 120 TABLET | Refills: 0 | Status: SHIPPED | OUTPATIENT
Start: 2023-06-25 | End: 2023-09-25 | Stop reason: SDUPTHER

## 2023-05-22 RX ORDER — HYDROCODONE BITARTRATE AND ACETAMINOPHEN 10; 325 MG/1; MG/1
1 TABLET ORAL EVERY 6 HOURS PRN
Qty: 120 TABLET | Refills: 0 | Status: SHIPPED | OUTPATIENT
Start: 2023-05-26 | End: 2023-08-17 | Stop reason: SDUPTHER

## 2023-05-22 RX ORDER — GABAPENTIN 400 MG/1
CAPSULE ORAL
Qty: 90 CAPSULE | Refills: 2 | Status: SHIPPED | OUTPATIENT
Start: 2023-05-22 | End: 2023-08-17 | Stop reason: SDUPTHER

## 2023-05-22 RX ORDER — KETOROLAC TROMETHAMINE 30 MG/ML
30 INJECTION, SOLUTION INTRAMUSCULAR; INTRAVENOUS
Status: COMPLETED | OUTPATIENT
Start: 2023-05-22 | End: 2023-05-22

## 2023-05-22 RX ORDER — HYDROCODONE BITARTRATE AND ACETAMINOPHEN 10; 325 MG/1; MG/1
1 TABLET ORAL EVERY 6 HOURS PRN
Qty: 120 TABLET | Refills: 0 | Status: SHIPPED | OUTPATIENT
Start: 2023-07-25 | End: 2023-10-04

## 2023-05-22 RX ADMIN — KETOROLAC TROMETHAMINE 30 MG: 30 INJECTION, SOLUTION INTRAMUSCULAR at 09:05

## 2023-05-23 ENCOUNTER — OFFICE VISIT (OUTPATIENT)
Dept: OBSTETRICS AND GYNECOLOGY | Facility: CLINIC | Age: 78
End: 2023-05-23
Payer: COMMERCIAL

## 2023-05-23 VITALS
HEIGHT: 68 IN | DIASTOLIC BLOOD PRESSURE: 74 MMHG | BODY MASS INDEX: 26.95 KG/M2 | SYSTOLIC BLOOD PRESSURE: 128 MMHG | WEIGHT: 177.81 LBS

## 2023-05-23 DIAGNOSIS — N76.3 CHRONIC VULVITIS: ICD-10-CM

## 2023-05-23 DIAGNOSIS — N30.90 CYSTITIS: ICD-10-CM

## 2023-05-23 DIAGNOSIS — R30.9 PAINFUL URINATION: Primary | ICD-10-CM

## 2023-05-23 LAB
BACTERIA #/AREA URNS HPF: ABNORMAL /HPF
BILIRUB UR QL STRIP: NEGATIVE
CLARITY UR: CLEAR
COLOR UR: ABNORMAL
GLUCOSE UR STRIP-MCNC: >1000 MG/DL
KETONES UR STRIP-SCNC: NEGATIVE MG/DL
LEUKOCYTE ESTERASE UR QL STRIP: ABNORMAL
NITRITE UR QL STRIP: NEGATIVE
PH UR STRIP: 6 PH UNITS
PROT UR QL STRIP: 10
RBC # UR STRIP: NEGATIVE /UL
RBC #/AREA URNS HPF: 5 /HPF
SP GR UR STRIP: 1.02
SQUAMOUS #/AREA URNS LPF: ABNORMAL /HPF
UROBILINOGEN UR STRIP-ACNC: NORMAL MG/DL
WBC #/AREA URNS HPF: 48 /HPF
WBC CLUMPS, UA: ABNORMAL /HPF

## 2023-05-23 PROCEDURE — 3078F DIAST BP <80 MM HG: CPT | Mod: CPTII,,, | Performed by: OBSTETRICS & GYNECOLOGY

## 2023-05-23 PROCEDURE — 3074F PR MOST RECENT SYSTOLIC BLOOD PRESSURE < 130 MM HG: ICD-10-PCS | Mod: CPTII,,, | Performed by: OBSTETRICS & GYNECOLOGY

## 2023-05-23 PROCEDURE — 99213 PR OFFICE/OUTPT VISIT, EST, LEVL III, 20-29 MIN: ICD-10-PCS | Mod: S$PBB,,, | Performed by: OBSTETRICS & GYNECOLOGY

## 2023-05-23 PROCEDURE — 3074F SYST BP LT 130 MM HG: CPT | Mod: CPTII,,, | Performed by: OBSTETRICS & GYNECOLOGY

## 2023-05-23 PROCEDURE — 87086 CULTURE, URINE: ICD-10-PCS | Mod: ,,, | Performed by: CLINICAL MEDICAL LABORATORY

## 2023-05-23 PROCEDURE — 81001 URINALYSIS: ICD-10-PCS | Mod: ,,, | Performed by: CLINICAL MEDICAL LABORATORY

## 2023-05-23 PROCEDURE — 3078F PR MOST RECENT DIASTOLIC BLOOD PRESSURE < 80 MM HG: ICD-10-PCS | Mod: CPTII,,, | Performed by: OBSTETRICS & GYNECOLOGY

## 2023-05-23 PROCEDURE — 99213 OFFICE O/P EST LOW 20 MIN: CPT | Mod: S$PBB,,, | Performed by: OBSTETRICS & GYNECOLOGY

## 2023-05-23 PROCEDURE — 87086 URINE CULTURE/COLONY COUNT: CPT | Mod: ,,, | Performed by: CLINICAL MEDICAL LABORATORY

## 2023-05-23 PROCEDURE — 99213 OFFICE O/P EST LOW 20 MIN: CPT | Mod: PBBFAC | Performed by: OBSTETRICS & GYNECOLOGY

## 2023-05-23 PROCEDURE — 81001 URINALYSIS AUTO W/SCOPE: CPT | Mod: ,,, | Performed by: CLINICAL MEDICAL LABORATORY

## 2023-05-23 RX ORDER — TRIAMCINOLONE ACETONIDE 1 MG/G
OINTMENT TOPICAL
Qty: 15 G | Refills: 0 | Status: SHIPPED | OUTPATIENT
Start: 2023-05-23 | End: 2023-06-07

## 2023-05-23 RX ORDER — NITROFURANTOIN 25; 75 MG/1; MG/1
100 CAPSULE ORAL 2 TIMES DAILY
Qty: 14 CAPSULE | Refills: 0 | Status: SHIPPED | OUTPATIENT
Start: 2023-05-23 | End: 2023-06-07

## 2023-05-23 NOTE — PATIENT INSTRUCTIONS
Discussed slightly cloudy urine.  Will empirically begin Macrodantin b.i.d. follow-up urine culture taken today.      Discussed beginning triamcinolone ointment 0.1% to be used b.i.d. x7 days to vulva then repeat on an as-needed basis.      Caution regarding over cleansing.    Follow-up with me if symptoms persist.

## 2023-05-23 NOTE — PROGRESS NOTES
Subjective:       Patient ID: Lorraine De Jesus is a 78 y.o. female.    Chief Complaint: Vaginal Atrophy (Pt presents c/o vaginal irritation/ rawness. She thinks it might have something to due with her being a diabetic and meds she is on.)    Presents today with chief complaint of vulvar irritation.      She is used steroid ointments in the past with good success.  However vulvar irritation has reoccurred.  Review of Systems      Objective:      Physical Exam  Genitourinary:     Comments: External genitalia reveals mild irritation and redness between the labia minora and labia majora on each side.  However no ulcerative lesions.      Speculum examination revealed no evidence of Candida vaginitis or other significant discharge.  Bimanual unremarkable she is had hysterectomy in the past    She has had some dysuria symptoms.  Therefore the urethra was prepped with Betadine mini cath specimen obtained urine is slightly cloudy.      Assessment:       1. Painful urination    2. Cystitis    3. Chronic vulvitis        Plan:       Patient Instructions   Discussed slightly cloudy urine.  Will empirically begin Macrodantin b.i.d. follow-up urine culture taken today.      Discussed beginning triamcinolone ointment 0.1% to be used b.i.d. x7 days to vulva then repeat on an as-needed basis.      Caution regarding over cleansing.    Follow-up with me if symptoms persist.

## 2023-05-25 LAB — UA COMPLETE W REFLEX CULTURE PNL UR: ABNORMAL

## 2023-05-26 ENCOUNTER — TELEPHONE (OUTPATIENT)
Dept: OBSTETRICS AND GYNECOLOGY | Facility: CLINIC | Age: 78
End: 2023-05-26

## 2023-05-26 DIAGNOSIS — N30.90 CYSTITIS: Primary | ICD-10-CM

## 2023-05-26 RX ORDER — FLUCONAZOLE 200 MG/1
200 TABLET ORAL DAILY
Qty: 14 TABLET | Refills: 0 | Status: SHIPPED | OUTPATIENT
Start: 2023-05-26 | End: 2023-06-09

## 2023-05-26 NOTE — TELEPHONE ENCOUNTER
Discussed Candida cystitis documented on culture.  Discussed treatment with Diflucan 200 mg daily for 2 weeks.    She will notify me if symptoms persist.

## 2023-06-01 NOTE — PROGRESS NOTES
Subjective:       Patient ID: Lorraine De Jesus is a 78 y.o. female.    Chief Complaint: Follow-up    Here today for routine evaluation and med refill.   Will do lab in main lab  Unable to tolerate farxiga due to multiple yeast infections.      Lab Results       Component                Value               Date                       HGBA1C                   7.9 (A)             02/13/2023            Lab Results       Component                Value               Date                       MICROALBUR               1.1                 08/10/2022            Lab Results       Component                Value               Date                       CHOL                     226 (H)             08/10/2022                 CHOL                     229 (H)             06/14/2021            Lab Results       Component                Value               Date                       HDL                      56                  08/10/2022                 HDL                      55                  06/14/2021            Lab Results       Component                Value               Date                       LDLCALC                  152                 08/10/2022                 LDLCALC                  151                 06/14/2021            No results found for: DLDL  Lab Results       Component                Value               Date                       TRIG                     88                  08/10/2022                 TRIG                     115                 06/14/2021              f1 Lab Results       Component                Value               Date                       CHOLHDL                  4.0                 08/10/2022                 CHOLHDL                  4.2                 06/14/2021            CMP  Sodium       Date                     Value               Ref Range           Status                09/27/2022               142                 136 - 145 mmol*     Final            ----------  Potassium        Date                     Value               Ref Range           Status                09/27/2022               4.1                 3.5 - 5.1 mmol*     Final            ----------  Chloride       Date                     Value               Ref Range           Status                09/27/2022               108 (H)             98 - 107 mmol/L     Final            ----------  CO2       Date                     Value               Ref Range           Status                09/27/2022               27                  21 - 32 mmol/L      Final            ----------  Glucose       Date                     Value               Ref Range           Status                09/27/2022               137 (H)             74 - 106 mg/dL      Final            ----------  BUN       Date                     Value               Ref Range           Status                09/27/2022               17                  7 - 18 mg/dL        Final            ----------  Creatinine       Date                     Value               Ref Range           Status                09/27/2022               0.78                0.55 - 1.02 mg*     Final            ----------  Calcium       Date                     Value               Ref Range           Status                09/27/2022               8.9                 8.5 - 10.1 mg/*     Final            ----------  Total Protein       Date                     Value               Ref Range           Status                10/21/2022               7.5                 6.4 - 8.2 g/dL      Final            ----------  Albumin       Date                     Value               Ref Range           Status                10/20/2022               3.5                 3.5 - 5.0 g/dL      Final            ----------  Bilirubin, Total       Date                     Value               Ref Range           Status                10/20/2022               0.4                 >0.0 - 1.2 mg/*     Final             ----------  Alk Phos       Date                     Value               Ref Range           Status                10/20/2022               116                 55 - 142 U/L        Final            ----------  AST       Date                     Value               Ref Range           Status                10/20/2022               16                  15 - 37 U/L         Final            ----------  ALT       Date                     Value               Ref Range           Status                10/20/2022               27                  13 - 56 U/L         Final            ----------  Anion Gap       Date                     Value               Ref Range           Status                09/27/2022               11                  7 - 16 mmol/L       Final            ----------  eGFR       Date                     Value               Ref Range           Status                09/27/2022               79                  >=60 mL/min/1.*     Final            ----------      Review of Systems   Constitutional:  Negative for activity change, appetite change, diaphoresis and fatigue.   HENT:  Negative for nasal congestion, facial swelling and sinus pressure/congestion.    Eyes:  Negative for visual disturbance.   Respiratory:  Negative for shortness of breath and wheezing.    Cardiovascular:  Negative for chest pain and leg swelling.   Gastrointestinal:  Negative for constipation, diarrhea, nausea and vomiting.   Endocrine: Negative for polydipsia, polyphagia and polyuria.   Genitourinary:  Negative for dysuria, frequency and urgency.   Musculoskeletal:  Negative for gait problem and myalgias.   Integumentary:  Negative for color change, rash and wound.   Neurological:  Negative for dizziness, syncope, weakness, headaches, coordination difficulties and coordination difficulties.   Hematological:  Does not bruise/bleed easily.   Psychiatric/Behavioral:  Negative for self-injury, sleep disturbance and suicidal ideas. The  patient is not nervous/anxious.        Objective:      Physical Exam  Vitals and nursing note reviewed.   Constitutional:       Appearance: Normal appearance.   HENT:      Head: Normocephalic.   Neck:      Thyroid: No thyromegaly.      Vascular: No carotid bruit.   Cardiovascular:      Rate and Rhythm: Normal rate and regular rhythm.      Pulses:           Dorsalis pedis pulses are 2+ on the right side and 2+ on the left side.        Posterior tibial pulses are 2+ on the right side and 2+ on the left side.      Heart sounds: Normal heart sounds.   Pulmonary:      Effort: Pulmonary effort is normal.      Breath sounds: Normal breath sounds.   Musculoskeletal:         General: Normal range of motion.   Feet:      Right foot:      Protective Sensation: 6 sites tested.  6 sites sensed.      Skin integrity: Skin integrity normal.      Left foot:      Protective Sensation: 6 sites tested.  6 sites sensed.      Skin integrity: Skin integrity normal.   Skin:     General: Skin is warm and dry.   Neurological:      General: No focal deficit present.      Mental Status: She is alert and oriented to person, place, and time.   Psychiatric:         Mood and Affect: Mood normal.         Behavior: Behavior normal.         Thought Content: Thought content normal.         Judgment: Judgment normal.       Assessment:     1. Type 2 diabetes mellitus without complication, without long-term current use of insulin  Will hold the farxiga and advised to take GEORGES bid as she reports that she has been taking it once daily  Lifestyle modifications.   -     POCT Glucose, Hand-Held Device    2. Essential hypertension  ARB coverage    3. Hyperlipidemia, unspecified hyperlipidemia type  Statin coverage     4. PVD (peripheral vascular disease)      5. Chronic pain syndrome      Other orders  -     glipiZIDE (GLUCOTROL) 10 MG TR24; Take 1 tablet (10 mg total) by mouth 2 (two) times a day.  Dispense: 180 tablet; Refill: 1

## 2023-06-07 ENCOUNTER — OFFICE VISIT (OUTPATIENT)
Dept: DIABETES SERVICES | Facility: CLINIC | Age: 78
End: 2023-06-07
Payer: COMMERCIAL

## 2023-06-07 VITALS
HEART RATE: 93 BPM | RESPIRATION RATE: 18 BRPM | DIASTOLIC BLOOD PRESSURE: 72 MMHG | OXYGEN SATURATION: 97 % | HEIGHT: 68 IN | BODY MASS INDEX: 26.67 KG/M2 | WEIGHT: 176 LBS | SYSTOLIC BLOOD PRESSURE: 118 MMHG

## 2023-06-07 DIAGNOSIS — E11.9 TYPE 2 DIABETES MELLITUS WITHOUT COMPLICATION, WITHOUT LONG-TERM CURRENT USE OF INSULIN: Primary | ICD-10-CM

## 2023-06-07 DIAGNOSIS — I73.9 PVD (PERIPHERAL VASCULAR DISEASE): ICD-10-CM

## 2023-06-07 DIAGNOSIS — G89.4 CHRONIC PAIN SYNDROME: Chronic | ICD-10-CM

## 2023-06-07 DIAGNOSIS — I10 ESSENTIAL HYPERTENSION: ICD-10-CM

## 2023-06-07 DIAGNOSIS — E78.5 HYPERLIPIDEMIA, UNSPECIFIED HYPERLIPIDEMIA TYPE: ICD-10-CM

## 2023-06-07 LAB — GLUCOSE SERPL-MCNC: 155 MG/DL (ref 70–110)

## 2023-06-07 PROCEDURE — 1159F PR MEDICATION LIST DOCUMENTED IN MEDICAL RECORD: ICD-10-PCS | Mod: CPTII,,, | Performed by: NURSE PRACTITIONER

## 2023-06-07 PROCEDURE — 1101F PT FALLS ASSESS-DOCD LE1/YR: CPT | Mod: CPTII,,, | Performed by: NURSE PRACTITIONER

## 2023-06-07 PROCEDURE — 1125F AMNT PAIN NOTED PAIN PRSNT: CPT | Mod: CPTII,,, | Performed by: NURSE PRACTITIONER

## 2023-06-07 PROCEDURE — 1160F PR REVIEW ALL MEDS BY PRESCRIBER/CLIN PHARMACIST DOCUMENTED: ICD-10-PCS | Mod: CPTII,,, | Performed by: NURSE PRACTITIONER

## 2023-06-07 PROCEDURE — 82962 GLUCOSE BLOOD TEST: CPT | Mod: PBBFAC | Performed by: NURSE PRACTITIONER

## 2023-06-07 PROCEDURE — 1101F PR PT FALLS ASSESS DOC 0-1 FALLS W/OUT INJ PAST YR: ICD-10-PCS | Mod: CPTII,,, | Performed by: NURSE PRACTITIONER

## 2023-06-07 PROCEDURE — 1159F MED LIST DOCD IN RCRD: CPT | Mod: CPTII,,, | Performed by: NURSE PRACTITIONER

## 2023-06-07 PROCEDURE — 1125F PR PAIN SEVERITY QUANTIFIED, PAIN PRESENT: ICD-10-PCS | Mod: CPTII,,, | Performed by: NURSE PRACTITIONER

## 2023-06-07 PROCEDURE — 99214 PR OFFICE/OUTPT VISIT, EST, LEVL IV, 30-39 MIN: ICD-10-PCS | Mod: S$PBB,,, | Performed by: NURSE PRACTITIONER

## 2023-06-07 PROCEDURE — 1160F RVW MEDS BY RX/DR IN RCRD: CPT | Mod: CPTII,,, | Performed by: NURSE PRACTITIONER

## 2023-06-07 PROCEDURE — 99214 OFFICE O/P EST MOD 30 MIN: CPT | Mod: S$PBB,,, | Performed by: NURSE PRACTITIONER

## 2023-06-07 PROCEDURE — 3078F DIAST BP <80 MM HG: CPT | Mod: CPTII,,, | Performed by: NURSE PRACTITIONER

## 2023-06-07 PROCEDURE — 3078F PR MOST RECENT DIASTOLIC BLOOD PRESSURE < 80 MM HG: ICD-10-PCS | Mod: CPTII,,, | Performed by: NURSE PRACTITIONER

## 2023-06-07 PROCEDURE — 3074F PR MOST RECENT SYSTOLIC BLOOD PRESSURE < 130 MM HG: ICD-10-PCS | Mod: CPTII,,, | Performed by: NURSE PRACTITIONER

## 2023-06-07 PROCEDURE — 3074F SYST BP LT 130 MM HG: CPT | Mod: CPTII,,, | Performed by: NURSE PRACTITIONER

## 2023-06-07 PROCEDURE — 3288F FALL RISK ASSESSMENT DOCD: CPT | Mod: CPTII,,, | Performed by: NURSE PRACTITIONER

## 2023-06-07 PROCEDURE — 99215 OFFICE O/P EST HI 40 MIN: CPT | Mod: PBBFAC | Performed by: NURSE PRACTITIONER

## 2023-06-07 PROCEDURE — 3288F PR FALLS RISK ASSESSMENT DOCUMENTED: ICD-10-PCS | Mod: CPTII,,, | Performed by: NURSE PRACTITIONER

## 2023-06-07 RX ORDER — GLIPIZIDE 10 MG/1
10 TABLET, FILM COATED, EXTENDED RELEASE ORAL 2 TIMES DAILY
Qty: 180 TABLET | Refills: 1 | Status: SHIPPED | OUTPATIENT
Start: 2023-06-07 | End: 2023-07-06 | Stop reason: SDUPTHER

## 2023-06-07 NOTE — PATIENT INSTRUCTIONS
Can continue off of farxiga    Ensure to take 1 tablet twice daily with meals of glipizide.    Do not drink regular carbonated drinks, juice or sweet tea.     Low Carbohydrate snacks/quick meals    Ham and cheese rollups - slice of ham wrapped around a string cheese/cheese slice. (0 gm carb)  Cucumber boats (stuffed with chicken salad or tuna salad - no fruit or sweet pickle in salad) (0 gm carb)  Celery and Peanut Butter (2 stalks with 1 Tbsp PNB = 6 gm carb)  Nuts - mixed (1/4 cup = 6 gm carb)  Sunflower seeds- without shell (1/4 cup = 7 gm carb) In the shell (1 cup = 3.3 gm carb)  Deviled eggs (no sweet pickles) - (0 gm carb)  Hard boiled eggs - (0 gm carb)  Guacamole with raw vegetables (mashed avocado with lime juice and seasoning to taste) (1 cup raw veg = 5 gm carb)  Ranch dressing with raw vegetables -(2 Tbsp Ranch = 2 gm carb, ½ cup raw veggies = 2.5 gm carb  Lasagna rolls- Slice zucchini thinly lengthwise and microwave for 1-2 minutes. Fill with ricotta, parmesan cheese. Roll and top with low carb tomato sauce. (5 rolls = 5 gm carb)  Crust less pizza -pepperoni or [place] ocasio topped with cheese and veggies +1 tbsp tomato sauce, microwave (1 gm carb)  Beef jerky - read label for lower carb jerky  Olives - Black (5 olives = 1 gm carb) Green (5 olives = 1 gm carb)  Dill pickles (1 whole dill pickle = 2 gm carb)  Sugar free jell-o (0 gm carb)  [place] Chicken Marinate chicken strips in 2 Tbsp sugar free maple syrup, 1 Tbsp lime juice, 1 Tbsp fresh cilantro. [place] or cook in skillet sprayed with oil. (0 gm carb)  Chicken nachos - Heat oven to 350. Rub 5-6 chicken tenders with 1 Tbsp Abdoul Taco seasoning then drizzle with vegetable oil. Bake at 350 for 3-4 min and then flip and cook 3-4 min. Top with grated cheese, jalopenos, ocasio, green onion. Place back in oven at temp of 425 for 3-4 minutes. Serve with side of 2 Tbsp ranch dressing or sour cream. (3 gm carb)  Egg Mcmuffin: using egg (or egg white for a  healthier version) as the bread put one slice of cheese with 1 slice [place] ocasio or sausage cecilia (1 gm carb per sandwich)  Southern Okra - Wash and dry fresh okra. Toss with olive oil, salt and pepper and roast in oven 10-20 minutes. (1 cup = 5 gm carb)  Crispy green beans - Robert 5 lbs fresh green beans. Toss with 1/3 cup melted coconut oil and sprinkle with 4 tsp salt and 1 tsp onion and garlic powder. Bake at 170-175 for 8 hours or dehydrate overnight in . (1 cup = 5 gm carb)  Salt and vinegar zucchini chips - Thinly slice zucchini as thin as possible. In small bowl whisk 2 Tbsp olive oil, 2 Tbsp white vinegar, and 2 tsp sea salt. Add zucchini and dehydrate or bake on 170 for 3-4 hours till crispy. (½ cup = 3 gm carb). Make in large batches and keep in air tight container up to 1 week   Zucchini Abdoul chips - Thinly slice zucchini as thin as possible. Heat oil in fryer to 350 and drop zucchini in hot oil working in batches of about 20 chips at a time. Remove, drain and sprinkle with Abdoul Taco seasoning. (1/2 cup = 3 gm carb). Make in large batches and keep in air tight container up to 1 week.  Abdoul Taco Seasoning - 2 Tbsp chili powder, ½ tsp garlic powder, ½ tsp onion powder, ½ tsp crushed red pepper flakes, ½ tsp dried oregano, 3 tsp ground cumin, 2 tsp sea salt, 2 tsp black pepper. Makes 20 servings (0 gm carb)  [place] milk (1 cup almond milk = 0.3 gm carb)  Cheese chips - Preheat oven 400. On a nonstick baking sheet add cheese slices (Cheddar, Swiss, Provolone, Josr Damien), bake 10-12 minutes or until browned. Cool completely before removal. (2 slices = 1 gm carb). Store in air tight container up to 1 week.   Breakfast balls - mix together 2 lbs pork sausage, 1 lb ground beef, 3 eggs, 2 Tbsp dried onion flakes, ½ tsp black pepper, ½ lb sharp cheddar cheese, shredded. Form into 4 dozen 1 balls. Bake on cookie sheet for 25 min at 375. Cool and may be frozen as well individually. (0 gm  carb)  Meat muffins - spray muffin tin with cooking spray. Line muffin tin with 1 slice ham. Add 1 raw egg. Top with grated cheese and salt and pepper. Bake 10-12 min. (0 gm carb)  Pork rinds/Cracklings (0 gm carb)  Gummy Bears - Add 1 packet of Sugar free jello (flavor of your choice), 1 packet unflavored gelatin and 1/3 cup cold water to small sauce pan. Stir well and heat over low till it become liquid and dissolved (2-3 minutes). Pour into mold and refrigerate 30-40 minutes. Add only ¼ cup if you want them more firm. (0 gm carb)  Cheese and meat kabobs (0 gm carb)  Garlic parmesan cheese crisps - Preheat oven to 350. On a nonstick baking sheet, place 1 Tbsp grated parmesan cheese, sprinkle with garlic and basil. Bake about 5 minutes or until outside edges become romero brown. Cool completely before removal (5 crisps = 1 gm carb)  Antipasti - Pepperoni, salami, green pepper, jalapeno pepper, mushrooms, onion, black olives, cheddar and provolone cheese cubes. Toss with Italian salad dressing. (1/2 cup = 5 gm carb)  [place] chicken wings - (0 gm carb)  Cheetos- preheat oven to 300. Chop ½ cup freshly shredded cheddar cheese that is frozen and place in  or  and chop into tiny pieces. In a mixing bowl, whip 3 egg whites and 1/8 tsp cream of tartar until VERY stiff peaks form. Sprinkle cheese on top of egg whites and then fold cheese into egg whites very carefully. Place mixture into large ziplock bag and cut hole in corner. Gently squeeze onto greased nonstick cookie pan in cheestos like shapes. Sprinkle with parmesan cheese. Bake for 30 minutes. Turn over off and leave in there for another 30 minutes. (1 cup = 1 gm carb)  Cheesy cauliflower tots - preheat oven to 400. Spray mini muffin tin with nonstick spray. Chop ½ large head of cauliflower into small pieces. Place in microwavable bowl and cover. Microwave 2 minutes. Drain cauliflower. Place in  and pulse till finely chopped.  To this add, 1/3 cup grated sharp cheddar, ¼ cup grated parmesan cheese, 2 Tbsp. almond flour, ¼ tsp salt, ½ tsp all purpose seasoning and 1 egg. Mix well. Place 1 Tbsp of mixture in each mini muffin tin. Bake 15 minutes. Remove and flip, bake another 15 minutes. Makes 24 tots. (10 tots = 5 gm carb)  [employer] protein bars (carbs vary)  Damien snacks - Preheat oven 350. 1 cup shredded Pepperjack paper. Scoop 1 Tbsp cheese. Place on non stick pan and press slightly flat. Top with 1 slice jalapeno pepper. Bake for 10-12 minutes till brown and crispy. Cool completely before removal. (10 crisps = 1 gm carb)  Snake bite (aka jalapeno poppers) - remove seeds and membrane from jalapenos, fill with cream cheese, wrap in ocasio. Stick a toothpick through to hold ocasio in place. Bake 15-20 min at 400 (4 bites = 2 gm carb)  5 minute PNB mousse - Beat together 4 oz softened cream cheese, 2 Tbsp natural PNB (no sugar added), ½ tsp vanilla extract and 1/3 cup Splenda. Fold in 1 cup Reddiwhip. Place in container of your choice and refrigerate up to 1 week. Top your serving with 1Tbsp Sugar free chocolate syrup. (1/2 cup = 4 gm carb)  BLT - Fill a leaf of mahin lettuce leaf with 1 Tbsp LF alvarenga, 2 slices ocasio, sliced tomato. Sprinkle salt and pepper. (0 gm carb)  Cinnamon and coconut bombs - in microwave safe bowl, mix 1 cup coconut butter, 1 cup coconut milk (full fat canned, not from box), 1 tsp vanilla extract, ½ tsp nutmeg and cinnamon, 1 tsp stevia powder extract. Melt until combined. Place bowl in fridge until hard enough to roll into 10-12 balls, about 30 minutes. Roll balls in 1 cup shredded coconut. Store in refrigerator (1 ball = 1 gm carb)

## 2023-06-16 ENCOUNTER — OFFICE VISIT (OUTPATIENT)
Dept: DERMATOLOGY | Facility: CLINIC | Age: 78
End: 2023-06-16
Payer: COMMERCIAL

## 2023-06-16 DIAGNOSIS — L81.0 POST-INFLAMMATORY HYPERPIGMENTATION: ICD-10-CM

## 2023-06-16 DIAGNOSIS — L82.0 INFLAMED SEBORRHEIC KERATOSIS: Primary | ICD-10-CM

## 2023-06-16 PROCEDURE — 17110 PR DESTRUCTION BENIGN LESIONS UP TO 14: ICD-10-PCS | Mod: ,,, | Performed by: STUDENT IN AN ORGANIZED HEALTH CARE EDUCATION/TRAINING PROGRAM

## 2023-06-16 PROCEDURE — 1159F MED LIST DOCD IN RCRD: CPT | Mod: CPTII,,, | Performed by: STUDENT IN AN ORGANIZED HEALTH CARE EDUCATION/TRAINING PROGRAM

## 2023-06-16 PROCEDURE — 17110 DESTRUCTION B9 LES UP TO 14: CPT | Mod: ,,, | Performed by: STUDENT IN AN ORGANIZED HEALTH CARE EDUCATION/TRAINING PROGRAM

## 2023-06-16 PROCEDURE — 99214 PR OFFICE/OUTPT VISIT, EST, LEVL IV, 30-39 MIN: ICD-10-PCS | Mod: 25,,, | Performed by: STUDENT IN AN ORGANIZED HEALTH CARE EDUCATION/TRAINING PROGRAM

## 2023-06-16 PROCEDURE — 1159F PR MEDICATION LIST DOCUMENTED IN MEDICAL RECORD: ICD-10-PCS | Mod: CPTII,,, | Performed by: STUDENT IN AN ORGANIZED HEALTH CARE EDUCATION/TRAINING PROGRAM

## 2023-06-16 PROCEDURE — 99214 OFFICE O/P EST MOD 30 MIN: CPT | Mod: 25,,, | Performed by: STUDENT IN AN ORGANIZED HEALTH CARE EDUCATION/TRAINING PROGRAM

## 2023-06-16 RX ORDER — HYDROQUINONE 40 MG/G
CREAM TOPICAL 2 TIMES DAILY
Qty: 28 G | Refills: 11 | Status: SHIPPED | OUTPATIENT
Start: 2023-06-16 | End: 2023-07-16

## 2023-06-16 NOTE — PROGRESS NOTES
[facility]  Santiago Sheikh MD    [address] [place] [address], [place] [address]  [phone]    Fax: [phone]    Patient Name: Lorraine De Jesus  Medical Record Number: [id number]  PCP: Mateo Chen MD  Age: 78 y.o. : 1945  Contact: [phone] (home)     History of Present Illness:     Lorraine De Jesus is a 78 y.o.  female here for follow up of ISK on the face. Last seen 23. Treatment plan included liquid nitrogen which did resolve some of her ISKs, but now she is concerned of the hyperpigmentation on her left cheek. Patient also would like to have more of her ISK treated that did not resolve during her last visit.     The patient has no other concerns today.    Review of Systems:     Unremarkable other than mentioned above.     Physical Exam:     General: Relaxed, oriented, alert    Skin examination of the scalp, face, neck, chest, back, abdomen, upper extremities and lower extremities were normal except for as listed below      Assessment and Plan:     1.  Post inflammatory hyperpigmentation   - discussed this may take months to resolve, or may be permanent in some cases     Plan:  - Hydroquinone 4% cream     2. Irritated Seborrheic Keratoses (L82.0)  Stuck-on inflamed papules with crust located on the left upper eyelid  Associated diagnoses: Pruritus and Cutaneous Inflammation    Plan: Liquid Nitrogen.  A total of 2 lesions were treated with liquid nitrogen, located on the above listed location.  This procedure was medically necessary because the lesions that were treated were: irritated and itchy. The  patient's consent was obtained including but not limited to risks of crusting, scabbing, blistering, scarring, darker  or lighter pigmentary change, recurrence, incomplete removal and infection.        Return to clinic as needed.     AVS printed with patient instructions     Santiago Sheikh MD   Mohs Surgery/Dermatologic Oncology  Dermatology

## 2023-06-16 NOTE — PATIENT INSTRUCTIONS
"Liquid Nitrogen Wound Care     - the areas treated with liquid nitrogen may form sores, blisters, and scabs. This is normal   - if a blister forms, please keep it intact and do not rupture it. It will resolve within a few days   - please keep petrolatum ointment to the area once to twice daily. You can cover with a bandage if you wish, but it is usually not necessary   - the area may be painful for a few days. We recommend ice, or over the counter tylenol or NSAIDs (such as ibuprofen or naproxen, if you are able to take these)   - this may result in a scar or a "hypopigmented" or lighter area of skin or "hyperpigmented" or darkening area of skin. This may or may not resolve with time   - please call our clinic if the lesion does not resolve, as this can be treated again     "

## 2023-06-23 NOTE — PROGRESS NOTES
Subjective     Patient ID: Lorraine De Jesus is a 78 y.o. female.    Chief Complaint: No chief complaint on file.    UROLOGY HISTORY PER DR. COFFMAN:  6/9/2021  here for repeat dilation. patient has chronic pain that is improved with urination. serial dilations over the years. typically she does well for a few months but was dilated just over 30 days ago.        Cystoscopy: After informed consent was confirmed. preprocedural abx were administered and the patient was taken to the cystoscopy suite and prepped and draped in standard fashion. The urethra was injected with lidocaine. Cystoscopy revealed no urethral lesions, moderately obstructing prostate, large bladder with no trabeculations or mucosal lesions. Ureteral orifices were identified bilaterally and were noted to be in orthotopic position demonstrate a clear reflux bilaterally. Retroflexion maneuver was performed to evaluate the trigone. Patient tolerated procedure well.         dilated up to 28 F without issue. teaching on self dilation today. sent home with sample. instructions not to introduce dilator farther then 1in into the urethra. discussed risk of injury to bladder. infection. pain.     recommend q 1 month self dilation. teaching performed by RN staff.      start hiprex 1gm daily, pyridium 100mg daily prn.     follow up 3 months for symptom check.    06/09/2021)--------------------------------------------------------------------------     Ms. De Jesus has been transferred to my care in Dr. Coffman's absence for f/u from above-mentioned cystoscopy with Hydrostatic Dilatation.  She states that she is doing much better since procedure, compliant with Hiprex, and free of s/s of infection to date.  Adds that her incontinence and bladder spasms are controlled on Oxybutynin 5mg daily.  (7/8/2021)----------------------------------------------------------------------------------     Ms. De Jesus is here today for c/o bladder pressure.  She has a history of known urethral  stenosis, and   She denies fever, constitutional symptoms or sensation of infection today.  PVR  0 ml.     -  Urethral stenosis:  Not performing self dilatations, states unable. Though taught to perform monthly self dilation at last visit, states she is unable to do this.  Reports she was symptom free following last cystoscopy with dilatation, but strainiing and pain at voiding onset have returned over the last 2 weeks.  -  Personal history UTI:  Hiprex started in June of this year.  -  Chronic pelvic pain in female:  R/t bladder spasms.  Controlled last visit with oxybutynine 5mg daily.  Discussed increasing Ditropan to BID  [November 22, 2021]  -------------------------------------------------------------------------------------------------------------------------------------------------------------------------------------------------------------------------  The above notes are notes of Dr. Recio and Ms. Shaw.  Patient sent to me for urethral dilatation.  She has been having problems with urethral syndrome since she was in her 40s.  Last urethral dilatation done June 24th and she feels she needs another today.  Pressure and discomfort like she usually has.  She is familiar with urethral dilatations and wishes to proceed again.   ------------------------------------------------------------- [December 16, 2021]-----------------------------------------------------------------------------------------------------------------------     Ms. De Jesus is a very pleasant 78 yo AA female, who has returned for c/o severe exacerbation of chronic pelvic pain and difficulty emptying.  Patient states increase of straining to urinate, causing bladder spasms from bladder around to back.  PVR 47 l.  Requests repeat urethral dilatations.  Dr. Singleton to room to evaluate.  Patient to be worked into his schedule this after noon for procedure.     She was last seen by Dr. Singleton 10 months prior for urethral dilatation and following  "POC:  "Urethral dilatation performed as described.  Urine sent for culture since she has lots of amorphous crystals that could mask infection.  Patient given Cipro 500 mg b.i.d. for 3 days to cover instrumentation.  She is to call when she needs another urethral dilatation"  [9/15/2022]-----------------------------------------------------------------------------------------  --------------------------------------------------------------------------------------------------------------------------------------------------------------------------------------------------------------------------------------------------  The above notes are from SAMSON Shaw, Dr. ARIEL Singleton and Dr KARLI Recio in the EMR system    This pleasant 78 year old female presents to the clinic for follow up of urethral stricture and bladder spasms. Patient was last seen in September 2022 for urethral dilatation. Patient reports urinary frequency, urgency, incomplete bladder emptying, bladder spasms, and nocturia 3 to 4 times a night. She denies dysuria, hematuria, nausea, vomiting, fever or chills. Her PVR is 100 mls today. She is on Ditropan XL 5 mg one at night and is tolerating this medication without side effects. She reports it does help some with her symptoms. She request to have a urethral dilatation done today. This was discussed with Dr. ARIEL Singleton and they will do the dilatation. She will follow up with us PRN and call when she feels like she needs another dilatation. I discussed the plan in detail with the patient and she is in agreement with the plan. All her questions were answered at today's visit. ------------------------------------------------------[June 27, 2023].         Review of Systems   Constitutional:  Negative for activity change and fever.   HENT:  Negative for hearing loss and trouble swallowing.    Eyes:  Negative for visual disturbance.   Respiratory:  Negative for cough, shortness of breath and wheezing.    Cardiovascular:  " Negative for chest pain.   Gastrointestinal:  Negative for abdominal pain, diarrhea, nausea and vomiting.   Endocrine: Negative for polyuria.   Genitourinary:  Positive for frequency and urgency. Negative for bladder incontinence, decreased urine volume, difficulty urinating, dysuria, enuresis, flank pain, hematuria and nocturia.        Urethral stricture        Bladder spasms        Chronic pelvic pain    Musculoskeletal:  Negative for back pain and gait problem.   Integumentary:  Negative for rash.   Neurological:  Negative for speech difficulty and weakness.   Psychiatric/Behavioral:  Negative for behavioral problems and confusion.         Objective     Physical Exam  Vitals and nursing note reviewed. Exam conducted with a chaperone present.   Constitutional:       General: She is not in acute distress.     Appearance: Normal appearance. She is not ill-appearing, toxic-appearing or diaphoretic.   HENT:      Head: Normocephalic.   Eyes:      Extraocular Movements: Extraocular movements intact.   Cardiovascular:      Rate and Rhythm: Normal rate and regular rhythm.      Heart sounds: Normal heart sounds.   Pulmonary:      Effort: Pulmonary effort is normal.      Breath sounds: Normal breath sounds. No wheezing, rhonchi or rales.   Abdominal:      General: Bowel sounds are normal.      Palpations: Abdomen is soft.      Tenderness: There is no abdominal tenderness. There is no right CVA tenderness, left CVA tenderness, guarding or rebound.   Musculoskeletal:         General: Normal range of motion.      Cervical back: Normal range of motion. No rigidity.   Skin:     General: Skin is warm and dry.   Neurological:      General: No focal deficit present.      Mental Status: She is alert and oriented to person, place, and time.      Motor: No weakness.      Coordination: Coordination normal.      Gait: Gait normal.   Psychiatric:         Mood and Affect: Mood normal.         Behavior: Behavior normal.         Thought  Content: Thought content normal.          Assessment and Plan     Problem List Items Addressed This Visit          Renal/    Bladder spasms    Relevant Medications    oxybutynin (DITROPAN-XL) 5 MG TR24    Stricture of female urethra - Primary       GI    Chronic pelvic pain in female (Chronic)     Other Visit Diagnoses       Personal history UTI        Relevant Orders    Urine culture             Urine culture   Renew and continue Ditropan XL 5 mg one at night   Urethral Dilatation with Dr. ARIEL Singleton today   Follow up with SAMSON Luciano as needed

## 2023-06-27 ENCOUNTER — OFFICE VISIT (OUTPATIENT)
Dept: UROLOGY | Facility: CLINIC | Age: 78
End: 2023-06-27
Payer: COMMERCIAL

## 2023-06-27 ENCOUNTER — OFFICE VISIT (OUTPATIENT)
Dept: FAMILY MEDICINE | Facility: CLINIC | Age: 78
End: 2023-06-27
Payer: COMMERCIAL

## 2023-06-27 VITALS
BODY MASS INDEX: 26.95 KG/M2 | SYSTOLIC BLOOD PRESSURE: 151 MMHG | HEIGHT: 68 IN | TEMPERATURE: 97 F | WEIGHT: 177.81 LBS | OXYGEN SATURATION: 95 % | RESPIRATION RATE: 17 BRPM | HEART RATE: 86 BPM | DIASTOLIC BLOOD PRESSURE: 95 MMHG

## 2023-06-27 VITALS
TEMPERATURE: 98 F | RESPIRATION RATE: 18 BRPM | BODY MASS INDEX: 27.13 KG/M2 | HEART RATE: 74 BPM | DIASTOLIC BLOOD PRESSURE: 70 MMHG | HEIGHT: 68 IN | SYSTOLIC BLOOD PRESSURE: 110 MMHG | OXYGEN SATURATION: 97 % | WEIGHT: 179 LBS

## 2023-06-27 DIAGNOSIS — N35.92 STRICTURE OF FEMALE URETHRA, UNSPECIFIED STRICTURE TYPE: Primary | ICD-10-CM

## 2023-06-27 DIAGNOSIS — E03.9 HYPOTHYROIDISM, UNSPECIFIED TYPE: ICD-10-CM

## 2023-06-27 DIAGNOSIS — N34.3 URETHRAL SYNDROME: Primary | ICD-10-CM

## 2023-06-27 DIAGNOSIS — Z11.3 SCREENING FOR STD (SEXUALLY TRANSMITTED DISEASE): Primary | ICD-10-CM

## 2023-06-27 DIAGNOSIS — R10.2 PELVIC PAIN: ICD-10-CM

## 2023-06-27 DIAGNOSIS — R41.3 MEMORY LOSS: ICD-10-CM

## 2023-06-27 DIAGNOSIS — N32.89 BLADDER SPASMS: ICD-10-CM

## 2023-06-27 DIAGNOSIS — R10.2 CHRONIC PELVIC PAIN IN FEMALE: Chronic | ICD-10-CM

## 2023-06-27 DIAGNOSIS — E03.8 TSH DEFICIENCY: ICD-10-CM

## 2023-06-27 DIAGNOSIS — Z87.440 PERSONAL HISTORY UTI: ICD-10-CM

## 2023-06-27 DIAGNOSIS — G89.29 CHRONIC PELVIC PAIN IN FEMALE: Chronic | ICD-10-CM

## 2023-06-27 DIAGNOSIS — E53.8 B12 DEFICIENCY: ICD-10-CM

## 2023-06-27 DIAGNOSIS — R41.3 OTHER AMNESIA: ICD-10-CM

## 2023-06-27 LAB
SYPHILIS AB INTERPRETATION: NORMAL
TSH SERPL DL<=0.005 MIU/L-ACNC: 2.04 UIU/ML (ref 0.36–3.74)
VIT B12 SERPL-MCNC: 895 PG/ML (ref 193–986)

## 2023-06-27 PROCEDURE — 3074F SYST BP LT 130 MM HG: CPT | Mod: CPTII,,, | Performed by: NURSE PRACTITIONER

## 2023-06-27 PROCEDURE — 86780 TREPONEMA PALLIDUM (SYPHILIS) ANTIBODY: ICD-10-PCS | Mod: ,,, | Performed by: CLINICAL MEDICAL LABORATORY

## 2023-06-27 PROCEDURE — 1101F PT FALLS ASSESS-DOCD LE1/YR: CPT | Mod: CPTII,,, | Performed by: NURSE PRACTITIONER

## 2023-06-27 PROCEDURE — 53660 DILATION OF URETHRA: CPT | Mod: S$PBB,,, | Performed by: UROLOGY

## 2023-06-27 PROCEDURE — 86780 TREPONEMA PALLIDUM: CPT | Mod: ,,, | Performed by: CLINICAL MEDICAL LABORATORY

## 2023-06-27 PROCEDURE — 99214 OFFICE O/P EST MOD 30 MIN: CPT | Mod: ,,, | Performed by: INTERNAL MEDICINE

## 2023-06-27 PROCEDURE — 1160F RVW MEDS BY RX/DR IN RCRD: CPT | Mod: CPTII,,, | Performed by: NURSE PRACTITIONER

## 2023-06-27 PROCEDURE — 1159F MED LIST DOCD IN RCRD: CPT | Mod: CPTII,,, | Performed by: NURSE PRACTITIONER

## 2023-06-27 PROCEDURE — 3074F PR MOST RECENT SYSTOLIC BLOOD PRESSURE < 130 MM HG: ICD-10-PCS | Mod: CPTII,,, | Performed by: NURSE PRACTITIONER

## 2023-06-27 PROCEDURE — 87086 CULTURE, URINE: ICD-10-PCS | Mod: ,,, | Performed by: CLINICAL MEDICAL LABORATORY

## 2023-06-27 PROCEDURE — 1101F PR PT FALLS ASSESS DOC 0-1 FALLS W/OUT INJ PAST YR: ICD-10-PCS | Mod: ,,, | Performed by: INTERNAL MEDICINE

## 2023-06-27 PROCEDURE — 1159F PR MEDICATION LIST DOCUMENTED IN MEDICAL RECORD: ICD-10-PCS | Mod: ,,, | Performed by: INTERNAL MEDICINE

## 2023-06-27 PROCEDURE — 3288F PR FALLS RISK ASSESSMENT DOCUMENTED: ICD-10-PCS | Mod: CPTII,,, | Performed by: NURSE PRACTITIONER

## 2023-06-27 PROCEDURE — 99212 OFFICE O/P EST SF 10 MIN: CPT | Mod: PBBFAC,25,27 | Performed by: UROLOGY

## 2023-06-27 PROCEDURE — 3077F SYST BP >= 140 MM HG: CPT | Mod: ,,, | Performed by: INTERNAL MEDICINE

## 2023-06-27 PROCEDURE — 82607 VITAMIN B12: ICD-10-PCS | Mod: ,,, | Performed by: CLINICAL MEDICAL LABORATORY

## 2023-06-27 PROCEDURE — 3078F DIAST BP <80 MM HG: CPT | Mod: CPTII,,, | Performed by: NURSE PRACTITIONER

## 2023-06-27 PROCEDURE — 99213 PR OFFICE/OUTPT VISIT, EST, LEVL III, 20-29 MIN: ICD-10-PCS | Mod: S$PBB,,, | Performed by: NURSE PRACTITIONER

## 2023-06-27 PROCEDURE — 3077F PR MOST RECENT SYSTOLIC BLOOD PRESSURE >= 140 MM HG: ICD-10-PCS | Mod: ,,, | Performed by: INTERNAL MEDICINE

## 2023-06-27 PROCEDURE — 84443 ASSAY THYROID STIM HORMONE: CPT | Mod: ,,, | Performed by: CLINICAL MEDICAL LABORATORY

## 2023-06-27 PROCEDURE — 1159F PR MEDICATION LIST DOCUMENTED IN MEDICAL RECORD: ICD-10-PCS | Mod: CPTII,,, | Performed by: NURSE PRACTITIONER

## 2023-06-27 PROCEDURE — 1101F PT FALLS ASSESS-DOCD LE1/YR: CPT | Mod: ,,, | Performed by: INTERNAL MEDICINE

## 2023-06-27 PROCEDURE — 3288F FALL RISK ASSESSMENT DOCD: CPT | Mod: ,,, | Performed by: INTERNAL MEDICINE

## 2023-06-27 PROCEDURE — 87086 URINE CULTURE/COLONY COUNT: CPT | Mod: ,,, | Performed by: CLINICAL MEDICAL LABORATORY

## 2023-06-27 PROCEDURE — 3288F PR FALLS RISK ASSESSMENT DOCUMENTED: ICD-10-PCS | Mod: ,,, | Performed by: INTERNAL MEDICINE

## 2023-06-27 PROCEDURE — 53660 PR DIL URETHRA,FEMALE,INITIAL: ICD-10-PCS | Mod: S$PBB,,, | Performed by: UROLOGY

## 2023-06-27 PROCEDURE — 3080F PR MOST RECENT DIASTOLIC BLOOD PRESSURE >= 90 MM HG: ICD-10-PCS | Mod: ,,, | Performed by: INTERNAL MEDICINE

## 2023-06-27 PROCEDURE — 53660 DILATION OF URETHRA: CPT | Mod: PBBFAC | Performed by: UROLOGY

## 2023-06-27 PROCEDURE — 1126F PR PAIN SEVERITY QUANTIFIED, NO PAIN PRESENT: ICD-10-PCS | Mod: ,,, | Performed by: INTERNAL MEDICINE

## 2023-06-27 PROCEDURE — 99024 POSTOP FOLLOW-UP VISIT: CPT | Mod: ,,, | Performed by: UROLOGY

## 2023-06-27 PROCEDURE — 1160F PR REVIEW ALL MEDS BY PRESCRIBER/CLIN PHARMACIST DOCUMENTED: ICD-10-PCS | Mod: CPTII,,, | Performed by: NURSE PRACTITIONER

## 2023-06-27 PROCEDURE — 99024 PR POST-OP FOLLOW-UP VISIT: ICD-10-PCS | Mod: ,,, | Performed by: UROLOGY

## 2023-06-27 PROCEDURE — 99214 PR OFFICE/OUTPT VISIT, EST, LEVL IV, 30-39 MIN: ICD-10-PCS | Mod: ,,, | Performed by: INTERNAL MEDICINE

## 2023-06-27 PROCEDURE — 3080F DIAST BP >= 90 MM HG: CPT | Mod: ,,, | Performed by: INTERNAL MEDICINE

## 2023-06-27 PROCEDURE — 1101F PR PT FALLS ASSESS DOC 0-1 FALLS W/OUT INJ PAST YR: ICD-10-PCS | Mod: CPTII,,, | Performed by: NURSE PRACTITIONER

## 2023-06-27 PROCEDURE — 3078F PR MOST RECENT DIASTOLIC BLOOD PRESSURE < 80 MM HG: ICD-10-PCS | Mod: CPTII,,, | Performed by: NURSE PRACTITIONER

## 2023-06-27 PROCEDURE — 99215 OFFICE O/P EST HI 40 MIN: CPT | Mod: PBBFAC | Performed by: NURSE PRACTITIONER

## 2023-06-27 PROCEDURE — 1160F PR REVIEW ALL MEDS BY PRESCRIBER/CLIN PHARMACIST DOCUMENTED: ICD-10-PCS | Mod: ,,, | Performed by: INTERNAL MEDICINE

## 2023-06-27 PROCEDURE — 84443 TSH: ICD-10-PCS | Mod: ,,, | Performed by: CLINICAL MEDICAL LABORATORY

## 2023-06-27 PROCEDURE — 3288F FALL RISK ASSESSMENT DOCD: CPT | Mod: CPTII,,, | Performed by: NURSE PRACTITIONER

## 2023-06-27 PROCEDURE — 82607 VITAMIN B-12: CPT | Mod: ,,, | Performed by: CLINICAL MEDICAL LABORATORY

## 2023-06-27 PROCEDURE — 99213 OFFICE O/P EST LOW 20 MIN: CPT | Mod: S$PBB,,, | Performed by: NURSE PRACTITIONER

## 2023-06-27 PROCEDURE — 1125F AMNT PAIN NOTED PAIN PRSNT: CPT | Mod: CPTII,,, | Performed by: NURSE PRACTITIONER

## 2023-06-27 PROCEDURE — 1125F PR PAIN SEVERITY QUANTIFIED, PAIN PRESENT: ICD-10-PCS | Mod: CPTII,,, | Performed by: NURSE PRACTITIONER

## 2023-06-27 PROCEDURE — 1160F RVW MEDS BY RX/DR IN RCRD: CPT | Mod: ,,, | Performed by: INTERNAL MEDICINE

## 2023-06-27 PROCEDURE — 1159F MED LIST DOCD IN RCRD: CPT | Mod: ,,, | Performed by: INTERNAL MEDICINE

## 2023-06-27 PROCEDURE — 1126F AMNT PAIN NOTED NONE PRSNT: CPT | Mod: ,,, | Performed by: INTERNAL MEDICINE

## 2023-06-27 RX ORDER — LIDOCAINE HYDROCHLORIDE 20 MG/ML
JELLY TOPICAL
Status: COMPLETED | OUTPATIENT
Start: 2023-06-27 | End: 2023-06-27

## 2023-06-27 RX ORDER — OXYBUTYNIN CHLORIDE 5 MG/1
TABLET, EXTENDED RELEASE ORAL
Qty: 90 TABLET | Refills: 3 | Status: SHIPPED | OUTPATIENT
Start: 2023-06-27 | End: 2024-01-08 | Stop reason: DRUGHIGH

## 2023-06-27 RX ADMIN — LIDOCAINE HYDROCHLORIDE 10 ML: 20 JELLY TOPICAL at 03:06

## 2023-06-27 NOTE — PATIENT INSTRUCTIONS
Urine culture   Renew and continue Ditropan XL 5 mg one at night   Urethral Dilatation with Dr. ARIEL Singleton today   Follow up with SAMSON Luciano as needed

## 2023-06-28 PROBLEM — E03.8 TSH DEFICIENCY: Status: ACTIVE | Noted: 2023-01-27

## 2023-06-28 PROBLEM — Z11.3 SCREENING FOR STD (SEXUALLY TRANSMITTED DISEASE): Status: ACTIVE | Noted: 2021-06-14

## 2023-06-28 PROBLEM — E53.8 B12 DEFICIENCY: Status: ACTIVE | Noted: 2023-06-28

## 2023-06-28 PROBLEM — R41.3 MEMORY LOSS: Status: ACTIVE | Noted: 2023-06-28

## 2023-06-28 RX ORDER — LEVOTHYROXINE SODIUM 75 UG/1
75 TABLET ORAL
Qty: 90 TABLET | Refills: 1 | Status: SHIPPED | OUTPATIENT
Start: 2023-06-28 | End: 2023-11-06 | Stop reason: SDUPTHER

## 2023-06-28 NOTE — PROGRESS NOTES
Subjective     Patient ID: Lorraine De Jesus is a 78 y.o. female.    Chief Complaint: Urethral Stricture (Urethral dilatation; JIMBO Brown NP)    UROLOGY HISTORY PER DR. COFFMAN:  6/9/2021  here for repeat dilation. patient has chronic pain that is improved with urination. serial dilations over the years. typically she does well for a few months but was dilated just over 30 days ago.        Cystoscopy: After informed consent was confirmed. preprocedural abx were administered and the patient was taken to the cystoscopy suite and prepped and draped in standard fashion. The urethra was injected with lidocaine. Cystoscopy revealed no urethral lesions, moderately obstructing prostate, large bladder with no trabeculations or mucosal lesions. Ureteral orifices were identified bilaterally and were noted to be in orthotopic position demonstrate a clear reflux bilaterally. Retroflexion maneuver was performed to evaluate the trigone. Patient tolerated procedure well.         dilated up to 28 F without issue. teaching on self dilation today. sent home with sample. instructions not to introduce dilator farther then 1in into the urethra. discussed risk of injury to bladder. infection. pain.     recommend q 1 month self dilation. teaching performed by RN staff.      start hiprex 1gm daily, pyridium 100mg daily prn.     follow up 3 months for symptom check.    06/09/2021)--------------------------------------------------------------------------     Ms. De Jesus has been transferred to my care in Dr. Coffman's absence for f/u from above-mentioned cystoscopy with Hydrostatic Dilatation.  She states that she is doing much better since procedure, compliant with Hiprex, and free of s/s of infection to date.  Adds that her incontinence and bladder spasms are controlled on Oxybutynin 5mg daily.  (7/8/2021)----------------------------------------------------------------------------------     Ms. De Jesus is here today for c/o bladder pressure.  She  has a history of known urethral stenosis, and   She denies fever, constitutional symptoms or sensation of infection today.  PVR  0 ml.     -  Urethral stenosis:  Not performing self dilatations, states unable. Though taught to perform monthly self dilation at last visit, states she is unable to do this.  Reports she was symptom free following last cystoscopy with dilatation, but strainiing and pain at voiding onset have returned over the last 2 weeks.  -  Personal history UTI:  Hiprex started in June of this year.  -  Chronic pelvic pain in female:  R/t bladder spasms.  Controlled last visit with oxybutynine 5mg daily.  Discussed increasing Ditropan to BID  [November 22, 2021]  -------------------------------------------------------------------------------------------------------------------------------------------------------------------------------------------------------------------------  The above notes are notes of Dr. Recio and Ms. Shaw.  Patient sent to me for urethral dilatation.  She has been having problems with urethral syndrome since she was in her 40s.  Last urethral dilatation done June 24th and she feels she needs another today.  Pressure and discomfort like she usually has.  She is familiar with urethral dilatations and wishes to proceed again.   ------------------------------------------------------------- [December 16, 2021]-----------------------------------------------------------------------------------------------------------------------     Ms. De Jesus is a very pleasant 76 yo AA female, who has returned for c/o severe exacerbation of chronic pelvic pain and difficulty emptying.  Patient states increase of straining to urinate, causing bladder spasms from bladder around to back.  PVR 47 l.  Requests repeat urethral dilatations.  Dr. Singleton to room to evaluate.  Patient to be worked into his schedule this after noon for procedure.     She was last seen by Dr. Singleton 10 months prior for urethral  "dilatation and following POC:  "Urethral dilatation performed as described.  Urine sent for culture since she has lots of amorphous crystals that could mask infection.  Patient given Cipro 500 mg b.i.d. for 3 days to cover instrumentation.  She is to call when she needs another urethral dilatation"  --------------------------------------------------------------------------------------------------------------------------------------  The above notes are notes of Ms. Shaw.  Patient benefited from the urethral dilatation previously and requested another 1 be done today.  ---------------------------  ------------------------------------------------------------------------------------------------------------------------------------------------------------------------------------------------------------------------------------------------------------  The above notes are from SAMSON Shaw, Dr. ARIEL Singleton and Dr KARLI Recio in the EMR system     This pleasant 78 year old female presents to the clinic for follow up of urethral stricture and bladder spasms. Patient was last seen in September 2022 for urethral dilatation. Patient reports urinary frequency, urgency, incomplete bladder emptying, bladder spasms, and nocturia 3 to 4 times a night. She denies dysuria, hematuria, nausea, vomiting, fever or chills. Her PVR is 100 mls today. She is on Ditropan XL 5 mg one at night and is tolerating this medication without side effects. She reports it does help some with her symptoms. She request to have a urethral dilatation done today. This was discussed with Dr. ARIEL Singleton and they will do the dilatation. She will follow up with us PRN and call when she feels like she needs another dilatation. I discussed the plan in detail with the patient and she is in agreement with the plan. All her questions were answered at today's visit. [Note of Maisha Stephanie]-------------[June 27, 2023]  Urethral dilatation     See note of Mr. Benz above.  " Patient wants another urethral dilatation which will be performed today. [June 27, 2023]  Review of Systems       Objective     Physical Exam       Assessment and Plan     1. Urethral syndrome    2. Bladder spasms  Overview:  Ditropan 5mg daily not controlling spasms    Orders:  -     LIDOcaine HCl 2% urojet    3. Pelvic pain    Urethral dilatation as described.  Patient given Cipro 500 mg b.i.d. for 3 days to cover instrumentation.  She will let us know when she needs additional urethral dilatation or other therapy.          No follow-ups on file.

## 2023-06-28 NOTE — PROGRESS NOTES
Subjective:       Patient ID: Lorraine De Jesus is a 78 y.o. female.    Chief Complaint: Thyroid Problem (Thyroid Check )  78-year-old nice lady presents with a chronic history of hypothyroidism, chronic diabetes mellitus, chronic thyroid nodules.  No also she has been having recent memory loss.    Due to the memory loss I will check a vitamin B12 level TSH and RPR.    Thyroid nodule some better order an ultrasound.    Memory loss will also order CT scan of her brain.  In consult Neurology.  Thyroid Problem  Patient reports no constipation or fatigue.   .    Current Medications:    Current Outpatient Medications:     amLODIPine (NORVASC) 5 MG tablet, Take 5 mg by mouth., Disp: , Rfl:     aspirin (ECOTRIN) 81 MG EC tablet, Take 81 mg by mouth once daily., Disp: , Rfl:     blood sugar diagnostic Strp, 1 strip by Misc.(Non-Drug; Combo Route) route 3 (three) times daily., Disp: 200 strip, Rfl: 3    blood-glucose meter kit, Use as instructed, Disp: 1 each, Rfl: 0    cloNIDine (CATAPRES) 0.1 MG tablet, TAKE 1 TABLET BY MOUTH EVERY 4 HOURS AS NEEDED FOR BLOOD PRESSURE GREATER THAN 170/90, Disp: , Rfl:     ezetimibe (ZETIA) 10 mg tablet, Take 1 tablet (10 mg total) by mouth once daily., Disp: 90 tablet, Rfl: 3    gabapentin (NEURONTIN) 400 MG capsule, TAKE 1 CAPSULE(400 MG) BY MOUTH EVERY 8 HOURS, Disp: 90 capsule, Rfl: 2    glipiZIDE (GLUCOTROL) 10 MG TR24, Take 1 tablet (10 mg total) by mouth 2 (two) times a day., Disp: 180 tablet, Rfl: 1    HYDROcodone-acetaminophen (NORCO)  mg per tablet, Take 1 tablet by mouth every 6 (six) hours as needed for Pain., Disp: 120 tablet, Rfl: 0    HYDROcodone-acetaminophen (NORCO)  mg per tablet, Take 1 tablet by mouth every 6 (six) hours as needed for Pain., Disp: 120 tablet, Rfl: 0    [START ON 7/25/2023] HYDROcodone-acetaminophen (NORCO)  mg per tablet, Take 1 tablet by mouth every 6 (six) hours as needed for Pain., Disp: 120 tablet, Rfl: 0    hydroquinone 4 % Crea, Apply  "topically 2 (two) times daily. (Patient not taking: Reported on 6/27/2023), Disp: 28 g, Rfl: 11    lancets (ACCU-CHEK FASTCLIX LANCET DRUM MIS), Take 1 each by mouth once daily., Disp: , Rfl:     olmesartan (BENICAR) 5 MG Tab, Take 10 mg by mouth once daily., Disp: , Rfl:     ONETOUCH DELICA PLUS LANCET 33 gauge Misc, Check blood sugar TID, Disp: 100 each, Rfl: 11    ONETOUCH VERIO TEST STRIPS Strp, TEST BLOOD GLUCOSE LEVELS TWICE DAILY, Disp: 200 strip, Rfl: 4    levothyroxine (SYNTHROID) 75 MCG tablet, Take 1 tablet (75 mcg total) by mouth before breakfast., Disp: 90 tablet, Rfl: 1    oxybutynin (DITROPAN-XL) 5 MG TR24, Take one tablet at night, Disp: 90 tablet, Rfl: 3           Review of Systems   Constitutional:  Negative for appetite change, fatigue and fever.   Respiratory:  Negative for shortness of breath.    Cardiovascular:  Negative for chest pain.   Gastrointestinal:  Negative for abdominal pain and constipation.   Endocrine: Negative for polydipsia, polyphagia and polyuria.   Genitourinary:  Negative for difficulty urinating, frequency and hot flashes.   Allergic/Immunologic: Negative for environmental allergies.   Neurological:  Positive for memory loss. Negative for dizziness and light-headedness.   Psychiatric/Behavioral:  Negative for agitation.               Vitals:    06/27/23 0924 06/27/23 0928   BP: (!) 143/78 (!) 151/95   BP Location: Right arm Left arm   Patient Position: Sitting    BP Method: Large (Automatic)    Pulse: 86    Resp: 17    Temp: 97.4 °F (36.3 °C)    TempSrc: Temporal    SpO2: 95%    Weight: 80.6 kg (177 lb 12.8 oz)    Height: 5' 8" (1.727 m)         Physical Exam  Vitals and nursing note reviewed.   Constitutional:       Appearance: Normal appearance.   Cardiovascular:      Rate and Rhythm: Normal rate and regular rhythm.      Pulses: Normal pulses.      Heart sounds: Normal heart sounds.   Pulmonary:      Effort: Pulmonary effort is normal.      Breath sounds: Normal breath " sounds.   Abdominal:      General: Abdomen is flat. Bowel sounds are normal.      Palpations: Abdomen is soft.   Musculoskeletal:         General: Normal range of motion.   Skin:     General: Skin is warm and dry.   Neurological:      General: No focal deficit present.      Mental Status: She is alert and oriented to person, place, and time. Mental status is at baseline.         Last Labs:     Office Visit on 06/27/2023   Component Date Value    Culture, Urine 06/27/2023 No Growth To Date    Office Visit on 06/27/2023   Component Date Value    Syphilis Ab Interpretati* 06/27/2023 Non-Reactive     TSH 06/27/2023 2.040     Vitamin B12 06/27/2023 895    Lab Visit on 06/07/2023   Component Date Value    Triglycerides 06/07/2023 97     Cholesterol 06/07/2023 186     HDL Cholesterol 06/07/2023 46     Cholesterol/HDL Ratio (R* 06/07/2023 4.0     Non-HDL 06/07/2023 140     LDL Calculated 06/07/2023 121     LDL/HDL 06/07/2023 2.6     VLDL 06/07/2023 19     Hemoglobin A1C 06/07/2023 6.8 (H)     Estimated Average Glucose 06/07/2023 140    Office Visit on 06/07/2023   Component Date Value    POC Glucose 06/07/2023 155 (A)        Last Imaging:  X-Ray Hip 2 or 3 views Left (with Pelvis when performed)  Narrative: EXAMINATION:  XR HIP WITH PELVIS WHEN PERFORMED, 2 OR 3 VIEWS LEFT    CLINICAL HISTORY:  .  Pain in left hip    COMPARISON:  March 25, 2021    TECHNIQUE:  AP and frog-leg lateral views left hip to include AP pelvis.  Three total views    FINDINGS:  There is no acute fracture or dislocation.  Left femoral head is well conjugated.  There is mild to moderate osteophyte formation and moderate joint space narrowing of the left hip.  There is mild osteophyte formation and mild to moderate joint space narrowing of the right hip.  Suspect osteopenia  Impression: Moderate osteoarthritis left hip    No acute fracture    Suspect osteopenia    Electronically signed by: Tevin Bermeo  Date:    11/28/2022  Time:    12:31          **Labs and x-rays personally reviewed by me    ** reviewed      Objective:        Assessment:       1. Screening for STD (sexually transmitted disease)  Treponema Pallidum (Syphillis) Antibody    Treponema Pallidum (Syphillis) Antibody      2. TSH deficiency  TSH    TSH      3. B12 deficiency  Vitamin B12    Vitamin B12      4. Other amnesia  CT Head Without Contrast      5. Hypothyroidism, unspecified type  US Thyroid      6. Memory loss  Ambulatory referral/consult to Neurology           Plan:         [unfilled]

## 2023-06-28 NOTE — PATIENT INSTRUCTIONS
Urethral dilatation as described.  Patient given Cipro 500 mg b.i.d. for 3 days to cover instrumentation.  She will let us know when she needs additional urethral dilatation or other therapy.

## 2023-06-28 NOTE — PROCEDURES
Urethral dilatation     The patient was placed in the dorsal lithotomy position and prepared for urethral dilatation.  Urethra was anesthetized with lidocaine jelly.  No sedation was given.  Urethral dilatation was begun with Golden sounds starting with the 20 [place] Golden sound.  She was dilated through 28 [place] successfully.  The residual in the bladder was approximately 90 mL.  She tolerated well.

## 2023-06-29 ENCOUNTER — TELEPHONE (OUTPATIENT)
Dept: UROLOGY | Facility: CLINIC | Age: 78
End: 2023-06-29
Payer: COMMERCIAL

## 2023-06-29 LAB — UA COMPLETE W REFLEX CULTURE PNL UR: NORMAL

## 2023-06-29 NOTE — TELEPHONE ENCOUNTER
----- Message from Andrea Benz NP sent at 6/29/2023  9:24 AM CDT -----  Please notify patient that her culture was negative, thanks   I called and spoke with the pt and informed her of the above message.  She voiced understanding and says she feels little better than the other day when she was here for office visit.

## 2023-06-30 ENCOUNTER — EXTERNAL CHRONIC CARE MANAGEMENT (OUTPATIENT)
Dept: FAMILY MEDICINE | Facility: CLINIC | Age: 78
End: 2023-06-30
Payer: COMMERCIAL

## 2023-06-30 PROCEDURE — G0511 CCM/BHI BY RHC/FQHC 20MIN MO: HCPCS | Mod: ,,, | Performed by: INTERNAL MEDICINE

## 2023-06-30 PROCEDURE — G0511 PR CHRONIC CARE MGMT, RHC OR FQHC ONLY, 20 MINS OR MORE: ICD-10-PCS | Mod: ,,, | Performed by: INTERNAL MEDICINE

## 2023-07-06 RX ORDER — GLIPIZIDE 10 MG/1
10 TABLET, FILM COATED, EXTENDED RELEASE ORAL 2 TIMES DAILY
Qty: 180 TABLET | Refills: 1 | Status: SHIPPED | OUTPATIENT
Start: 2023-07-06 | End: 2023-07-12 | Stop reason: SDUPTHER

## 2023-07-17 RX ORDER — GLIPIZIDE 10 MG/1
10 TABLET, FILM COATED, EXTENDED RELEASE ORAL 2 TIMES DAILY
Qty: 180 TABLET | Refills: 1 | Status: SHIPPED | OUTPATIENT
Start: 2023-07-17 | End: 2023-10-04 | Stop reason: SDUPTHER

## 2023-07-20 ENCOUNTER — HOSPITAL ENCOUNTER (OUTPATIENT)
Dept: RADIOLOGY | Facility: HOSPITAL | Age: 78
Discharge: HOME | End: 2023-07-20
Attending: INTERNAL MEDICINE
Payer: COMMERCIAL

## 2023-07-20 ENCOUNTER — TELEPHONE (OUTPATIENT)
Dept: FAMILY MEDICINE | Facility: CLINIC | Age: 78
End: 2023-07-20
Payer: COMMERCIAL

## 2023-07-20 DIAGNOSIS — E03.9 HYPOTHYROIDISM, UNSPECIFIED TYPE: ICD-10-CM

## 2023-07-20 PROCEDURE — 76536 US THYROID: ICD-10-PCS | Mod: 26,,, | Performed by: RADIOLOGY

## 2023-07-20 PROCEDURE — 76536 US EXAM OF HEAD AND NECK: CPT | Mod: TC

## 2023-07-20 PROCEDURE — 76536 US EXAM OF HEAD AND NECK: CPT | Mod: 26,,, | Performed by: RADIOLOGY

## 2023-07-26 ENCOUNTER — OFFICE VISIT (OUTPATIENT)
Dept: FAMILY MEDICINE | Facility: CLINIC | Age: 78
End: 2023-07-26
Payer: COMMERCIAL

## 2023-07-26 ENCOUNTER — TELEPHONE (OUTPATIENT)
Dept: FAMILY MEDICINE | Facility: CLINIC | Age: 78
End: 2023-07-26
Payer: COMMERCIAL

## 2023-07-26 VITALS
SYSTOLIC BLOOD PRESSURE: 127 MMHG | WEIGHT: 175 LBS | BODY MASS INDEX: 26.52 KG/M2 | TEMPERATURE: 97 F | DIASTOLIC BLOOD PRESSURE: 75 MMHG | OXYGEN SATURATION: 98 % | HEIGHT: 68 IN | RESPIRATION RATE: 17 BRPM | HEART RATE: 78 BPM

## 2023-07-26 DIAGNOSIS — E04.2 MULTIPLE THYROID NODULES: ICD-10-CM

## 2023-07-26 DIAGNOSIS — R10.9 ABDOMINAL PAIN, UNSPECIFIED ABDOMINAL LOCATION: Primary | ICD-10-CM

## 2023-07-26 LAB
BILIRUB UR QL STRIP: NEGATIVE
CLARITY UR: CLEAR
COLOR UR: ABNORMAL
GLUCOSE UR STRIP-MCNC: >1000 MG/DL
KETONES UR STRIP-SCNC: NEGATIVE MG/DL
LEUKOCYTE ESTERASE UR QL STRIP: ABNORMAL
MUCOUS, UA: ABNORMAL /LPF
NITRITE UR QL STRIP: NEGATIVE
PH UR STRIP: 6.5 PH UNITS
PROT UR QL STRIP: NEGATIVE
RBC # UR STRIP: NEGATIVE /UL
RBC #/AREA URNS HPF: 3 /HPF
SP GR UR STRIP: 1.02
SQUAMOUS #/AREA URNS LPF: ABNORMAL /HPF
UROBILINOGEN UR STRIP-ACNC: NORMAL MG/DL
WBC #/AREA URNS HPF: 16 /HPF

## 2023-07-26 PROCEDURE — 1101F PT FALLS ASSESS-DOCD LE1/YR: CPT | Mod: ,,, | Performed by: INTERNAL MEDICINE

## 2023-07-26 PROCEDURE — 3078F PR MOST RECENT DIASTOLIC BLOOD PRESSURE < 80 MM HG: ICD-10-PCS | Mod: ,,, | Performed by: INTERNAL MEDICINE

## 2023-07-26 PROCEDURE — 87086 CULTURE, URINE: ICD-10-PCS | Mod: ,,, | Performed by: CLINICAL MEDICAL LABORATORY

## 2023-07-26 PROCEDURE — 1159F PR MEDICATION LIST DOCUMENTED IN MEDICAL RECORD: ICD-10-PCS | Mod: ,,, | Performed by: INTERNAL MEDICINE

## 2023-07-26 PROCEDURE — 87086 URINE CULTURE/COLONY COUNT: CPT | Mod: ,,, | Performed by: CLINICAL MEDICAL LABORATORY

## 2023-07-26 PROCEDURE — 1101F PR PT FALLS ASSESS DOC 0-1 FALLS W/OUT INJ PAST YR: ICD-10-PCS | Mod: ,,, | Performed by: INTERNAL MEDICINE

## 2023-07-26 PROCEDURE — 3074F SYST BP LT 130 MM HG: CPT | Mod: ,,, | Performed by: INTERNAL MEDICINE

## 2023-07-26 PROCEDURE — 3074F PR MOST RECENT SYSTOLIC BLOOD PRESSURE < 130 MM HG: ICD-10-PCS | Mod: ,,, | Performed by: INTERNAL MEDICINE

## 2023-07-26 PROCEDURE — 99214 OFFICE O/P EST MOD 30 MIN: CPT | Mod: ,,, | Performed by: INTERNAL MEDICINE

## 2023-07-26 PROCEDURE — 1160F PR REVIEW ALL MEDS BY PRESCRIBER/CLIN PHARMACIST DOCUMENTED: ICD-10-PCS | Mod: ,,, | Performed by: INTERNAL MEDICINE

## 2023-07-26 PROCEDURE — 3288F FALL RISK ASSESSMENT DOCD: CPT | Mod: ,,, | Performed by: INTERNAL MEDICINE

## 2023-07-26 PROCEDURE — 1125F PR PAIN SEVERITY QUANTIFIED, PAIN PRESENT: ICD-10-PCS | Mod: ,,, | Performed by: INTERNAL MEDICINE

## 2023-07-26 PROCEDURE — 81001 URINALYSIS AUTO W/SCOPE: CPT | Mod: ,,, | Performed by: CLINICAL MEDICAL LABORATORY

## 2023-07-26 PROCEDURE — 81001 URINALYSIS, REFLEX TO URINE CULTURE: ICD-10-PCS | Mod: ,,, | Performed by: CLINICAL MEDICAL LABORATORY

## 2023-07-26 PROCEDURE — 1159F MED LIST DOCD IN RCRD: CPT | Mod: ,,, | Performed by: INTERNAL MEDICINE

## 2023-07-26 PROCEDURE — 1125F AMNT PAIN NOTED PAIN PRSNT: CPT | Mod: ,,, | Performed by: INTERNAL MEDICINE

## 2023-07-26 PROCEDURE — 1160F RVW MEDS BY RX/DR IN RCRD: CPT | Mod: ,,, | Performed by: INTERNAL MEDICINE

## 2023-07-26 PROCEDURE — 99214 PR OFFICE/OUTPT VISIT, EST, LEVL IV, 30-39 MIN: ICD-10-PCS | Mod: ,,, | Performed by: INTERNAL MEDICINE

## 2023-07-26 PROCEDURE — 3078F DIAST BP <80 MM HG: CPT | Mod: ,,, | Performed by: INTERNAL MEDICINE

## 2023-07-26 PROCEDURE — 3288F PR FALLS RISK ASSESSMENT DOCUMENTED: ICD-10-PCS | Mod: ,,, | Performed by: INTERNAL MEDICINE

## 2023-07-26 NOTE — TELEPHONE ENCOUNTER
----- Message from Mateo Chen MD sent at 7/26/2023  4:30 PM CDT -----  Need to see in  1 week please  abnl results     1635 Pt is scheduled to come in on 8/2/23

## 2023-07-28 LAB — UA COMPLETE W REFLEX CULTURE PNL UR: NORMAL

## 2023-07-31 ENCOUNTER — EXTERNAL CHRONIC CARE MANAGEMENT (OUTPATIENT)
Dept: FAMILY MEDICINE | Facility: CLINIC | Age: 78
End: 2023-07-31
Payer: COMMERCIAL

## 2023-07-31 PROBLEM — R10.9 ABDOMINAL PAIN: Status: ACTIVE | Noted: 2023-07-31

## 2023-07-31 PROBLEM — E04.2 MULTIPLE THYROID NODULES: Status: ACTIVE | Noted: 2023-07-31

## 2023-07-31 PROCEDURE — G0511 PR CHRONIC CARE MGMT, RHC OR FQHC ONLY, 20 MINS OR MORE: ICD-10-PCS | Mod: ,,, | Performed by: INTERNAL MEDICINE

## 2023-07-31 PROCEDURE — G0511 CCM/BHI BY RHC/FQHC 20MIN MO: HCPCS | Mod: ,,, | Performed by: INTERNAL MEDICINE

## 2023-07-31 NOTE — PROGRESS NOTES
Subjective:       Patient ID: Lorraine De Jesus is a 78 y.o. female.    Chief Complaint: Results (Abnl labs ), Thyroid Problem (Ultrasound ), and Abdominal Pain  Patient seen in follow-up she complains of moderate abdominal pain.  She has history of thyroid nodules.  She has seen the surgeon approximately 2 years ago fine needle aspirate was done biopsy was negative for malignancy the biopsy was but benign.  She also complains of burning when she urinates in urinary frequency.  The plan today will be a KUB UA CNS.  Also  Refer back to general surgery to evaluate thyroid nodules.  And also will work hard to find the patient and endocrinologist.  Thyroid Problem  Patient reports no constipation or fatigue.   Abdominal Pain  Pertinent negatives include no constipation, fever or frequency.     .    Current Medications:    Current Outpatient Medications:     amLODIPine (NORVASC) 5 MG tablet, Take 5 mg by mouth., Disp: , Rfl:     aspirin (ECOTRIN) 81 MG EC tablet, Take 81 mg by mouth once daily., Disp: , Rfl:     blood sugar diagnostic Strp, 1 strip by Misc.(Non-Drug; Combo Route) route 3 (three) times daily., Disp: 200 strip, Rfl: 3    blood-glucose meter kit, Use as instructed, Disp: 1 each, Rfl: 0    cloNIDine (CATAPRES) 0.1 MG tablet, TAKE 1 TABLET BY MOUTH EVERY 4 HOURS AS NEEDED FOR BLOOD PRESSURE GREATER THAN 170/90, Disp: , Rfl:     ezetimibe (ZETIA) 10 mg tablet, Take 1 tablet (10 mg total) by mouth once daily., Disp: 90 tablet, Rfl: 3    gabapentin (NEURONTIN) 400 MG capsule, TAKE 1 CAPSULE(400 MG) BY MOUTH EVERY 8 HOURS, Disp: 90 capsule, Rfl: 2    glipiZIDE (GLUCOTROL) 10 MG TR24, Take 1 tablet (10 mg total) by mouth 2 (two) times a day., Disp: 180 tablet, Rfl: 1    HYDROcodone-acetaminophen (NORCO)  mg per tablet, Take 1 tablet by mouth every 6 (six) hours as needed for Pain., Disp: 120 tablet, Rfl: 0    HYDROcodone-acetaminophen (NORCO)  mg per tablet, Take 1 tablet by mouth every 6 (six) hours as  "needed for Pain., Disp: 120 tablet, Rfl: 0    HYDROcodone-acetaminophen (NORCO)  mg per tablet, Take 1 tablet by mouth every 6 (six) hours as needed for Pain., Disp: 120 tablet, Rfl: 0    lancets (ACCU-CHEK FASTCLIX LANCET DRUM MISC), Take 1 each by mouth once daily., Disp: , Rfl:     levothyroxine (SYNTHROID) 75 MCG tablet, Take 1 tablet (75 mcg total) by mouth before breakfast., Disp: 90 tablet, Rfl: 1    olmesartan (BENICAR) 5 MG Tab, Take 10 mg by mouth once daily., Disp: , Rfl:     ONETOUCH DELICA PLUS LANCET 33 gauge Misc, Check blood sugar TID, Disp: 100 each, Rfl: 11    ONETOUCH VERIO TEST STRIPS Strp, TEST BLOOD GLUCOSE LEVELS TWICE DAILY, Disp: 200 strip, Rfl: 4    oxybutynin (DITROPAN-XL) 5 MG TR24, Take one tablet at night, Disp: 90 tablet, Rfl: 3           Review of Systems   Constitutional:  Negative for appetite change, fatigue and fever.   Respiratory:  Negative for shortness of breath.    Cardiovascular:  Negative for chest pain.   Gastrointestinal:  Positive for abdominal pain. Negative for constipation.   Endocrine: Negative for polydipsia, polyphagia and polyuria.   Genitourinary:  Negative for difficulty urinating, frequency and hot flashes.   Allergic/Immunologic: Negative for environmental allergies.   Neurological:  Negative for dizziness and light-headedness.   Psychiatric/Behavioral:  Negative for agitation.                 Vitals:    07/26/23 0922   BP: 127/75   BP Location: Right arm   Patient Position: Sitting   BP Method: Large (Automatic)   Pulse: 78   Resp: 17   Temp: 97.3 °F (36.3 °C)   TempSrc: Temporal   SpO2: 98%   Weight: 79.4 kg (175 lb)   Height: 5' 8" (1.727 m)        Physical Exam  Vitals and nursing note reviewed.   Constitutional:       Appearance: Normal appearance.   Cardiovascular:      Rate and Rhythm: Normal rate and regular rhythm.      Pulses: Normal pulses.      Heart sounds: Normal heart sounds.   Pulmonary:      Effort: Pulmonary effort is normal.      " Breath sounds: Normal breath sounds.   Abdominal:      General: Abdomen is flat. Bowel sounds are normal.      Palpations: Abdomen is soft.      Tenderness: There is abdominal tenderness.   Musculoskeletal:         General: Normal range of motion.   Skin:     General: Skin is warm and dry.   Neurological:      General: No focal deficit present.      Mental Status: She is alert and oriented to person, place, and time. Mental status is at baseline.           Last Labs:     Office Visit on 07/26/2023   Component Date Value    Color, UA 07/26/2023 Dark-Yellow     Clarity, UA 07/26/2023 Clear     pH, UA 07/26/2023 6.5     Leukocytes, UA 07/26/2023 Trace (A)     Nitrites, UA 07/26/2023 Negative     Protein, UA 07/26/2023 Negative     Glucose, UA 07/26/2023 >1000     Ketones, UA 07/26/2023 Negative     Urobilinogen, UA 07/26/2023 Normal     Bilirubin, UA 07/26/2023 Negative     Blood, UA 07/26/2023 Negative     Specific Gravity, UA 07/26/2023 1.018     WBC, UA 07/26/2023 16 (H)     RBC, UA 07/26/2023 3     Squamous Epithelial Cell* 07/26/2023 Occasional (A)     Mucous 07/26/2023 Occasional (A)     Culture, Urine 07/26/2023 Skin/Urogenital Louise Isolated, no further workup.    Office Visit on 06/27/2023   Component Date Value    Culture, Urine 06/27/2023 Skin/Urogenital Louise Isolated, no further workup.    Office Visit on 06/27/2023   Component Date Value    Syphilis Ab Interpretati* 06/27/2023 Non-Reactive     TSH 06/27/2023 2.040     Vitamin B12 06/27/2023 895        Last Imaging:  X-Ray KUB  Narrative: EXAMINATION:  XR KUB    CLINICAL HISTORY:  Unspecified abdominal pain    COMPARISON:  KUB September 10, 2018    TECHNIQUE:  Frontal views of the abdomen.    FINDINGS:  8 mm inferior pole left renal calculus.  Nonspecific bowel gas pattern.  Mild levoconvex curvature of the lumbar spine.  Lung bases clear. Atherosclerotic calcification demonstrated.  Impression: As above.    Point of Service: [facility]    Electronically signed by: Vadim Swanson  Date:    07/26/2023  Time:    10:30         **Labs and x-rays personally reviewed by me    ** reviewed      Objective:        Assessment:       1. Abdominal pain, unspecified abdominal location  Urinalysis, Reflex to Urine Culture    X-Ray KUB    Urinalysis, Reflex to Urine Culture    Urinalysis, Microscopic    Urine culture      2. Multiple thyroid nodules  Ambulatory referral/consult to Endocrine/ENT Surgery for ENT/Head and Neck Surgeons           Plan:         [unfilled]

## 2023-08-02 ENCOUNTER — OFFICE VISIT (OUTPATIENT)
Dept: FAMILY MEDICINE | Facility: CLINIC | Age: 78
End: 2023-08-02
Payer: COMMERCIAL

## 2023-08-02 VITALS
RESPIRATION RATE: 17 BRPM | DIASTOLIC BLOOD PRESSURE: 79 MMHG | SYSTOLIC BLOOD PRESSURE: 149 MMHG | HEIGHT: 68 IN | BODY MASS INDEX: 27.16 KG/M2 | TEMPERATURE: 97 F | HEART RATE: 93 BPM | WEIGHT: 179.19 LBS | OXYGEN SATURATION: 98 %

## 2023-08-02 DIAGNOSIS — M54.17 LUMBOSACRAL RADICULOPATHY: Chronic | ICD-10-CM

## 2023-08-02 DIAGNOSIS — M79.671 BILATERAL FOOT PAIN: ICD-10-CM

## 2023-08-02 DIAGNOSIS — E11.40 DIABETIC NEUROPATHY WITH NEUROLOGIC COMPLICATION: Chronic | ICD-10-CM

## 2023-08-02 DIAGNOSIS — N20.0 RENAL CALCULUS: Primary | ICD-10-CM

## 2023-08-02 DIAGNOSIS — E11.49 DIABETIC NEUROPATHY WITH NEUROLOGIC COMPLICATION: Chronic | ICD-10-CM

## 2023-08-02 DIAGNOSIS — M79.672 BILATERAL FOOT PAIN: ICD-10-CM

## 2023-08-02 PROCEDURE — 3288F PR FALLS RISK ASSESSMENT DOCUMENTED: ICD-10-PCS | Mod: ,,, | Performed by: INTERNAL MEDICINE

## 2023-08-02 PROCEDURE — 1160F PR REVIEW ALL MEDS BY PRESCRIBER/CLIN PHARMACIST DOCUMENTED: ICD-10-PCS | Mod: ,,, | Performed by: INTERNAL MEDICINE

## 2023-08-02 PROCEDURE — 3077F SYST BP >= 140 MM HG: CPT | Mod: ,,, | Performed by: INTERNAL MEDICINE

## 2023-08-02 PROCEDURE — 3078F DIAST BP <80 MM HG: CPT | Mod: ,,, | Performed by: INTERNAL MEDICINE

## 2023-08-02 PROCEDURE — 1101F PT FALLS ASSESS-DOCD LE1/YR: CPT | Mod: ,,, | Performed by: INTERNAL MEDICINE

## 2023-08-02 PROCEDURE — 3078F PR MOST RECENT DIASTOLIC BLOOD PRESSURE < 80 MM HG: ICD-10-PCS | Mod: ,,, | Performed by: INTERNAL MEDICINE

## 2023-08-02 PROCEDURE — 1126F PR PAIN SEVERITY QUANTIFIED, NO PAIN PRESENT: ICD-10-PCS | Mod: ,,, | Performed by: INTERNAL MEDICINE

## 2023-08-02 PROCEDURE — 1159F PR MEDICATION LIST DOCUMENTED IN MEDICAL RECORD: ICD-10-PCS | Mod: ,,, | Performed by: INTERNAL MEDICINE

## 2023-08-02 PROCEDURE — 1126F AMNT PAIN NOTED NONE PRSNT: CPT | Mod: ,,, | Performed by: INTERNAL MEDICINE

## 2023-08-02 PROCEDURE — 3288F FALL RISK ASSESSMENT DOCD: CPT | Mod: ,,, | Performed by: INTERNAL MEDICINE

## 2023-08-02 PROCEDURE — 99213 OFFICE O/P EST LOW 20 MIN: CPT | Mod: ,,, | Performed by: INTERNAL MEDICINE

## 2023-08-02 PROCEDURE — 3077F PR MOST RECENT SYSTOLIC BLOOD PRESSURE >= 140 MM HG: ICD-10-PCS | Mod: ,,, | Performed by: INTERNAL MEDICINE

## 2023-08-02 PROCEDURE — 99213 PR OFFICE/OUTPT VISIT, EST, LEVL III, 20-29 MIN: ICD-10-PCS | Mod: ,,, | Performed by: INTERNAL MEDICINE

## 2023-08-02 PROCEDURE — 1101F PR PT FALLS ASSESS DOC 0-1 FALLS W/OUT INJ PAST YR: ICD-10-PCS | Mod: ,,, | Performed by: INTERNAL MEDICINE

## 2023-08-02 PROCEDURE — 1159F MED LIST DOCD IN RCRD: CPT | Mod: ,,, | Performed by: INTERNAL MEDICINE

## 2023-08-02 PROCEDURE — 1160F RVW MEDS BY RX/DR IN RCRD: CPT | Mod: ,,, | Performed by: INTERNAL MEDICINE

## 2023-08-03 RX ORDER — GABAPENTIN 600 MG/1
600 TABLET ORAL 3 TIMES DAILY
Qty: 90 TABLET | Refills: 3 | Status: SHIPPED | OUTPATIENT
Start: 2023-08-03 | End: 2023-08-09

## 2023-08-09 ENCOUNTER — OFFICE VISIT (OUTPATIENT)
Dept: NEUROLOGY | Facility: CLINIC | Age: 78
End: 2023-08-09
Payer: COMMERCIAL

## 2023-08-09 VITALS
SYSTOLIC BLOOD PRESSURE: 124 MMHG | DIASTOLIC BLOOD PRESSURE: 60 MMHG | WEIGHT: 181.19 LBS | BODY MASS INDEX: 27.46 KG/M2 | RESPIRATION RATE: 18 BRPM | OXYGEN SATURATION: 98 % | HEART RATE: 92 BPM | HEIGHT: 68 IN

## 2023-08-09 DIAGNOSIS — E55.9 VITAMIN D DEFICIENCY: ICD-10-CM

## 2023-08-09 DIAGNOSIS — R41.3 MEMORY LOSS: ICD-10-CM

## 2023-08-09 DIAGNOSIS — E03.9 HYPOTHYROIDISM, UNSPECIFIED TYPE: ICD-10-CM

## 2023-08-09 DIAGNOSIS — R41.3 OTHER AMNESIA: Primary | ICD-10-CM

## 2023-08-09 DIAGNOSIS — N20.0 RENAL CALCULUS: Primary | ICD-10-CM

## 2023-08-09 DIAGNOSIS — E53.8 VITAMIN B12 DEFICIENCY: ICD-10-CM

## 2023-08-09 DIAGNOSIS — N32.89 BLADDER SPASMS: ICD-10-CM

## 2023-08-09 PROCEDURE — 1126F AMNT PAIN NOTED NONE PRSNT: CPT | Mod: CPTII,,, | Performed by: NURSE PRACTITIONER

## 2023-08-09 PROCEDURE — 99213 OFFICE O/P EST LOW 20 MIN: CPT | Mod: S$PBB,,, | Performed by: NURSE PRACTITIONER

## 2023-08-09 PROCEDURE — 99213 PR OFFICE/OUTPT VISIT, EST, LEVL III, 20-29 MIN: ICD-10-PCS | Mod: S$PBB,,, | Performed by: NURSE PRACTITIONER

## 2023-08-09 PROCEDURE — 3074F PR MOST RECENT SYSTOLIC BLOOD PRESSURE < 130 MM HG: ICD-10-PCS | Mod: CPTII,,, | Performed by: NURSE PRACTITIONER

## 2023-08-09 PROCEDURE — 1160F RVW MEDS BY RX/DR IN RCRD: CPT | Mod: CPTII,,, | Performed by: NURSE PRACTITIONER

## 2023-08-09 PROCEDURE — 1159F MED LIST DOCD IN RCRD: CPT | Mod: CPTII,,, | Performed by: NURSE PRACTITIONER

## 2023-08-09 PROCEDURE — 3078F DIAST BP <80 MM HG: CPT | Mod: CPTII,,, | Performed by: NURSE PRACTITIONER

## 2023-08-09 PROCEDURE — 1126F PR PAIN SEVERITY QUANTIFIED, NO PAIN PRESENT: ICD-10-PCS | Mod: CPTII,,, | Performed by: NURSE PRACTITIONER

## 2023-08-09 PROCEDURE — 1160F PR REVIEW ALL MEDS BY PRESCRIBER/CLIN PHARMACIST DOCUMENTED: ICD-10-PCS | Mod: CPTII,,, | Performed by: NURSE PRACTITIONER

## 2023-08-09 PROCEDURE — 99215 OFFICE O/P EST HI 40 MIN: CPT | Mod: PBBFAC | Performed by: NURSE PRACTITIONER

## 2023-08-09 PROCEDURE — 3288F PR FALLS RISK ASSESSMENT DOCUMENTED: ICD-10-PCS | Mod: CPTII,,, | Performed by: NURSE PRACTITIONER

## 2023-08-09 PROCEDURE — 1101F PR PT FALLS ASSESS DOC 0-1 FALLS W/OUT INJ PAST YR: ICD-10-PCS | Mod: CPTII,,, | Performed by: NURSE PRACTITIONER

## 2023-08-09 PROCEDURE — 1159F PR MEDICATION LIST DOCUMENTED IN MEDICAL RECORD: ICD-10-PCS | Mod: CPTII,,, | Performed by: NURSE PRACTITIONER

## 2023-08-09 PROCEDURE — 3078F PR MOST RECENT DIASTOLIC BLOOD PRESSURE < 80 MM HG: ICD-10-PCS | Mod: CPTII,,, | Performed by: NURSE PRACTITIONER

## 2023-08-09 PROCEDURE — 1101F PT FALLS ASSESS-DOCD LE1/YR: CPT | Mod: CPTII,,, | Performed by: NURSE PRACTITIONER

## 2023-08-09 PROCEDURE — 3288F FALL RISK ASSESSMENT DOCD: CPT | Mod: CPTII,,, | Performed by: NURSE PRACTITIONER

## 2023-08-09 PROCEDURE — 3074F SYST BP LT 130 MM HG: CPT | Mod: CPTII,,, | Performed by: NURSE PRACTITIONER

## 2023-08-09 NOTE — PROGRESS NOTES
Subjective     Patient ID: Lorraine De Jesus is a 78 y.o. female.    Chief Complaint: No chief complaint on file.    UROLOGY HISTORY PER DR. COFFMAN:  6/9/2021  here for repeat dilation. patient has chronic pain that is improved with urination. serial dilations over the years. typically she does well for a few months but was dilated just over 30 days ago.        Cystoscopy: After informed consent was confirmed. preprocedural abx were administered and the patient was taken to the cystoscopy suite and prepped and draped in standard fashion. The urethra was injected with lidocaine. Cystoscopy revealed no urethral lesions, moderately obstructing prostate, large bladder with no trabeculations or mucosal lesions. Ureteral orifices were identified bilaterally and were noted to be in orthotopic position demonstrate a clear reflux bilaterally. Retroflexion maneuver was performed to evaluate the trigone. Patient tolerated procedure well.         dilated up to 28 F without issue. teaching on self dilation today. sent home with sample. instructions not to introduce dilator farther then 1in into the urethra. discussed risk of injury to bladder. infection. pain.     recommend q 1 month self dilation. teaching performed by RN staff.      start hiprex 1gm daily, pyridium 100mg daily prn.     follow up 3 months for symptom check.    06/09/2021)--------------------------------------------------------------------------     Ms. De Jesus has been transferred to my care in Dr. Coffman's absence for f/u from above-mentioned cystoscopy with Hydrostatic Dilatation.  She states that she is doing much better since procedure, compliant with Hiprex, and free of s/s of infection to date.  Adds that her incontinence and bladder spasms are controlled on Oxybutynin 5mg daily.  (7/8/2021)----------------------------------------------------------------------------------     Ms. De Jesus is here today for c/o bladder pressure.  She has a history of known urethral  stenosis, and   She denies fever, constitutional symptoms or sensation of infection today.  PVR  0 ml.     -  Urethral stenosis:  Not performing self dilatations, states unable. Though taught to perform monthly self dilation at last visit, states she is unable to do this.  Reports she was symptom free following last cystoscopy with dilatation, but strainiing and pain at voiding onset have returned over the last 2 weeks.  -  Personal history UTI:  Hiprex started in June of this year.  -  Chronic pelvic pain in female:  R/t bladder spasms.  Controlled last visit with oxybutynine 5mg daily.  Discussed increasing Ditropan to BID  [November 22, 2021]  -------------------------------------------------------------------------------------------------------------------------------------------------------------------------------------------------------------------------  The above notes are notes of Dr. Recio and Ms. Shaw.  Patient sent to me for urethral dilatation.  She has been having problems with urethral syndrome since she was in her 40s.  Last urethral dilatation done June 24th and she feels she needs another today.  Pressure and discomfort like she usually has.  She is familiar with urethral dilatations and wishes to proceed again.   ------------------------------------------------------------- [December 16, 2021]-----------------------------------------------------------------------------------------------------------------------     Ms. De Jesus is a very pleasant 78 yo AA female, who has returned for c/o severe exacerbation of chronic pelvic pain and difficulty emptying.  Patient states increase of straining to urinate, causing bladder spasms from bladder around to back.  PVR 47 l.  Requests repeat urethral dilatations.  Dr. Singleton to room to evaluate.  Patient to be worked into his schedule this after noon for procedure.     She was last seen by Dr. Singleton 10 months prior for urethral dilatation and following  "POC:  "Urethral dilatation performed as described.  Urine sent for culture since she has lots of amorphous crystals that could mask infection.  Patient given Cipro 500 mg b.i.d. for 3 days to cover instrumentation.  She is to call when she needs another urethral dilatation"  [9/15/2022]-----------------------------------------------------------------------------------------  --------------------------------------------------------------------------------------------------------------------------------------------------------------------------------------------------------------------------------------------------  The above notes are from SAMSON Shaw, Dr. ARIEL Singleton and Dr KARLI Recio in the EMR system     This pleasant 78 year old female presents to the clinic for follow up of urethral stricture and bladder spasms. Patient was last seen in September 2022 for urethral dilatation. Patient reports urinary frequency, urgency, incomplete bladder emptying, bladder spasms, and nocturia 3 to 4 times a night. She denies dysuria, hematuria, nausea, vomiting, fever or chills. Her PVR is 100 mls today. She is on Ditropan XL 5 mg one at night and is tolerating this medication without side effects. She reports it does help some with her symptoms. She request to have a urethral dilatation done today. This was discussed with Dr. ARIEL Singleton and they will do the dilatation. She will follow up with us PRN and call when she feels like she needs another dilatation. I discussed the plan in detail with the patient and she is in agreement with the plan. All her questions were answered at today's visit. ------------------------------------------------------[June 27, 2023].      See note of Mr. Benz above.  Patient wants another urethral dilatation which will be performed today. [June 27, 2023 Dr. ARIEL Singleton].     This pleasant 78 year old female presents to the clinic for renal stone, urethral stricture and bladder spasms. Patient was seen by her " PCP in July 2023 for abdominal pain. Patient had a KUB done on July 26, 2023 that showed a 8 mm inferior pole left renal calculus, Nonspecific bowel gas pattern and Mild levoconvex curvature of the lumbar spine. Her KUB today showed No acute process. Left nephrolithiasis without change. The KUB today also showed there is mild to moderate stool in the normal caliber colon. I reviewed both KUB's with Urologist Dr. ARIEL Singleton and the patient. Dr. Singleton does not feel the kidney stone is the source of her pain as there was no obstruction or hydronephrosis noted. She does have a urethral stricture that was dilated in the office at the last visit by Dr. ARIEL Singleton. She does report this helped with her symptoms. She desires to have another dilatation but wants to wait another month for this. She is on Ditropan XL 5 mg one at night and is tolerating this medication without side effects. She desires to continue this medication. She denies any symptoms of a UTI. Her urine culture in June and July 2023 were negative. She denies dysuria or hematuria. Her PVR today is 16 mls. I discussed the plan in detail with Urologist Dr. ARIEL Singleton and the patient and they are in agreement with the plan. All her questions were answered at today's visit. I spent 20 minutes counseling this patient.     KUB done on August 14, 2023 showed FINDINGS: The bowel gas pattern is nonobstructive without gross mass lesion.  There is mild to moderate stool in the normal caliber colon. Radiopaque calculus overlies lower pole left kidney without change Osseous structures are similar  Impression:  No acute process  Left nephrolithiasis without change. -----[ August 14, 2023].               Review of Systems   Constitutional:  Negative for activity change and fever.   HENT:  Negative for hearing loss and trouble swallowing.    Eyes:  Negative for visual disturbance.   Respiratory:  Negative for cough, shortness of breath and wheezing.    Cardiovascular:  Negative for  chest pain.   Gastrointestinal:  Negative for abdominal pain, diarrhea, nausea and vomiting.   Endocrine: Negative for polyuria.   Genitourinary:  Positive for nocturia and urgency. Negative for bladder incontinence, decreased urine volume, difficulty urinating, dysuria, enuresis, flank pain, frequency and hematuria.        Urethral stricture        Kidney stone        Chronic pelvic pain   Musculoskeletal:  Negative for back pain and gait problem.   Integumentary:  Negative for rash.   Neurological:  Negative for speech difficulty and weakness.   Psychiatric/Behavioral:  Negative for behavioral problems and confusion.           Objective     Physical Exam  Vitals and nursing note reviewed. Exam conducted with a chaperone present.   Constitutional:       General: She is not in acute distress.     Appearance: Normal appearance. She is not ill-appearing, toxic-appearing or diaphoretic.   HENT:      Head: Normocephalic.   Eyes:      Extraocular Movements: Extraocular movements intact.   Cardiovascular:      Rate and Rhythm: Normal rate and regular rhythm.      Heart sounds: Normal heart sounds.   Pulmonary:      Effort: Pulmonary effort is normal.      Breath sounds: Normal breath sounds. No wheezing, rhonchi or rales.   Abdominal:      General: Bowel sounds are normal.      Palpations: Abdomen is soft.      Tenderness: There is no abdominal tenderness. There is no right CVA tenderness, left CVA tenderness, guarding or rebound.   Musculoskeletal:         General: Normal range of motion.      Cervical back: Normal range of motion. No rigidity.   Skin:     General: Skin is warm and dry.   Neurological:      General: No focal deficit present.      Mental Status: She is alert and oriented to person, place, and time.      Motor: No weakness.      Coordination: Coordination normal.      Gait: Gait normal.   Psychiatric:         Mood and Affect: Mood normal.         Behavior: Behavior normal.         Thought Content: Thought  content normal.        Assessment and Plan     Problem List Items Addressed This Visit          Renal/    Bladder spasms    Stricture of female urethra - Primary    Renal calculus       GI    Chronic pelvic pain in female (Chronic)        Continue Oxybutynin XL 5 mg one at night   Return to clinic in one month for Urethral Dilatation   Follow up with Urology NP JIMBO Luciano in one month

## 2023-08-09 NOTE — PROGRESS NOTES
Subjective:       Patient ID: Lorraine De Jesus is a 78 y.o. female     Chief Complaint:    Chief Complaint   Patient presents with    Memory Loss     Eval         Allergies:  Lipitor [atorvastatin] and Pravachol [pravastatin]    Current Medications:    Outpatient Encounter Medications as of 8/9/2023   Medication Sig Dispense Refill    amLODIPine (NORVASC) 5 MG tablet Take 5 mg by mouth.      aspirin (ECOTRIN) 81 MG EC tablet Take 81 mg by mouth once daily.      blood sugar diagnostic Strp 1 strip by Misc.(Non-Drug; Combo Route) route 3 (three) times daily. 200 strip 3    blood-glucose meter kit Use as instructed 1 each 0    cloNIDine (CATAPRES) 0.1 MG tablet TAKE 1 TABLET BY MOUTH EVERY 4 HOURS AS NEEDED FOR BLOOD PRESSURE GREATER THAN 170/90      ezetimibe (ZETIA) 10 mg tablet Take 1 tablet (10 mg total) by mouth once daily. 90 tablet 3    gabapentin (NEURONTIN) 400 MG capsule TAKE 1 CAPSULE(400 MG) BY MOUTH EVERY 8 HOURS 90 capsule 2    glipiZIDE (GLUCOTROL) 10 MG TR24 Take 1 tablet (10 mg total) by mouth 2 (two) times a day. 180 tablet 1    HYDROcodone-acetaminophen (NORCO)  mg per tablet Take 1 tablet by mouth every 6 (six) hours as needed for Pain. 120 tablet 0    HYDROcodone-acetaminophen (NORCO)  mg per tablet Take 1 tablet by mouth every 6 (six) hours as needed for Pain. 120 tablet 0    HYDROcodone-acetaminophen (NORCO)  mg per tablet Take 1 tablet by mouth every 6 (six) hours as needed for Pain. 120 tablet 0    lancets (ACCU-CHEK FASTCLIX LANCET [facility] [place] [facility]) Take 1 each by mouth once daily.      levothyroxine (SYNTHROID) 75 MCG tablet Take 1 tablet (75 mcg total) by mouth before breakfast. 90 tablet 1    olmesartan (BENICAR) 5 MG Tab Take 10 mg by mouth once daily.      ONETOUCH DELICA PLUS LANCET 33 gauge Misc Check blood sugar  each 11    ONETOUCH VERIO TEST STRIPS Strp TEST BLOOD GLUCOSE LEVELS TWICE DAILY 200 strip 4    oxybutynin (DITROPAN-XL) 5 MG TR24 Take one tablet at night  90 tablet 3    [DISCONTINUED] gabapentin (NEURONTIN) 600 MG tablet Take 1 tablet (600 mg total) by mouth 3 (three) times daily. 90 tablet 3    [] hydroquinone 4 % Crea Apply topically 2 (two) times daily. (Patient not taking: Reported on 2023) 28 g 11    [DISCONTINUED] glipiZIDE (GLUCOTROL) 10 MG TR24 Take 1 tablet (10 mg total) by mouth 2 (two) times a day. 180 tablet 1     No facility-administered encounter medications on file as of 2023.       History of Present Illness  79 y/o female new referral to neurology for reported memory impairment    She has PMH of diabetes, chronic pain followed in pain treatment here for lower back pain.    She is on neurontin (verify dosing, as I see pain treatment was giving 400mg tid, but I see more recently primary care prescribing 600mg tid), is on norco PRN as well.  Denies history of CVA or prior head injury.  Symptoms of simple conversation forgetful, turning down wrong road, but no getting lost, is able to perform all her ADLs without complication.  No family history of alzheimer's dementia.    Her MMSE was 28/30    No prior sleep study           Review of Systems  Review of Systems   Constitutional:  Negative for diaphoresis and fever.   HENT:  Negative for congestion, hearing loss and tinnitus.    Eyes:  Negative for blurred vision, double vision, photophobia, discharge and redness.   Respiratory:  Negative for cough and shortness of breath.    Cardiovascular:  Negative for chest pain.   Gastrointestinal:  Negative for abdominal pain, nausea and vomiting.   Musculoskeletal:  Negative for back pain, joint pain, myalgias and neck pain.   Skin:  Negative for itching and rash.   Neurological:  Positive for tingling and sensory change. Negative for dizziness, tremors, speech change, focal weakness, seizures, loss of consciousness, weakness and headaches.   Psychiatric/Behavioral:  Positive for memory loss. Negative for depression and hallucinations. The patient  does not have insomnia.    All other systems reviewed and are negative.     Objective:     NEUROLOGICAL EXAMINATION:     MENTAL STATUS   Oriented to person, place, and time.   Registration: recalls 3 of 3 objects. Recall at 5 minutes: recalls 2 of 3 objects.   Attention: normal. Concentration: normal.   Speech: speech is normal   Level of consciousness: alert  Knowledge: good and consistent with education.   Normal comprehension.     CRANIAL NERVES     CN II   Visual fields full to confrontation.   Visual acuity: normal  Right visual field deficit: none  Left visual field deficit: none     CN III, IV, VI   Pupils are equal, round, and reactive to light.  Extraocular motions are normal.   Right pupil: Size: 3 mm. Shape: regular. Reactivity: brisk. Consensual response: intact. Accommodation: intact.   Left pupil: Size: 3 mm. Shape: regular. Reactivity: brisk. Consensual response: intact. Accommodation: intact.   CN III: no CN III palsy  CN VI: no CN VI palsy  Nystagmus: none   Diplopia: none  Upgaze: normal  Downgaze: normal  Conjugate gaze: present  Vestibulo-ocular reflex: present    CN V   Facial sensation intact.   Right facial sensation deficit: none  Left facial sensation deficit: none  Right corneal reflex: normal  Left corneal reflex: normal    CN VII   Facial expression full, symmetric.   Right facial weakness: none  Left facial weakness: none  Right taste: normal  Left taste: normal    CN VIII   CN VIII normal.   Hearing: intact    CN IX, X   CN IX normal.   CN X normal.   Palate: symmetric    CN XI   CN XI normal.   Right sternocleidomastoid strength: normal  Left sternocleidomastoid strength: normal  Right trapezius strength: normal  Left trapezius strength: normal    CN XII   CN XII normal.   Tongue: not atrophic  Fasciculations: absent  Tongue deviation: none    MOTOR EXAM   Muscle bulk: normal  Overall muscle tone: normal  Right arm tone: normal  Left arm tone: normal  Right arm pronator drift:  absent  Left arm pronator drift: absent  Right leg tone: normal  Left leg tone: normal    Strength   Right neck flexion:   Left neck flexion:   Right neck extension:   Left neck extension:   Right deltoid:   Left deltoid:   Right biceps:   Left biceps:   Right triceps:   Left triceps:   Right wrist flexion:   Left wrist flexion:   Right wrist extension:   Left wrist extension:   Right interossei:   Left interossei:   Right iliopsoas:   Left iliopsoas:   Right quadriceps:   Left quadriceps:   Right hamstrin/5  Left hamstrin/5  Right anterior tibial:   Left anterior tibial:   Right posterior tibial:   Left posterior tibial:   Right gastroc:   Left gastroc:     REFLEXES     Reflexes   Right brachioradialis: 2+  Left brachioradialis: 2+  Right biceps: 2+  Left biceps: 2+  Right triceps: 2+  Left triceps: 2+  Right patellar: 2+  Left patellar: 2+  Right achilles: 2+  Left achilles: 2+  Right plantar: normal  Left plantar: normal  Right Almeida: absent  Left Al[place]: absent  Right ankle clonus: absent  Left ankle clonus: absent  Right pendular knee jerk: absent  Left pendular knee jerk: absent    SENSORY EXAM   Right arm light touch: normal  Left arm light touch: normal  Right leg light touch: decreased from toes  Left leg light touch: decreased from toes  Right arm vibration: normal  Left arm vibration: normal  Right leg vibration: decreased from toes  Left leg vibration: decreased from toes  Right arm proprioception: normal  Left arm proprioception: normal  Right leg proprioception: decreased from toes  Left leg proprioception: decreased from toes  Right arm pinprick: normal  Left arm pinprick: normal  Right leg pinprick: decreased from toes  Left leg pinprick: decreased from toes    GAIT AND COORDINATION     Gait  Gait: normal     Coordination   Finger to nose coordination: normal  Heel to shin coordination: normal  Tandem walking  coordination: normal    Tremor   Resting tremor: absent  Intention tremor: absent  Action tremor: absent       Physical Exam  Vitals and nursing note reviewed.   Constitutional:       Appearance: Normal appearance.   HENT:      Head: Normocephalic.   Eyes:      Extraocular Movements: Extraocular movements intact and EOM normal.      Pupils: Pupils are equal, round, and reactive to light.   Cardiovascular:      Rate and Rhythm: Normal rate and regular rhythm.   Pulmonary:      Effort: Pulmonary effort is normal.      Breath sounds: Normal breath sounds.   Musculoskeletal:         General: No swelling or tenderness. Normal range of motion.      Cervical back: Normal range of motion and neck supple.      Right lower leg: No edema.      Left lower leg: No edema.   Skin:     General: Skin is warm and dry.      Coloration: Skin is not jaundiced.      Findings: No rash.   Neurological:      General: No focal deficit present.      Mental Status: She is alert and oriented to person, place, and time.      GCS: GCS eye subscore is 4. GCS verbal subscore is 5. GCS motor subscore is 6.      Cranial Nerves: No cranial nerve deficit.      Sensory: Sensory deficit present.      Motor: Motor function is intact. No weakness.      Coordination: Coordination is intact. Coordination normal. Finger-Nose-Finger Test and Heel to Shin Test normal.      Gait: Gait is intact. Gait and tandem walk normal.      Deep Tendon Reflexes: Reflexes normal.      Reflex Scores:       Tricep reflexes are 2+ on the right side and 2+ on the left side.       Bicep reflexes are 2+ on the right side and 2+ on the left side.       Brachioradialis reflexes are 2+ on the right side and 2+ on the left side.       Patellar reflexes are 2+ on the right side and 2+ on the left side.       Achilles reflexes are 2+ on the right side and 2+ on the left side.  Psychiatric:         Mood and Affect: Mood normal.         Speech: Speech normal.         Behavior: Behavior  normal.          Assessment:     Problem List Items Addressed This Visit          Neuro    Other amnesia - Primary    Relevant Orders    MRI Brain Without Contrast     Other Visit Diagnoses       Memory loss        Relevant Orders    Ammonia    CBC Auto Differential    Comprehensive Metabolic Panel    Syphilis Antibody with reflex to RPR    Vitamin B12 deficiency        Relevant Orders    Folate    Vitamin B12    Hypothyroidism, unspecified type        Relevant Orders    TSH    Vitamin D deficiency        Relevant Orders    Vitamin D             Primary Diagnosis and ICD10  Other amnesia [R41.3]    Plan:     Patient Instructions   Recommend taking lower dose 400mg neurontin  MMSE 28/30  MRI brain  Dementia panel  Do not recommend medications at this time    Medications Discontinued During This Encounter   Medication Reason    gabapentin (NEURONTIN) 600 MG tablet        Requested Prescriptions      No prescriptions requested or ordered in this encounter       Orders Placed This Encounter   Procedures    MRI Brain Without Contrast    Ammonia    CBC Auto Differential    Comprehensive Metabolic Panel    Folate    TSH    Vitamin B12    Vitamin D    Syphilis Antibody with reflex to RPR

## 2023-08-09 NOTE — PATIENT INSTRUCTIONS
Recommend taking lower dose 400mg neurontin  MMSE 28/30  MRI brain  Dementia panel  Do not recommend medications at this time

## 2023-08-09 NOTE — PROGRESS NOTES
Subjective:       Patient ID: Lorraine De Jesus is a 78 y.o. female.    Chief Complaint: Results    HPI  .  Patient presents with abdominal pain.  Patient does have evidence of a renal calculus.  Redness calculus in the past plan renal ultrasound of the left kidney refer to Nephrology Dr. Singleton.  Patient does have some CVA tenderness on the left.    For pain will increase Neurontin to 600 mg 1 p.o. 3 times a day.  The  Current Medications:    Current Outpatient Medications:     amLODIPine (NORVASC) 5 MG tablet, Take 5 mg by mouth., Disp: , Rfl:     aspirin (ECOTRIN) 81 MG EC tablet, Take 81 mg by mouth once daily., Disp: , Rfl:     blood sugar diagnostic Strp, 1 strip by Misc.(Non-Drug; Combo Route) route 3 (three) times daily., Disp: 200 strip, Rfl: 3    blood-glucose meter kit, Use as instructed, Disp: 1 each, Rfl: 0    cloNIDine (CATAPRES) 0.1 MG tablet, TAKE 1 TABLET BY MOUTH EVERY 4 HOURS AS NEEDED FOR BLOOD PRESSURE GREATER THAN 170/90, Disp: , Rfl:     ezetimibe (ZETIA) 10 mg tablet, Take 1 tablet (10 mg total) by mouth once daily., Disp: 90 tablet, Rfl: 3    gabapentin (NEURONTIN) 400 MG capsule, TAKE 1 CAPSULE(400 MG) BY MOUTH EVERY 8 HOURS, Disp: 90 capsule, Rfl: 2    glipiZIDE (GLUCOTROL) 10 MG TR24, Take 1 tablet (10 mg total) by mouth 2 (two) times a day., Disp: 180 tablet, Rfl: 1    HYDROcodone-acetaminophen (NORCO)  mg per tablet, Take 1 tablet by mouth every 6 (six) hours as needed for Pain., Disp: 120 tablet, Rfl: 0    HYDROcodone-acetaminophen (NORCO)  mg per tablet, Take 1 tablet by mouth every 6 (six) hours as needed for Pain., Disp: 120 tablet, Rfl: 0    HYDROcodone-acetaminophen (NORCO)  mg per tablet, Take 1 tablet by mouth every 6 (six) hours as needed for Pain., Disp: 120 tablet, Rfl: 0    lancets (ACCU-CHEK FASTCLIX LANCET [facility] Take 1 each by mouth once daily., Disp: , Rfl:     levothyroxine (SYNTHROID) 75 MCG tablet, Take 1 tablet (75 mcg total) by mouth before  "breakfast., Disp: 90 tablet, Rfl: 1    olmesartan (BENICAR) 5 MG Tab, Take 10 mg by mouth once daily., Disp: , Rfl:     ONETOUCH DELICA PLUS LANCET 33 gauge Misc, Check blood sugar TID, Disp: 100 each, Rfl: 11    ONETOUCH VERIO TEST STRIPS Strp, TEST BLOOD GLUCOSE LEVELS TWICE DAILY, Disp: 200 strip, Rfl: 4    oxybutynin (DITROPAN-XL) 5 MG TR24, Take one tablet at night, Disp: 90 tablet, Rfl: 3    gabapentin (NEURONTIN) 600 MG tablet, Take 1 tablet (600 mg total) by mouth 3 (three) times daily., Disp: 90 tablet, Rfl: 3           Review of Systems   Constitutional:  Negative for appetite change, fatigue and fever.   Respiratory:  Negative for shortness of breath.    Cardiovascular:  Negative for chest pain.   Gastrointestinal:  Negative for abdominal pain and constipation.   Endocrine: Negative for polydipsia, polyphagia and polyuria.   Genitourinary:  Negative for difficulty urinating, frequency and hot flashes.   Allergic/Immunologic: Negative for environmental allergies.   Neurological:  Negative for dizziness and light-headedness.   Psychiatric/Behavioral:  Negative for agitation.                 Vitals:    08/02/23 1546 08/02/23 1548   BP: (!) 160/83 (!) 149/79   BP Location: Right arm Left arm   Patient Position: Sitting    BP Method: Large (Automatic)    Pulse: 93    Resp: 17    Temp: 97.4 °F (36.3 °C)    TempSrc: Temporal    SpO2: 98%    Weight: 81.3 kg (179 lb 3.2 oz)    Height: 5' 8" (1.727 m)         Physical Exam  Vitals and nursing note reviewed.   Constitutional:       Appearance: Normal appearance.   Cardiovascular:      Rate and Rhythm: Normal rate and regular rhythm.      Pulses: Normal pulses.      Heart sounds: Normal heart sounds.   Pulmonary:      Effort: Pulmonary effort is normal.      Breath sounds: Normal breath sounds.   Abdominal:      General: Abdomen is flat. Bowel sounds are normal.      Palpations: Abdomen is soft.   Musculoskeletal:         General: Normal range of motion.   Skin:     " General: Skin is warm and dry.   Neurological:      General: No focal deficit present.      Mental Status: She is alert and oriented to person, place, and time. Mental status is at baseline.           Last Labs:     Office Visit on 07/26/2023   Component Date Value    Color, UA 07/26/2023 Dark-Yellow     Clarity, UA 07/26/2023 Clear     pH, UA 07/26/2023 6.5     Leukocytes, UA 07/26/2023 Trace (A)     Nitrites, UA 07/26/2023 Negative     Protein, UA 07/26/2023 Negative     Glucose, UA 07/26/2023 >1000     Ketones, UA 07/26/2023 Negative     Urobilinogen, UA 07/26/2023 Normal     Bilirubin, UA 07/26/2023 Negative     Blood, UA 07/26/2023 Negative     Specific Gravity, UA 07/26/2023 1.018     WBC, UA 07/26/2023 16 (H)     RBC, UA 07/26/2023 3     Squamous Epithelial Cell* 07/26/2023 Occasional (A)     Mucous 07/26/2023 Occasional (A)     Culture, Urine 07/26/2023 Skin/Urogenital Louise Isolated, no further workup.        Last Imaging:  X-Ray [facility]  Narrative: EXAMINATION:  XR [facility]    CLINICAL HISTORY:  Unspecified abdominal pain    COMPARISON:  [facility] September 10, 2018    TECHNIQUE:  Frontal views of the abdomen.    FINDINGS:  8 mm inferior pole left renal calculus.  Nonspecific bowel gas pattern.  Mild levoconvex curvature of the lumbar spine.  Lung bases clear. Atherosclerotic calcification demonstrated.  Impression: As above.    Point of Service: [facility]    Electronically signed by: Vadim Swanson  Date:    07/26/2023  Time:    10:30         **Labs and x-rays personally reviewed by me    ** reviewed      Objective:        Assessment:       1. Renal calculus  Ambulatory referral/consult to Urology    US Kidney      2. Diabetic neuropathy with neurologic complication        3. Lumbosacral radiculopathy        4. Bilateral foot pain             Plan:         [unfilled]

## 2023-08-10 ENCOUNTER — TELEPHONE (OUTPATIENT)
Dept: NEUROLOGY | Facility: CLINIC | Age: 78
End: 2023-08-10
Payer: COMMERCIAL

## 2023-08-10 NOTE — TELEPHONE ENCOUNTER
Called pt and gave her the information below from DENNYS Oneill NP. She v/u.        ----- Message from OLGA Galan sent at 8/10/2023  8:34 AM CDT -----  No significant abnormalities noted on her labs

## 2023-08-14 ENCOUNTER — HOSPITAL ENCOUNTER (OUTPATIENT)
Dept: RADIOLOGY | Facility: HOSPITAL | Age: 78
Discharge: HOME | End: 2023-08-14
Attending: NURSE PRACTITIONER
Payer: COMMERCIAL

## 2023-08-14 ENCOUNTER — OFFICE VISIT (OUTPATIENT)
Dept: UROLOGY | Facility: CLINIC | Age: 78
End: 2023-08-14
Payer: COMMERCIAL

## 2023-08-14 VITALS
HEART RATE: 78 BPM | DIASTOLIC BLOOD PRESSURE: 70 MMHG | OXYGEN SATURATION: 99 % | HEIGHT: 68 IN | WEIGHT: 179 LBS | RESPIRATION RATE: 18 BRPM | BODY MASS INDEX: 27.13 KG/M2 | SYSTOLIC BLOOD PRESSURE: 110 MMHG | TEMPERATURE: 98 F

## 2023-08-14 DIAGNOSIS — G89.29 CHRONIC PELVIC PAIN IN FEMALE: Chronic | ICD-10-CM

## 2023-08-14 DIAGNOSIS — N32.89 BLADDER SPASMS: ICD-10-CM

## 2023-08-14 DIAGNOSIS — R10.2 CHRONIC PELVIC PAIN IN FEMALE: Chronic | ICD-10-CM

## 2023-08-14 DIAGNOSIS — N20.0 RENAL CALCULUS: ICD-10-CM

## 2023-08-14 DIAGNOSIS — N35.92 STRICTURE OF FEMALE URETHRA, UNSPECIFIED STRICTURE TYPE: Primary | ICD-10-CM

## 2023-08-14 PROCEDURE — 74018 RADEX ABDOMEN 1 VIEW: CPT | Mod: TC

## 2023-08-14 PROCEDURE — 3288F FALL RISK ASSESSMENT DOCD: CPT | Mod: CPTII,,, | Performed by: NURSE PRACTITIONER

## 2023-08-14 PROCEDURE — 3078F PR MOST RECENT DIASTOLIC BLOOD PRESSURE < 80 MM HG: ICD-10-PCS | Mod: CPTII,,, | Performed by: NURSE PRACTITIONER

## 2023-08-14 PROCEDURE — 99213 PR OFFICE/OUTPT VISIT, EST, LEVL III, 20-29 MIN: ICD-10-PCS | Mod: S$PBB,,, | Performed by: NURSE PRACTITIONER

## 2023-08-14 PROCEDURE — 3288F PR FALLS RISK ASSESSMENT DOCUMENTED: ICD-10-PCS | Mod: CPTII,,, | Performed by: NURSE PRACTITIONER

## 2023-08-14 PROCEDURE — 1101F PR PT FALLS ASSESS DOC 0-1 FALLS W/OUT INJ PAST YR: ICD-10-PCS | Mod: CPTII,,, | Performed by: NURSE PRACTITIONER

## 2023-08-14 PROCEDURE — 74018 XR KUB: ICD-10-PCS | Mod: 26,,, | Performed by: RADIOLOGY

## 2023-08-14 PROCEDURE — 3074F SYST BP LT 130 MM HG: CPT | Mod: CPTII,,, | Performed by: NURSE PRACTITIONER

## 2023-08-14 PROCEDURE — 3074F PR MOST RECENT SYSTOLIC BLOOD PRESSURE < 130 MM HG: ICD-10-PCS | Mod: CPTII,,, | Performed by: NURSE PRACTITIONER

## 2023-08-14 PROCEDURE — 99215 OFFICE O/P EST HI 40 MIN: CPT | Mod: PBBFAC | Performed by: NURSE PRACTITIONER

## 2023-08-14 PROCEDURE — 1160F PR REVIEW ALL MEDS BY PRESCRIBER/CLIN PHARMACIST DOCUMENTED: ICD-10-PCS | Mod: CPTII,,, | Performed by: NURSE PRACTITIONER

## 2023-08-14 PROCEDURE — 1159F MED LIST DOCD IN RCRD: CPT | Mod: CPTII,,, | Performed by: NURSE PRACTITIONER

## 2023-08-14 PROCEDURE — 1126F PR PAIN SEVERITY QUANTIFIED, NO PAIN PRESENT: ICD-10-PCS | Mod: CPTII,,, | Performed by: NURSE PRACTITIONER

## 2023-08-14 PROCEDURE — 1101F PT FALLS ASSESS-DOCD LE1/YR: CPT | Mod: CPTII,,, | Performed by: NURSE PRACTITIONER

## 2023-08-14 PROCEDURE — 74018 RADEX ABDOMEN 1 VIEW: CPT | Mod: 26,,, | Performed by: RADIOLOGY

## 2023-08-14 PROCEDURE — 1126F AMNT PAIN NOTED NONE PRSNT: CPT | Mod: CPTII,,, | Performed by: NURSE PRACTITIONER

## 2023-08-14 PROCEDURE — 99213 OFFICE O/P EST LOW 20 MIN: CPT | Mod: S$PBB,,, | Performed by: NURSE PRACTITIONER

## 2023-08-14 PROCEDURE — 1160F RVW MEDS BY RX/DR IN RCRD: CPT | Mod: CPTII,,, | Performed by: NURSE PRACTITIONER

## 2023-08-14 PROCEDURE — 1159F PR MEDICATION LIST DOCUMENTED IN MEDICAL RECORD: ICD-10-PCS | Mod: CPTII,,, | Performed by: NURSE PRACTITIONER

## 2023-08-14 PROCEDURE — 3078F DIAST BP <80 MM HG: CPT | Mod: CPTII,,, | Performed by: NURSE PRACTITIONER

## 2023-08-17 NOTE — H&P (VIEW-ONLY)
Subjective:         Patient ID: Lorraine De Jesus is a 78 y.o. female.    Chief Complaint: Back Pain      Pain  This is a chronic problem. The current episode started more than 1 year ago. The problem occurs daily. The problem has been waxing and waning. Associated symptoms include arthralgias and neck pain. Pertinent negatives include no anorexia, chest pain, chills, coughing, fever, sore throat, vertigo or vomiting.     Review of Systems   Constitutional:  Negative for activity change, appetite change, chills, fever and unexpected weight change.   HENT:  Negative for drooling, ear discharge, ear pain, facial swelling, nosebleeds, sore throat, trouble swallowing, voice change and goiter.    Eyes:  Negative for photophobia, pain, discharge, redness and visual disturbance.   Respiratory:  Negative for apnea, cough, choking, chest tightness, shortness of breath, wheezing and stridor.    Cardiovascular:  Negative for chest pain, palpitations and leg swelling.   Gastrointestinal:  Negative for abdominal distention, anorexia, diarrhea, rectal pain, vomiting and fecal incontinence.   Endocrine: Negative for cold intolerance, heat intolerance, polydipsia, polyphagia and polyuria.   Genitourinary:  Negative for bladder incontinence, dysuria, flank pain, frequency and hot flashes.   Musculoskeletal:  Positive for arthralgias, back pain, leg pain and neck pain.   Integumentary:  Negative for color change and pallor.   Allergic/Immunologic: Negative for immunocompromised state.   Neurological:  Negative for dizziness, vertigo, seizures, syncope, facial asymmetry, speech difficulty, light-headedness, coordination difficulties, memory loss and coordination difficulties.   Hematological:  Negative for adenopathy. Does not bruise/bleed easily.   Psychiatric/Behavioral:  Negative for agitation, behavioral problems, confusion, decreased concentration, dysphoric mood, hallucinations, self-injury and suicidal ideas. The patient is not  nervous/anxious and is not hyperactive.            Past Medical History:   Diagnosis Date    Adenomatous polyp of ascending colon 2021    Adenomatous polyp of descending colon 2021    Age related osteoporosis 10/28/2020    Benign paroxysmal vertigo     Chronic pain syndrome     Chronic pelvic pain in female 2021    Ditropan XL 5mg    Colon, diverticulosis 2021    Depressive disorder 2019    External hemorrhoid 2021    Gastrointestinal hemorrhage associated with anorectal source 2021    GERD (gastroesophageal reflux disease) 2013    Hyperlipidemia 2012    Hypothyroidism 2019    Joint pain     Nonrheumatic tricuspid (valve) insufficiency 2018    Personal history UTI 2021    Controlled on Hiprex    Polyneuropathy 2018    PVD (peripheral vascular disease) 10/30/2018    Temporal arteritis     Type 2 diabetes mellitus 2014    Urethral meatal stenosis 2018    history of known urethral stenosis, and was taught to perform monthly self dilation at home.     Past Surgical History:   Procedure Laterality Date     left lumbar sympathetic nerve block Left 2020    X3  DR BROWN    CARDIAC CATHETERIZATION  10/14/2009     SECTION      x 2    COLONOSCOPY  2009    CYSTOSCOPY WITH HYDRODISTENSION OF BLADDER N/A 2021    Procedure: CYSTOSCOPY, WITH BLADDER HYDRODISTENSION;  Surgeon: Dequan Recio MD;  Location: [facility] OR;  Service: Urology;  Laterality: N/A;    CYSTOSCOPY WITH URETHRAL DILATION  2021    Dr Recio    EPIDURAL STEROID INJECTION N/A 10/27/2022    Procedure: Injection, Steroid, Epidural, L5/S1;  Surgeon: Belkis Brown MD;  Location: [facility] PAIN [facility];  Service: Pain Management;  Laterality: N/A;    HYSTERECTOMY      LUMBAR SYMPATHETIC NERVE BLOCK Left 10/05/2020    Left Sympathetic Nerve Block - Dr Brown - 10/5/20, 20, 20. 20, 10/9/19, 19 and 18    right lumbar sympathetic nerve  "block Right 2020    X4 DR LANDEROS    THYROIDECTOMY      1990's     Social History     Socioeconomic History    Marital status:    Tobacco Use    Smoking status: Never    Smokeless tobacco: Never   Substance and Sexual Activity    Alcohol use: Never    Drug use: Never    Sexual activity: Not Currently     Family History   Problem Relation Age of Onset    Cancer Mother     Hypertension Mother     Cancer Father     Cancer Sister     Diabetes Sister     Hyperlipidemia Sister     Hypertension Sister     Cancer Brother     Diabetes Sister     No Known Problems Sister     Cancer Brother     No Known Problems Brother     Cancer Brother      Review of patient's allergies indicates:   Allergen Reactions    Lipitor [atorvastatin] Other (See Comments)     Muscle aching    Pravachol [pravastatin] Other (See Comments)     Muscle aching        Objective:  Vitals:    08/21/23 0913   BP: (!) 147/79   Pulse: (!) 59   Resp: 20   Weight: 80.7 kg (178 lb)   Height: 5' 8" (1.727 m)   PainSc:   6         Physical Exam  Vitals and nursing note reviewed. Exam conducted with a chaperone present.   Constitutional:       General: She is awake. She is not in acute distress.     Appearance: Normal appearance. She is not ill-appearing or diaphoretic.   HENT:      Head: Normocephalic and atraumatic.      Nose: Nose normal.      Mouth/Throat:      Mouth: Mucous membranes are moist.      Pharynx: Oropharynx is clear.   Eyes:      Conjunctiva/sclera: Conjunctivae normal.      Pupils: Pupils are equal, round, and reactive to light.   Cardiovascular:      Rate and Rhythm: Normal rate.   Pulmonary:      Effort: Pulmonary effort is normal. No respiratory distress.   Abdominal:      Palpations: Abdomen is soft.   Musculoskeletal:      Cervical back: Normal range of motion and neck supple.      Thoracic back: Tenderness present.      Lumbar back: Tenderness present. Decreased range of motion.   Skin:     General: Skin is warm and dry.   Neurological: "      General: No focal deficit present.      Mental Status: She is alert and oriented to person, place, and time. Mental status is at baseline.      Cranial Nerves: No cranial nerve deficit (II-XII).   Psychiatric:         Mood and Affect: Mood normal.         Behavior: Behavior normal. Behavior is cooperative.         Thought Content: Thought content normal.           X-Ray KUB  Narrative: EXAMINATION:  XR KUB    CLINICAL HISTORY:  .  Calculus of kidney    COMPARISON:  July 26, 2023    TECHNIQUE:  AP supine abdomen    FINDINGS:  The bowel gas pattern is nonobstructive without gross mass lesion.  There is mild to moderate stool in the normal caliber colon.    Radiopaque calculus overlies lower pole left kidney without change    Osseous structures are similar  Impression: No acute process    Left nephrolithiasis without change    Electronically signed by: Tevin Bermeo  Date:    08/14/2023  Time:    14:02       Lab Visit on 08/09/2023   Component Date Value Ref Range Status    Ammonia 08/09/2023 <10 (L)  11 - 32 µmol/L Final    Sodium 08/09/2023 143  136 - 145 mmol/L Final    Potassium 08/09/2023 3.9  3.5 - 5.1 mmol/L Final    Chloride 08/09/2023 109 (H)  98 - 107 mmol/L Final    CO2 08/09/2023 29  21 - 32 mmol/L Final    Anion Gap 08/09/2023 9  7 - 16 mmol/L Final    Glucose 08/09/2023 109 (H)  74 - 106 mg/dL Final    BUN 08/09/2023 14  7 - 18 mg/dL Final    Creatinine 08/09/2023 0.81  0.55 - 1.02 mg/dL Final    BUN/Creatinine Ratio 08/09/2023 17  6 - 20 Final    Calcium 08/09/2023 9.2  8.5 - 10.1 mg/dL Final    Total Protein 08/09/2023 7.1  6.4 - 8.2 g/dL Final    Albumin 08/09/2023 3.2 (L)  3.5 - 5.0 g/dL Final    Globulin 08/09/2023 3.9  2.0 - 4.0 g/dL Final    A/G Ratio 08/09/2023 0.8   Final    Bilirubin, Total 08/09/2023 0.3  >0.0 - 1.2 mg/dL Final    Alk Phos 08/09/2023 109  55 - 142 U/L Final    ALT 08/09/2023 18  13 - 56 U/L Final    AST 08/09/2023 14 (L)  15 - 37 U/L Final    eGFR 08/09/2023 75  >=60  mL/min/1.73m2 Final    Folate 08/09/2023 >20.0 (H)  3.1 - 17.5 ng/mL Final    TSH 08/09/2023 1.310  0.358 - 3.740 uIU/mL Final    Vitamin B12 08/09/2023 915  193 - 986 pg/mL Final    Vitamin D 25-Hydroxy, Blood 08/09/2023 48.7  ng/mL Final    Syphilis Ab Interpretation 08/09/2023 Non-Reactive  Non-Reactive Final    WBC 08/09/2023 6.68  4.50 - 11.00 K/uL Final    RBC 08/09/2023 4.35  4.20 - 5.40 M/uL Final    Hemoglobin 08/09/2023 11.6 (L)  12.0 - 16.0 g/dL Final    Hematocrit 08/09/2023 36.7 (L)  38.0 - 47.0 % Final    MCV 08/09/2023 84.4  80.0 - 96.0 fL Final    MCH 08/09/2023 26.7 (L)  27.0 - 31.0 pg Final    MCHC 08/09/2023 31.6 (L)  32.0 - 36.0 g/dL Final    RDW 08/09/2023 15.9 (H)  11.5 - 14.5 % Final    Platelet Count 08/09/2023 270  150 - 400 K/uL Final    MPV 08/09/2023 9.8  9.4 - 12.4 fL Final    Neutrophils % 08/09/2023 51.3 (L)  53.0 - 65.0 % Final    Lymphocytes % 08/09/2023 38.2  27.0 - 41.0 % Final    Monocytes % 08/09/2023 6.9 (H)  2.0 - 6.0 % Final    Eosinophils % 08/09/2023 2.8  1.0 - 4.0 % Final    Basophils % 08/09/2023 0.7  0.0 - 1.0 % Final    Immature Granulocytes % 08/09/2023 0.1  0.0 - 0.4 % Final    nRBC, Auto 08/09/2023 0.0  <=0.0 % Final    Neutrophils, Abs 08/09/2023 3.42  1.80 - 7.70 K/uL Final    Lymphocytes, Absolute 08/09/2023 2.55  1.00 - 4.80 K/uL Final    Monocytes, Absolute 08/09/2023 0.46  0.00 - 0.80 K/uL Final    Eosinophils, Absolute 08/09/2023 0.19  0.00 - 0.50 K/uL Final    Basophils, Absolute 08/09/2023 0.05  0.00 - 0.20 K/uL Final    Immature Granulocytes, Absolute 08/09/2023 0.01  0.00 - 0.04 K/uL Final    nRBC, Absolute 08/09/2023 0.00  <=0.00 x10e3/uL Final    Diff Type 08/09/2023 Auto   Final   Office Visit on 07/26/2023   Component Date Value Ref Range Status    Color, UA 07/26/2023 Dark-Yellow  Colorless, Straw, Yellow, Light Yellow, Dark Yellow Final    Clarity, UA 07/26/2023 Clear  Clear Final    pH, UA 07/26/2023 6.5  5.0 to 8.0 pH Units Final    Leukocytes, UA  07/26/2023 Trace (A)  Negative Final    Nitrites, UA 07/26/2023 Negative  Negative Final    Protein, UA 07/26/2023 Negative  Negative Final    Glucose, UA 07/26/2023 >1000  Normal mg/dL Final    Ketones, UA 07/26/2023 Negative  Negative mg/dL Final    Urobilinogen, UA 07/26/2023 Normal  0.2, 1.0, Normal mg/dL Final    Bilirubin, UA 07/26/2023 Negative  Negative Final    Blood, UA 07/26/2023 Negative  Negative Final    Specific Gravity, UA 07/26/2023 1.018  <=1.030 Final    WBC, UA 07/26/2023 16 (H)  <=5 /hpf Final    RBC, UA 07/26/2023 3  <=3 /hpf Final    Squamous Epithelial Cells, UA 07/26/2023 Occasional (A)  None Seen /HPF Final    Mucous 07/26/2023 Occasional (A)  None Seen /LPF Final    Culture, Urine 07/26/2023 Skin/Urogenital Louise Isolated, no further workup.   Final   Office Visit on 06/27/2023   Component Date Value Ref Range Status    Culture, Urine 06/27/2023 Skin/Urogenital Louise Isolated, no further workup.   Final   Office Visit on 06/27/2023   Component Date Value Ref Range Status    Syphilis Ab Interpretation 06/27/2023 Non-Reactive  Non-Reactive Final    TSH 06/27/2023 2.040  0.358 - 3.740 uIU/mL Final    Vitamin B12 06/27/2023 895  193 - 986 pg/mL Final   Lab Visit on 06/07/2023   Component Date Value Ref Range Status    Triglycerides 06/07/2023 97  35 - 150 mg/dL Final    Cholesterol 06/07/2023 186  0 - 200 mg/dL Final    HDL Cholesterol 06/07/2023 46  40 - 60 mg/dL Final    Cholesterol/HDL Ratio (Risk Factor) 06/07/2023 4.0   Final    Non-HDL 06/07/2023 140  mg/dL Final    LDL Calculated 06/07/2023 121  mg/dL Final    LDL/HDL 06/07/2023 2.6   Final    VLDL 06/07/2023 19  mg/dL Final    Hemoglobin A1C 06/07/2023 6.8 (H)  4.5 - 6.6 % Final    Estimated Average Glucose 06/07/2023 140  mg/dL Final   Office Visit on 06/07/2023   Component Date Value Ref Range Status    POC Glucose 06/07/2023 155 (A)  70 - 110 MG/DL Final   Office Visit on 05/23/2023   Component Date Value Ref Range Status    Color,  UA 05/23/2023 Light-Yellow  Colorless, Straw, Yellow, Light Yellow, Dark Yellow Final    Clarity, UA 05/23/2023 Clear  Clear Final    pH, UA 05/23/2023 6.0  5.0 to 8.0 pH Units Final    Leukocytes, UA 05/23/2023 Large (A)  Negative Final    Nitrites, UA 05/23/2023 Negative  Negative Final    Protein, UA 05/23/2023 10 (A)  Negative Final    Glucose, UA 05/23/2023 >1000  Normal mg/dL Final    Ketones, UA 05/23/2023 Negative  Negative mg/dL Final    Urobilinogen, UA 05/23/2023 Normal  0.2, 1.0, Normal mg/dL Final    Bilirubin, UA 05/23/2023 Negative  Negative Final    Blood, UA 05/23/2023 Negative  Negative Final    Specific Gravity, UA 05/23/2023 1.020  <=1.030 Final    WBC, UA 05/23/2023 48 (H)  <=5 /hpf Final    RBC, UA 05/23/2023 5 (H)  <=3 /hpf Final    Bacteria, UA 05/23/2023 Few (A)  None Seen /hpf Final    Squamous Epithelial Cells, UA 05/23/2023 Occasional (A)  None Seen /HPF Final    WBC Clumps 05/23/2023 Many (A)  None Seen /hpf Final    Culture, Urine 05/23/2023 70,000 Yeast (A)   Final   Office Visit on 05/22/2023   Component Date Value Ref Range Status    POC Amphetamines 05/22/2023 Negative  Negative, Inconclusive Final    POC Barbiturates 05/22/2023 Negative  Negative, Inconclusive Final    POC Benzodiazepines 05/22/2023 Negative  Negative, Inconclusive Final    POC Cocaine 05/22/2023 Negative  Negative, Inconclusive Final    POC THC 05/22/2023 Negative  Negative, Inconclusive Final    POC Methadone 05/22/2023 Negative  Negative, Inconclusive Final    POC Methamphetamine 05/22/2023 Negative  Negative, Inconclusive Final    POC Opiates 05/22/2023 Presumptive Positive (A)  Negative, Inconclusive Final    POC Oxycodone 05/22/2023 Negative  Negative, Inconclusive Final    POC Phencyclidine 05/22/2023 Negative  Negative, Inconclusive Final    POC Methylenedioxymethamphetamine * 05/22/2023 Negative  Negative, Inconclusive Final    POC Tricyclic Antidepressants 05/22/2023 Negative  Negative, Inconclusive Final     POC Buprenorphine 05/22/2023 Negative   Final     Acceptable 05/22/2023 Yes   Final    POC Temperature (Urine) 05/22/2023 90   Final         Orders Placed This Encounter   Procedures    POCT Urine Drug Screen Presump     Interpretive Information:     Negative:  No drug detected at the cut off level.   Positive:  This result represents presumptive positive for the   tested drug, other substances may yield a positive response other   than the analyte of interest. This result should be utilized for   diagnostic purpose only. Confirmation testing will be performed upon physician request only.            Requested Prescriptions     Pending Prescriptions Disp Refills    HYDROcodone-acetaminophen (NORCO)  mg per tablet 120 tablet 0     Sig: Take 1 tablet by mouth every 6 (six) hours as needed for Pain.    HYDROcodone-acetaminophen (NORCO)  mg per tablet 120 tablet 0     Sig: Take 1 tablet by mouth every 6 (six) hours as needed for Pain.    HYDROcodone-acetaminophen (NORCO)  mg per tablet 120 tablet 0     Sig: Take 1 tablet by mouth every 6 (six) hours as needed for Pain.    gabapentin (NEURONTIN) 400 MG capsule 90 capsule 2     Sig: TAKE 1 CAPSULE(400 MG) BY MOUTH EVERY 8 HOURS       Assessment:     1. Encounter for long-term (current) use of other medications    2. Diabetic neuropathy with neurologic complication    3. Lumbosacral radiculopathy    4. Bilateral foot pain         A's of Opioid Risk Assessment  Activity:Patient can perform ADL.   Analgesia:Patients pain is partially controlled by current medication. Patient has tried OTC medications such as Tylenol and Ibuprofen with out relief.   Adverse Effects: Patient denies constipation or sedation.  Aberrant Behavior:  reviewed with no aberrant drug seeking/taking behavior.  Overdose reversal drug naloxone discussed    Drug screen reviewed      X-ray left knee degenerative changes no fracture noted      MRI lumbar spine [facility] March 25, 2021 spinal stenosis L5/S1 disc bulge facet joint arthropathy multiple level degenerative changes        Plan:    Narcan February 2023    Follows [facility] neuropathy symptoms lower extremities    She had lumbar L5/S1 TORIE # 1 October 27, 2022, she states she had 80% relief after procedure, she states procedure did help improve her level function, patient experienced greater than 3 months pain relief with 20 procedures    Requesting lumbar procedure she is complaint back pain buttock and leg pain numbness and tingling radicular in nature    Complaining of thoracic discomfort between her shoulder blades    She has known scoliosis    Requesting Toradol injection    Toradol 30 mg IM, tolerated well     Continue home exercise program as directed ongoing greater than 1 year, 3 days per week 45 minutes per session    Continue current medication    Indications for this procedure for this specific patient include the following     - Injections being provided as part of a comprehensive pain management program.    - Pt has failed 6 weeks of conservative therapy including oral meds, PT and or home exercise program which has been discussed with the patient   - Injection being provided for suspected radicular pain.    - Pain scale of greater than or equal to 3/10 with functional impairment  - No evidence of local or systemic infection, bleeding tendency or unstable medical condition.    - Pain is causing significant functional limitation resulting in diminished quality of life and impaired age appropriate ADL's.   - Repeat injections are done no sooner than 7 days after the previous injection  - Epidural done for suspected radicular pain along dermatome of nerve   - Epidural done to differentiate level of radicular nerve root pain   -procedure done prior to surgical consideration  - Repeat injections done only when pt reports 50% improvement in pain from previous injections    -increased level of  function  - patient is aware if they take any NSAIDs/anticoagulant prior to procedure procedure will be postponed, this was listed on the preop instructions and highlighted, list of NSAIDs/anticoagulant reviewed with patient to include methotrexate  - Injection done at L5/S1 level(s) which is consistent with patient's dermatomal pain complaint  The planned medically necessary  surgical procedure is performed in a hospital outpatient department and not in an ambulatory surgical center due to:     -there is no geographically assessable ambulatory surgery center that has the  necessary equipment and fluoroscopy needed for the procedure     -there is no geographically assessable ambulatory surgical center available at which the physician has privileges     -an ASC's  specific  guideline regarding the individuals weight or health conditions that prevent the use of an ASC     -done under fluoro  Monitor anesthesia request is medically indicated for the scheduled nerve block procedure due to:  1- needle phobia and anxiety, placing  the patient at risk during the provided service.  2-patient has an ASA class greater than 3 and requires constant presence of an anesthesiologist during the procedure,   3-patient has severe problems hard to lie still  4-patient suffers from chronic pain and is unable to function due to  diminished ADLs    Schedule lumbar L5/S1 TORIE # 1, lumbosacral radiculopathy    Dr. Brown    Bring original prescription medication bottles/container/box with labels to each visit

## 2023-08-17 NOTE — PROGRESS NOTES
Subjective:         Patient ID: Lorraine De Jesus is a 78 y.o. female.    Chief Complaint: Back Pain      Pain  This is a chronic problem. The current episode started more than 1 year ago. The problem occurs daily. The problem has been waxing and waning. Associated symptoms include arthralgias and neck pain. Pertinent negatives include no anorexia, chest pain, chills, coughing, fever, sore throat, vertigo or vomiting.     Review of Systems   Constitutional:  Negative for activity change, appetite change, chills, fever and unexpected weight change.   HENT:  Negative for drooling, ear discharge, ear pain, facial swelling, nosebleeds, sore throat, trouble swallowing, voice change and goiter.    Eyes:  Negative for photophobia, pain, discharge, redness and visual disturbance.   Respiratory:  Negative for apnea, cough, choking, chest tightness, shortness of breath, wheezing and stridor.    Cardiovascular:  Negative for chest pain, palpitations and leg swelling.   Gastrointestinal:  Negative for abdominal distention, anorexia, diarrhea, rectal pain, vomiting and fecal incontinence.   Endocrine: Negative for cold intolerance, heat intolerance, polydipsia, polyphagia and polyuria.   Genitourinary:  Negative for bladder incontinence, dysuria, flank pain, frequency and hot flashes.   Musculoskeletal:  Positive for arthralgias, back pain, leg pain and neck pain.   Integumentary:  Negative for color change and pallor.   Allergic/Immunologic: Negative for immunocompromised state.   Neurological:  Negative for dizziness, vertigo, seizures, syncope, facial asymmetry, speech difficulty, light-headedness, coordination difficulties, memory loss and coordination difficulties.   Hematological:  Negative for adenopathy. Does not bruise/bleed easily.   Psychiatric/Behavioral:  Negative for agitation, behavioral problems, confusion, decreased concentration, dysphoric mood, hallucinations, self-injury and suicidal ideas. The patient is not  nervous/anxious and is not hyperactive.            Past Medical History:   Diagnosis Date    Adenomatous polyp of ascending colon 2021    Adenomatous polyp of descending colon 2021    Age related osteoporosis 10/28/2020    Benign paroxysmal vertigo     Chronic pain syndrome     Chronic pelvic pain in female 2021    Ditropan XL 5mg    Colon, diverticulosis 2021    Depressive disorder 2019    External hemorrhoid 2021    Gastrointestinal hemorrhage associated with anorectal source 2021    GERD (gastroesophageal reflux disease) 2013    Hyperlipidemia 2012    Hypothyroidism 2019    Joint pain     Nonrheumatic tricuspid (valve) insufficiency 2018    Personal history UTI 2021    Controlled on Hiprex    Polyneuropathy 2018    PVD (peripheral vascular disease) 10/30/2018    Temporal arteritis     Type 2 diabetes mellitus 2014    Urethral meatal stenosis 2018    history of known urethral stenosis, and was taught to perform monthly self dilation at home.     Past Surgical History:   Procedure Laterality Date     left lumbar sympathetic nerve block Left 2020    X3  DR BROWN    CARDIAC CATHETERIZATION  10/14/2009     SECTION      x 2    COLONOSCOPY  2009    CYSTOSCOPY WITH HYDRODISTENSION OF BLADDER N/A 2021    Procedure: CYSTOSCOPY, WITH BLADDER HYDRODISTENSION;  Surgeon: Dequan Recio MD;  Location: [facility];  Service: Urology;  Laterality: N/A;    CYSTOSCOPY WITH URETHRAL DILATION  2021    Dr Recio    EPIDURAL STEROID INJECTION N/A 10/27/2022    Procedure: Injection, Steroid, Epidural, L5/S1;  Surgeon: Belkis Brown MD;  Location: [facility];  Service: Pain Management;  Laterality: N/A;    HYSTERECTOMY      LUMBAR SYMPATHETIC NERVE BLOCK Left 10/05/2020    Left Sympathetic Nerve Block - Dr Brown - 10/5/20, 20, 20. 20, 10/9/19, 19 and 18    right lumbar sympathetic nerve  "block Right 2020    X4 DR LANDEROS    THYROIDECTOMY      1990's     Social History     Socioeconomic History    Marital status:    Tobacco Use    Smoking status: Never    Smokeless tobacco: Never   Substance and Sexual Activity    Alcohol use: Never    Drug use: Never    Sexual activity: Not Currently     Family History   Problem Relation Age of Onset    Cancer Mother     Hypertension Mother     Cancer Father     Cancer Sister     Diabetes Sister     Hyperlipidemia Sister     Hypertension Sister     Cancer Brother     Diabetes Sister     No Known Problems Sister     Cancer Brother     No Known Problems Brother     Cancer Brother      Review of patient's allergies indicates:   Allergen Reactions    Lipitor [atorvastatin] Other (See Comments)     Muscle aching    Pravachol [pravastatin] Other (See Comments)     Muscle aching        Objective:  Vitals:    08/21/23 0913   BP: (!) 147/79   Pulse: (!) 59   Resp: 20   Weight: 80.7 kg (178 lb)   Height: 5' 8" (1.727 m)   PainSc:   6         Physical Exam  Vitals and nursing note reviewed. Exam conducted with a chaperone present.   Constitutional:       General: She is awake. She is not in acute distress.     Appearance: Normal appearance. She is not ill-appearing or diaphoretic.   HENT:      Head: Normocephalic and atraumatic.      Nose: Nose normal.      Mouth/Throat:      Mouth: Mucous membranes are moist.      Pharynx: Oropharynx is clear.   Eyes:      Conjunctiva/sclera: Conjunctivae normal.      Pupils: Pupils are equal, round, and reactive to light.   Cardiovascular:      Rate and Rhythm: Normal rate.   Pulmonary:      Effort: Pulmonary effort is normal. No respiratory distress.   Abdominal:      Palpations: Abdomen is soft.   Musculoskeletal:      Cervical back: Normal range of motion and neck supple.      Thoracic back: Tenderness present.      Lumbar back: Tenderness present. Decreased range of motion.   Skin:     General: Skin is warm and dry.   Neurological: "      General: No focal deficit present.      Mental Status: She is alert and oriented to person, place, and time. Mental status is at baseline.      Cranial Nerves: No cranial nerve deficit (II-XII).   Psychiatric:         Mood and Affect: Mood normal.         Behavior: Behavior normal. Behavior is cooperative.         Thought Content: Thought content normal.           X-Ray KUB  Narrative: EXAMINATION:  XR KUB    CLINICAL HISTORY:  .  Calculus of kidney    COMPARISON:  July 26, 2023    TECHNIQUE:  AP supine abdomen    FINDINGS:  The bowel gas pattern is nonobstructive without gross mass lesion.  There is mild to moderate stool in the normal caliber colon.    Radiopaque calculus overlies lower pole left kidney without change    Osseous structures are similar  Impression: No acute process    Left nephrolithiasis without change    Electronically signed by: Tevin Bermeo  Date:    08/14/2023  Time:    14:02       Lab Visit on 08/09/2023   Component Date Value Ref Range Status    Ammonia 08/09/2023 <10 (L)  11 - 32 µmol/L Final    Sodium 08/09/2023 143  136 - 145 mmol/L Final    Potassium 08/09/2023 3.9  3.5 - 5.1 mmol/L Final    Chloride 08/09/2023 109 (H)  98 - 107 mmol/L Final    CO2 08/09/2023 29  21 - 32 mmol/L Final    Anion Gap 08/09/2023 9  7 - 16 mmol/L Final    Glucose 08/09/2023 109 (H)  74 - 106 mg/dL Final    BUN 08/09/2023 14  7 - 18 mg/dL Final    Creatinine 08/09/2023 0.81  0.55 - 1.02 mg/dL Final    BUN/Creatinine Ratio 08/09/2023 17  6 - 20 Final    Calcium 08/09/2023 9.2  8.5 - 10.1 mg/dL Final    Total Protein 08/09/2023 7.1  6.4 - 8.2 g/dL Final    Albumin 08/09/2023 3.2 (L)  3.5 - 5.0 g/dL Final    Globulin 08/09/2023 3.9  2.0 - 4.0 g/dL Final    A/G Ratio 08/09/2023 0.8   Final    Bilirubin, Total 08/09/2023 0.3  >0.0 - 1.2 mg/dL Final    Alk Phos 08/09/2023 109  55 - 142 U/L Final    ALT 08/09/2023 18  13 - 56 U/L Final    AST 08/09/2023 14 (L)  15 - 37 U/L Final    eGFR 08/09/2023 75  >=60  mL/min/1.73m2 Final    Folate 08/09/2023 >20.0 (H)  3.1 - 17.5 ng/mL Final    TSH 08/09/2023 1.310  0.358 - 3.740 uIU/mL Final    Vitamin B12 08/09/2023 915  193 - 986 pg/mL Final    Vitamin D 25-Hydroxy, Blood 08/09/2023 48.7  ng/mL Final    Syphilis Ab Interpretation 08/09/2023 Non-Reactive  Non-Reactive Final    WBC 08/09/2023 6.68  4.50 - 11.00 K/uL Final    RBC 08/09/2023 4.35  4.20 - 5.40 M/uL Final    Hemoglobin 08/09/2023 11.6 (L)  12.0 - 16.0 g/dL Final    Hematocrit 08/09/2023 36.7 (L)  38.0 - 47.0 % Final    MCV 08/09/2023 84.4  80.0 - 96.0 fL Final    MCH 08/09/2023 26.7 (L)  27.0 - 31.0 pg Final    MCHC 08/09/2023 31.6 (L)  32.0 - 36.0 g/dL Final    RDW 08/09/2023 15.9 (H)  11.5 - 14.5 % Final    Platelet Count 08/09/2023 270  150 - 400 K/uL Final    MPV 08/09/2023 9.8  9.4 - 12.4 fL Final    Neutrophils % 08/09/2023 51.3 (L)  53.0 - 65.0 % Final    Lymphocytes % 08/09/2023 38.2  27.0 - 41.0 % Final    Monocytes % 08/09/2023 6.9 (H)  2.0 - 6.0 % Final    Eosinophils % 08/09/2023 2.8  1.0 - 4.0 % Final    Basophils % 08/09/2023 0.7  0.0 - 1.0 % Final    Immature Granulocytes % 08/09/2023 0.1  0.0 - 0.4 % Final    nRBC, Auto 08/09/2023 0.0  <=0.0 % Final    Neutrophils, Abs 08/09/2023 3.42  1.80 - 7.70 K/uL Final    Lymphocytes, Absolute 08/09/2023 2.55  1.00 - 4.80 K/uL Final    Monocytes, Absolute 08/09/2023 0.46  0.00 - 0.80 K/uL Final    Eosinophils, Absolute 08/09/2023 0.19  0.00 - 0.50 K/uL Final    Basophils, Absolute 08/09/2023 0.05  0.00 - 0.20 K/uL Final    Immature Granulocytes, Absolute 08/09/2023 0.01  0.00 - 0.04 K/uL Final    nRBC, Absolute 08/09/2023 0.00  <=0.00 x10e3/uL Final    Diff Type 08/09/2023 Auto   Final   Office Visit on 07/26/2023   Component Date Value Ref Range Status    Color, UA 07/26/2023 Dark-Yellow  Colorless, Straw, Yellow, Light Yellow, Dark Yellow Final    Clarity, UA 07/26/2023 Clear  Clear Final    pH, UA 07/26/2023 6.5  5.0 to 8.0 pH Units Final    Leukocytes, UA  07/26/2023 Trace (A)  Negative Final    Nitrites, UA 07/26/2023 Negative  Negative Final    Protein, UA 07/26/2023 Negative  Negative Final    Glucose, UA 07/26/2023 >1000  Normal mg/dL Final    Ketones, UA 07/26/2023 Negative  Negative mg/dL Final    Urobilinogen, UA 07/26/2023 Normal  0.2, 1.0, Normal mg/dL Final    Bilirubin, UA 07/26/2023 Negative  Negative Final    Blood, UA 07/26/2023 Negative  Negative Final    Specific Gravity, UA 07/26/2023 1.018  <=1.030 Final    WBC, UA 07/26/2023 16 (H)  <=5 /hpf Final    RBC, UA 07/26/2023 3  <=3 /hpf Final    Squamous Epithelial Cells, UA 07/26/2023 Occasional (A)  None Seen /HPF Final    Mucous 07/26/2023 Occasional (A)  None Seen /LPF Final    Culture, Urine 07/26/2023 Skin/Urogenital Louise Isolated, no further workup.   Final   Office Visit on 06/27/2023   Component Date Value Ref Range Status    Culture, Urine 06/27/2023 Skin/Urogenital Louise Isolated, no further workup.   Final   Office Visit on 06/27/2023   Component Date Value Ref Range Status    Syphilis Ab Interpretation 06/27/2023 Non-Reactive  Non-Reactive Final    TSH 06/27/2023 2.040  0.358 - 3.740 uIU/mL Final    Vitamin B12 06/27/2023 895  193 - 986 pg/mL Final   Lab Visit on 06/07/2023   Component Date Value Ref Range Status    Triglycerides 06/07/2023 97  35 - 150 mg/dL Final    Cholesterol 06/07/2023 186  0 - 200 mg/dL Final    HDL Cholesterol 06/07/2023 46  40 - 60 mg/dL Final    Cholesterol/HDL Ratio (Risk Factor) 06/07/2023 4.0   Final    Non-HDL 06/07/2023 140  mg/dL Final    LDL Calculated 06/07/2023 121  mg/dL Final    LDL/HDL 06/07/2023 2.6   Final    VLDL 06/07/2023 19  mg/dL Final    Hemoglobin A1C 06/07/2023 6.8 (H)  4.5 - 6.6 % Final    Estimated Average Glucose 06/07/2023 140  mg/dL Final   Office Visit on 06/07/2023   Component Date Value Ref Range Status    POC Glucose 06/07/2023 155 (A)  70 - 110 MG/DL Final   Office Visit on 05/23/2023   Component Date Value Ref Range Status    Color,  UA 05/23/2023 Light-Yellow  Colorless, Straw, Yellow, Light Yellow, Dark Yellow Final    Clarity, UA 05/23/2023 Clear  Clear Final    pH, UA 05/23/2023 6.0  5.0 to 8.0 pH Units Final    Leukocytes, UA 05/23/2023 Large (A)  Negative Final    Nitrites, UA 05/23/2023 Negative  Negative Final    Protein, UA 05/23/2023 10 (A)  Negative Final    Glucose, UA 05/23/2023 >1000  Normal mg/dL Final    Ketones, UA 05/23/2023 Negative  Negative mg/dL Final    Urobilinogen, UA 05/23/2023 Normal  0.2, 1.0, Normal mg/dL Final    Bilirubin, UA 05/23/2023 Negative  Negative Final    Blood, UA 05/23/2023 Negative  Negative Final    Specific Gravity, UA 05/23/2023 1.020  <=1.030 Final    WBC, UA 05/23/2023 48 (H)  <=5 /hpf Final    RBC, UA 05/23/2023 5 (H)  <=3 /hpf Final    Bacteria, UA 05/23/2023 Few (A)  None Seen /hpf Final    Squamous Epithelial Cells, UA 05/23/2023 Occasional (A)  None Seen /HPF Final    WBC Clumps 05/23/2023 Many (A)  None Seen /hpf Final    Culture, Urine 05/23/2023 70,000 Yeast (A)   Final   Office Visit on 05/22/2023   Component Date Value Ref Range Status    POC Amphetamines 05/22/2023 Negative  Negative, Inconclusive Final    POC Barbiturates 05/22/2023 Negative  Negative, Inconclusive Final    POC Benzodiazepines 05/22/2023 Negative  Negative, Inconclusive Final    POC Cocaine 05/22/2023 Negative  Negative, Inconclusive Final    POC THC 05/22/2023 Negative  Negative, Inconclusive Final    POC Methadone 05/22/2023 Negative  Negative, Inconclusive Final    POC Methamphetamine 05/22/2023 Negative  Negative, Inconclusive Final    POC Opiates 05/22/2023 Presumptive Positive (A)  Negative, Inconclusive Final    POC Oxycodone 05/22/2023 Negative  Negative, Inconclusive Final    POC Phencyclidine 05/22/2023 Negative  Negative, Inconclusive Final    POC Methylenedioxymethamphetamine * 05/22/2023 Negative  Negative, Inconclusive Final    POC Tricyclic Antidepressants 05/22/2023 Negative  Negative, Inconclusive Final     POC Buprenorphine 05/22/2023 Negative   Final     Acceptable 05/22/2023 Yes   Final    POC Temperature (Urine) 05/22/2023 90   Final         Orders Placed This Encounter   Procedures    POCT Urine Drug Screen Presump     Interpretive Information:     Negative:  No drug detected at the cut off level.   Positive:  This result represents presumptive positive for the   tested drug, other substances may yield a positive response other   than the analyte of interest. This result should be utilized for   diagnostic purpose only. Confirmation testing will be performed upon physician request only.            Requested Prescriptions     Pending Prescriptions Disp Refills    HYDROcodone-acetaminophen (NORCO)  mg per tablet 120 tablet 0     Sig: Take 1 tablet by mouth every 6 (six) hours as needed for Pain.    HYDROcodone-acetaminophen (NORCO)  mg per tablet 120 tablet 0     Sig: Take 1 tablet by mouth every 6 (six) hours as needed for Pain.    HYDROcodone-acetaminophen (NORCO)  mg per tablet 120 tablet 0     Sig: Take 1 tablet by mouth every 6 (six) hours as needed for Pain.    gabapentin (NEURONTIN) 400 MG capsule 90 capsule 2     Sig: TAKE 1 CAPSULE(400 MG) BY MOUTH EVERY 8 HOURS       Assessment:     1. Encounter for long-term (current) use of other medications    2. Diabetic neuropathy with neurologic complication    3. Lumbosacral radiculopathy    4. Bilateral foot pain         A's of Opioid Risk Assessment  Activity:Patient can perform ADL.   Analgesia:Patients pain is partially controlled by current medication. Patient has tried OTC medications such as Tylenol and Ibuprofen with out relief.   Adverse Effects: Patient denies constipation or sedation.  Aberrant Behavior:  reviewed with no aberrant drug seeking/taking behavior.  Overdose reversal drug naloxone discussed    Drug screen reviewed      X-ray left knee degenerative changes no fracture noted      MRI lumbar spine [facility] March 25, 2021 spinal stenosis L5/S1 disc bulge facet joint arthropathy multiple level degenerative changes        Plan:    Narcan February 2023    Follows Neurology [facility] neuropathy symptoms lower extremities    She had lumbar L5/S1 TORIE # 1 October 27, 2022, she states she had 80% relief after procedure, she states procedure did help improve her level function, patient experienced greater than 3 months pain relief with 20 procedures    Requesting lumbar procedure she is complaint back pain buttock and leg pain numbness and tingling radicular in nature    Complaining of thoracic discomfort between her shoulder blades    She has known scoliosis    Requesting Toradol injection    Toradol 30 mg IM, tolerated well     Continue home exercise program as directed ongoing greater than 1 year, 3 days per week 45 minutes per session    Continue current medication    Indications for this procedure for this specific patient include the following     - Injections being provided as part of a comprehensive pain management program.    - Pt has failed 6 weeks of conservative therapy including oral meds, PT and or home exercise program which has been discussed with the patient   - Injection being provided for suspected radicular pain.    - Pain scale of greater than or equal to 3/10 with functional impairment  - No evidence of local or systemic infection, bleeding tendency or unstable medical condition.    - Pain is causing significant functional limitation resulting in diminished quality of life and impaired age appropriate ADL's.   - Repeat injections are done no sooner than 7 days after the previous injection  - Epidural done for suspected radicular pain along dermatome of nerve   - Epidural done to differentiate level of radicular nerve root pain   -procedure done prior to surgical consideration  - Repeat injections done only when pt reports 50% improvement in pain from previous injections    -increased level of  function  - patient is aware if they take any NSAIDs/anticoagulant prior to procedure procedure will be postponed, this was listed on the preop instructions and highlighted, list of NSAIDs/anticoagulant reviewed with patient to include methotrexate  - Injection done at L5/S1 level(s) which is consistent with patient's dermatomal pain complaint  The planned medically necessary  surgical procedure is performed in a hospital outpatient department and not in an ambulatory surgical center due to:     -there is no geographically assessable ambulatory surgery center that has the  necessary equipment and fluoroscopy needed for the procedure     -there is no geographically assessable ambulatory surgical center available at which the physician has privileges     -an ASC's  specific  guideline regarding the individuals weight or health conditions that prevent the use of an ASC     -done under fluoro  Monitor anesthesia request is medically indicated for the scheduled nerve block procedure due to:  1- needle phobia and anxiety, placing  the patient at risk during the provided service.  2-patient has an ASA class greater than 3 and requires constant presence of an anesthesiologist during the procedure,   3-patient has severe problems hard to lie still  4-patient suffers from chronic pain and is unable to function due to  diminished ADLs    Schedule lumbar L5/S1 TORIE # 1, lumbosacral radiculopathy    Dr. Brown    Bring original prescription medication bottles/container/box with labels to each visit

## 2023-08-21 ENCOUNTER — OFFICE VISIT (OUTPATIENT)
Dept: PAIN MEDICINE | Facility: CLINIC | Age: 78
End: 2023-08-21
Payer: COMMERCIAL

## 2023-08-21 VITALS
BODY MASS INDEX: 26.98 KG/M2 | HEIGHT: 68 IN | WEIGHT: 178 LBS | RESPIRATION RATE: 20 BRPM | SYSTOLIC BLOOD PRESSURE: 147 MMHG | HEART RATE: 59 BPM | DIASTOLIC BLOOD PRESSURE: 79 MMHG

## 2023-08-21 DIAGNOSIS — M79.671 BILATERAL FOOT PAIN: ICD-10-CM

## 2023-08-21 DIAGNOSIS — E11.40 DIABETIC NEUROPATHY WITH NEUROLOGIC COMPLICATION: Chronic | ICD-10-CM

## 2023-08-21 DIAGNOSIS — M54.17 LUMBOSACRAL RADICULOPATHY: Primary | Chronic | ICD-10-CM

## 2023-08-21 DIAGNOSIS — Z79.899 ENCOUNTER FOR LONG-TERM (CURRENT) USE OF OTHER MEDICATIONS: ICD-10-CM

## 2023-08-21 DIAGNOSIS — E11.49 DIABETIC NEUROPATHY WITH NEUROLOGIC COMPLICATION: Chronic | ICD-10-CM

## 2023-08-21 DIAGNOSIS — M79.672 BILATERAL FOOT PAIN: ICD-10-CM

## 2023-08-21 PROCEDURE — 1159F PR MEDICATION LIST DOCUMENTED IN MEDICAL RECORD: ICD-10-PCS | Mod: CPTII,,, | Performed by: PHYSICIAN ASSISTANT

## 2023-08-21 PROCEDURE — 3077F SYST BP >= 140 MM HG: CPT | Mod: CPTII,,, | Performed by: PHYSICIAN ASSISTANT

## 2023-08-21 PROCEDURE — 3078F PR MOST RECENT DIASTOLIC BLOOD PRESSURE < 80 MM HG: ICD-10-PCS | Mod: CPTII,,, | Performed by: PHYSICIAN ASSISTANT

## 2023-08-21 PROCEDURE — 1125F AMNT PAIN NOTED PAIN PRSNT: CPT | Mod: CPTII,,, | Performed by: PHYSICIAN ASSISTANT

## 2023-08-21 PROCEDURE — 3288F FALL RISK ASSESSMENT DOCD: CPT | Mod: CPTII,,, | Performed by: PHYSICIAN ASSISTANT

## 2023-08-21 PROCEDURE — 1125F PR PAIN SEVERITY QUANTIFIED, PAIN PRESENT: ICD-10-PCS | Mod: CPTII,,, | Performed by: PHYSICIAN ASSISTANT

## 2023-08-21 PROCEDURE — 3077F PR MOST RECENT SYSTOLIC BLOOD PRESSURE >= 140 MM HG: ICD-10-PCS | Mod: CPTII,,, | Performed by: PHYSICIAN ASSISTANT

## 2023-08-21 PROCEDURE — 1159F MED LIST DOCD IN RCRD: CPT | Mod: CPTII,,, | Performed by: PHYSICIAN ASSISTANT

## 2023-08-21 PROCEDURE — 80305 DRUG TEST PRSMV DIR OPT OBS: CPT | Mod: PBBFAC | Performed by: PHYSICIAN ASSISTANT

## 2023-08-21 PROCEDURE — 1101F PR PT FALLS ASSESS DOC 0-1 FALLS W/OUT INJ PAST YR: ICD-10-PCS | Mod: CPTII,,, | Performed by: PHYSICIAN ASSISTANT

## 2023-08-21 PROCEDURE — 96372 THER/PROPH/DIAG INJ SC/IM: CPT | Mod: PBBFAC | Performed by: PHYSICIAN ASSISTANT

## 2023-08-21 PROCEDURE — 3288F PR FALLS RISK ASSESSMENT DOCUMENTED: ICD-10-PCS | Mod: CPTII,,, | Performed by: PHYSICIAN ASSISTANT

## 2023-08-21 PROCEDURE — 99214 OFFICE O/P EST MOD 30 MIN: CPT | Mod: S$PBB,25,, | Performed by: PHYSICIAN ASSISTANT

## 2023-08-21 PROCEDURE — 99999PBSHW PR PBB SHADOW TECHNICAL ONLY FILED TO HB: Mod: PBBFAC,,,

## 2023-08-21 PROCEDURE — 99999PBSHW POCT URINE DRUG SCREEN PRESUMP: Mod: PBBFAC,,,

## 2023-08-21 PROCEDURE — 3078F DIAST BP <80 MM HG: CPT | Mod: CPTII,,, | Performed by: PHYSICIAN ASSISTANT

## 2023-08-21 PROCEDURE — 99999PBSHW POCT URINE DRUG SCREEN PRESUMP: ICD-10-PCS | Mod: PBBFAC,,,

## 2023-08-21 PROCEDURE — 99214 PR OFFICE/OUTPT VISIT, EST, LEVL IV, 30-39 MIN: ICD-10-PCS | Mod: S$PBB,25,, | Performed by: PHYSICIAN ASSISTANT

## 2023-08-21 PROCEDURE — 99215 OFFICE O/P EST HI 40 MIN: CPT | Mod: PBBFAC | Performed by: PHYSICIAN ASSISTANT

## 2023-08-21 PROCEDURE — 1101F PT FALLS ASSESS-DOCD LE1/YR: CPT | Mod: CPTII,,, | Performed by: PHYSICIAN ASSISTANT

## 2023-08-21 RX ORDER — KETOROLAC TROMETHAMINE 30 MG/ML
30 INJECTION, SOLUTION INTRAMUSCULAR; INTRAVENOUS
Status: COMPLETED | OUTPATIENT
Start: 2023-08-21 | End: 2023-08-21

## 2023-08-21 RX ORDER — HYDROCODONE BITARTRATE AND ACETAMINOPHEN 10; 325 MG/1; MG/1
1 TABLET ORAL EVERY 6 HOURS PRN
Qty: 120 TABLET | Refills: 0 | Status: SHIPPED | OUTPATIENT
Start: 2023-08-24 | End: 2023-10-04

## 2023-08-21 RX ORDER — HYDROCODONE BITARTRATE AND ACETAMINOPHEN 10; 325 MG/1; MG/1
1 TABLET ORAL EVERY 6 HOURS PRN
Qty: 120 TABLET | Refills: 0 | Status: CANCELLED | OUTPATIENT
Start: 2023-08-21

## 2023-08-21 RX ORDER — GABAPENTIN 400 MG/1
CAPSULE ORAL
Qty: 90 CAPSULE | Refills: 0 | Status: SHIPPED | OUTPATIENT
Start: 2023-08-21 | End: 2023-09-25 | Stop reason: SDUPTHER

## 2023-08-21 RX ORDER — EZETIMIBE 10 MG/1
10 TABLET ORAL
Qty: 90 TABLET | Refills: 3 | Status: SHIPPED | OUTPATIENT
Start: 2023-08-21 | End: 2023-11-06 | Stop reason: SDUPTHER

## 2023-08-21 RX ADMIN — KETOROLAC TROMETHAMINE 30 MG: 60 INJECTION, SOLUTION INTRAMUSCULAR at 10:08

## 2023-08-21 NOTE — PATIENT INSTRUCTIONS

## 2023-08-31 ENCOUNTER — EXTERNAL CHRONIC CARE MANAGEMENT (OUTPATIENT)
Dept: FAMILY MEDICINE | Facility: CLINIC | Age: 78
End: 2023-08-31
Payer: COMMERCIAL

## 2023-08-31 PROCEDURE — G0511 PR CHRONIC CARE MGMT, RHC OR FQHC ONLY, 20 MINS OR MORE: ICD-10-PCS | Mod: ,,, | Performed by: INTERNAL MEDICINE

## 2023-08-31 PROCEDURE — G0511 CCM/BHI BY RHC/FQHC 20MIN MO: HCPCS | Mod: ,,, | Performed by: INTERNAL MEDICINE

## 2023-09-05 ENCOUNTER — HOSPITAL ENCOUNTER (OUTPATIENT)
Dept: RADIOLOGY | Facility: HOSPITAL | Age: 78
Discharge: HOME | End: 2023-09-05
Attending: NURSE PRACTITIONER
Payer: COMMERCIAL

## 2023-09-05 ENCOUNTER — TELEPHONE (OUTPATIENT)
Dept: NEUROLOGY | Facility: CLINIC | Age: 78
End: 2023-09-05
Payer: COMMERCIAL

## 2023-09-05 DIAGNOSIS — R41.3 OTHER AMNESIA: ICD-10-CM

## 2023-09-05 PROCEDURE — 70551 MRI BRAIN WITHOUT CONTRAST: ICD-10-PCS | Mod: 26,,, | Performed by: RADIOLOGY

## 2023-09-05 PROCEDURE — 70551 MRI BRAIN STEM W/O DYE: CPT | Mod: 26,,, | Performed by: RADIOLOGY

## 2023-09-05 PROCEDURE — 70551 MRI BRAIN STEM W/O DYE: CPT | Mod: TC

## 2023-09-05 NOTE — TELEPHONE ENCOUNTER
Called and left message with MRI of the brain results as documented below.          ----- Message from Salvador Maki CMA sent at 9/5/2023  1:39 PM CDT -----    ----- Message -----  From: Tevin Oneill FNP  Sent: 9/5/2023  12:14 PM CDT  To: Salvador Maki CMA    No acute abnormalities noted on the MRI, no prior strokes

## 2023-09-12 ENCOUNTER — ANESTHESIA (OUTPATIENT)
Dept: PAIN MEDICINE | Facility: HOSPITAL | Age: 78
End: 2023-09-12
Payer: COMMERCIAL

## 2023-09-12 ENCOUNTER — ANESTHESIA EVENT (OUTPATIENT)
Dept: PAIN MEDICINE | Facility: HOSPITAL | Age: 78
End: 2023-09-12
Payer: COMMERCIAL

## 2023-09-12 ENCOUNTER — HOSPITAL ENCOUNTER (OUTPATIENT)
Facility: HOSPITAL | Age: 78
Discharge: HOME | End: 2023-09-12
Attending: PAIN MEDICINE | Admitting: PAIN MEDICINE
Payer: COMMERCIAL

## 2023-09-12 VITALS
WEIGHT: 178 LBS | RESPIRATION RATE: 10 BRPM | SYSTOLIC BLOOD PRESSURE: 147 MMHG | TEMPERATURE: 97 F | OXYGEN SATURATION: 100 % | DIASTOLIC BLOOD PRESSURE: 75 MMHG | HEIGHT: 68 IN | BODY MASS INDEX: 26.98 KG/M2 | HEART RATE: 50 BPM

## 2023-09-12 DIAGNOSIS — M54.17 LUMBOSACRAL RADICULOPATHY: ICD-10-CM

## 2023-09-12 PROCEDURE — 63600175 PHARM REV CODE 636 W HCPCS: Performed by: NURSE ANESTHETIST, CERTIFIED REGISTERED

## 2023-09-12 PROCEDURE — 25500020 PHARM REV CODE 255: Performed by: PAIN MEDICINE

## 2023-09-12 PROCEDURE — 62323 NJX INTERLAMINAR LMBR/SAC: CPT | Mod: ,,, | Performed by: PAIN MEDICINE

## 2023-09-12 PROCEDURE — 27000284 HC CANNULA NASAL: Performed by: NURSE ANESTHETIST, CERTIFIED REGISTERED

## 2023-09-12 PROCEDURE — 37000008 HC ANESTHESIA 1ST 15 MINUTES: Performed by: PAIN MEDICINE

## 2023-09-12 PROCEDURE — D9220A PRA ANESTHESIA: Mod: 23,,, | Performed by: NURSE ANESTHETIST, CERTIFIED REGISTERED

## 2023-09-12 PROCEDURE — 25000003 PHARM REV CODE 250: Performed by: PAIN MEDICINE

## 2023-09-12 PROCEDURE — 62323 PR INJ LUMBAR/SACRAL, W/IMAGING GUIDANCE: ICD-10-PCS | Mod: ,,, | Performed by: PAIN MEDICINE

## 2023-09-12 PROCEDURE — 25000003 PHARM REV CODE 250: Performed by: NURSE ANESTHETIST, CERTIFIED REGISTERED

## 2023-09-12 PROCEDURE — D9220A PRA ANESTHESIA: ICD-10-PCS | Mod: 23,,, | Performed by: NURSE ANESTHETIST, CERTIFIED REGISTERED

## 2023-09-12 PROCEDURE — 62323 NJX INTERLAMINAR LMBR/SAC: CPT | Performed by: PAIN MEDICINE

## 2023-09-12 PROCEDURE — 63600175 PHARM REV CODE 636 W HCPCS: Performed by: PAIN MEDICINE

## 2023-09-12 RX ORDER — LIDOCAINE HYDROCHLORIDE 20 MG/ML
INJECTION, SOLUTION EPIDURAL; INFILTRATION; INTRACAUDAL; PERINEURAL
Status: DISCONTINUED | OUTPATIENT
Start: 2023-09-12 | End: 2023-09-12

## 2023-09-12 RX ORDER — TRIAMCINOLONE ACETONIDE 40 MG/ML
INJECTION, SUSPENSION INTRA-ARTICULAR; INTRAMUSCULAR CODE/TRAUMA/SEDATION MEDICATION
Status: DISCONTINUED | OUTPATIENT
Start: 2023-09-12 | End: 2023-09-12 | Stop reason: HOSPADM

## 2023-09-12 RX ORDER — IOPAMIDOL 612 MG/ML
INJECTION, SOLUTION INTRATHECAL CODE/TRAUMA/SEDATION MEDICATION
Status: DISCONTINUED | OUTPATIENT
Start: 2023-09-12 | End: 2023-09-12 | Stop reason: HOSPADM

## 2023-09-12 RX ORDER — SODIUM CHLORIDE 9 MG/ML
INJECTION, SOLUTION INTRAVENOUS CONTINUOUS
Status: DISCONTINUED | OUTPATIENT
Start: 2023-09-12 | End: 2023-09-12 | Stop reason: HOSPADM

## 2023-09-12 RX ORDER — PROPOFOL 10 MG/ML
INJECTION, EMULSION INTRAVENOUS
Status: DISCONTINUED | OUTPATIENT
Start: 2023-09-12 | End: 2023-09-12

## 2023-09-12 RX ADMIN — SODIUM CHLORIDE: 9 INJECTION, SOLUTION INTRAVENOUS at 09:09

## 2023-09-12 RX ADMIN — PROPOFOL 100 MG: 10 INJECTION, EMULSION INTRAVENOUS at 09:09

## 2023-09-12 RX ADMIN — LIDOCAINE HYDROCHLORIDE 50 MG: 20 INJECTION, SOLUTION INTRAVENOUS at 09:09

## 2023-09-12 NOTE — PLAN OF CARE
No driving, operating machinery, making legal decisions, or signing legal documents until tomorrow.   Continue diet as tolerated. Drink plenty of fluids and rest.   If unable to void in 8 hours proceed to nearest ER.   Notify MD of redness or drainage from incision site as well as any fever over the next 3-4 days.  No lifting over 5 lbs for the next 24 hrs.   Continue medications as prescribed. May take pain medication as prescribed.   May shower tomorrow. No tub baths for 48 hours following procedure.   May remove bandaids tomorrow, if they fall off before tomorrow they do not have to be replaced.    Pain on admission was 8 . Pain after the procedure is 0. Pain on discharge is the ambulatory entrance. Left with her daughter.

## 2023-09-12 NOTE — ANESTHESIA POSTPROCEDURE EVALUATION
Anesthesia Post Evaluation    Patient: Lorraine De Jesus    Procedure(s) Performed: Procedure(s) (LRB):  Injection, Steroid, Epidural, L5/S1 (N/A)    Final Anesthesia Type: general      Patient location during evaluation: GI PACU  Patient participation: Yes- Able to Participate  Level of consciousness: awake and alert  Post-procedure vital signs: reviewed and stable  Pain management: adequate  Airway patency: patent    PONV status at discharge: No PONV  Anesthetic complications: no      Cardiovascular status: blood pressure returned to baseline  Respiratory status: unassisted  Hydration status: euvolemic  Follow-up not needed.          Vitals Value Taken Time   /68 09/12/23 0922   Temp 36.1 °C (97 °F) 09/12/23 0914   Pulse 81 09/12/23 0922   Resp 13 09/12/23 0922   SpO2 100 % 09/12/23 0922   Vitals shown include unvalidated device data.      No case tracking events are documented in the log.      Pain/Toby Score: Toby Score: 8 (9/12/2023  9:15 AM)

## 2023-09-12 NOTE — OP NOTE
"Procedure Note    Procedure Date: 9/12/2023    Procedure Performed:  Lumbar interlaminar epidural steroid injection under fluoroscopy at L5-S1    Indications: Patient failed conservative therapy.      Pre-op diagnosis: Lumbar Radiculopathy    Post-op diagnosis: same    Physician: Belkis Brown MD    Anesthesia: MAC    Medications injected: Kenalog 40mg,  2 mL sterile preservative-free normal saline.    Local anesthetic used: 1% Lidocaine, 3 ml    Estimated Blood Loss:  None    Complications:  None    Technique:  The patient was interviewed in the holding area and Risks/Benefits were discussed, including, but not limited to, the possibility of new or different pain, bleeding or infection.   All questions were answered.  The patient understood and accepted risks.  Consent was verified and signed.   A time-out was taken to identify patient and procedure prior to starting the procedure. The patient was placed in the prone position on the fluoroscopy table. The area of the lumbar spine was prepped with Chloraprep and draped in a sterile manner. The L5-S1  interspace was identified and marked under AP fluoroscopy. The skin and subcutaneous tissues overlying the targeted interspace were anesthetized with 3-5 mL of 1% lidocaine using a 25G 1.5" needle.  A 20G  3.5" Tuohy epidural needle was directed toward the interspace under fluoroscopic guidance until the ligamentum flavum was engaged. From this point, a loss of resistance technique with a pulsator syringe a was used to identify entrance of the needle into the epidural space. Once loss of resistance was observed 3mL of Isovue contrast solution was injected. An appropriate epidurogram was noted.  A 3mL mixture consisting of saline and 40 mg of kenalog was injected slowly and without resistance.  The needle was  removed and a sterile Band-Aid dressing was applied to the puncture site.  The patient tolerated the procedure well and was transferred to the .AC. in stable " Left Message asking Patient to call us back regarding her labs.    emailed   condition.  The patient was monitored after the procedure and was given post-procedure and discharge instructions to follow at home. The patient was discharged in a stable condition and accompanied by an adult .    Epidurogram:5 mL allotment of Isovue M 300 contrast revealed excellent delineation from L4-S1. There were no filling defects or obstruction to dye flow noted.  There was no  intravascular or intrathecal spread noted with dye flow.

## 2023-09-12 NOTE — DISCHARGE SUMMARY
The  Discharge Note  Short Stay    Admit Date: 9/12/2023    Discharge Date: 9/12/2023    Attending Physician: Belkis Brown     Discharge Provider: Belkis Brown    Diagnosis:  Lumbosacral radiculopathy      Procedure performed:  L5-S1 epidural steroid injection and epidurogram under fluoroscopy    Findings: Procedure tolerated well and without complications. Consistent with diagnosis.    EBL: 0cc    Specimens: None    Discharged Condition: Good    Final Diagnoses: Lumbosacral radiculopathy [M54.17]    Disposition: Home or Self Care    Hospital Course: No complications, uneventful    Outcome of Hospitalization, Treatment, Procedure, or Surgery:  Patient was admitted for outpatient interventional pain management procedure. The patient tolerated the procedure well with no complications.    Follow up/Patient Instructions:  Follow up as scheduled in Pain Management office in 3-4 weeks.  Patient has received instructions and follow up date and time.    Medications:  Continue previous medications

## 2023-09-12 NOTE — TRANSFER OF CARE
"Anesthesia Transfer of Care Note    Patient: Lorraine De Jesus    Procedure(s) Performed: Procedure(s) (LRB):  Injection, Steroid, Epidural, L5/S1 (N/A)    Patient location: PACU    Anesthesia Type: general    Transport from OR: Transported from OR on room air with adequate spontaneous ventilation    Post pain: adequate analgesia    Post assessment: no apparent anesthetic complications    Post vital signs: stable    Level of consciousness: sedated    Nausea/Vomiting: no nausea/vomiting    Complications: none    Transfer of care protocol was followed      Last vitals:   Visit Vitals  /81   Pulse 83   Temp 36.1 °C (97 °F) (Skin)   Resp 19   Ht 5' 8" (1.727 m)   Wt 80.7 kg (178 lb)   LMP  (LMP Unknown)   SpO2 95%   BMI 27.06 kg/m²     "

## 2023-09-18 NOTE — PROGRESS NOTES
Lorraine De Jesus presented for a  Medicare AWV and comprehensive Health Risk Assessment today. She is an established pt of Dr. Chen. The pt reports she is doing well and offers no complaints.     The following components were reviewed and updated:    Medical history  Family History  Social history  Allergies and Current Medications  Health Risk Assessment  Health Maintenance  Care Team         ** See Completed Assessments for Annual Wellness Visit within the encounter summary.**           The following assessments were completed:  Living Situation  CAGE  Depression Screening  Timed Get Up and Go  Whisper Test  Cognitive Function Screening  Nutrition Screening  ADL Screening  PAQ Screening        There were no vitals filed for this visit.  There is no height or weight on file to calculate BMI.  Physical Exam  Constitutional:       General: She is not in acute distress.     Appearance: Normal appearance.   HENT:      Head: Normocephalic.      Mouth/Throat:      Mouth: Mucous membranes are moist.   Cardiovascular:      Rate and Rhythm: Normal rate and regular rhythm.      Pulses: Normal pulses.      Heart sounds: Normal heart sounds. No murmur heard.  Pulmonary:      Effort: No respiratory distress.      Breath sounds: Normal breath sounds. No wheezing, rhonchi or rales.   Abdominal:      Palpations: Abdomen is soft.      Tenderness: There is no abdominal tenderness.   Musculoskeletal:         General: Normal range of motion.      Cervical back: Neck supple.      Right lower leg: No edema.      Left lower leg: No edema.   Skin:     General: Skin is warm and dry.   Neurological:      Mental Status: She is alert and oriented to person, place, and time.   Psychiatric:         Mood and Affect: Mood normal.         Behavior: Behavior normal.           Diagnoses and health risks identified today and associated recommendations/orders:    1. Encounter for subsequent annual wellness visit (AWV) in Medicare patient      2.  Essential hypertension      3. Type 2 diabetes mellitus without complication, without long-term current use of insulin      4. Hyperlipidemia, unspecified hyperlipidemia type      5. Gastroesophageal reflux disease, unspecified whether esophagitis present      6. Lumbosacral radiculopathy        Provided Lorraine with a 5-10 year written screening schedule and personal prevention plan. Recommendations were developed using the USPSTF age appropriate recommendations. Education, counseling, and referrals were provided as needed. After Visit Summary printed and given to patient which includes a list of additional screenings\tests needed.    No follow-ups on file.    BASIA MCCANN RN      I offered to discuss advanced care planning, including how to pick a person who would make decisions for you if you were unable to make them for yourself, called a health care power of , and what kind of decisions you might make such as use of life sustaining treatments such as ventilators and tube feeding when faced with a life limiting illness recorded on a living will that they will need to know. (How you want to be cared for as you near the end of your natural life)    Tetanus vaccine - declined, Shingles vaccine - declined, Influenza vaccine - declined, Pneumonia vaccine - declined and diabetic urine screen drawn this visit   X Patient is interested in learning more about how to make advanced directives.  I provided them paperwork and offered to discuss this with them.

## 2023-09-19 ENCOUNTER — OFFICE VISIT (OUTPATIENT)
Dept: FAMILY MEDICINE | Facility: CLINIC | Age: 78
End: 2023-09-19
Payer: COMMERCIAL

## 2023-09-19 VITALS
WEIGHT: 178 LBS | SYSTOLIC BLOOD PRESSURE: 134 MMHG | RESPIRATION RATE: 16 BRPM | TEMPERATURE: 98 F | HEART RATE: 78 BPM | DIASTOLIC BLOOD PRESSURE: 72 MMHG | OXYGEN SATURATION: 99 % | BODY MASS INDEX: 26.98 KG/M2 | HEIGHT: 68 IN

## 2023-09-19 DIAGNOSIS — K21.9 GASTROESOPHAGEAL REFLUX DISEASE, UNSPECIFIED WHETHER ESOPHAGITIS PRESENT: ICD-10-CM

## 2023-09-19 DIAGNOSIS — Z00.00 ENCOUNTER FOR SUBSEQUENT ANNUAL WELLNESS VISIT (AWV) IN MEDICARE PATIENT: Primary | ICD-10-CM

## 2023-09-19 DIAGNOSIS — E11.9 TYPE 2 DIABETES MELLITUS WITHOUT COMPLICATION, WITHOUT LONG-TERM CURRENT USE OF INSULIN: ICD-10-CM

## 2023-09-19 DIAGNOSIS — Z00.00 ENCOUNTER FOR PREVENTIVE HEALTH EXAMINATION: ICD-10-CM

## 2023-09-19 DIAGNOSIS — I10 ESSENTIAL HYPERTENSION: ICD-10-CM

## 2023-09-19 DIAGNOSIS — E78.5 HYPERLIPIDEMIA, UNSPECIFIED HYPERLIPIDEMIA TYPE: ICD-10-CM

## 2023-09-19 DIAGNOSIS — M54.17 LUMBOSACRAL RADICULOPATHY: Chronic | ICD-10-CM

## 2023-09-19 PROCEDURE — 1126F AMNT PAIN NOTED NONE PRSNT: CPT | Mod: ,,, | Performed by: NURSE PRACTITIONER

## 2023-09-19 PROCEDURE — 1159F MED LIST DOCD IN RCRD: CPT | Mod: ,,, | Performed by: NURSE PRACTITIONER

## 2023-09-19 PROCEDURE — 1170F FXNL STATUS ASSESSED: CPT | Mod: ,,, | Performed by: NURSE PRACTITIONER

## 2023-09-19 PROCEDURE — 1126F PR PAIN SEVERITY QUANTIFIED, NO PAIN PRESENT: ICD-10-PCS | Mod: ,,, | Performed by: NURSE PRACTITIONER

## 2023-09-19 PROCEDURE — 3075F PR MOST RECENT SYSTOLIC BLOOD PRESS GE 130-139MM HG: ICD-10-PCS | Mod: ,,, | Performed by: NURSE PRACTITIONER

## 2023-09-19 PROCEDURE — 1159F PR MEDICATION LIST DOCUMENTED IN MEDICAL RECORD: ICD-10-PCS | Mod: ,,, | Performed by: NURSE PRACTITIONER

## 2023-09-19 PROCEDURE — 3288F PR FALLS RISK ASSESSMENT DOCUMENTED: ICD-10-PCS | Mod: ,,, | Performed by: NURSE PRACTITIONER

## 2023-09-19 PROCEDURE — 1170F PR FUNCTIONAL STATUS ASSESSED: ICD-10-PCS | Mod: ,,, | Performed by: NURSE PRACTITIONER

## 2023-09-19 PROCEDURE — 1160F RVW MEDS BY RX/DR IN RCRD: CPT | Mod: ,,, | Performed by: NURSE PRACTITIONER

## 2023-09-19 PROCEDURE — 3078F DIAST BP <80 MM HG: CPT | Mod: ,,, | Performed by: NURSE PRACTITIONER

## 2023-09-19 PROCEDURE — G0439 PPPS, SUBSEQ VISIT: HCPCS | Mod: ,,, | Performed by: NURSE PRACTITIONER

## 2023-09-19 PROCEDURE — 1160F PR REVIEW ALL MEDS BY PRESCRIBER/CLIN PHARMACIST DOCUMENTED: ICD-10-PCS | Mod: ,,, | Performed by: NURSE PRACTITIONER

## 2023-09-19 PROCEDURE — 1101F PT FALLS ASSESS-DOCD LE1/YR: CPT | Mod: ,,, | Performed by: NURSE PRACTITIONER

## 2023-09-19 PROCEDURE — 3075F SYST BP GE 130 - 139MM HG: CPT | Mod: ,,, | Performed by: NURSE PRACTITIONER

## 2023-09-19 PROCEDURE — 3288F FALL RISK ASSESSMENT DOCD: CPT | Mod: ,,, | Performed by: NURSE PRACTITIONER

## 2023-09-19 PROCEDURE — 3078F PR MOST RECENT DIASTOLIC BLOOD PRESSURE < 80 MM HG: ICD-10-PCS | Mod: ,,, | Performed by: NURSE PRACTITIONER

## 2023-09-19 PROCEDURE — G0439 PR MEDICARE ANNUAL WELLNESS SUBSEQUENT VISIT: ICD-10-PCS | Mod: ,,, | Performed by: NURSE PRACTITIONER

## 2023-09-19 PROCEDURE — 1101F PR PT FALLS ASSESS DOC 0-1 FALLS W/OUT INJ PAST YR: ICD-10-PCS | Mod: ,,, | Performed by: NURSE PRACTITIONER

## 2023-09-19 NOTE — PATIENT INSTRUCTIONS
The following information is provided to all patients.  This information is to help you find resources for any of the problems found today that may be affecting your health:                Living healthy guide: www.[facility].[email or web address].[facility]      Understanding Diabetes: www.diabetes.org      Eating healthy: www.cdc.gov/healthyweight      CDC home safety checklist: www.cdc.gov/steadi/patient.html      Agency on Aging: www.goea.[place].[facility]      Alcoholics anonymous (AA): www.aa.org      Physical Activity: www.toni.nih.gov/sv6kpvh      Tobacco use: www.quitwithusla.org

## 2023-09-20 ENCOUNTER — OFFICE VISIT (OUTPATIENT)
Dept: NEUROLOGY | Facility: CLINIC | Age: 78
End: 2023-09-20
Payer: COMMERCIAL

## 2023-09-20 VITALS
HEART RATE: 57 BPM | OXYGEN SATURATION: 97 % | SYSTOLIC BLOOD PRESSURE: 126 MMHG | RESPIRATION RATE: 18 BRPM | DIASTOLIC BLOOD PRESSURE: 74 MMHG | WEIGHT: 178 LBS | BODY MASS INDEX: 26.98 KG/M2 | HEIGHT: 68 IN

## 2023-09-20 DIAGNOSIS — F09 MILD COGNITIVE DISORDER: Primary | ICD-10-CM

## 2023-09-20 DIAGNOSIS — E11.40 DIABETIC NEUROPATHY WITH NEUROLOGIC COMPLICATION: Chronic | ICD-10-CM

## 2023-09-20 DIAGNOSIS — E11.49 DIABETIC NEUROPATHY WITH NEUROLOGIC COMPLICATION: Chronic | ICD-10-CM

## 2023-09-20 PROCEDURE — 99213 OFFICE O/P EST LOW 20 MIN: CPT | Mod: S$PBB,,, | Performed by: NURSE PRACTITIONER

## 2023-09-20 PROCEDURE — 3288F PR FALLS RISK ASSESSMENT DOCUMENTED: ICD-10-PCS | Mod: CPTII,,, | Performed by: NURSE PRACTITIONER

## 2023-09-20 PROCEDURE — 1159F PR MEDICATION LIST DOCUMENTED IN MEDICAL RECORD: ICD-10-PCS | Mod: CPTII,,, | Performed by: NURSE PRACTITIONER

## 2023-09-20 PROCEDURE — 1101F PT FALLS ASSESS-DOCD LE1/YR: CPT | Mod: CPTII,,, | Performed by: NURSE PRACTITIONER

## 2023-09-20 PROCEDURE — 3078F DIAST BP <80 MM HG: CPT | Mod: CPTII,,, | Performed by: NURSE PRACTITIONER

## 2023-09-20 PROCEDURE — 1159F MED LIST DOCD IN RCRD: CPT | Mod: CPTII,,, | Performed by: NURSE PRACTITIONER

## 2023-09-20 PROCEDURE — 3288F FALL RISK ASSESSMENT DOCD: CPT | Mod: CPTII,,, | Performed by: NURSE PRACTITIONER

## 2023-09-20 PROCEDURE — 3078F PR MOST RECENT DIASTOLIC BLOOD PRESSURE < 80 MM HG: ICD-10-PCS | Mod: CPTII,,, | Performed by: NURSE PRACTITIONER

## 2023-09-20 PROCEDURE — 1101F PR PT FALLS ASSESS DOC 0-1 FALLS W/OUT INJ PAST YR: ICD-10-PCS | Mod: CPTII,,, | Performed by: NURSE PRACTITIONER

## 2023-09-20 PROCEDURE — 99213 PR OFFICE/OUTPT VISIT, EST, LEVL III, 20-29 MIN: ICD-10-PCS | Mod: S$PBB,,, | Performed by: NURSE PRACTITIONER

## 2023-09-20 PROCEDURE — 1126F PR PAIN SEVERITY QUANTIFIED, NO PAIN PRESENT: ICD-10-PCS | Mod: CPTII,,, | Performed by: NURSE PRACTITIONER

## 2023-09-20 PROCEDURE — 1160F RVW MEDS BY RX/DR IN RCRD: CPT | Mod: CPTII,,, | Performed by: NURSE PRACTITIONER

## 2023-09-20 PROCEDURE — 99215 OFFICE O/P EST HI 40 MIN: CPT | Mod: PBBFAC | Performed by: NURSE PRACTITIONER

## 2023-09-20 PROCEDURE — 1126F AMNT PAIN NOTED NONE PRSNT: CPT | Mod: CPTII,,, | Performed by: NURSE PRACTITIONER

## 2023-09-20 PROCEDURE — 1160F PR REVIEW ALL MEDS BY PRESCRIBER/CLIN PHARMACIST DOCUMENTED: ICD-10-PCS | Mod: CPTII,,, | Performed by: NURSE PRACTITIONER

## 2023-09-20 PROCEDURE — 3074F PR MOST RECENT SYSTOLIC BLOOD PRESSURE < 130 MM HG: ICD-10-PCS | Mod: CPTII,,, | Performed by: NURSE PRACTITIONER

## 2023-09-20 PROCEDURE — 3074F SYST BP LT 130 MM HG: CPT | Mod: CPTII,,, | Performed by: NURSE PRACTITIONER

## 2023-09-20 NOTE — PATIENT INSTRUCTIONS
Continue current medications and plan of care  Encouraged good eating and sleep habits  Followup in one year for recheck, sooner if symptoms worsening

## 2023-09-20 NOTE — PROGRESS NOTES
Subjective:       Patient ID: Lorraine De Jesus is a 78 y.o. female     Chief Complaint:    Chief Complaint   Patient presents with    Follow up        Allergies:  Lipitor [atorvastatin] and Pravachol [pravastatin]    Current Medications:    Outpatient Encounter Medications as of 9/20/2023   Medication Sig Dispense Refill    amLODIPine (NORVASC) 5 MG tablet Take 5 mg by mouth.      blood sugar diagnostic Strp 1 strip by Misc.(Non-Drug; Combo Route) route 3 (three) times daily. 200 strip 3    blood-glucose meter kit Use as instructed 1 each 0    cloNIDine (CATAPRES) 0.1 MG tablet TAKE 1 TABLET BY MOUTH EVERY 4 HOURS AS NEEDED FOR BLOOD PRESSURE GREATER THAN 170/90      ezetimibe (ZETIA) 10 mg tablet TAKE 1 TABLET(10 MG) BY MOUTH EVERY DAY 90 tablet 3    gabapentin (NEURONTIN) 400 MG capsule TAKE 1 CAPSULE(400 MG) BY MOUTH EVERY 8 HOURS 90 capsule 0    glipiZIDE (GLUCOTROL) 10 MG TR24 Take 1 tablet (10 mg total) by mouth 2 (two) times a day. 180 tablet 1    HYDROcodone-acetaminophen (NORCO)  mg per tablet Take 1 tablet by mouth every 6 (six) hours as needed for Pain. 120 tablet 0    lancets (ACCU-CHEK FASTCLIX LANCET DRUM [facility] [place]) Take 1 each by mouth once daily.      levothyroxine (SYNTHROID) 75 MCG tablet Take 1 tablet (75 mcg total) by mouth before breakfast. 90 tablet 1    ONETOUCH DELICA PLUS LANCET 33 gauge Misc Check blood sugar  each 11    ONETOUCH VERIO TEST STRIPS Strp TEST BLOOD GLUCOSE LEVELS TWICE DAILY 200 strip 4    oxybutynin (DITROPAN-XL) 5 MG TR24 Take one tablet at night 90 tablet 3    aspirin (ECOTRIN) 81 MG EC tablet Take 81 mg by mouth once daily.      HYDROcodone-acetaminophen (NORCO)  mg per tablet Take 1 tablet by mouth every 6 (six) hours as needed for Pain. (Patient not taking: Reported on 9/19/2023) 120 tablet 0    HYDROcodone-acetaminophen (NORCO)  mg per tablet Take 1 tablet by mouth every 6 (six) hours as needed for Pain. (Patient not taking: Reported on 9/20/2023)  120 tablet 0    olmesartan (BENICAR) 5 MG Tab Take 10 mg by mouth once daily.       No facility-administered encounter medications on file as of 9/20/2023.       History of Present Illness  79 y/o female following in neurology for reported cognitive impairment.    She has PMH of diabetes, chronic pain followed in pain treatment here for lower back pain.    She is on neurontin, norco.  Symptoms of simple conversation forgetful, turning down wrong road, but no getting lost, is able to perform all her ADLs without complication.  No family history of alzheimer's dementia.    Her MMSE was 28/30.  I obtained labs which were not revealing, her MRI did not indicate any acute findings, no prior CVA.    No prior sleep study    Symptoms essentially steady, denies worsening.  I suggest yearly followup and repeat MMSE, no medications recommended at this time           Review of Systems  Review of Systems   Constitutional:  Negative for diaphoresis and fever.   HENT:  Negative for congestion, hearing loss and tinnitus.    Eyes:  Negative for blurred vision, double vision, photophobia, discharge and redness.   Respiratory:  Negative for cough and shortness of breath.    Cardiovascular:  Negative for chest pain.   Gastrointestinal:  Negative for abdominal pain, nausea and vomiting.   Musculoskeletal:  Negative for back pain, joint pain, myalgias and neck pain.   Skin:  Negative for itching and rash.   Neurological:  Positive for tingling and sensory change. Negative for dizziness, tremors, speech change, focal weakness, seizures, loss of consciousness, weakness and headaches.   Psychiatric/Behavioral:  Positive for memory loss. Negative for depression and hallucinations. The patient does not have insomnia.    All other systems reviewed and are negative.     Objective:     NEUROLOGICAL EXAMINATION:     MENTAL STATUS   Oriented to person, place, and time.   Registration: recalls 3 of 3 objects. Recall at 5 minutes: recalls 2 of 3  objects.   Attention: normal. Concentration: normal.   Speech: speech is normal   Level of consciousness: alert  Knowledge: good and consistent with education.   Normal comprehension.     CRANIAL NERVES     CN II   Visual fields full to confrontation.   Visual acuity: normal  Right visual field deficit: none  Left visual field deficit: none     CN III, IV, VI   Pupils are equal, round, and reactive to light.  Extraocular motions are normal.   Right pupil: Size: 3 mm. Shape: regular. Reactivity: brisk. Consensual response: intact. Accommodation: intact.   Left pupil: Size: 3 mm. Shape: regular. Reactivity: brisk. Consensual response: intact. Accommodation: intact.   CN III: no CN III palsy  CN VI: no CN VI palsy  Nystagmus: none   Diplopia: none  Upgaze: normal  Downgaze: normal  Conjugate gaze: present  Vestibulo-ocular reflex: present    CN V   Facial sensation intact.   Right facial sensation deficit: none  Left facial sensation deficit: none  Right corneal reflex: normal  Left corneal reflex: normal    CN VII   Facial expression full, symmetric.   Right facial weakness: none  Left facial weakness: none  Right taste: normal  Left taste: normal    CN VIII   CN VIII normal.   Hearing: intact    CN IX, X   CN IX normal.   CN X normal.   Palate: symmetric    CN XI   CN XI normal.   Right sternocleidomastoid strength: normal  Left sternocleidomastoid strength: normal  Right trapezius strength: normal  Left trapezius strength: normal    CN XII   CN XII normal.   Tongue: not atrophic  Fasciculations: absent  Tongue deviation: none    MOTOR EXAM   Muscle bulk: normal  Overall muscle tone: normal  Right arm tone: normal  Left arm tone: normal  Right arm pronator drift: absent  Left arm pronator drift: absent  Right leg tone: normal  Left leg tone: normal    Strength   Right neck flexion: 5/5  Left neck flexion: 5/5  Right neck extension: 5/5  Left neck extension: 5/5  Right deltoid: 5/5  Left deltoid: 5/5  Right biceps:    Left biceps:   Right triceps:   Left triceps:   Right wrist flexion:   Left wrist flexion:   Right wrist extension:   Left wrist extension:   Right interossei:   Left interossei:   Right iliopsoas:   Left iliopsoas:   Right quadriceps:   Left quadriceps:   Right hamstrin/5  Left hamstrin/5  Right anterior tibial:   Left anterior tibial:   Right posterior tibial:   Left posterior tibial:   Right gastroc:   Left gastroc:     REFLEXES     Reflexes   Right brachioradialis: 2+  Left brachioradialis: 2+  Right biceps: 2+  Left biceps: 2+  Right triceps: 2+  Left triceps: 2+  Right patellar: 2+  Left patellar: 2+  Right achilles: 2+  Left achilles: 2+  Right plantar: normal  Left plantar: normal  Right [place]: absent  Left [place]: absent  Right ankle clonus: absent  Left ankle clonus: absent  Right pendular knee jerk: absent  Left pendular knee jerk: absent    SENSORY EXAM   Right arm light touch: normal  Left arm light touch: normal  Right leg light touch: decreased from toes  Left leg light touch: decreased from toes  Right arm vibration: normal  Left arm vibration: normal  Right leg vibration: decreased from toes  Left leg vibration: decreased from toes  Right arm proprioception: normal  Left arm proprioception: normal  Right leg proprioception: decreased from toes  Left leg proprioception: decreased from toes  Right arm pinprick: normal  Left arm pinprick: normal  Right leg pinprick: decreased from toes  Left leg pinprick: decreased from toes    GAIT AND COORDINATION     Gait  Gait: normal     Coordination   Finger to nose coordination: normal  Heel to shin coordination: normal  Tandem walking coordination: normal    Tremor   Resting tremor: absent  Intention tremor: absent  Action tremor: absent       Physical Exam  Vitals and nursing note reviewed.   Constitutional:       Appearance: Normal appearance.   HENT:      Head: Normocephalic.   Eyes:       Extraocular Movements: Extraocular movements intact and EOM normal.      Pupils: Pupils are equal, round, and reactive to light.   Cardiovascular:      Rate and Rhythm: Normal rate and regular rhythm.   Pulmonary:      Effort: Pulmonary effort is normal.      Breath sounds: Normal breath sounds.   Musculoskeletal:         General: No swelling or tenderness. Normal range of motion.      Cervical back: Normal range of motion and neck supple.      Right lower leg: No edema.      Left lower leg: No edema.   Skin:     General: Skin is warm and dry.      Coloration: Skin is not jaundiced.      Findings: No rash.   Neurological:      General: No focal deficit present.      Mental Status: She is alert and oriented to person, place, and time.      GCS: GCS eye subscore is 4. GCS verbal subscore is 5. GCS motor subscore is 6.      Cranial Nerves: No cranial nerve deficit.      Sensory: Sensory deficit present.      Motor: Motor function is intact. No weakness.      Coordination: Coordination is intact. Coordination normal. Finger-Nose-Finger Test and Heel to Shin Test normal.      Gait: Gait is intact. Gait and tandem walk normal.      Deep Tendon Reflexes: Reflexes normal.      Reflex Scores:       Tricep reflexes are 2+ on the right side and 2+ on the left side.       Bicep reflexes are 2+ on the right side and 2+ on the left side.       Brachioradialis reflexes are 2+ on the right side and 2+ on the left side.       Patellar reflexes are 2+ on the right side and 2+ on the left side.       Achilles reflexes are 2+ on the right side and 2+ on the left side.  Psychiatric:         Mood and Affect: Mood normal.         Speech: Speech normal.         Behavior: Behavior normal.          Assessment:     Problem List Items Addressed This Visit          Neuro    Diabetic neuropathy with neurologic complication (Chronic)    Mild cognitive disorder - Primary          Primary Diagnosis and ICD10  Mild cognitive disorder  [F09]    Plan:     Patient Instructions   Continue current medications and plan of care  Encouraged good eating and sleep habits  Followup in one year for recheck, sooner if symptoms worsening    There are no discontinued medications.      Requested Prescriptions      No prescriptions requested or ordered in this encounter       No orders of the defined types were placed in this encounter.

## 2023-09-25 NOTE — PROGRESS NOTES
Subjective:         Patient ID: Lorraine De Jesus is a 78 y.o. female.    Chief Complaint: Low-back Pain, Leg Pain, and Foot Pain      Pain  This is a chronic problem. The current episode started more than 1 year ago. The problem occurs daily. The problem has been gradually improving. Associated symptoms include arthralgias and neck pain. Pertinent negatives include no anorexia, chest pain, chills, coughing, fever, sore throat, vertigo or vomiting.     Review of Systems   Constitutional:  Negative for activity change, appetite change, chills, fever and unexpected weight change.   HENT:  Negative for drooling, ear discharge, ear pain, facial swelling, nosebleeds, sore throat, trouble swallowing, voice change and goiter.    Eyes:  Negative for photophobia, pain, discharge, redness and visual disturbance.   Respiratory:  Negative for apnea, cough, choking, chest tightness, shortness of breath, wheezing and stridor.    Cardiovascular:  Negative for chest pain, palpitations and leg swelling.   Gastrointestinal:  Negative for abdominal distention, anorexia, diarrhea, rectal pain, vomiting and fecal incontinence.   Endocrine: Negative for cold intolerance, heat intolerance, polydipsia, polyphagia and polyuria.   Genitourinary:  Negative for bladder incontinence, dysuria, flank pain, frequency and hot flashes.   Musculoskeletal:  Positive for arthralgias, back pain, leg pain and neck pain.   Integumentary:  Negative for color change and pallor.   Allergic/Immunologic: Negative for immunocompromised state.   Neurological:  Negative for dizziness, vertigo, seizures, syncope, facial asymmetry, speech difficulty, light-headedness, memory loss and coordination difficulties.   Hematological:  Negative for adenopathy. Does not bruise/bleed easily.   Psychiatric/Behavioral:  Negative for agitation, behavioral problems, confusion, decreased concentration, dysphoric mood, hallucinations, self-injury and suicidal ideas. The patient is  not nervous/anxious and is not hyperactive.            Past Medical History:   Diagnosis Date    Adenomatous polyp of ascending colon 2021    Adenomatous polyp of descending colon 2021    Age related osteoporosis 10/28/2020    Benign paroxysmal vertigo     Chronic pain syndrome     Chronic pelvic pain in female 2021    Ditropan XL 5mg    Colon, diverticulosis 2021    Depressive disorder 2019    External hemorrhoid 2021    Gastrointestinal hemorrhage associated with anorectal source 2021    GERD (gastroesophageal reflux disease) 2013    Hyperlipidemia 2012    Hypothyroidism 2019    Joint pain     Nonrheumatic tricuspid (valve) insufficiency 2018    Personal history UTI 2021    Controlled on Hiprex    Polyneuropathy 2018    PVD (peripheral vascular disease) 10/30/2018    Temporal arteritis     Type 2 diabetes mellitus 2014    Urethral meatal stenosis 2018    history of known urethral stenosis, and was taught to perform monthly self dilation at home.     Past Surgical History:   Procedure Laterality Date     left lumbar sympathetic nerve block Left 2020    X3  DR BROWN    CARDIAC CATHETERIZATION  10/14/2009     SECTION      x 2    COLONOSCOPY  2009    CYSTOSCOPY WITH HYDRODISTENSION OF BLADDER N/A 2021    Procedure: CYSTOSCOPY, WITH BLADDER HYDRODISTENSION;  Surgeon: Dequan Recio MD;  Location: [facility];  Service: Urology;  Laterality: N/A;    CYSTOSCOPY WITH URETHRAL DILATION  2021    Dr Recio    EPIDURAL STEROID INJECTION N/A 10/27/2022    Procedure: Injection, Steroid, Epidural, L5/S1;  Surgeon: Belkis Brown MD;  Location: [facility];  Service: Pain Management;  Laterality: N/A;    EPIDURAL STEROID INJECTION N/A 2023    Procedure: Injection, Steroid, Epidural, L5/S1;  Surgeon: Belkis Brown MD;  Location: [facility];  Service: Pain Management;  Laterality: N/A;     HYSTERECTOMY  1980's    LUMBAR SYMPATHETIC NERVE BLOCK Left 10/05/2020    Left Sympathetic Nerve Block - Dr Brown - 10/5/20, 9/21/20, 1/20/20. 1/6/20, 10/9/19, 9/25/19 and 7/11/18    right lumbar sympathetic nerve block Right 2020    X4 DR BROWN    THYROIDECTOMY      1990's     Social History     Socioeconomic History    Marital status:    Tobacco Use    Smoking status: Never     Passive exposure: Never    Smokeless tobacco: Never   Substance and Sexual Activity    Alcohol use: Never    Drug use: Never    Sexual activity: Not Currently     Social Determinants of Health     Financial Resource Strain: Low Risk  (9/19/2023)    Overall Financial Resource Strain (CARDIA)     Difficulty of Paying Living Expenses: Not very hard   Food Insecurity: No Food Insecurity (9/19/2023)    Hunger Vital Sign     Worried About Running Out of Food in the Last Year: Never true     Ran Out of Food in the Last Year: Never true   Transportation Needs: No Transportation Needs (9/19/2023)    PRAPARE - Transportation     Lack of Transportation (Medical): No     Lack of Transportation (Non-Medical): No   Physical Activity: Sufficiently Active (9/19/2023)    Exercise Vital Sign     Days of Exercise per Week: 4 days     Minutes of Exercise per Session: 60 min   Stress: No Stress Concern Present (9/19/2023)    [employer] [place] [employer] - Occupational Stress Questionnaire     Feeling of Stress : Not at all   Social Connections: Moderately Integrated (9/19/2023)    Social Connection and Isolation Panel [NHANES]     Frequency of Communication with Friends and Family: Three times a week     Frequency of Social Gatherings with Friends and Family: Twice a week     Attends [employer] Services: More than 4 times per year     Active Member of Clubs or Organizations: Yes     Attends Club or Organization Meetings: More than 4 times per year     Marital Status:    Housing Stability: Low Risk  (9/19/2023)    Housing Stability  "Vital Sign     Unable to Pay for Housing in the Last Year: No     Number of Places Lived in the Last Year: 1     Unstable Housing in the Last Year: No     Family History   Problem Relation Age of Onset    Cancer Mother     Hypertension Mother     Cancer Father     Cancer Sister     Diabetes Sister     Hyperlipidemia Sister     Hypertension Sister     Cancer Brother     Diabetes Sister     No Known Problems Sister     Cancer Brother     No Known Problems Brother     Cancer Brother      Review of patient's allergies indicates:   Allergen Reactions    Lipitor [atorvastatin] Other (See Comments)     Muscle aching    Pravachol [pravastatin] Other (See Comments)     Muscle aching        Objective:  Vitals:    09/26/23 0842   BP: (!) 150/83   Pulse: (!) 59   Resp: 18   Weight: 81.6 kg (180 lb)   Height: 5' 8" (1.727 m)   PainSc:   8         Physical Exam  Vitals and nursing note reviewed. Exam conducted with a chaperone present.   Constitutional:       General: She is awake. She is not in acute distress.     Appearance: Normal appearance. She is not ill-appearing or diaphoretic.   HENT:      Head: Normocephalic and atraumatic.      Nose: Nose normal.      Mouth/Throat:      Mouth: Mucous membranes are moist.      Pharynx: Oropharynx is clear.   Eyes:      Conjunctiva/sclera: Conjunctivae normal.      Pupils: Pupils are equal, round, and reactive to light.   Cardiovascular:      Rate and Rhythm: Normal rate.   Pulmonary:      Effort: Pulmonary effort is normal. No respiratory distress.   Abdominal:      Palpations: Abdomen is soft.   Musculoskeletal:      Cervical back: Normal range of motion and neck supple.      Thoracic back: Tenderness present.      Lumbar back: Tenderness present. Decreased range of motion.   Skin:     General: Skin is warm and dry.   Neurological:      General: No focal deficit present.      Mental Status: She is alert and oriented to person, place, and time. Mental status is at baseline.      Cranial " Nerves: No cranial nerve deficit (II-XII).   Psychiatric:         Mood and Affect: Mood normal.         Behavior: Behavior normal. Behavior is cooperative.         Thought Content: Thought content normal.           [place] Fluoro for Pain Management  See OP Notes for results.     IMPRESSION: See OP Notes for results.     This procedure was auto-finalized by: Virtual Radiologist       Office Visit on 08/21/2023   Component Date Value Ref Range Status    POC Amphetamines 08/21/2023 Negative  Negative, Inconclusive Final    POC Barbiturates 08/21/2023 Negative  Negative, Inconclusive Final    POC Benzodiazepines 08/21/2023 Negative  Negative, Inconclusive Final    POC Cocaine 08/21/2023 Negative  Negative, Inconclusive Final    POC THC 08/21/2023 Negative  Negative, Inconclusive Final    POC Methadone 08/21/2023 Negative  Negative, Inconclusive Final    POC Methamphetamine 08/21/2023 Negative  Negative, Inconclusive Final    POC Opiates 08/21/2023 Presumptive Positive (A)  Negative, Inconclusive Final    POC Oxycodone 08/21/2023 Negative  Negative, Inconclusive Final    POC Phencyclidine 08/21/2023 Negative  Negative, Inconclusive Final    POC Methylenedioxymethamphetamine * 08/21/2023 Negative  Negative, Inconclusive Final    POC Tricyclic Antidepressants 08/21/2023 Negative  Negative, Inconclusive Final    POC Buprenorphine 08/21/2023 Negative   Final     Acceptable 08/21/2023 Yes   Final    POC Temperature (Urine) 08/21/2023 90   Final   Lab Visit on 08/09/2023   Component Date Value Ref Range Status    Ammonia 08/09/2023 <10 (L)  11 - 32 µmol/L Final    Sodium 08/09/2023 143  136 - 145 mmol/L Final    Potassium 08/09/2023 3.9  3.5 - 5.1 mmol/L Final    Chloride 08/09/2023 109 (H)  98 - 107 mmol/L Final    CO2 08/09/2023 29  21 - 32 mmol/L Final    Anion Gap 08/09/2023 9  7 - 16 mmol/L Final    Glucose 08/09/2023 109 (H)  74 - 106 mg/dL Final    BUN 08/09/2023 14  7 - 18 mg/dL Final    Creatinine  08/09/2023 0.81  0.55 - 1.02 mg/dL Final    BUN/Creatinine Ratio 08/09/2023 17  6 - 20 Final    Calcium 08/09/2023 9.2  8.5 - 10.1 mg/dL Final    Total Protein 08/09/2023 7.1  6.4 - 8.2 g/dL Final    Albumin 08/09/2023 3.2 (L)  3.5 - 5.0 g/dL Final    Globulin 08/09/2023 3.9  2.0 - 4.0 g/dL Final    A/G Ratio 08/09/2023 0.8   Final    Bilirubin, Total 08/09/2023 0.3  >0.0 - 1.2 mg/dL Final    Alk Phos 08/09/2023 109  55 - 142 U/L Final    ALT 08/09/2023 18  13 - 56 U/L Final    AST 08/09/2023 14 (L)  15 - 37 U/L Final    eGFR 08/09/2023 75  >=60 mL/min/1.73m2 Final    Folate 08/09/2023 >20.0 (H)  3.1 - 17.5 ng/mL Final    TSH 08/09/2023 1.310  0.358 - 3.740 uIU/mL Final    Vitamin B12 08/09/2023 915  193 - 986 pg/mL Final    Vitamin D 25-Hydroxy, Blood 08/09/2023 48.7  ng/mL Final    Syphilis Ab Interpretation 08/09/2023 Non-Reactive  Non-Reactive Final    WBC 08/09/2023 6.68  4.50 - 11.00 K/uL Final    RBC 08/09/2023 4.35  4.20 - 5.40 M/uL Final    Hemoglobin 08/09/2023 11.6 (L)  12.0 - 16.0 g/dL Final    Hematocrit 08/09/2023 36.7 (L)  38.0 - 47.0 % Final    MCV 08/09/2023 84.4  80.0 - 96.0 fL Final    MCH 08/09/2023 26.7 (L)  27.0 - 31.0 pg Final    MCHC 08/09/2023 31.6 (L)  32.0 - 36.0 g/dL Final    RDW 08/09/2023 15.9 (H)  11.5 - 14.5 % Final    Platelet Count 08/09/2023 270  150 - 400 K/uL Final    MPV 08/09/2023 9.8  9.4 - 12.4 fL Final    Neutrophils % 08/09/2023 51.3 (L)  53.0 - 65.0 % Final    Lymphocytes % 08/09/2023 38.2  27.0 - 41.0 % Final    Monocytes % 08/09/2023 6.9 (H)  2.0 - 6.0 % Final    Eosinophils % 08/09/2023 2.8  1.0 - 4.0 % Final    Basophils % 08/09/2023 0.7  0.0 - 1.0 % Final    Immature Granulocytes % 08/09/2023 0.1  0.0 - 0.4 % Final    nRBC, Auto 08/09/2023 0.0  <=0.0 % Final    Neutrophils, Abs 08/09/2023 3.42  1.80 - 7.70 K/uL Final    Lymphocytes, Absolute 08/09/2023 2.55  1.00 - 4.80 K/uL Final    Monocytes, Absolute 08/09/2023 0.46  0.00 - 0.80 K/uL Final    Eosinophils, Absolute  08/09/2023 0.19  0.00 - 0.50 K/uL Final    Basophils, Absolute 08/09/2023 0.05  0.00 - 0.20 K/uL Final    Immature Granulocytes, Absolute 08/09/2023 0.01  0.00 - 0.04 K/uL Final    nRBC, Absolute 08/09/2023 0.00  <=0.00 x10e3/uL Final    Diff Type 08/09/2023 Auto   Final   Office Visit on 07/26/2023   Component Date Value Ref Range Status    Color, UA 07/26/2023 Dark-Yellow  Colorless, Straw, Yellow, Light Yellow, Dark Yellow Final    Clarity, UA 07/26/2023 Clear  Clear Final    pH, UA 07/26/2023 6.5  5.0 to 8.0 pH Units Final    Leukocytes, UA 07/26/2023 Trace (A)  Negative Final    Nitrites, UA 07/26/2023 Negative  Negative Final    Protein, UA 07/26/2023 Negative  Negative Final    Glucose, UA 07/26/2023 >1000  Normal mg/dL Final    Ketones, UA 07/26/2023 Negative  Negative mg/dL Final    Urobilinogen, UA 07/26/2023 Normal  0.2, 1.0, Normal mg/dL Final    Bilirubin, UA 07/26/2023 Negative  Negative Final    Blood, UA 07/26/2023 Negative  Negative Final    Specific Gravity, UA 07/26/2023 1.018  <=1.030 Final    WBC, UA 07/26/2023 16 (H)  <=5 /hpf Final    RBC, UA 07/26/2023 3  <=3 /hpf Final    Squamous Epithelial Cells, UA 07/26/2023 Occasional (A)  None Seen /HPF Final    Mucous 07/26/2023 Occasional (A)  None Seen /LPF Final    Culture, Urine 07/26/2023 Skin/Urogenital Louise Isolated, no further workup.   Final   Office Visit on 06/27/2023   Component Date Value Ref Range Status    Culture, Urine 06/27/2023 Skin/Urogenital Louise Isolated, no further workup.   Final   Office Visit on 06/27/2023   Component Date Value Ref Range Status    Syphilis Ab Interpretation 06/27/2023 Non-Reactive  Non-Reactive Final    TSH 06/27/2023 2.040  0.358 - 3.740 uIU/mL Final    Vitamin B12 06/27/2023 895  193 - 986 pg/mL Final   Lab Visit on 06/07/2023   Component Date Value Ref Range Status    Triglycerides 06/07/2023 97  35 - 150 mg/dL Final    Cholesterol 06/07/2023 186  0 - 200 mg/dL Final    HDL Cholesterol 06/07/2023 46  40  - 60 mg/dL Final    Cholesterol/HDL Ratio (Risk Factor) 06/07/2023 4.0   Final    Non-HDL 06/07/2023 140  mg/dL Final    LDL Calculated 06/07/2023 121  mg/dL Final    LDL/HDL 06/07/2023 2.6   Final    VLDL 06/07/2023 19  mg/dL Final    Hemoglobin A1C 06/07/2023 6.8 (H)  4.5 - 6.6 % Final    Estimated Average Glucose 06/07/2023 140  mg/dL Final   Office Visit on 06/07/2023   Component Date Value Ref Range Status    POC Glucose 06/07/2023 155 (A)  70 - 110 MG/DL Final   Office Visit on 05/23/2023   Component Date Value Ref Range Status    Color, UA 05/23/2023 Light-Yellow  Colorless, Straw, Yellow, Light Yellow, Dark Yellow Final    Clarity, UA 05/23/2023 Clear  Clear Final    pH, UA 05/23/2023 6.0  5.0 to 8.0 pH Units Final    Leukocytes, UA 05/23/2023 Large (A)  Negative Final    Nitrites, UA 05/23/2023 Negative  Negative Final    Protein, UA 05/23/2023 10 (A)  Negative Final    Glucose, UA 05/23/2023 >1000  Normal mg/dL Final    Ketones, UA 05/23/2023 Negative  Negative mg/dL Final    Urobilinogen, UA 05/23/2023 Normal  0.2, 1.0, Normal mg/dL Final    Bilirubin, UA 05/23/2023 Negative  Negative Final    Blood, UA 05/23/2023 Negative  Negative Final    Specific Gravity, UA 05/23/2023 1.020  <=1.030 Final    WBC, UA 05/23/2023 48 (H)  <=5 /hpf Final    RBC, UA 05/23/2023 5 (H)  <=3 /hpf Final    Bacteria, UA 05/23/2023 Few (A)  None Seen /hpf Final    Squamous Epithelial Cells, UA 05/23/2023 Occasional (A)  None Seen /HPF Final    WBC Clumps 05/23/2023 Many (A)  None Seen /hpf Final    Culture, Urine 05/23/2023 70,000 Yeast (A)   Final   Office Visit on 05/22/2023   Component Date Value Ref Range Status    POC Amphetamines 05/22/2023 Negative  Negative, Inconclusive Final    POC Barbiturates 05/22/2023 Negative  Negative, Inconclusive Final    POC Benzodiazepines 05/22/2023 Negative  Negative, Inconclusive Final    POC Cocaine 05/22/2023 Negative  Negative, Inconclusive Final    POC THC 05/22/2023 Negative  Negative,  Inconclusive Final    POC Methadone 05/22/2023 Negative  Negative, Inconclusive Final    POC Methamphetamine 05/22/2023 Negative  Negative, Inconclusive Final    POC Opiates 05/22/2023 Presumptive Positive (A)  Negative, Inconclusive Final    POC Oxycodone 05/22/2023 Negative  Negative, Inconclusive Final    POC Phencyclidine 05/22/2023 Negative  Negative, Inconclusive Final    POC Methylenedioxymethamphetamine * 05/22/2023 Negative  Negative, Inconclusive Final    POC Tricyclic Antidepressants 05/22/2023 Negative  Negative, Inconclusive Final    POC Buprenorphine 05/22/2023 Negative   Final     Acceptable 05/22/2023 Yes   Final    POC Temperature (Urine) 05/22/2023 90   Final         No orders of the defined types were placed in this encounter.        Requested Prescriptions     Signed Prescriptions Disp Refills    gabapentin (NEURONTIN) 400 MG capsule 90 capsule 2     Sig: TAKE 1 CAPSULE(400 MG) BY MOUTH EVERY 8 HOURS    HYDROcodone-acetaminophen (NORCO)  mg per tablet 120 tablet 0     Sig: Take 1 tablet by mouth every 6 (six) hours as needed for Pain.    HYDROcodone-acetaminophen (NORCO)  mg per tablet 120 tablet 0     Sig: Take 1 tablet by mouth every 6 (six) hours as needed for Pain.    HYDROcodone-acetaminophen (NORCO)  mg per tablet 120 tablet 0     Sig: Take 1 tablet by mouth every 6 (six) hours as needed for Pain.       Assessment:     1. Lumbosacral radiculopathy    2. Diabetic neuropathy with neurologic complication    3. Bilateral foot pain         A's of Opioid Risk Assessment  Activity:Patient can perform ADL.   Analgesia:Patients pain is partially controlled by current medication. Patient has tried OTC medications such as Tylenol and Ibuprofen with out relief.   Adverse Effects: Patient denies constipation or sedation.  Aberrant Behavior:  reviewed with no aberrant drug seeking/taking behavior.  Overdose reversal drug naloxone discussed    Drug screen  reviewed        X-ray left knee degenerative changes no fracture noted      MRI lumbar spine [facility] March 25, 2021 spinal stenosis L5/S1 disc bulge facet joint arthropathy multiple level degenerative changes        Plan:    Narcan February 2023    Follows Neurology [facility] neuropathy symptoms lower extremities    Follow-up after lumbar L5/S1 TORIE # 1 September 12, 2023, she states she had 90% relief after procedure, she states procedure did help improve her level function    She has known scoliosis    She would like to continue conservative management doing quite well after procedure    Continue home exercise program as directed     Continue current medication    Follow-up 3 months    Dr. Brown September 2024    Bring original prescription medication bottles/container/box with labels to each visit

## 2023-09-26 ENCOUNTER — OFFICE VISIT (OUTPATIENT)
Dept: PAIN MEDICINE | Facility: CLINIC | Age: 78
End: 2023-09-26
Payer: COMMERCIAL

## 2023-09-26 VITALS
HEART RATE: 59 BPM | BODY MASS INDEX: 27.28 KG/M2 | HEIGHT: 68 IN | DIASTOLIC BLOOD PRESSURE: 83 MMHG | WEIGHT: 180 LBS | RESPIRATION RATE: 18 BRPM | SYSTOLIC BLOOD PRESSURE: 150 MMHG

## 2023-09-26 DIAGNOSIS — E11.40 DIABETIC NEUROPATHY WITH NEUROLOGIC COMPLICATION: Chronic | ICD-10-CM

## 2023-09-26 DIAGNOSIS — E11.49 DIABETIC NEUROPATHY WITH NEUROLOGIC COMPLICATION: Chronic | ICD-10-CM

## 2023-09-26 DIAGNOSIS — M79.671 BILATERAL FOOT PAIN: ICD-10-CM

## 2023-09-26 DIAGNOSIS — M54.17 LUMBOSACRAL RADICULOPATHY: Primary | Chronic | ICD-10-CM

## 2023-09-26 DIAGNOSIS — M79.672 BILATERAL FOOT PAIN: ICD-10-CM

## 2023-09-26 PROCEDURE — 3077F PR MOST RECENT SYSTOLIC BLOOD PRESSURE >= 140 MM HG: ICD-10-PCS | Mod: CPTII,,, | Performed by: PHYSICIAN ASSISTANT

## 2023-09-26 PROCEDURE — 3079F DIAST BP 80-89 MM HG: CPT | Mod: CPTII,,, | Performed by: PHYSICIAN ASSISTANT

## 2023-09-26 PROCEDURE — 99214 PR OFFICE/OUTPT VISIT, EST, LEVL IV, 30-39 MIN: ICD-10-PCS | Mod: S$PBB,,, | Performed by: PHYSICIAN ASSISTANT

## 2023-09-26 PROCEDURE — 3288F FALL RISK ASSESSMENT DOCD: CPT | Mod: CPTII,,, | Performed by: PHYSICIAN ASSISTANT

## 2023-09-26 PROCEDURE — 99214 OFFICE O/P EST MOD 30 MIN: CPT | Mod: S$PBB,,, | Performed by: PHYSICIAN ASSISTANT

## 2023-09-26 PROCEDURE — 3079F PR MOST RECENT DIASTOLIC BLOOD PRESSURE 80-89 MM HG: ICD-10-PCS | Mod: CPTII,,, | Performed by: PHYSICIAN ASSISTANT

## 2023-09-26 PROCEDURE — 1101F PR PT FALLS ASSESS DOC 0-1 FALLS W/OUT INJ PAST YR: ICD-10-PCS | Mod: CPTII,,, | Performed by: PHYSICIAN ASSISTANT

## 2023-09-26 PROCEDURE — 1159F MED LIST DOCD IN RCRD: CPT | Mod: CPTII,,, | Performed by: PHYSICIAN ASSISTANT

## 2023-09-26 PROCEDURE — 1159F PR MEDICATION LIST DOCUMENTED IN MEDICAL RECORD: ICD-10-PCS | Mod: CPTII,,, | Performed by: PHYSICIAN ASSISTANT

## 2023-09-26 PROCEDURE — 3288F PR FALLS RISK ASSESSMENT DOCUMENTED: ICD-10-PCS | Mod: CPTII,,, | Performed by: PHYSICIAN ASSISTANT

## 2023-09-26 PROCEDURE — 3077F SYST BP >= 140 MM HG: CPT | Mod: CPTII,,, | Performed by: PHYSICIAN ASSISTANT

## 2023-09-26 PROCEDURE — 1101F PT FALLS ASSESS-DOCD LE1/YR: CPT | Mod: CPTII,,, | Performed by: PHYSICIAN ASSISTANT

## 2023-09-26 PROCEDURE — 99214 OFFICE O/P EST MOD 30 MIN: CPT | Mod: PBBFAC | Performed by: PHYSICIAN ASSISTANT

## 2023-09-26 PROCEDURE — 1125F AMNT PAIN NOTED PAIN PRSNT: CPT | Mod: CPTII,,, | Performed by: PHYSICIAN ASSISTANT

## 2023-09-26 PROCEDURE — 1125F PR PAIN SEVERITY QUANTIFIED, PAIN PRESENT: ICD-10-PCS | Mod: CPTII,,, | Performed by: PHYSICIAN ASSISTANT

## 2023-09-26 RX ORDER — HYDROCODONE BITARTRATE AND ACETAMINOPHEN 10; 325 MG/1; MG/1
1 TABLET ORAL EVERY 6 HOURS PRN
Qty: 120 TABLET | Refills: 0 | Status: SHIPPED | OUTPATIENT
Start: 2023-11-25 | End: 2023-10-04

## 2023-09-26 RX ORDER — HYDROCODONE BITARTRATE AND ACETAMINOPHEN 10; 325 MG/1; MG/1
1 TABLET ORAL EVERY 6 HOURS PRN
Qty: 120 TABLET | Refills: 0 | Status: SHIPPED | OUTPATIENT
Start: 2023-09-26 | End: 2023-09-27 | Stop reason: SDUPTHER

## 2023-09-26 RX ORDER — GABAPENTIN 400 MG/1
CAPSULE ORAL
Qty: 90 CAPSULE | Refills: 2 | Status: SHIPPED | OUTPATIENT
Start: 2023-09-26 | End: 2023-12-13 | Stop reason: SDUPTHER

## 2023-09-26 RX ORDER — HYDROCODONE BITARTRATE AND ACETAMINOPHEN 10; 325 MG/1; MG/1
1 TABLET ORAL EVERY 6 HOURS PRN
Qty: 120 TABLET | Refills: 0 | Status: SHIPPED | OUTPATIENT
Start: 2023-10-26 | End: 2023-10-04

## 2023-09-27 DIAGNOSIS — M79.671 BILATERAL FOOT PAIN: ICD-10-CM

## 2023-09-27 DIAGNOSIS — M79.672 BILATERAL FOOT PAIN: ICD-10-CM

## 2023-09-27 DIAGNOSIS — E11.49 DIABETIC NEUROPATHY WITH NEUROLOGIC COMPLICATION: Chronic | ICD-10-CM

## 2023-09-27 DIAGNOSIS — E11.40 DIABETIC NEUROPATHY WITH NEUROLOGIC COMPLICATION: Chronic | ICD-10-CM

## 2023-09-27 DIAGNOSIS — M54.17 LUMBOSACRAL RADICULOPATHY: Chronic | ICD-10-CM

## 2023-09-27 NOTE — TELEPHONE ENCOUNTER
----- Message from Praveena Galindo sent at 9/27/2023 12:03 PM CDT -----  Regarding: rx  Pt usual pharmacy is out of meds pt needs a new rx for pain meds sent [facility] in [place] please call pt back at [phone]  LUIS  SEBASTIÁN   09/27/2023   1:38 PM   Will send prescription to [facility]. Patient notified.   Left message to cancel [place] at [facility].

## 2023-09-28 RX ORDER — HYDROCODONE BITARTRATE AND ACETAMINOPHEN 10; 325 MG/1; MG/1
1 TABLET ORAL EVERY 6 HOURS PRN
Qty: 120 TABLET | Refills: 0 | Status: SHIPPED | OUTPATIENT
Start: 2023-09-28 | End: 2023-10-04

## 2023-09-29 NOTE — PROGRESS NOTES
"Subjective:       Patient ID: Lorraine De Jesus is a 78 y.o. female.    Chief Complaint: General Diabetes Follow-up (Here for routine follow up and A1C check. Patient checks one time daily. C/o feeling weak and fatigued for the past week or so.)    Here today for routine evaluation and med refill.  Admits that she has only been taking glipizide once daily instead of bid dosing.  Has recently been diagnosed with UTI and is currently on Abx.  Lab Results       Component                Value               Date                       HGBA1C                   6.9 (A)             10/04/2023                Lab Results       Component                Value               Date                       HGBA1C                   6.8 (H)             06/07/2023            Lab Results       Component                Value               Date                       MICROALBUR               1.1                 08/10/2022            Lab Results       Component                Value               Date                       CHOL                     186                 06/07/2023                 CHOL                     226 (H)             08/10/2022                 CHOL                     229 (H)             06/14/2021            Lab Results       Component                Value               Date                       HDL                      46                  06/07/2023                 HDL                      56                  08/10/2022                 HDL                      55                  06/14/2021            Lab Results       Component                Value               Date                       LDLCALC                  121                 06/07/2023                 LDLCALC                  152                 08/10/2022                 LDLCALC                  151                 06/14/2021            No results found for: "DLDL"  Lab Results       Component                Value               Date                       TRIG       "               97                  06/07/2023                 TRIG                     88                  08/10/2022                 TRIG                     115                 06/14/2021              f1 Lab Results       Component                Value               Date                       CHOLHDL                  4.0                 06/07/2023                 CHOLHDL                  4.0                 08/10/2022                 CHOLHDL                  4.2                 06/14/2021            CMP  Sodium       Date                     Value               Ref Range           Status                08/09/2023               143                 136 - 145 mmol*     Final            ----------  Potassium       Date                     Value               Ref Range           Status                08/09/2023               3.9                 3.5 - 5.1 mmol*     Final            ----------  Chloride       Date                     Value               Ref Range           Status                08/09/2023               109 (H)             98 - 107 mmol/L     Final            ----------  CO2       Date                     Value               Ref Range           Status                08/09/2023               29                  21 - 32 mmol/L      Final            ----------  Glucose       Date                     Value               Ref Range           Status                08/09/2023               109 (H)             74 - 106 mg/dL      Final            ----------  BUN       Date                     Value               Ref Range           Status                08/09/2023               14                  7 - 18 mg/dL        Final            ----------  Creatinine       Date                     Value               Ref Range           Status                08/09/2023               0.81                0.55 - 1.02 mg*     Final            ----------  Calcium       Date                     Value               Ref Range            Status                08/09/2023               9.2                 8.5 - 10.1 mg/*     Final            ----------  Total Protein       Date                     Value               Ref Range           Status                08/09/2023               7.1                 6.4 - 8.2 g/dL      Final            ----------  Albumin       Date                     Value               Ref Range           Status                08/09/2023               3.2 (L)             3.5 - 5.0 g/dL      Final            ----------  Bilirubin, Total       Date                     Value               Ref Range           Status                08/09/2023               0.3                 >0.0 - 1.2 mg/*     Final            ----------  Alk Phos       Date                     Value               Ref Range           Status                08/09/2023               109                 55 - 142 U/L        Final            ----------  AST       Date                     Value               Ref Range           Status                08/09/2023               14 (L)              15 - 37 U/L         Final            ----------  ALT       Date                     Value               Ref Range           Status                08/09/2023               18                  13 - 56 U/L         Final            ----------  Anion Gap       Date                     Value               Ref Range           Status                08/09/2023               9                   7 - 16 mmol/L       Final            ----------  eGFR       Date                     Value               Ref Range           Status                08/09/2023               75                  >=60 mL/min/1.*     Final            ----------        Review of Systems   Constitutional:  Negative for activity change, appetite change, diaphoresis and fatigue.   HENT:  Negative for nasal congestion, facial swelling and sinus pressure/congestion.    Eyes:  Negative for visual disturbance.   Respiratory:   Negative for shortness of breath and wheezing.    Cardiovascular:  Negative for chest pain and leg swelling.   Gastrointestinal:  Negative for constipation, diarrhea, nausea and vomiting.   Endocrine: Negative for polydipsia, polyphagia and polyuria.   Genitourinary:  Negative for dysuria, frequency and urgency.   Musculoskeletal:  Negative for gait problem and myalgias.   Integumentary:  Negative for color change, rash and wound.   Neurological:  Negative for dizziness, syncope, weakness, headaches and coordination difficulties.   Hematological:  Does not bruise/bleed easily.   Psychiatric/Behavioral:  Negative for self-injury, sleep disturbance and suicidal ideas. The patient is not nervous/anxious.          Objective:      Physical Exam  Vitals and nursing note reviewed.   Constitutional:       Appearance: Normal appearance.   HENT:      Head: Normocephalic.   Neck:      Thyroid: No thyromegaly.      Vascular: No carotid bruit.   Cardiovascular:      Rate and Rhythm: Normal rate and regular rhythm.      Pulses:           Dorsalis pedis pulses are 2+ on the right side and 2+ on the left side.        Posterior tibial pulses are 2+ on the right side and 2+ on the left side.      Heart sounds: Normal heart sounds.   Pulmonary:      Effort: Pulmonary effort is normal.      Breath sounds: Normal breath sounds.   Musculoskeletal:         General: Normal range of motion.   Feet:      Right foot:      Protective Sensation: 6 sites tested.  6 sites sensed.      Skin integrity: Skin integrity normal.      Left foot:      Protective Sensation: 6 sites tested.  6 sites sensed.      Skin integrity: Skin integrity normal.   Skin:     General: Skin is warm and dry.   Neurological:      General: No focal deficit present.      Mental Status: She is alert and oriented to person, place, and time.   Psychiatric:         Mood and Affect: Mood normal.         Behavior: Behavior normal.         Thought Content: Thought content normal.          Judgment: Judgment normal.         Assessment:     1. Type 2 diabetes mellitus without complication, without long-term current use of insulin  Increase glipizide to BID dosing.  Can not tolerate SGLT-2 or metformin   Lifestyle modifications.   -     POCT Glucose, Hand-Held Device    2. Essential hypertension  ARB coverage    3. Hyperlipidemia, unspecified hyperlipidemia type  Statin intolerant, on zetia.      4. PVD (peripheral vascular disease)      5. Chronic pain syndrome      Other orders  -     glipiZIDE (GLUCOTROL) 10 MG TR24; Take 1 tablet (10 mg total) by mouth 2 (two) times a day.  Dispense: 180 tablet; Refill: 1

## 2023-09-30 ENCOUNTER — EXTERNAL CHRONIC CARE MANAGEMENT (OUTPATIENT)
Dept: FAMILY MEDICINE | Facility: CLINIC | Age: 78
End: 2023-09-30
Payer: COMMERCIAL

## 2023-09-30 PROCEDURE — G0511 PR CHRONIC CARE MGMT, RHC OR FQHC ONLY, 20 MINS OR MORE: ICD-10-PCS | Mod: ,,, | Performed by: INTERNAL MEDICINE

## 2023-09-30 PROCEDURE — G0511 CCM/BHI BY RHC/FQHC 20MIN MO: HCPCS | Mod: ,,, | Performed by: INTERNAL MEDICINE

## 2023-10-02 ENCOUNTER — OFFICE VISIT (OUTPATIENT)
Dept: FAMILY MEDICINE | Facility: CLINIC | Age: 78
End: 2023-10-02
Payer: COMMERCIAL

## 2023-10-02 VITALS
DIASTOLIC BLOOD PRESSURE: 80 MMHG | BODY MASS INDEX: 26.67 KG/M2 | HEIGHT: 68 IN | OXYGEN SATURATION: 98 % | SYSTOLIC BLOOD PRESSURE: 132 MMHG | TEMPERATURE: 99 F | HEART RATE: 82 BPM | WEIGHT: 176 LBS | RESPIRATION RATE: 17 BRPM

## 2023-10-02 DIAGNOSIS — K59.00 CONSTIPATION, UNSPECIFIED CONSTIPATION TYPE: ICD-10-CM

## 2023-10-02 DIAGNOSIS — R10.30 LOWER ABDOMINAL PAIN: ICD-10-CM

## 2023-10-02 DIAGNOSIS — N30.00 ACUTE CYSTITIS WITHOUT HEMATURIA: ICD-10-CM

## 2023-10-02 DIAGNOSIS — Z20.828 CONTACT WITH AND (SUSPECTED) EXPOSURE TO OTHER VIRAL COMMUNICABLE DISEASES: Primary | ICD-10-CM

## 2023-10-02 LAB
BILIRUB SERPL-MCNC: NEGATIVE MG/DL
BLOOD URINE, POC: ABNORMAL
COLOR, POC UA: ABNORMAL
CTP QC/QA: YES
FLUAV AG NPH QL: NEGATIVE
FLUBV AG NPH QL: NEGATIVE
GLUCOSE UR QL STRIP: 100
KETONES UR QL STRIP: NEGATIVE
LEUKOCYTE ESTERASE URINE, POC: ABNORMAL
NITRITE, POC UA: POSITIVE
PH, POC UA: 5.5
PROTEIN, POC: 30
SARS-COV-2 AG RESP QL IA.RAPID: NEGATIVE
SPECIFIC GRAVITY, POC UA: 1.03
UROBILINOGEN, POC UA: 1

## 2023-10-02 PROCEDURE — 1125F AMNT PAIN NOTED PAIN PRSNT: CPT | Mod: ,,, | Performed by: FAMILY MEDICINE

## 2023-10-02 PROCEDURE — 3079F DIAST BP 80-89 MM HG: CPT | Mod: ,,, | Performed by: FAMILY MEDICINE

## 2023-10-02 PROCEDURE — 99214 PR OFFICE/OUTPT VISIT, EST, LEVL IV, 30-39 MIN: ICD-10-PCS | Mod: ,,, | Performed by: FAMILY MEDICINE

## 2023-10-02 PROCEDURE — 81003 URINALYSIS AUTO W/O SCOPE: CPT | Mod: QW,,, | Performed by: FAMILY MEDICINE

## 2023-10-02 PROCEDURE — 87428 SARSCOV & INF VIR A&B AG IA: CPT | Mod: QW,,, | Performed by: FAMILY MEDICINE

## 2023-10-02 PROCEDURE — 3079F PR MOST RECENT DIASTOLIC BLOOD PRESSURE 80-89 MM HG: ICD-10-PCS | Mod: ,,, | Performed by: FAMILY MEDICINE

## 2023-10-02 PROCEDURE — 81003 POCT URINALYSIS W/O SCOPE: ICD-10-PCS | Mod: QW,,, | Performed by: FAMILY MEDICINE

## 2023-10-02 PROCEDURE — 3075F PR MOST RECENT SYSTOLIC BLOOD PRESS GE 130-139MM HG: ICD-10-PCS | Mod: ,,, | Performed by: FAMILY MEDICINE

## 2023-10-02 PROCEDURE — 87428 POCT SARS-COV2 (COVID) WITH FLU ANTIGEN: ICD-10-PCS | Mod: QW,,, | Performed by: FAMILY MEDICINE

## 2023-10-02 PROCEDURE — 1159F MED LIST DOCD IN RCRD: CPT | Mod: ,,, | Performed by: FAMILY MEDICINE

## 2023-10-02 PROCEDURE — 1125F PR PAIN SEVERITY QUANTIFIED, PAIN PRESENT: ICD-10-PCS | Mod: ,,, | Performed by: FAMILY MEDICINE

## 2023-10-02 PROCEDURE — 3075F SYST BP GE 130 - 139MM HG: CPT | Mod: ,,, | Performed by: FAMILY MEDICINE

## 2023-10-02 PROCEDURE — 1159F PR MEDICATION LIST DOCUMENTED IN MEDICAL RECORD: ICD-10-PCS | Mod: ,,, | Performed by: FAMILY MEDICINE

## 2023-10-02 PROCEDURE — 99214 OFFICE O/P EST MOD 30 MIN: CPT | Mod: ,,, | Performed by: FAMILY MEDICINE

## 2023-10-02 RX ORDER — NITROFURANTOIN 25; 75 MG/1; MG/1
100 CAPSULE ORAL 2 TIMES DAILY
Qty: 14 CAPSULE | Refills: 0 | Status: SHIPPED | OUTPATIENT
Start: 2023-10-02 | End: 2023-10-09 | Stop reason: SDUPTHER

## 2023-10-02 NOTE — PROGRESS NOTES
Subjective     Patient ID: Lorraine De Jesus is a 78 y.o. female.    Chief Complaint: Fatigue (Started yesterday. ), Headache (Sharp pain. Started Saturday. Intermittent. ), Abdominal Pain (Lower abdominal pain started for a wk. No fever. ), and Generalized Body Aches (Started yesterday. )    No vomiting or diarrhea.  Mild dysuria.  No cough or sore throat.  She does not think she is been constipated    Fatigue  Associated symptoms include abdominal pain, fatigue and headaches.   Headache   Associated symptoms include abdominal pain.   Abdominal Pain  Associated symptoms include headaches.     Review of Systems   Constitutional:  Positive for fatigue.   Gastrointestinal:  Positive for abdominal pain.   Neurological:  Positive for headaches.          Objective     Physical Exam  Constitutional:       General: She is not in acute distress.     Appearance: She is not ill-appearing.   HENT:      Nose: No congestion or rhinorrhea.      Mouth/Throat:      Pharynx: No posterior oropharyngeal erythema.   Cardiovascular:      Rate and Rhythm: Normal rate and regular rhythm.   Pulmonary:      Effort: Pulmonary effort is normal.      Breath sounds: Normal breath sounds.   Abdominal:      General: Abdomen is flat. Bowel sounds are normal.      Palpations: Abdomen is soft.      Tenderness: There is abdominal tenderness (Tender across lower abdomen.).   Neurological:      Mental Status: She is alert.          Assessment and Plan     1. Contact with and (suspected) exposure to other viral communicable diseases  -     POCT SARS-COV2 (COVID) with Flu Antigen    2. Lower abdominal pain  -     POCT URINALYSIS W/O SCOPE  -     X-Ray KUB; Future; Expected date: 10/02/2023  -     CBC Auto Differential; Future; Expected date: 10/02/2023    3. Acute cystitis without hematuria    4. Constipation, unspecified constipation type        Lower abdominal pain probably due to combination of cystitis and constipation.  We will check a CBC         No  follow-ups on file.

## 2023-10-04 ENCOUNTER — OFFICE VISIT (OUTPATIENT)
Dept: DIABETES SERVICES | Facility: CLINIC | Age: 78
End: 2023-10-04
Payer: COMMERCIAL

## 2023-10-04 VITALS
OXYGEN SATURATION: 97 % | DIASTOLIC BLOOD PRESSURE: 75 MMHG | SYSTOLIC BLOOD PRESSURE: 122 MMHG | BODY MASS INDEX: 26.67 KG/M2 | RESPIRATION RATE: 16 BRPM | HEIGHT: 68 IN | HEART RATE: 80 BPM | WEIGHT: 176 LBS

## 2023-10-04 DIAGNOSIS — I10 ESSENTIAL HYPERTENSION: ICD-10-CM

## 2023-10-04 DIAGNOSIS — G47.33 OBSTRUCTIVE SLEEP APNEA: ICD-10-CM

## 2023-10-04 DIAGNOSIS — G89.4 CHRONIC PAIN SYNDROME: Chronic | ICD-10-CM

## 2023-10-04 DIAGNOSIS — E11.9 TYPE 2 DIABETES MELLITUS WITHOUT COMPLICATION, WITHOUT LONG-TERM CURRENT USE OF INSULIN: Primary | ICD-10-CM

## 2023-10-04 DIAGNOSIS — E78.5 HYPERLIPIDEMIA, UNSPECIFIED HYPERLIPIDEMIA TYPE: ICD-10-CM

## 2023-10-04 LAB
GLUCOSE SERPL-MCNC: 190 MG/DL (ref 70–110)
HBA1C MFR BLD: 6.9 % (ref 4.5–6.6)

## 2023-10-04 PROCEDURE — 3078F PR MOST RECENT DIASTOLIC BLOOD PRESSURE < 80 MM HG: ICD-10-PCS | Mod: CPTII,,, | Performed by: NURSE PRACTITIONER

## 2023-10-04 PROCEDURE — 3288F FALL RISK ASSESSMENT DOCD: CPT | Mod: CPTII,,, | Performed by: NURSE PRACTITIONER

## 2023-10-04 PROCEDURE — 99215 OFFICE O/P EST HI 40 MIN: CPT | Mod: PBBFAC | Performed by: NURSE PRACTITIONER

## 2023-10-04 PROCEDURE — 1101F PT FALLS ASSESS-DOCD LE1/YR: CPT | Mod: CPTII,,, | Performed by: NURSE PRACTITIONER

## 2023-10-04 PROCEDURE — 99214 OFFICE O/P EST MOD 30 MIN: CPT | Mod: S$PBB,,, | Performed by: NURSE PRACTITIONER

## 2023-10-04 PROCEDURE — 1159F PR MEDICATION LIST DOCUMENTED IN MEDICAL RECORD: ICD-10-PCS | Mod: CPTII,,, | Performed by: NURSE PRACTITIONER

## 2023-10-04 PROCEDURE — 99999PBSHW POCT HEMOGLOBIN A1C: Mod: PBBFAC,,,

## 2023-10-04 PROCEDURE — 3074F SYST BP LT 130 MM HG: CPT | Mod: CPTII,,, | Performed by: NURSE PRACTITIONER

## 2023-10-04 PROCEDURE — 3288F PR FALLS RISK ASSESSMENT DOCUMENTED: ICD-10-PCS | Mod: CPTII,,, | Performed by: NURSE PRACTITIONER

## 2023-10-04 PROCEDURE — 1101F PR PT FALLS ASSESS DOC 0-1 FALLS W/OUT INJ PAST YR: ICD-10-PCS | Mod: CPTII,,, | Performed by: NURSE PRACTITIONER

## 2023-10-04 PROCEDURE — 82962 GLUCOSE BLOOD TEST: CPT | Mod: PBBFAC | Performed by: NURSE PRACTITIONER

## 2023-10-04 PROCEDURE — 3074F PR MOST RECENT SYSTOLIC BLOOD PRESSURE < 130 MM HG: ICD-10-PCS | Mod: CPTII,,, | Performed by: NURSE PRACTITIONER

## 2023-10-04 PROCEDURE — 99214 PR OFFICE/OUTPT VISIT, EST, LEVL IV, 30-39 MIN: ICD-10-PCS | Mod: S$PBB,,, | Performed by: NURSE PRACTITIONER

## 2023-10-04 PROCEDURE — 99999PBSHW POCT GLUCOSE, HAND-HELD DEVICE: Mod: PBBFAC,,,

## 2023-10-04 PROCEDURE — 3078F DIAST BP <80 MM HG: CPT | Mod: CPTII,,, | Performed by: NURSE PRACTITIONER

## 2023-10-04 PROCEDURE — 1159F MED LIST DOCD IN RCRD: CPT | Mod: CPTII,,, | Performed by: NURSE PRACTITIONER

## 2023-10-04 PROCEDURE — 99999PBSHW POCT GLUCOSE, HAND-HELD DEVICE: ICD-10-PCS | Mod: PBBFAC,,,

## 2023-10-04 PROCEDURE — 83036 HEMOGLOBIN GLYCOSYLATED A1C: CPT | Mod: PBBFAC | Performed by: NURSE PRACTITIONER

## 2023-10-04 RX ORDER — GLIPIZIDE 10 MG/1
10 TABLET, FILM COATED, EXTENDED RELEASE ORAL 2 TIMES DAILY
Qty: 180 TABLET | Refills: 1 | Status: SHIPPED | OUTPATIENT
Start: 2023-10-04 | End: 2023-12-27

## 2023-10-05 NOTE — PROGRESS NOTES
Subjective     Patient ID: Lorraine De Jesus is a 78 y.o. female.    Chief Complaint: No chief complaint on file.    UROLOGY HISTORY PER DR. COFFMAN:  6/9/2021  here for repeat dilation. patient has chronic pain that is improved with urination. serial dilations over the years. typically she does well for a few months but was dilated just over 30 days ago.        Cystoscopy: After informed consent was confirmed. preprocedural abx were administered and the patient was taken to the cystoscopy suite and prepped and draped in standard fashion. The urethra was injected with lidocaine. Cystoscopy revealed no urethral lesions, moderately obstructing prostate, large bladder with no trabeculations or mucosal lesions. Ureteral orifices were identified bilaterally and were noted to be in orthotopic position demonstrate a clear reflux bilaterally. Retroflexion maneuver was performed to evaluate the trigone. Patient tolerated procedure well.         dilated up to 28 F without issue. teaching on self dilation today. sent home with sample. instructions not to introduce dilator farther then 1in into the urethra. discussed risk of injury to bladder. infection. pain.     recommend q 1 month self dilation. teaching performed by RN staff.      start hiprex 1gm daily, pyridium 100mg daily prn.     follow up 3 months for symptom check.    06/09/2021)--------------------------------------------------------------------------     Ms. De Jesus has been transferred to my care in Dr. Coffman's absence for f/u from above-mentioned cystoscopy with Hydrostatic Dilatation.  She states that she is doing much better since procedure, compliant with Hiprex, and free of s/s of infection to date.  Adds that her incontinence and bladder spasms are controlled on Oxybutynin 5mg daily.  (7/8/2021)----------------------------------------------------------------------------------     Ms. De Jesus is here today for c/o bladder pressure.  She has a history of known urethral  stenosis, and   She denies fever, constitutional symptoms or sensation of infection today.  PVR  0 ml.     -  Urethral stenosis:  Not performing self dilatations, states unable. Though taught to perform monthly self dilation at last visit, states she is unable to do this.  Reports she was symptom free following last cystoscopy with dilatation, but strainiing and pain at voiding onset have returned over the last 2 weeks.  -  Personal history UTI:  Hiprex started in June of this year.  -  Chronic pelvic pain in female:  R/t bladder spasms.  Controlled last visit with oxybutynine 5mg daily.  Discussed increasing Ditropan to BID  [November 22, 2021]  -------------------------------------------------------------------------------------------------------------------------------------------------------------------------------------------------------------------------  The above notes are notes of Dr. Recio and Ms. Shaw.  Patient sent to me for urethral dilatation.  She has been having problems with urethral syndrome since she was in her 40s.  Last urethral dilatation done June 24th and she feels she needs another today.  Pressure and discomfort like she usually has.  She is familiar with urethral dilatations and wishes to proceed again.   ------------------------------------------------------------- [December 16, 2021]-----------------------------------------------------------------------------------------------------------------------     Ms. De Jesus is a very pleasant 78 yo AA female, who has returned for c/o severe exacerbation of chronic pelvic pain and difficulty emptying.  Patient states increase of straining to urinate, causing bladder spasms from bladder around to back.  PVR 47 l.  Requests repeat urethral dilatations.  Dr. Singleton to room to evaluate.  Patient to be worked into his schedule this after noon for procedure.     She was last seen by Dr. Singleton 10 months prior for urethral dilatation and following  "POC:  "Urethral dilatation performed as described.  Urine sent for culture since she has lots of amorphous crystals that could mask infection.  Patient given Cipro 500 mg b.i.d. for 3 days to cover instrumentation.  She is to call when she needs another urethral dilatation"  [9/15/2022]-----------------------------------------------------------------------------------------  --------------------------------------------------------------------------------------------------------------------------------------------------------------------------------------------------------------------------------------------------  The above notes are from SAMSON Shaw, Dr. ARIEL Singleton and Dr KARLI Recio in the EMR system     This pleasant 78 year old female presents to the clinic for follow up of urethral stricture and bladder spasms. Patient was last seen in September 2022 for urethral dilatation. Patient reports urinary frequency, urgency, incomplete bladder emptying, bladder spasms, and nocturia 3 to 4 times a night. She denies dysuria, hematuria, nausea, vomiting, fever or chills. Her PVR is 100 mls today. She is on Ditropan XL 5 mg one at night and is tolerating this medication without side effects. She reports it does help some with her symptoms. She request to have a urethral dilatation done today. This was discussed with Dr. ARIEL Singleton and they will do the dilatation. She will follow up with us PRN and call when she feels like she needs another dilatation. I discussed the plan in detail with the patient and she is in agreement with the plan. All her questions were answered at today's visit. ------------------------------------------------------[June 27, 2023].      See note of Mr. Benz above.  Patient wants another urethral dilatation which will be performed today. [June 27, 2023 Dr. ARIEL Singleton].      This pleasant 78 year old female presents to the clinic for renal stone, urethral stricture and bladder spasms. Patient was seen by her " PCP in July 2023 for abdominal pain. Patient had a KUB done on July 26, 2023 that showed a 8 mm inferior pole left renal calculus, Nonspecific bowel gas pattern and Mild levoconvex curvature of the lumbar spine. Her KUB today showed No acute process. Left nephrolithiasis without change. The KUB today also showed there is mild to moderate stool in the normal caliber colon. I reviewed both KUB's with Urologist Dr. ARIEL Singleton and the patient. Dr. Singleton does not feel the kidney stone is the source of her pain as there was no obstruction or hydronephrosis noted. She does have a urethral stricture that was dilated in the office at the last visit by Dr. ARIEL Singleton. She does report this helped with her symptoms. She desires to have another dilatation but wants to wait another month for this. She is on Ditropan XL 5 mg one at night and is tolerating this medication without side effects. She desires to continue this medication. She denies any symptoms of a UTI. Her urine culture in June and July 2023 were negative. She denies dysuria or hematuria. Her PVR today is 16 mls. I discussed the plan in detail with Urologist Dr. ARIEL Singleton and the patient and they are in agreement with the plan. All her questions were answered at today's visit. I spent 20 minutes counseling this patient.      KUB done on August 14, 2023 showed FINDINGS: The bowel gas pattern is nonobstructive without gross mass lesion.  There is mild to moderate stool in the normal caliber colon. Radiopaque calculus overlies lower pole left kidney without change Osseous structures are similar  Impression:  No acute process  Left nephrolithiasis without change. -----[ August 14, 2023].   ------------------------------------------------------------------------------------------------------------------------------------------------------------------------------------------------------------    This pleasant 78 year old female presents to the clinic for follow up of urethral  stricture, bladder spasms, and thinks she has a UTI. Patient desires to have another Urethral Dilatation. She reported at the last visit that the dilatation did help. She feels like she may have a UTI.  She does have some lower abdominal pressure from not emptying her bladder.  She reports nocturia 4 times a night, frequency, urgency, and incomplete bladder emptying. Her PVR is 200 mls. She denies dysuria, hematuria, fever, chills, nausea or vomiting. I discussed having the dilatation with Dr. Singleton and the patient and they are in agreement with doing the dilatation at today's visit. She is on Oxybutynin XL 5 mg and is tolerating this medication without side effects. She desires to continue this medication. She did not complain of stone pain at today's visit. Her KUB in August 2023 showed No acute process  Left nephrolithiasis without change and her KUB done on July 26, 2023 that showed a 8 mm inferior pole left renal calculus. She has some fatigue at today's visit and will follow up with her PCP for this. I discussed the plan in detail with the patient and she is in agreement with the plan. All her questions were answered at today's visit. I spent 20 minutes counseling this patient. ----------------------------------------------------------  [October 6, 2023].                 Review of Systems   Constitutional:  Negative for activity change and fever.   HENT:  Negative for hearing loss and trouble swallowing.    Eyes:  Negative for visual disturbance.   Respiratory:  Negative for cough, shortness of breath and wheezing.    Cardiovascular:  Negative for chest pain.   Gastrointestinal:  Negative for abdominal pain, diarrhea, nausea and vomiting.   Endocrine: Negative for polyuria.   Genitourinary:  Positive for frequency and urgency. Negative for bladder incontinence, decreased urine volume, difficulty urinating, dysuria, enuresis, flank pain, hematuria and nocturia.        Chronic Pelvic Pain       Urethral Stricture         Renal Stone        Bladder Spasms    Musculoskeletal:  Negative for back pain and gait problem.   Integumentary:  Negative for rash.   Neurological:  Negative for speech difficulty and weakness.   Psychiatric/Behavioral:  Negative for behavioral problems and confusion.           Objective     Physical Exam  Vitals and nursing note reviewed.   Constitutional:       General: She is not in acute distress.     Appearance: Normal appearance. She is not ill-appearing, toxic-appearing or diaphoretic.   HENT:      Head: Normocephalic.   Eyes:      Extraocular Movements: Extraocular movements intact.   Cardiovascular:      Rate and Rhythm: Normal rate and regular rhythm.      Heart sounds: Normal heart sounds.   Pulmonary:      Effort: Pulmonary effort is normal.      Breath sounds: Normal breath sounds. No wheezing, rhonchi or rales.   Abdominal:      General: Bowel sounds are normal.      Palpations: Abdomen is soft.      Tenderness: There is no abdominal tenderness. There is no right CVA tenderness, left CVA tenderness, guarding or rebound.   Musculoskeletal:         General: Normal range of motion.      Cervical back: Normal range of motion. No rigidity.   Skin:     General: Skin is warm and dry.   Neurological:      General: No focal deficit present.      Mental Status: She is alert and oriented to person, place, and time.      Motor: No weakness.      Coordination: Coordination normal.      Gait: Gait normal.   Psychiatric:         Mood and Affect: Mood normal.         Behavior: Behavior normal.         Thought Content: Thought content normal.        Assessment and Plan     Problem List Items Addressed This Visit          Renal/    Stricture of female urethra (Chronic)    Bladder spasms - Primary    Renal calculus       GI    Chronic pelvic pain in female (Chronic)    Relevant Orders    Urine culture     Other Visit Diagnoses       Frequency of urination        Relevant Orders    Urine culture    Urgency of  urination        Relevant Orders    Urine culture             Urine culture   Urethral Dilatation with Dr. ARIEL Singleton today   Continue Ditropan (Oxybutynin)  XL 5 mg one at night   Follow up with PCP for fatigue   Follow up with Urology NP JIMBO Luciano in 3 months sooner if needed

## 2023-10-06 ENCOUNTER — OFFICE VISIT (OUTPATIENT)
Dept: UROLOGY | Facility: CLINIC | Age: 78
End: 2023-10-06
Payer: COMMERCIAL

## 2023-10-06 VITALS
SYSTOLIC BLOOD PRESSURE: 110 MMHG | HEIGHT: 68 IN | TEMPERATURE: 99 F | DIASTOLIC BLOOD PRESSURE: 80 MMHG | WEIGHT: 176 LBS | BODY MASS INDEX: 26.67 KG/M2 | OXYGEN SATURATION: 98 % | RESPIRATION RATE: 18 BRPM | HEART RATE: 86 BPM

## 2023-10-06 DIAGNOSIS — R35.0 FREQUENCY OF URINATION: ICD-10-CM

## 2023-10-06 DIAGNOSIS — R10.2 PELVIC PAIN: ICD-10-CM

## 2023-10-06 DIAGNOSIS — N32.89 BLADDER SPASMS: ICD-10-CM

## 2023-10-06 DIAGNOSIS — N32.89 BLADDER SPASMS: Primary | ICD-10-CM

## 2023-10-06 DIAGNOSIS — N20.0 RENAL CALCULUS: ICD-10-CM

## 2023-10-06 DIAGNOSIS — N35.92 STRICTURE OF FEMALE URETHRA, UNSPECIFIED STRICTURE TYPE: ICD-10-CM

## 2023-10-06 DIAGNOSIS — N35.82 OTHER STRICTURE OF URETHRA IN FEMALE: Chronic | ICD-10-CM

## 2023-10-06 DIAGNOSIS — G89.29 CHRONIC PELVIC PAIN IN FEMALE: Chronic | ICD-10-CM

## 2023-10-06 DIAGNOSIS — R39.15 URGENCY OF URINATION: ICD-10-CM

## 2023-10-06 DIAGNOSIS — R10.2 CHRONIC PELVIC PAIN IN FEMALE: Chronic | ICD-10-CM

## 2023-10-06 DIAGNOSIS — N34.3 URETHRAL SYNDROME: Primary | ICD-10-CM

## 2023-10-06 PROCEDURE — 3079F PR MOST RECENT DIASTOLIC BLOOD PRESSURE 80-89 MM HG: ICD-10-PCS | Mod: CPTII,,, | Performed by: NURSE PRACTITIONER

## 2023-10-06 PROCEDURE — 99213 PR OFFICE/OUTPT VISIT, EST, LEVL III, 20-29 MIN: ICD-10-PCS | Mod: S$PBB,,, | Performed by: NURSE PRACTITIONER

## 2023-10-06 PROCEDURE — 53660 DILATION OF URETHRA: CPT | Mod: PBBFAC | Performed by: UROLOGY

## 2023-10-06 PROCEDURE — 1159F MED LIST DOCD IN RCRD: CPT | Mod: CPTII,,, | Performed by: UROLOGY

## 2023-10-06 PROCEDURE — 3079F DIAST BP 80-89 MM HG: CPT | Mod: CPTII,,, | Performed by: NURSE PRACTITIONER

## 2023-10-06 PROCEDURE — 1126F AMNT PAIN NOTED NONE PRSNT: CPT | Mod: CPTII,,, | Performed by: NURSE PRACTITIONER

## 2023-10-06 PROCEDURE — 87086 CULTURE, URINE: ICD-10-PCS | Mod: ,,, | Performed by: CLINICAL MEDICAL LABORATORY

## 2023-10-06 PROCEDURE — 99213 OFFICE O/P EST LOW 20 MIN: CPT | Mod: S$PBB,,, | Performed by: NURSE PRACTITIONER

## 2023-10-06 PROCEDURE — 53660 PR DIL URETHRA,FEMALE,INITIAL: ICD-10-PCS | Mod: S$PBB,,, | Performed by: UROLOGY

## 2023-10-06 PROCEDURE — 99214 OFFICE O/P EST MOD 30 MIN: CPT | Mod: PBBFAC,25 | Performed by: NURSE PRACTITIONER

## 2023-10-06 PROCEDURE — 1159F PR MEDICATION LIST DOCUMENTED IN MEDICAL RECORD: ICD-10-PCS | Mod: CPTII,,, | Performed by: NURSE PRACTITIONER

## 2023-10-06 PROCEDURE — 1126F PR PAIN SEVERITY QUANTIFIED, NO PAIN PRESENT: ICD-10-PCS | Mod: CPTII,,, | Performed by: NURSE PRACTITIONER

## 2023-10-06 PROCEDURE — 1160F RVW MEDS BY RX/DR IN RCRD: CPT | Mod: CPTII,,, | Performed by: NURSE PRACTITIONER

## 2023-10-06 PROCEDURE — 99024 POSTOP FOLLOW-UP VISIT: CPT | Mod: ,,, | Performed by: UROLOGY

## 2023-10-06 PROCEDURE — 99024 PR POST-OP FOLLOW-UP VISIT: ICD-10-PCS | Mod: ,,, | Performed by: UROLOGY

## 2023-10-06 PROCEDURE — 1160F PR REVIEW ALL MEDS BY PRESCRIBER/CLIN PHARMACIST DOCUMENTED: ICD-10-PCS | Mod: CPTII,,, | Performed by: UROLOGY

## 2023-10-06 PROCEDURE — 1101F PR PT FALLS ASSESS DOC 0-1 FALLS W/OUT INJ PAST YR: ICD-10-PCS | Mod: CPTII,,, | Performed by: NURSE PRACTITIONER

## 2023-10-06 PROCEDURE — 99213 OFFICE O/P EST LOW 20 MIN: CPT | Mod: PBBFAC | Performed by: UROLOGY

## 2023-10-06 PROCEDURE — 1160F PR REVIEW ALL MEDS BY PRESCRIBER/CLIN PHARMACIST DOCUMENTED: ICD-10-PCS | Mod: CPTII,,, | Performed by: NURSE PRACTITIONER

## 2023-10-06 PROCEDURE — 53660 DILATION OF URETHRA: CPT | Mod: S$PBB,,, | Performed by: UROLOGY

## 2023-10-06 PROCEDURE — 1159F PR MEDICATION LIST DOCUMENTED IN MEDICAL RECORD: ICD-10-PCS | Mod: CPTII,,, | Performed by: UROLOGY

## 2023-10-06 PROCEDURE — 3074F PR MOST RECENT SYSTOLIC BLOOD PRESSURE < 130 MM HG: ICD-10-PCS | Mod: CPTII,,, | Performed by: NURSE PRACTITIONER

## 2023-10-06 PROCEDURE — 87086 URINE CULTURE/COLONY COUNT: CPT | Mod: ,,, | Performed by: CLINICAL MEDICAL LABORATORY

## 2023-10-06 PROCEDURE — 1160F RVW MEDS BY RX/DR IN RCRD: CPT | Mod: CPTII,,, | Performed by: UROLOGY

## 2023-10-06 PROCEDURE — 3074F SYST BP LT 130 MM HG: CPT | Mod: CPTII,,, | Performed by: NURSE PRACTITIONER

## 2023-10-06 PROCEDURE — 1159F MED LIST DOCD IN RCRD: CPT | Mod: CPTII,,, | Performed by: NURSE PRACTITIONER

## 2023-10-06 PROCEDURE — 1101F PT FALLS ASSESS-DOCD LE1/YR: CPT | Mod: CPTII,,, | Performed by: NURSE PRACTITIONER

## 2023-10-06 PROCEDURE — 3288F PR FALLS RISK ASSESSMENT DOCUMENTED: ICD-10-PCS | Mod: CPTII,,, | Performed by: NURSE PRACTITIONER

## 2023-10-06 PROCEDURE — 3288F FALL RISK ASSESSMENT DOCD: CPT | Mod: CPTII,,, | Performed by: NURSE PRACTITIONER

## 2023-10-06 RX ORDER — LIDOCAINE HYDROCHLORIDE 20 MG/ML
JELLY TOPICAL
Status: COMPLETED | OUTPATIENT
Start: 2023-10-06 | End: 2023-10-06

## 2023-10-06 RX ADMIN — LIDOCAINE HYDROCHLORIDE 10 ML: 20 JELLY TOPICAL at 10:10

## 2023-10-06 NOTE — PROCEDURES
Procedures    Urethral dilatation     The patient was placed in the dorsal lithotomy position in clinic treatment room and prepared for urethral dilatation.  Urethra was anesthetized with lidocaine jelly.  No sedation was given.  Urethral dilatation was begun with Golden sounds starting with the 20 Marshallese Golden sound.  She was dilated through 28 Marshallese successfully and procedure terminated.  The residual in the bladder was approximately 70 mL.  She tolerated reasonably well.

## 2023-10-06 NOTE — PATIENT INSTRUCTIONS
Urine culture   Urethral Dilatation with Dr. ARIEL Singleton today   Continue Ditropan (Oxybutynin)  XL 5 mg one at night   Follow up with PCP for fatigue   Follow up with Urology NP JIMBO Luciano in 3 months sooner if needed

## 2023-10-06 NOTE — PATIENT INSTRUCTIONS
Urethral dilatation as described.  Urine culture collected.  See note of Mr. Benz.  Patient given Cipro 500 mg b.i.d. for 3 days to cover instrumentation.

## 2023-10-08 LAB — UA COMPLETE W REFLEX CULTURE PNL UR: ABNORMAL

## 2023-10-09 ENCOUNTER — TELEPHONE (OUTPATIENT)
Dept: UROLOGY | Facility: CLINIC | Age: 78
End: 2023-10-09
Payer: COMMERCIAL

## 2023-10-09 ENCOUNTER — OFFICE VISIT (OUTPATIENT)
Dept: FAMILY MEDICINE | Facility: CLINIC | Age: 78
End: 2023-10-09
Payer: COMMERCIAL

## 2023-10-09 VITALS
WEIGHT: 174 LBS | SYSTOLIC BLOOD PRESSURE: 130 MMHG | OXYGEN SATURATION: 98 % | DIASTOLIC BLOOD PRESSURE: 84 MMHG | HEART RATE: 105 BPM | TEMPERATURE: 98 F | RESPIRATION RATE: 18 BRPM | HEIGHT: 68 IN | BODY MASS INDEX: 26.37 KG/M2

## 2023-10-09 DIAGNOSIS — E53.8 B12 DEFICIENCY: ICD-10-CM

## 2023-10-09 DIAGNOSIS — B37.9 YEAST INFECTION: Primary | ICD-10-CM

## 2023-10-09 DIAGNOSIS — E03.8 TSH DEFICIENCY: ICD-10-CM

## 2023-10-09 DIAGNOSIS — R53.1 WEAKNESS: ICD-10-CM

## 2023-10-09 DIAGNOSIS — N39.0 URINARY TRACT INFECTION WITHOUT HEMATURIA, SITE UNSPECIFIED: Primary | ICD-10-CM

## 2023-10-09 LAB
ANION GAP SERPL CALCULATED.3IONS-SCNC: 11 MMOL/L (ref 7–16)
BACTERIA #/AREA URNS HPF: ABNORMAL /HPF
BASOPHILS # BLD AUTO: 0.03 K/UL (ref 0–0.2)
BASOPHILS NFR BLD AUTO: 0.5 % (ref 0–1)
BILIRUB UR QL STRIP: NEGATIVE
BUN SERPL-MCNC: 12 MG/DL (ref 7–18)
BUN/CREAT SERPL: 13 (ref 6–20)
CALCIUM SERPL-MCNC: 9.1 MG/DL (ref 8.5–10.1)
CHLORIDE SERPL-SCNC: 106 MMOL/L (ref 98–107)
CLARITY UR: ABNORMAL
CO2 SERPL-SCNC: 26 MMOL/L (ref 21–32)
COLOR UR: ABNORMAL
CREAT SERPL-MCNC: 0.93 MG/DL (ref 0.55–1.02)
DIFFERENTIAL METHOD BLD: ABNORMAL
EGFR (NO RACE VARIABLE) (RUSH/TITUS): 64 ML/MIN/1.73M2
EOSINOPHIL # BLD AUTO: 0.15 K/UL (ref 0–0.5)
EOSINOPHIL NFR BLD AUTO: 2.7 % (ref 1–4)
ERYTHROCYTE [DISTWIDTH] IN BLOOD BY AUTOMATED COUNT: 15.7 % (ref 11.5–14.5)
GLUCOSE SERPL-MCNC: 187 MG/DL (ref 74–106)
GLUCOSE UR STRIP-MCNC: 300 MG/DL
HCT VFR BLD AUTO: 39.3 % (ref 38–47)
HGB BLD-MCNC: 12.7 G/DL (ref 12–16)
HYALINE CASTS #/AREA URNS LPF: ABNORMAL /LPF
IMM GRANULOCYTES # BLD AUTO: 0.02 K/UL (ref 0–0.04)
IMM GRANULOCYTES NFR BLD: 0.4 % (ref 0–0.4)
KETONES UR STRIP-SCNC: NEGATIVE MG/DL
LEUKOCYTE ESTERASE UR QL STRIP: ABNORMAL
LYMPHOCYTES # BLD AUTO: 1.56 K/UL (ref 1–4.8)
LYMPHOCYTES NFR BLD AUTO: 28.6 % (ref 27–41)
MCH RBC QN AUTO: 27.3 PG (ref 27–31)
MCHC RBC AUTO-ENTMCNC: 32.3 G/DL (ref 32–36)
MCV RBC AUTO: 84.5 FL (ref 80–96)
MONOCYTES # BLD AUTO: 0.42 K/UL (ref 0–0.8)
MONOCYTES NFR BLD AUTO: 7.7 % (ref 2–6)
MPC BLD CALC-MCNC: 10.1 FL (ref 9.4–12.4)
MUCOUS, UA: ABNORMAL /LPF
NEUTROPHILS # BLD AUTO: 3.28 K/UL (ref 1.8–7.7)
NEUTROPHILS NFR BLD AUTO: 60.1 % (ref 53–65)
NITRITE UR QL STRIP: NEGATIVE
NRBC # BLD AUTO: 0 X10E3/UL
NRBC, AUTO (.00): 0 %
PH UR STRIP: 6.5 PH UNITS
PLATELET # BLD AUTO: 257 K/UL (ref 150–400)
POTASSIUM SERPL-SCNC: 4.2 MMOL/L (ref 3.5–5.1)
PROT UR QL STRIP: NEGATIVE
RBC # BLD AUTO: 4.65 M/UL (ref 4.2–5.4)
RBC # UR STRIP: NEGATIVE /UL
RBC #/AREA URNS HPF: 14 /HPF
SODIUM SERPL-SCNC: 139 MMOL/L (ref 136–145)
SP GR UR STRIP: 1.01
SQUAMOUS #/AREA URNS LPF: ABNORMAL /HPF
TSH SERPL DL<=0.005 MIU/L-ACNC: 1 UIU/ML (ref 0.36–3.74)
UROBILINOGEN UR STRIP-ACNC: NORMAL MG/DL
VIT B12 SERPL-MCNC: 782 PG/ML (ref 193–986)
WBC # BLD AUTO: 5.46 K/UL (ref 4.5–11)
WBC #/AREA URNS HPF: 67 /HPF

## 2023-10-09 PROCEDURE — 82607 VITAMIN B-12: CPT | Mod: GZ,,, | Performed by: CLINICAL MEDICAL LABORATORY

## 2023-10-09 PROCEDURE — 84443 TSH: ICD-10-PCS | Mod: ,,, | Performed by: CLINICAL MEDICAL LABORATORY

## 2023-10-09 PROCEDURE — 1159F MED LIST DOCD IN RCRD: CPT | Mod: ,,, | Performed by: INTERNAL MEDICINE

## 2023-10-09 PROCEDURE — 1101F PT FALLS ASSESS-DOCD LE1/YR: CPT | Mod: ,,, | Performed by: INTERNAL MEDICINE

## 2023-10-09 PROCEDURE — 99214 OFFICE O/P EST MOD 30 MIN: CPT | Mod: ,,, | Performed by: INTERNAL MEDICINE

## 2023-10-09 PROCEDURE — 3075F PR MOST RECENT SYSTOLIC BLOOD PRESS GE 130-139MM HG: ICD-10-PCS | Mod: ,,, | Performed by: INTERNAL MEDICINE

## 2023-10-09 PROCEDURE — 80048 BASIC METABOLIC PANEL: ICD-10-PCS | Mod: ,,, | Performed by: CLINICAL MEDICAL LABORATORY

## 2023-10-09 PROCEDURE — 87086 CULTURE, URINE: ICD-10-PCS | Mod: ,,, | Performed by: CLINICAL MEDICAL LABORATORY

## 2023-10-09 PROCEDURE — 3075F SYST BP GE 130 - 139MM HG: CPT | Mod: ,,, | Performed by: INTERNAL MEDICINE

## 2023-10-09 PROCEDURE — 3079F PR MOST RECENT DIASTOLIC BLOOD PRESSURE 80-89 MM HG: ICD-10-PCS | Mod: ,,, | Performed by: INTERNAL MEDICINE

## 2023-10-09 PROCEDURE — 3288F PR FALLS RISK ASSESSMENT DOCUMENTED: ICD-10-PCS | Mod: ,,, | Performed by: INTERNAL MEDICINE

## 2023-10-09 PROCEDURE — 1101F PR PT FALLS ASSESS DOC 0-1 FALLS W/OUT INJ PAST YR: ICD-10-PCS | Mod: ,,, | Performed by: INTERNAL MEDICINE

## 2023-10-09 PROCEDURE — 1160F PR REVIEW ALL MEDS BY PRESCRIBER/CLIN PHARMACIST DOCUMENTED: ICD-10-PCS | Mod: ,,, | Performed by: INTERNAL MEDICINE

## 2023-10-09 PROCEDURE — 1160F RVW MEDS BY RX/DR IN RCRD: CPT | Mod: ,,, | Performed by: INTERNAL MEDICINE

## 2023-10-09 PROCEDURE — 80048 BASIC METABOLIC PNL TOTAL CA: CPT | Mod: ,,, | Performed by: CLINICAL MEDICAL LABORATORY

## 2023-10-09 PROCEDURE — 82607 VITAMIN B12: ICD-10-PCS | Mod: GZ,,, | Performed by: CLINICAL MEDICAL LABORATORY

## 2023-10-09 PROCEDURE — 1159F PR MEDICATION LIST DOCUMENTED IN MEDICAL RECORD: ICD-10-PCS | Mod: ,,, | Performed by: INTERNAL MEDICINE

## 2023-10-09 PROCEDURE — 1125F PR PAIN SEVERITY QUANTIFIED, PAIN PRESENT: ICD-10-PCS | Mod: ,,, | Performed by: INTERNAL MEDICINE

## 2023-10-09 PROCEDURE — 3288F FALL RISK ASSESSMENT DOCD: CPT | Mod: ,,, | Performed by: INTERNAL MEDICINE

## 2023-10-09 PROCEDURE — 84443 ASSAY THYROID STIM HORMONE: CPT | Mod: ,,, | Performed by: CLINICAL MEDICAL LABORATORY

## 2023-10-09 PROCEDURE — 99214 PR OFFICE/OUTPT VISIT, EST, LEVL IV, 30-39 MIN: ICD-10-PCS | Mod: ,,, | Performed by: INTERNAL MEDICINE

## 2023-10-09 PROCEDURE — 85025 CBC WITH DIFFERENTIAL: ICD-10-PCS | Mod: ,,, | Performed by: CLINICAL MEDICAL LABORATORY

## 2023-10-09 PROCEDURE — 81001 URINALYSIS AUTO W/SCOPE: CPT | Mod: ,,, | Performed by: CLINICAL MEDICAL LABORATORY

## 2023-10-09 PROCEDURE — 3079F DIAST BP 80-89 MM HG: CPT | Mod: ,,, | Performed by: INTERNAL MEDICINE

## 2023-10-09 PROCEDURE — 1125F AMNT PAIN NOTED PAIN PRSNT: CPT | Mod: ,,, | Performed by: INTERNAL MEDICINE

## 2023-10-09 PROCEDURE — 87086 URINE CULTURE/COLONY COUNT: CPT | Mod: ,,, | Performed by: CLINICAL MEDICAL LABORATORY

## 2023-10-09 PROCEDURE — 85025 COMPLETE CBC W/AUTO DIFF WBC: CPT | Mod: ,,, | Performed by: CLINICAL MEDICAL LABORATORY

## 2023-10-09 PROCEDURE — 81001 URINALYSIS, REFLEX TO URINE CULTURE: ICD-10-PCS | Mod: ,,, | Performed by: CLINICAL MEDICAL LABORATORY

## 2023-10-09 RX ORDER — FLUCONAZOLE 100 MG/1
TABLET ORAL
Qty: 8 TABLET | Refills: 0 | Status: SHIPPED | OUTPATIENT
Start: 2023-10-09 | End: 2024-01-10 | Stop reason: SDUPTHER

## 2023-10-09 RX ORDER — NITROFURANTOIN 25; 75 MG/1; MG/1
100 CAPSULE ORAL 2 TIMES DAILY
Qty: 20 CAPSULE | Refills: 0 | Status: SHIPPED | OUTPATIENT
Start: 2023-10-09 | End: 2024-02-15

## 2023-10-09 NOTE — TELEPHONE ENCOUNTER
----- Message from Andrea Benz NP sent at 10/9/2023  1:23 PM CDT -----  Please let patient know she has a yeast infection. If no allergy to diflucan then we will treat with Diflucan 100 mg two tablets on day one then one daily x 7 days, please let me know what pharmacy, thanks   I called and spoke with the pt and informed her of the above message.  She said she has no allergy to Diflucan and send up hill to [place] on [address]

## 2023-10-09 NOTE — TELEPHONE ENCOUNTER
Please let patient know she has a yeast infection. If no allergy to diflucan then we will treat with Diflucan 100 mg two tablets on day one then one daily x 7 days, please let me know what pharmacy, thanks         Erin Galeano RN Lafleur, Charles D, NP  No allergy to diflucan.  Send up [place] to [facility] on [address]--thanks

## 2023-10-10 ENCOUNTER — TELEPHONE (OUTPATIENT)
Dept: FAMILY MEDICINE | Facility: CLINIC | Age: 78
End: 2023-10-10
Payer: COMMERCIAL

## 2023-10-10 NOTE — TELEPHONE ENCOUNTER
----- Message from Mateo Chen MD sent at 10/10/2023 11:33 AM CDT -----  Need to see in  1 week please  abnl results     0737 Pt is scheduled to come in on 10/16/23

## 2023-10-11 ENCOUNTER — TELEPHONE (OUTPATIENT)
Dept: UROLOGY | Facility: CLINIC | Age: 78
End: 2023-10-11
Payer: COMMERCIAL

## 2023-10-11 ENCOUNTER — CLINICAL SUPPORT (OUTPATIENT)
Dept: UROLOGY | Facility: CLINIC | Age: 78
End: 2023-10-11
Payer: COMMERCIAL

## 2023-10-11 VITALS
HEIGHT: 68 IN | TEMPERATURE: 99 F | SYSTOLIC BLOOD PRESSURE: 122 MMHG | DIASTOLIC BLOOD PRESSURE: 82 MMHG | OXYGEN SATURATION: 98 % | HEART RATE: 89 BPM | RESPIRATION RATE: 20 BRPM | WEIGHT: 174 LBS | BODY MASS INDEX: 26.37 KG/M2

## 2023-10-11 DIAGNOSIS — N32.89 BLADDER SPASMS: Primary | ICD-10-CM

## 2023-10-11 LAB — UA COMPLETE W REFLEX CULTURE PNL UR: NORMAL

## 2023-10-11 PROCEDURE — 99213 OFFICE O/P EST LOW 20 MIN: CPT | Mod: PBBFAC

## 2023-10-11 NOTE — PROGRESS NOTES
Sent pt to void--rescanned  bladder and got 258ml.  Pt called her drug store while in the clinic room and she said they did get the Rx for Diflucan and she can pick it up.  Discussed with her, possibility of catheter being placed. Pt said she was told by Dr Baxter years ago not to catherize because she had bladder spasms so bad.  Relayed information to SAMSON Luciano and he said he does not want to put in a bliss with that small amount of urine at this time.   He said for her to get the Diflucan picked up and take according to directions, and call tomorrow to let us know how she is doing urinating.  We may have to bring her back in and place a catheter.  I relayed this information to pt and she voiced understanding.

## 2023-10-11 NOTE — TELEPHONE ENCOUNTER
I called the pt back, after receiving message that she is having trouble urinating.  Pt reports she is having increasing problems urinating, having to strain to urinate, sometimes when she strains she gets a little urine out and sometimes nothing comes out.  She said it feels like her urine is locked up.  I told her I will relay this to the NP and see what he wants to do.  I will call her back.

## 2023-10-11 NOTE — TELEPHONE ENCOUNTER
I called pt back and spoke with her.  I told her that NP said she can come in for a nurse visit to get bladder scanned and if she is retaining a lot of  urine, can place bliss catheter.  Pt voiced understanding and said she will come on back.  I told her she will be a work in and may have to wait a little.  She voiced understanding.

## 2023-10-12 PROBLEM — R53.1 WEAKNESS: Status: ACTIVE | Noted: 2023-10-12

## 2023-10-12 PROBLEM — N39.0 URINARY TRACT INFECTION WITHOUT HEMATURIA: Status: ACTIVE | Noted: 2023-10-12

## 2023-10-12 NOTE — PROGRESS NOTES
Subjective:       Patient ID: Lorraine De Jesus is a 78 y.o. female.    Chief Complaint: Abdominal Pain and Dizziness (Headache )  Patient presents with profound generalized weakness poor appetite lateral get up go patient does have insomnia.  I spoke with the patient told her that I think she is depressed and want to put on antidepressant she declines to antidepressant.    She complains of urinary frequency and suprapubic discomfort.  Patient also has chronic diabetes mellitus.    Plan will check a CBC BNP vitamin B12 level TSH for the fatigue check a use a CNS start Macrobid 100 mg 1 p.o. b.i.d. and also refer the patient to physical therapy at [facility]  Abdominal Pain  Pertinent negatives include no constipation, fever or frequency.   Dizziness: no fever, no light-headedness and no chest pain.no environmental allergies.    .    Current Medications:    Current Outpatient Medications:     amLODIPine (NORVASC) 5 MG tablet, Take 5 mg by mouth., Disp: , Rfl:     blood sugar diagnostic Strp, 1 strip by Misc.(Non-Drug; Combo Route) route 3 (three) times daily., Disp: 200 strip, Rfl: 3    blood-glucose meter kit, Use as instructed, Disp: 1 each, Rfl: 0    cloNIDine (CATAPRES) 0.1 MG tablet, TAKE 1 TABLET BY MOUTH EVERY 4 HOURS AS NEEDED FOR BLOOD PRESSURE GREATER THAN 170/90, Disp: , Rfl:     ezetimibe (ZETIA) 10 mg tablet, TAKE 1 TABLET(10 MG) BY MOUTH EVERY DAY, Disp: 90 tablet, Rfl: 3    gabapentin (NEURONTIN) 400 MG capsule, TAKE 1 CAPSULE(400 MG) BY MOUTH EVERY 8 HOURS, Disp: 90 capsule, Rfl: 2    glipiZIDE (GLUCOTROL) 10 MG TR24, Take 1 tablet (10 mg total) by mouth 2 (two) times a day., Disp: 180 tablet, Rfl: 1    lancets (ACCU-CHEK FASTCLIX LANCET DRUM [facility]), Take 1 each by mouth once daily., Disp: , Rfl:     levothyroxine (SYNTHROID) 75 MCG tablet, Take 1 tablet (75 mcg total) by mouth before breakfast., Disp: 90 tablet, Rfl: 1    nitrofurantoin, macrocrystal-monohydrate, (MACROBID) 100 MG capsule, Take 1 capsule (100  "mg total) by mouth 2 (two) times daily., Disp: 20 capsule, Rfl: 0    olmesartan (BENICAR) 5 MG Tab, Take 10 mg by mouth once daily., Disp: , Rfl:     ONETOUCH DELICA PLUS LANCET 33 gauge Misc, Check blood sugar TID, Disp: 100 each, Rfl: 11    ONETOUCH VERIO TEST STRIPS Strp, TEST BLOOD GLUCOSE LEVELS TWICE DAILY, Disp: 200 strip, Rfl: 4    oxybutynin (DITROPAN-XL) 5 MG TR24, Take one tablet at night, Disp: 90 tablet, Rfl: 3    aspirin (ECOTRIN) 81 MG EC tablet, Take 81 mg by mouth once daily., Disp: , Rfl:     fluconazole (DIFLUCAN) 100 MG tablet, Take two tablets on day one then reduce to one tablet daily, Disp: 8 tablet, Rfl: 0           Review of Systems   Constitutional:  Negative for appetite change, fatigue and fever.   Respiratory:  Negative for shortness of breath.    Cardiovascular:  Negative for chest pain.   Gastrointestinal:  Positive for abdominal pain. Negative for constipation.   Endocrine: Negative for polydipsia, polyphagia and polyuria.   Genitourinary:  Negative for difficulty urinating, frequency and hot flashes.   Allergic/Immunologic: Negative for environmental allergies.   Neurological:  Positive for dizziness. Negative for light-headedness.   Psychiatric/Behavioral:  Negative for agitation.                 Vitals:    10/09/23 1006 10/09/23 1029   BP: (!) 145/85 130/84   BP Location: Right arm    Patient Position: Sitting    BP Method: Large (Automatic)    Pulse: 105    Resp: 18    Temp: 98.2 °F (36.8 °C)    TempSrc: Temporal    SpO2: 98%    Weight: 78.9 kg (174 lb)    Height: 5' 8" (1.727 m)         Physical Exam  Vitals and nursing note reviewed.   Constitutional:       Appearance: Normal appearance.   Cardiovascular:      Rate and Rhythm: Normal rate and regular rhythm.      Pulses: Normal pulses.      Heart sounds: Normal heart sounds.   Pulmonary:      Effort: Pulmonary effort is normal.      Breath sounds: Normal breath sounds.   Abdominal:      General: Abdomen is flat. Bowel sounds are " normal.      Palpations: Abdomen is soft.   Musculoskeletal:         General: Normal range of motion.   Skin:     General: Skin is warm and dry.   Neurological:      General: No focal deficit present.      Mental Status: She is alert and oriented to person, place, and time. Mental status is at baseline.           Last Labs:     Office Visit on 10/09/2023   Component Date Value    Sodium 10/09/2023 139     Potassium 10/09/2023 4.2     Chloride 10/09/2023 106     CO2 10/09/2023 26     Anion Gap 10/09/2023 11     Glucose 10/09/2023 187 (H)     BUN 10/09/2023 12     Creatinine 10/09/2023 0.93     BUN/Creatinine Ratio 10/09/2023 13     Calcium 10/09/2023 9.1     eGFR 10/09/2023 64     Vitamin B12 10/09/2023 782     TSH 10/09/2023 0.997     Color, UA 10/09/2023 Light-Yellow     Clarity, UA 10/09/2023 Turbid     pH, UA 10/09/2023 6.5     Leukocytes, UA 10/09/2023 Moderate (A)     Nitrites, UA 10/09/2023 Negative     Protein, UA 10/09/2023 Negative     Glucose, UA 10/09/2023 300 (A)     Ketones, UA 10/09/2023 Negative     Urobilinogen, UA 10/09/2023 Normal     Bilirubin, UA 10/09/2023 Negative     Blood, UA 10/09/2023 Negative     Specific Gravity, UA 10/09/2023 1.013     WBC 10/09/2023 5.46     RBC 10/09/2023 4.65     Hemoglobin 10/09/2023 12.7     Hematocrit 10/09/2023 39.3     MCV 10/09/2023 84.5     MCH 10/09/2023 27.3     MCHC 10/09/2023 32.3     RDW 10/09/2023 15.7 (H)     Platelet Count 10/09/2023 257     MPV 10/09/2023 10.1     Neutrophils % 10/09/2023 60.1     Lymphocytes % 10/09/2023 28.6     Monocytes % 10/09/2023 7.7 (H)     Eosinophils % 10/09/2023 2.7     Basophils % 10/09/2023 0.5     Immature Granulocytes % 10/09/2023 0.4     nRBC, Auto 10/09/2023 0.0     Neutrophils, Abs 10/09/2023 3.28     Lymphocytes, Absolute 10/09/2023 1.56     Monocytes, Absolute 10/09/2023 0.42     Eosinophils, Absolute 10/09/2023 0.15     Basophils, Absolute 10/09/2023 0.03     Immature Granulocytes, A* 10/09/2023 0.02     nRBC,  Absolute 10/09/2023 0.00     Diff Type 10/09/2023 Auto     WBC, UA 10/09/2023 67 (H)     RBC, UA 10/09/2023 14 (H)     Bacteria, UA 10/09/2023 Moderate (A)     Squamous Epithelial Cell* 10/09/2023 Occasional (A)     Hyaline Casts, UA 10/09/2023 2-5 (A)     Mucous 10/09/2023 Occasional (A)     Culture, Urine 10/09/2023 Skin/Urogenital Louise Isolated, no further workup.    Office Visit on 10/06/2023   Component Date Value    Culture, Urine 10/06/2023 63,000 Yeast (A)    Lab Visit on 10/04/2023   Component Date Value    Creatinine, Urine 10/04/2023 127     Microalbumin 10/04/2023 2.0     Microalbumin/Creatinine * 10/04/2023 15.7    Office Visit on 10/04/2023   Component Date Value    Hemoglobin A1C 10/04/2023 6.9 (A)     POC Glucose 10/04/2023 190 (A)    Office Visit on 10/02/2023   Component Date Value    SARS Coronavirus 2 Antig* 10/02/2023 Negative     Rapid Influenza A Ag 10/02/2023 Negative     Rapid Influenza B Ag 10/02/2023 Negative      Acceptab* 10/02/2023 Yes     Color, UA 10/02/2023 Dark Deirdre     Spec Grav UA 10/02/2023 1.030     pH, UA 10/02/2023 5.5     WBC, UA 10/02/2023 Trace     Nitrite, UA 10/02/2023 Positive     Protein, POC 10/02/2023 30     Glucose, UA 10/02/2023 100     Ketones, UA 10/02/2023 Negative     Bilirubin, POC 10/02/2023 Negative     Urobilinogen, UA 10/02/2023 1.0     Blood, UA 10/02/2023 Trace        Last Imaging:  X-Ray KUB  Narrative: EXAMINATION:  XR KUB    CLINICAL HISTORY:  Lower abdominal pain, unspecified    TECHNIQUE:  Abdomen, 2 supine views    COMPARISON:  09/10/2018, 07/26/2023, and 08/04/2023    FINDINGS:  An 8 mm calcification which overlies the upper pole of the left kidney.  Nonspecific calcifications are present inferior to the SI joint and also in the right lower quadrant.  These could be vascular in origin or could be in the area of the distal ureters.  Vascular calcifications are present elsewhere.    There is abundant stool throughout the colon but no  small bowel dilatation or displacement is seen.  No significant focal bony abnormalities are present.  Impression: 1.  Abundant stool.    2.  Possible left nephrolithiasis.    [facility] of [facility]    Electronically signed by: Philly Euceda  Date:    10/02/2023  Time:    15:33         **Labs and x-rays personally reviewed by me    ** reviewed      Objective:        Assessment:       1. Urinary tract infection without hematuria, site unspecified  Basic Metabolic Panel    CBC Auto Differential    Urinalysis, Reflex to Urine Culture    Basic Metabolic Panel    CBC Auto Differential    Urinalysis, Reflex to Urine Culture    Urinalysis, Microscopic    Urine culture      2. TSH deficiency  TSH    TSH      3. B12 deficiency  Vitamin B12    Vitamin B12      4. Weakness  Ambulatory referral/consult to Physical/Occupational Therapy           Plan:         [unfilled]

## 2023-10-16 ENCOUNTER — HOSPITAL ENCOUNTER (EMERGENCY)
Facility: HOSPITAL | Age: 78
Discharge: HOME | End: 2023-10-16
Payer: COMMERCIAL

## 2023-10-16 ENCOUNTER — OFFICE VISIT (OUTPATIENT)
Dept: FAMILY MEDICINE | Facility: CLINIC | Age: 78
End: 2023-10-16
Payer: COMMERCIAL

## 2023-10-16 VITALS
DIASTOLIC BLOOD PRESSURE: 81 MMHG | RESPIRATION RATE: 18 BRPM | HEIGHT: 68 IN | OXYGEN SATURATION: 98 % | BODY MASS INDEX: 26.43 KG/M2 | WEIGHT: 174.38 LBS | TEMPERATURE: 97 F | HEART RATE: 79 BPM | SYSTOLIC BLOOD PRESSURE: 128 MMHG

## 2023-10-16 VITALS
TEMPERATURE: 99 F | OXYGEN SATURATION: 98 % | BODY MASS INDEX: 26.07 KG/M2 | RESPIRATION RATE: 16 BRPM | HEART RATE: 70 BPM | WEIGHT: 172 LBS | SYSTOLIC BLOOD PRESSURE: 164 MMHG | DIASTOLIC BLOOD PRESSURE: 88 MMHG | HEIGHT: 68 IN

## 2023-10-16 DIAGNOSIS — R07.9 CHEST PAIN, UNSPECIFIED TYPE: Primary | ICD-10-CM

## 2023-10-16 DIAGNOSIS — R07.9 CHEST PAIN: ICD-10-CM

## 2023-10-16 DIAGNOSIS — N39.0 ACUTE URINARY TRACT INFECTION: Primary | ICD-10-CM

## 2023-10-16 DIAGNOSIS — I10 ESSENTIAL (PRIMARY) HYPERTENSION: ICD-10-CM

## 2023-10-16 DIAGNOSIS — F33.1 MODERATE EPISODE OF RECURRENT MAJOR DEPRESSIVE DISORDER: ICD-10-CM

## 2023-10-16 LAB
ALBUMIN SERPL BCP-MCNC: 3.2 G/DL (ref 3.5–5)
ALBUMIN/GLOB SERPL: 0.7 {RATIO}
ALP SERPL-CCNC: 90 U/L (ref 55–142)
ALT SERPL W P-5'-P-CCNC: 19 U/L (ref 13–56)
ANION GAP SERPL CALCULATED.3IONS-SCNC: 6 MMOL/L (ref 7–16)
APTT PPP: 33.2 SECONDS (ref 25.2–37.3)
AST SERPL W P-5'-P-CCNC: 10 U/L (ref 15–37)
BACTERIA #/AREA URNS HPF: ABNORMAL /HPF
BASOPHILS # BLD AUTO: 0.03 K/UL (ref 0–0.2)
BASOPHILS NFR BLD AUTO: 0.3 % (ref 0–1)
BILIRUB SERPL-MCNC: 0.3 MG/DL (ref ?–1.2)
BILIRUB UR QL STRIP: NEGATIVE
BUN SERPL-MCNC: 15 MG/DL (ref 7–18)
BUN/CREAT SERPL: 18 (ref 6–20)
CALCIUM SERPL-MCNC: 9.2 MG/DL (ref 8.5–10.1)
CHLORIDE SERPL-SCNC: 107 MMOL/L (ref 98–107)
CLARITY UR: CLEAR
CO2 SERPL-SCNC: 29 MMOL/L (ref 21–32)
COLOR UR: YELLOW
CREAT SERPL-MCNC: 0.83 MG/DL (ref 0.55–1.02)
DIFFERENTIAL METHOD BLD: ABNORMAL
EGFR (NO RACE VARIABLE) (RUSH/TITUS): 73 ML/MIN/1.73M2
EOSINOPHIL # BLD AUTO: 0.07 K/UL (ref 0–0.5)
EOSINOPHIL NFR BLD AUTO: 0.8 % (ref 1–4)
ERYTHROCYTE [DISTWIDTH] IN BLOOD BY AUTOMATED COUNT: 15.1 % (ref 11.5–14.5)
GLOBULIN SER-MCNC: 4.9 G/DL (ref 2–4)
GLUCOSE SERPL-MCNC: 145 MG/DL (ref 74–106)
GLUCOSE UR STRIP-MCNC: 150 MG/DL
HCT VFR BLD AUTO: 39.1 % (ref 38–47)
HGB BLD-MCNC: 12.8 G/DL (ref 12–16)
IMM GRANULOCYTES # BLD AUTO: 0.03 K/UL (ref 0–0.04)
IMM GRANULOCYTES NFR BLD: 0.3 % (ref 0–0.4)
INR BLD: 1.05
KETONES UR STRIP-SCNC: NEGATIVE MG/DL
LEUKOCYTE ESTERASE UR QL STRIP: ABNORMAL
LYMPHOCYTES # BLD AUTO: 1.84 K/UL (ref 1–4.8)
LYMPHOCYTES NFR BLD AUTO: 21.1 % (ref 27–41)
MAGNESIUM SERPL-MCNC: 2.4 MG/DL (ref 1.7–2.3)
MCH RBC QN AUTO: 27.3 PG (ref 27–31)
MCHC RBC AUTO-ENTMCNC: 32.7 G/DL (ref 32–36)
MCV RBC AUTO: 83.4 FL (ref 80–96)
MONOCYTES # BLD AUTO: 0.44 K/UL (ref 0–0.8)
MONOCYTES NFR BLD AUTO: 5 % (ref 2–6)
MPC BLD CALC-MCNC: 9.4 FL (ref 9.4–12.4)
MUCOUS, UA: ABNORMAL /LPF
NEUTROPHILS # BLD AUTO: 6.33 K/UL (ref 1.8–7.7)
NEUTROPHILS NFR BLD AUTO: 72.5 % (ref 53–65)
NITRITE UR QL STRIP: NEGATIVE
NRBC # BLD AUTO: 0 X10E3/UL
NRBC, AUTO (.00): 0 %
NT-PROBNP SERPL-MCNC: 77 PG/ML (ref 1–450)
PH UR STRIP: 6.5 PH UNITS
PLATELET # BLD AUTO: 344 K/UL (ref 150–400)
POTASSIUM SERPL-SCNC: 4.1 MMOL/L (ref 3.5–5.1)
PROT SERPL-MCNC: 8.1 G/DL (ref 6.4–8.2)
PROT UR QL STRIP: 10
PROTHROMBIN TIME: 13.6 SECONDS (ref 11.7–14.7)
RBC # BLD AUTO: 4.69 M/UL (ref 4.2–5.4)
RBC # UR STRIP: NEGATIVE /UL
RBC #/AREA URNS HPF: 4 /HPF
SODIUM SERPL-SCNC: 138 MMOL/L (ref 136–145)
SP GR UR STRIP: 1.02
SQUAMOUS #/AREA URNS LPF: ABNORMAL /HPF
TROPONIN I SERPL DL<=0.01 NG/ML-MCNC: <4 PG/ML
UROBILINOGEN UR STRIP-ACNC: NORMAL MG/DL
WBC # BLD AUTO: 8.74 K/UL (ref 4.5–11)
WBC #/AREA URNS HPF: 40 /HPF

## 2023-10-16 PROCEDURE — 96361 HYDRATE IV INFUSION ADD-ON: CPT

## 2023-10-16 PROCEDURE — 3288F FALL RISK ASSESSMENT DOCD: CPT | Mod: ,,, | Performed by: INTERNAL MEDICINE

## 2023-10-16 PROCEDURE — 87086 URINE CULTURE/COLONY COUNT: CPT | Performed by: NURSE PRACTITIONER

## 2023-10-16 PROCEDURE — 85730 THROMBOPLASTIN TIME PARTIAL: CPT | Performed by: NURSE PRACTITIONER

## 2023-10-16 PROCEDURE — 3074F SYST BP LT 130 MM HG: CPT | Mod: ,,, | Performed by: INTERNAL MEDICINE

## 2023-10-16 PROCEDURE — 99284 EMERGENCY DEPT VISIT MOD MDM: CPT | Mod: 25

## 2023-10-16 PROCEDURE — 3079F DIAST BP 80-89 MM HG: CPT | Mod: ,,, | Performed by: INTERNAL MEDICINE

## 2023-10-16 PROCEDURE — 99214 OFFICE O/P EST MOD 30 MIN: CPT | Mod: ,,, | Performed by: INTERNAL MEDICINE

## 2023-10-16 PROCEDURE — 93010 EKG 12-LEAD: ICD-10-PCS | Mod: ,,, | Performed by: STUDENT IN AN ORGANIZED HEALTH CARE EDUCATION/TRAINING PROGRAM

## 2023-10-16 PROCEDURE — 1159F MED LIST DOCD IN RCRD: CPT | Mod: ,,, | Performed by: INTERNAL MEDICINE

## 2023-10-16 PROCEDURE — 1160F PR REVIEW ALL MEDS BY PRESCRIBER/CLIN PHARMACIST DOCUMENTED: ICD-10-PCS | Mod: ,,, | Performed by: INTERNAL MEDICINE

## 2023-10-16 PROCEDURE — 99284 EMERGENCY DEPT VISIT MOD MDM: CPT | Mod: ,,, | Performed by: NURSE PRACTITIONER

## 2023-10-16 PROCEDURE — 25000003 PHARM REV CODE 250: Performed by: NURSE PRACTITIONER

## 2023-10-16 PROCEDURE — 80053 COMPREHEN METABOLIC PANEL: CPT | Performed by: NURSE PRACTITIONER

## 2023-10-16 PROCEDURE — 93005 ELECTROCARDIOGRAM TRACING: CPT

## 2023-10-16 PROCEDURE — 93010 ELECTROCARDIOGRAM REPORT: CPT | Mod: ,,, | Performed by: STUDENT IN AN ORGANIZED HEALTH CARE EDUCATION/TRAINING PROGRAM

## 2023-10-16 PROCEDURE — 1125F AMNT PAIN NOTED PAIN PRSNT: CPT | Mod: ,,, | Performed by: INTERNAL MEDICINE

## 2023-10-16 PROCEDURE — 96365 THER/PROPH/DIAG IV INF INIT: CPT

## 2023-10-16 PROCEDURE — 1101F PR PT FALLS ASSESS DOC 0-1 FALLS W/OUT INJ PAST YR: ICD-10-PCS | Mod: ,,, | Performed by: INTERNAL MEDICINE

## 2023-10-16 PROCEDURE — 3079F PR MOST RECENT DIASTOLIC BLOOD PRESSURE 80-89 MM HG: ICD-10-PCS | Mod: ,,, | Performed by: INTERNAL MEDICINE

## 2023-10-16 PROCEDURE — 1101F PT FALLS ASSESS-DOCD LE1/YR: CPT | Mod: ,,, | Performed by: INTERNAL MEDICINE

## 2023-10-16 PROCEDURE — 1159F PR MEDICATION LIST DOCUMENTED IN MEDICAL RECORD: ICD-10-PCS | Mod: ,,, | Performed by: INTERNAL MEDICINE

## 2023-10-16 PROCEDURE — 83880 ASSAY OF NATRIURETIC PEPTIDE: CPT | Performed by: NURSE PRACTITIONER

## 2023-10-16 PROCEDURE — 1125F PR PAIN SEVERITY QUANTIFIED, PAIN PRESENT: ICD-10-PCS | Mod: ,,, | Performed by: INTERNAL MEDICINE

## 2023-10-16 PROCEDURE — 63600175 PHARM REV CODE 636 W HCPCS: Performed by: NURSE PRACTITIONER

## 2023-10-16 PROCEDURE — 85610 PROTHROMBIN TIME: CPT | Performed by: NURSE PRACTITIONER

## 2023-10-16 PROCEDURE — 99214 PR OFFICE/OUTPT VISIT, EST, LEVL IV, 30-39 MIN: ICD-10-PCS | Mod: ,,, | Performed by: INTERNAL MEDICINE

## 2023-10-16 PROCEDURE — 81001 URINALYSIS AUTO W/SCOPE: CPT | Performed by: NURSE PRACTITIONER

## 2023-10-16 PROCEDURE — 85025 COMPLETE CBC W/AUTO DIFF WBC: CPT | Performed by: NURSE PRACTITIONER

## 2023-10-16 PROCEDURE — 3288F PR FALLS RISK ASSESSMENT DOCUMENTED: ICD-10-PCS | Mod: ,,, | Performed by: INTERNAL MEDICINE

## 2023-10-16 PROCEDURE — 83735 ASSAY OF MAGNESIUM: CPT | Performed by: NURSE PRACTITIONER

## 2023-10-16 PROCEDURE — 84484 ASSAY OF TROPONIN QUANT: CPT | Performed by: NURSE PRACTITIONER

## 2023-10-16 PROCEDURE — 87077 CULTURE AEROBIC IDENTIFY: CPT | Performed by: NURSE PRACTITIONER

## 2023-10-16 PROCEDURE — 1160F RVW MEDS BY RX/DR IN RCRD: CPT | Mod: ,,, | Performed by: INTERNAL MEDICINE

## 2023-10-16 PROCEDURE — 3074F PR MOST RECENT SYSTOLIC BLOOD PRESSURE < 130 MM HG: ICD-10-PCS | Mod: ,,, | Performed by: INTERNAL MEDICINE

## 2023-10-16 PROCEDURE — 99284 PR EMERGENCY DEPT VISIT,LEVEL IV: ICD-10-PCS | Mod: ,,, | Performed by: NURSE PRACTITIONER

## 2023-10-16 RX ADMIN — SODIUM CHLORIDE 1000 ML: 9 INJECTION, SOLUTION INTRAVENOUS at 04:10

## 2023-10-16 RX ADMIN — GENTAMICIN SULFATE 350 MG: 40 INJECTION, SOLUTION INTRAMUSCULAR; INTRAVENOUS at 04:10

## 2023-10-16 NOTE — DISCHARGE INSTRUCTIONS
Take medication as prescribed.   Increase fluid intake to flush urinary tract and to keep hydrated.   Follow up with PCP in 2 days for recheck.   Return to ER with new or worsening symptoms.

## 2023-10-16 NOTE — ED TRIAGE NOTES
Pt presents to ED with c/o weakness, body aches, and chest tightness. Pt reports she was sent per Dr. Chen. Pt reports she has been sick for almost 2 weeks

## 2023-10-16 NOTE — PROGRESS NOTES
Pt referred to ER per Dr Chen for chest pains; pt declined being transferred to ER by ambulance; pt will arrive POV

## 2023-10-17 ENCOUNTER — TELEPHONE (OUTPATIENT)
Dept: EMERGENCY MEDICINE | Facility: HOSPITAL | Age: 78
End: 2023-10-17
Payer: COMMERCIAL

## 2023-10-17 PROBLEM — F33.1 MODERATE EPISODE OF RECURRENT MAJOR DEPRESSIVE DISORDER: Status: ACTIVE | Noted: 2023-10-17

## 2023-10-17 PROBLEM — R07.9 CHEST PAIN: Status: ACTIVE | Noted: 2023-10-17

## 2023-10-17 RX ORDER — CEFUROXIME AXETIL 500 MG/1
500 TABLET ORAL EVERY 12 HOURS
Qty: 20 TABLET | Refills: 0 | Status: SHIPPED | OUTPATIENT
Start: 2023-10-17 | End: 2023-10-27

## 2023-10-17 NOTE — ED PROVIDER NOTES
Encounter Date: 10/16/2023       History     Chief Complaint   Patient presents with    Generalized Body Aches     Patient presents to ER with complaint of generalized body aches and chest pain.  She was sent to ER by Dr. Chen's office for evaluation.  She states her symptoms have been ongoing for proximally 2 week.  She states she is been treated for yeast infection and bacterial infection by Dr. Chen's.  She reports she did not have symptoms when she was treated for those things.  She was chest pain as pain when taking a deep breath and tenderness with palpation.  States she was had a headache and  body aches.  She denies fever.  No nausea vomiting or diarrhea.  She states she has not had much of an appetite and just feels dehydrated.  No cough or congestion.  No syncope or dizziness.    The history is provided by the patient. No  was used.     Review of patient's allergies indicates:   Allergen Reactions    Lipitor [atorvastatin] Other (See Comments)     Muscle aching    Pravachol [pravastatin] Other (See Comments)     Muscle aching     Past Medical History:   Diagnosis Date    Adenomatous polyp of ascending colon 6/22/2021    Adenomatous polyp of descending colon 6/22/2021    Age related osteoporosis 10/28/2020    Benign paroxysmal vertigo     Chronic pain syndrome     Chronic pelvic pain in female 7/28/2021    Ditropan XL 5mg    Colon, diverticulosis 6/22/2021    Depressive disorder 08/12/2019    External hemorrhoid 6/22/2021    Gastrointestinal hemorrhage associated with anorectal source 6/22/2021    GERD (gastroesophageal reflux disease) 11/21/2013    Hyperlipidemia 01/16/2012    Hypothyroidism 02/25/2019    Joint pain     Nonrheumatic tricuspid (valve) insufficiency 03/18/2018    Personal history UTI 7/28/2021    Controlled on Hiprex    Polyneuropathy 07/11/2018    PVD (peripheral vascular disease) 10/30/2018    Temporal arteritis     Type 2 diabetes mellitus 04/02/2014    Urethral  meatal stenosis 2018    history of known urethral stenosis, and was taught to perform monthly self dilation at home.     Past Surgical History:   Procedure Laterality Date     left lumbar sympathetic nerve block Left 2020    X3  DR BROWN    CARDIAC CATHETERIZATION  10/14/2009     SECTION      x 2    COLONOSCOPY  2009    CYSTOSCOPY WITH HYDRODISTENSION OF BLADDER N/A 2021    Procedure: CYSTOSCOPY, WITH BLADDER HYDRODISTENSION;  Surgeon: Dequan Recio MD;  Location: [facility] OR;  Service: Urology;  Laterality: N/A;    CYSTOSCOPY WITH URETHRAL DILATION  2021    Dr Recio    EPIDURAL STEROID INJECTION N/A 10/27/2022    Procedure: Injection, Steroid, Epidural, L5/S1;  Surgeon: Belkis Brown MD;  Location: [facility];  Service: Pain Management;  Laterality: N/A;    EPIDURAL STEROID INJECTION N/A 2023    Procedure: Injection, Steroid, Epidural, L5/S1;  Surgeon: Belkis Brown MD;  Location: [facility];  Service: Pain Management;  Laterality: N/A;    HYSTERECTOMY      LUMBAR SYMPATHETIC NERVE BLOCK Left 10/05/2020    Left Sympathetic Nerve Block - Dr Brown - 10/5/20, 20, 20. 20, 10/9/19, 19 and 18    right lumbar sympathetic nerve block Right 2020    X4 DR BROWN    THYROIDECTOMY           Family History   Problem Relation Age of Onset    Cancer Mother     Hypertension Mother     Cancer Father     Cancer Sister     Diabetes Sister     Hyperlipidemia Sister     Hypertension Sister     Cancer Brother     Diabetes Sister     No Known Problems Sister     Cancer Brother     No Known Problems Brother     Cancer Brother      Social History     Tobacco Use    Smoking status: Never     Passive exposure: Never    Smokeless tobacco: Never   Substance Use Topics    Alcohol use: Never    Drug use: Never     Review of Systems   Constitutional:  Positive for activity change, appetite change, chills and fatigue.   Cardiovascular:  Positive for  chest pain.   Neurological:  Positive for weakness and headaches.   All other systems reviewed and are negative.      Physical Exam     Initial Vitals [10/16/23 1205]   BP Pulse Resp Temp SpO2   (!) 148/86 83 19 98.6 °F (37 °C) 100 %      MAP       --         Physical Exam    Nursing note and vitals reviewed.  Constitutional: She appears well-developed and well-nourished.   HENT:   Head: Normocephalic.   Nose: Nose normal.   Mouth/Throat: Oropharynx is clear and moist.   Eyes: Conjunctivae and EOM are normal.   Neck: Neck supple.   Normal range of motion.  Cardiovascular:  Normal rate, regular rhythm, normal heart sounds and intact distal pulses.           Pulmonary/Chest: Breath sounds normal.   Abdominal: Abdomen is soft. Bowel sounds are normal.   Musculoskeletal:         General: Normal range of motion.      Cervical back: Normal range of motion and neck supple.     Neurological: She is alert and oriented to person, place, and time. She has normal strength. GCS score is 15. GCS eye subscore is 4. GCS verbal subscore is 5. GCS motor subscore is 6.   Skin: Skin is warm and dry. Capillary refill takes less than 2 seconds.   Psychiatric: She has a normal mood and affect. Her behavior is normal. Judgment and thought content normal.         Medical Screening Exam   See Full Note    ED Course   Procedures  Labs Reviewed   CULTURE, URINE - Abnormal; Notable for the following components:       Result Value    Culture, Urine 20,000 Streptococcus agalactiae (Group B) (*)     All other components within normal limits   COMPREHENSIVE METABOLIC PANEL - Abnormal; Notable for the following components:    Anion Gap 6 (*)     Glucose 145 (*)     Albumin 3.2 (*)     Globulin 4.9 (*)     AST 10 (*)     All other components within normal limits   URINALYSIS, REFLEX TO URINE CULTURE - Abnormal; Notable for the following components:    Leukocytes, UA Moderate (*)     Protein, UA 10 (*)     Glucose,  (*)     All other components  within normal limits   MAGNESIUM - Abnormal; Notable for the following components:    Magnesium 2.4 (*)     All other components within normal limits   CBC WITH DIFFERENTIAL - Abnormal; Notable for the following components:    RDW 15.1 (*)     Neutrophils % 72.5 (*)     Lymphocytes % 21.1 (*)     Eosinophils % 0.8 (*)     All other components within normal limits   URINALYSIS, MICROSCOPIC - Abnormal; Notable for the following components:    WBC, UA 40 (*)     RBC, UA 4 (*)     Bacteria, UA Moderate (*)     Squamous Epithelial Cells, UA Few (*)     Mucous Few (*)     All other components within normal limits   TROPONIN I - Normal   PROTIME-INR - Normal   APTT - Normal   NT-PRO NATRIURETIC PEPTIDE - Normal   CBC W/ AUTO DIFFERENTIAL    Narrative:     The following orders were created for panel order CBC auto differential.  Procedure                               Abnormality         Status                     ---------                               -----------         ------                     CBC with Differential[[id number]]       Abnormal            Final result                 Please view results for these tests on the individual orders.          Imaging Results              X-Ray Chest AP Portable (Final result)  Result time 10/16/23 13:05:42      Final result by Wilfrido Flores MD (10/16/23 13:05:42)                   Impression:      No acute findings.      Electronically signed by: Wilfrido Flores  Date:    10/16/2023  Time:    13:05               Narrative:    EXAMINATION:  XR CHEST AP PORTABLE    CLINICAL HISTORY:  Chest pain;    TECHNIQUE:  Single frontal view of the chest was performed.    COMPARISON:  07/12/2021    FINDINGS:  Heart size normal. Lungs clear. No pneumothorax or pleural effusion.                                       Medications   gentamicin (GARAMYCIN) 350 mg in sodium chloride 0.9% 100 mL IVPB (0 mg Intravenous Stopped 10/16/23 1722)   sodium chloride 0.9% bolus 1,000 mL 1,000 mL (0 mLs  Intravenous Stopped 10/16/23 1809)     Medical Decision Making  Patient presents to ER with complaint of generalized body aches and chest pain.  She was sent to ER by Dr. Chen's office for evaluation.  She states her symptoms have been ongoing for proximally 2 week.  She states she is been treated for yeast infection and bacterial infection by Dr. Chen's.  She reports she did not have symptoms when she was treated for those things.  She was chest pain as pain when taking a deep breath and tenderness with palpation.  States she was had a headache and  body aches.  She denies fever.  No nausea vomiting or diarrhea.  She states she has not had much of an appetite and just feels dehydrated.  No cough or congestion.  No syncope or dizziness.      Amount and/or Complexity of Data Reviewed  External Data Reviewed: notes.  Labs: ordered. Decision-making details documented in ED Course.  Radiology: ordered. Decision-making details documented in ED Course.  Discussion of management or test interpretation with external provider(s): Initiate IV, collect lab work  1 L normal saline bolus.  Gentamicin 1 in done dose IV acute urinary tract    Patient was discharged home with diagnosis of acute urinary tract infection and chest pain.  1 done dose of gentamicin IV in the ER treat her urinary tract infection.  She was told she was not have antibiotics to take at home.  She was told to increase her fluid intake to flush her urinary tract.  She was told to follow up with Dr. Chen in 2 days for recheck.                               Clinical Impression:   Final diagnoses:  [R07.9] Chest pain  [N39.0] Acute urinary tract infection (Primary)        ED Disposition Condition    Discharge Stable          ED Prescriptions    None       Follow-up Information       Follow up With Specialties Details Why Contact Mateo Mayfield MD Internal Medicine, Family Medicine, Hospitalist Schedule an appointment as soon as possible for a  visit in 2 days  [address] [facility] [place] [address]  [phone]               Elma Graham, FNP  10/17/23 7411

## 2023-10-17 NOTE — TELEPHONE ENCOUNTER
----- Message from OLGA Alaniz sent at 10/17/2023 12:12 PM CDT -----  Please notify the patient that I sent in a RX for antibiotics to her pharmacy

## 2023-10-17 NOTE — PROGRESS NOTES
Subjective:       Patient ID: Lorraine De Jesus is a 78 y.o. female.  3  Chief Complaint: Headache, Dizziness, and Abdominal Pain  Patient presents with multiple complaints she complains dizziness abdominal pain in a in her most important complains severe chest pain.  She described the chest tightness she rates it a 5/10.  Stated she is very concerned about this she is never had this type of chest pain will patient also has chronic hypotension slightly elevated today.  Because she is having active chest tightness/chest pain going to refer her to the emergency room.  Also potential hospitalization has been discussed with patient.  Headache   Associated symptoms include abdominal pain and dizziness. Pertinent negatives include no fever.   Dizziness:    Associated symptoms: headaches.no fever, no light-headedness and no chest pain.no environmental allergies.  Abdominal Pain  Associated symptoms include headaches. Pertinent negatives include no constipation, fever or frequency.     .    Current Medications:    Current Outpatient Medications:     amLODIPine (NORVASC) 5 MG tablet, Take 5 mg by mouth., Disp: , Rfl:     blood sugar diagnostic Strp, 1 strip by Misc.(Non-Drug; Combo Route) route 3 (three) times daily., Disp: 200 strip, Rfl: 3    blood-glucose meter kit, Use as instructed, Disp: 1 each, Rfl: 0    cloNIDine (CATAPRES) 0.1 MG tablet, TAKE 1 TABLET BY MOUTH EVERY 4 HOURS AS NEEDED FOR BLOOD PRESSURE GREATER THAN 170/90, Disp: , Rfl:     ezetimibe (ZETIA) 10 mg tablet, TAKE 1 TABLET(10 MG) BY MOUTH EVERY DAY, Disp: 90 tablet, Rfl: 3    fluconazole (DIFLUCAN) 100 MG tablet, Take two tablets on day one then reduce to one tablet daily, Disp: 8 tablet, Rfl: 0    gabapentin (NEURONTIN) 400 MG capsule, TAKE 1 CAPSULE(400 MG) BY MOUTH EVERY 8 HOURS, Disp: 90 capsule, Rfl: 2    glipiZIDE (GLUCOTROL) 10 MG TR24, Take 1 tablet (10 mg total) by mouth 2 (two) times a day., Disp: 180 tablet, Rfl: 1    lancets (ACCU-CHEK FASTCLIX  "LANCET DRUM MISC), Take 1 each by mouth once daily., Disp: , Rfl:     levothyroxine (SYNTHROID) 75 MCG tablet, Take 1 tablet (75 mcg total) by mouth before breakfast., Disp: 90 tablet, Rfl: 1    nitrofurantoin, macrocrystal-monohydrate, (MACROBID) 100 MG capsule, Take 1 capsule (100 mg total) by mouth 2 (two) times daily., Disp: 20 capsule, Rfl: 0    olmesartan (BENICAR) 5 MG Tab, Take 10 mg by mouth once daily., Disp: , Rfl:     ONETOUCH DELICA PLUS LANCET 33 gauge Misc, Check blood sugar TID, Disp: 100 each, Rfl: 11    ONETOUCH VERIO TEST STRIPS Strp, TEST BLOOD GLUCOSE LEVELS TWICE DAILY, Disp: 200 strip, Rfl: 4    oxybutynin (DITROPAN-XL) 5 MG TR24, Take one tablet at night, Disp: 90 tablet, Rfl: 3    aspirin (ECOTRIN) 81 MG EC tablet, Take 81 mg by mouth once daily., Disp: , Rfl:     cefUROXime (CEFTIN) 500 MG tablet, Take 1 tablet (500 mg total) by mouth every 12 (twelve) hours. for 10 days, Disp: 20 tablet, Rfl: 0  No current facility-administered medications for this visit.           Review of Systems   Constitutional:  Negative for appetite change, fatigue and fever.   Respiratory:  Negative for shortness of breath.    Cardiovascular:  Negative for chest pain.   Gastrointestinal:  Positive for abdominal pain. Negative for constipation.   Endocrine: Negative for polydipsia, polyphagia and polyuria.   Genitourinary:  Negative for difficulty urinating, frequency and hot flashes.   Allergic/Immunologic: Negative for environmental allergies.   Neurological:  Positive for dizziness and headaches. Negative for light-headedness.   Psychiatric/Behavioral:  Negative for agitation.                 Vitals:    10/16/23 0948   BP: 128/81   BP Location: Right arm   Patient Position: Sitting   BP Method: Large (Automatic)   Pulse: 79   Resp: 18   Temp: 97.3 °F (36.3 °C)   TempSrc: Temporal   SpO2: 98%   Weight: 79.1 kg (174 lb 6.4 oz)   Height: 5' 8" (1.727 m)        Physical Exam  Vitals and nursing note reviewed. "   Constitutional:       Appearance: Normal appearance.   Cardiovascular:      Rate and Rhythm: Normal rate and regular rhythm.      Pulses: Normal pulses.      Heart sounds: Normal heart sounds.   Pulmonary:      Effort: Pulmonary effort is normal.      Breath sounds: Normal breath sounds.   Abdominal:      General: Abdomen is flat. Bowel sounds are normal.      Palpations: Abdomen is soft.   Musculoskeletal:         General: Normal range of motion.   Skin:     General: Skin is warm and dry.   Neurological:      General: No focal deficit present.      Mental Status: She is alert and oriented to person, place, and time. Mental status is at baseline.           Last Labs:     Admission on 10/16/2023, Discharged on 10/16/2023   Component Date Value    Sodium 10/16/2023 138     Potassium 10/16/2023 4.1     Chloride 10/16/2023 107     CO2 10/16/2023 29     Anion Gap 10/16/2023 6 (L)     Glucose 10/16/2023 145 (H)     BUN 10/16/2023 15     Creatinine 10/16/2023 0.83     BUN/Creatinine Ratio 10/16/2023 18     Calcium 10/16/2023 9.2     Total Protein 10/16/2023 8.1     Albumin 10/16/2023 3.2 (L)     Globulin 10/16/2023 4.9 (H)     A/G Ratio 10/16/2023 0.7     Bilirubin, Total 10/16/2023 0.3     Alk Phos 10/16/2023 90     ALT 10/16/2023 19     AST 10/16/2023 10 (L)     eGFR 10/16/2023 73     Color, UA 10/16/2023 Yellow     Clarity, UA 10/16/2023 Clear     pH, UA 10/16/2023 6.5     Leukocytes, UA 10/16/2023 Moderate (A)     Nitrites, UA 10/16/2023 Negative     Protein, UA 10/16/2023 10 (A)     Glucose, UA 10/16/2023 150 (A)     Ketones, UA 10/16/2023 Negative     Urobilinogen, UA 10/16/2023 Normal     Bilirubin, UA 10/16/2023 Negative     Blood, UA 10/16/2023 Negative     Specific Gravity, UA 10/16/2023 1.020     Troponin I High Sensitiv* 10/16/2023 <4.0     PT 10/16/2023 13.6     INR 10/16/2023 1.05     Magnesium 10/16/2023 2.4 (H)     PTT 10/16/2023 33.2     ProBNP 10/16/2023 77     WBC 10/16/2023 8.74     RBC 10/16/2023  4.69     Hemoglobin 10/16/2023 12.8     Hematocrit 10/16/2023 39.1     MCV 10/16/2023 83.4     MCH 10/16/2023 27.3     MCHC 10/16/2023 32.7     RDW 10/16/2023 15.1 (H)     Platelet Count 10/16/2023 344     MPV 10/16/2023 9.4     Neutrophils % 10/16/2023 72.5 (H)     Lymphocytes % 10/16/2023 21.1 (L)     Monocytes % 10/16/2023 5.0     Eosinophils % 10/16/2023 0.8 (L)     Basophils % 10/16/2023 0.3     Immature Granulocytes % 10/16/2023 0.3     nRBC, Auto 10/16/2023 0.0     Neutrophils, Abs 10/16/2023 6.33     Lymphocytes, Absolute 10/16/2023 1.84     Monocytes, Absolute 10/16/2023 0.44     Eosinophils, Absolute 10/16/2023 0.07     Basophils, Absolute 10/16/2023 0.03     Immature Granulocytes, A* 10/16/2023 0.03     nRBC, Absolute 10/16/2023 0.00     Diff Type 10/16/2023 Auto     WBC, UA 10/16/2023 40 (H)     RBC, UA 10/16/2023 4 (H)     Bacteria, UA 10/16/2023 Moderate (A)     Squamous Epithelial Cell* 10/16/2023 Few (A)     Mucous 10/16/2023 Few (A)     Culture, Urine 10/16/2023 20,000 Streptococcus agalactiae (Group B) (A)    Office Visit on 10/09/2023   Component Date Value    Sodium 10/09/2023 139     Potassium 10/09/2023 4.2     Chloride 10/09/2023 106     CO2 10/09/2023 26     Anion Gap 10/09/2023 11     Glucose 10/09/2023 187 (H)     BUN 10/09/2023 12     Creatinine 10/09/2023 0.93     BUN/Creatinine Ratio 10/09/2023 13     Calcium 10/09/2023 9.1     eGFR 10/09/2023 64     Vitamin B12 10/09/2023 782     TSH 10/09/2023 0.997     Color, UA 10/09/2023 Light-Yellow     Clarity, UA 10/09/2023 Turbid     pH, UA 10/09/2023 6.5     Leukocytes, UA 10/09/2023 Moderate (A)     Nitrites, UA 10/09/2023 Negative     Protein, UA 10/09/2023 Negative     Glucose, UA 10/09/2023 300 (A)     Ketones, UA 10/09/2023 Negative     Urobilinogen, UA 10/09/2023 Normal     Bilirubin, UA 10/09/2023 Negative     Blood, UA 10/09/2023 Negative     Specific Gravity, UA 10/09/2023 1.013     WBC 10/09/2023 5.46     RBC 10/09/2023 4.65      Hemoglobin 10/09/2023 12.7     Hematocrit 10/09/2023 39.3     MCV 10/09/2023 84.5     MCH 10/09/2023 27.3     MCHC 10/09/2023 32.3     RDW 10/09/2023 15.7 (H)     Platelet Count 10/09/2023 257     MPV 10/09/2023 10.1     Neutrophils % 10/09/2023 60.1     Lymphocytes % 10/09/2023 28.6     Monocytes % 10/09/2023 7.7 (H)     Eosinophils % 10/09/2023 2.7     Basophils % 10/09/2023 0.5     Immature Granulocytes % 10/09/2023 0.4     nRBC, Auto 10/09/2023 0.0     Neutrophils, Abs 10/09/2023 3.28     Lymphocytes, Absolute 10/09/2023 1.56     Monocytes, Absolute 10/09/2023 0.42     Eosinophils, Absolute 10/09/2023 0.15     Basophils, Absolute 10/09/2023 0.03     Immature Granulocytes, A* 10/09/2023 0.02     nRBC, Absolute 10/09/2023 0.00     Diff Type 10/09/2023 Auto     WBC, UA 10/09/2023 67 (H)     RBC, UA 10/09/2023 14 (H)     Bacteria, UA 10/09/2023 Moderate (A)     Squamous Epithelial Cell* 10/09/2023 Occasional (A)     Hyaline Casts, UA 10/09/2023 2-5 (A)     Mucous 10/09/2023 Occasional (A)     Culture, Urine 10/09/2023 Skin/Urogenital Louise Isolated, no further workup.    Office Visit on 10/06/2023   Component Date Value    Culture, Urine 10/06/2023 63,000 Yeast (A)    Lab Visit on 10/04/2023   Component Date Value    Creatinine, Urine 10/04/2023 127     Microalbumin 10/04/2023 2.0     Microalbumin/Creatinine * 10/04/2023 15.7    Office Visit on 10/04/2023   Component Date Value    Hemoglobin A1C 10/04/2023 6.9 (A)     POC Glucose 10/04/2023 190 (A)    Office Visit on 10/02/2023   Component Date Value    SARS Coronavirus 2 Antig* 10/02/2023 Negative     Rapid Influenza A Ag 10/02/2023 Negative     Rapid Influenza B Ag 10/02/2023 Negative      Acceptab* 10/02/2023 Yes     Color, UA 10/02/2023 Dark Deirdre     Spec Grav UA 10/02/2023 1.030     pH, UA 10/02/2023 5.5     WBC, UA 10/02/2023 Trace     Nitrite, UA 10/02/2023 Positive     Protein, POC 10/02/2023 30     Glucose, UA 10/02/2023 100     Ketones, UA  10/02/2023 Negative     Bilirubin, POC 10/02/2023 Negative     Urobilinogen, UA 10/02/2023 1.0     Blood, UA 10/02/2023 Trace        Last Imaging:  X-Ray Chest AP Portable  Narrative: EXAMINATION:  XR CHEST AP PORTABLE    CLINICAL HISTORY:  Chest pain;    TECHNIQUE:  Single frontal view of the chest was performed.    COMPARISON:  07/12/2021    FINDINGS:  Heart size normal. Lungs clear. No pneumothorax or pleural effusion.  Impression: No acute findings.    Electronically signed by: Wilfrido Flores  Date:    10/16/2023  Time:    13:05         **Labs and x-rays personally reviewed by me    ** reviewed      Objective:        Assessment:       1. Chest pain, unspecified type        2. Essential (primary) hypertension        3. Moderate episode of recurrent major depressive disorder             Plan:         [unfilled]

## 2023-10-18 LAB — UA COMPLETE W REFLEX CULTURE PNL UR: ABNORMAL

## 2023-10-30 ENCOUNTER — TELEPHONE (OUTPATIENT)
Dept: PAIN MEDICINE | Facility: CLINIC | Age: 78
End: 2023-10-30
Payer: COMMERCIAL

## 2023-10-30 DIAGNOSIS — M54.17 LUMBOSACRAL RADICULOPATHY: Primary | ICD-10-CM

## 2023-10-30 RX ORDER — HYDROCODONE BITARTRATE AND ACETAMINOPHEN 10; 325 MG/1; MG/1
1 TABLET ORAL EVERY 6 HOURS PRN
Qty: 120 TABLET | Refills: 0 | Status: SHIPPED | OUTPATIENT
Start: 2023-11-29 | End: 2023-12-13 | Stop reason: SDUPTHER

## 2023-10-30 RX ORDER — HYDROCODONE BITARTRATE AND ACETAMINOPHEN 10; 325 MG/1; MG/1
1 TABLET ORAL EVERY 6 HOURS PRN
Qty: 120 TABLET | Refills: 0 | Status: SHIPPED | OUTPATIENT
Start: 2023-10-30 | End: 2023-12-13 | Stop reason: SDUPTHER

## 2023-10-30 NOTE — TELEPHONE ENCOUNTER
----- Message from Karen Gonzales sent at 10/30/2023  9:24 AM CDT -----  Regarding: PRESCRIPTION  Patient called and stated that she will like to speak with a nurse about her medicine if you could give her a call @[phone]    LUIS LOWERY   10/30/2023   9:54 AM   Attempted to call, no answer left message

## 2023-10-30 NOTE — TELEPHONE ENCOUNTER
----- Message from Karen Gonzales sent at 10/30/2023  9:24 AM CDT -----  Regarding: PRESCRIPTION  Patient called and stated that she will like to speak with a nurse about her medicine if you could give her a call @[phone]    LUIS LOWERY   10/30/2023   10:00 AM   Spoke with patient, Norco 10 canceled per ARSENIO ESPINOZA. Will reorder meds.

## 2023-10-31 ENCOUNTER — EXTERNAL CHRONIC CARE MANAGEMENT (OUTPATIENT)
Dept: FAMILY MEDICINE | Facility: CLINIC | Age: 78
End: 2023-10-31
Payer: COMMERCIAL

## 2023-10-31 PROCEDURE — G0511 PR CHRONIC CARE MGMT, RHC OR FQHC ONLY, 20 MINS OR MORE: ICD-10-PCS | Mod: ,,, | Performed by: INTERNAL MEDICINE

## 2023-10-31 PROCEDURE — G0511 CCM/BHI BY RHC/FQHC 20MIN MO: HCPCS | Mod: ,,, | Performed by: INTERNAL MEDICINE

## 2023-11-06 ENCOUNTER — OFFICE VISIT (OUTPATIENT)
Dept: FAMILY MEDICINE | Facility: CLINIC | Age: 78
End: 2023-11-06
Payer: COMMERCIAL

## 2023-11-06 VITALS
DIASTOLIC BLOOD PRESSURE: 90 MMHG | SYSTOLIC BLOOD PRESSURE: 158 MMHG | RESPIRATION RATE: 18 BRPM | HEIGHT: 68 IN | BODY MASS INDEX: 26.43 KG/M2 | HEART RATE: 97 BPM | WEIGHT: 174.38 LBS | OXYGEN SATURATION: 100 % | TEMPERATURE: 98 F

## 2023-11-06 DIAGNOSIS — I10 ESSENTIAL (PRIMARY) HYPERTENSION: ICD-10-CM

## 2023-11-06 DIAGNOSIS — N39.0 URINARY TRACT INFECTION WITHOUT HEMATURIA, SITE UNSPECIFIED: Primary | ICD-10-CM

## 2023-11-06 DIAGNOSIS — E55.9 VITAMIN D DEFICIENCY: ICD-10-CM

## 2023-11-06 LAB
25(OH)D3 SERPL-MCNC: 49.5 NG/ML
BACTERIA #/AREA URNS HPF: ABNORMAL /HPF
BILIRUB UR QL STRIP: NEGATIVE
CAOX CRY URNS QL MICRO: ABNORMAL /HPF
CLARITY UR: ABNORMAL
COLOR UR: YELLOW
GLUCOSE UR STRIP-MCNC: 300 MG/DL
KETONES UR STRIP-SCNC: NEGATIVE MG/DL
LEUKOCYTE ESTERASE UR QL STRIP: ABNORMAL
MUCOUS, UA: ABNORMAL /LPF
NITRITE UR QL STRIP: NEGATIVE
PH UR STRIP: 6 PH UNITS
PROT UR QL STRIP: 20
RBC # UR STRIP: NEGATIVE /UL
RBC #/AREA URNS HPF: 7 /HPF
SP GR UR STRIP: 1.02
SQUAMOUS #/AREA URNS LPF: ABNORMAL /HPF
UROBILINOGEN UR STRIP-ACNC: NORMAL MG/DL
WBC #/AREA URNS HPF: >182 /HPF
YEAST #/AREA URNS HPF: ABNORMAL /HPF

## 2023-11-06 PROCEDURE — 1101F PT FALLS ASSESS-DOCD LE1/YR: CPT | Mod: ,,, | Performed by: INTERNAL MEDICINE

## 2023-11-06 PROCEDURE — 82306 VITAMIN D 25 HYDROXY: CPT | Mod: ,,, | Performed by: CLINICAL MEDICAL LABORATORY

## 2023-11-06 PROCEDURE — 87086 CULTURE, URINE: ICD-10-PCS | Mod: ,,, | Performed by: CLINICAL MEDICAL LABORATORY

## 2023-11-06 PROCEDURE — 3080F PR MOST RECENT DIASTOLIC BLOOD PRESSURE >= 90 MM HG: ICD-10-PCS | Mod: ,,, | Performed by: INTERNAL MEDICINE

## 2023-11-06 PROCEDURE — 82306 VITAMIN D: ICD-10-PCS | Mod: ,,, | Performed by: CLINICAL MEDICAL LABORATORY

## 2023-11-06 PROCEDURE — 3288F PR FALLS RISK ASSESSMENT DOCUMENTED: ICD-10-PCS | Mod: ,,, | Performed by: INTERNAL MEDICINE

## 2023-11-06 PROCEDURE — 99214 PR OFFICE/OUTPT VISIT, EST, LEVL IV, 30-39 MIN: ICD-10-PCS | Mod: ,,, | Performed by: INTERNAL MEDICINE

## 2023-11-06 PROCEDURE — 3080F DIAST BP >= 90 MM HG: CPT | Mod: ,,, | Performed by: INTERNAL MEDICINE

## 2023-11-06 PROCEDURE — 87086 URINE CULTURE/COLONY COUNT: CPT | Mod: ,,, | Performed by: CLINICAL MEDICAL LABORATORY

## 2023-11-06 PROCEDURE — 3288F FALL RISK ASSESSMENT DOCD: CPT | Mod: ,,, | Performed by: INTERNAL MEDICINE

## 2023-11-06 PROCEDURE — 3077F SYST BP >= 140 MM HG: CPT | Mod: ,,, | Performed by: INTERNAL MEDICINE

## 2023-11-06 PROCEDURE — 1125F PR PAIN SEVERITY QUANTIFIED, PAIN PRESENT: ICD-10-PCS | Mod: ,,, | Performed by: INTERNAL MEDICINE

## 2023-11-06 PROCEDURE — 3077F PR MOST RECENT SYSTOLIC BLOOD PRESSURE >= 140 MM HG: ICD-10-PCS | Mod: ,,, | Performed by: INTERNAL MEDICINE

## 2023-11-06 PROCEDURE — 1125F AMNT PAIN NOTED PAIN PRSNT: CPT | Mod: ,,, | Performed by: INTERNAL MEDICINE

## 2023-11-06 PROCEDURE — 81001 URINALYSIS, REFLEX TO URINE CULTURE: ICD-10-PCS | Mod: ,,, | Performed by: CLINICAL MEDICAL LABORATORY

## 2023-11-06 PROCEDURE — 81001 URINALYSIS AUTO W/SCOPE: CPT | Mod: ,,, | Performed by: CLINICAL MEDICAL LABORATORY

## 2023-11-06 PROCEDURE — 1159F PR MEDICATION LIST DOCUMENTED IN MEDICAL RECORD: ICD-10-PCS | Mod: ,,, | Performed by: INTERNAL MEDICINE

## 2023-11-06 PROCEDURE — 1101F PR PT FALLS ASSESS DOC 0-1 FALLS W/OUT INJ PAST YR: ICD-10-PCS | Mod: ,,, | Performed by: INTERNAL MEDICINE

## 2023-11-06 PROCEDURE — 1159F MED LIST DOCD IN RCRD: CPT | Mod: ,,, | Performed by: INTERNAL MEDICINE

## 2023-11-06 PROCEDURE — 99214 OFFICE O/P EST MOD 30 MIN: CPT | Mod: ,,, | Performed by: INTERNAL MEDICINE

## 2023-11-06 PROCEDURE — 1160F PR REVIEW ALL MEDS BY PRESCRIBER/CLIN PHARMACIST DOCUMENTED: ICD-10-PCS | Mod: ,,, | Performed by: INTERNAL MEDICINE

## 2023-11-06 PROCEDURE — 1160F RVW MEDS BY RX/DR IN RCRD: CPT | Mod: ,,, | Performed by: INTERNAL MEDICINE

## 2023-11-06 RX ORDER — EZETIMIBE 10 MG/1
10 TABLET ORAL DAILY
Qty: 90 TABLET | Refills: 3 | Status: SHIPPED | OUTPATIENT
Start: 2023-11-06

## 2023-11-06 RX ORDER — LEVOTHYROXINE SODIUM 75 UG/1
75 TABLET ORAL
Qty: 90 TABLET | Refills: 1 | Status: SHIPPED | OUTPATIENT
Start: 2023-11-06 | End: 2024-03-05 | Stop reason: SDUPTHER

## 2023-11-08 PROBLEM — E55.9 VITAMIN D DEFICIENCY: Status: ACTIVE | Noted: 2023-11-08

## 2023-11-08 LAB — UA COMPLETE W REFLEX CULTURE PNL UR: NORMAL

## 2023-11-08 NOTE — PROGRESS NOTES
Subjective:       Patient ID: Lorraine De Jesus is a 78 y.o. female.    Chief Complaint: Pain (All over body pain )  Patient seen evaluated.  Patient has a history of a recent urinary tract infection, chronic dyslipidemia and also the patient is recovering from dehydration.  And she has been having generalized weakness fatigue.    Going to repeat a UA CNS, repeat a vitamin-D level.  I will refill her Zetia 10 mg 1 p.o. q.day patient is improving slowly  Pain  Pertinent negatives include no abdominal pain, chest pain, fatigue or fever.     .    Current Medications:    Current Outpatient Medications:     amLODIPine (NORVASC) 5 MG tablet, Take 5 mg by mouth., Disp: , Rfl:     blood sugar diagnostic Strp, 1 strip by Misc.(Non-Drug; Combo Route) route 3 (three) times daily., Disp: 200 strip, Rfl: 3    blood-glucose meter kit, Use as instructed, Disp: 1 each, Rfl: 0    cloNIDine (CATAPRES) 0.1 MG tablet, TAKE 1 TABLET BY MOUTH EVERY 4 HOURS AS NEEDED FOR BLOOD PRESSURE GREATER THAN 170/90, Disp: , Rfl:     fluconazole (DIFLUCAN) 100 MG tablet, Take two tablets on day one then reduce to one tablet daily, Disp: 8 tablet, Rfl: 0    gabapentin (NEURONTIN) 400 MG capsule, TAKE 1 CAPSULE(400 MG) BY MOUTH EVERY 8 HOURS, Disp: 90 capsule, Rfl: 2    glipiZIDE (GLUCOTROL) 10 MG TR24, Take 1 tablet (10 mg total) by mouth 2 (two) times a day., Disp: 180 tablet, Rfl: 1    HYDROcodone-acetaminophen (NORCO)  mg per tablet, Take 1 tablet by mouth every 6 (six) hours as needed for Pain., Disp: 120 tablet, Rfl: 0    [START ON 11/29/2023] HYDROcodone-acetaminophen (NORCO)  mg per tablet, Take 1 tablet by mouth every 6 (six) hours as needed for Pain., Disp: 120 tablet, Rfl: 0    lancets (ACCU-CHEK FASTCLIX LANCET DRUM [facility]), Take 1 each by mouth once daily., Disp: , Rfl:     nitrofurantoin, macrocrystal-monohydrate, (MACROBID) 100 MG capsule, Take 1 capsule (100 mg total) by mouth 2 (two) times daily., Disp: 20 capsule, Rfl: 0     "olmesartan (BENICAR) 5 MG Tab, Take 10 mg by mouth once daily., Disp: , Rfl:     ONETOUCH DELICA PLUS LANCET 33 gauge Misc, Check blood sugar TID, Disp: 100 each, Rfl: 11    ONETOUCH VERIO TEST STRIPS Strp, TEST BLOOD GLUCOSE LEVELS TWICE DAILY, Disp: 200 strip, Rfl: 4    oxybutynin (DITROPAN-XL) 5 MG TR24, Take one tablet at night, Disp: 90 tablet, Rfl: 3    aspirin (ECOTRIN) 81 MG EC tablet, Take 81 mg by mouth once daily., Disp: , Rfl:     ezetimibe (ZETIA) 10 mg tablet, Take 1 tablet (10 mg total) by mouth once daily., Disp: 90 tablet, Rfl: 3    levothyroxine (SYNTHROID) 75 MCG tablet, Take 1 tablet (75 mcg total) by mouth before breakfast., Disp: 90 tablet, Rfl: 1           Review of Systems   Constitutional:  Negative for appetite change, fatigue and fever.   Respiratory:  Negative for shortness of breath.    Cardiovascular:  Negative for chest pain.   Gastrointestinal:  Negative for abdominal pain and constipation.   Endocrine: Negative for polydipsia, polyphagia and polyuria.   Genitourinary:  Negative for difficulty urinating, frequency and hot flashes.   Allergic/Immunologic: Negative for environmental allergies.   Neurological:  Negative for dizziness and light-headedness.   Psychiatric/Behavioral:  Negative for agitation.                 Vitals:    11/06/23 0937 11/06/23 1043   BP: (!) 153/73 (!) 158/90   BP Location: Right arm    Patient Position: Sitting    BP Method: Large (Automatic)    Pulse: 97    Resp: 18    Temp: 97.8 °F (36.6 °C)    TempSrc: Temporal    SpO2: 100%    Weight: 79.1 kg (174 lb 6.4 oz)    Height: 5' 8" (1.727 m)         Physical Exam  Vitals and nursing note reviewed.   Constitutional:       Appearance: Normal appearance.   Cardiovascular:      Rate and Rhythm: Normal rate and regular rhythm.      Pulses: Normal pulses.      Heart sounds: Normal heart sounds.   Pulmonary:      Effort: Pulmonary effort is normal.      Breath sounds: Normal breath sounds.   Abdominal:      General: " Abdomen is flat. Bowel sounds are normal.      Palpations: Abdomen is soft.   Musculoskeletal:         General: Normal range of motion.   Skin:     General: Skin is warm and dry.   Neurological:      General: No focal deficit present.      Mental Status: She is alert and oriented to person, place, and time. Mental status is at baseline.           Last Labs:     Office Visit on 11/06/2023   Component Date Value    Vitamin D 25-Hydroxy, Bl* 11/06/2023 49.5     Color, UA 11/06/2023 Yellow     Clarity, UA 11/06/2023 Turbid     pH, UA 11/06/2023 6.0     Leukocytes, UA 11/06/2023 Large (A)     Nitrites, UA 11/06/2023 Negative     Protein, UA 11/06/2023 20 (A)     Glucose, UA 11/06/2023 300 (A)     Ketones, UA 11/06/2023 Negative     Urobilinogen, UA 11/06/2023 Normal     Bilirubin, UA 11/06/2023 Negative     Blood, UA 11/06/2023 Negative     Specific Gravity, UA 11/06/2023 1.018     WBC, UA 11/06/2023 >182 (H)     RBC, UA 11/06/2023 7 (H)     Bacteria, UA 11/06/2023 Few (A)     Squamous Epithelial Cell* 11/06/2023 Few (A)     Calcium Oxalate Crystals* 11/06/2023 Occasional (A)     Yeast, UA 11/06/2023 Many (A)     Mucous 11/06/2023 Moderate (A)     Culture, Urine 11/06/2023 Skin/Urogenital Louise Isolated, no further workup.    Admission on 10/16/2023, Discharged on 10/16/2023   Component Date Value    Sodium 10/16/2023 138     Potassium 10/16/2023 4.1     Chloride 10/16/2023 107     CO2 10/16/2023 29     Anion Gap 10/16/2023 6 (L)     Glucose 10/16/2023 145 (H)     BUN 10/16/2023 15     Creatinine 10/16/2023 0.83     BUN/Creatinine Ratio 10/16/2023 18     Calcium 10/16/2023 9.2     Total Protein 10/16/2023 8.1     Albumin 10/16/2023 3.2 (L)     Globulin 10/16/2023 4.9 (H)     A/G Ratio 10/16/2023 0.7     Bilirubin, Total 10/16/2023 0.3     Alk Phos 10/16/2023 90     ALT 10/16/2023 19     AST 10/16/2023 10 (L)     eGFR 10/16/2023 73     Color, UA 10/16/2023 Yellow     Clarity, UA 10/16/2023 Clear     pH, UA 10/16/2023 6.5      Leukocytes, UA 10/16/2023 Moderate (A)     Nitrites, UA 10/16/2023 Negative     Protein, UA 10/16/2023 10 (A)     Glucose, UA 10/16/2023 150 (A)     Ketones, UA 10/16/2023 Negative     Urobilinogen, UA 10/16/2023 Normal     Bilirubin, UA 10/16/2023 Negative     Blood, UA 10/16/2023 Negative     Specific Gravity, UA 10/16/2023 1.020     Troponin I High Sensitiv* 10/16/2023 <4.0     PT 10/16/2023 13.6     INR 10/16/2023 1.05     Magnesium 10/16/2023 2.4 (H)     PTT 10/16/2023 33.2     ProBNP 10/16/2023 77     WBC 10/16/2023 8.74     RBC 10/16/2023 4.69     Hemoglobin 10/16/2023 12.8     Hematocrit 10/16/2023 39.1     MCV 10/16/2023 83.4     MCH 10/16/2023 27.3     MCHC 10/16/2023 32.7     RDW 10/16/2023 15.1 (H)     Platelet Count 10/16/2023 344     MPV 10/16/2023 9.4     Neutrophils % 10/16/2023 72.5 (H)     Lymphocytes % 10/16/2023 21.1 (L)     Monocytes % 10/16/2023 5.0     Eosinophils % 10/16/2023 0.8 (L)     Basophils % 10/16/2023 0.3     Immature Granulocytes % 10/16/2023 0.3     nRBC, Auto 10/16/2023 0.0     Neutrophils, Abs 10/16/2023 6.33     Lymphocytes, Absolute 10/16/2023 1.84     Monocytes, Absolute 10/16/2023 0.44     Eosinophils, Absolute 10/16/2023 0.07     Basophils, Absolute 10/16/2023 0.03     Immature Granulocytes, A* 10/16/2023 0.03     nRBC, Absolute 10/16/2023 0.00     Diff Type 10/16/2023 Auto     WBC, UA 10/16/2023 40 (H)     RBC, UA 10/16/2023 4 (H)     Bacteria, UA 10/16/2023 Moderate (A)     Squamous Epithelial Cell* 10/16/2023 Few (A)     Mucous 10/16/2023 Few (A)     Culture, Urine 10/16/2023 20,000 Streptococcus agalactiae (Group B) (A)    Office Visit on 10/09/2023   Component Date Value    Sodium 10/09/2023 139     Potassium 10/09/2023 4.2     Chloride 10/09/2023 106     CO2 10/09/2023 26     Anion Gap 10/09/2023 11     Glucose 10/09/2023 187 (H)     BUN 10/09/2023 12     Creatinine 10/09/2023 0.93     BUN/Creatinine Ratio 10/09/2023 13     Calcium 10/09/2023 9.1     eGFR 10/09/2023  64     Vitamin B12 10/09/2023 782     TSH 10/09/2023 0.997     Color, UA 10/09/2023 Light-Yellow     Clarity, UA 10/09/2023 Turbid     pH, UA 10/09/2023 6.5     Leukocytes, UA 10/09/2023 Moderate (A)     Nitrites, UA 10/09/2023 Negative     Protein, UA 10/09/2023 Negative     Glucose, UA 10/09/2023 300 (A)     Ketones, UA 10/09/2023 Negative     Urobilinogen, UA 10/09/2023 Normal     Bilirubin, UA 10/09/2023 Negative     Blood, UA 10/09/2023 Negative     Specific Gravity, UA 10/09/2023 1.013     WBC 10/09/2023 5.46     RBC 10/09/2023 4.65     Hemoglobin 10/09/2023 12.7     Hematocrit 10/09/2023 39.3     MCV 10/09/2023 84.5     MCH 10/09/2023 27.3     MCHC 10/09/2023 32.3     RDW 10/09/2023 15.7 (H)     Platelet Count 10/09/2023 257     MPV 10/09/2023 10.1     Neutrophils % 10/09/2023 60.1     Lymphocytes % 10/09/2023 28.6     Monocytes % 10/09/2023 7.7 (H)     Eosinophils % 10/09/2023 2.7     Basophils % 10/09/2023 0.5     Immature Granulocytes % 10/09/2023 0.4     nRBC, Auto 10/09/2023 0.0     Neutrophils, Abs 10/09/2023 3.28     Lymphocytes, Absolute 10/09/2023 1.56     Monocytes, Absolute 10/09/2023 0.42     Eosinophils, Absolute 10/09/2023 0.15     Basophils, Absolute 10/09/2023 0.03     Immature Granulocytes, A* 10/09/2023 0.02     nRBC, Absolute 10/09/2023 0.00     Diff Type 10/09/2023 Auto     WBC, UA 10/09/2023 67 (H)     RBC, UA 10/09/2023 14 (H)     Bacteria, UA 10/09/2023 Moderate (A)     Squamous Epithelial Cell* 10/09/2023 Occasional (A)     Hyaline Casts, UA 10/09/2023 2-5 (A)     Mucous 10/09/2023 Occasional (A)     Culture, Urine 10/09/2023 Skin/Urogenital Louise Isolated, no further workup.        Last Imaging:  X-Ray Chest AP Portable  Narrative: EXAMINATION:  XR CHEST AP PORTABLE    CLINICAL HISTORY:  Chest pain;    TECHNIQUE:  Single frontal view of the chest was performed.    COMPARISON:  07/12/2021    FINDINGS:  Heart size normal. Lungs clear. No pneumothorax or pleural effusion.  Impression: No  acute findings.    Electronically signed by: Wilfrido Flores  Date:    10/16/2023  Time:    13:05         **Labs and x-rays personally reviewed by me    ** reviewed      Objective:        Assessment:       1. Urinary tract infection without hematuria, site unspecified  Urinalysis, Reflex to Urine Culture    Urinalysis, Reflex to Urine Culture    Urinalysis, Microscopic    Urine culture      2. Vitamin D deficiency  Vitamin D    Vitamin D      3. Essential (primary) hypertension             Plan:         [unfilled]

## 2023-11-30 ENCOUNTER — EXTERNAL CHRONIC CARE MANAGEMENT (OUTPATIENT)
Dept: FAMILY MEDICINE | Facility: CLINIC | Age: 78
End: 2023-11-30
Payer: COMMERCIAL

## 2023-11-30 PROCEDURE — G0511 PR CHRONIC CARE MGMT, RHC OR FQHC ONLY, 20 MINS OR MORE: ICD-10-PCS | Mod: ,,, | Performed by: INTERNAL MEDICINE

## 2023-11-30 PROCEDURE — G0511 CCM/BHI BY RHC/FQHC 20MIN MO: HCPCS | Mod: ,,, | Performed by: INTERNAL MEDICINE

## 2023-12-13 NOTE — PROGRESS NOTES
Subjective:         Patient ID: Lorraine De Jesus is a 78 y.o. female.    Chief Complaint: Low-back Pain, Leg Pain, and Mid-back Pain      Pain  This is a chronic problem. The current episode started more than 1 year ago. The problem occurs daily. The problem has been waxing and waning. Associated symptoms include arthralgias and neck pain. Pertinent negatives include no anorexia, chest pain, chills, coughing, fever, sore throat, vertigo or vomiting.     Review of Systems   Constitutional:  Negative for activity change, appetite change, chills, fever and unexpected weight change.   HENT:  Negative for drooling, ear discharge, ear pain, facial swelling, nosebleeds, sore throat, trouble swallowing, voice change and goiter.    Eyes:  Negative for photophobia, pain, discharge, redness and visual disturbance.   Respiratory:  Negative for apnea, cough, choking, chest tightness, shortness of breath, wheezing and stridor.    Cardiovascular:  Negative for chest pain, palpitations and leg swelling.   Gastrointestinal:  Negative for abdominal distention, anorexia, diarrhea, rectal pain, vomiting and fecal incontinence.   Endocrine: Negative for cold intolerance, heat intolerance, polydipsia, polyphagia and polyuria.   Genitourinary:  Negative for bladder incontinence, dysuria, flank pain, frequency and hot flashes.   Musculoskeletal:  Positive for arthralgias, back pain, leg pain and neck pain.   Integumentary:  Negative for color change and pallor.   Allergic/Immunologic: Negative for immunocompromised state.   Neurological:  Negative for dizziness, vertigo, seizures, syncope, facial asymmetry, speech difficulty, light-headedness, memory loss and coordination difficulties.   Hematological:  Negative for adenopathy. Does not bruise/bleed easily.   Psychiatric/Behavioral:  Negative for agitation, behavioral problems, confusion, decreased concentration, dysphoric mood, hallucinations, self-injury and suicidal ideas. The patient is  not nervous/anxious and is not hyperactive.            Past Medical History:   Diagnosis Date    Adenomatous polyp of ascending colon 2021    Adenomatous polyp of descending colon 2021    Age related osteoporosis 10/28/2020    Benign paroxysmal vertigo     Chronic pain syndrome     Chronic pelvic pain in female 2021    Ditropan XL 5mg    Colon, diverticulosis 2021    Depressive disorder 2019    External hemorrhoid 2021    Gastrointestinal hemorrhage associated with anorectal source 2021    GERD (gastroesophageal reflux disease) 2013    Hyperlipidemia 2012    Hypothyroidism 2019    Joint pain     Nonrheumatic tricuspid (valve) insufficiency 2018    Personal history UTI 2021    Controlled on Hiprex    Polyneuropathy 2018    PVD (peripheral vascular disease) 10/30/2018    Temporal arteritis     Type 2 diabetes mellitus 2014    Urethral meatal stenosis 2018    history of known urethral stenosis, and was taught to perform monthly self dilation at home.     Past Surgical History:   Procedure Laterality Date     left lumbar sympathetic nerve block Left 2020    X3  DR BROWN    CARDIAC CATHETERIZATION  10/14/2009     SECTION      x 2    COLONOSCOPY  2009    CYSTOSCOPY WITH HYDRODISTENSION OF BLADDER N/A 2021    Procedure: CYSTOSCOPY, WITH BLADDER HYDRODISTENSION;  Surgeon: Dequan Recio MD;  Location: [facility];  Service: Urology;  Laterality: N/A;    CYSTOSCOPY WITH URETHRAL DILATION  2021    Dr Recio    EPIDURAL STEROID INJECTION N/A 10/27/2022    Procedure: Injection, Steroid, Epidural, L5/S1;  Surgeon: Belkis Brown MD;  Location: [facility];  Service: Pain Management;  Laterality: N/A;    EPIDURAL STEROID INJECTION N/A 2023    Procedure: Injection, Steroid, Epidural, L5/S1;  Surgeon: Belkis Brown MD;  Location: [facility];  Service: Pain Management;  Laterality: N/A;     HYSTERECTOMY  1980's    LUMBAR SYMPATHETIC NERVE BLOCK Left 10/05/2020    Left Sympathetic Nerve Block - Dr Brown - 10/5/20, 9/21/20, 1/20/20. 1/6/20, 10/9/19, 9/25/19 and 7/11/18    right lumbar sympathetic nerve block Right 2020    X4 DR BROWN    THYROIDECTOMY      1990's     Social History     Socioeconomic History    Marital status:    Tobacco Use    Smoking status: Never     Passive exposure: Never    Smokeless tobacco: Never   Substance and Sexual Activity    Alcohol use: Never    Drug use: Never    Sexual activity: Not Currently     Social Determinants of Health     Financial Resource Strain: Low Risk  (9/19/2023)    Overall Financial Resource Strain (CARDIA)     Difficulty of Paying Living Expenses: Not very hard   Food Insecurity: No Food Insecurity (9/19/2023)    Hunger Vital Sign     Worried About Running Out of Food in the Last Year: Never true     Ran Out of Food in the Last Year: Never true   Transportation Needs: No Transportation Needs (9/19/2023)    PRAPARE - Transportation     Lack of Transportation (Medical): No     Lack of Transportation (Non-Medical): No   Physical Activity: Sufficiently Active (9/19/2023)    Exercise Vital Sign     Days of Exercise per Week: 4 days     Minutes of Exercise per Session: 60 min   Stress: No Stress Concern Present (9/19/2023)    [place] [employer] - Occupational Stress Questionnaire     Feeling of Stress : Not at all   Social Connections: Moderately Integrated (9/19/2023)    Social Connection and Isolation Panel [NHANES]     Frequency of Communication with Friends and Family: Three times a week     Frequency of Social Gatherings with Friends and Family: Twice a week     Attends [employer] Services: More than 4 times per year     Active Member of Clubs or Organizations: Yes     Attends Club or Organization Meetings: More than 4 times per year     Marital Status:    Housing Stability: Low Risk  (9/19/2023)    Housing Stability  "Vital Sign     Unable to Pay for Housing in the Last Year: No     Number of Places Lived in the Last Year: 1     Unstable Housing in the Last Year: No     Family History   Problem Relation Age of Onset    Cancer Mother     Hypertension Mother     Cancer Father     Cancer Sister     Diabetes Sister     Hyperlipidemia Sister     Hypertension Sister     Cancer Brother     Diabetes Sister     No Known Problems Sister     Cancer Brother     No Known Problems Brother     Cancer Brother      Review of patient's allergies indicates:   Allergen Reactions    Lipitor [atorvastatin] Other (See Comments)     Muscle aching    Pravachol [pravastatin] Other (See Comments)     Muscle aching        Objective:  Vitals:    12/18/23 0845 12/18/23 0847   BP: 113/67    Pulse: 64    Resp: 20    Weight: 78.9 kg (174 lb)    Height: 5' 8.5" (1.74 m)    PainSc:   6   6         Physical Exam  Vitals and nursing note reviewed. Exam conducted with a chaperone present.   Constitutional:       General: She is awake. She is not in acute distress.     Appearance: Normal appearance. She is not ill-appearing or diaphoretic.   HENT:      Head: Normocephalic and atraumatic.      Nose: Nose normal.      Mouth/Throat:      Mouth: Mucous membranes are moist.      Pharynx: Oropharynx is clear.   Eyes:      Conjunctiva/sclera: Conjunctivae normal.      Pupils: Pupils are equal, round, and reactive to light.   Cardiovascular:      Rate and Rhythm: Normal rate.   Pulmonary:      Effort: Pulmonary effort is normal. No respiratory distress.   Abdominal:      Palpations: Abdomen is soft.   Musculoskeletal:      Cervical back: Normal range of motion and neck supple.      Thoracic back: Tenderness present.      Lumbar back: Tenderness present. Decreased range of motion.   Skin:     General: Skin is warm and dry.   Neurological:      General: No focal deficit present.      Mental Status: She is alert and oriented to person, place, and time. Mental status is at " baseline.      Cranial Nerves: No cranial nerve deficit (II-XII).   Psychiatric:         Mood and Affect: Mood normal.         Behavior: Behavior normal. Behavior is cooperative.         Thought Content: Thought content normal.           X-Ray Chest AP Portable  Narrative: EXAMINATION:  XR CHEST AP PORTABLE    CLINICAL HISTORY:  Chest pain;    TECHNIQUE:  Single frontal view of the chest was performed.    COMPARISON:  07/12/2021    FINDINGS:  Heart size normal. Lungs clear. No pneumothorax or pleural effusion.  Impression: No acute findings.    Electronically signed by: Wilfrido Flores  Date:    10/16/2023  Time:    13:05       Office Visit on 11/06/2023   Component Date Value Ref Range Status    Vitamin D 25-Hydroxy, Blood 11/06/2023 49.5  ng/mL Final    Color, UA 11/06/2023 Yellow  Colorless, Straw, Yellow, Light Yellow, Dark Yellow Final    Clarity, UA 11/06/2023 Turbid  Clear Final    pH, UA 11/06/2023 6.0  5.0 to 8.0 pH Units Final    Leukocytes, UA 11/06/2023 Large (A)  Negative Final    Nitrites, UA 11/06/2023 Negative  Negative Final    Protein, UA 11/06/2023 20 (A)  Negative Final    Glucose, UA 11/06/2023 300 (A)  Normal mg/dL Final    Ketones, UA 11/06/2023 Negative  Negative mg/dL Final    Urobilinogen, UA 11/06/2023 Normal  0.2, 1.0, Normal mg/dL Final    Bilirubin, UA 11/06/2023 Negative  Negative Final    Blood, UA 11/06/2023 Negative  Negative Final    Specific Gravity, UA 11/06/2023 1.018  <=1.030 Final    WBC, UA 11/06/2023 >182 (H)  <=5 /hpf Final    RBC, UA 11/06/2023 7 (H)  <=3 /hpf Final    Bacteria, UA 11/06/2023 Few (A)  None Seen /hpf Final    Squamous Epithelial Cells, UA 11/06/2023 Few (A)  None Seen /HPF Final    Calcium Oxalate Crystals, UA 11/06/2023 Occasional (A)  None Seen /HPF Final    Yeast, UA 11/06/2023 Many (A)  None Seen /hpf Final    Mucous 11/06/2023 Moderate (A)  None Seen /LPF Final    Culture, Urine 11/06/2023 Skin/Urogenital Louise Isolated, no further workup.   Final    Admission on 10/16/2023, Discharged on 10/16/2023   Component Date Value Ref Range Status    Sodium 10/16/2023 138  136 - 145 mmol/L Final    Potassium 10/16/2023 4.1  3.5 - 5.1 mmol/L Final    Chloride 10/16/2023 107  98 - 107 mmol/L Final    CO2 10/16/2023 29  21 - 32 mmol/L Final    Anion Gap 10/16/2023 6 (L)  7 - 16 mmol/L Final    Glucose 10/16/2023 145 (H)  74 - 106 mg/dL Final    BUN 10/16/2023 15  7 - 18 mg/dL Final    Creatinine 10/16/2023 0.83  0.55 - 1.02 mg/dL Final    BUN/Creatinine Ratio 10/16/2023 18  6 - 20 Final    Calcium 10/16/2023 9.2  8.5 - 10.1 mg/dL Final    Total Protein 10/16/2023 8.1  6.4 - 8.2 g/dL Final    Albumin 10/16/2023 3.2 (L)  3.5 - 5.0 g/dL Final    Globulin 10/16/2023 4.9 (H)  2.0 - 4.0 g/dL Final    A/G Ratio 10/16/2023 0.7   Final    Bilirubin, Total 10/16/2023 0.3  >0.0 - 1.2 mg/dL Final    Alk Phos 10/16/2023 90  55 - 142 U/L Final    ALT 10/16/2023 19  13 - 56 U/L Final    AST 10/16/2023 10 (L)  15 - 37 U/L Final    eGFR 10/16/2023 73  >=60 mL/min/1.73m2 Final    Color, UA 10/16/2023 Yellow  Colorless, Straw, Yellow, Light Yellow, Dark Yellow Final    Clarity, UA 10/16/2023 Clear  Clear Final    pH, UA 10/16/2023 6.5  5.0 to 8.0 pH Units Final    Leukocytes, UA 10/16/2023 Moderate (A)  Negative Final    Nitrites, UA 10/16/2023 Negative  Negative Final    Protein, UA 10/16/2023 10 (A)  Negative Final    Glucose, UA 10/16/2023 150 (A)  Normal mg/dL Final    Ketones, UA 10/16/2023 Negative  Negative mg/dL Final    Urobilinogen, UA 10/16/2023 Normal  0.2, 1.0, Normal mg/dL Final    Bilirubin, UA 10/16/2023 Negative  Negative Final    Blood, UA 10/16/2023 Negative  Negative Final    Specific Gravity, UA 10/16/2023 1.020  <=1.030 Final    Troponin I High Sensitivity 10/16/2023 <4.0  <=60.4 pg/mL Final    PT 10/16/2023 13.6  11.7 - 14.7 seconds Final    INR 10/16/2023 1.05  <=3.30 Final    Magnesium 10/16/2023 2.4 (H)  1.7 - 2.3 mg/dL Final    PTT 10/16/2023 33.2  25.2 - 37.3  seconds Final    ProBNP 10/16/2023 77  1 - 450 pg/mL Final    WBC 10/16/2023 8.74  4.50 - 11.00 K/uL Final    RBC 10/16/2023 4.69  4.20 - 5.40 M/uL Final    Hemoglobin 10/16/2023 12.8  12.0 - 16.0 g/dL Final    Hematocrit 10/16/2023 39.1  38.0 - 47.0 % Final    MCV 10/16/2023 83.4  80.0 - 96.0 fL Final    MCH 10/16/2023 27.3  27.0 - 31.0 pg Final    MCHC 10/16/2023 32.7  32.0 - 36.0 g/dL Final    RDW 10/16/2023 15.1 (H)  11.5 - 14.5 % Final    Platelet Count 10/16/2023 344  150 - 400 K/uL Final    MPV 10/16/2023 9.4  9.4 - 12.4 fL Final    Neutrophils % 10/16/2023 72.5 (H)  53.0 - 65.0 % Final    Lymphocytes % 10/16/2023 21.1 (L)  27.0 - 41.0 % Final    Monocytes % 10/16/2023 5.0  2.0 - 6.0 % Final    Eosinophils % 10/16/2023 0.8 (L)  1.0 - 4.0 % Final    Basophils % 10/16/2023 0.3  0.0 - 1.0 % Final    Immature Granulocytes % 10/16/2023 0.3  0.0 - 0.4 % Final    nRBC, Auto 10/16/2023 0.0  <=0.0 % Final    Neutrophils, Abs 10/16/2023 6.33  1.80 - 7.70 K/uL Final    Lymphocytes, Absolute 10/16/2023 1.84  1.00 - 4.80 K/uL Final    Monocytes, Absolute 10/16/2023 0.44  0.00 - 0.80 K/uL Final    Eosinophils, Absolute 10/16/2023 0.07  0.00 - 0.50 K/uL Final    Basophils, Absolute 10/16/2023 0.03  0.00 - 0.20 K/uL Final    Immature Granulocytes, Absolute 10/16/2023 0.03  0.00 - 0.04 K/uL Final    nRBC, Absolute 10/16/2023 0.00  <=0.00 x10e3/uL Final    Diff Type 10/16/2023 Auto   Final    WBC, UA 10/16/2023 40 (H)  <=5 /hpf Final    RBC, UA 10/16/2023 4 (H)  <=3 /hpf Final    Bacteria, UA 10/16/2023 Moderate (A)  None Seen /hpf Final    Squamous Epithelial Cells, UA 10/16/2023 Few (A)  None Seen /HPF Final    Mucous 10/16/2023 Few (A)  None Seen /LPF Final    Culture, Urine 10/16/2023 20,000 Streptococcus agalactiae (Group B) (A)   Final   Office Visit on 10/09/2023   Component Date Value Ref Range Status    Sodium 10/09/2023 139  136 - 145 mmol/L Final    Potassium 10/09/2023 4.2  3.5 - 5.1 mmol/L Final    Chloride  10/09/2023 106  98 - 107 mmol/L Final    CO2 10/09/2023 26  21 - 32 mmol/L Final    Anion Gap 10/09/2023 11  7 - 16 mmol/L Final    Glucose 10/09/2023 187 (H)  74 - 106 mg/dL Final    BUN 10/09/2023 12  7 - 18 mg/dL Final    Creatinine 10/09/2023 0.93  0.55 - 1.02 mg/dL Final    BUN/Creatinine Ratio 10/09/2023 13  6 - 20 Final    Calcium 10/09/2023 9.1  8.5 - 10.1 mg/dL Final    eGFR 10/09/2023 64  >=60 mL/min/1.73m2 Final    Vitamin B12 10/09/2023 782  193 - 986 pg/mL Final    TSH 10/09/2023 0.997  0.358 - 3.740 uIU/mL Final    Color, UA 10/09/2023 Light-Yellow  Colorless, Straw, Yellow, Light Yellow, Dark Yellow Final    Clarity, UA 10/09/2023 Turbid  Clear Final    pH, UA 10/09/2023 6.5  5.0 to 8.0 pH Units Final    Leukocytes, UA 10/09/2023 Moderate (A)  Negative Final    Nitrites, UA 10/09/2023 Negative  Negative Final    Protein, UA 10/09/2023 Negative  Negative Final    Glucose, UA 10/09/2023 300 (A)  Normal mg/dL Final    Ketones, UA 10/09/2023 Negative  Negative mg/dL Final    Urobilinogen, UA 10/09/2023 Normal  0.2, 1.0, Normal mg/dL Final    Bilirubin, UA 10/09/2023 Negative  Negative Final    Blood, UA 10/09/2023 Negative  Negative Final    Specific Gravity, UA 10/09/2023 1.013  <=1.030 Final    WBC 10/09/2023 5.46  4.50 - 11.00 K/uL Final    RBC 10/09/2023 4.65  4.20 - 5.40 M/uL Final    Hemoglobin 10/09/2023 12.7  12.0 - 16.0 g/dL Final    Hematocrit 10/09/2023 39.3  38.0 - 47.0 % Final    MCV 10/09/2023 84.5  80.0 - 96.0 fL Final    MCH 10/09/2023 27.3  27.0 - 31.0 pg Final    MCHC 10/09/2023 32.3  32.0 - 36.0 g/dL Final    RDW 10/09/2023 15.7 (H)  11.5 - 14.5 % Final    Platelet Count 10/09/2023 257  150 - 400 K/uL Final    MPV 10/09/2023 10.1  9.4 - 12.4 fL Final    Neutrophils % 10/09/2023 60.1  53.0 - 65.0 % Final    Lymphocytes % 10/09/2023 28.6  27.0 - 41.0 % Final    Monocytes % 10/09/2023 7.7 (H)  2.0 - 6.0 % Final    Eosinophils % 10/09/2023 2.7  1.0 - 4.0 % Final    Basophils % 10/09/2023  0.5  0.0 - 1.0 % Final    Immature Granulocytes % 10/09/2023 0.4  0.0 - 0.4 % Final    nRBC, Auto 10/09/2023 0.0  <=0.0 % Final    Neutrophils, Abs 10/09/2023 3.28  1.80 - 7.70 K/uL Final    Lymphocytes, Absolute 10/09/2023 1.56  1.00 - 4.80 K/uL Final    Monocytes, Absolute 10/09/2023 0.42  0.00 - 0.80 K/uL Final    Eosinophils, Absolute 10/09/2023 0.15  0.00 - 0.50 K/uL Final    Basophils, Absolute 10/09/2023 0.03  0.00 - 0.20 K/uL Final    Immature Granulocytes, Absolute 10/09/2023 0.02  0.00 - 0.04 K/uL Final    nRBC, Absolute 10/09/2023 0.00  <=0.00 x10e3/uL Final    Diff Type 10/09/2023 Auto   Final    WBC, UA 10/09/2023 67 (H)  <=5 /hpf Final    RBC, UA 10/09/2023 14 (H)  <=3 /hpf Final    Bacteria, UA 10/09/2023 Moderate (A)  None Seen /hpf Final    Squamous Epithelial Cells, UA 10/09/2023 Occasional (A)  None Seen /HPF Final    Hyaline Casts, UA 10/09/2023 2-5 (A)  None Seen /lpf Final    Mucous 10/09/2023 Occasional (A)  None Seen /LPF Final    Culture, Urine 10/09/2023 Skin/Urogenital Louise Isolated, no further workup.   Final   Office Visit on 10/06/2023   Component Date Value Ref Range Status    Culture, Urine 10/06/2023 63,000 Yeast (A)   Final   Lab Visit on 10/04/2023   Component Date Value Ref Range Status    Creatinine, Urine 10/04/2023 127  28 - 219 mg/dL Final    Microalbumin 10/04/2023 2.0  0.0 - 2.8 mg/dL Final    Microalbumin/Creatinine Ratio 10/04/2023 15.7  0.0 - 30.0 mg/g Final   Office Visit on 10/04/2023   Component Date Value Ref Range Status    Hemoglobin A1C 10/04/2023 6.9 (A)  4.5 - 6.6 % Final    POC Glucose 10/04/2023 190 (A)  70 - 110 MG/DL Final   Office Visit on 10/02/2023   Component Date Value Ref Range Status    SARS Coronavirus 2 Antigen 10/02/2023 Negative  Negative Final    Rapid Influenza A Ag 10/02/2023 Negative  Negative Final    Rapid Influenza B Ag 10/02/2023 Negative  Negative Final     Acceptable 10/02/2023 Yes   Final    Color, UA 10/02/2023 Dark  Deirdre   Final    Spec Grav UA 10/02/2023 1.030   Final    pH, UA 10/02/2023 5.5   Final    WBC, UA 10/02/2023 Trace   Final    Nitrite, UA 10/02/2023 Positive   Final    Protein, POC 10/02/2023 30   Final    Glucose, UA 10/02/2023 100   Final    Ketones, UA 10/02/2023 Negative   Final    Bilirubin, POC 10/02/2023 Negative   Final    Urobilinogen, UA 10/02/2023 1.0   Final    Blood, UA 10/02/2023 Trace   Final   Office Visit on 08/21/2023   Component Date Value Ref Range Status    POC Amphetamines 08/21/2023 Negative  Negative, Inconclusive Final    POC Barbiturates 08/21/2023 Negative  Negative, Inconclusive Final    POC Benzodiazepines 08/21/2023 Negative  Negative, Inconclusive Final    POC Cocaine 08/21/2023 Negative  Negative, Inconclusive Final    POC THC 08/21/2023 Negative  Negative, Inconclusive Final    POC Methadone 08/21/2023 Negative  Negative, Inconclusive Final    POC Methamphetamine 08/21/2023 Negative  Negative, Inconclusive Final    POC Opiates 08/21/2023 Presumptive Positive (A)  Negative, Inconclusive Final    POC Oxycodone 08/21/2023 Negative  Negative, Inconclusive Final    POC Phencyclidine 08/21/2023 Negative  Negative, Inconclusive Final    POC Methylenedioxymethamphetamine * 08/21/2023 Negative  Negative, Inconclusive Final    POC Tricyclic Antidepressants 08/21/2023 Negative  Negative, Inconclusive Final    POC Buprenorphine 08/21/2023 Negative   Final     Acceptable 08/21/2023 Yes   Final    POC Temperature (Urine) 08/21/2023 90   Final   Lab Visit on 08/09/2023   Component Date Value Ref Range Status    Ammonia 08/09/2023 <10 (L)  11 - 32 µmol/L Final    Sodium 08/09/2023 143  136 - 145 mmol/L Final    Potassium 08/09/2023 3.9  3.5 - 5.1 mmol/L Final    Chloride 08/09/2023 109 (H)  98 - 107 mmol/L Final    CO2 08/09/2023 29  21 - 32 mmol/L Final    Anion Gap 08/09/2023 9  7 - 16 mmol/L Final    Glucose 08/09/2023 109 (H)  74 - 106 mg/dL Final    BUN 08/09/2023 14  7 - 18  mg/dL Final    Creatinine 08/09/2023 0.81  0.55 - 1.02 mg/dL Final    BUN/Creatinine Ratio 08/09/2023 17  6 - 20 Final    Calcium 08/09/2023 9.2  8.5 - 10.1 mg/dL Final    Total Protein 08/09/2023 7.1  6.4 - 8.2 g/dL Final    Albumin 08/09/2023 3.2 (L)  3.5 - 5.0 g/dL Final    Globulin 08/09/2023 3.9  2.0 - 4.0 g/dL Final    A/G Ratio 08/09/2023 0.8   Final    Bilirubin, Total 08/09/2023 0.3  >0.0 - 1.2 mg/dL Final    Alk Phos 08/09/2023 109  55 - 142 U/L Final    ALT 08/09/2023 18  13 - 56 U/L Final    AST 08/09/2023 14 (L)  15 - 37 U/L Final    eGFR 08/09/2023 75  >=60 mL/min/1.73m2 Final    Folate 08/09/2023 >20.0 (H)  3.1 - 17.5 ng/mL Final    TSH 08/09/2023 1.310  0.358 - 3.740 uIU/mL Final    Vitamin B12 08/09/2023 915  193 - 986 pg/mL Final    Vitamin D 25-Hydroxy, Blood 08/09/2023 48.7  ng/mL Final    Syphilis Ab Interpretation 08/09/2023 Non-Reactive  Non-Reactive Final    WBC 08/09/2023 6.68  4.50 - 11.00 K/uL Final    RBC 08/09/2023 4.35  4.20 - 5.40 M/uL Final    Hemoglobin 08/09/2023 11.6 (L)  12.0 - 16.0 g/dL Final    Hematocrit 08/09/2023 36.7 (L)  38.0 - 47.0 % Final    MCV 08/09/2023 84.4  80.0 - 96.0 fL Final    MCH 08/09/2023 26.7 (L)  27.0 - 31.0 pg Final    MCHC 08/09/2023 31.6 (L)  32.0 - 36.0 g/dL Final    RDW 08/09/2023 15.9 (H)  11.5 - 14.5 % Final    Platelet Count 08/09/2023 270  150 - 400 K/uL Final    MPV 08/09/2023 9.8  9.4 - 12.4 fL Final    Neutrophils % 08/09/2023 51.3 (L)  53.0 - 65.0 % Final    Lymphocytes % 08/09/2023 38.2  27.0 - 41.0 % Final    Monocytes % 08/09/2023 6.9 (H)  2.0 - 6.0 % Final    Eosinophils % 08/09/2023 2.8  1.0 - 4.0 % Final    Basophils % 08/09/2023 0.7  0.0 - 1.0 % Final    Immature Granulocytes % 08/09/2023 0.1  0.0 - 0.4 % Final    nRBC, Auto 08/09/2023 0.0  <=0.0 % Final    Neutrophils, Abs 08/09/2023 3.42  1.80 - 7.70 K/uL Final    Lymphocytes, Absolute 08/09/2023 2.55  1.00 - 4.80 K/uL Final    Monocytes, Absolute 08/09/2023 0.46  0.00 - 0.80 K/uL Final     Eosinophils, Absolute 08/09/2023 0.19  0.00 - 0.50 K/uL Final    Basophils, Absolute 08/09/2023 0.05  0.00 - 0.20 K/uL Final    Immature Granulocytes, Absolute 08/09/2023 0.01  0.00 - 0.04 K/uL Final    nRBC, Absolute 08/09/2023 0.00  <=0.00 x10e3/uL Final    Diff Type 08/09/2023 Auto   Final   Office Visit on 07/26/2023   Component Date Value Ref Range Status    Color, UA 07/26/2023 Dark-Yellow  Colorless, Straw, Yellow, Light Yellow, Dark Yellow Final    Clarity, UA 07/26/2023 Clear  Clear Final    pH, UA 07/26/2023 6.5  5.0 to 8.0 pH Units Final    Leukocytes, UA 07/26/2023 Trace (A)  Negative Final    Nitrites, UA 07/26/2023 Negative  Negative Final    Protein, UA 07/26/2023 Negative  Negative Final    Glucose, UA 07/26/2023 >1000  Normal mg/dL Final    Ketones, UA 07/26/2023 Negative  Negative mg/dL Final    Urobilinogen, UA 07/26/2023 Normal  0.2, 1.0, Normal mg/dL Final    Bilirubin, UA 07/26/2023 Negative  Negative Final    Blood, UA 07/26/2023 Negative  Negative Final    Specific Gravity, UA 07/26/2023 1.018  <=1.030 Final    WBC, UA 07/26/2023 16 (H)  <=5 /hpf Final    RBC, UA 07/26/2023 3  <=3 /hpf Final    Squamous Epithelial Cells, UA 07/26/2023 Occasional (A)  None Seen /HPF Final    Mucous 07/26/2023 Occasional (A)  None Seen /LPF Final    Culture, Urine 07/26/2023 Skin/Urogenital Louise Isolated, no further workup.   Final   There may be more visits with results that are not included.         Orders Placed This Encounter   Procedures    POCT Urine Drug Screen Presump     Interpretive Information:     Negative:  No drug detected at the cut off level.   Positive:  This result represents presumptive positive for the   tested drug, other substances may yield a positive response other   than the analyte of interest. This result should be utilized for   diagnostic purpose only. Confirmation testing will be performed upon physician request only.            Requested Prescriptions     Signed Prescriptions  Disp Refills    gabapentin (NEURONTIN) 400 MG capsule 90 capsule 2     Sig: TAKE 1 CAPSULE(400 MG) BY MOUTH EVERY 8 HOURS    HYDROcodone-acetaminophen (NORCO)  mg per tablet 120 tablet 0     Sig: Take 1 tablet by mouth every 6 (six) hours as needed for Pain.    HYDROcodone-acetaminophen (NORCO)  mg per tablet 120 tablet 0     Sig: Take 1 tablet by mouth every 6 (six) hours as needed for Pain.    HYDROcodone-acetaminophen (NORCO)  mg per tablet 120 tablet 0     Sig: Take 1 tablet by mouth every 6 (six) hours as needed for Pain.       Assessment:     1. Lumbosacral radiculopathy    2. Diabetic neuropathy with neurologic complication    3. Bilateral foot pain    4. Encounter for long-term (current) use of other medications         A's of Opioid Risk Assessment  Activity:Patient can perform ADL.   Analgesia:Patients pain is partially controlled by current medication. Patient has tried OTC medications such as Tylenol and Ibuprofen with out relief.   Adverse Effects: Patient denies constipation or sedation.  Aberrant Behavior:  reviewed with no aberrant drug seeking/taking behavior.  Overdose reversal drug naloxone discussed    Drug screen reviewed        X-ray left knee degenerative changes no fracture noted      MRI lumbar spine [facility] March 25, 2021 spinal stenosis L5/S1 disc bulge facet joint arthropathy multiple level degenerative changes        Plan:    Narcan February 2023    Follows Neurology [facility] neuropathy symptoms lower extremities    She had lumbar L5/S1 TORIE # 1 September 12, 2023, she states she had 90% relief after procedure, she states procedure did help improve her level function    She has known scoliosis    Complaint back pain buttock and leg pain numbness and tingling radicular in nature     Requesting Toradol injection     Considering lumbar procedure     Toradol 30 mg IM, tolerated well     She would like to continue conservative management this time    Continue  home exercise program as directed     Continue current medication    Follow-up 3 months    Dr. Brown September 2024    Bring original prescription medication bottles/container/box with labels to each visit

## 2023-12-18 ENCOUNTER — OFFICE VISIT (OUTPATIENT)
Dept: PAIN MEDICINE | Facility: CLINIC | Age: 78
End: 2023-12-18
Payer: COMMERCIAL

## 2023-12-18 VITALS
DIASTOLIC BLOOD PRESSURE: 67 MMHG | BODY MASS INDEX: 25.77 KG/M2 | RESPIRATION RATE: 20 BRPM | SYSTOLIC BLOOD PRESSURE: 113 MMHG | HEART RATE: 64 BPM | HEIGHT: 69 IN | WEIGHT: 174 LBS

## 2023-12-18 DIAGNOSIS — M54.17 LUMBOSACRAL RADICULOPATHY: Primary | Chronic | ICD-10-CM

## 2023-12-18 DIAGNOSIS — E11.49 DIABETIC NEUROPATHY WITH NEUROLOGIC COMPLICATION: Chronic | ICD-10-CM

## 2023-12-18 DIAGNOSIS — M79.672 BILATERAL FOOT PAIN: ICD-10-CM

## 2023-12-18 DIAGNOSIS — Z79.899 ENCOUNTER FOR LONG-TERM (CURRENT) USE OF OTHER MEDICATIONS: ICD-10-CM

## 2023-12-18 DIAGNOSIS — M79.671 BILATERAL FOOT PAIN: ICD-10-CM

## 2023-12-18 DIAGNOSIS — E11.40 DIABETIC NEUROPATHY WITH NEUROLOGIC COMPLICATION: Chronic | ICD-10-CM

## 2023-12-18 LAB
CTP QC/QA: YES
POC (AMP) AMPHETAMINE: NEGATIVE
POC (BAR) BARBITURATES: NEGATIVE
POC (BUP) BUPRENORPHINE: NEGATIVE
POC (BZO) BENZODIAZEPINES: NEGATIVE
POC (COC) COCAINE: NEGATIVE
POC (MDMA) METHYLENEDIOXYMETHAMPHETAMINE 3,4: NEGATIVE
POC (MET) METHAMPHETAMINE: NEGATIVE
POC (MOP) OPIATES: ABNORMAL
POC (MTD) METHADONE: NEGATIVE
POC (OXY) OXYCODONE: NEGATIVE
POC (PCP) PHENCYCLIDINE: NEGATIVE
POC (TCA) TRICYCLIC ANTIDEPRESSANTS: NEGATIVE
POC TEMPERATURE (URINE): 92
POC THC: NEGATIVE

## 2023-12-18 PROCEDURE — 3078F DIAST BP <80 MM HG: CPT | Mod: CPTII,,, | Performed by: PHYSICIAN ASSISTANT

## 2023-12-18 PROCEDURE — 80305 DRUG TEST PRSMV DIR OPT OBS: CPT | Mod: PBBFAC | Performed by: PHYSICIAN ASSISTANT

## 2023-12-18 PROCEDURE — 99999PBSHW PR PBB SHADOW TECHNICAL ONLY FILED TO HB: Mod: PBBFAC,,,

## 2023-12-18 PROCEDURE — 1101F PT FALLS ASSESS-DOCD LE1/YR: CPT | Mod: CPTII,,, | Performed by: PHYSICIAN ASSISTANT

## 2023-12-18 PROCEDURE — 3074F PR MOST RECENT SYSTOLIC BLOOD PRESSURE < 130 MM HG: ICD-10-PCS | Mod: CPTII,,, | Performed by: PHYSICIAN ASSISTANT

## 2023-12-18 PROCEDURE — 1125F PR PAIN SEVERITY QUANTIFIED, PAIN PRESENT: ICD-10-PCS | Mod: CPTII,,, | Performed by: PHYSICIAN ASSISTANT

## 2023-12-18 PROCEDURE — 1159F MED LIST DOCD IN RCRD: CPT | Mod: CPTII,,, | Performed by: PHYSICIAN ASSISTANT

## 2023-12-18 PROCEDURE — 1125F AMNT PAIN NOTED PAIN PRSNT: CPT | Mod: CPTII,,, | Performed by: PHYSICIAN ASSISTANT

## 2023-12-18 PROCEDURE — 1159F PR MEDICATION LIST DOCUMENTED IN MEDICAL RECORD: ICD-10-PCS | Mod: CPTII,,, | Performed by: PHYSICIAN ASSISTANT

## 2023-12-18 PROCEDURE — 1101F PR PT FALLS ASSESS DOC 0-1 FALLS W/OUT INJ PAST YR: ICD-10-PCS | Mod: CPTII,,, | Performed by: PHYSICIAN ASSISTANT

## 2023-12-18 PROCEDURE — 99214 OFFICE O/P EST MOD 30 MIN: CPT | Mod: S$PBB,25,, | Performed by: PHYSICIAN ASSISTANT

## 2023-12-18 PROCEDURE — 99214 OFFICE O/P EST MOD 30 MIN: CPT | Mod: PBBFAC | Performed by: PHYSICIAN ASSISTANT

## 2023-12-18 PROCEDURE — 96372 THER/PROPH/DIAG INJ SC/IM: CPT | Mod: PBBFAC | Performed by: PHYSICIAN ASSISTANT

## 2023-12-18 PROCEDURE — 3078F PR MOST RECENT DIASTOLIC BLOOD PRESSURE < 80 MM HG: ICD-10-PCS | Mod: CPTII,,, | Performed by: PHYSICIAN ASSISTANT

## 2023-12-18 PROCEDURE — 3288F PR FALLS RISK ASSESSMENT DOCUMENTED: ICD-10-PCS | Mod: CPTII,,, | Performed by: PHYSICIAN ASSISTANT

## 2023-12-18 PROCEDURE — 99999PBSHW PR PBB SHADOW TECHNICAL ONLY FILED TO HB: ICD-10-PCS | Mod: PBBFAC,,,

## 2023-12-18 PROCEDURE — 99214 PR OFFICE/OUTPT VISIT, EST, LEVL IV, 30-39 MIN: ICD-10-PCS | Mod: S$PBB,25,, | Performed by: PHYSICIAN ASSISTANT

## 2023-12-18 PROCEDURE — 3074F SYST BP LT 130 MM HG: CPT | Mod: CPTII,,, | Performed by: PHYSICIAN ASSISTANT

## 2023-12-18 PROCEDURE — 3288F FALL RISK ASSESSMENT DOCD: CPT | Mod: CPTII,,, | Performed by: PHYSICIAN ASSISTANT

## 2023-12-18 PROCEDURE — 99999PBSHW POCT URINE DRUG SCREEN PRESUMP: Mod: PBBFAC,,,

## 2023-12-18 RX ORDER — HYDROCODONE BITARTRATE AND ACETAMINOPHEN 10; 325 MG/1; MG/1
1 TABLET ORAL EVERY 6 HOURS PRN
Qty: 120 TABLET | Refills: 0 | Status: SHIPPED | OUTPATIENT
Start: 2024-02-27 | End: 2024-03-21 | Stop reason: SDUPTHER

## 2023-12-18 RX ORDER — KETOROLAC TROMETHAMINE 30 MG/ML
30 INJECTION, SOLUTION INTRAMUSCULAR; INTRAVENOUS
Status: COMPLETED | OUTPATIENT
Start: 2023-12-18 | End: 2023-12-18

## 2023-12-18 RX ORDER — GABAPENTIN 400 MG/1
CAPSULE ORAL
Qty: 90 CAPSULE | Refills: 2 | Status: SHIPPED | OUTPATIENT
Start: 2023-12-18 | End: 2024-03-21 | Stop reason: SDUPTHER

## 2023-12-18 RX ORDER — HYDROCODONE BITARTRATE AND ACETAMINOPHEN 10; 325 MG/1; MG/1
1 TABLET ORAL EVERY 6 HOURS PRN
Qty: 120 TABLET | Refills: 0 | Status: SHIPPED | OUTPATIENT
Start: 2024-01-28 | End: 2024-03-21 | Stop reason: SDUPTHER

## 2023-12-18 RX ORDER — HYDROCODONE BITARTRATE AND ACETAMINOPHEN 10; 325 MG/1; MG/1
1 TABLET ORAL EVERY 6 HOURS PRN
Qty: 120 TABLET | Refills: 0 | Status: SHIPPED | OUTPATIENT
Start: 2023-12-29 | End: 2024-03-21 | Stop reason: SDUPTHER

## 2023-12-18 RX ADMIN — KETOROLAC TROMETHAMINE 30 MG: 30 INJECTION, SOLUTION INTRAMUSCULAR at 09:12

## 2023-12-27 RX ORDER — GLIPIZIDE 10 MG/1
10 TABLET, FILM COATED, EXTENDED RELEASE ORAL 2 TIMES DAILY
Qty: 180 TABLET | Refills: 1 | Status: SHIPPED | OUTPATIENT
Start: 2023-12-27

## 2023-12-31 ENCOUNTER — EXTERNAL CHRONIC CARE MANAGEMENT (OUTPATIENT)
Dept: FAMILY MEDICINE | Facility: CLINIC | Age: 78
End: 2023-12-31
Payer: COMMERCIAL

## 2023-12-31 PROCEDURE — G0511 CCM/BHI BY RHC/FQHC 20MIN MO: HCPCS | Mod: ,,, | Performed by: INTERNAL MEDICINE

## 2024-01-04 NOTE — PROGRESS NOTES
Subjective     Patient ID: Lorraine De Jesus is a 78 y.o. female.    Chief Complaint: No chief complaint on file.    UROLOGY HISTORY PER DR. COFFMAN:  6/9/2021  here for repeat dilation. patient has chronic pain that is improved with urination. serial dilations over the years. typically she does well for a few months but was dilated just over 30 days ago.        Cystoscopy: After informed consent was confirmed. preprocedural abx were administered and the patient was taken to the cystoscopy suite and prepped and draped in standard fashion. The urethra was injected with lidocaine. Cystoscopy revealed no urethral lesions, moderately obstructing prostate, large bladder with no trabeculations or mucosal lesions. Ureteral orifices were identified bilaterally and were noted to be in orthotopic position demonstrate a clear reflux bilaterally. Retroflexion maneuver was performed to evaluate the trigone. Patient tolerated procedure well.         dilated up to 28 F without issue. teaching on self dilation today. sent home with sample. instructions not to introduce dilator farther then 1in into the urethra. discussed risk of injury to bladder. infection. pain.     recommend q 1 month self dilation. teaching performed by RN staff.      start hiprex 1gm daily, pyridium 100mg daily prn.     follow up 3 months for symptom check.    06/09/2021)--------------------------------------------------------------------------     Ms. De Jesus has been transferred to my care in Dr. Coffman's absence for f/u from above-mentioned cystoscopy with Hydrostatic Dilatation.  She states that she is doing much better since procedure, compliant with Hiprex, and free of s/s of infection to date.  Adds that her incontinence and bladder spasms are controlled on Oxybutynin 5mg daily.  (7/8/2021)----------------------------------------------------------------------------------     Ms. De Jesus is here today for c/o bladder pressure.  She has a history of known urethral  stenosis, and   She denies fever, constitutional symptoms or sensation of infection today.  PVR  0 ml.     -  Urethral stenosis:  Not performing self dilatations, states unable. Though taught to perform monthly self dilation at last visit, states she is unable to do this.  Reports she was symptom free following last cystoscopy with dilatation, but strainiing and pain at voiding onset have returned over the last 2 weeks.  -  Personal history UTI:  Hiprex started in June of this year.  -  Chronic pelvic pain in female:  R/t bladder spasms.  Controlled last visit with oxybutynine 5mg daily.  Discussed increasing Ditropan to BID  [November 22, 2021]  -------------------------------------------------------------------------------------------------------------------------------------------------------------------------------------------------------------------------  The above notes are notes of Dr. Recio and Ms. Shaw.  Patient sent to me for urethral dilatation.  She has been having problems with urethral syndrome since she was in her 40s.  Last urethral dilatation done June 24th and she feels she needs another today.  Pressure and discomfort like she usually has.  She is familiar with urethral dilatations and wishes to proceed again.   ------------------------------------------------------------- [December 16, 2021]-----------------------------------------------------------------------------------------------------------------------     Ms. De Jesus is a very pleasant 76 yo AA female, who has returned for c/o severe exacerbation of chronic pelvic pain and difficulty emptying.  Patient states increase of straining to urinate, causing bladder spasms from bladder around to back.  PVR 47 l.  Requests repeat urethral dilatations.  Dr. Singleton to room to evaluate.  Patient to be worked into his schedule this after noon for procedure.     She was last seen by Dr. Singleton 10 months prior for urethral dilatation and following  "POC:  "Urethral dilatation performed as described.  Urine sent for culture since she has lots of amorphous crystals that could mask infection.  Patient given Cipro 500 mg b.i.d. for 3 days to cover instrumentation.  She is to call when she needs another urethral dilatation"  [9/15/2022]-----------------------------------------------------------------------------------------  --------------------------------------------------------------------------------------------------------------------------------------------------------------------------------------------------------------------------------------------------  The above notes are from SAMSON Shaw, Dr. ARIEL Singleton and Dr KARLI Recio in the EMR system     This pleasant 78 year old female presents to the clinic for follow up of urethral stricture and bladder spasms. Patient was last seen in September 2022 for urethral dilatation. Patient reports urinary frequency, urgency, incomplete bladder emptying, bladder spasms, and nocturia 3 to 4 times a night. She denies dysuria, hematuria, nausea, vomiting, fever or chills. Her PVR is 100 mls today. She is on Ditropan XL 5 mg one at night and is tolerating this medication without side effects. She reports it does help some with her symptoms. She request to have a urethral dilatation done today. This was discussed with Dr. ARIEL Singleton and they will do the dilatation. She will follow up with us PRN and call when she feels like she needs another dilatation. I discussed the plan in detail with the patient and she is in agreement with the plan. All her questions were answered at today's visit. ------------------------------------------------------[June 27, 2023].      See note of Mr. Benz above.  Patient wants another urethral dilatation which will be performed today. [June 27, 2023 Dr. ARIEL Singleton].      This pleasant 78 year old female presents to the clinic for renal stone, urethral stricture and bladder spasms. Patient was seen by her " PCP in July 2023 for abdominal pain. Patient had a KUB done on July 26, 2023 that showed a 8 mm inferior pole left renal calculus, Nonspecific bowel gas pattern and Mild levoconvex curvature of the lumbar spine. Her KUB today showed No acute process. Left nephrolithiasis without change. The KUB today also showed there is mild to moderate stool in the normal caliber colon. I reviewed both KUB's with Urologist Dr. ARIEL Singleton and the patient. Dr. Singleton does not feel the kidney stone is the source of her pain as there was no obstruction or hydronephrosis noted. She does have a urethral stricture that was dilated in the office at the last visit by Dr. ARIEL Singleton. She does report this helped with her symptoms. She desires to have another dilatation but wants to wait another month for this. She is on Ditropan XL 5 mg one at night and is tolerating this medication without side effects. She desires to continue this medication. She denies any symptoms of a UTI. Her urine culture in June and July 2023 were negative. She denies dysuria or hematuria. Her PVR today is 16 mls. I discussed the plan in detail with Urologist Dr. ARIEL Singleton and the patient and they are in agreement with the plan. All her questions were answered at today's visit. I spent 20 minutes counseling this patient.      KUB done on August 14, 2023 showed FINDINGS: The bowel gas pattern is nonobstructive without gross mass lesion.  There is mild to moderate stool in the normal caliber colon. Radiopaque calculus overlies lower pole left kidney without change Osseous structures are similar  Impression:  No acute process  Left nephrolithiasis without change. -----[ August 14, 2023].   ------------------------------------------------------------------------------------------------------------------------------------------------------------------------------------------------------------     This pleasant 78 year old female presents to the clinic for follow up of urethral  stricture, bladder spasms, and thinks she has a UTI. Patient desires to have another Urethral Dilatation. She reported at the last visit that the dilatation did help. She feels like she may have a UTI.  She does have some lower abdominal pressure from not emptying her bladder.  She reports nocturia 4 times a night, frequency, urgency, and incomplete bladder emptying. Her PVR is 200 mls. She denies dysuria, hematuria, fever, chills, nausea or vomiting. I discussed having the dilatation with Dr. Singleton and the patient and they are in agreement with doing the dilatation at today's visit. She is on Oxybutynin XL 5 mg and is tolerating this medication without side effects. She desires to continue this medication. She did not complain of stone pain at today's visit. Her KUB in August 2023 showed No acute process  Left nephrolithiasis without change and her KUB done on July 26, 2023 that showed a 8 mm inferior pole left renal calculus. She has some fatigue at today's visit and will follow up with her PCP for this. I discussed the plan in detail with the patient and she is in agreement with the plan. All her questions were answered at today's visit. I spent 20 minutes counseling this patient. ----------------------------------------------------------  [October 6, 2023].   ------------------------------------------------------------------------------------------------------------------------------------------------------------------------------------------------------------------    Procedures     Urethral dilatation     The patient was placed in the dorsal lithotomy position in clinic treatment room and prepared for urethral dilatation.  Urethra was anesthetized with lidocaine jelly.  No sedation was given.  Urethral dilatation was begun with Golden sounds starting with the 20 Serbian Golden sound.  She was dilated through 28 Serbian successfully and procedure terminated.  The residual in the bladder was approximately 70 mL.   She tolerated reasonably well.    Urethral dilatation as described.  Urine culture collected.  See note of Mr. Benz.  Patient given Cipro 500 mg b.i.d. for 3 days to cover instrumentation.  -----------------------------------------------------------------------------------------------------  [October 6, 2023].   -------------------------------------------------------------------------------------------------------------------------------------------------------------------------------------------------------------------------------------  The above note is from Dr. ARIEL Singleton in the EMR system.     This pleasant 78 year old female presents to the clinic for follow up of urethral stricture, bladder spasms, UTI, and kidney stone. Patient desires to have another Urethral Dilatation. She reported at the last visit that the dilatation did help. I discussed having the dilatation with Dr. Singleton and the patient and they are in agreement with doing the dilatation. She will be scheduled for the urethral dilatation for January 9, 2024 at 3:15 pm with Dr. ARIEL Singleton since this could not be done today. She feels like she may have another UTI.  She reports being treated for a UTI in the ER with IV Gentamicin and PO Ceftin 500 mg on October 16, 2023. Her urine culture grew 20,000 Streptococcus agalactiae (Group B) on October 16, 2023.  She followed up with her PCP and repeat urine culture on November 6, 2023 was negative.   She reports nocturia 2 to 4 times a night, dysuria, urgency, incontinence, bladder spasms and incomplete bladder emptying. Her PVR is 121 mls. She denies frequency, hematuria, fever, chills, nausea or vomiting.  She is on Oxybutynin XL 5 mg and is tolerating this medication without side effects. We discussed increasing the Oxybutynin XL to 10 mg one at bedtime and she is agreeable.  She did not complain of stone pain at today's visit. Her KUB in August 2023 showed No acute process  Left nephrolithiasis without  change and her KUB done on July 26, 2023 that showed a 8 mm inferior pole left renal calculus. Her KUB done on October 2, 2023 showed An 8 mm calcification which overlies the upper pole of the left kidney in addition to Abundant stool.  I will culture her urine and treat if indicated. She may need to be back on a UTI suppressant if she continues to have frequent UTI's. I discussed the plan in detail with the patient and she is in agreement with the plan. All her questions were answered at today's visit. ----------------------------------------------------------------------  [January 8, 2024].             Review of Systems   Constitutional:  Negative for activity change and fever.   HENT:  Negative for hearing loss and trouble swallowing.    Eyes:  Negative for visual disturbance.   Respiratory:  Negative for cough, shortness of breath and wheezing.    Cardiovascular:  Negative for chest pain.   Gastrointestinal:  Negative for abdominal pain, diarrhea, nausea and vomiting.   Endocrine: Negative for polyuria.   Genitourinary:  Positive for bladder incontinence, nocturia and urgency. Negative for decreased urine volume, difficulty urinating, dysuria, enuresis, flank pain, frequency and hematuria.        Chronic Pelvic Pain        Urethral Stricture        Bladder Spasms        Renal Stones    Musculoskeletal:  Negative for back pain and gait problem.   Integumentary:  Negative for rash.   Neurological:  Negative for speech difficulty and weakness.   Psychiatric/Behavioral:  Negative for behavioral problems and confusion.           Objective     Physical Exam  Vitals and nursing note reviewed.   Constitutional:       General: She is not in acute distress.     Appearance: Normal appearance. She is not ill-appearing, toxic-appearing or diaphoretic.   HENT:      Head: Normocephalic.   Eyes:      Extraocular Movements: Extraocular movements intact.   Cardiovascular:      Rate and Rhythm: Normal rate and regular rhythm.       Heart sounds: Normal heart sounds.   Pulmonary:      Effort: Pulmonary effort is normal.      Breath sounds: Normal breath sounds. No wheezing, rhonchi or rales.   Abdominal:      General: Bowel sounds are normal.      Palpations: Abdomen is soft.      Tenderness: There is no abdominal tenderness. There is no right CVA tenderness, left CVA tenderness, guarding or rebound.   Musculoskeletal:         General: Normal range of motion.      Cervical back: Normal range of motion. No rigidity.   Skin:     General: Skin is warm and dry.   Neurological:      General: No focal deficit present.      Mental Status: She is alert and oriented to person, place, and time.      Motor: No weakness.      Coordination: Coordination normal.      Gait: Gait normal.   Psychiatric:         Mood and Affect: Mood normal.         Behavior: Behavior normal.         Thought Content: Thought content normal.          Assessment and Plan     Problem List Items Addressed This Visit          Renal/    Stricture of female urethra (Chronic)    Bladder spasms    Relevant Medications    oxybutynin (DITROPAN-XL) 10 MG 24 hr tablet    Dysuria    Relevant Orders    Urine culture    Renal calculus       GI    Chronic pelvic pain in female - Primary (Chronic)     Other Visit Diagnoses       Urgency of urination        Relevant Medications    oxybutynin (DITROPAN-XL) 10 MG 24 hr tablet    Other Relevant Orders    Urine culture    Nocturia        Relevant Medications    oxybutynin (DITROPAN-XL) 10 MG 24 hr tablet    Other Relevant Orders    Urine culture             Urine culture    Renew and increase Ditropan (Oxybutynin)  XL to 10 mg one at night   Keep appointment with Dr. ARIEL Singleton on January 9, 2024 at 3:15 pm for urethral dilatation   Follow up with Urology NP JIMBO Luciano in 3 months sooner if needed

## 2024-01-08 ENCOUNTER — OFFICE VISIT (OUTPATIENT)
Dept: UROLOGY | Facility: CLINIC | Age: 79
End: 2024-01-08
Payer: COMMERCIAL

## 2024-01-08 VITALS
WEIGHT: 174 LBS | BODY MASS INDEX: 26.37 KG/M2 | HEIGHT: 68 IN | DIASTOLIC BLOOD PRESSURE: 70 MMHG | OXYGEN SATURATION: 97 % | RESPIRATION RATE: 18 BRPM | SYSTOLIC BLOOD PRESSURE: 130 MMHG | HEART RATE: 60 BPM | TEMPERATURE: 98 F

## 2024-01-08 DIAGNOSIS — N32.89 BLADDER SPASMS: ICD-10-CM

## 2024-01-08 DIAGNOSIS — R39.15 URGENCY OF URINATION: ICD-10-CM

## 2024-01-08 DIAGNOSIS — N35.82 OTHER STRICTURE OF URETHRA IN FEMALE: Chronic | ICD-10-CM

## 2024-01-08 DIAGNOSIS — N20.0 RENAL CALCULUS: ICD-10-CM

## 2024-01-08 DIAGNOSIS — R30.0 DYSURIA: ICD-10-CM

## 2024-01-08 DIAGNOSIS — R35.1 NOCTURIA: ICD-10-CM

## 2024-01-08 DIAGNOSIS — R10.2 CHRONIC PELVIC PAIN IN FEMALE: Primary | Chronic | ICD-10-CM

## 2024-01-08 DIAGNOSIS — G89.29 CHRONIC PELVIC PAIN IN FEMALE: Primary | Chronic | ICD-10-CM

## 2024-01-08 PROCEDURE — 99214 OFFICE O/P EST MOD 30 MIN: CPT | Mod: PBBFAC | Performed by: NURSE PRACTITIONER

## 2024-01-08 PROCEDURE — 87086 URINE CULTURE/COLONY COUNT: CPT | Mod: ,,, | Performed by: CLINICAL MEDICAL LABORATORY

## 2024-01-08 PROCEDURE — 99214 OFFICE O/P EST MOD 30 MIN: CPT | Mod: S$PBB,,, | Performed by: NURSE PRACTITIONER

## 2024-01-08 PROCEDURE — 3288F FALL RISK ASSESSMENT DOCD: CPT | Mod: CPTII,,, | Performed by: NURSE PRACTITIONER

## 2024-01-08 PROCEDURE — 1125F AMNT PAIN NOTED PAIN PRSNT: CPT | Mod: CPTII,,, | Performed by: NURSE PRACTITIONER

## 2024-01-08 PROCEDURE — 1101F PT FALLS ASSESS-DOCD LE1/YR: CPT | Mod: CPTII,,, | Performed by: NURSE PRACTITIONER

## 2024-01-08 PROCEDURE — 1160F RVW MEDS BY RX/DR IN RCRD: CPT | Mod: CPTII,,, | Performed by: NURSE PRACTITIONER

## 2024-01-08 PROCEDURE — 3078F DIAST BP <80 MM HG: CPT | Mod: CPTII,,, | Performed by: NURSE PRACTITIONER

## 2024-01-08 PROCEDURE — 1159F MED LIST DOCD IN RCRD: CPT | Mod: CPTII,,, | Performed by: NURSE PRACTITIONER

## 2024-01-08 PROCEDURE — 3075F SYST BP GE 130 - 139MM HG: CPT | Mod: CPTII,,, | Performed by: NURSE PRACTITIONER

## 2024-01-08 RX ORDER — OXYBUTYNIN CHLORIDE 10 MG/1
TABLET, EXTENDED RELEASE ORAL
Qty: 90 TABLET | Refills: 3 | Status: SHIPPED | OUTPATIENT
Start: 2024-01-08

## 2024-01-08 NOTE — PATIENT INSTRUCTIONS
Urine culture    Renew and increase Ditropan (Oxybutynin)  XL to 10 mg one at night   Keep appointment with Dr. ARIEL Singleton on January 9, 2024 at 3:15 pm for urethral dilatation   Follow up with Urology NP JIMBO Luciano in 3 months sooner if needed

## 2024-01-09 ENCOUNTER — OFFICE VISIT (OUTPATIENT)
Dept: UROLOGY | Facility: CLINIC | Age: 79
End: 2024-01-09
Payer: COMMERCIAL

## 2024-01-09 VITALS
BODY MASS INDEX: 26.52 KG/M2 | SYSTOLIC BLOOD PRESSURE: 152 MMHG | WEIGHT: 175 LBS | HEART RATE: 79 BPM | HEIGHT: 68 IN | DIASTOLIC BLOOD PRESSURE: 93 MMHG

## 2024-01-09 DIAGNOSIS — N39.0 URINARY TRACT INFECTION WITH HEMATURIA, SITE UNSPECIFIED: ICD-10-CM

## 2024-01-09 DIAGNOSIS — N34.3 URETHRAL SYNDROME: Primary | ICD-10-CM

## 2024-01-09 DIAGNOSIS — N35.92 STRICTURE OF FEMALE URETHRA, UNSPECIFIED STRICTURE TYPE: ICD-10-CM

## 2024-01-09 DIAGNOSIS — R31.9 URINARY TRACT INFECTION WITH HEMATURIA, SITE UNSPECIFIED: ICD-10-CM

## 2024-01-09 DIAGNOSIS — R10.2 CHRONIC PELVIC PAIN IN FEMALE: ICD-10-CM

## 2024-01-09 DIAGNOSIS — R39.15 URGENCY OF URINATION: ICD-10-CM

## 2024-01-09 DIAGNOSIS — G89.29 CHRONIC PELVIC PAIN IN FEMALE: ICD-10-CM

## 2024-01-09 PROCEDURE — 1160F RVW MEDS BY RX/DR IN RCRD: CPT | Mod: CPTII,,, | Performed by: UROLOGY

## 2024-01-09 PROCEDURE — 3077F SYST BP >= 140 MM HG: CPT | Mod: CPTII,,, | Performed by: UROLOGY

## 2024-01-09 PROCEDURE — 96372 THER/PROPH/DIAG INJ SC/IM: CPT | Mod: PBBFAC,59 | Performed by: UROLOGY

## 2024-01-09 PROCEDURE — 99499 UNLISTED E&M SERVICE: CPT | Mod: S$PBB,,, | Performed by: UROLOGY

## 2024-01-09 PROCEDURE — 53660 DILATION OF URETHRA: CPT | Mod: PBBFAC | Performed by: UROLOGY

## 2024-01-09 PROCEDURE — 99999PBSHW PR PBB SHADOW TECHNICAL ONLY FILED TO HB: Mod: PBBFAC,,,

## 2024-01-09 PROCEDURE — 3080F DIAST BP >= 90 MM HG: CPT | Mod: CPTII,,, | Performed by: UROLOGY

## 2024-01-09 PROCEDURE — 1125F AMNT PAIN NOTED PAIN PRSNT: CPT | Mod: CPTII,,, | Performed by: UROLOGY

## 2024-01-09 PROCEDURE — 1101F PT FALLS ASSESS-DOCD LE1/YR: CPT | Mod: CPTII,,, | Performed by: UROLOGY

## 2024-01-09 PROCEDURE — 1159F MED LIST DOCD IN RCRD: CPT | Mod: CPTII,,, | Performed by: UROLOGY

## 2024-01-09 PROCEDURE — 53660 DILATION OF URETHRA: CPT | Mod: S$PBB,,, | Performed by: UROLOGY

## 2024-01-09 PROCEDURE — 3288F FALL RISK ASSESSMENT DOCD: CPT | Mod: CPTII,,, | Performed by: UROLOGY

## 2024-01-09 PROCEDURE — 99215 OFFICE O/P EST HI 40 MIN: CPT | Mod: PBBFAC,25 | Performed by: UROLOGY

## 2024-01-09 RX ORDER — GENTAMICIN SULFATE 40 MG/ML
80 INJECTION, SOLUTION INTRAMUSCULAR; INTRAVENOUS ONCE
Status: COMPLETED | OUTPATIENT
Start: 2024-01-09 | End: 2024-01-09

## 2024-01-09 RX ORDER — LIDOCAINE HYDROCHLORIDE 20 MG/ML
JELLY TOPICAL
Status: COMPLETED | OUTPATIENT
Start: 2024-01-09 | End: 2024-01-09

## 2024-01-09 RX ADMIN — LIDOCAINE HYDROCHLORIDE 10 ML: 20 JELLY TOPICAL at 02:01

## 2024-01-09 RX ADMIN — GENTAMICIN SULFATE 80 MG: 40 INJECTION, SOLUTION INTRAMUSCULAR; INTRAVENOUS at 02:01

## 2024-01-10 ENCOUNTER — TELEPHONE (OUTPATIENT)
Dept: UROLOGY | Facility: CLINIC | Age: 79
End: 2024-01-10
Payer: COMMERCIAL

## 2024-01-10 DIAGNOSIS — B37.9 YEAST INFECTION: ICD-10-CM

## 2024-01-10 LAB — UA COMPLETE W REFLEX CULTURE PNL UR: ABNORMAL

## 2024-01-10 RX ORDER — FLUCONAZOLE 100 MG/1
TABLET ORAL
Qty: 8 TABLET | Refills: 0 | Status: SHIPPED | OUTPATIENT
Start: 2024-01-10 | End: 2024-02-05 | Stop reason: SDUPTHER

## 2024-01-10 NOTE — TELEPHONE ENCOUNTER
Please let patient know her urine culture grew >100,000 Yeast and if no allergy to diflucan then we can treat the infection with that, let me know what pharmacy, thanks     Erin Galeano, Andrea Bailon, NP  Spoke with her--no allergy to Diflucan, has taken it in past--send Rx to [facility] on [address]--thanks     Rita for Diflucan 100 mg take two tablets on day one then reduce to one tablet daily sent to patient's pharmacy

## 2024-01-10 NOTE — TELEPHONE ENCOUNTER
----- Message from Andrea Benz NP sent at 1/10/2024 12:59 PM CST -----  Please let patient know her urine culture grew >100,000 Yeast and if no allergy to diflucan then we can treat the infection with that, let me know what pharmacy, thanks   I called and spoke with the pt and informed her of the above message.  She said she has taken Diflucan in past and is not allergic to it.  Send it to [facility] on [address].  I will relay to SAMSON Benz.  Told her he will probably get it sent in to drug store this evening.  She voiced understanding.

## 2024-01-10 NOTE — PROCEDURES
Procedures    Urethral dilatation    The patient was given 80 mg gentamicin IM about 15 minutes prior to procedure.  The patient was placed in the dorsal lithotomy position in clinic treatment room.  Usual preparation of the genitalia was carried out with Betadine.  Urethra anesthetized by Ms. Kaba with lidocaine jelly Uro jet.  Urethral dilatation performed by me with Golden sounds beginning with an 18 Peruvian sound and proceeding through 28 Peruvian.  Residual in the bladder was approximately 60 mL.  Tolerated well.

## 2024-01-10 NOTE — PROGRESS NOTES
Subjective     Patient ID: Lorraine De Jesus is a 78 y.o. female.    Chief Complaint: Follow-up (Urethral dilatation)    6/9/2021  here for repeat dilation. patient has chronic pain that is improved with urination. serial dilations over the years. typically she does well for a few months but was dilated just over 30 days ago.        Cystoscopy: After informed consent was confirmed. preprocedural abx were administered and the patient was taken to the cystoscopy suite and prepped and draped in standard fashion. The urethra was injected with lidocaine. Cystoscopy revealed no urethral lesions, moderately obstructing prostate, large bladder with no trabeculations or mucosal lesions. Ureteral orifices were identified bilaterally and were noted to be in orthotopic position demonstrate a clear reflux bilaterally. Retroflexion maneuver was performed to evaluate the trigone. Patient tolerated procedure well.         dilated up to 28 F without issue. teaching on self dilation today. sent home with sample. instructions not to introduce dilator farther then 1in into the urethra. discussed risk of injury to bladder. infection. pain.     recommend q 1 month self dilation. teaching performed by RN staff.      start hiprex 1gm daily, pyridium 100mg daily prn.     follow up 3 months for symptom check.    06/09/2021)--------------------------------------------------------------------------     Ms. De Jesus has been transferred to my care in Dr. Recio's absence for f/u from above-mentioned cystoscopy with Hydrostatic Dilatation.  She states that she is doing much better since procedure, compliant with Hiprex, and free of s/s of infection to date.  Adds that her incontinence and bladder spasms are controlled on Oxybutynin 5mg daily.  (7/8/2021)----------------------------------------------------------------------------------     Ms. De Jesus is here today for c/o bladder pressure.  She has a history of known urethral stenosis, and   She denies  "fever, constitutional symptoms or sensation of infection today.  PVR  0 ml.     -  Urethral stenosis:  Not performing self dilatations, states unable. Though taught to perform monthly self dilation at last visit, states she is unable to do this.  Reports she was symptom free following last cystoscopy with dilatation, but strainiing and pain at voiding onset have returned over the last 2 weeks.  -  Personal history UTI:  Hiprex started in June of this year.  -  Chronic pelvic pain in female:  R/t bladder spasms.  Controlled last visit with oxybutynine 5mg daily.  Discussed increasing Ditropan to BID  [November 22, 2021]  -------------------------------------------------------------------------------------------------------------------------------------------------------------------------------------------------------------------------  The above notes are notes of Dr. Recio and Ms. Shaw.  Patient sent to me for urethral dilatation.  She has been having problems with urethral syndrome since she was in her 40s.  Last urethral dilatation done June 24th and she feels she needs another today.  Pressure and discomfort like she usually has.  She is familiar with urethral dilatations and wishes to proceed again.   ------------------------------------------------------------- [December 16, 2021]-----------------------------------------------------------------------------------------------------------------------     Ms. De Jesus is a very pleasant 78 yo AA female, who has returned for c/o severe exacerbation of chronic pelvic pain and difficulty emptying.  Patient states increase of straining to urinate, causing bladder spasms from bladder around to back.  PVR 47 l.  Requests repeat urethral dilatations.  Dr. Singleton to room to evaluate.  Patient to be worked into his schedule this after noon for procedure.     She was last seen by Dr. Singleton 10 months prior for urethral dilatation and following POC:  "Urethral dilatation performed " "as described.  Urine sent for culture since she has lots of amorphous crystals that could mask infection.  Patient given Cipro 500 mg b.i.d. for 3 days to cover instrumentation.  She is to call when she needs another urethral dilatation"  [9/15/2022]-----------------------------------------------------------------------------------------  --------------------------------------------------------------------------------------------------------------------------------------------------------------------------------------------------------------------------------------------------  The above notes are from SAMSON Shaw, Dr. ARIEL Singleton and Dr KARLI Recio in the EMR system     This pleasant 78 year old female presents to the clinic for follow up of urethral stricture and bladder spasms. Patient was last seen in September 2022 for urethral dilatation. Patient reports urinary frequency, urgency, incomplete bladder emptying, bladder spasms, and nocturia 3 to 4 times a night. She denies dysuria, hematuria, nausea, vomiting, fever or chills. Her PVR is 100 mls today. She is on Ditropan XL 5 mg one at night and is tolerating this medication without side effects. She reports it does help some with her symptoms. She request to have a urethral dilatation done today. This was discussed with Dr. ARIEL Singleton and they will do the dilatation. She will follow up with us PRN and call when she feels like she needs another dilatation. I discussed the plan in detail with the patient and she is in agreement with the plan. All her questions were answered at today's visit. ------------------------------------------------------[June 27, 2023].      See note of Mr. Benz above.  Patient wants another urethral dilatation which will be performed today. [June 27, 2023 Dr. ARIEL Singleton].      This pleasant 78 year old female presents to the clinic for renal stone, urethral stricture and bladder spasms. Patient was seen by her PCP in July 2023 for abdominal pain. " Patient had a KUB done on July 26, 2023 that showed a 8 mm inferior pole left renal calculus, Nonspecific bowel gas pattern and Mild levoconvex curvature of the lumbar spine. Her KUB today showed No acute process. Left nephrolithiasis without change. The KUB today also showed there is mild to moderate stool in the normal caliber colon. I reviewed both KUB's with Urologist Dr. ARIEL Singleton and the patient. Dr. Singleton does not feel the kidney stone is the source of her pain as there was no obstruction or hydronephrosis noted. She does have a urethral stricture that was dilated in the office at the last visit by Dr. ARIEL Singleton. She does report this helped with her symptoms. She desires to have another dilatation but wants to wait another month for this. She is on Ditropan XL 5 mg one at night and is tolerating this medication without side effects. She desires to continue this medication. She denies any symptoms of a UTI. Her urine culture in June and July 2023 were negative. She denies dysuria or hematuria. Her PVR today is 16 mls. I discussed the plan in detail with Urologist Dr. ARIEL Singleton and the patient and they are in agreement with the plan. All her questions were answered at today's visit. I spent 20 minutes counseling this patient.      KUB done on August 14, 2023 showed FINDINGS: The bowel gas pattern is nonobstructive without gross mass lesion.  There is mild to moderate stool in the normal caliber colon. Radiopaque calculus overlies lower pole left kidney without change Osseous structures are similar  Impression:  No acute process  Left nephrolithiasis without change. -----[ August 14, 2023].   ------------------------------------------------------------------------------------------------------------------------------------------------------------------------------------------------------------     This pleasant 78 year old female presents to the clinic for follow up of urethral stricture, bladder spasms, and thinks she  has a UTI. Patient desires to have another Urethral Dilatation. She reported at the last visit that the dilatation did help. She feels like she may have a UTI.  She does have some lower abdominal pressure from not emptying her bladder.  She reports nocturia 4 times a night, frequency, urgency, and incomplete bladder emptying. Her PVR is 200 mls. She denies dysuria, hematuria, fever, chills, nausea or vomiting. I discussed having the dilatation with Dr. Singleton and the patient and they are in agreement with doing the dilatation at today's visit. She is on Oxybutynin XL 5 mg and is tolerating this medication without side effects. She desires to continue this medication. She did not complain of stone pain at today's visit. Her KUB in August 2023 showed No acute process  Left nephrolithiasis without change and her KUB done on July 26, 2023 that showed a 8 mm inferior pole left renal calculus. She has some fatigue at today's visit and will follow up with her PCP for this. I discussed the plan in detail with the patient and she is in agreement with the plan. All her questions were answered at today's visit. I spent 20 minutes counseling this patient. ----------------------------------------------------------  [October 6, 2023].         Review of Systems   Constitutional:  Negative for activity change and fever.   HENT:  Negative for hearing loss and trouble swallowing.    Eyes:  Negative for visual disturbance.   Respiratory:  Negative for cough, shortness of breath and wheezing.    Cardiovascular:  Negative for chest pain.   Gastrointestinal:  Negative for abdominal pain, diarrhea, nausea and vomiting.   Endocrine: Negative for polyuria.   Genitourinary:  Positive for frequency and urgency. Negative for bladder incontinence, decreased urine volume, difficulty urinating, dysuria, enuresis, flank pain, hematuria and nocturia.        Chronic Pelvic Pain       Urethral Stricture        Renal Stone        Bladder Spasms     Musculoskeletal:  Negative for back pain and gait problem.   Integumentary:  Negative for rash.   Neurological:  Negative for speech difficulty and weakness.   Psychiatric/Behavioral:  Negative for behavioral problems and confusion.    ----------------------------------------------------------------------------------------------------------------------------  Above notes are notes of Mr. Vaughn Benz.  Ms. De Jesus feels she needs a another urethral dilatation today and is in for such.  Urine culture submitted yesterday and results pending but preliminary is not growing anything.    Urinalysis by me revealed 1 or 2 red cells and 2 or 3 pus cells.  The dipstick had 2+ leukocytes, 2+ blood, trace of protein, pH 7.0 and urine specific gravity 1.015.          Review of Systems       Objective     Physical Exam  Constitutional:       Appearance: Normal appearance. She is normal weight.   Neurological:      Mental Status: She is alert.   Psychiatric:         Mood and Affect: Mood normal.         Behavior: Behavior normal.   Urinalysis revealed 2 or 3 pus cells and 2 or 3 red cells per high-powered field.  Dipstick had pH of 7.0, specific gravity 1.015, 2+ leukocytes, 2+ blood, and trace of protein     Assessment and Plan     1. Urethral syndrome    2. Urinary tract infection with hematuria, site unspecified  -     gentamicin injection 80 mg    3. Stricture of female urethra, unspecified stricture type  Overview:  history of known urethral stenosis, and was taught to perform monthly self dilation at home.    Orders:  -     LIDOcaine HCl 2% urojet    4. Urgency of urination    5. Chronic pelvic pain in female  Overview:  Ditropan XL 5mg controlling chronic bladder pain.    Hx of Urethral stenosis        Urethral dilatation as described.  Patient given gentamicin 80 mg IM prior to procedure.  We will check on results of urine culture tomorrow and treat specifically.  Meanwhile patient given Augmentin 875 b.i.d. for 3 days  while awaiting culture results.  Patient will let us know when she needs another urethral dilatation        No follow-ups on file.

## 2024-01-11 ENCOUNTER — TELEPHONE (OUTPATIENT)
Dept: UROLOGY | Facility: CLINIC | Age: 79
End: 2024-01-11
Payer: COMMERCIAL

## 2024-01-11 NOTE — TELEPHONE ENCOUNTER
Urine culture was performed at last office on 1/9/2024 and resulted in >100,000 yeast. I spoke with patient and reviewed culture results and allergies. Patient denies any know problem taking Diflucan. Patient voiced understanding that she is to take Diflucan 100 mg 2 tablets on first day then 1 tablet daily until completed. Patient requested order to be sent to [facility] on 24 th. I saw where medication was ordered yesterday by Andrea Benz NP and Negrita Galeano spoke with her. I called her back and she said she didn't know anything about it.     I called her son left message and spoke with her Daughter and asked her to check and make sure her Mom gets her medication and starts taking it. She voiced understanding how her mom is to take the medication. She said today is her mothers birthday and she will check on her.

## 2024-01-15 PROBLEM — N39.0 URINARY TRACT INFECTION WITHOUT HEMATURIA: Status: RESOLVED | Noted: 2023-10-12 | Resolved: 2024-01-15

## 2024-01-31 ENCOUNTER — EXTERNAL CHRONIC CARE MANAGEMENT (OUTPATIENT)
Dept: FAMILY MEDICINE | Facility: CLINIC | Age: 79
End: 2024-01-31
Payer: COMMERCIAL

## 2024-01-31 PROCEDURE — G0511 CCM/BHI BY RHC/FQHC 20MIN MO: HCPCS | Mod: ,,, | Performed by: INTERNAL MEDICINE

## 2024-02-05 ENCOUNTER — OFFICE VISIT (OUTPATIENT)
Dept: FAMILY MEDICINE | Facility: CLINIC | Age: 79
End: 2024-02-05
Payer: COMMERCIAL

## 2024-02-05 VITALS
WEIGHT: 174 LBS | SYSTOLIC BLOOD PRESSURE: 129 MMHG | TEMPERATURE: 97 F | HEART RATE: 82 BPM | DIASTOLIC BLOOD PRESSURE: 83 MMHG | RESPIRATION RATE: 17 BRPM | OXYGEN SATURATION: 100 % | BODY MASS INDEX: 26.37 KG/M2 | HEIGHT: 68 IN

## 2024-02-05 DIAGNOSIS — B37.9 YEAST INFECTION: ICD-10-CM

## 2024-02-05 DIAGNOSIS — E04.2 MULTIPLE THYROID NODULES: ICD-10-CM

## 2024-02-05 DIAGNOSIS — R10.9 ABDOMINAL PAIN, UNSPECIFIED ABDOMINAL LOCATION: Primary | ICD-10-CM

## 2024-02-05 LAB
ANION GAP SERPL CALCULATED.3IONS-SCNC: 5 MMOL/L (ref 7–16)
BACTERIA #/AREA URNS HPF: ABNORMAL /HPF
BASOPHILS # BLD AUTO: 0.03 K/UL (ref 0–0.2)
BASOPHILS NFR BLD AUTO: 0.5 % (ref 0–1)
BILIRUB UR QL STRIP: NEGATIVE
BUN SERPL-MCNC: 13 MG/DL (ref 7–18)
BUN/CREAT SERPL: 15 (ref 6–20)
CALCIUM SERPL-MCNC: 9.3 MG/DL (ref 8.5–10.1)
CAOX CRY URNS QL MICRO: ABNORMAL /HPF
CHLORIDE SERPL-SCNC: 111 MMOL/L (ref 98–107)
CLARITY UR: ABNORMAL
CO2 SERPL-SCNC: 29 MMOL/L (ref 21–32)
COLOR UR: YELLOW
CREAT SERPL-MCNC: 0.84 MG/DL (ref 0.55–1.02)
DIFFERENTIAL METHOD BLD: ABNORMAL
EGFR (NO RACE VARIABLE) (RUSH/TITUS): 72 ML/MIN/1.73M2
EOSINOPHIL # BLD AUTO: 0.14 K/UL (ref 0–0.5)
EOSINOPHIL NFR BLD AUTO: 2.4 % (ref 1–4)
ERYTHROCYTE [DISTWIDTH] IN BLOOD BY AUTOMATED COUNT: 15.1 % (ref 11.5–14.5)
GLUCOSE SERPL-MCNC: 160 MG/DL (ref 74–106)
GLUCOSE UR STRIP-MCNC: 100 MG/DL
HCT VFR BLD AUTO: 38.2 % (ref 38–47)
HGB BLD-MCNC: 12.4 G/DL (ref 12–16)
IMM GRANULOCYTES # BLD AUTO: 0.02 K/UL (ref 0–0.04)
IMM GRANULOCYTES NFR BLD: 0.3 % (ref 0–0.4)
KETONES UR STRIP-SCNC: NEGATIVE MG/DL
LEUKOCYTE ESTERASE UR QL STRIP: ABNORMAL
LYMPHOCYTES # BLD AUTO: 1.9 K/UL (ref 1–4.8)
LYMPHOCYTES NFR BLD AUTO: 32.9 % (ref 27–41)
MCH RBC QN AUTO: 27.5 PG (ref 27–31)
MCHC RBC AUTO-ENTMCNC: 32.5 G/DL (ref 32–36)
MCV RBC AUTO: 84.7 FL (ref 80–96)
MONOCYTES # BLD AUTO: 0.4 K/UL (ref 0–0.8)
MONOCYTES NFR BLD AUTO: 6.9 % (ref 2–6)
MPC BLD CALC-MCNC: 10.4 FL (ref 9.4–12.4)
MUCOUS, UA: ABNORMAL /LPF
NEUTROPHILS # BLD AUTO: 3.28 K/UL (ref 1.8–7.7)
NEUTROPHILS NFR BLD AUTO: 57 % (ref 53–65)
NITRITE UR QL STRIP: NEGATIVE
NRBC # BLD AUTO: 0 X10E3/UL
NRBC, AUTO (.00): 0 %
PH UR STRIP: 5.5 PH UNITS
PLATELET # BLD AUTO: 265 K/UL (ref 150–400)
POTASSIUM SERPL-SCNC: 3.8 MMOL/L (ref 3.5–5.1)
PROT UR QL STRIP: 10
RBC # BLD AUTO: 4.51 M/UL (ref 4.2–5.4)
RBC # UR STRIP: NEGATIVE /UL
RBC #/AREA URNS HPF: 6 /HPF
SODIUM SERPL-SCNC: 141 MMOL/L (ref 136–145)
SP GR UR STRIP: 1.02
SQUAMOUS #/AREA URNS LPF: ABNORMAL /HPF
T4 FREE SERPL-MCNC: 1.01 NG/DL (ref 0.76–1.46)
T4 SERPL-MCNC: 6.2 ΜG/DL (ref 4.8–13.9)
TRANS CELLS #/AREA URNS LPF: ABNORMAL /LPF
TSH SERPL DL<=0.005 MIU/L-ACNC: 2.36 UIU/ML (ref 0.36–3.74)
UROBILINOGEN UR STRIP-ACNC: NORMAL MG/DL
WBC # BLD AUTO: 5.77 K/UL (ref 4.5–11)
WBC #/AREA URNS HPF: >182 /HPF
WBC CLUMPS, UA: ABNORMAL /HPF

## 2024-02-05 PROCEDURE — 85025 COMPLETE CBC W/AUTO DIFF WBC: CPT | Mod: ,,, | Performed by: CLINICAL MEDICAL LABORATORY

## 2024-02-05 PROCEDURE — 3079F DIAST BP 80-89 MM HG: CPT | Mod: ,,, | Performed by: INTERNAL MEDICINE

## 2024-02-05 PROCEDURE — 80048 BASIC METABOLIC PNL TOTAL CA: CPT | Mod: ,,, | Performed by: CLINICAL MEDICAL LABORATORY

## 2024-02-05 PROCEDURE — 1101F PT FALLS ASSESS-DOCD LE1/YR: CPT | Mod: ,,, | Performed by: INTERNAL MEDICINE

## 2024-02-05 PROCEDURE — 81001 URINALYSIS AUTO W/SCOPE: CPT | Mod: ,,, | Performed by: CLINICAL MEDICAL LABORATORY

## 2024-02-05 PROCEDURE — 84439 ASSAY OF FREE THYROXINE: CPT | Mod: ,,, | Performed by: CLINICAL MEDICAL LABORATORY

## 2024-02-05 PROCEDURE — 84443 ASSAY THYROID STIM HORMONE: CPT | Mod: ,,, | Performed by: CLINICAL MEDICAL LABORATORY

## 2024-02-05 PROCEDURE — 99214 OFFICE O/P EST MOD 30 MIN: CPT | Mod: ,,, | Performed by: INTERNAL MEDICINE

## 2024-02-05 PROCEDURE — 3074F SYST BP LT 130 MM HG: CPT | Mod: ,,, | Performed by: INTERNAL MEDICINE

## 2024-02-05 PROCEDURE — 1125F AMNT PAIN NOTED PAIN PRSNT: CPT | Mod: ,,, | Performed by: INTERNAL MEDICINE

## 2024-02-05 PROCEDURE — 3288F FALL RISK ASSESSMENT DOCD: CPT | Mod: ,,, | Performed by: INTERNAL MEDICINE

## 2024-02-05 PROCEDURE — 87086 URINE CULTURE/COLONY COUNT: CPT | Mod: ,,, | Performed by: CLINICAL MEDICAL LABORATORY

## 2024-02-05 RX ORDER — POLYETHYLENE GLYCOL 3350 17 G/17G
17 POWDER, FOR SOLUTION ORAL DAILY
Qty: 30 EACH | Refills: 0 | Status: SHIPPED | OUTPATIENT
Start: 2024-02-05

## 2024-02-05 RX ORDER — FLUCONAZOLE 100 MG/1
TABLET ORAL
Qty: 5 TABLET | Refills: 1 | Status: SHIPPED | OUTPATIENT
Start: 2024-02-05

## 2024-02-05 RX ORDER — LORATADINE 10 MG/1
10 TABLET ORAL DAILY
Qty: 15 TABLET | Refills: 3 | Status: SHIPPED | OUTPATIENT
Start: 2024-02-05

## 2024-02-06 ENCOUNTER — TELEPHONE (OUTPATIENT)
Dept: FAMILY MEDICINE | Facility: CLINIC | Age: 79
End: 2024-02-06
Payer: COMMERCIAL

## 2024-02-06 NOTE — TELEPHONE ENCOUNTER
----- Message from Mateo Chen MD sent at 2/5/2024  5:02 PM CST -----  Need to see in  1 week please  abnl results     1140 Pt is scheduled to come in on 02/14/24

## 2024-02-07 ENCOUNTER — TELEPHONE (OUTPATIENT)
Dept: FAMILY MEDICINE | Facility: CLINIC | Age: 79
End: 2024-02-07
Payer: COMMERCIAL

## 2024-02-07 LAB — UA COMPLETE W REFLEX CULTURE PNL UR: ABNORMAL

## 2024-02-07 NOTE — TELEPHONE ENCOUNTER
----- Message from Mtaeo Chen MD sent at 2/7/2024  3:25 PM CST -----  Need to see in  1 week please  abnl results     1556 Pt is scheduled to come in on 02/14/24

## 2024-02-13 ENCOUNTER — OFFICE VISIT (OUTPATIENT)
Dept: FAMILY MEDICINE | Facility: CLINIC | Age: 79
End: 2024-02-13
Payer: COMMERCIAL

## 2024-02-13 VITALS
HEART RATE: 81 BPM | TEMPERATURE: 97 F | SYSTOLIC BLOOD PRESSURE: 169 MMHG | RESPIRATION RATE: 18 BRPM | BODY MASS INDEX: 26.52 KG/M2 | HEIGHT: 68 IN | WEIGHT: 175 LBS | OXYGEN SATURATION: 95 % | DIASTOLIC BLOOD PRESSURE: 83 MMHG

## 2024-02-13 DIAGNOSIS — N20.0 KIDNEY STONE ON LEFT SIDE: Primary | ICD-10-CM

## 2024-02-13 DIAGNOSIS — I10 ESSENTIAL (PRIMARY) HYPERTENSION: ICD-10-CM

## 2024-02-13 LAB
BACTERIA #/AREA URNS HPF: ABNORMAL /HPF
BILIRUB UR QL STRIP: NEGATIVE
CLARITY UR: ABNORMAL
COLOR UR: YELLOW
GLUCOSE UR STRIP-MCNC: 500 MG/DL
KETONES UR STRIP-SCNC: NEGATIVE MG/DL
LEUKOCYTE ESTERASE UR QL STRIP: ABNORMAL
MUCOUS, UA: ABNORMAL /LPF
NITRITE UR QL STRIP: NEGATIVE
PH UR STRIP: 6.5 PH UNITS
PROT UR QL STRIP: NEGATIVE
RBC # UR STRIP: NEGATIVE /UL
RBC #/AREA URNS HPF: 3 /HPF
SP GR UR STRIP: 1.01
SQUAMOUS #/AREA URNS LPF: ABNORMAL /HPF
UROBILINOGEN UR STRIP-ACNC: NORMAL MG/DL
WBC #/AREA URNS HPF: 47 /HPF
WBC CLUMPS, UA: ABNORMAL /HPF
YEAST #/AREA URNS HPF: ABNORMAL /HPF

## 2024-02-13 PROCEDURE — 81001 URINALYSIS AUTO W/SCOPE: CPT | Mod: ,,, | Performed by: CLINICAL MEDICAL LABORATORY

## 2024-02-13 PROCEDURE — 87186 SC STD MICRODIL/AGAR DIL: CPT | Mod: ,,, | Performed by: CLINICAL MEDICAL LABORATORY

## 2024-02-13 PROCEDURE — 87077 CULTURE AEROBIC IDENTIFY: CPT | Mod: ,,, | Performed by: CLINICAL MEDICAL LABORATORY

## 2024-02-13 PROCEDURE — 3288F FALL RISK ASSESSMENT DOCD: CPT | Mod: ,,, | Performed by: INTERNAL MEDICINE

## 2024-02-13 PROCEDURE — 3079F DIAST BP 80-89 MM HG: CPT | Mod: ,,, | Performed by: INTERNAL MEDICINE

## 2024-02-13 PROCEDURE — 1101F PT FALLS ASSESS-DOCD LE1/YR: CPT | Mod: ,,, | Performed by: INTERNAL MEDICINE

## 2024-02-13 PROCEDURE — 99213 OFFICE O/P EST LOW 20 MIN: CPT | Mod: ,,, | Performed by: INTERNAL MEDICINE

## 2024-02-13 PROCEDURE — 1126F AMNT PAIN NOTED NONE PRSNT: CPT | Mod: ,,, | Performed by: INTERNAL MEDICINE

## 2024-02-13 PROCEDURE — 87086 URINE CULTURE/COLONY COUNT: CPT | Mod: ,,, | Performed by: CLINICAL MEDICAL LABORATORY

## 2024-02-13 PROCEDURE — 3077F SYST BP >= 140 MM HG: CPT | Mod: ,,, | Performed by: INTERNAL MEDICINE

## 2024-02-14 PROBLEM — B37.9 YEAST INFECTION: Status: ACTIVE | Noted: 2024-02-14

## 2024-02-15 ENCOUNTER — OFFICE VISIT (OUTPATIENT)
Dept: UROLOGY | Facility: CLINIC | Age: 79
End: 2024-02-15
Payer: COMMERCIAL

## 2024-02-15 ENCOUNTER — OFFICE VISIT (OUTPATIENT)
Dept: FAMILY MEDICINE | Facility: CLINIC | Age: 79
End: 2024-02-15
Payer: COMMERCIAL

## 2024-02-15 VITALS
OXYGEN SATURATION: 98 % | TEMPERATURE: 99 F | BODY MASS INDEX: 26.52 KG/M2 | WEIGHT: 175 LBS | RESPIRATION RATE: 18 BRPM | HEART RATE: 110 BPM | SYSTOLIC BLOOD PRESSURE: 102 MMHG | HEIGHT: 68 IN | DIASTOLIC BLOOD PRESSURE: 62 MMHG

## 2024-02-15 VITALS
DIASTOLIC BLOOD PRESSURE: 84 MMHG | SYSTOLIC BLOOD PRESSURE: 144 MMHG | WEIGHT: 174 LBS | OXYGEN SATURATION: 100 % | TEMPERATURE: 100 F | BODY MASS INDEX: 26.37 KG/M2 | HEART RATE: 113 BPM | HEIGHT: 68 IN

## 2024-02-15 DIAGNOSIS — R30.0 DYSURIA: ICD-10-CM

## 2024-02-15 DIAGNOSIS — R10.2 CHRONIC PELVIC PAIN IN FEMALE: Chronic | ICD-10-CM

## 2024-02-15 DIAGNOSIS — G89.29 CHRONIC PELVIC PAIN IN FEMALE: Chronic | ICD-10-CM

## 2024-02-15 DIAGNOSIS — N20.0 KIDNEY STONE ON LEFT SIDE: Primary | ICD-10-CM

## 2024-02-15 DIAGNOSIS — N32.89 BLADDER SPASMS: ICD-10-CM

## 2024-02-15 DIAGNOSIS — U07.1 COVID-19: Primary | ICD-10-CM

## 2024-02-15 DIAGNOSIS — Z20.828 EXPOSURE TO VIRAL DISEASE: ICD-10-CM

## 2024-02-15 DIAGNOSIS — N20.0 RENAL CALCULUS: ICD-10-CM

## 2024-02-15 DIAGNOSIS — N39.41 URGE INCONTINENCE: ICD-10-CM

## 2024-02-15 DIAGNOSIS — N35.82 OTHER STRICTURE OF URETHRA IN FEMALE: Chronic | ICD-10-CM

## 2024-02-15 LAB
CTP QC/QA: YES
FLUAV AG NPH QL: NEGATIVE
FLUBV AG NPH QL: NEGATIVE
S PYO RRNA THROAT QL PROBE: NEGATIVE
SARS-COV-2 AG RESP QL IA.RAPID: POSITIVE

## 2024-02-15 PROCEDURE — 3078F DIAST BP <80 MM HG: CPT | Mod: CPTII,,, | Performed by: NURSE PRACTITIONER

## 2024-02-15 PROCEDURE — 99213 OFFICE O/P EST LOW 20 MIN: CPT | Mod: S$PBB,,, | Performed by: NURSE PRACTITIONER

## 2024-02-15 PROCEDURE — 87804 INFLUENZA ASSAY W/OPTIC: CPT | Mod: 59,QW,, | Performed by: NURSE PRACTITIONER

## 2024-02-15 PROCEDURE — 1101F PT FALLS ASSESS-DOCD LE1/YR: CPT | Mod: CPTII,,, | Performed by: NURSE PRACTITIONER

## 2024-02-15 PROCEDURE — 3079F DIAST BP 80-89 MM HG: CPT | Mod: ,,, | Performed by: NURSE PRACTITIONER

## 2024-02-15 PROCEDURE — 1159F MED LIST DOCD IN RCRD: CPT | Mod: ,,, | Performed by: NURSE PRACTITIONER

## 2024-02-15 PROCEDURE — 99214 OFFICE O/P EST MOD 30 MIN: CPT | Mod: ,,, | Performed by: NURSE PRACTITIONER

## 2024-02-15 PROCEDURE — 1101F PT FALLS ASSESS-DOCD LE1/YR: CPT | Mod: ,,, | Performed by: NURSE PRACTITIONER

## 2024-02-15 PROCEDURE — 3288F FALL RISK ASSESSMENT DOCD: CPT | Mod: CPTII,,, | Performed by: NURSE PRACTITIONER

## 2024-02-15 PROCEDURE — 1125F AMNT PAIN NOTED PAIN PRSNT: CPT | Mod: CPTII,,, | Performed by: NURSE PRACTITIONER

## 2024-02-15 PROCEDURE — 87426 SARSCOV CORONAVIRUS AG IA: CPT | Mod: QW,,, | Performed by: NURSE PRACTITIONER

## 2024-02-15 PROCEDURE — 3077F SYST BP >= 140 MM HG: CPT | Mod: ,,, | Performed by: NURSE PRACTITIONER

## 2024-02-15 PROCEDURE — 3074F SYST BP LT 130 MM HG: CPT | Mod: CPTII,,, | Performed by: NURSE PRACTITIONER

## 2024-02-15 PROCEDURE — 87880 STREP A ASSAY W/OPTIC: CPT | Mod: QW,,, | Performed by: NURSE PRACTITIONER

## 2024-02-15 PROCEDURE — 99215 OFFICE O/P EST HI 40 MIN: CPT | Mod: PBBFAC | Performed by: NURSE PRACTITIONER

## 2024-02-15 PROCEDURE — 3288F FALL RISK ASSESSMENT DOCD: CPT | Mod: ,,, | Performed by: NURSE PRACTITIONER

## 2024-02-15 NOTE — PROGRESS NOTES
Subjective:       Patient ID: Lorraine De Jesus is a 79 y.o. female.    Chief Complaint: Abdominal Pain and Sore Throat (Irritated a bit )    Abdominal Pain  Pertinent negatives include no constipation, fever or frequency.   Sore Throat   Associated symptoms include abdominal pain. Pertinent negatives include no shortness of breath.     .  Patient seen and evaluated complains of moderate abdominal, patient complains of vaginitis and requesting Diflucan patient has history of thyroid nodules and will need follow up on the thyroid markers.  Will check a total T4 TSH and free T4.    Because abdominal pain and mild lower abdominal tenderness going to order KUB UA CNS CBC and a BNP.    For the vaginitis Diflucan 200 mg 1 p.o. q.day.  Patient has had postnasal drip runny nose and some nasal congestion will start Claritin 10 mg 1 p.o. q.day.  Patient is scheduled for thyroid ultrasound on 02/09/2024.    Current Medications:    Current Outpatient Medications:     amLODIPine (NORVASC) 5 MG tablet, Take 5 mg by mouth., Disp: , Rfl:     blood sugar diagnostic Strp, 1 strip by Misc.(Non-Drug; Combo Route) route 3 (three) times daily., Disp: 200 strip, Rfl: 3    blood-glucose meter kit, Use as instructed, Disp: 1 each, Rfl: 0    cloNIDine (CATAPRES) 0.1 MG tablet, TAKE 1 TABLET BY MOUTH EVERY 4 HOURS AS NEEDED FOR BLOOD PRESSURE GREATER THAN 170/90, Disp: , Rfl:     ezetimibe (ZETIA) 10 mg tablet, Take 1 tablet (10 mg total) by mouth once daily., Disp: 90 tablet, Rfl: 3    gabapentin (NEURONTIN) 400 MG capsule, TAKE 1 CAPSULE(400 MG) BY MOUTH EVERY 8 HOURS, Disp: 90 capsule, Rfl: 2    glipiZIDE (GLUCOTROL) 10 MG TR24, TAKE 1 TABLET BY MOUTH TWICE DAILY, Disp: 180 tablet, Rfl: 1    HYDROcodone-acetaminophen (NORCO)  mg per tablet, Take 1 tablet by mouth every 6 (six) hours as needed for Pain., Disp: 120 tablet, Rfl: 0    HYDROcodone-acetaminophen (NORCO)  mg per tablet, Take 1 tablet by mouth every 6 (six) hours as needed  for Pain., Disp: 120 tablet, Rfl: 0    [START ON 2/27/2024] HYDROcodone-acetaminophen (NORCO)  mg per tablet, Take 1 tablet by mouth every 6 (six) hours as needed for Pain., Disp: 120 tablet, Rfl: 0    lancets (ACCU-CHEK FASTCLIX LANCET DRUM MISC), Take 1 each by mouth once daily., Disp: , Rfl:     levothyroxine (SYNTHROID) 75 MCG tablet, Take 1 tablet (75 mcg total) by mouth before breakfast., Disp: 90 tablet, Rfl: 1    olmesartan (BENICAR) 5 MG Tab, Take 10 mg by mouth once daily., Disp: , Rfl:     ONETOUCH DELICA PLUS LANCET 33 gauge Misc, Check blood sugar TID, Disp: 100 each, Rfl: 11    ONETOUCH VERIO TEST STRIPS Strp, TEST BLOOD GLUCOSE LEVELS TWICE DAILY, Disp: 200 strip, Rfl: 4    oxybutynin (DITROPAN-XL) 10 MG 24 hr tablet, Take one tablet at night, Disp: 90 tablet, Rfl: 3    aspirin (ECOTRIN) 81 MG EC tablet, Take 81 mg by mouth once daily., Disp: , Rfl:     fluconazole (DIFLUCAN) 100 MG tablet, Take two tablets on day one then reduce to one tablet daily, Disp: 5 tablet, Rfl: 1    loratadine (CLARITIN) 10 mg tablet, Take 1 tablet (10 mg total) by mouth once daily., Disp: 15 tablet, Rfl: 3    nitrofurantoin, macrocrystal-monohydrate, (MACROBID) 100 MG capsule, Take 1 capsule (100 mg total) by mouth 2 (two) times daily. (Patient not taking: Reported on 1/9/2024), Disp: 20 capsule, Rfl: 0    polyethylene glycol (GLYCOLAX) 17 gram PwPk, Take 17 g by mouth once daily., Disp: 30 each, Rfl: 0           Review of Systems   Constitutional:  Negative for appetite change, fatigue and fever.   HENT:  Positive for sore throat.    Respiratory:  Negative for shortness of breath.    Cardiovascular:  Negative for chest pain.   Gastrointestinal:  Positive for abdominal pain. Negative for constipation.   Endocrine: Negative for polydipsia, polyphagia and polyuria.   Genitourinary:  Negative for difficulty urinating, frequency and hot flashes.   Allergic/Immunologic: Negative for environmental allergies.   Neurological:  " Negative for dizziness and light-headedness.   Psychiatric/Behavioral:  Negative for agitation.                 Vitals:    02/05/24 0916   BP: 129/83   BP Location: Left arm   Patient Position: Sitting   BP Method: Large (Automatic)   Pulse: 82   Resp: 17   Temp: 97.3 °F (36.3 °C)   TempSrc: Temporal   SpO2: 100%   Weight: 78.9 kg (174 lb)   Height: 5' 8" (1.727 m)        Physical Exam  Vitals and nursing note reviewed.   Constitutional:       Appearance: Normal appearance.   Cardiovascular:      Rate and Rhythm: Normal rate and regular rhythm.      Pulses: Normal pulses.      Heart sounds: Normal heart sounds.   Pulmonary:      Effort: Pulmonary effort is normal.      Breath sounds: Normal breath sounds.   Abdominal:      General: Abdomen is flat. Bowel sounds are normal.      Palpations: Abdomen is soft.   Musculoskeletal:         General: Normal range of motion.   Skin:     General: Skin is warm and dry.   Neurological:      General: No focal deficit present.      Mental Status: She is alert and oriented to person, place, and time. Mental status is at baseline.           Last Labs:     Office Visit on 02/05/2024   Component Date Value    Sodium 02/05/2024 141     Potassium 02/05/2024 3.8     Chloride 02/05/2024 111 (H)     CO2 02/05/2024 29     Anion Gap 02/05/2024 5 (L)     Glucose 02/05/2024 160 (H)     BUN 02/05/2024 13     Creatinine 02/05/2024 0.84     BUN/Creatinine Ratio 02/05/2024 15     Calcium 02/05/2024 9.3     eGFR 02/05/2024 72     Color, UA 02/05/2024 Yellow     Clarity, UA 02/05/2024 Turbid     pH, UA 02/05/2024 5.5     Leukocytes, UA 02/05/2024 Large (A)     Nitrites, UA 02/05/2024 Negative     Protein, UA 02/05/2024 10 (A)     Glucose, UA 02/05/2024 100 (A)     Ketones, UA 02/05/2024 Negative     Urobilinogen, UA 02/05/2024 Normal     Bilirubin, UA 02/05/2024 Negative     Blood, UA 02/05/2024 Negative     Specific Gravity, UA 02/05/2024 1.017     TSH 02/05/2024 2.360     Total T4 02/05/2024 6.2  "    Free T4 02/05/2024 1.01     WBC 02/05/2024 5.77     RBC 02/05/2024 4.51     Hemoglobin 02/05/2024 12.4     Hematocrit 02/05/2024 38.2     MCV 02/05/2024 84.7     MCH 02/05/2024 27.5     MCHC 02/05/2024 32.5     RDW 02/05/2024 15.1 (H)     Platelet Count 02/05/2024 265     MPV 02/05/2024 10.4     Neutrophils % 02/05/2024 57.0     Lymphocytes % 02/05/2024 32.9     Monocytes % 02/05/2024 6.9 (H)     Eosinophils % 02/05/2024 2.4     Basophils % 02/05/2024 0.5     Immature Granulocytes % 02/05/2024 0.3     nRBC, Auto 02/05/2024 0.0     Neutrophils, Abs 02/05/2024 3.28     Lymphocytes, Absolute 02/05/2024 1.90     Monocytes, Absolute 02/05/2024 0.40     Eosinophils, Absolute 02/05/2024 0.14     Basophils, Absolute 02/05/2024 0.03     Immature Granulocytes, A* 02/05/2024 0.02     nRBC, Absolute 02/05/2024 0.00     Diff Type 02/05/2024 Auto     WBC, UA 02/05/2024 >182 (H)     RBC, UA 02/05/2024 6 (H)     Bacteria, UA 02/05/2024 Few (A)     Squamous Epithelial Cell* 02/05/2024 Few (A)     WBC Clumps 02/05/2024 Many (A)     Calcium Oxalate Crystals* 02/05/2024 Moderate (A)     Transitional Epithelial * 02/05/2024 Occasional (A)     Mucous 02/05/2024 Few (A)     Culture, Urine 02/05/2024 >100,000 Yeast (A)    Office Visit on 01/08/2024   Component Date Value    Culture, Urine 01/08/2024 >100,000 Yeast (A)        Last Imaging:  X-Ray KUB  Narrative: EXAMINATION:  XR KUB    CLINICAL HISTORY:  Unspecified abdominal pain    TECHNIQUE:  XR KUB    COMPARISON:  10/2/23    FINDINGS:  Lower lobes are clear    There is no bowel obstruction.  No free air.  1 cm stone overlies the left kidney, unchanged.    Significant colonic stool.    No acute bone findings.  Degenerative change of the lumbar spine and hips.  Impression: 1 cm stone overlies the left kidney, unchanged.    Significant colonic stool.    Electronically signed by: Louie Pina  Date:    02/05/2024  Time:    10:19         **Labs and x-rays personally reviewed by  me    ** reviewed      Objective:        Assessment:       1. Abdominal pain, unspecified abdominal location  Basic Metabolic Panel    CBC Auto Differential    Urinalysis, Reflex to Urine Culture    X-Ray KUB    Basic Metabolic Panel    CBC Auto Differential    Urinalysis, Reflex to Urine Culture    Urinalysis, Microscopic    Urine culture      2. Multiple thyroid nodules  TSH    T4    T4, Free    US Thyroid    TSH    T4    T4, Free      3. Yeast infection  fluconazole (DIFLUCAN) 100 MG tablet           Plan:         1. Abdominal pain, unspecified abdominal location  -     Basic Metabolic Panel; Future; Expected date: 02/05/2024  -     CBC Auto Differential; Future; Expected date: 02/05/2024  -     Urinalysis, Reflex to Urine Culture; Future; Expected date: 02/05/2024  -     X-Ray KUB; Future; Expected date: 02/05/2024  -     Urinalysis, Microscopic  -     Urine culture    2. Multiple thyroid nodules  -     TSH; Future; Expected date: 02/05/2024  -     T4; Future; Expected date: 02/05/2024  -     T4, Free; Future; Expected date: 02/05/2024  -     US Thyroid; Future; Expected date: 02/05/2024    3. Yeast infection  -     fluconazole (DIFLUCAN) 100 MG tablet; Take two tablets on day one then reduce to one tablet daily  Dispense: 5 tablet; Refill: 1    Other orders  -     loratadine (CLARITIN) 10 mg tablet; Take 1 tablet (10 mg total) by mouth once daily.  Dispense: 15 tablet; Refill: 3  -     polyethylene glycol (GLYCOLAX) 17 gram PwPk; Take 17 g by mouth once daily.  Dispense: 30 each; Refill: 0

## 2024-02-15 NOTE — PROGRESS NOTES
Subjective     Patient ID: Lorraine De Jesus is a 79 y.o. female.    Chief Complaint: No chief complaint on file.    UROLOGY HISTORY PER DR. COFFMAN:  6/9/2021  here for repeat dilation. patient has chronic pain that is improved with urination. serial dilations over the years. typically she does well for a few months but was dilated just over 30 days ago.        Cystoscopy: After informed consent was confirmed. preprocedural abx were administered and the patient was taken to the cystoscopy suite and prepped and draped in standard fashion. The urethra was injected with lidocaine. Cystoscopy revealed no urethral lesions, moderately obstructing prostate, large bladder with no trabeculations or mucosal lesions. Ureteral orifices were identified bilaterally and were noted to be in orthotopic position demonstrate a clear reflux bilaterally. Retroflexion maneuver was performed to evaluate the trigone. Patient tolerated procedure well.         dilated up to 28 F without issue. teaching on self dilation today. sent home with sample. instructions not to introduce dilator farther then 1in into the urethra. discussed risk of injury to bladder. infection. pain.     recommend q 1 month self dilation. teaching performed by RN staff.      start hiprex 1gm daily, pyridium 100mg daily prn.     follow up 3 months for symptom check.    06/09/2021)--------------------------------------------------------------------------     Ms. De Jesus has been transferred to my care in Dr. Coffman's absence for f/u from above-mentioned cystoscopy with Hydrostatic Dilatation.  She states that she is doing much better since procedure, compliant with Hiprex, and free of s/s of infection to date.  Adds that her incontinence and bladder spasms are controlled on Oxybutynin 5mg daily.  (7/8/2021)----------------------------------------------------------------------------------     Ms. De Jesus is here today for c/o bladder pressure.  She has a history of known urethral  stenosis, and   She denies fever, constitutional symptoms or sensation of infection today.  PVR  0 ml.     -  Urethral stenosis:  Not performing self dilatations, states unable. Though taught to perform monthly self dilation at last visit, states she is unable to do this.  Reports she was symptom free following last cystoscopy with dilatation, but strainiing and pain at voiding onset have returned over the last 2 weeks.  -  Personal history UTI:  Hiprex started in June of this year.  -  Chronic pelvic pain in female:  R/t bladder spasms.  Controlled last visit with oxybutynine 5mg daily.  Discussed increasing Ditropan to BID  [November 22, 2021]  -------------------------------------------------------------------------------------------------------------------------------------------------------------------------------------------------------------------------  The above notes are notes of Dr. Recio and Ms. Shaw.  Patient sent to me for urethral dilatation.  She has been having problems with urethral syndrome since she was in her 40s.  Last urethral dilatation done June 24th and she feels she needs another today.  Pressure and discomfort like she usually has.  She is familiar with urethral dilatations and wishes to proceed again.   ------------------------------------------------------------- [December 16, 2021]-----------------------------------------------------------------------------------------------------------------------     Ms. De Jesus is a very pleasant 76 yo AA female, who has returned for c/o severe exacerbation of chronic pelvic pain and difficulty emptying.  Patient states increase of straining to urinate, causing bladder spasms from bladder around to back.  PVR 47 l.  Requests repeat urethral dilatations.  Dr. Singleton to room to evaluate.  Patient to be worked into his schedule this after noon for procedure.     She was last seen by Dr. Singleton 10 months prior for urethral dilatation and following  "POC:  "Urethral dilatation performed as described.  Urine sent for culture since she has lots of amorphous crystals that could mask infection.  Patient given Cipro 500 mg b.i.d. for 3 days to cover instrumentation.  She is to call when she needs another urethral dilatation"  [9/15/2022]-----------------------------------------------------------------------------------------  --------------------------------------------------------------------------------------------------------------------------------------------------------------------------------------------------------------------------------------------------  The above notes are from SAMSON Shaw, Dr. ARIEL Singleton and Dr KARLI Recio in the EMR system     This pleasant 78 year old female presents to the clinic for follow up of urethral stricture and bladder spasms. Patient was last seen in September 2022 for urethral dilatation. Patient reports urinary frequency, urgency, incomplete bladder emptying, bladder spasms, and nocturia 3 to 4 times a night. She denies dysuria, hematuria, nausea, vomiting, fever or chills. Her PVR is 100 mls today. She is on Ditropan XL 5 mg one at night and is tolerating this medication without side effects. She reports it does help some with her symptoms. She request to have a urethral dilatation done today. This was discussed with Dr. ARIEL Singleton and they will do the dilatation. She will follow up with us PRN and call when she feels like she needs another dilatation. I discussed the plan in detail with the patient and she is in agreement with the plan. All her questions were answered at today's visit. ------------------------------------------------------[June 27, 2023].      See note of Mr. Benz above.  Patient wants another urethral dilatation which will be performed today. [June 27, 2023 Dr. ARIEL Singleton].      This pleasant 78 year old female presents to the clinic for renal stone, urethral stricture and bladder spasms. Patient was seen by her " PCP in July 2023 for abdominal pain. Patient had a KUB done on July 26, 2023 that showed a 8 mm inferior pole left renal calculus, Nonspecific bowel gas pattern and Mild levoconvex curvature of the lumbar spine. Her KUB today showed No acute process. Left nephrolithiasis without change. The KUB today also showed there is mild to moderate stool in the normal caliber colon. I reviewed both KUB's with Urologist Dr. ARIEL Singleton and the patient. Dr. Singleton does not feel the kidney stone is the source of her pain as there was no obstruction or hydronephrosis noted. She does have a urethral stricture that was dilated in the office at the last visit by Dr. ARIEL Singleton. She does report this helped with her symptoms. She desires to have another dilatation but wants to wait another month for this. She is on Ditropan XL 5 mg one at night and is tolerating this medication without side effects. She desires to continue this medication. She denies any symptoms of a UTI. Her urine culture in June and July 2023 were negative. She denies dysuria or hematuria. Her PVR today is 16 mls. I discussed the plan in detail with Urologist Dr. ARIEL Singleton and the patient and they are in agreement with the plan. All her questions were answered at today's visit. I spent 20 minutes counseling this patient.      KUB done on August 14, 2023 showed FINDINGS: The bowel gas pattern is nonobstructive without gross mass lesion.  There is mild to moderate stool in the normal caliber colon. Radiopaque calculus overlies lower pole left kidney without change Osseous structures are similar  Impression:  No acute process  Left nephrolithiasis without change. -----[ August 14, 2023].   ------------------------------------------------------------------------------------------------------------------------------------------------------------------------------------------------------------     This pleasant 78 year old female presents to the clinic for follow up of urethral  stricture, bladder spasms, and thinks she has a UTI. Patient desires to have another Urethral Dilatation. She reported at the last visit that the dilatation did help. She feels like she may have a UTI.  She does have some lower abdominal pressure from not emptying her bladder.  She reports nocturia 4 times a night, frequency, urgency, and incomplete bladder emptying. Her PVR is 200 mls. She denies dysuria, hematuria, fever, chills, nausea or vomiting. I discussed having the dilatation with Dr. Singleton and the patient and they are in agreement with doing the dilatation at today's visit. She is on Oxybutynin XL 5 mg and is tolerating this medication without side effects. She desires to continue this medication. She did not complain of stone pain at today's visit. Her KUB in August 2023 showed No acute process  Left nephrolithiasis without change and her KUB done on July 26, 2023 that showed a 8 mm inferior pole left renal calculus. She has some fatigue at today's visit and will follow up with her PCP for this. I discussed the plan in detail with the patient and she is in agreement with the plan. All her questions were answered at today's visit. I spent 20 minutes counseling this patient. ----------------------------------------------------------  [October 6, 2023].   ------------------------------------------------------------------------------------------------------------------------------------------------------------------------------------------------------------------     Procedures     Urethral dilatation     The patient was placed in the dorsal lithotomy position in clinic treatment room and prepared for urethral dilatation.  Urethra was anesthetized with lidocaine jelly.  No sedation was given.  Urethral dilatation was begun with Golden sounds starting with the 20 Armenian Golden sound.  She was dilated through 28 Armenian successfully and procedure terminated.  The residual in the bladder was approximately 70 mL.   She tolerated reasonably well.     Urethral dilatation as described.  Urine culture collected.  See note of Mr. Benz.  Patient given Cipro 500 mg b.i.d. for 3 days to cover instrumentation.  -----------------------------------------------------------------------------------------------------  [October 6, 2023].   -------------------------------------------------------------------------------------------------------------------------------------------------------------------------------------------------------------------------------------  The above note is from Dr. ARIEL Singleton in the EMR system.      This pleasant 78 year old female presents to the clinic for follow up of urethral stricture, bladder spasms, UTI, and kidney stone. Patient desires to have another Urethral Dilatation. She reported at the last visit that the dilatation did help. I discussed having the dilatation with Dr. Singleton and the patient and they are in agreement with doing the dilatation. She will be scheduled for the urethral dilatation for January 9, 2024 at 3:15 pm with Dr. ARIEL Singleton since this could not be done today. She feels like she may have another UTI.  She reports being treated for a UTI in the ER with IV Gentamicin and PO Ceftin 500 mg on October 16, 2023. Her urine culture grew 20,000 Streptococcus agalactiae (Group B) on October 16, 2023.  She followed up with her PCP and repeat urine culture on November 6, 2023 was negative.   She reports nocturia 2 to 4 times a night, dysuria, urgency, incontinence, bladder spasms and incomplete bladder emptying. Her PVR is 121 mls. She denies frequency, hematuria, fever, chills, nausea or vomiting.  She is on Oxybutynin XL 5 mg and is tolerating this medication without side effects. We discussed increasing the Oxybutynin XL to 10 mg one at bedtime and she is agreeable.  She did not complain of stone pain at today's visit. Her KUB in August 2023 showed No acute process  Left nephrolithiasis without  change and her KUB done on July 26, 2023 that showed a 8 mm inferior pole left renal calculus. Her KUB done on October 2, 2023 showed An 8 mm calcification which overlies the upper pole of the left kidney in addition to Abundant stool.  I will culture her urine and treat if indicated. She may need to be back on a UTI suppressant if she continues to have frequent UTI's. I discussed the plan in detail with the patient and she is in agreement with the plan. All her questions were answered at today's visit. ----------------------------------------------------------------------  [January 8, 2024].         This pleasant 79 year old female presents to the clinic as a referral from Dr. RONDA Chen for kidney stone.  She is a regular Urology patient that is followed for urethral stricture, bladder spasms, UTI, and kidney stone.  Patient had a KUB on February 5, 2024 that showed 1 cm stone overlies the left kidney, unchanged.  Significant colonic stool.  Her PCP cultured her urine on February 13, 2024 and it is growing >100,000 Coagulase-negative Staphylococcus species but not final as of today.  She will follow up with PCP for treatment and we will be happy to assist if needed.  She does report dysuria, left lower back pain, bladder discomfort, nocturia 2 to 3 times a night, urgency and difficulty emptying her bladder.  Her PVR is 98 mls.  She denies hematuria or frequency.  She desires to have another urethral dilatation and I discussed this with the patient and Dr. Singleton.   Dr. Singleton recommends she waits until after the UTI is treated and we also recommend starting Hiprex for UTI suppression after the UTI is treated. We will go ahead and schedule the CT renal stone study to evaluate the kidney stone. Patient does not feel the Ditropan is helping as much with the bladder spasms.  I discussed increasing the Ditropan or we can try Gemtesa 75 mg one at bedtime.  She desires to try the Gemtesa and will hold the Ditropan for now.   She was given samples of this medication and encouraged to read up on the side effects. I discussed the plan in detail with the patient and she is in agreement with the plan.  All her questions were answered at today's visit.  I spent 20 minutes counseling this patient.       XR KUB done on February 5, 2024. CLINICAL HISTORY:  Unspecified abdominal pain.  TECHNIQUE:  XR KUB.  COMPARISON:  10/2/23.  FINDINGS:  Lower lobes are clear.  There is no bowel obstruction.  No free air.  1 cm stone overlies the left kidney, unchanged.  Significant colonic stool.  No acute bone findings.  Degenerative change of the lumbar spine and hips.  Impression:  1 cm stone overlies the left kidney, unchanged.  Significant colonic stool.  Electronically signed by: Louie Pina.  ----------------------------------------------  [February 15, 2024].                Review of Systems   Constitutional:  Negative for activity change and fever.   HENT:  Negative for hearing loss and trouble swallowing.    Eyes:  Negative for visual disturbance.   Respiratory:  Negative for cough, shortness of breath and wheezing.    Cardiovascular:  Negative for chest pain.   Gastrointestinal:  Negative for abdominal pain, diarrhea, nausea and vomiting.   Endocrine: Negative for polyuria.   Genitourinary:  Positive for dysuria. Negative for bladder incontinence, decreased urine volume, difficulty urinating, enuresis, flank pain, frequency, hematuria, nocturia and urgency.        Kidney Stone        Chronic Pelvic Pain        Urge incontinence        Urethral Stricture        Bladder spasms    Musculoskeletal:  Negative for back pain and gait problem.   Integumentary:  Negative for rash.   Neurological:  Negative for speech difficulty and weakness.   Psychiatric/Behavioral:  Negative for behavioral problems and confusion.           Objective     Physical Exam  Vitals and nursing note reviewed.   Constitutional:       General: She is not in acute distress.      Appearance: Normal appearance. She is not ill-appearing, toxic-appearing or diaphoretic.   HENT:      Head: Normocephalic.   Eyes:      Extraocular Movements: Extraocular movements intact.   Cardiovascular:      Rate and Rhythm: Normal rate and regular rhythm.      Heart sounds: Normal heart sounds.   Pulmonary:      Effort: Pulmonary effort is normal.      Breath sounds: Normal breath sounds. No wheezing, rhonchi or rales.   Abdominal:      General: Bowel sounds are normal.      Palpations: Abdomen is soft.      Tenderness: There is no abdominal tenderness. There is no right CVA tenderness, left CVA tenderness, guarding or rebound.   Musculoskeletal:         General: Normal range of motion.      Cervical back: Normal range of motion. No rigidity.   Skin:     General: Skin is warm and dry.   Neurological:      General: No focal deficit present.      Mental Status: She is alert and oriented to person, place, and time.      Motor: No weakness.      Coordination: Coordination normal.      Gait: Gait normal.   Psychiatric:         Mood and Affect: Mood normal.         Behavior: Behavior normal.         Thought Content: Thought content normal.          Assessment and Plan     Problem List Items Addressed This Visit          Renal/    Stricture of female urethra (Chronic)    Bladder spasms    Dysuria    Renal calculus    Relevant Orders    CT Renal Stone Study ABD Pelvis WO    Kidney stone on left side - Primary    Relevant Orders    CT Renal Stone Study ABD Pelvis WO    Urge incontinence       GI    Chronic pelvic pain in female (Chronic)        Follow up with Dr. Chen on urine culture  -- we would be happy to treat if needed   CT Renal stone study abdomen and pelvis without contrast to evaluate kidney stone   Consider adding Hiprex after treatment of UTI for UTI suppression   Will schedule urethral dilatation with Dr. Singleton once infection is gone   Hold Ditropan (Oxybutynin) for now   Sample of Gemtesa 75 mg one at  bedtime given to patient-- read up on side effects   Follow up with Urology SAMSON Luciano after CT same day

## 2024-02-15 NOTE — PATIENT INSTRUCTIONS
Follow up with Dr. Chen on urine culture  -- we would be happy to treat if needed   CT Renal stone study abdomen and pelvis without contrast to evaluate kidney stone   Consider adding Hiprex after treatment of UTI for UTI suppression   Will schedule urethral dilatation with Dr. Singleton once infection is gone   Hold Ditropan (Oxybutynin) for now   Sample of Gemtesa 75 mg one at bedtime given to patient-- read up on side effects   Follow up with Urology NP JIMBO Luciano after CT same day

## 2024-02-16 LAB — UA COMPLETE W REFLEX CULTURE PNL UR: ABNORMAL

## 2024-02-16 NOTE — PROGRESS NOTES
Subjective:       Patient ID: Lorraine De Jesus is a 79 y.o. female.    Chief Complaint: Fever, Sore Throat, and Chills    Fever, Sore Throat, and Chills      Fever   Associated symptoms include a sore throat. Pertinent negatives include no abdominal pain, chest pain, congestion, coughing, ear pain, headaches, nausea, rash or vomiting.   Sore Throat   Pertinent negatives include no abdominal pain, congestion, coughing, ear pain, headaches, neck pain, shortness of breath or vomiting.     Review of Systems   Constitutional:  Positive for chills and fever. Negative for appetite change and fatigue.   HENT:  Positive for sore throat. Negative for nasal congestion and ear pain.    Eyes:  Negative for pain, discharge and itching.   Respiratory:  Negative for cough and shortness of breath.    Cardiovascular:  Negative for chest pain and leg swelling.   Gastrointestinal:  Negative for abdominal pain, change in bowel habit, nausea and vomiting.   Musculoskeletal:  Negative for back pain, gait problem and neck pain.   Integumentary:  Negative for rash and wound.   Allergic/Immunologic: Negative for immunocompromised state.   Neurological:  Negative for dizziness, weakness and headaches.   All other systems reviewed and are negative.        Objective:      Physical Exam  Vitals and nursing note reviewed.   Constitutional:       General: She is not in acute distress.     Appearance: Normal appearance. She is not ill-appearing, toxic-appearing or diaphoretic.   HENT:      Head: Normocephalic.      Right Ear: Tympanic membrane, ear canal and external ear normal.      Left Ear: Tympanic membrane, ear canal and external ear normal.      Nose: Congestion present. No rhinorrhea.      Mouth/Throat:      Mouth: Mucous membranes are moist.      Pharynx: Posterior oropharyngeal erythema present. No oropharyngeal exudate.   Eyes:      General: No scleral icterus.        Right eye: No discharge.         Left eye: No discharge.      Extraocular  Movements: Extraocular movements intact.      Conjunctiva/sclera: Conjunctivae normal.      Pupils: Pupils are equal, round, and reactive to light.   Cardiovascular:      Rate and Rhythm: Regular rhythm. Tachycardia present.      Pulses: Normal pulses.      Heart sounds: Normal heart sounds. No murmur heard.  Pulmonary:      Effort: Pulmonary effort is normal. No respiratory distress.      Breath sounds: Normal breath sounds. No wheezing, rhonchi or rales.   Musculoskeletal:         General: Normal range of motion.      Cervical back: Neck supple. No tenderness.   Lymphadenopathy:      Cervical: No cervical adenopathy.   Skin:     General: Skin is warm and dry.      Capillary Refill: Capillary refill takes less than 2 seconds.      Findings: No rash.   Neurological:      Mental Status: She is alert and oriented to person, place, and time.   Psychiatric:         Mood and Affect: Mood normal.         Behavior: Behavior normal.         Thought Content: Thought content normal.         Judgment: Judgment normal.            Assessment:       1. COVID-19    2. Exposure to viral disease        Plan:   COVID-19  -     nirmatrelvir-ritonavir 300 mg (150 mg x 2)-100 mg copackaged tablets (EUA); Take 3 tablets by mouth 2 (two) times daily for 5 days. Each dose contains 2 nirmatrelvir (pink tablets) and 1 ritonavir (white tablet). Take all 3 tablets together  Dispense: 30 tablet; Refill: 0    Exposure to viral disease  -     POCT Influenza A/B Rapid Antigen  -     SARS Coronavirus 2 Antigen, POCT  -     POCT rapid strep A         Risks, benefits, and side effects were discussed with the patient. All questions were answered to the fullest satisfaction of the patient, and pt verbalized understanding and agreement to treatment plan. Pt was to call with any new or worsening symptoms, or present to the ER

## 2024-02-16 NOTE — PATIENT INSTRUCTIONS
Prevention steps for people with confirmed or suspected COVID-19 (including persons under investigation) who do not need to be hospitalized and people with confirmed COVID-19 who were hospitalized and determined to be medically stable to go home.    Your healthcare provider and public health staff will evaluate whether you can be cared for at home.  Stay home except to get medical care.  Separate yourself from other people and animals in your home  Call ahead before visiting your doctor.  Wear a facemask.  Cover your coughs and sneezes.  Clean your hands often.  Avoid sharing personal household items.  Clean all high-touch surfaces every day.  Monitor your symptoms. Seek prompt medical attention if your illness is worsening (e.g., difficulty breathing). Before seeking care, call your healthcare provider.  If you have a medical emergency and need to call 911, notify the dispatch personnel that you have, or are being evaluated for COVID-19. If possible, put on a facemask before emergency medical services arrive.  Discontinuing home isolation. Call your provider about guidance to discontinue home isolation.    Recommended precautions for household members, intimate partners, and caregivers in a nonhealthcare setting of a patient with symptomatic laboratory-confirmed COVID-19 or a patient under investigation.  Household members, intimate partners, and caregivers in a nonhealthcare setting may have close contact with a person with symptomatic, laboratory-confirmed COVID-19 or a person under investigation. Close contacts should monitor their health; they should call their healthcare provider right away if they develop symptoms suggestive of COVID-19 (e.g., fever, cough, shortness of breath).    Close contacts should also follow these recommendations:  Make sure that you understand and can help the patient follow their healthcare provider's instructions for medication(s) and care. You should help the patient with basic  needs in the home and provide support for getting groceries, prescriptions, and other personal needs.  Monitor the patient's symptoms. If the patient is getting sicker, call his or her healthcare provider and tell them that the patient has laboratory-confirmed COVID-19. This will help the healthcare provider's office take steps to keep other people in the office or waiting room from getting infected. Ask the healthcare provider to call the local or [facility] health department for additional guidance. If the patient has a medical emergency and you need to call 911, notify the dispatch personnel that the patient has, or is being evaluated for COVID-19.  Household members should stay in another room or be  from the patient as much as possible. Household members should use a separate bedroom and bathroom, if available.  Prohibit visitors who do not have an essential need to be in the home.  Household members should care for any pets in the home. Do not handle pets or other animals while sick.  Make sure that shared spaces in the home have good air flow, such as by an air conditioner or an opened window, weather permitting.  Perform hand hygiene frequently. Wash your hands often with soap and water for at least 20 seconds or use an alcohol-based hand  that contains 60 to 95% alcohol, covering all surfaces of your hands and rubbing them together until they feel dry. Soap and water should be used preferentially if hands are visibly dirty.  Avoid touching your eyes, nose, and mouth with unwashed hands.  The patient should wear a facemask when you are around other people. If the patient is not able to wear a facemask (for example, because it causes trouble breathing), you, as the caregiver should wear a mask when you are in the same room as the patient.  Wear a disposable facemask and gloves when you touch or have contact with the patient's blood, stool, or body fluids, such as saliva, sputum, nasal mucus,  vomit, urine.  Throw out disposable facemasks and gloves after using them. Do not reuse.  When removing personal protective equipment, first remove and dispose of gloves. Then, immediately clean your hands with soap and water or alcohol-based hand . Next, remove and dispose of facemask, and immediately clean your hands again with soap and water or alcohol-based hand .  Avoid sharing household items with the patient. You should not share dishes, drinking glasses, cups, eating utensils, towels, bedding, or other items. After the patient uses these items, you should wash them thoroughly (see below Wash laundry thoroughly).  Clean all high-touch surfaces, such as counters, tabletops, doorknobs, bathroom fixtures, toilets, phones, keyboards, tablets, and bedside tables, every day. Also, clean any surfaces that may have blood, stool, or body fluids on them.  Use a household cleaning spray or wipe, according to the label instructions. Labels contain instructions for safe and effective use of the cleaning product including precautions you should take when applying the product, such as wearing gloves and making sure you have good ventilation during use of the product.  Wash laundry thoroughly.  Immediately remove and wash clothes or bedding that have blood, stool, or body fluids on them.  Wear disposable gloves while handling soiled items and keep soiled items away from your body. Clean your hands (with soap and water or an alcohol-based hand ) immediately after removing your gloves.  Read and follow directions on labels of laundry or clothing items and detergent. In general, using a normal laundry detergent according to washing machine instructions and dry thoroughly using the warmest temperatures recommended on the clothing label.  Place all used disposable gloves, facemasks, and other contaminated items in a lined container before disposing of them with other household waste. Clean your  hands (with soap and water or an alcohol-based hand ) immediately after handling these items. Soap and water should be used preferentially if hands are visibly dirty.  Discuss any additional questions with your state or local health department or healthcare provider. Check available hours when contacting your local health department.    For more information see CDC link below.      https://www.cdc.gov/coronavirus/2019-ncov/hcp/guidance-prevent-spread.html#precautions      OVER THE COUNTER ZINC AND VITAMIN C PER LABEL INSTRUCTIONS  PEPCID 20 MG PO TWICE A DAY  KEEP HYDRATED  OVER THE COUNTER SYMPTOMATIC RELIEF  RETURN TO CLINIC PRN AS NEEDED

## 2024-02-19 ENCOUNTER — HOSPITAL ENCOUNTER (OUTPATIENT)
Dept: RADIOLOGY | Facility: HOSPITAL | Age: 79
Discharge: HOME | End: 2024-02-19
Attending: INTERNAL MEDICINE
Payer: COMMERCIAL

## 2024-02-19 DIAGNOSIS — E04.2 MULTIPLE THYROID NODULES: ICD-10-CM

## 2024-02-19 PROCEDURE — 76536 US EXAM OF HEAD AND NECK: CPT | Mod: 26,,, | Performed by: RADIOLOGY

## 2024-02-19 PROCEDURE — 76536 US EXAM OF HEAD AND NECK: CPT | Mod: TC

## 2024-02-19 RX ORDER — SULFAMETHOXAZOLE AND TRIMETHOPRIM 800; 160 MG/1; MG/1
1 TABLET ORAL 2 TIMES DAILY
Qty: 14 TABLET | Refills: 0 | Status: SHIPPED | OUTPATIENT
Start: 2024-02-19 | End: 2024-03-16 | Stop reason: ALTCHOICE

## 2024-02-19 NOTE — PROGRESS NOTES
Subjective:       Patient ID: Lorraine De Jesus is a 79 y.o. female.    Chief Complaint: Results (Abnl lab results )    HPI  .  Patient does have chronic hypertension blood pressure clinic today is elevated at 1 70/80 patient stated that home blood pressure is 130/78.  Patient also states that she has a left kidney stone and wants to see the urologist I am going to go ahead and refer to urologist will check a repeat UA CNS dyspnea make sure there is no infection hematuria  Current Medications:    Current Outpatient Medications:     amLODIPine (NORVASC) 5 MG tablet, Take 5 mg by mouth., Disp: , Rfl:     blood sugar diagnostic Strp, 1 strip by Misc.(Non-Drug; Combo Route) route 3 (three) times daily., Disp: 200 strip, Rfl: 3    blood-glucose meter kit, Use as instructed, Disp: 1 each, Rfl: 0    cloNIDine (CATAPRES) 0.1 MG tablet, TAKE 1 TABLET BY MOUTH EVERY 4 HOURS AS NEEDED FOR BLOOD PRESSURE GREATER THAN 170/90, Disp: , Rfl:     ezetimibe (ZETIA) 10 mg tablet, Take 1 tablet (10 mg total) by mouth once daily., Disp: 90 tablet, Rfl: 3    fluconazole (DIFLUCAN) 100 MG tablet, Take two tablets on day one then reduce to one tablet daily, Disp: 5 tablet, Rfl: 1    gabapentin (NEURONTIN) 400 MG capsule, TAKE 1 CAPSULE(400 MG) BY MOUTH EVERY 8 HOURS, Disp: 90 capsule, Rfl: 2    glipiZIDE (GLUCOTROL) 10 MG TR24, TAKE 1 TABLET BY MOUTH TWICE DAILY, Disp: 180 tablet, Rfl: 1    HYDROcodone-acetaminophen (NORCO)  mg per tablet, Take 1 tablet by mouth every 6 (six) hours as needed for Pain., Disp: 120 tablet, Rfl: 0    HYDROcodone-acetaminophen (NORCO)  mg per tablet, Take 1 tablet by mouth every 6 (six) hours as needed for Pain., Disp: 120 tablet, Rfl: 0    [START ON 2/27/2024] HYDROcodone-acetaminophen (NORCO)  mg per tablet, Take 1 tablet by mouth every 6 (six) hours as needed for Pain., Disp: 120 tablet, Rfl: 0    lancets (ACCU-CHEK FASTCLIX LANCET DRUM [facility]), Take 1 each by mouth once daily., Disp: , Rfl:      levothyroxine (SYNTHROID) 75 MCG tablet, Take 1 tablet (75 mcg total) by mouth before breakfast., Disp: 90 tablet, Rfl: 1    loratadine (CLARITIN) 10 mg tablet, Take 1 tablet (10 mg total) by mouth once daily., Disp: 15 tablet, Rfl: 3    olmesartan (BENICAR) 5 MG Tab, Take 10 mg by mouth once daily., Disp: , Rfl:     ONETOUCH DELICA PLUS LANCET 33 gauge Misc, Check blood sugar TID, Disp: 100 each, Rfl: 11    ONETOUCH VERIO TEST STRIPS Strp, TEST BLOOD GLUCOSE LEVELS TWICE DAILY, Disp: 200 strip, Rfl: 4    oxybutynin (DITROPAN-XL) 10 MG 24 hr tablet, Take one tablet at night, Disp: 90 tablet, Rfl: 3    polyethylene glycol (GLYCOLAX) 17 gram PwPk, Take 17 g by mouth once daily., Disp: 30 each, Rfl: 0    aspirin (ECOTRIN) 81 MG EC tablet, Take 81 mg by mouth once daily., Disp: , Rfl:     nirmatrelvir-ritonavir 300 mg (150 mg x 2)-100 mg copackaged tablets (EUA), Take 3 tablets by mouth 2 (two) times daily for 5 days. Each dose contains 2 nirmatrelvir (pink tablets) and 1 ritonavir (white tablet). Take all 3 tablets together, Disp: 30 tablet, Rfl: 0           Review of Systems   Constitutional:  Negative for appetite change, fatigue and fever.   Respiratory:  Negative for shortness of breath.    Cardiovascular:  Negative for chest pain.   Gastrointestinal:  Negative for abdominal pain and constipation.   Endocrine: Negative for polydipsia, polyphagia and polyuria.   Genitourinary:  Negative for difficulty urinating, frequency and hot flashes.   Allergic/Immunologic: Negative for environmental allergies.   Neurological:  Negative for dizziness and light-headedness.   Psychiatric/Behavioral:  Negative for agitation.                 Vitals:    02/13/24 1011 02/13/24 1112   BP: (!) 172/80 (!) 169/83   BP Location: Left arm Right arm   Patient Position: Sitting Sitting   BP Method: Large (Automatic)    Pulse: 81    Resp: 18    Temp: 97.3 °F (36.3 °C)    TempSrc: Temporal    SpO2: 95%    Weight: 79.4 kg (175 lb)    Height: 5'  "8" (1.727 m)         Physical Exam  Vitals and nursing note reviewed.   Constitutional:       Appearance: Normal appearance.   Cardiovascular:      Rate and Rhythm: Normal rate and regular rhythm.      Pulses: Normal pulses.      Heart sounds: Normal heart sounds.   Pulmonary:      Effort: Pulmonary effort is normal.      Breath sounds: Normal breath sounds.   Abdominal:      General: Abdomen is flat. Bowel sounds are normal.      Palpations: Abdomen is soft.   Musculoskeletal:         General: Normal range of motion.   Skin:     General: Skin is warm and dry.   Neurological:      General: No focal deficit present.      Mental Status: She is alert and oriented to person, place, and time. Mental status is at baseline.           Last Labs:     Office Visit on 02/13/2024   Component Date Value    Color, UA 02/13/2024 Yellow     Clarity, UA 02/13/2024 Turbid     pH, UA 02/13/2024 6.5     Leukocytes, UA 02/13/2024 Large (A)     Nitrites, UA 02/13/2024 Negative     Protein, UA 02/13/2024 Negative     Glucose, UA 02/13/2024 500 (A)     Ketones, UA 02/13/2024 Negative     Urobilinogen, UA 02/13/2024 Normal     Bilirubin, UA 02/13/2024 Negative     Blood, UA 02/13/2024 Negative     Specific Gravity, UA 02/13/2024 1.014     WBC, UA 02/13/2024 47 (H)     RBC, UA 02/13/2024 3     Bacteria, UA 02/13/2024 Few (A)     Squamous Epithelial Cell* 02/13/2024 Occasional (A)     WBC Clumps 02/13/2024 Few (A)     Yeast, UA 02/13/2024 Occasional (A)     Mucous 02/13/2024 Occasional (A)     Culture, Urine 02/13/2024 >100,000 Staphylococcus epidermidis (A)    Office Visit on 02/05/2024   Component Date Value    Sodium 02/05/2024 141     Potassium 02/05/2024 3.8     Chloride 02/05/2024 111 (H)     CO2 02/05/2024 29     Anion Gap 02/05/2024 5 (L)     Glucose 02/05/2024 160 (H)     BUN 02/05/2024 13     Creatinine 02/05/2024 0.84     BUN/Creatinine Ratio 02/05/2024 15     Calcium 02/05/2024 9.3     eGFR 02/05/2024 72     Color, UA 02/05/2024 " Yellow     Clarity, UA 02/05/2024 Turbid     pH, UA 02/05/2024 5.5     Leukocytes, UA 02/05/2024 Large (A)     Nitrites, UA 02/05/2024 Negative     Protein, UA 02/05/2024 10 (A)     Glucose, UA 02/05/2024 100 (A)     Ketones, UA 02/05/2024 Negative     Urobilinogen, UA 02/05/2024 Normal     Bilirubin, UA 02/05/2024 Negative     Blood, UA 02/05/2024 Negative     Specific Gravity, UA 02/05/2024 1.017     TSH 02/05/2024 2.360     Total T4 02/05/2024 6.2     Free T4 02/05/2024 1.01     WBC 02/05/2024 5.77     RBC 02/05/2024 4.51     Hemoglobin 02/05/2024 12.4     Hematocrit 02/05/2024 38.2     MCV 02/05/2024 84.7     MCH 02/05/2024 27.5     MCHC 02/05/2024 32.5     RDW 02/05/2024 15.1 (H)     Platelet Count 02/05/2024 265     MPV 02/05/2024 10.4     Neutrophils % 02/05/2024 57.0     Lymphocytes % 02/05/2024 32.9     Monocytes % 02/05/2024 6.9 (H)     Eosinophils % 02/05/2024 2.4     Basophils % 02/05/2024 0.5     Immature Granulocytes % 02/05/2024 0.3     nRBC, Auto 02/05/2024 0.0     Neutrophils, Abs 02/05/2024 3.28     Lymphocytes, Absolute 02/05/2024 1.90     Monocytes, Absolute 02/05/2024 0.40     Eosinophils, Absolute 02/05/2024 0.14     Basophils, Absolute 02/05/2024 0.03     Immature Granulocytes, A* 02/05/2024 0.02     nRBC, Absolute 02/05/2024 0.00     Diff Type 02/05/2024 Auto     WBC, UA 02/05/2024 >182 (H)     RBC, UA 02/05/2024 6 (H)     Bacteria, UA 02/05/2024 Few (A)     Squamous Epithelial Cell* 02/05/2024 Few (A)     WBC Clumps 02/05/2024 Many (A)     Calcium Oxalate Crystals* 02/05/2024 Moderate (A)     Transitional Epithelial * 02/05/2024 Occasional (A)     Mucous 02/05/2024 Few (A)     Culture, Urine 02/05/2024 >100,000 Yeast (A)        Last Imaging:  X-Ray KUB  Narrative: EXAMINATION:  XR KUB    CLINICAL HISTORY:  Unspecified abdominal pain    TECHNIQUE:  XR KUB    COMPARISON:  10/2/23    FINDINGS:  Lower lobes are clear    There is no bowel obstruction.  No free air.  1 cm stone overlies the left  kidney, unchanged.    Significant colonic stool.    No acute bone findings.  Degenerative change of the lumbar spine and hips.  Impression: 1 cm stone overlies the left kidney, unchanged.    Significant colonic stool.    Electronically signed by: Louie Pina  Date:    02/05/2024  Time:    10:19         **Labs and x-rays personally reviewed by me    ** reviewed      Objective:        Assessment:       1. Kidney stone on left side  Urinalysis, Reflex to Urine Culture    Urinalysis, Reflex to Urine Culture    Ambulatory referral/consult to Urology    Urinalysis, Microscopic    Urine culture      2. Essential (primary) hypertension             Plan:         1. Kidney stone on left side  -     Urinalysis, Reflex to Urine Culture; Future; Expected date: 02/13/2024  -     Ambulatory referral/consult to Urology; Future; Expected date: 02/20/2024  -     Urinalysis, Microscopic  -     Urine culture    2. Essential (primary) hypertension

## 2024-02-19 NOTE — TELEPHONE ENCOUNTER
----- Message from Mateo Chen MD sent at 2/19/2024 12:39 AM CST -----  Bactrim DS 1 p.o. b.i.d. x7 days    0838 Call made to pt regarding above message; no answer; unable to leave voicemail at this time; rx sent to pharmacy on file.

## 2024-02-22 ENCOUNTER — OFFICE VISIT (OUTPATIENT)
Dept: FAMILY MEDICINE | Facility: CLINIC | Age: 79
End: 2024-02-22
Payer: COMMERCIAL

## 2024-02-22 VITALS
HEIGHT: 68 IN | OXYGEN SATURATION: 99 % | TEMPERATURE: 98 F | RESPIRATION RATE: 19 BRPM | DIASTOLIC BLOOD PRESSURE: 85 MMHG | WEIGHT: 174 LBS | HEART RATE: 66 BPM | SYSTOLIC BLOOD PRESSURE: 115 MMHG | BODY MASS INDEX: 26.37 KG/M2

## 2024-02-22 DIAGNOSIS — Z20.828 EXPOSURE TO VIRAL DISEASE: ICD-10-CM

## 2024-02-22 DIAGNOSIS — J02.9 PHARYNGITIS, UNSPECIFIED ETIOLOGY: Primary | ICD-10-CM

## 2024-02-22 PROBLEM — G47.30 SLEEP APNEA: Status: ACTIVE | Noted: 2024-02-22

## 2024-02-22 LAB
CTP QC/QA: YES
SARS-COV-2 AG RESP QL IA.RAPID: NEGATIVE

## 2024-02-22 PROCEDURE — 3288F FALL RISK ASSESSMENT DOCD: CPT | Mod: ,,, | Performed by: NURSE PRACTITIONER

## 2024-02-22 PROCEDURE — 3079F DIAST BP 80-89 MM HG: CPT | Mod: ,,, | Performed by: NURSE PRACTITIONER

## 2024-02-22 PROCEDURE — 87426 SARSCOV CORONAVIRUS AG IA: CPT | Mod: QW,,, | Performed by: NURSE PRACTITIONER

## 2024-02-22 PROCEDURE — 3074F SYST BP LT 130 MM HG: CPT | Mod: ,,, | Performed by: NURSE PRACTITIONER

## 2024-02-22 PROCEDURE — 1101F PT FALLS ASSESS-DOCD LE1/YR: CPT | Mod: ,,, | Performed by: NURSE PRACTITIONER

## 2024-02-22 PROCEDURE — 99213 OFFICE O/P EST LOW 20 MIN: CPT | Mod: ,,, | Performed by: NURSE PRACTITIONER

## 2024-02-22 PROCEDURE — 1159F MED LIST DOCD IN RCRD: CPT | Mod: ,,, | Performed by: NURSE PRACTITIONER

## 2024-02-22 PROCEDURE — 1160F RVW MEDS BY RX/DR IN RCRD: CPT | Mod: ,,, | Performed by: NURSE PRACTITIONER

## 2024-02-22 RX ORDER — AMOXICILLIN 500 MG/1
500 CAPSULE ORAL 2 TIMES DAILY
Qty: 20 CAPSULE | Refills: 0 | Status: SHIPPED | OUTPATIENT
Start: 2024-02-22 | End: 2024-03-03

## 2024-02-22 NOTE — PROGRESS NOTES
"Subjective:       Patient ID: Lorraine De Jesus is a 79 y.o. female.    Chief Complaint: COVID-19 Concerns (Patient wants to see if she still has covid from 2/15/2024.)    Presents to clinic as above. Feels a lot better. Still has sore throat. Does not want to be swabbed for strep. Wants antibiotic.       Review of Systems   Constitutional: Negative.    HENT:  Positive for sore throat. Negative for congestion, ear pain and sinus pain.    Respiratory: Negative.     Cardiovascular: Negative.    Gastrointestinal: Negative.    Musculoskeletal: Negative.    Neurological: Negative.           Reviewed family, medical, surgical, and social history.    Objective:      /85 (BP Location: Right arm, Patient Position: Sitting, BP Method: Medium (Manual))   Pulse 66   Temp 97.9 °F (36.6 °C) (Oral)   Resp 19   Ht 5' 8" (1.727 m)   Wt 78.9 kg (174 lb)   LMP  (LMP Unknown)   SpO2 99%   BMI 26.46 kg/m²   Physical Exam  Vitals and nursing note reviewed.   Constitutional:       General: She is not in acute distress.     Appearance: Normal appearance. She is normal weight. She is not ill-appearing, toxic-appearing or diaphoretic.   HENT:      Head: Normocephalic.      Right Ear: Tympanic membrane, ear canal and external ear normal.      Left Ear: Tympanic membrane, ear canal and external ear normal.      Nose: Nose normal.      Mouth/Throat:      Mouth: Mucous membranes are moist.      Pharynx: Posterior oropharyngeal erythema present. No oropharyngeal exudate.   Neck:      Vascular: No carotid bruit.   Cardiovascular:      Rate and Rhythm: Normal rate and regular rhythm.      Heart sounds: Normal heart sounds.   Pulmonary:      Effort: Pulmonary effort is normal.      Breath sounds: Normal breath sounds.   Musculoskeletal:      Cervical back: Normal range of motion and neck supple. No rigidity or tenderness.   Lymphadenopathy:      Cervical: No cervical adenopathy.   Skin:     General: Skin is warm and dry.      Capillary " Refill: Capillary refill takes less than 2 seconds.   Neurological:      Mental Status: She is alert and oriented to person, place, and time.   Psychiatric:         Mood and Affect: Mood normal.         Behavior: Behavior normal.         Thought Content: Thought content normal.         Judgment: Judgment normal.            Office Visit on 02/22/2024   Component Date Value Ref Range Status    SARS Coronavirus 2 Antigen 02/22/2024 Negative  Negative Final     Acceptable 02/22/2024 Yes   Final      Assessment:       1. Pharyngitis, unspecified etiology    2. Exposure to viral disease        Plan:       Pharyngitis, unspecified etiology  -     amoxicillin (AMOXIL) 500 MG capsule; Take 1 capsule (500 mg total) by mouth 2 (two) times a day. for 10 days  Dispense: 20 capsule; Refill: 0    Exposure to viral disease  -     SARS Coronavirus 2 Antigen, POCT    Drink plenty of fluids  OTC meds for s/s  RTC PRN           Risks, benefits, and side effects were discussed with the patient. All questions were answered to the fullest satisfaction of the patient, and pt verbalized understanding and agreement to treatment plan. Pt was to call with any new or worsening symptoms, or present to the ER.

## 2024-02-29 ENCOUNTER — EXTERNAL CHRONIC CARE MANAGEMENT (OUTPATIENT)
Dept: FAMILY MEDICINE | Facility: CLINIC | Age: 79
End: 2024-02-29
Payer: COMMERCIAL

## 2024-02-29 PROCEDURE — G0511 CCM/BHI BY RHC/FQHC 20MIN MO: HCPCS | Mod: ,,, | Performed by: INTERNAL MEDICINE

## 2024-03-03 NOTE — PROGRESS NOTES
Subjective     Patient ID: Lorraine De Jesus is a 79 y.o. female.    Chief Complaint: No chief complaint on file.    UROLOGY HISTORY PER DR. COFFMAN:  6/9/2021  here for repeat dilation. patient has chronic pain that is improved with urination. serial dilations over the years. typically she does well for a few months but was dilated just over 30 days ago.        Cystoscopy: After informed consent was confirmed. preprocedural abx were administered and the patient was taken to the cystoscopy suite and prepped and draped in standard fashion. The urethra was injected with lidocaine. Cystoscopy revealed no urethral lesions, moderately obstructing prostate, large bladder with no trabeculations or mucosal lesions. Ureteral orifices were identified bilaterally and were noted to be in orthotopic position demonstrate a clear reflux bilaterally. Retroflexion maneuver was performed to evaluate the trigone. Patient tolerated procedure well.         dilated up to 28 F without issue. teaching on self dilation today. sent home with sample. instructions not to introduce dilator farther then 1in into the urethra. discussed risk of injury to bladder. infection. pain.     recommend q 1 month self dilation. teaching performed by RN staff.      start hiprex 1gm daily, pyridium 100mg daily prn.     follow up 3 months for symptom check.    06/09/2021)--------------------------------------------------------------------------     Ms. De Jesus has been transferred to my care in Dr. Coffman's absence for f/u from above-mentioned cystoscopy with Hydrostatic Dilatation.  She states that she is doing much better since procedure, compliant with Hiprex, and free of s/s of infection to date.  Adds that her incontinence and bladder spasms are controlled on Oxybutynin 5mg daily.  (7/8/2021)----------------------------------------------------------------------------------     Ms. De Jesus is here today for c/o bladder pressure.  She has a history of known urethral  stenosis, and   She denies fever, constitutional symptoms or sensation of infection today.  PVR  0 ml.     -  Urethral stenosis:  Not performing self dilatations, states unable. Though taught to perform monthly self dilation at last visit, states she is unable to do this.  Reports she was symptom free following last cystoscopy with dilatation, but strainiing and pain at voiding onset have returned over the last 2 weeks.  -  Personal history UTI:  Hiprex started in June of this year.  -  Chronic pelvic pain in female:  R/t bladder spasms.  Controlled last visit with oxybutynine 5mg daily.  Discussed increasing Ditropan to BID  [November 22, 2021]  -------------------------------------------------------------------------------------------------------------------------------------------------------------------------------------------------------------------------  The above notes are notes of Dr. Recio and Ms. Shaw.  Patient sent to me for urethral dilatation.  She has been having problems with urethral syndrome since she was in her 40s.  Last urethral dilatation done June 24th and she feels she needs another today.  Pressure and discomfort like she usually has.  She is familiar with urethral dilatations and wishes to proceed again.   ------------------------------------------------------------- [December 16, 2021]-----------------------------------------------------------------------------------------------------------------------     Ms. De Jesus is a very pleasant 78 yo AA female, who has returned for c/o severe exacerbation of chronic pelvic pain and difficulty emptying.  Patient states increase of straining to urinate, causing bladder spasms from bladder around to back.  PVR 47 l.  Requests repeat urethral dilatations.  Dr. Singleton to room to evaluate.  Patient to be worked into his schedule this after noon for procedure.     She was last seen by Dr. Singleton 10 months prior for urethral dilatation and following  "POC:  "Urethral dilatation performed as described.  Urine sent for culture since she has lots of amorphous crystals that could mask infection.  Patient given Cipro 500 mg b.i.d. for 3 days to cover instrumentation.  She is to call when she needs another urethral dilatation"  [9/15/2022]-----------------------------------------------------------------------------------------  --------------------------------------------------------------------------------------------------------------------------------------------------------------------------------------------------------------------------------------------------  The above notes are from SAMSON Shaw, Dr. ARIEL Singleton and Dr KARLI Recio in the EMR system     This pleasant 78 year old female presents to the clinic for follow up of urethral stricture and bladder spasms. Patient was last seen in September 2022 for urethral dilatation. Patient reports urinary frequency, urgency, incomplete bladder emptying, bladder spasms, and nocturia 3 to 4 times a night. She denies dysuria, hematuria, nausea, vomiting, fever or chills. Her PVR is 100 mls today. She is on Ditropan XL 5 mg one at night and is tolerating this medication without side effects. She reports it does help some with her symptoms. She request to have a urethral dilatation done today. This was discussed with Dr. ARIEL Singleton and they will do the dilatation. She will follow up with us PRN and call when she feels like she needs another dilatation. I discussed the plan in detail with the patient and she is in agreement with the plan. All her questions were answered at today's visit. ------------------------------------------------------[June 27, 2023].      See note of Mr. Benz above.  Patient wants another urethral dilatation which will be performed today. [June 27, 2023 Dr. ARIEL Singleton].      This pleasant 78 year old female presents to the clinic for renal stone, urethral stricture and bladder spasms. Patient was seen by her " PCP in July 2023 for abdominal pain. Patient had a KUB done on July 26, 2023 that showed a 8 mm inferior pole left renal calculus, Nonspecific bowel gas pattern and Mild levoconvex curvature of the lumbar spine. Her KUB today showed No acute process. Left nephrolithiasis without change. The KUB today also showed there is mild to moderate stool in the normal caliber colon. I reviewed both KUB's with Urologist Dr. ARIEL Singleton and the patient. Dr. Singleton does not feel the kidney stone is the source of her pain as there was no obstruction or hydronephrosis noted. She does have a urethral stricture that was dilated in the office at the last visit by Dr. ARIEL Singleton. She does report this helped with her symptoms. She desires to have another dilatation but wants to wait another month for this. She is on Ditropan XL 5 mg one at night and is tolerating this medication without side effects. She desires to continue this medication. She denies any symptoms of a UTI. Her urine culture in June and July 2023 were negative. She denies dysuria or hematuria. Her PVR today is 16 mls. I discussed the plan in detail with Urologist Dr. ARIEL Singleton and the patient and they are in agreement with the plan. All her questions were answered at today's visit. I spent 20 minutes counseling this patient.      KUB done on August 14, 2023 showed FINDINGS: The bowel gas pattern is nonobstructive without gross mass lesion.  There is mild to moderate stool in the normal caliber colon. Radiopaque calculus overlies lower pole left kidney without change Osseous structures are similar  Impression:  No acute process  Left nephrolithiasis without change. -----[ August 14, 2023].   ------------------------------------------------------------------------------------------------------------------------------------------------------------------------------------------------------------     This pleasant 78 year old female presents to the clinic for follow up of urethral  stricture, bladder spasms, and thinks she has a UTI. Patient desires to have another Urethral Dilatation. She reported at the last visit that the dilatation did help. She feels like she may have a UTI.  She does have some lower abdominal pressure from not emptying her bladder.  She reports nocturia 4 times a night, frequency, urgency, and incomplete bladder emptying. Her PVR is 200 mls. She denies dysuria, hematuria, fever, chills, nausea or vomiting. I discussed having the dilatation with Dr. Singleton and the patient and they are in agreement with doing the dilatation at today's visit. She is on Oxybutynin XL 5 mg and is tolerating this medication without side effects. She desires to continue this medication. She did not complain of stone pain at today's visit. Her KUB in August 2023 showed No acute process  Left nephrolithiasis without change and her KUB done on July 26, 2023 that showed a 8 mm inferior pole left renal calculus. She has some fatigue at today's visit and will follow up with her PCP for this. I discussed the plan in detail with the patient and she is in agreement with the plan. All her questions were answered at today's visit. I spent 20 minutes counseling this patient. ----------------------------------------------------------  [October 6, 2023].   ------------------------------------------------------------------------------------------------------------------------------------------------------------------------------------------------------------------     Procedures     Urethral dilatation     The patient was placed in the dorsal lithotomy position in clinic treatment room and prepared for urethral dilatation.  Urethra was anesthetized with lidocaine jelly.  No sedation was given.  Urethral dilatation was begun with Golden sounds starting with the 20 Cambodian Golden sound.  She was dilated through 28 Cambodian successfully and procedure terminated.  The residual in the bladder was approximately 70 mL.   She tolerated reasonably well.     Urethral dilatation as described.  Urine culture collected.  See note of Mr. Benz.  Patient given Cipro 500 mg b.i.d. for 3 days to cover instrumentation.  -----------------------------------------------------------------------------------------------------  [October 6, 2023].   -------------------------------------------------------------------------------------------------------------------------------------------------------------------------------------------------------------------------------------  The above note is from Dr. ARIEL Singleton in the EMR system.      This pleasant 78 year old female presents to the clinic for follow up of urethral stricture, bladder spasms, UTI, and kidney stone. Patient desires to have another Urethral Dilatation. She reported at the last visit that the dilatation did help. I discussed having the dilatation with Dr. Singleton and the patient and they are in agreement with doing the dilatation. She will be scheduled for the urethral dilatation for January 9, 2024 at 3:15 pm with Dr. ARIEL Singleton since this could not be done today. She feels like she may have another UTI.  She reports being treated for a UTI in the ER with IV Gentamicin and PO Ceftin 500 mg on October 16, 2023. Her urine culture grew 20,000 Streptococcus agalactiae (Group B) on October 16, 2023.  She followed up with her PCP and repeat urine culture on November 6, 2023 was negative.   She reports nocturia 2 to 4 times a night, dysuria, urgency, incontinence, bladder spasms and incomplete bladder emptying. Her PVR is 121 mls. She denies frequency, hematuria, fever, chills, nausea or vomiting.  She is on Oxybutynin XL 5 mg and is tolerating this medication without side effects. We discussed increasing the Oxybutynin XL to 10 mg one at bedtime and she is agreeable.  She did not complain of stone pain at today's visit. Her KUB in August 2023 showed No acute process  Left nephrolithiasis without  change and her KUB done on July 26, 2023 that showed a 8 mm inferior pole left renal calculus. Her KUB done on October 2, 2023 showed An 8 mm calcification which overlies the upper pole of the left kidney in addition to Abundant stool.  I will culture her urine and treat if indicated. She may need to be back on a UTI suppressant if she continues to have frequent UTI's. I discussed the plan in detail with the patient and she is in agreement with the plan. All her questions were answered at today's visit. ----------------------------------------------------------------------  [January 8, 2024].         This pleasant 79 year old female presents to the clinic as a referral from Dr. RONDA Chen for kidney stone.  She is a regular Urology patient that is followed for urethral stricture, bladder spasms, UTI, and kidney stone.  Patient had a KUB on February 5, 2024 that showed 1 cm stone overlies the left kidney, unchanged.  Significant colonic stool.  Her PCP cultured her urine on February 13, 2024 and it is growing >100,000 Coagulase-negative Staphylococcus species but not final as of today.  She will follow up with PCP for treatment and we will be happy to assist if needed.  She does report dysuria, left lower back pain, bladder discomfort, nocturia 2 to 3 times a night, urgency and difficulty emptying her bladder.  Her PVR is 98 mls.  She denies hematuria or frequency.  She desires to have another urethral dilatation and I discussed this with the patient and Dr. Singleton.   Dr. Singleton recommends she waits until after the UTI is treated and we also recommend starting Hiprex for UTI suppression after the UTI is treated. We will go ahead and schedule the CT renal stone study to evaluate the kidney stone. Patient does not feel the Ditropan is helping as much with the bladder spasms.  I discussed increasing the Ditropan or we can try Gemtesa 75 mg one at bedtime.  She desires to try the Gemtesa and will hold the Ditropan for now.   She was given samples of this medication and encouraged to read up on the side effects. I discussed the plan in detail with the patient and she is in agreement with the plan.  All her questions were answered at today's visit.  I spent 20 minutes counseling this patient.        XR KUB done on February 5, 2024. CLINICAL HISTORY:  Unspecified abdominal pain.  TECHNIQUE:  XR KUB.  COMPARISON:  10/2/23.  FINDINGS:  Lower lobes are clear.  There is no bowel obstruction.  No free air.  1 cm stone overlies the left kidney, unchanged.  Significant colonic stool.  No acute bone findings.  Degenerative change of the lumbar spine and hips.  Impression:  1 cm stone overlies the left kidney, unchanged.  Significant colonic stool.  Electronically signed by: Louie Pina.  ----------------------------------------------  [February 15, 2024].            This pleasant 79 year old female presents to the clinic for follow up of CT results, urethral stricture, bladder spasms, UTI, and kidney stone.  Patient states she is doing better today.  Her CT Renal Stone Study done on March 8, 2024 showed No acute CT findings. There is a 9 mm calculus within the inferior pole of left kidney with No convincing ureteral calculus or hydronephrosis.  I reviewed the CT with Dr. Singleton and discussed with the patient that the stone is still in the kidney without any hydronephrosis, obstruction or ureteral stone seen.  We discussed that her pain is not from the kidney stone and more likely from the constipation.  We recommended trying a bottle of mag citrate per the bottle instructions and if no improvement then follow up with her PCP for the constipation. Patient is agreeable and states she would like to schedule her next urethral dilatation.  This will be scheduled with Dr. ARIEL Singleton for April 30, 2024 at 2:30 pm.  She does report some incomplete bladder emptying, Nocturia 2 to 3 times a night and urinary incontinence.  Her PVR today is 000 mls. She  denies dysuria, hematuria, frequency or urgency. She denies any fever, chills, nausea or vomiting. She denies any back or flank pain but does have some abdominal discomfort that has improved since the last visit. We held the Oxybutynin at the last visit and gave her samples of the Gemtesa 75 mg one at bedtime.  She will let us know if this medication works better for her.  Her last urine culture in February 2024 grew >100,000 Staphylococcus epidermidis that was treated with bactrim ds by her PCP. She does not feel like she has a UTI today.  We may need to consider adding Hiprex in the future if she continues to have multiple UTI's.  I discussed the plan in detail with Urologist Dr. ARIEL Singleton and the patient and they are in agreement with the plan.  All her questions were answered at today's visit.     CT RENAL STONE STUDY ABD PELVIS WO done on March 8, 2024. CLINICAL HISTORY:  Calculus of kidneykidney stone;  COMPARISON:  CT abdomen pelvis November 30, 2020  TECHNIQUE:  Multiple axial tomographic images of the abdomen and pelvis were obtained without the use of intravenous contrast.  FINDINGS:  Lung bases clear.  1.1 cm hypodensity within the ventral left hepatic lobe, similar to prior, likely benign.  Visualized gallbladder grossly unremarkable.  Visualized pancreas appears unremarkable.  Spleen grossly unremarkable.  Bilateral adrenal glands grossly unremarkable.  9 mm calculus within the inferior pole of left kidney.  No convincing ureteral calculus or hydronephrosis.  Urinary bladder incompletely distended.  Prior hysterectomy.  No convincing evidence of gastrointestinal obstruction or acute appendicitis.  Atherosclerotic calcification demonstrated.  Scattered skeletal degenerative change.  Partially visualized probable hemangioma within T8 vertebral body.  Impression:  No acute CT findings. 9 mm calculus within the inferior pole of left kidney.  No convincing ureteral calculus or hydronephrosis.  Prior  hysterectomy.  Other/detailed findings as above.  The CT exam was performed using one or more of the following dose  reduction techniques- Automated exposure control, adjustment of the mA  and/or kV according to patient size, and/or use of iterative  reconstructed technique.  Point of Service: [facility].  Electronically signed by: Vadim Swanson.   ------------------------------------------------------------------------------------------------------------------------------------------  [March 8, 2024]             Review of Systems   Constitutional:  Negative for activity change and fever.   HENT:  Negative for hearing loss and trouble swallowing.    Eyes:  Negative for visual disturbance.   Respiratory:  Negative for cough, shortness of breath and wheezing.    Cardiovascular:  Negative for chest pain.   Gastrointestinal:  Negative for abdominal pain, diarrhea, nausea and vomiting.   Endocrine: Negative for polyuria.   Genitourinary:  Positive for bladder incontinence and nocturia. Negative for decreased urine volume, difficulty urinating, dysuria, enuresis, flank pain, frequency, hematuria and urgency.        Left Kidney Stone        Urethral Stricture    Musculoskeletal:  Negative for back pain and gait problem.   Integumentary:  Negative for rash.   Neurological:  Negative for speech difficulty and weakness.   Psychiatric/Behavioral:  Negative for behavioral problems and confusion.           Objective     Physical Exam  Vitals and nursing note reviewed.   Constitutional:       General: She is not in acute distress.     Appearance: Normal appearance. She is not ill-appearing, toxic-appearing or diaphoretic.   HENT:      Head: Normocephalic.   Eyes:      Extraocular Movements: Extraocular movements intact.   Cardiovascular:      Rate and Rhythm: Normal rate and regular rhythm.      Heart sounds: Normal heart sounds.   Pulmonary:      Effort: Pulmonary effort is normal.      Breath sounds: Normal breath  sounds. No wheezing, rhonchi or rales.   Abdominal:      General: Bowel sounds are normal.      Palpations: Abdomen is soft.      Tenderness: There is no abdominal tenderness. There is no right CVA tenderness, left CVA tenderness, guarding or rebound.   Musculoskeletal:         General: Normal range of motion.      Cervical back: Normal range of motion. No rigidity.   Skin:     General: Skin is warm and dry.   Neurological:      General: No focal deficit present.      Mental Status: She is alert and oriented to person, place, and time.      Motor: No weakness.      Coordination: Coordination normal.      Gait: Gait normal.   Psychiatric:         Mood and Affect: Mood normal.         Behavior: Behavior normal.         Thought Content: Thought content normal.          Assessment and Plan     Problem List Items Addressed This Visit          Renal/    Stricture of female urethra (Chronic)    Kidney stone on left side - Primary    Urge incontinence        Urethral Dilatation with Dr. ARIEL Singleton on April 30, 2024  at 2:30 pm   May try a bottle of mag citrate per bottle instructions for the constipation -- if no results follow up with PCP for the constipation   Continue Gemtesa 75 mg one at bedtime   Hold Oxybutynin for now   Follow up with Urology NP JIMBO Luciano in 3 months or sooner if needed

## 2024-03-05 ENCOUNTER — OFFICE VISIT (OUTPATIENT)
Dept: FAMILY MEDICINE | Facility: CLINIC | Age: 79
End: 2024-03-05
Payer: COMMERCIAL

## 2024-03-05 VITALS
BODY MASS INDEX: 25.76 KG/M2 | TEMPERATURE: 98 F | RESPIRATION RATE: 18 BRPM | HEIGHT: 68 IN | WEIGHT: 170 LBS | HEART RATE: 69 BPM | DIASTOLIC BLOOD PRESSURE: 79 MMHG | OXYGEN SATURATION: 99 % | SYSTOLIC BLOOD PRESSURE: 125 MMHG

## 2024-03-05 DIAGNOSIS — E11.9 TYPE 2 DIABETES MELLITUS WITHOUT COMPLICATION, WITHOUT LONG-TERM CURRENT USE OF INSULIN: ICD-10-CM

## 2024-03-05 DIAGNOSIS — I10 ESSENTIAL (PRIMARY) HYPERTENSION: ICD-10-CM

## 2024-03-05 DIAGNOSIS — Z78.0 POST-MENOPAUSAL: Primary | ICD-10-CM

## 2024-03-05 DIAGNOSIS — F33.0 MILD EPISODE OF RECURRENT MAJOR DEPRESSIVE DISORDER: ICD-10-CM

## 2024-03-05 PROCEDURE — 99214 OFFICE O/P EST MOD 30 MIN: CPT | Mod: ,,, | Performed by: INTERNAL MEDICINE

## 2024-03-05 PROCEDURE — 3078F DIAST BP <80 MM HG: CPT | Mod: ,,, | Performed by: INTERNAL MEDICINE

## 2024-03-05 PROCEDURE — 1125F AMNT PAIN NOTED PAIN PRSNT: CPT | Mod: ,,, | Performed by: INTERNAL MEDICINE

## 2024-03-05 PROCEDURE — 1101F PT FALLS ASSESS-DOCD LE1/YR: CPT | Mod: ,,, | Performed by: INTERNAL MEDICINE

## 2024-03-05 PROCEDURE — 3288F FALL RISK ASSESSMENT DOCD: CPT | Mod: ,,, | Performed by: INTERNAL MEDICINE

## 2024-03-05 PROCEDURE — 3074F SYST BP LT 130 MM HG: CPT | Mod: ,,, | Performed by: INTERNAL MEDICINE

## 2024-03-05 RX ORDER — ESCITALOPRAM OXALATE 10 MG/1
10 TABLET ORAL DAILY
Qty: 90 TABLET | Refills: 1 | Status: SHIPPED | OUTPATIENT
Start: 2024-03-05

## 2024-03-05 RX ORDER — LEVOTHYROXINE SODIUM 75 UG/1
75 TABLET ORAL
Qty: 90 EACH | Refills: 1 | Status: SHIPPED | OUTPATIENT
Start: 2024-03-05

## 2024-03-08 ENCOUNTER — OFFICE VISIT (OUTPATIENT)
Dept: UROLOGY | Facility: CLINIC | Age: 79
End: 2024-03-08
Payer: COMMERCIAL

## 2024-03-08 ENCOUNTER — HOSPITAL ENCOUNTER (OUTPATIENT)
Dept: RADIOLOGY | Facility: HOSPITAL | Age: 79
Discharge: HOME | End: 2024-03-08
Attending: NURSE PRACTITIONER
Payer: COMMERCIAL

## 2024-03-08 VITALS — BODY MASS INDEX: 25.76 KG/M2 | HEIGHT: 68 IN | WEIGHT: 170 LBS

## 2024-03-08 DIAGNOSIS — N39.41 URGE INCONTINENCE: ICD-10-CM

## 2024-03-08 DIAGNOSIS — N20.0 KIDNEY STONE ON LEFT SIDE: ICD-10-CM

## 2024-03-08 DIAGNOSIS — N35.82 OTHER STRICTURE OF URETHRA IN FEMALE: Chronic | ICD-10-CM

## 2024-03-08 DIAGNOSIS — N20.0 RENAL CALCULUS: ICD-10-CM

## 2024-03-08 DIAGNOSIS — N20.0 KIDNEY STONE ON LEFT SIDE: Primary | ICD-10-CM

## 2024-03-08 PROCEDURE — 74176 CT ABD & PELVIS W/O CONTRAST: CPT | Mod: 26,,, | Performed by: RADIOLOGY

## 2024-03-08 PROCEDURE — 3288F FALL RISK ASSESSMENT DOCD: CPT | Mod: CPTII,,, | Performed by: NURSE PRACTITIONER

## 2024-03-08 PROCEDURE — 1160F RVW MEDS BY RX/DR IN RCRD: CPT | Mod: CPTII,,, | Performed by: NURSE PRACTITIONER

## 2024-03-08 PROCEDURE — 74176 CT ABD & PELVIS W/O CONTRAST: CPT | Mod: TC

## 2024-03-08 PROCEDURE — 99214 OFFICE O/P EST MOD 30 MIN: CPT | Mod: PBBFAC,25 | Performed by: NURSE PRACTITIONER

## 2024-03-08 PROCEDURE — 1159F MED LIST DOCD IN RCRD: CPT | Mod: CPTII,,, | Performed by: NURSE PRACTITIONER

## 2024-03-08 PROCEDURE — 99213 OFFICE O/P EST LOW 20 MIN: CPT | Mod: S$PBB,,, | Performed by: NURSE PRACTITIONER

## 2024-03-08 PROCEDURE — 1101F PT FALLS ASSESS-DOCD LE1/YR: CPT | Mod: CPTII,,, | Performed by: NURSE PRACTITIONER

## 2024-03-08 NOTE — PATIENT INSTRUCTIONS
Urethral Dilatation with Dr. ARIEL Singleton on April 30, 2024  at 2:30 pm   May try a bottle of mag citrate per bottle instructions for the constipation -- if no results follow up with PCP for the constipation   Continue Gemtesa 75 mg one at bedtime   Hold Oxybutynin for now   Follow up with Urology NP JIMBO Luciano in 3 months or sooner if needed

## 2024-03-09 PROBLEM — F33.0 MILD EPISODE OF RECURRENT MAJOR DEPRESSIVE DISORDER: Status: ACTIVE | Noted: 2024-03-09

## 2024-03-09 PROBLEM — Z78.0 POST-MENOPAUSAL: Status: ACTIVE | Noted: 2024-03-09

## 2024-03-09 NOTE — PROGRESS NOTES
Subjective:       Patient ID: Lorraine De Jesus is a 79 y.o. female.    Chief Complaint: Follow-up (1 mo fu ) and Abdominal Pain (At the bottom of stomach )    Follow-up  Associated symptoms include abdominal pain.   Abdominal Pain      .  Patient stated that she often feels depressed stated when she wakes up she sometimes she does not want to do anything.  Patient has chronic diabetes chronic hypertension and also chronic hypothyroidism   Will continue resume Synthroid 75 mics 1 p.o. q.day.  Patient is clinically depressed will start Lexapro 10 mg 1 p.o. q.day.    Health maintenance   DEXA scan    Current Medications:    Current Outpatient Medications:     amLODIPine (NORVASC) 5 MG tablet, Take 5 mg by mouth., Disp: , Rfl:     blood sugar diagnostic Strp, 1 strip by Misc.(Non-Drug; Combo Route) route 3 (three) times daily., Disp: 200 strip, Rfl: 3    blood-glucose meter kit, Use as instructed, Disp: 1 each, Rfl: 0    cloNIDine (CATAPRES) 0.1 MG tablet, TAKE 1 TABLET BY MOUTH EVERY 4 HOURS AS NEEDED FOR BLOOD PRESSURE GREATER THAN 170/90, Disp: , Rfl:     ezetimibe (ZETIA) 10 mg tablet, Take 1 tablet (10 mg total) by mouth once daily., Disp: 90 tablet, Rfl: 3    fluconazole (DIFLUCAN) 100 MG tablet, Take two tablets on day one then reduce to one tablet daily, Disp: 5 tablet, Rfl: 1    gabapentin (NEURONTIN) 400 MG capsule, TAKE 1 CAPSULE(400 MG) BY MOUTH EVERY 8 HOURS, Disp: 90 capsule, Rfl: 2    glipiZIDE (GLUCOTROL) 10 MG TR24, TAKE 1 TABLET BY MOUTH TWICE DAILY, Disp: 180 tablet, Rfl: 1    HYDROcodone-acetaminophen (NORCO)  mg per tablet, Take 1 tablet by mouth every 6 (six) hours as needed for Pain., Disp: 120 tablet, Rfl: 0    HYDROcodone-acetaminophen (NORCO)  mg per tablet, Take 1 tablet by mouth every 6 (six) hours as needed for Pain., Disp: 120 tablet, Rfl: 0    HYDROcodone-acetaminophen (NORCO)  mg per tablet, Take 1 tablet by mouth every 6 (six) hours as needed for Pain., Disp: 120 tablet,  "Rfl: 0    lancets (ACCU-CHEK FASTCLIX LANCET DRUM MIS), Take 1 each by mouth once daily., Disp: , Rfl:     loratadine (CLARITIN) 10 mg tablet, Take 1 tablet (10 mg total) by mouth once daily., Disp: 15 tablet, Rfl: 3    olmesartan (BENICAR) 5 MG Tab, Take 10 mg by mouth once daily., Disp: , Rfl:     ONETOUCH DELICA PLUS LANCET 33 gauge Misc, Check blood sugar TID, Disp: 100 each, Rfl: 11    ONETOUCH VERIO TEST STRIPS Strp, TEST BLOOD GLUCOSE LEVELS TWICE DAILY, Disp: 200 strip, Rfl: 4    oxybutynin (DITROPAN-XL) 10 MG 24 hr tablet, Take one tablet at night, Disp: 90 tablet, Rfl: 3    polyethylene glycol (GLYCOLAX) 17 gram PwPk, Take 17 g by mouth once daily., Disp: 30 each, Rfl: 0    sulfamethoxazole-trimethoprim 800-160mg (BACTRIM DS) 800-160 mg Tab, Take 1 tablet by mouth 2 (two) times daily. (Patient not taking: Reported on 3/8/2024), Disp: 14 tablet, Rfl: 0    EScitalopram oxalate (LEXAPRO) 10 MG tablet, Take 1 tablet (10 mg total) by mouth once daily., Disp: 90 tablet, Rfl: 1    levothyroxine (SYNTHROID) 75 MCG tablet, Take 1 tablet (75 mcg total) by mouth before breakfast., Disp: 90 each, Rfl: 1           Review of Systems   Gastrointestinal:  Positive for abdominal pain.                Vitals:    03/05/24 0932   BP: 125/79   BP Location: Left arm   Patient Position: Sitting   BP Method: Large (Automatic)   Pulse: 69   Resp: 18   Temp: 97.7 °F (36.5 °C)   TempSrc: Temporal   SpO2: 99%   Weight: 77.1 kg (170 lb)   Height: 5' 8" (1.727 m)        Physical Exam  Vitals and nursing note reviewed.   Constitutional:       Appearance: Normal appearance.   Cardiovascular:      Rate and Rhythm: Normal rate and regular rhythm.      Pulses: Normal pulses.      Heart sounds: Normal heart sounds.   Pulmonary:      Effort: Pulmonary effort is normal.      Breath sounds: Normal breath sounds.   Abdominal:      General: Abdomen is flat. Bowel sounds are normal.      Palpations: Abdomen is soft.   Musculoskeletal:         " General: Normal range of motion.   Skin:     General: Skin is warm and dry.   Neurological:      General: No focal deficit present.      Mental Status: She is alert and oriented to person, place, and time. Mental status is at baseline.           Last Labs:     Office Visit on 02/22/2024   Component Date Value    SARS Coronavirus 2 Antig* 02/22/2024 Negative      Acceptab* 02/22/2024 Yes    Office Visit on 02/15/2024   Component Date Value    Rapid Influenza A Ag 02/15/2024 Negative     Rapid Influenza B Ag 02/15/2024 Negative      Acceptab* 02/15/2024 Yes     SARS Coronavirus 2 Antig* 02/15/2024 Positive (A)      Acceptab* 02/15/2024 Yes     Rapid Strep A Screen 02/15/2024 Negative      Acceptab* 02/15/2024 Yes    Office Visit on 02/13/2024   Component Date Value    Color, UA 02/13/2024 Yellow     Clarity, UA 02/13/2024 Turbid     pH, UA 02/13/2024 6.5     Leukocytes, UA 02/13/2024 Large (A)     Nitrites, UA 02/13/2024 Negative     Protein, UA 02/13/2024 Negative     Glucose, UA 02/13/2024 500 (A)     Ketones, UA 02/13/2024 Negative     Urobilinogen, UA 02/13/2024 Normal     Bilirubin, UA 02/13/2024 Negative     Blood, UA 02/13/2024 Negative     Specific Gravity, UA 02/13/2024 1.014     WBC, UA 02/13/2024 47 (H)     RBC, UA 02/13/2024 3     Bacteria, UA 02/13/2024 Few (A)     Squamous Epithelial Cell* 02/13/2024 Occasional (A)     WBC Clumps 02/13/2024 Few (A)     Yeast, UA 02/13/2024 Occasional (A)     Mucous 02/13/2024 Occasional (A)     Culture, Urine 02/13/2024 >100,000 Staphylococcus epidermidis (A)        Last Imaging:  CT Renal Stone Study ABD Pelvis WO  Narrative: EXAMINATION:  CT RENAL STONE STUDY ABD PELVIS WO    CLINICAL HISTORY:  Calculus of kidneykidney stone;    COMPARISON:  CT abdomen pelvis November 30, 2020    TECHNIQUE:  Multiple axial tomographic images of the abdomen and pelvis were obtained without the use of intravenous  contrast.    FINDINGS:  Lung bases clear.    1.1 cm hypodensity within the ventral left hepatic lobe, similar to prior, likely benign.  Visualized gallbladder grossly unremarkable.  Visualized pancreas appears unremarkable.  Spleen grossly unremarkable.    Bilateral adrenal glands grossly unremarkable.  9 mm calculus within the inferior pole of left kidney.  No convincing ureteral calculus or hydronephrosis.  Urinary bladder incompletely distended.  Prior hysterectomy.    No convincing evidence of gastrointestinal obstruction or acute appendicitis.  Atherosclerotic calcification demonstrated.  Scattered skeletal degenerative change.  Partially visualized probable hemangioma within T8 vertebral body.  Impression: No acute CT findings. 9 mm calculus within the inferior pole of left kidney.  No convincing ureteral calculus or hydronephrosis.  Prior hysterectomy.  Other/detailed findings as above.    The CT exam was performed using one or more of the following dose    reduction techniques- Automated exposure control, adjustment of the mA    and/or kV according to patient size, and/or use of iterative    reconstructed technique.    Point of Service: [facility]    Electronically signed by: Vadim Swanson  Date:    03/08/2024  Time:    10:21         **Labs and x-rays personally reviewed by me    ** reviewed      Objective:        Assessment:       1. Post-menopausal  DXA Bone Density Axial Skeleton 1 or more sites      2. Essential (primary) hypertension        3. Type 2 diabetes mellitus without complication, without long-term current use of insulin        4. Mild episode of recurrent major depressive disorder             Plan:         1. Post-menopausal  -     DXA Bone Density Axial Skeleton 1 or more sites; Future; Expected date: 03/05/2024    2. Essential (primary) hypertension    3. Type 2 diabetes mellitus without complication, without long-term current use of insulin    4. Mild episode of recurrent  major depressive disorder    Other orders  -     levothyroxine (SYNTHROID) 75 MCG tablet; Take 1 tablet (75 mcg total) by mouth before breakfast.  Dispense: 90 each; Refill: 1  -     EScitalopram oxalate (LEXAPRO) 10 MG tablet; Take 1 tablet (10 mg total) by mouth once daily.  Dispense: 90 tablet; Refill: 1

## 2024-03-16 ENCOUNTER — OFFICE VISIT (OUTPATIENT)
Dept: FAMILY MEDICINE | Facility: CLINIC | Age: 79
End: 2024-03-16
Payer: COMMERCIAL

## 2024-03-16 VITALS
BODY MASS INDEX: 25.16 KG/M2 | HEART RATE: 84 BPM | DIASTOLIC BLOOD PRESSURE: 75 MMHG | WEIGHT: 166 LBS | SYSTOLIC BLOOD PRESSURE: 119 MMHG | OXYGEN SATURATION: 99 % | HEIGHT: 68 IN | TEMPERATURE: 98 F

## 2024-03-16 DIAGNOSIS — R19.8 ABDOMINAL FULLNESS: Primary | ICD-10-CM

## 2024-03-16 DIAGNOSIS — R19.7 DIARRHEA, UNSPECIFIED TYPE: ICD-10-CM

## 2024-03-16 LAB
ALBUMIN SERPL BCP-MCNC: 3.4 G/DL (ref 3.5–5)
ALBUMIN/GLOB SERPL: 0.9 {RATIO}
ALP SERPL-CCNC: 105 U/L (ref 55–142)
ALT SERPL W P-5'-P-CCNC: 19 U/L (ref 13–56)
ANION GAP SERPL CALCULATED.3IONS-SCNC: 9 MMOL/L (ref 7–16)
AST SERPL W P-5'-P-CCNC: 17 U/L (ref 15–37)
BASOPHILS # BLD AUTO: 0.04 K/UL (ref 0–0.2)
BASOPHILS NFR BLD AUTO: 0.6 % (ref 0–1)
BILIRUB SERPL-MCNC: 0.3 MG/DL (ref ?–1.2)
BUN SERPL-MCNC: 13 MG/DL (ref 7–18)
BUN/CREAT SERPL: 13 (ref 6–20)
CALCIUM SERPL-MCNC: 9.5 MG/DL (ref 8.5–10.1)
CHLORIDE SERPL-SCNC: 108 MMOL/L (ref 98–107)
CO2 SERPL-SCNC: 25 MMOL/L (ref 21–32)
CREAT SERPL-MCNC: 0.97 MG/DL (ref 0.55–1.02)
DIFFERENTIAL METHOD BLD: ABNORMAL
EGFR (NO RACE VARIABLE) (RUSH/TITUS): 60 ML/MIN/1.73M2
EOSINOPHIL # BLD AUTO: 0.05 K/UL (ref 0–0.5)
EOSINOPHIL NFR BLD AUTO: 0.7 % (ref 1–4)
ERYTHROCYTE [DISTWIDTH] IN BLOOD BY AUTOMATED COUNT: 14.9 % (ref 11.5–14.5)
GLOBULIN SER-MCNC: 4 G/DL (ref 2–4)
GLUCOSE SERPL-MCNC: 178 MG/DL (ref 74–106)
HCT VFR BLD AUTO: 38.2 % (ref 38–47)
HGB BLD-MCNC: 12.8 G/DL (ref 12–16)
IMM GRANULOCYTES # BLD AUTO: 0.03 K/UL (ref 0–0.04)
IMM GRANULOCYTES NFR BLD: 0.4 % (ref 0–0.4)
LYMPHOCYTES # BLD AUTO: 1.67 K/UL (ref 1–4.8)
LYMPHOCYTES NFR BLD AUTO: 23.4 % (ref 27–41)
MCH RBC QN AUTO: 26.6 PG (ref 27–31)
MCHC RBC AUTO-ENTMCNC: 33.5 G/DL (ref 32–36)
MCV RBC AUTO: 79.4 FL (ref 80–96)
MONOCYTES # BLD AUTO: 0.58 K/UL (ref 0–0.8)
MONOCYTES NFR BLD AUTO: 8.1 % (ref 2–6)
MPC BLD CALC-MCNC: 9.5 FL (ref 9.4–12.4)
NEUTROPHILS # BLD AUTO: 4.76 K/UL (ref 1.8–7.7)
NEUTROPHILS NFR BLD AUTO: 66.8 % (ref 53–65)
NRBC # BLD AUTO: 0 X10E3/UL
NRBC, AUTO (.00): 0 %
PLATELET # BLD AUTO: 228 K/UL (ref 150–400)
POTASSIUM SERPL-SCNC: 3.7 MMOL/L (ref 3.5–5.1)
PROT SERPL-MCNC: 7.4 G/DL (ref 6.4–8.2)
RBC # BLD AUTO: 4.81 M/UL (ref 4.2–5.4)
SODIUM SERPL-SCNC: 138 MMOL/L (ref 136–145)
WBC # BLD AUTO: 7.13 K/UL (ref 4.5–11)

## 2024-03-16 PROCEDURE — 1101F PT FALLS ASSESS-DOCD LE1/YR: CPT | Mod: ,,, | Performed by: NURSE PRACTITIONER

## 2024-03-16 PROCEDURE — 3078F DIAST BP <80 MM HG: CPT | Mod: ,,, | Performed by: NURSE PRACTITIONER

## 2024-03-16 PROCEDURE — 3074F SYST BP LT 130 MM HG: CPT | Mod: ,,, | Performed by: NURSE PRACTITIONER

## 2024-03-16 PROCEDURE — 1125F AMNT PAIN NOTED PAIN PRSNT: CPT | Mod: ,,, | Performed by: NURSE PRACTITIONER

## 2024-03-16 PROCEDURE — 80053 COMPREHEN METABOLIC PANEL: CPT | Mod: ,,, | Performed by: CLINICAL MEDICAL LABORATORY

## 2024-03-16 PROCEDURE — 3288F FALL RISK ASSESSMENT DOCD: CPT | Mod: ,,, | Performed by: NURSE PRACTITIONER

## 2024-03-16 PROCEDURE — 99213 OFFICE O/P EST LOW 20 MIN: CPT | Mod: ,,, | Performed by: NURSE PRACTITIONER

## 2024-03-16 PROCEDURE — 85025 COMPLETE CBC W/AUTO DIFF WBC: CPT | Mod: ,,, | Performed by: CLINICAL MEDICAL LABORATORY

## 2024-03-16 NOTE — PROGRESS NOTES
Subjective:       Patient ID: Lorraine De Jesus is a 79 y.o. female.    Chief Complaint: Diarrhea (Started Thursday )    Diarrhea (Started Thursday )- states it is watery diarrhea- no fever, chills, body aches, nausea or vomiting- states every time she eats or drinks she has a watery bowel movement- states she feels bloated and full- pt takes Norco daily      Diarrhea   This is a new problem. The current episode started in the past 7 days. The problem occurs 5 to 10 times per day. The problem has been unchanged. The stool consistency is described as Watery. The patient states that diarrhea does not awaken her from sleep. Associated symptoms include bloating. Pertinent negatives include no abdominal pain, arthralgias, chills, coughing, fever, headaches, increased  flatus, myalgias, sweats, URI, vomiting or weight loss. There are no known risk factors. She has tried nothing for the symptoms. There is no history of bowel resection, inflammatory bowel disease, irritable bowel syndrome, malabsorption, a recent abdominal surgery or short gut syndrome.     Review of Systems   Constitutional:  Negative for appetite change, chills, fatigue, fever and weight loss.   HENT:  Negative for nasal congestion, ear pain and sore throat.    Eyes:  Negative for pain, discharge and itching.   Respiratory:  Negative for cough and shortness of breath.    Cardiovascular:  Negative for chest pain and leg swelling.   Gastrointestinal:  Positive for bloating and diarrhea. Negative for abdominal pain, change in bowel habit, flatus, nausea and vomiting.   Musculoskeletal:  Negative for arthralgias, back pain, gait problem, myalgias and neck pain.   Integumentary:  Negative for rash and wound.   Allergic/Immunologic: Negative for immunocompromised state.   Neurological:  Negative for dizziness, weakness and headaches.   All other systems reviewed and are negative.        Objective:      Physical Exam  Vitals and nursing note reviewed.    Constitutional:       General: She is not in acute distress.     Appearance: Normal appearance. She is not ill-appearing, toxic-appearing or diaphoretic.   HENT:      Head: Normocephalic.      Right Ear: Tympanic membrane, ear canal and external ear normal.      Left Ear: Tympanic membrane, ear canal and external ear normal.      Nose: Nose normal. No congestion or rhinorrhea.      Mouth/Throat:      Mouth: Mucous membranes are moist.      Pharynx: No oropharyngeal exudate or posterior oropharyngeal erythema.   Eyes:      General: No scleral icterus.        Right eye: No discharge.         Left eye: No discharge.      Extraocular Movements: Extraocular movements intact.      Conjunctiva/sclera: Conjunctivae normal.      Pupils: Pupils are equal, round, and reactive to light.   Cardiovascular:      Rate and Rhythm: Normal rate and regular rhythm.      Pulses: Normal pulses.      Heart sounds: Normal heart sounds. No murmur heard.  Pulmonary:      Effort: Pulmonary effort is normal. No respiratory distress.      Breath sounds: Normal breath sounds. No wheezing, rhonchi or rales.   Abdominal:      General: Bowel sounds are decreased.      Palpations: Abdomen is soft.      Tenderness: There is no abdominal tenderness. There is no right CVA tenderness, left CVA tenderness, guarding or rebound.      Comments: Chronic renal stone noted on Xray   Musculoskeletal:         General: Normal range of motion.      Cervical back: Neck supple. No tenderness.   Lymphadenopathy:      Cervical: No cervical adenopathy.   Skin:     General: Skin is warm and dry.      Capillary Refill: Capillary refill takes less than 2 seconds.      Findings: No rash.   Neurological:      Mental Status: She is alert and oriented to person, place, and time.   Psychiatric:         Mood and Affect: Mood normal.         Behavior: Behavior normal.         Thought Content: Thought content normal.         Judgment: Judgment normal.          Assessment:        1. Diarrhea, unspecified type        Plan:   Diarrhea, unspecified type  -     X-Ray KUB; Future; Expected date: 03/16/2024  -     CBC Auto Differential; Future; Expected date: 03/16/2024  -     Comprehensive Metabolic Panel; Future; Expected date: 03/16/2024         Risks, benefits, and side effects were discussed with the patient. All questions were answered to the fullest satisfaction of the patient, and pt verbalized understanding and agreement to treatment plan. Pt was to call with any new or worsening symptoms, or present to the ER

## 2024-03-18 ENCOUNTER — OFFICE VISIT (OUTPATIENT)
Dept: FAMILY MEDICINE | Facility: CLINIC | Age: 79
End: 2024-03-18
Payer: COMMERCIAL

## 2024-03-18 VITALS
BODY MASS INDEX: 25.41 KG/M2 | HEART RATE: 70 BPM | OXYGEN SATURATION: 98 % | TEMPERATURE: 98 F | RESPIRATION RATE: 18 BRPM | WEIGHT: 167.63 LBS | SYSTOLIC BLOOD PRESSURE: 104 MMHG | DIASTOLIC BLOOD PRESSURE: 70 MMHG | HEIGHT: 68 IN

## 2024-03-18 DIAGNOSIS — K59.00 CONSTIPATION, UNSPECIFIED CONSTIPATION TYPE: Primary | ICD-10-CM

## 2024-03-18 PROCEDURE — 3288F FALL RISK ASSESSMENT DOCD: CPT | Mod: ,,, | Performed by: INTERNAL MEDICINE

## 2024-03-18 PROCEDURE — 3078F DIAST BP <80 MM HG: CPT | Mod: ,,, | Performed by: INTERNAL MEDICINE

## 2024-03-18 PROCEDURE — 1125F AMNT PAIN NOTED PAIN PRSNT: CPT | Mod: ,,, | Performed by: INTERNAL MEDICINE

## 2024-03-18 PROCEDURE — 99213 OFFICE O/P EST LOW 20 MIN: CPT | Mod: ,,, | Performed by: INTERNAL MEDICINE

## 2024-03-18 PROCEDURE — 3074F SYST BP LT 130 MM HG: CPT | Mod: ,,, | Performed by: INTERNAL MEDICINE

## 2024-03-18 PROCEDURE — 1101F PT FALLS ASSESS-DOCD LE1/YR: CPT | Mod: ,,, | Performed by: INTERNAL MEDICINE

## 2024-03-18 PROCEDURE — 1159F MED LIST DOCD IN RCRD: CPT | Mod: ,,, | Performed by: INTERNAL MEDICINE

## 2024-03-18 RX ORDER — LACTULOSE 10 G/15ML
20 SOLUTION ORAL 2 TIMES DAILY PRN
Qty: 400 ML | Refills: 0 | Status: SHIPPED | OUTPATIENT
Start: 2024-03-18

## 2024-03-20 PROBLEM — K59.00 CONSTIPATION: Status: ACTIVE | Noted: 2024-03-16

## 2024-03-20 NOTE — PROGRESS NOTES
Subjective:       Patient ID: Lorraine De Jesus is a 79 y.o. female.    Chief Complaint: Abdominal Pain    Abdominal Pain  Associated symptoms include constipation. Pertinent negatives include no fever or frequency.     .  Patient has abdominal most likely constipation constipation has been chronic.  Plan KUB start lactulose    Current Medications:    Current Outpatient Medications:     amLODIPine (NORVASC) 5 MG tablet, Take 5 mg by mouth., Disp: , Rfl:     blood sugar diagnostic Strp, 1 strip by Misc.(Non-Drug; Combo Route) route 3 (three) times daily., Disp: 200 strip, Rfl: 3    blood-glucose meter kit, Use as instructed, Disp: 1 each, Rfl: 0    cloNIDine (CATAPRES) 0.1 MG tablet, TAKE 1 TABLET BY MOUTH EVERY 4 HOURS AS NEEDED FOR BLOOD PRESSURE GREATER THAN 170/90, Disp: , Rfl:     EScitalopram oxalate (LEXAPRO) 10 MG tablet, Take 1 tablet (10 mg total) by mouth once daily., Disp: 90 tablet, Rfl: 1    ezetimibe (ZETIA) 10 mg tablet, Take 1 tablet (10 mg total) by mouth once daily., Disp: 90 tablet, Rfl: 3    fluconazole (DIFLUCAN) 100 MG tablet, Take two tablets on day one then reduce to one tablet daily, Disp: 5 tablet, Rfl: 1    gabapentin (NEURONTIN) 400 MG capsule, TAKE 1 CAPSULE(400 MG) BY MOUTH EVERY 8 HOURS, Disp: 90 capsule, Rfl: 2    glipiZIDE (GLUCOTROL) 10 MG TR24, TAKE 1 TABLET BY MOUTH TWICE DAILY, Disp: 180 tablet, Rfl: 1    HYDROcodone-acetaminophen (NORCO)  mg per tablet, Take 1 tablet by mouth every 6 (six) hours as needed for Pain., Disp: 120 tablet, Rfl: 0    HYDROcodone-acetaminophen (NORCO)  mg per tablet, Take 1 tablet by mouth every 6 (six) hours as needed for Pain., Disp: 120 tablet, Rfl: 0    HYDROcodone-acetaminophen (NORCO)  mg per tablet, Take 1 tablet by mouth every 6 (six) hours as needed for Pain., Disp: 120 tablet, Rfl: 0    lancets (ACCU-CHEK FASTCLIX LANCET DRUM [facility]), Take 1 each by mouth once daily., Disp: , Rfl:     levothyroxine (SYNTHROID) 75 MCG tablet, Take 1  "tablet (75 mcg total) by mouth before breakfast., Disp: 90 each, Rfl: 1    loratadine (CLARITIN) 10 mg tablet, Take 1 tablet (10 mg total) by mouth once daily., Disp: 15 tablet, Rfl: 3    olmesartan (BENICAR) 5 MG Tab, Take 10 mg by mouth once daily., Disp: , Rfl:     ONETOUCH DELICA PLUS LANCET 33 gauge Misc, Check blood sugar TID, Disp: 100 each, Rfl: 11    ONETOUCH VERIO TEST STRIPS Strp, TEST BLOOD GLUCOSE LEVELS TWICE DAILY, Disp: 200 strip, Rfl: 4    oxybutynin (DITROPAN-XL) 10 MG 24 hr tablet, Take one tablet at night, Disp: 90 tablet, Rfl: 3    polyethylene glycol (GLYCOLAX) 17 gram PwPk, Take 17 g by mouth once daily., Disp: 30 each, Rfl: 0    lactulose (CHRONULAC) 20 gram/30 mL Soln, Take 30 mLs (20 g total) by mouth 2 (two) times daily as needed (for constipation)., Disp: 400 mL, Rfl: 0           Review of Systems   Constitutional:  Negative for appetite change, fatigue and fever.   Respiratory:  Negative for shortness of breath.    Cardiovascular:  Negative for chest pain.   Gastrointestinal:  Positive for abdominal pain and constipation.   Endocrine: Negative for polydipsia, polyphagia and polyuria.   Genitourinary:  Negative for difficulty urinating, frequency and hot flashes.   Allergic/Immunologic: Negative for environmental allergies.   Neurological:  Negative for dizziness and light-headedness.   Psychiatric/Behavioral:  Negative for agitation.                 Vitals:    03/18/24 1029   BP: 104/70   BP Location: Left arm   Patient Position: Sitting   BP Method: Large (Automatic)   Pulse: 70   Resp: 18   Temp: 97.6 °F (36.4 °C)   TempSrc: Temporal   SpO2: 98%   Weight: 76 kg (167 lb 9.6 oz)   Height: 5' 8" (1.727 m)        Physical Exam  Vitals and nursing note reviewed.   Constitutional:       Appearance: Normal appearance.   Cardiovascular:      Rate and Rhythm: Normal rate and regular rhythm.      Pulses: Normal pulses.      Heart sounds: Normal heart sounds.   Pulmonary:      Effort: Pulmonary " effort is normal.      Breath sounds: Normal breath sounds.   Abdominal:      General: Abdomen is flat. Bowel sounds are normal.      Palpations: Abdomen is soft.   Musculoskeletal:         General: Normal range of motion.   Skin:     General: Skin is warm and dry.   Neurological:      General: No focal deficit present.      Mental Status: She is alert and oriented to person, place, and time. Mental status is at baseline.           Last Labs:     Office Visit on 03/16/2024   Component Date Value    Sodium 03/16/2024 138     Potassium 03/16/2024 3.7     Chloride 03/16/2024 108 (H)     CO2 03/16/2024 25     Anion Gap 03/16/2024 9     Glucose 03/16/2024 178 (H)     BUN 03/16/2024 13     Creatinine 03/16/2024 0.97     BUN/Creatinine Ratio 03/16/2024 13     Calcium 03/16/2024 9.5     Total Protein 03/16/2024 7.4     Albumin 03/16/2024 3.4 (L)     Globulin 03/16/2024 4.0     A/G Ratio 03/16/2024 0.9     Bilirubin, Total 03/16/2024 0.3     Alk Phos 03/16/2024 105     ALT 03/16/2024 19     AST 03/16/2024 17     eGFR 03/16/2024 60     WBC 03/16/2024 7.13     RBC 03/16/2024 4.81     Hemoglobin 03/16/2024 12.8     Hematocrit 03/16/2024 38.2     MCV 03/16/2024 79.4 (L)     MCH 03/16/2024 26.6 (L)     MCHC 03/16/2024 33.5     RDW 03/16/2024 14.9 (H)     Platelet Count 03/16/2024 228     MPV 03/16/2024 9.5     Neutrophils % 03/16/2024 66.8 (H)     Lymphocytes % 03/16/2024 23.4 (L)     Monocytes % 03/16/2024 8.1 (H)     Eosinophils % 03/16/2024 0.7 (L)     Basophils % 03/16/2024 0.6     Immature Granulocytes % 03/16/2024 0.4     nRBC, Auto 03/16/2024 0.0     Neutrophils, Abs 03/16/2024 4.76     Lymphocytes, Absolute 03/16/2024 1.67     Monocytes, Absolute 03/16/2024 0.58     Eosinophils, Absolute 03/16/2024 0.05     Basophils, Absolute 03/16/2024 0.04     Immature Granulocytes, A* 03/16/2024 0.03     nRBC, Absolute 03/16/2024 0.00     Diff Type 03/16/2024 Auto    Office Visit on 02/22/2024   Component Date Value    SARS  Coronavirus 2 Antig* 02/22/2024 Negative      Acceptab* 02/22/2024 Yes        Last Imaging:  X-Ray KUB  Narrative: EXAMINATION:  XR KUB    CLINICAL HISTORY:  Constipation, unspecified    TECHNIQUE:  Single AP supine view of the abdomen (KUB) was performed    COMPARISON:  03/16/2024    FINDINGS:  Mild colonic stool is seen.  No bowel obstruction.  Calcification of the left renal shadow similar.  Aortic calcifications and iliac calcifications as well.  Impression: Mild colonic stool appears decreased from prior exam.    Electronically signed by: Wilfrido Flores  Date:    03/18/2024  Time:    11:01         **Labs and x-rays personally reviewed by me    ** reviewed      Objective:        Assessment:       1. Constipation, unspecified constipation type  X-Ray KUB           Plan:         1. Constipation, unspecified constipation type  -     X-Ray KUB; Future; Expected date: 03/18/2024    Other orders  -     lactulose (CHRONULAC) 20 gram/30 mL Soln; Take 30 mLs (20 g total) by mouth 2 (two) times daily as needed (for constipation).  Dispense: 400 mL; Refill: 0

## 2024-03-21 NOTE — PROGRESS NOTES
Subjective:         Patient ID: Lorraine De Jesus is a 79 y.o. female.    Chief Complaint: Low-back Pain, Foot Pain, and Leg Pain      Pain  This is a chronic problem. The current episode started more than 1 year ago. The problem occurs daily. The problem has been unchanged. Associated symptoms include arthralgias and neck pain. Pertinent negatives include no anorexia, chest pain, chills, coughing, fever, sore throat, vertigo or vomiting.     Review of Systems   Constitutional:  Negative for activity change, appetite change, chills, fever and unexpected weight change.   HENT:  Negative for drooling, ear discharge, ear pain, facial swelling, nosebleeds, sore throat, trouble swallowing, voice change and goiter.    Eyes:  Negative for photophobia, pain, discharge, redness and visual disturbance.   Respiratory:  Negative for apnea, cough, choking, chest tightness, shortness of breath, wheezing and stridor.    Cardiovascular:  Negative for chest pain, palpitations and leg swelling.   Gastrointestinal:  Negative for abdominal distention, anorexia, diarrhea, rectal pain, vomiting and fecal incontinence.   Endocrine: Negative for cold intolerance, heat intolerance, polydipsia, polyphagia and polyuria.   Genitourinary:  Negative for bladder incontinence, dysuria, flank pain, frequency and hot flashes.   Musculoskeletal:  Positive for arthralgias, back pain, leg pain and neck pain.   Integumentary:  Negative for color change and pallor.   Allergic/Immunologic: Negative for immunocompromised state.   Neurological:  Negative for dizziness, vertigo, seizures, syncope, facial asymmetry, speech difficulty, light-headedness, memory loss and coordination difficulties.   Hematological:  Negative for adenopathy. Does not bruise/bleed easily.   Psychiatric/Behavioral:  Negative for agitation, behavioral problems, confusion, decreased concentration, dysphoric mood, hallucinations, self-injury and suicidal ideas. The patient is not  nervous/anxious and is not hyperactive.            Past Medical History:   Diagnosis Date    Adenomatous polyp of ascending colon 2021    Adenomatous polyp of descending colon 2021    Age related osteoporosis 10/28/2020    Benign paroxysmal vertigo     Chronic pain syndrome     Chronic pelvic pain in female 2021    Ditropan XL 5mg    Colon, diverticulosis 2021    Depressive disorder 2019    External hemorrhoid 2021    Gastrointestinal hemorrhage associated with anorectal source 2021    GERD (gastroesophageal reflux disease) 2013    Hyperlipidemia 2012    Hypothyroidism 2019    Joint pain     Nonrheumatic tricuspid (valve) insufficiency 2018    Personal history UTI 2021    Controlled on Hiprex    Polyneuropathy 2018    PVD (peripheral vascular disease) 10/30/2018    Temporal arteritis     Type 2 diabetes mellitus 2014    Urethral meatal stenosis 2018    history of known urethral stenosis, and was taught to perform monthly self dilation at home.     Past Surgical History:   Procedure Laterality Date     left lumbar sympathetic nerve block Left 2020    X3  DR BROWN    CARDIAC CATHETERIZATION  10/14/2009     SECTION      x 2    COLONOSCOPY  2009    CYSTOSCOPY WITH HYDRODISTENSION OF BLADDER N/A 2021    Procedure: CYSTOSCOPY, WITH BLADDER HYDRODISTENSION;  Surgeon: Dequan Recio MD;  Location: [facility];  Service: Urology;  Laterality: N/A;    CYSTOSCOPY WITH URETHRAL DILATION  2021    Dr Recio    EPIDURAL STEROID INJECTION N/A 10/27/2022    Procedure: Injection, Steroid, Epidural, L5/S1;  Surgeon: Belkis Brown MD;  Location: [facility];  Service: Pain Management;  Laterality: N/A;    EPIDURAL STEROID INJECTION N/A 2023    Procedure: Injection, Steroid, Epidural, L5/S1;  Surgeon: Belkis Brown MD;  Location: [facility];  Service: Pain Management;  Laterality: N/A;    HYSTERECTOMY   1980's    LUMBAR SYMPATHETIC NERVE BLOCK Left 10/05/2020    Left Sympathetic Nerve Block - Dr Brown - 10/5/20, 9/21/20, 1/20/20. 1/6/20, 10/9/19, 9/25/19 and 7/11/18    right lumbar sympathetic nerve block Right 2020    X4 DR BROWN    THYROIDECTOMY      1990's     Social History     Socioeconomic History    Marital status:    Tobacco Use    Smoking status: Never     Passive exposure: Never    Smokeless tobacco: Never   Substance and Sexual Activity    Alcohol use: Never    Drug use: Never    Sexual activity: Not Currently     Social Determinants of Health     Financial Resource Strain: Low Risk  (9/19/2023)    Overall Financial Resource Strain (CARDIA)     Difficulty of Paying Living Expenses: Not very hard   Food Insecurity: No Food Insecurity (9/19/2023)    Hunger Vital Sign     Worried About Running Out of Food in the Last Year: Never true     Ran Out of Food in the Last Year: Never true   Transportation Needs: No Transportation Needs (9/19/2023)    PRAPARE - Transportation     Lack of Transportation (Medical): No     Lack of Transportation (Non-Medical): No   Physical Activity: Sufficiently Active (9/19/2023)    Exercise Vital Sign     Days of Exercise per Week: 4 days     Minutes of Exercise per Session: 60 min   Stress: No Stress Concern Present (9/19/2023)    [employer] [employer] - Occupational Stress Questionnaire     Feeling of Stress : Not at all   Social Connections: Moderately Integrated (9/19/2023)    Social Connection and Isolation Panel [NHANES]     Frequency of Communication with Friends and Family: Three times a week     Frequency of Social Gatherings with Friends and Family: Twice a week     Attends [employer] Services: More than 4 times per year     Active Member of Clubs or Organizations: Yes     Attends Club or Organization Meetings: More than 4 times per year     Marital Status:    Housing Stability: Low Risk  (9/19/2023)    Housing Stability Vital Sign      "Unable to Pay for Housing in the Last Year: No     Number of Places Lived in the Last Year: 1     Unstable Housing in the Last Year: No     Family History   Problem Relation Age of Onset    Cancer Mother     Hypertension Mother     Cancer Father     Cancer Sister     Diabetes Sister     Hyperlipidemia Sister     Hypertension Sister     Cancer Brother     Diabetes Sister     No Known Problems Sister     Cancer Brother     No Known Problems Brother     Cancer Brother      Review of patient's allergies indicates:   Allergen Reactions    Lipitor [atorvastatin] Other (See Comments)     Muscle aching    Pravachol [pravastatin] Other (See Comments)     Muscle aching        Objective:  Vitals:    03/26/24 0828   BP: 136/77   Pulse: 67   Resp: 18   Weight: 78.9 kg (174 lb)   Height: 5' 8" (1.727 m)   PainSc:   8         Physical Exam  Vitals and nursing note reviewed. Exam conducted with a chaperone present.   Constitutional:       General: She is awake. She is not in acute distress.     Appearance: Normal appearance. She is not ill-appearing or diaphoretic.   HENT:      Head: Normocephalic and atraumatic.      Nose: Nose normal.      Mouth/Throat:      Mouth: Mucous membranes are moist.      Pharynx: Oropharynx is clear.   Eyes:      Conjunctiva/sclera: Conjunctivae normal.      Pupils: Pupils are equal, round, and reactive to light.   Cardiovascular:      Rate and Rhythm: Normal rate.   Pulmonary:      Effort: Pulmonary effort is normal. No respiratory distress.   Abdominal:      Palpations: Abdomen is soft.   Musculoskeletal:      Cervical back: Normal range of motion and neck supple.      Thoracic back: Tenderness present.      Lumbar back: Tenderness present. Decreased range of motion.   Skin:     General: Skin is warm and dry.   Neurological:      General: No focal deficit present.      Mental Status: She is alert and oriented to person, place, and time. Mental status is at baseline.      Cranial Nerves: No cranial " nerve deficit (II-XII).   Psychiatric:         Mood and Affect: Mood normal.         Behavior: Behavior normal. Behavior is cooperative.         Thought Content: Thought content normal.           X-Ray KUB  Narrative: EXAMINATION:  XR KUB    CLINICAL HISTORY:  Constipation, unspecified    TECHNIQUE:  XR KUB    COMPARISON:  3/18/24    FINDINGS:  Lower lobes are clear    There is no bowel obstruction.  No free air.    1 cm stone overlies the left kidney.    Significant colonic stool.  Impression: 1 cm stone overlies the left kidney.    Significant colonic stool.    Electronically signed by: Louie Pina  Date:    03/25/2024  Time:    15:00       Office Visit on 03/16/2024   Component Date Value Ref Range Status    Sodium 03/16/2024 138  136 - 145 mmol/L Final    Potassium 03/16/2024 3.7  3.5 - 5.1 mmol/L Final    Chloride 03/16/2024 108 (H)  98 - 107 mmol/L Final    CO2 03/16/2024 25  21 - 32 mmol/L Final    Anion Gap 03/16/2024 9  7 - 16 mmol/L Final    Glucose 03/16/2024 178 (H)  74 - 106 mg/dL Final    BUN 03/16/2024 13  7 - 18 mg/dL Final    Creatinine 03/16/2024 0.97  0.55 - 1.02 mg/dL Final    BUN/Creatinine Ratio 03/16/2024 13  6 - 20 Final    Calcium 03/16/2024 9.5  8.5 - 10.1 mg/dL Final    Total Protein 03/16/2024 7.4  6.4 - 8.2 g/dL Final    Albumin 03/16/2024 3.4 (L)  3.5 - 5.0 g/dL Final    Globulin 03/16/2024 4.0  2.0 - 4.0 g/dL Final    A/G Ratio 03/16/2024 0.9   Final    Bilirubin, Total 03/16/2024 0.3  >0.0 - 1.2 mg/dL Final    Alk Phos 03/16/2024 105  55 - 142 U/L Final    ALT 03/16/2024 19  13 - 56 U/L Final    AST 03/16/2024 17  15 - 37 U/L Final    eGFR 03/16/2024 60  >=60 mL/min/1.73m2 Final    WBC 03/16/2024 7.13  4.50 - 11.00 K/uL Final    RBC 03/16/2024 4.81  4.20 - 5.40 M/uL Final    Hemoglobin 03/16/2024 12.8  12.0 - 16.0 g/dL Final    Hematocrit 03/16/2024 38.2  38.0 - 47.0 % Final    MCV 03/16/2024 79.4 (L)  80.0 - 96.0 fL Final    MCH 03/16/2024 26.6 (L)  27.0 - 31.0 pg Final    MCHC  03/16/2024 33.5  32.0 - 36.0 g/dL Final    RDW 03/16/2024 14.9 (H)  11.5 - 14.5 % Final    Platelet Count 03/16/2024 228  150 - 400 K/uL Final    MPV 03/16/2024 9.5  9.4 - 12.4 fL Final    Neutrophils % 03/16/2024 66.8 (H)  53.0 - 65.0 % Final    Lymphocytes % 03/16/2024 23.4 (L)  27.0 - 41.0 % Final    Monocytes % 03/16/2024 8.1 (H)  2.0 - 6.0 % Final    Eosinophils % 03/16/2024 0.7 (L)  1.0 - 4.0 % Final    Basophils % 03/16/2024 0.6  0.0 - 1.0 % Final    Immature Granulocytes % 03/16/2024 0.4  0.0 - 0.4 % Final    nRBC, Auto 03/16/2024 0.0  <=0.0 % Final    Neutrophils, Abs 03/16/2024 4.76  1.80 - 7.70 K/uL Final    Lymphocytes, Absolute 03/16/2024 1.67  1.00 - 4.80 K/uL Final    Monocytes, Absolute 03/16/2024 0.58  0.00 - 0.80 K/uL Final    Eosinophils, Absolute 03/16/2024 0.05  0.00 - 0.50 K/uL Final    Basophils, Absolute 03/16/2024 0.04  0.00 - 0.20 K/uL Final    Immature Granulocytes, Absolute 03/16/2024 0.03  0.00 - 0.04 K/uL Final    nRBC, Absolute 03/16/2024 0.00  <=0.00 x10e3/uL Final    Diff Type 03/16/2024 Auto   Final   Office Visit on 02/22/2024   Component Date Value Ref Range Status    SARS Coronavirus 2 Antigen 02/22/2024 Negative  Negative Final     Acceptable 02/22/2024 Yes   Final   Office Visit on 02/15/2024   Component Date Value Ref Range Status    Rapid Influenza A Ag 02/15/2024 Negative  Negative Final    Rapid Influenza B Ag 02/15/2024 Negative  Negative Final     Acceptable 02/15/2024 Yes   Final    SARS Coronavirus 2 Antigen 02/15/2024 Positive (A)  Negative Final     Acceptable 02/15/2024 Yes   Final    Rapid Strep A Screen 02/15/2024 Negative  Negative Final     Acceptable 02/15/2024 Yes   Final   Office Visit on 02/13/2024   Component Date Value Ref Range Status    Color, UA 02/13/2024 Yellow  Colorless, Straw, Yellow, Light Yellow, Dark Yellow Final    Clarity, UA 02/13/2024 Turbid  Clear Final    pH, UA 02/13/2024 6.5  5.0  to 8.0 pH Units Final    Leukocytes, UA 02/13/2024 Large (A)  Negative Final    Nitrites, UA 02/13/2024 Negative  Negative Final    Protein, UA 02/13/2024 Negative  Negative Final    Glucose, UA 02/13/2024 500 (A)  Normal mg/dL Final    Ketones, UA 02/13/2024 Negative  Negative mg/dL Final    Urobilinogen, UA 02/13/2024 Normal  0.2, 1.0, Normal mg/dL Final    Bilirubin, UA 02/13/2024 Negative  Negative Final    Blood, UA 02/13/2024 Negative  Negative Final    Specific Gravity, UA 02/13/2024 1.014  <=1.030 Final    WBC, UA 02/13/2024 47 (H)  <=5 /hpf Final    RBC, UA 02/13/2024 3  <=3 /hpf Final    Bacteria, UA 02/13/2024 Few (A)  None Seen /hpf Final    Squamous Epithelial Cells, UA 02/13/2024 Occasional (A)  None Seen /HPF Final    WBC Clumps 02/13/2024 Few (A)  None Seen /hpf Final    Yeast, UA 02/13/2024 Occasional (A)  None Seen /hpf Final    Mucous 02/13/2024 Occasional (A)  None Seen /LPF Final    Culture, Urine 02/13/2024 >100,000 Staphylococcus epidermidis (A)   Final   Office Visit on 02/05/2024   Component Date Value Ref Range Status    Sodium 02/05/2024 141  136 - 145 mmol/L Final    Potassium 02/05/2024 3.8  3.5 - 5.1 mmol/L Final    Chloride 02/05/2024 111 (H)  98 - 107 mmol/L Final    CO2 02/05/2024 29  21 - 32 mmol/L Final    Anion Gap 02/05/2024 5 (L)  7 - 16 mmol/L Final    Glucose 02/05/2024 160 (H)  74 - 106 mg/dL Final    BUN 02/05/2024 13  7 - 18 mg/dL Final    Creatinine 02/05/2024 0.84  0.55 - 1.02 mg/dL Final    BUN/Creatinine Ratio 02/05/2024 15  6 - 20 Final    Calcium 02/05/2024 9.3  8.5 - 10.1 mg/dL Final    eGFR 02/05/2024 72  >=60 mL/min/1.73m2 Final    Color, UA 02/05/2024 Yellow  Colorless, Straw, Yellow, Light Yellow, Dark Yellow Final    Clarity, UA 02/05/2024 Turbid  Clear Final    pH, UA 02/05/2024 5.5  5.0 to 8.0 pH Units Final    Leukocytes, UA 02/05/2024 Large (A)  Negative Final    Nitrites, UA 02/05/2024 Negative  Negative Final    Protein, UA 02/05/2024 10 (A)  Negative  Final    Glucose, UA 02/05/2024 100 (A)  Normal mg/dL Final    Ketones, UA 02/05/2024 Negative  Negative mg/dL Final    Urobilinogen, UA 02/05/2024 Normal  0.2, 1.0, Normal mg/dL Final    Bilirubin, UA 02/05/2024 Negative  Negative Final    Blood, UA 02/05/2024 Negative  Negative Final    Specific Gravity, UA 02/05/2024 1.017  <=1.030 Final    TSH 02/05/2024 2.360  0.358 - 3.740 uIU/mL Final    Total T4 02/05/2024 6.2  4.8 - 13.9 µg/dL Final    Free T4 02/05/2024 1.01  0.76 - 1.46 ng/dL Final    WBC 02/05/2024 5.77  4.50 - 11.00 K/uL Final    RBC 02/05/2024 4.51  4.20 - 5.40 M/uL Final    Hemoglobin 02/05/2024 12.4  12.0 - 16.0 g/dL Final    Hematocrit 02/05/2024 38.2  38.0 - 47.0 % Final    MCV 02/05/2024 84.7  80.0 - 96.0 fL Final    MCH 02/05/2024 27.5  27.0 - 31.0 pg Final    MCHC 02/05/2024 32.5  32.0 - 36.0 g/dL Final    RDW 02/05/2024 15.1 (H)  11.5 - 14.5 % Final    Platelet Count 02/05/2024 265  150 - 400 K/uL Final    MPV 02/05/2024 10.4  9.4 - 12.4 fL Final    Neutrophils % 02/05/2024 57.0  53.0 - 65.0 % Final    Lymphocytes % 02/05/2024 32.9  27.0 - 41.0 % Final    Monocytes % 02/05/2024 6.9 (H)  2.0 - 6.0 % Final    Eosinophils % 02/05/2024 2.4  1.0 - 4.0 % Final    Basophils % 02/05/2024 0.5  0.0 - 1.0 % Final    Immature Granulocytes % 02/05/2024 0.3  0.0 - 0.4 % Final    nRBC, Auto 02/05/2024 0.0  <=0.0 % Final    Neutrophils, Abs 02/05/2024 3.28  1.80 - 7.70 K/uL Final    Lymphocytes, Absolute 02/05/2024 1.90  1.00 - 4.80 K/uL Final    Monocytes, Absolute 02/05/2024 0.40  0.00 - 0.80 K/uL Final    Eosinophils, Absolute 02/05/2024 0.14  0.00 - 0.50 K/uL Final    Basophils, Absolute 02/05/2024 0.03  0.00 - 0.20 K/uL Final    Immature Granulocytes, Absolute 02/05/2024 0.02  0.00 - 0.04 K/uL Final    nRBC, Absolute 02/05/2024 0.00  <=0.00 x10e3/uL Final    Diff Type 02/05/2024 Auto   Final    WBC, UA 02/05/2024 >182 (H)  <=5 /hpf Final    RBC, UA 02/05/2024 6 (H)  <=3 /hpf Final    Bacteria, UA  02/05/2024 Few (A)  None Seen /hpf Final    Squamous Epithelial Cells, UA 02/05/2024 Few (A)  None Seen /HPF Final    WBC Clumps 02/05/2024 Many (A)  None Seen /hpf Final    Calcium Oxalate Crystals, UA 02/05/2024 Moderate (A)  None Seen /HPF Final    Transitional Epithelial Cells, UA 02/05/2024 Occasional (A)  None Seen /lpf Final    Mucous 02/05/2024 Few (A)  None Seen /LPF Final    Culture, Urine 02/05/2024 >100,000 Yeast (A)   Final   Office Visit on 01/08/2024   Component Date Value Ref Range Status    Culture, Urine 01/08/2024 >100,000 Yeast (A)   Final   Office Visit on 12/18/2023   Component Date Value Ref Range Status    POC Amphetamines 12/18/2023 Negative  Negative, Inconclusive Final    POC Barbiturates 12/18/2023 Negative  Negative, Inconclusive Final    POC Benzodiazepines 12/18/2023 Negative  Negative, Inconclusive Final    POC Cocaine 12/18/2023 Negative  Negative, Inconclusive Final    POC THC 12/18/2023 Negative  Negative, Inconclusive Final    POC Methadone 12/18/2023 Negative  Negative, Inconclusive Final    POC Methamphetamine 12/18/2023 Negative  Negative, Inconclusive Final    POC Opiates 12/18/2023 Presumptive Positive (A)  Negative, Inconclusive Final    POC Oxycodone 12/18/2023 Negative  Negative, Inconclusive Final    POC Phencyclidine 12/18/2023 Negative  Negative, Inconclusive Final    POC Methylenedioxymethamphetamine * 12/18/2023 Negative  Negative, Inconclusive Final    POC Tricyclic Antidepressants 12/18/2023 Negative  Negative, Inconclusive Final    POC Buprenorphine 12/18/2023 Negative   Final     Acceptable 12/18/2023 Yes   Final    POC Temperature (Urine) 12/18/2023 92   Final   Office Visit on 11/06/2023   Component Date Value Ref Range Status    Vitamin D 25-Hydroxy, Blood 11/06/2023 49.5  ng/mL Final    Color, UA 11/06/2023 Yellow  Colorless, Straw, Yellow, Light Yellow, Dark Yellow Final    Clarity, UA 11/06/2023 Turbid  Clear Final    pH, UA 11/06/2023 6.0   5.0 to 8.0 pH Units Final    Leukocytes, UA 11/06/2023 Large (A)  Negative Final    Nitrites, UA 11/06/2023 Negative  Negative Final    Protein, UA 11/06/2023 20 (A)  Negative Final    Glucose, UA 11/06/2023 300 (A)  Normal mg/dL Final    Ketones, UA 11/06/2023 Negative  Negative mg/dL Final    Urobilinogen, UA 11/06/2023 Normal  0.2, 1.0, Normal mg/dL Final    Bilirubin, UA 11/06/2023 Negative  Negative Final    Blood, UA 11/06/2023 Negative  Negative Final    Specific Gravity, UA 11/06/2023 1.018  <=1.030 Final    WBC, UA 11/06/2023 >182 (H)  <=5 /hpf Final    RBC, UA 11/06/2023 7 (H)  <=3 /hpf Final    Bacteria, UA 11/06/2023 Few (A)  None Seen /hpf Final    Squamous Epithelial Cells, UA 11/06/2023 Few (A)  None Seen /HPF Final    Calcium Oxalate Crystals, UA 11/06/2023 Occasional (A)  None Seen /HPF Final    Yeast, UA 11/06/2023 Many (A)  None Seen /hpf Final    Mucous 11/06/2023 Moderate (A)  None Seen /LPF Final    Culture, Urine 11/06/2023 Skin/Urogenital Louise Isolated, no further workup.   Final   Admission on 10/16/2023, Discharged on 10/16/2023   Component Date Value Ref Range Status    Sodium 10/16/2023 138  136 - 145 mmol/L Final    Potassium 10/16/2023 4.1  3.5 - 5.1 mmol/L Final    Chloride 10/16/2023 107  98 - 107 mmol/L Final    CO2 10/16/2023 29  21 - 32 mmol/L Final    Anion Gap 10/16/2023 6 (L)  7 - 16 mmol/L Final    Glucose 10/16/2023 145 (H)  74 - 106 mg/dL Final    BUN 10/16/2023 15  7 - 18 mg/dL Final    Creatinine 10/16/2023 0.83  0.55 - 1.02 mg/dL Final    BUN/Creatinine Ratio 10/16/2023 18  6 - 20 Final    Calcium 10/16/2023 9.2  8.5 - 10.1 mg/dL Final    Total Protein 10/16/2023 8.1  6.4 - 8.2 g/dL Final    Albumin 10/16/2023 3.2 (L)  3.5 - 5.0 g/dL Final    Globulin 10/16/2023 4.9 (H)  2.0 - 4.0 g/dL Final    A/G Ratio 10/16/2023 0.7   Final    Bilirubin, Total 10/16/2023 0.3  >0.0 - 1.2 mg/dL Final    Alk Phos 10/16/2023 90  55 - 142 U/L Final    ALT 10/16/2023 19  13 - 56 U/L Final     AST 10/16/2023 10 (L)  15 - 37 U/L Final    eGFR 10/16/2023 73  >=60 mL/min/1.73m2 Final    Color, UA 10/16/2023 Yellow  Colorless, Straw, Yellow, Light Yellow, Dark Yellow Final    Clarity, UA 10/16/2023 Clear  Clear Final    pH, UA 10/16/2023 6.5  5.0 to 8.0 pH Units Final    Leukocytes, UA 10/16/2023 Moderate (A)  Negative Final    Nitrites, UA 10/16/2023 Negative  Negative Final    Protein, UA 10/16/2023 10 (A)  Negative Final    Glucose, UA 10/16/2023 150 (A)  Normal mg/dL Final    Ketones, UA 10/16/2023 Negative  Negative mg/dL Final    Urobilinogen, UA 10/16/2023 Normal  0.2, 1.0, Normal mg/dL Final    Bilirubin, UA 10/16/2023 Negative  Negative Final    Blood, UA 10/16/2023 Negative  Negative Final    Specific Gravity, UA 10/16/2023 1.020  <=1.030 Final    Troponin I High Sensitivity 10/16/2023 <4.0  <=60.4 pg/mL Final    PT 10/16/2023 13.6  11.7 - 14.7 seconds Final    INR 10/16/2023 1.05  <=3.30 Final    Magnesium 10/16/2023 2.4 (H)  1.7 - 2.3 mg/dL Final    PTT 10/16/2023 33.2  25.2 - 37.3 seconds Final    ProBNP 10/16/2023 77  1 - 450 pg/mL Final    WBC 10/16/2023 8.74  4.50 - 11.00 K/uL Final    RBC 10/16/2023 4.69  4.20 - 5.40 M/uL Final    Hemoglobin 10/16/2023 12.8  12.0 - 16.0 g/dL Final    Hematocrit 10/16/2023 39.1  38.0 - 47.0 % Final    MCV 10/16/2023 83.4  80.0 - 96.0 fL Final    MCH 10/16/2023 27.3  27.0 - 31.0 pg Final    MCHC 10/16/2023 32.7  32.0 - 36.0 g/dL Final    RDW 10/16/2023 15.1 (H)  11.5 - 14.5 % Final    Platelet Count 10/16/2023 344  150 - 400 K/uL Final    MPV 10/16/2023 9.4  9.4 - 12.4 fL Final    Neutrophils % 10/16/2023 72.5 (H)  53.0 - 65.0 % Final    Lymphocytes % 10/16/2023 21.1 (L)  27.0 - 41.0 % Final    Monocytes % 10/16/2023 5.0  2.0 - 6.0 % Final    Eosinophils % 10/16/2023 0.8 (L)  1.0 - 4.0 % Final    Basophils % 10/16/2023 0.3  0.0 - 1.0 % Final    Immature Granulocytes % 10/16/2023 0.3  0.0 - 0.4 % Final    nRBC, Auto 10/16/2023 0.0  <=0.0 % Final    Neutrophils,  Abs 10/16/2023 6.33  1.80 - 7.70 K/uL Final    Lymphocytes, Absolute 10/16/2023 1.84  1.00 - 4.80 K/uL Final    Monocytes, Absolute 10/16/2023 0.44  0.00 - 0.80 K/uL Final    Eosinophils, Absolute 10/16/2023 0.07  0.00 - 0.50 K/uL Final    Basophils, Absolute 10/16/2023 0.03  0.00 - 0.20 K/uL Final    Immature Granulocytes, Absolute 10/16/2023 0.03  0.00 - 0.04 K/uL Final    nRBC, Absolute 10/16/2023 0.00  <=0.00 x10e3/uL Final    Diff Type 10/16/2023 Auto   Final    WBC, UA 10/16/2023 40 (H)  <=5 /hpf Final    RBC, UA 10/16/2023 4 (H)  <=3 /hpf Final    Bacteria, UA 10/16/2023 Moderate (A)  None Seen /hpf Final    Squamous Epithelial Cells, UA 10/16/2023 Few (A)  None Seen /HPF Final    Mucous 10/16/2023 Few (A)  None Seen /LPF Final    Culture, Urine 10/16/2023 20,000 Streptococcus agalactiae (Group B) (A)   Final   Office Visit on 10/09/2023   Component Date Value Ref Range Status    Sodium 10/09/2023 139  136 - 145 mmol/L Final    Potassium 10/09/2023 4.2  3.5 - 5.1 mmol/L Final    Chloride 10/09/2023 106  98 - 107 mmol/L Final    CO2 10/09/2023 26  21 - 32 mmol/L Final    Anion Gap 10/09/2023 11  7 - 16 mmol/L Final    Glucose 10/09/2023 187 (H)  74 - 106 mg/dL Final    BUN 10/09/2023 12  7 - 18 mg/dL Final    Creatinine 10/09/2023 0.93  0.55 - 1.02 mg/dL Final    BUN/Creatinine Ratio 10/09/2023 13  6 - 20 Final    Calcium 10/09/2023 9.1  8.5 - 10.1 mg/dL Final    eGFR 10/09/2023 64  >=60 mL/min/1.73m2 Final    Vitamin B12 10/09/2023 782  193 - 986 pg/mL Final    TSH 10/09/2023 0.997  0.358 - 3.740 uIU/mL Final    Color, UA 10/09/2023 Light-Yellow  Colorless, Straw, Yellow, Light Yellow, Dark Yellow Final    Clarity, UA 10/09/2023 Turbid  Clear Final    pH, UA 10/09/2023 6.5  5.0 to 8.0 pH Units Final    Leukocytes, UA 10/09/2023 Moderate (A)  Negative Final    Nitrites, UA 10/09/2023 Negative  Negative Final    Protein, UA 10/09/2023 Negative  Negative Final    Glucose, UA 10/09/2023 300 (A)  Normal mg/dL  Final    Ketones, UA 10/09/2023 Negative  Negative mg/dL Final    Urobilinogen, UA 10/09/2023 Normal  0.2, 1.0, Normal mg/dL Final    Bilirubin, UA 10/09/2023 Negative  Negative Final    Blood, UA 10/09/2023 Negative  Negative Final    Specific Gravity, UA 10/09/2023 1.013  <=1.030 Final    WBC 10/09/2023 5.46  4.50 - 11.00 K/uL Final    RBC 10/09/2023 4.65  4.20 - 5.40 M/uL Final    Hemoglobin 10/09/2023 12.7  12.0 - 16.0 g/dL Final    Hematocrit 10/09/2023 39.3  38.0 - 47.0 % Final    MCV 10/09/2023 84.5  80.0 - 96.0 fL Final    MCH 10/09/2023 27.3  27.0 - 31.0 pg Final    MCHC 10/09/2023 32.3  32.0 - 36.0 g/dL Final    RDW 10/09/2023 15.7 (H)  11.5 - 14.5 % Final    Platelet Count 10/09/2023 257  150 - 400 K/uL Final    MPV 10/09/2023 10.1  9.4 - 12.4 fL Final    Neutrophils % 10/09/2023 60.1  53.0 - 65.0 % Final    Lymphocytes % 10/09/2023 28.6  27.0 - 41.0 % Final    Monocytes % 10/09/2023 7.7 (H)  2.0 - 6.0 % Final    Eosinophils % 10/09/2023 2.7  1.0 - 4.0 % Final    Basophils % 10/09/2023 0.5  0.0 - 1.0 % Final    Immature Granulocytes % 10/09/2023 0.4  0.0 - 0.4 % Final    nRBC, Auto 10/09/2023 0.0  <=0.0 % Final    Neutrophils, Abs 10/09/2023 3.28  1.80 - 7.70 K/uL Final    Lymphocytes, Absolute 10/09/2023 1.56  1.00 - 4.80 K/uL Final    Monocytes, Absolute 10/09/2023 0.42  0.00 - 0.80 K/uL Final    Eosinophils, Absolute 10/09/2023 0.15  0.00 - 0.50 K/uL Final    Basophils, Absolute 10/09/2023 0.03  0.00 - 0.20 K/uL Final    Immature Granulocytes, Absolute 10/09/2023 0.02  0.00 - 0.04 K/uL Final    nRBC, Absolute 10/09/2023 0.00  <=0.00 x10e3/uL Final    Diff Type 10/09/2023 Auto   Final    WBC, UA 10/09/2023 67 (H)  <=5 /hpf Final    RBC, UA 10/09/2023 14 (H)  <=3 /hpf Final    Bacteria, UA 10/09/2023 Moderate (A)  None Seen /hpf Final    Squamous Epithelial Cells, UA 10/09/2023 Occasional (A)  None Seen /HPF Final    Hyaline Casts, UA 10/09/2023 2-5 (A)  None Seen /lpf Final    Mucous 10/09/2023  Occasional (A)  None Seen /LPF Final    Culture, Urine 10/09/2023 Skin/Urogenital Louise Isolated, no further workup.   Final   There may be more visits with results that are not included.         Orders Placed This Encounter   Procedures    POCT Urine Drug Screen Presump     Interpretive Information:     Negative:  No drug detected at the cut off level.   Positive:  This result represents presumptive positive for the   tested drug, other substances may yield a positive response other   than the analyte of interest. This result should be utilized for   diagnostic purpose only. Confirmation testing will be performed upon physician request only.            Requested Prescriptions     Signed Prescriptions Disp Refills    gabapentin (NEURONTIN) 400 MG capsule 90 capsule 2     Sig: TAKE 1 CAPSULE(400 MG) BY MOUTH EVERY 8 HOURS    HYDROcodone-acetaminophen (NORCO)  mg per tablet 120 tablet 0     Sig: Take 1 tablet by mouth every 6 (six) hours as needed for Pain.    HYDROcodone-acetaminophen (NORCO)  mg per tablet 120 tablet 0     Sig: Take 1 tablet by mouth every 6 (six) hours as needed for Pain.    HYDROcodone-acetaminophen (NORCO)  mg per tablet 120 tablet 0     Sig: Take 1 tablet by mouth every 6 (six) hours as needed for Pain.    DULoxetine (CYMBALTA) 30 MG capsule 30 capsule 11     Sig: Take 1 capsule (30 mg total) by mouth once daily.       Assessment:     1. Diabetic neuropathy with neurologic complication    2. Lumbosacral radiculopathy    3. Bilateral foot pain    4. Encounter for long-term (current) use of other medications         A's of Opioid Risk Assessment  Activity:Patient can perform ADL.   Analgesia:Patients pain is partially controlled by current medication. Patient has tried OTC medications such as Tylenol and Ibuprofen with out relief.   Adverse Effects: Patient denies constipation or sedation.  Aberrant Behavior:  reviewed with no aberrant drug seeking/taking behavior.  Overdose  reversal drug naloxone discussed    Drug screen reviewed        X-ray left knee degenerative changes no fracture noted      MRI lumbar spine [facility] March 25, 2021 spinal stenosis L5/S1 disc bulge facet joint arthropathy multiple level degenerative changes        Plan:    Narcan February 2023    Follows Neurology [facility] neuropathy symptoms lower extremities    She had lumbar L5/S1 TORIE # 1 September 12, 2023, she states she had 90% relief after procedure, she states procedure did help improve her level function    She has known scoliosis    Today complaining mostly of increasing bilateral lower extremity burning numbness and tingling neuropathy type symptoms     She is requesting options     We discussed increasing gabapentin are trying duloxetine     She would like to try duloxetine     Duloxetine 30 mg 1 p.o. q.day     She is considering following up with Neurology to discuss options    Continue home exercise program as directed     Continue current medication    Follow-up 3 months    Dr. Brown September 2024    Bring original prescription medication bottles/container/box with labels to each visit

## 2024-03-25 ENCOUNTER — OFFICE VISIT (OUTPATIENT)
Dept: FAMILY MEDICINE | Facility: CLINIC | Age: 79
End: 2024-03-25
Payer: COMMERCIAL

## 2024-03-25 VITALS
HEIGHT: 68 IN | HEART RATE: 67 BPM | SYSTOLIC BLOOD PRESSURE: 138 MMHG | TEMPERATURE: 98 F | WEIGHT: 174 LBS | OXYGEN SATURATION: 97 % | DIASTOLIC BLOOD PRESSURE: 82 MMHG | BODY MASS INDEX: 26.37 KG/M2 | RESPIRATION RATE: 17 BRPM

## 2024-03-25 DIAGNOSIS — K59.00 CONSTIPATION, UNSPECIFIED CONSTIPATION TYPE: Primary | ICD-10-CM

## 2024-03-25 PROCEDURE — 3288F FALL RISK ASSESSMENT DOCD: CPT | Mod: ,,, | Performed by: INTERNAL MEDICINE

## 2024-03-25 PROCEDURE — 3079F DIAST BP 80-89 MM HG: CPT | Mod: ,,, | Performed by: INTERNAL MEDICINE

## 2024-03-25 PROCEDURE — 99213 OFFICE O/P EST LOW 20 MIN: CPT | Mod: ,,, | Performed by: INTERNAL MEDICINE

## 2024-03-25 PROCEDURE — 1101F PT FALLS ASSESS-DOCD LE1/YR: CPT | Mod: ,,, | Performed by: INTERNAL MEDICINE

## 2024-03-25 PROCEDURE — 3075F SYST BP GE 130 - 139MM HG: CPT | Mod: ,,, | Performed by: INTERNAL MEDICINE

## 2024-03-25 PROCEDURE — 1126F AMNT PAIN NOTED NONE PRSNT: CPT | Mod: ,,, | Performed by: INTERNAL MEDICINE

## 2024-03-26 ENCOUNTER — OFFICE VISIT (OUTPATIENT)
Dept: PAIN MEDICINE | Facility: CLINIC | Age: 79
End: 2024-03-26
Payer: COMMERCIAL

## 2024-03-26 VITALS
BODY MASS INDEX: 26.37 KG/M2 | SYSTOLIC BLOOD PRESSURE: 136 MMHG | DIASTOLIC BLOOD PRESSURE: 77 MMHG | WEIGHT: 174 LBS | HEART RATE: 67 BPM | RESPIRATION RATE: 18 BRPM | HEIGHT: 68 IN

## 2024-03-26 DIAGNOSIS — E11.49 DIABETIC NEUROPATHY WITH NEUROLOGIC COMPLICATION: Primary | Chronic | ICD-10-CM

## 2024-03-26 DIAGNOSIS — Z79.899 ENCOUNTER FOR LONG-TERM (CURRENT) USE OF OTHER MEDICATIONS: ICD-10-CM

## 2024-03-26 DIAGNOSIS — M79.671 BILATERAL FOOT PAIN: ICD-10-CM

## 2024-03-26 DIAGNOSIS — E11.40 DIABETIC NEUROPATHY WITH NEUROLOGIC COMPLICATION: Primary | Chronic | ICD-10-CM

## 2024-03-26 DIAGNOSIS — M54.17 LUMBOSACRAL RADICULOPATHY: Chronic | ICD-10-CM

## 2024-03-26 DIAGNOSIS — M79.672 BILATERAL FOOT PAIN: ICD-10-CM

## 2024-03-26 PROCEDURE — 1125F AMNT PAIN NOTED PAIN PRSNT: CPT | Mod: CPTII,,, | Performed by: PHYSICIAN ASSISTANT

## 2024-03-26 PROCEDURE — 99999PBSHW POCT URINE DRUG SCREEN PRESUMP: Mod: PBBFAC,,,

## 2024-03-26 PROCEDURE — 80305 DRUG TEST PRSMV DIR OPT OBS: CPT | Mod: PBBFAC | Performed by: PHYSICIAN ASSISTANT

## 2024-03-26 PROCEDURE — 3075F SYST BP GE 130 - 139MM HG: CPT | Mod: CPTII,,, | Performed by: PHYSICIAN ASSISTANT

## 2024-03-26 PROCEDURE — 99214 OFFICE O/P EST MOD 30 MIN: CPT | Mod: S$PBB,,, | Performed by: PHYSICIAN ASSISTANT

## 2024-03-26 PROCEDURE — 1101F PT FALLS ASSESS-DOCD LE1/YR: CPT | Mod: CPTII,,, | Performed by: PHYSICIAN ASSISTANT

## 2024-03-26 PROCEDURE — 99215 OFFICE O/P EST HI 40 MIN: CPT | Mod: PBBFAC | Performed by: PHYSICIAN ASSISTANT

## 2024-03-26 PROCEDURE — 3078F DIAST BP <80 MM HG: CPT | Mod: CPTII,,, | Performed by: PHYSICIAN ASSISTANT

## 2024-03-26 PROCEDURE — 1159F MED LIST DOCD IN RCRD: CPT | Mod: CPTII,,, | Performed by: PHYSICIAN ASSISTANT

## 2024-03-26 PROCEDURE — 3288F FALL RISK ASSESSMENT DOCD: CPT | Mod: CPTII,,, | Performed by: PHYSICIAN ASSISTANT

## 2024-03-26 RX ORDER — GABAPENTIN 400 MG/1
CAPSULE ORAL
Qty: 90 CAPSULE | Refills: 2 | Status: SHIPPED | OUTPATIENT
Start: 2024-03-26

## 2024-03-26 RX ORDER — DULOXETIN HYDROCHLORIDE 30 MG/1
30 CAPSULE, DELAYED RELEASE ORAL DAILY
Qty: 30 CAPSULE | Refills: 11 | Status: SHIPPED | OUTPATIENT
Start: 2024-03-26 | End: 2025-03-26

## 2024-03-26 RX ORDER — HYDROCODONE BITARTRATE AND ACETAMINOPHEN 10; 325 MG/1; MG/1
1 TABLET ORAL EVERY 6 HOURS PRN
Qty: 120 TABLET | Refills: 0 | Status: SHIPPED | OUTPATIENT
Start: 2024-03-29

## 2024-03-26 RX ORDER — HYDROCODONE BITARTRATE AND ACETAMINOPHEN 10; 325 MG/1; MG/1
1 TABLET ORAL EVERY 6 HOURS PRN
Qty: 120 TABLET | Refills: 0 | Status: SHIPPED | OUTPATIENT
Start: 2024-05-28

## 2024-03-26 RX ORDER — HYDROCODONE BITARTRATE AND ACETAMINOPHEN 10; 325 MG/1; MG/1
1 TABLET ORAL EVERY 6 HOURS PRN
Qty: 120 TABLET | Refills: 0 | Status: SHIPPED | OUTPATIENT
Start: 2024-04-28

## 2024-03-27 ENCOUNTER — TELEPHONE (OUTPATIENT)
Dept: FAMILY MEDICINE | Facility: CLINIC | Age: 79
End: 2024-03-27
Payer: COMMERCIAL

## 2024-03-27 NOTE — TELEPHONE ENCOUNTER
----- Message from Mateo Chen MD sent at 3/26/2024  2:23 PM CDT -----  Need to see in  1 week please  abnl results     0844 Pt is scheduled to come in on 04/03/24

## 2024-03-28 NOTE — PROGRESS NOTES
Subjective:       Patient ID: Lorraine De Jesus is a 79 y.o. female.    Chief Complaint: Follow-up (constipation)    HPI  .  Patient complains of chronic constipation also complains of chronic abdominal pain.  Stated that she has been taking multiple laxatives but she has not getting better  Current Medications:    Current Outpatient Medications:     amLODIPine (NORVASC) 5 MG tablet, Take 5 mg by mouth., Disp: , Rfl:     blood sugar diagnostic Strp, 1 strip by Misc.(Non-Drug; Combo Route) route 3 (three) times daily., Disp: 200 strip, Rfl: 3    blood-glucose meter kit, Use as instructed, Disp: 1 each, Rfl: 0    cloNIDine (CATAPRES) 0.1 MG tablet, TAKE 1 TABLET BY MOUTH EVERY 4 HOURS AS NEEDED FOR BLOOD PRESSURE GREATER THAN 170/90, Disp: , Rfl:     EScitalopram oxalate (LEXAPRO) 10 MG tablet, Take 1 tablet (10 mg total) by mouth once daily., Disp: 90 tablet, Rfl: 1    ezetimibe (ZETIA) 10 mg tablet, Take 1 tablet (10 mg total) by mouth once daily., Disp: 90 tablet, Rfl: 3    fluconazole (DIFLUCAN) 100 MG tablet, Take two tablets on day one then reduce to one tablet daily, Disp: 5 tablet, Rfl: 1    glipiZIDE (GLUCOTROL) 10 MG TR24, TAKE 1 TABLET BY MOUTH TWICE DAILY, Disp: 180 tablet, Rfl: 1    lactulose (CHRONULAC) 20 gram/30 mL Soln, Take 30 mLs (20 g total) by mouth 2 (two) times daily as needed (for constipation)., Disp: 400 mL, Rfl: 0    lancets (ACCU-CHEK FASTCLIX LANCET DRUM [facility] Take 1 each by mouth once daily., Disp: , Rfl:     levothyroxine (SYNTHROID) 75 MCG tablet, Take 1 tablet (75 mcg total) by mouth before breakfast., Disp: 90 each, Rfl: 1    loratadine (CLARITIN) 10 mg tablet, Take 1 tablet (10 mg total) by mouth once daily., Disp: 15 tablet, Rfl: 3    olmesartan (BENICAR) 5 MG Tab, Take 10 mg by mouth once daily., Disp: , Rfl:     ONETOUCH DELICA PLUS LANCET 33 gauge Misc, Check blood sugar TID, Disp: 100 each, Rfl: 11    ONETOUCH VERIO TEST STRIPS Strp, TEST BLOOD GLUCOSE LEVELS TWICE DAILY, Disp: 200  "strip, Rfl: 4    oxybutynin (DITROPAN-XL) 10 MG 24 hr tablet, Take one tablet at night, Disp: 90 tablet, Rfl: 3    polyethylene glycol (GLYCOLAX) 17 gram PwPk, Take 17 g by mouth once daily., Disp: 30 each, Rfl: 0    DULoxetine (CYMBALTA) 30 MG capsule, Take 1 capsule (30 mg total) by mouth once daily., Disp: 30 capsule, Rfl: 11    gabapentin (NEURONTIN) 400 MG capsule, TAKE 1 CAPSULE(400 MG) BY MOUTH EVERY 8 HOURS, Disp: 90 capsule, Rfl: 2    [START ON 5/28/2024] HYDROcodone-acetaminophen (NORCO)  mg per tablet, Take 1 tablet by mouth every 6 (six) hours as needed for Pain., Disp: 120 tablet, Rfl: 0    [START ON 4/28/2024] HYDROcodone-acetaminophen (NORCO)  mg per tablet, Take 1 tablet by mouth every 6 (six) hours as needed for Pain., Disp: 120 tablet, Rfl: 0    [START ON 3/29/2024] HYDROcodone-acetaminophen (NORCO)  mg per tablet, Take 1 tablet by mouth every 6 (six) hours as needed for Pain., Disp: 120 tablet, Rfl: 0    linaCLOtide (LINZESS) 72 mcg Cap capsule, Take 1 capsule (72 mcg total) by mouth before breakfast., Disp: 30 capsule, Rfl: 1           ROS  Twelve point system reviewed, unremarkable except for stated above in HPI.        Objective:         Vitals:    03/25/24 1331 03/25/24 1459   BP: (!) 144/84 138/82   BP Location: Left arm    Patient Position: Sitting    BP Method: Large (Automatic)    Pulse: 67    Resp: 17    Temp: 97.7 °F (36.5 °C)    TempSrc: Temporal    SpO2: 97%    Weight: 78.9 kg (174 lb)    Height: 5' 8" (1.727 m)         Physical Exam     Patient is awake alert oriented person place and  Lungs are clear to auscultation bilaterally no crackles or wheezes   Cardiovascular S1-S2 regular rate and rhythm no murmurs rubs or gallops   Abdomen is soft positive bowel sounds nontender, extremities no clubbing cyanosis edema  Neuro no focal neurological deficits  Skin warm and dry.     Last Labs:     Office Visit on 03/16/2024   Component Date Value    Sodium 03/16/2024 138     " Potassium 03/16/2024 3.7     Chloride 03/16/2024 108 (H)     CO2 03/16/2024 25     Anion Gap 03/16/2024 9     Glucose 03/16/2024 178 (H)     BUN 03/16/2024 13     Creatinine 03/16/2024 0.97     BUN/Creatinine Ratio 03/16/2024 13     Calcium 03/16/2024 9.5     Total Protein 03/16/2024 7.4     Albumin 03/16/2024 3.4 (L)     Globulin 03/16/2024 4.0     A/G Ratio 03/16/2024 0.9     Bilirubin, Total 03/16/2024 0.3     Alk Phos 03/16/2024 105     ALT 03/16/2024 19     AST 03/16/2024 17     eGFR 03/16/2024 60     WBC 03/16/2024 7.13     RBC 03/16/2024 4.81     Hemoglobin 03/16/2024 12.8     Hematocrit 03/16/2024 38.2     MCV 03/16/2024 79.4 (L)     MCH 03/16/2024 26.6 (L)     MCHC 03/16/2024 33.5     RDW 03/16/2024 14.9 (H)     Platelet Count 03/16/2024 228     MPV 03/16/2024 9.5     Neutrophils % 03/16/2024 66.8 (H)     Lymphocytes % 03/16/2024 23.4 (L)     Monocytes % 03/16/2024 8.1 (H)     Eosinophils % 03/16/2024 0.7 (L)     Basophils % 03/16/2024 0.6     Immature Granulocytes % 03/16/2024 0.4     nRBC, Auto 03/16/2024 0.0     Neutrophils, Abs 03/16/2024 4.76     Lymphocytes, Absolute 03/16/2024 1.67     Monocytes, Absolute 03/16/2024 0.58     Eosinophils, Absolute 03/16/2024 0.05     Basophils, Absolute 03/16/2024 0.04     Immature Granulocytes, A* 03/16/2024 0.03     nRBC, Absolute 03/16/2024 0.00     Diff Type 03/16/2024 Auto        Last Imaging:  X-Ray KUB  Narrative: EXAMINATION:  XR KUB    CLINICAL HISTORY:  Constipation, unspecified    TECHNIQUE:  XR KUB    COMPARISON:  3/18/24    FINDINGS:  Lower lobes are clear    There is no bowel obstruction.  No free air.    1 cm stone overlies the left kidney.    Significant colonic stool.  Impression: 1 cm stone overlies the left kidney.    Significant colonic stool.    Electronically signed by: Louie Pina  Date:    03/25/2024  Time:    15:00         **Labs and x-rays personally reviewed by me    ** reviewed           Assessment & Plan:       1. Constipation,  unspecified constipation type  -     X-Ray KUB; Future; Expected date: 03/25/2024  -     Ambulatory referral/consult to Gastroenterology; Future; Expected date: 04/01/2024    Other orders  -     linaCLOtide (LINZESS) 72 mcg Cap capsule; Take 1 capsule (72 mcg total) by mouth before breakfast.  Dispense: 30 capsule; Refill: 1            Mateo Chen MD

## 2024-03-31 ENCOUNTER — EXTERNAL CHRONIC CARE MANAGEMENT (OUTPATIENT)
Dept: FAMILY MEDICINE | Facility: CLINIC | Age: 79
End: 2024-03-31
Payer: COMMERCIAL

## 2024-03-31 PROCEDURE — G0511 CCM/BHI BY RHC/FQHC 20MIN MO: HCPCS | Mod: ,,, | Performed by: INTERNAL MEDICINE

## 2024-04-16 ENCOUNTER — OFFICE VISIT (OUTPATIENT)
Dept: DERMATOLOGY | Facility: CLINIC | Age: 79
End: 2024-04-16

## 2024-04-16 DIAGNOSIS — L98.8 RHYTIDES: Primary | ICD-10-CM

## 2024-04-16 PROCEDURE — 99212 OFFICE O/P EST SF 10 MIN: CPT | Mod: ,,, | Performed by: STUDENT IN AN ORGANIZED HEALTH CARE EDUCATION/TRAINING PROGRAM

## 2024-04-16 NOTE — PROGRESS NOTES
[facility]  Santiago Sheikh MD    [address] [place] [address], [place] [address]  [phone]    Fax: [phone]    Patient Name: Lorraine De Jesus  Medical Record Number: [id number]  PCP: Mateo Chen MD  Age: 79 y.o. : 1945  Contact: [phone] (home)     History of Present Illness:     Lorraine De Jesus is a 79 y.o.  female here for rhytides. She is concerned about static rhytides of the lower face and forehead.     The patient has no other concerns today.    Review of Systems:     Unremarkable other than mentioned above.     Physical Exam:     General: Relaxed, oriented, alert    Skin examination of the scalp, face, neck, chest, back, abdomen, upper extremities and lower extremities were normal except for as listed below      Assessment and Plan:     1. Rhytides of lower and upper face   - discussed Botox, patient declined     - given samples of hyaluronic acid serums       Return to clinic PRN     AVS printed with patient instructions     Santiago Sheikh MD   Mohs Surgery/Dermatologic Oncology  Dermatology

## 2024-04-30 ENCOUNTER — OFFICE VISIT (OUTPATIENT)
Dept: UROLOGY | Facility: CLINIC | Age: 79
End: 2024-04-30
Payer: COMMERCIAL

## 2024-04-30 ENCOUNTER — EXTERNAL CHRONIC CARE MANAGEMENT (OUTPATIENT)
Dept: FAMILY MEDICINE | Facility: CLINIC | Age: 79
End: 2024-04-30
Payer: COMMERCIAL

## 2024-04-30 VITALS
WEIGHT: 172 LBS | DIASTOLIC BLOOD PRESSURE: 81 MMHG | SYSTOLIC BLOOD PRESSURE: 133 MMHG | HEART RATE: 86 BPM | HEIGHT: 68 IN | BODY MASS INDEX: 26.07 KG/M2

## 2024-04-30 DIAGNOSIS — R10.2 CHRONIC PELVIC PAIN IN FEMALE: ICD-10-CM

## 2024-04-30 DIAGNOSIS — G89.29 CHRONIC PELVIC PAIN IN FEMALE: ICD-10-CM

## 2024-04-30 DIAGNOSIS — N34.3 URETHRAL SYNDROME: Primary | ICD-10-CM

## 2024-04-30 DIAGNOSIS — N39.41 URGE INCONTINENCE: ICD-10-CM

## 2024-04-30 PROCEDURE — 99024 POSTOP FOLLOW-UP VISIT: CPT | Mod: ,,, | Performed by: UROLOGY

## 2024-04-30 PROCEDURE — 99215 OFFICE O/P EST HI 40 MIN: CPT | Mod: PBBFAC | Performed by: UROLOGY

## 2024-04-30 PROCEDURE — 1101F PT FALLS ASSESS-DOCD LE1/YR: CPT | Mod: CPTII,,, | Performed by: UROLOGY

## 2024-04-30 PROCEDURE — 3075F SYST BP GE 130 - 139MM HG: CPT | Mod: CPTII,,, | Performed by: UROLOGY

## 2024-04-30 PROCEDURE — 1159F MED LIST DOCD IN RCRD: CPT | Mod: CPTII,,, | Performed by: UROLOGY

## 2024-04-30 PROCEDURE — 1125F AMNT PAIN NOTED PAIN PRSNT: CPT | Mod: CPTII,,, | Performed by: UROLOGY

## 2024-04-30 PROCEDURE — G0511 CCM/BHI BY RHC/FQHC 20MIN MO: HCPCS | Mod: ,,, | Performed by: INTERNAL MEDICINE

## 2024-04-30 PROCEDURE — 3288F FALL RISK ASSESSMENT DOCD: CPT | Mod: CPTII,,, | Performed by: UROLOGY

## 2024-04-30 PROCEDURE — 1160F RVW MEDS BY RX/DR IN RCRD: CPT | Mod: CPTII,,, | Performed by: UROLOGY

## 2024-04-30 PROCEDURE — 3079F DIAST BP 80-89 MM HG: CPT | Mod: CPTII,,, | Performed by: UROLOGY

## 2024-05-01 NOTE — PROGRESS NOTES
Ms. De Jesus had last urethral dilatation on January 9th.  She really does not feel that she needs to have another 1 today.  She would like to delay a couple of months.  We will reschedule.

## 2024-05-08 ENCOUNTER — OFFICE VISIT (OUTPATIENT)
Dept: FAMILY MEDICINE | Facility: CLINIC | Age: 79
End: 2024-05-08
Payer: COMMERCIAL

## 2024-05-08 VITALS
WEIGHT: 172 LBS | BODY MASS INDEX: 26.07 KG/M2 | HEIGHT: 68 IN | HEART RATE: 62 BPM | DIASTOLIC BLOOD PRESSURE: 72 MMHG | OXYGEN SATURATION: 100 % | RESPIRATION RATE: 18 BRPM | TEMPERATURE: 99 F | SYSTOLIC BLOOD PRESSURE: 131 MMHG

## 2024-05-08 DIAGNOSIS — E11.9 TYPE 2 DIABETES MELLITUS WITHOUT COMPLICATION, WITHOUT LONG-TERM CURRENT USE OF INSULIN: Primary | ICD-10-CM

## 2024-05-08 LAB
ANION GAP SERPL CALCULATED.3IONS-SCNC: 8 MMOL/L (ref 7–16)
BUN SERPL-MCNC: 14 MG/DL (ref 7–18)
BUN/CREAT SERPL: 16 (ref 6–20)
CALCIUM SERPL-MCNC: 9.6 MG/DL (ref 8.5–10.1)
CHLORIDE SERPL-SCNC: 109 MMOL/L (ref 98–107)
CHOLEST SERPL-MCNC: 203 MG/DL (ref 0–200)
CHOLEST/HDLC SERPL: 3.2 {RATIO}
CO2 SERPL-SCNC: 29 MMOL/L (ref 21–32)
CREAT SERPL-MCNC: 0.85 MG/DL (ref 0.55–1.02)
EGFR (NO RACE VARIABLE) (RUSH/TITUS): 71 ML/MIN/1.73M2
EST. AVERAGE GLUCOSE BLD GHB EST-MCNC: 134 MG/DL
GLUCOSE SERPL-MCNC: 107 MG/DL (ref 74–106)
HBA1C MFR BLD HPLC: 6.3 % (ref 4.5–6.6)
HDLC SERPL-MCNC: 63 MG/DL (ref 40–60)
LDLC SERPL CALC-MCNC: 120 MG/DL
LDLC/HDLC SERPL: 1.9 {RATIO}
NONHDLC SERPL-MCNC: 140 MG/DL
POTASSIUM SERPL-SCNC: 3.8 MMOL/L (ref 3.5–5.1)
SODIUM SERPL-SCNC: 142 MMOL/L (ref 136–145)
TRIGL SERPL-MCNC: 100 MG/DL (ref 35–150)
VLDLC SERPL-MCNC: 20 MG/DL

## 2024-05-08 PROCEDURE — 1101F PT FALLS ASSESS-DOCD LE1/YR: CPT | Mod: ,,, | Performed by: INTERNAL MEDICINE

## 2024-05-08 PROCEDURE — 3075F SYST BP GE 130 - 139MM HG: CPT | Mod: ,,, | Performed by: INTERNAL MEDICINE

## 2024-05-08 PROCEDURE — 99213 OFFICE O/P EST LOW 20 MIN: CPT | Mod: ,,, | Performed by: INTERNAL MEDICINE

## 2024-05-08 PROCEDURE — 3288F FALL RISK ASSESSMENT DOCD: CPT | Mod: ,,, | Performed by: INTERNAL MEDICINE

## 2024-05-08 PROCEDURE — 3078F DIAST BP <80 MM HG: CPT | Mod: ,,, | Performed by: INTERNAL MEDICINE

## 2024-05-08 PROCEDURE — 80061 LIPID PANEL: CPT | Mod: ,,, | Performed by: CLINICAL MEDICAL LABORATORY

## 2024-05-08 PROCEDURE — 1126F AMNT PAIN NOTED NONE PRSNT: CPT | Mod: ,,, | Performed by: INTERNAL MEDICINE

## 2024-05-08 PROCEDURE — 80048 BASIC METABOLIC PNL TOTAL CA: CPT | Mod: ,,, | Performed by: CLINICAL MEDICAL LABORATORY

## 2024-05-08 PROCEDURE — 1159F MED LIST DOCD IN RCRD: CPT | Mod: ,,, | Performed by: INTERNAL MEDICINE

## 2024-05-08 PROCEDURE — 83036 HEMOGLOBIN GLYCOSYLATED A1C: CPT | Mod: ,,, | Performed by: CLINICAL MEDICAL LABORATORY

## 2024-05-13 NOTE — PROGRESS NOTES
Subjective:       Patient ID: Lorraine De Jesus is a 79 y.o. female.    Chief Complaint: Constipation (FU )    HPI  .  Patient doing very well today voiced no complaints today patient has chronic diabetes blood sugar has been running proximally 120s to 130s.  Will address health maintenance issues today.    Current Medications:    Current Outpatient Medications:     amLODIPine (NORVASC) 5 MG tablet, Take 5 mg by mouth once daily., Disp: , Rfl:     blood sugar diagnostic Strp, 1 strip by Misc.(Non-Drug; Combo Route) route 3 (three) times daily., Disp: 200 strip, Rfl: 3    blood-glucose meter kit, Use as instructed, Disp: 1 each, Rfl: 0    cloNIDine (CATAPRES) 0.1 MG tablet, TAKE 1 TABLET BY MOUTH EVERY 4 HOURS AS NEEDED FOR BLOOD PRESSURE GREATER THAN 170/90, Disp: , Rfl:     DULoxetine (CYMBALTA) 30 MG capsule, Take 1 capsule (30 mg total) by mouth once daily., Disp: 30 capsule, Rfl: 11    EScitalopram oxalate (LEXAPRO) 10 MG tablet, Take 1 tablet (10 mg total) by mouth once daily., Disp: 90 tablet, Rfl: 1    ezetimibe (ZETIA) 10 mg tablet, Take 1 tablet (10 mg total) by mouth once daily., Disp: 90 tablet, Rfl: 3    fluconazole (DIFLUCAN) 100 MG tablet, Take two tablets on day one then reduce to one tablet daily, Disp: 5 tablet, Rfl: 1    gabapentin (NEURONTIN) 400 MG capsule, TAKE 1 CAPSULE(400 MG) BY MOUTH EVERY 8 HOURS, Disp: 90 capsule, Rfl: 2    glipiZIDE (GLUCOTROL) 10 MG TR24, TAKE 1 TABLET BY MOUTH TWICE DAILY, Disp: 180 tablet, Rfl: 1    [START ON 5/28/2024] HYDROcodone-acetaminophen (NORCO)  mg per tablet, Take 1 tablet by mouth every 6 (six) hours as needed for Pain., Disp: 120 tablet, Rfl: 0    HYDROcodone-acetaminophen (NORCO)  mg per tablet, Take 1 tablet by mouth every 6 (six) hours as needed for Pain., Disp: 120 tablet, Rfl: 0    HYDROcodone-acetaminophen (NORCO)  mg per tablet, Take 1 tablet by mouth every 6 (six) hours as needed for Pain., Disp: 120 tablet, Rfl: 0    lactulose  "(CHRONULAC) 20 gram/30 mL Soln, Take 30 mLs (20 g total) by mouth 2 (two) times daily as needed (for constipation)., Disp: 400 mL, Rfl: 0    lancets (ACCU-CHEK FASTCLIX LANCET DRUM [facility] [place]), Take 1 each by mouth once daily., Disp: , Rfl:     levothyroxine (SYNTHROID) 75 MCG tablet, Take 1 tablet (75 mcg total) by mouth before breakfast., Disp: 90 each, Rfl: 1    linaCLOtide (LINZESS) 72 mcg Cap capsule, Take 1 capsule (72 mcg total) by mouth before breakfast., Disp: 30 capsule, Rfl: 1    loratadine (CLARITIN) 10 mg tablet, Take 1 tablet (10 mg total) by mouth once daily., Disp: 15 tablet, Rfl: 3    olmesartan (BENICAR) 5 MG Tab, Take 10 mg by mouth once daily., Disp: , Rfl:     ONETOUCH DELICA PLUS LANCET 33 gauge Misc, Check blood sugar TID, Disp: 100 each, Rfl: 11    ONETOUCH VERIO TEST STRIPS Strp, TEST BLOOD GLUCOSE LEVELS TWICE DAILY, Disp: 200 strip, Rfl: 4    oxybutynin (DITROPAN-XL) 10 MG 24 hr tablet, Take one tablet at night, Disp: 90 tablet, Rfl: 3    polyethylene glycol (GLYCOLAX) 17 gram PwPk, Take 17 g by mouth once daily. (Patient taking differently: Take 17 g by mouth daily as needed for Constipation.), Disp: 30 each, Rfl: 0           ROS  Twelve point system reviewed, unremarkable except for stated above in HPI.        Objective:         Vitals:    05/08/24 0958   BP: 131/72   BP Location: Left arm   Patient Position: Sitting   BP Method: Large (Automatic)   Pulse: 62   Resp: 18   Temp: 98.8 °F (37.1 °C)   TempSrc: Temporal   SpO2: 100%   Weight: 78 kg (172 lb)   Height: 5' 8" (1.727 m)        Physical Exam     Patient is awake alert oriented person place and  Lungs are clear to auscultation bilaterally no crackles or wheezes   Cardiovascular S1-S2 regular rate and rhythm no murmurs rubs or gallops   Abdomen is soft positive bowel sounds nontender, extremities no clubbing cyanosis edema  Neuro no focal neurological deficits  Skin warm and dry.     Last Labs:     Office Visit on 05/08/2024   Component " Date Value    Sodium 05/08/2024 142     Potassium 05/08/2024 3.8     Chloride 05/08/2024 109 (H)     CO2 05/08/2024 29     Anion Gap 05/08/2024 8     Glucose 05/08/2024 107 (H)     BUN 05/08/2024 14     Creatinine 05/08/2024 0.85     BUN/Creatinine Ratio 05/08/2024 16     Calcium 05/08/2024 9.6     eGFR 05/08/2024 71     Hemoglobin A1C 05/08/2024 6.3     Estimated Average Glucose 05/08/2024 134     Triglycerides 05/08/2024 100     Cholesterol 05/08/2024 203 (H)     HDL Cholesterol 05/08/2024 63 (H)     Cholesterol/HDL Ratio (R* 05/08/2024 3.2     Non-HDL 05/08/2024 140     LDL Calculated 05/08/2024 120     LDL/HDL 05/08/2024 1.9     VLDL 05/08/2024 20        Last Imaging:  X-Ray KUB  Narrative: EXAMINATION:  XR KUB    CLINICAL HISTORY:  Constipation, unspecified    TECHNIQUE:  XR KUB    COMPARISON:  3/18/24    FINDINGS:  Lower lobes are clear    There is no bowel obstruction.  No free air.    1 cm stone overlies the left kidney.    Significant colonic stool.  Impression: 1 cm stone overlies the left kidney.    Significant colonic stool.    Electronically signed by: Louie Pina  Date:    03/25/2024  Time:    15:00         **Labs and x-rays personally reviewed by me    ** reviewed           Assessment & Plan:       1. Type 2 diabetes mellitus without complication, without long-term current use of insulin  -     Basic Metabolic Panel; Future; Expected date: 05/08/2024  -     Hemoglobin A1C; Future; Expected date: 05/08/2024  -     Lipid Panel; Future; Expected date: 05/08/2024            Mateo Chen MD

## 2024-05-21 ENCOUNTER — OFFICE VISIT (OUTPATIENT)
Dept: FAMILY MEDICINE | Facility: CLINIC | Age: 79
End: 2024-05-21
Payer: COMMERCIAL

## 2024-05-21 VITALS
OXYGEN SATURATION: 93 % | HEART RATE: 61 BPM | TEMPERATURE: 99 F | SYSTOLIC BLOOD PRESSURE: 150 MMHG | RESPIRATION RATE: 18 BRPM | DIASTOLIC BLOOD PRESSURE: 80 MMHG | WEIGHT: 175 LBS | BODY MASS INDEX: 26.52 KG/M2 | HEIGHT: 68 IN

## 2024-05-21 DIAGNOSIS — M79.671 RIGHT FOOT PAIN: ICD-10-CM

## 2024-05-21 DIAGNOSIS — S92.354A CLOSED NONDISPLACED FRACTURE OF FIFTH METATARSAL BONE OF RIGHT FOOT, INITIAL ENCOUNTER: Primary | ICD-10-CM
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
## 2024-05-21 PROCEDURE — 1125F AMNT PAIN NOTED PAIN PRSNT: CPT | Mod: ,,, | Performed by: FAMILY MEDICINE

## 2024-05-21 PROCEDURE — 99214 OFFICE O/P EST MOD 30 MIN: CPT | Mod: 25,,, | Performed by: FAMILY MEDICINE

## 2024-05-21 PROCEDURE — 1160F RVW MEDS BY RX/DR IN RCRD: CPT | Mod: ,,, | Performed by: FAMILY MEDICINE

## 2024-05-21 PROCEDURE — 1159F MED LIST DOCD IN RCRD: CPT | Mod: ,,, | Performed by: FAMILY MEDICINE

## 2024-05-21 PROCEDURE — 3077F SYST BP >= 140 MM HG: CPT | Mod: ,,, | Performed by: FAMILY MEDICINE

## 2024-05-21 PROCEDURE — 96372 THER/PROPH/DIAG INJ SC/IM: CPT | Mod: ,,, | Performed by: FAMILY MEDICINE

## 2024-05-21 PROCEDURE — 3288F FALL RISK ASSESSMENT DOCD: CPT | Mod: ,,, | Performed by: FAMILY MEDICINE

## 2024-05-21 PROCEDURE — 3079F DIAST BP 80-89 MM HG: CPT | Mod: ,,, | Performed by: FAMILY MEDICINE

## 2024-05-21 PROCEDURE — 1101F PT FALLS ASSESS-DOCD LE1/YR: CPT | Mod: ,,, | Performed by: FAMILY MEDICINE

## 2024-05-21 RX ORDER — KETOROLAC TROMETHAMINE 30 MG/ML
30 INJECTION, SOLUTION INTRAMUSCULAR; INTRAVENOUS
Status: COMPLETED | OUTPATIENT
Start: 2024-05-21 | End: 2024-05-21

## 2024-05-21 RX ADMIN — KETOROLAC TROMETHAMINE 30 MG: 30 INJECTION, SOLUTION INTRAMUSCULAR; INTRAVENOUS at 06:05

## 2024-05-21 NOTE — PROGRESS NOTES
Subjective:       Patient ID: Lorraine De Jesus is a 79 y.o. female.    Chief Complaint: Toe Injury (Pt. States fell last night.)    Pt slipped and hit her right lateral aspect of foot last night, is ambulatory with pain.     Toe Injury  Associated symptoms include joint swelling. Pertinent negatives include no abdominal pain, arthralgias, change in bowel habit, chest pain, chills, congestion, coughing, diaphoresis, fatigue, fever, headaches, myalgias, nausea, neck pain, numbness, rash, sore throat, vertigo, vomiting or weakness.     Review of Systems   Constitutional:  Negative for activity change, appetite change, chills, diaphoresis, fatigue, fever and unexpected weight change.   HENT:  Negative for nasal congestion, dental problem, drooling, ear discharge, ear pain, facial swelling, hearing loss, mouth sores, nosebleeds, postnasal drip, rhinorrhea, sinus pressure/congestion, sneezing, sore throat, tinnitus, trouble swallowing, voice change and goiter.    Eyes:  Negative for photophobia, discharge, itching and visual disturbance.   Respiratory:  Negative for apnea, cough, choking, chest tightness, shortness of breath, wheezing and stridor.    Cardiovascular:  Negative for chest pain, palpitations, leg swelling and claudication.   Gastrointestinal:  Negative for abdominal distention, abdominal pain, anal bleeding, blood in stool, change in bowel habit, constipation, diarrhea, nausea and vomiting.   Endocrine: Negative for cold intolerance, heat intolerance, polydipsia, polyphagia and polyuria.   Genitourinary:  Negative for bladder incontinence, decreased urine volume, difficulty urinating, dysuria, enuresis, flank pain, frequency, hematuria, nocturia, pelvic pain and urgency.   Musculoskeletal:  Positive for joint swelling. Negative for arthralgias, back pain, gait problem, leg pain, myalgias, neck pain, neck stiffness and joint deformity.   Integumentary:  Negative for pallor, rash, wound, breast mass and breast  tenderness.   Allergic/Immunologic: Negative for environmental allergies, food allergies and immunocompromised state.   Neurological:  Negative for dizziness, vertigo, tremors, seizures, syncope, facial asymmetry, speech difficulty, weakness, light-headedness, numbness, headaches, memory loss and coordination difficulties.   Hematological:  Negative for adenopathy. Does not bruise/bleed easily.   Psychiatric/Behavioral:  Negative for agitation, behavioral problems, confusion, decreased concentration, dysphoric mood, hallucinations, self-injury, sleep disturbance and suicidal ideas. The patient is not nervous/anxious and is not hyperactive.    Breast: Negative for mass and tenderness        Objective:      Physical Exam  Vitals reviewed.   Constitutional:       Appearance: Normal appearance.   HENT:      Head: Normocephalic and atraumatic.      Right Ear: Tympanic membrane, ear canal and external ear normal.      Left Ear: Tympanic membrane, ear canal and external ear normal.      Nose: Nose normal.      Mouth/Throat:      Mouth: Mucous membranes are moist.      Pharynx: Oropharynx is clear.   Eyes:      Extraocular Movements: Extraocular movements intact.      Conjunctiva/sclera: Conjunctivae normal.      Pupils: Pupils are equal, round, and reactive to light.   Cardiovascular:      Rate and Rhythm: Normal rate and regular rhythm.      Pulses: Normal pulses.      Heart sounds: Normal heart sounds.   Pulmonary:      Effort: Pulmonary effort is normal.      Breath sounds: Normal breath sounds.   Abdominal:      General: Bowel sounds are normal.      Palpations: Abdomen is soft.   Musculoskeletal:         General: Swelling, tenderness and signs of injury present. Normal range of motion.      Cervical back: Normal range of motion and neck supple.      Comments: Right lateral foot pain around 5th metatarsal and 5th phalanges.    Skin:     General: Skin is warm and dry.   Neurological:      General: No focal deficit  present.      Mental Status: She is alert. Mental status is at baseline.   Psychiatric:         Mood and Affect: Mood normal.         Behavior: Behavior normal.         Thought Content: Thought content normal.         Judgment: Judgment normal.         Assessment:       1. Closed nondisplaced fracture of fifth metatarsal bone of right foot, initial encounter    2. Fall, initial encounter    3. Right foot pain        Plan:     Closed nondisplaced fracture of fifth metatarsal bone of right foot, initial encounter  -     ketorolac injection 30 mg  -     Ambulatory referral/consult to Orthopedics; Future; Expected date: 05/28/2024    Fall, initial encounter  -     X-Ray Foot Complete 3 view Right; Future; Expected date: 05/21/2024    Right foot pain     Fracture on xray, will place in boot, patient has crutches at home, will setup with ortho.

## 2024-05-22 ENCOUNTER — OFFICE VISIT (OUTPATIENT)
Dept: ORTHOPEDICS | Facility: CLINIC | Age: 79
End: 2024-05-22
Payer: COMMERCIAL

## 2024-05-22 ENCOUNTER — TELEPHONE (OUTPATIENT)
Dept: ORTHOPEDICS | Facility: CLINIC | Age: 79
End: 2024-05-22
Payer: COMMERCIAL

## 2024-05-22 DIAGNOSIS — S92.354A CLOSED NONDISPLACED FRACTURE OF FIFTH METATARSAL BONE OF RIGHT FOOT, INITIAL ENCOUNTER: ICD-10-CM

## 2024-05-22 PROCEDURE — 99204 OFFICE O/P NEW MOD 45 MIN: CPT | Mod: S$PBB,57,, | Performed by: ORTHOPAEDIC SURGERY

## 2024-05-22 PROCEDURE — 28470 CLTX METATARSAL FX WO MNP EA: CPT | Mod: S$PBB,RT,, | Performed by: ORTHOPAEDIC SURGERY

## 2024-05-22 PROCEDURE — 1159F MED LIST DOCD IN RCRD: CPT | Mod: CPTII,,, | Performed by: ORTHOPAEDIC SURGERY

## 2024-05-22 PROCEDURE — 28470 CLTX METATARSAL FX WO MNP EA: CPT | Mod: PBBFAC,RT | Performed by: ORTHOPAEDIC SURGERY

## 2024-05-22 PROCEDURE — 99214 OFFICE O/P EST MOD 30 MIN: CPT | Mod: PBBFAC,25 | Performed by: ORTHOPAEDIC SURGERY

## 2024-05-22 NOTE — PROGRESS NOTES
CLINIC NOTE       Chief Complaint   Patient presents with    Right Foot - Pain, Injury        Lorraine De Jesus is a 79 y.o. female seen today for evaluation of right foot injury.  She was reportedly injured her foot yesterday when she slipped in the bathroom.  She was describes inversion type mechanism.  She would subsequent pain over the lateral border of the foot.  She was seen yesterday by Dr. Yaerlis evans.  X-rays there revealed segmental fracture of the 5th metatarsal.  He was placed in a cam walker boot and referred for orthopedic consultation.  She was been able to tolerate pain and ambulating independently with a boot.      Past Medical History:   Diagnosis Date    Adenomatous polyp of ascending colon 6/22/2021    Adenomatous polyp of descending colon 6/22/2021    Age related osteoporosis 10/28/2020    Benign paroxysmal vertigo     Chronic pain syndrome     Chronic pelvic pain in female 7/28/2021    Ditropan XL 5mg    Colon, diverticulosis 6/22/2021    Depressive disorder 08/12/2019    External hemorrhoid 6/22/2021    Gastrointestinal hemorrhage associated with anorectal source 6/22/2021    GERD (gastroesophageal reflux disease) 11/21/2013    Hyperlipidemia 01/16/2012    Hypothyroidism 02/25/2019    Joint pain     Nonrheumatic tricuspid (valve) insufficiency 03/18/2018    Personal history UTI 7/28/2021    Controlled on Hiprex    Polyneuropathy 07/11/2018    PVD (peripheral vascular disease) 10/30/2018    Temporal arteritis     Type 2 diabetes mellitus 04/02/2014    Urethral meatal stenosis 7/11/2018    history of known urethral stenosis, and was taught to perform monthly self dilation at home.     Family History   Problem Relation Name Age of Onset    Cancer Mother      Hypertension Mother      Cancer Father      Cancer Sister      Diabetes Sister      Hyperlipidemia Sister      Hypertension Sister      Cancer Brother      Diabetes Sister      No Known Problems Sister      Cancer Brother      No Known  Problems Brother      Cancer Brother       Current Outpatient Medications on File Prior to Visit   Medication Sig Dispense Refill    amLODIPine (NORVASC) 5 MG tablet Take 5 mg by mouth once daily.      blood sugar diagnostic Strp 1 strip by Misc.(Non-Drug; Combo Route) route 3 (three) times daily. 200 strip 3    blood-glucose meter kit Use as instructed 1 each 0    cloNIDine (CATAPRES) 0.1 MG tablet TAKE 1 TABLET BY MOUTH EVERY 4 HOURS AS NEEDED FOR BLOOD PRESSURE GREATER THAN 170/90      DULoxetine (CYMBALTA) 30 MG capsule Take 1 capsule (30 mg total) by mouth once daily. 30 capsule 11    EScitalopram oxalate (LEXAPRO) 10 MG tablet Take 1 tablet (10 mg total) by mouth once daily. 90 tablet 1    ezetimibe (ZETIA) 10 mg tablet Take 1 tablet (10 mg total) by mouth once daily. 90 tablet 3    fluconazole (DIFLUCAN) 100 MG tablet Take two tablets on day one then reduce to one tablet daily 5 tablet 1    gabapentin (NEURONTIN) 400 MG capsule TAKE 1 CAPSULE(400 MG) BY MOUTH EVERY 8 HOURS 90 capsule 2    glipiZIDE (GLUCOTROL) 10 MG TR24 TAKE 1 TABLET BY MOUTH TWICE DAILY 180 tablet 1    [START ON 5/28/2024] HYDROcodone-acetaminophen (NORCO)  mg per tablet Take 1 tablet by mouth every 6 (six) hours as needed for Pain. 120 tablet 0    HYDROcodone-acetaminophen (NORCO)  mg per tablet Take 1 tablet by mouth every 6 (six) hours as needed for Pain. 120 tablet 0    HYDROcodone-acetaminophen (NORCO)  mg per tablet Take 1 tablet by mouth every 6 (six) hours as needed for Pain. 120 tablet 0    lactulose (CHRONULAC) 20 gram/30 mL Soln Take 30 mLs (20 g total) by mouth 2 (two) times daily as needed (for constipation). 400 mL 0    lancets (ACCU-CHEK FASTCLIX LANCET DRUM [facility]) Take 1 each by mouth once daily.      levothyroxine (SYNTHROID) 75 MCG tablet Take 1 tablet (75 mcg total) by mouth before breakfast. 90 each 1    linaCLOtide (LINZESS) 72 mcg Cap capsule Take 1 capsule (72 mcg total) by mouth before breakfast. 30  capsule 1    loratadine (CLARITIN) 10 mg tablet Take 1 tablet (10 mg total) by mouth once daily. 15 tablet 3    olmesartan (BENICAR) 5 MG Tab Take 10 mg by mouth once daily.      ONETOUCH DELICA PLUS LANCET 33 gauge Misc Check blood sugar  each 11    ONETOUCH VERIO TEST STRIPS Strp TEST BLOOD GLUCOSE LEVELS TWICE DAILY 200 strip 4    oxybutynin (DITROPAN-XL) 10 MG 24 hr tablet Take one tablet at night 90 tablet 3    polyethylene glycol (GLYCOLAX) 17 gram PwPk Take 17 g by mouth once daily. (Patient taking differently: Take 17 g by mouth daily as needed for Constipation.) 30 each 0     Current Facility-Administered Medications on File Prior to Visit   Medication Dose Route Frequency Provider Last Rate Last Admin    [COMPLETED] ketorolac injection 30 mg  30 mg Intramuscular 1 time in Clinic/HOD Neo Manzo II, DO   30 mg at 24 1825       ROS     There were no vitals filed for this visit.    Past Surgical History:   Procedure Laterality Date     left lumbar sympathetic nerve block Left 2020    X3  DR BROWN    CARDIAC CATHETERIZATION  10/14/2009     SECTION      x 2    COLONOSCOPY  2009    CYSTOSCOPY WITH HYDRODISTENSION OF BLADDER N/A 2021    Procedure: CYSTOSCOPY, WITH BLADDER HYDRODISTENSION;  Surgeon: Dequan Recio MD;  Location: [facility] OR;  Service: Urology;  Laterality: N/A;    CYSTOSCOPY WITH URETHRAL DILATION  2021    Dr Recio    EPIDURAL STEROID INJECTION N/A 10/27/2022    Procedure: Injection, Steroid, Epidural, L5/S1;  Surgeon: Belkis Brown MD;  Location: [facility];  Service: Pain Management;  Laterality: N/A;    EPIDURAL STEROID INJECTION N/A 2023    Procedure: Injection, Steroid, Epidural, L5/S1;  Surgeon: Belkis Brown MD;  Location: [facility];  Service: Pain Management;  Laterality: N/A;    HYSTERECTOMY      LUMBAR SYMPATHETIC NERVE BLOCK Left 10/05/2020    Left Sympathetic Nerve Block - Dr Brown - 10/5/20, 20,  1/20/20. 1/6/20, 10/9/19, 9/25/19 and 7/11/18    right lumbar sympathetic nerve block Right 2020    X4 DR LANDEROS    THYROIDECTOMY      1990's        Review of patient's allergies indicates:   Allergen Reactions    Lipitor [atorvastatin] Other (See Comments)     Muscle aching    Pravachol [pravastatin] Other (See Comments)     Muscle aching        Ortho Exam :  Right foot shows mild soft tissue swelling and moderate tenderness palpation along the 5th metatarsal.  Skin is intact.  Ankle ligaments intact.    Radiographic Examination:    Technique:    Findings:    Impression:   See Above    Assessment and Plan  Patient Active Problem List    Diagnosis Date Noted    Diarrhea 03/16/2024    Constipation 03/16/2024    Post-menopausal 03/09/2024    Mild episode of recurrent major depressive disorder 03/09/2024    Sleep apnea 02/22/2024    Kidney stone on left side 02/15/2024    Urge incontinence 02/15/2024    COVID-19 02/15/2024    Exposure to viral disease 02/15/2024    Yeast infection 02/14/2024    Vitamin D deficiency 11/08/2023    Chest pain 10/17/2023    Moderate episode of recurrent major depressive disorder 10/17/2023    Weakness 10/12/2023    Mild cognitive disorder 09/20/2023    Renal calculus 08/09/2023    Multiple thyroid nodules 07/31/2023    Abdominal pain 07/31/2023    Other amnesia 06/28/2023    B12 deficiency 06/28/2023    TSH deficiency 01/27/2023    Obese 01/27/2023    Obstructive sleep apnea 01/27/2023    Left genital labial abscess 12/05/2022    Hemangioma of spleen 10/20/2022    Gastroesophageal reflux disease 09/19/2022    DDD (degenerative disc disease), lumbar 09/19/2022    Screening for cervical cancer 09/19/2022    Screening for viral disease 09/19/2022    BMI 27.0-27.9,adult 09/19/2022    Hyperglycemia 12/06/2021    Dizziness 12/06/2021    Chronic pelvic pain in female 07/28/2021    Acute pharyngitis 07/13/2021    Lesion of spleen 07/13/2021    Gastrointestinal hemorrhage associated with anorectal  source 06/22/2021    Colon, diverticulosis 06/22/2021    External hemorrhoid 06/22/2021    Adenomatous polyp of ascending colon 06/22/2021    Adenomatous polyp of descending colon 06/22/2021    Screening for STD (sexually transmitted disease) 06/14/2021    Essential (primary) hypertension 06/14/2021    Type 2 diabetes mellitus without complication 06/14/2021    Bladder spasms 05/05/2021    Dysuria 05/05/2021    Yeast infection involving the vagina and surrounding area 04/19/2021    Chronic pain syndrome 03/25/2021    Lumbosacral radiculopathy 03/25/2021    Diabetic neuropathy with neurologic complication 03/25/2021    Bilateral foot pain 03/25/2021    Right foot pain 03/25/2021    Left hip pain 03/25/2021    PVD (peripheral vascular disease) 10/30/2018    Polyneuropathy 07/11/2018    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  Plan:  Continue cam walker boot and weight-bearing as tolerated.  Activity restrictions outlined.  Recheck 3 weeks repeat x-ray or sooner for interval problems.    Wilfrido Meza M.D.

## 2024-05-28 RX ORDER — LINACLOTIDE 72 UG/1
CAPSULE, GELATIN COATED ORAL
Qty: 30 CAPSULE | Refills: 1 | Status: SHIPPED | OUTPATIENT
Start: 2024-05-28

## 2024-05-31 ENCOUNTER — EXTERNAL CHRONIC CARE MANAGEMENT (OUTPATIENT)
Dept: FAMILY MEDICINE | Facility: CLINIC | Age: 79
End: 2024-05-31
Payer: COMMERCIAL

## 2024-05-31 PROCEDURE — G0511 CCM/BHI BY RHC/FQHC 20MIN MO: HCPCS | Mod: ,,, | Performed by: INTERNAL MEDICINE

## 2024-06-11 NOTE — PROGRESS NOTES
Subjective:         Patient ID: Lorraine De Jesus is a 79 y.o. female.    Chief Complaint: Foot Pain and Leg Pain      Pain  This is a chronic problem. The current episode started more than 1 year ago. The problem occurs daily. The problem has been waxing and waning. Associated symptoms include arthralgias and neck pain. Pertinent negatives include no anorexia, chest pain, chills, coughing, fever, sore throat, vertigo or vomiting.     Review of Systems   Constitutional:  Negative for activity change, appetite change, chills, fever and unexpected weight change.   HENT:  Negative for drooling, ear discharge, ear pain, facial swelling, nosebleeds, sore throat, trouble swallowing, voice change and goiter.    Eyes:  Negative for photophobia, pain, discharge, redness and visual disturbance.   Respiratory:  Negative for apnea, cough, choking, chest tightness, shortness of breath, wheezing and stridor.    Cardiovascular:  Negative for chest pain, palpitations and leg swelling.   Gastrointestinal:  Negative for abdominal distention, anorexia, diarrhea, rectal pain, vomiting and fecal incontinence.   Endocrine: Negative for cold intolerance, heat intolerance, polydipsia, polyphagia and polyuria.   Genitourinary:  Negative for bladder incontinence, dysuria, flank pain, frequency and hot flashes.   Musculoskeletal:  Positive for arthralgias, back pain, leg pain and neck pain.   Integumentary:  Negative for color change and pallor.   Allergic/Immunologic: Negative for immunocompromised state.   Neurological:  Negative for dizziness, vertigo, seizures, syncope, facial asymmetry, speech difficulty, light-headedness, memory loss and coordination difficulties.   Hematological:  Negative for adenopathy. Does not bruise/bleed easily.   Psychiatric/Behavioral:  Negative for agitation, behavioral problems, confusion, decreased concentration, dysphoric mood, hallucinations, self-injury and suicidal ideas. The patient is not nervous/anxious  and is not hyperactive.            Past Medical History:   Diagnosis Date    Adenomatous polyp of ascending colon 2021    Adenomatous polyp of descending colon 2021    Age related osteoporosis 10/28/2020    Benign paroxysmal vertigo     Chronic pain syndrome     Chronic pelvic pain in female 2021    Ditropan XL 5mg    Colon, diverticulosis 2021    Depressive disorder 2019    External hemorrhoid 2021    Gastrointestinal hemorrhage associated with anorectal source 2021    GERD (gastroesophageal reflux disease) 2013    Hyperlipidemia 2012    Hypothyroidism 2019    Joint pain     Nonrheumatic tricuspid (valve) insufficiency 2018    Personal history UTI 2021    Controlled on Hiprex    Polyneuropathy 2018    PVD (peripheral vascular disease) 10/30/2018    Temporal arteritis     Type 2 diabetes mellitus 2014    Urethral meatal stenosis 2018    history of known urethral stenosis, and was taught to perform monthly self dilation at home.     Past Surgical History:   Procedure Laterality Date     left lumbar sympathetic nerve block Left 2020    X3  DR BROWN    CARDIAC CATHETERIZATION  10/14/2009     SECTION      x 2    COLONOSCOPY  2009    CYSTOSCOPY WITH HYDRODISTENSION OF BLADDER N/A 2021    Procedure: CYSTOSCOPY, WITH BLADDER HYDRODISTENSION;  Surgeon: Dequan Recio MD;  Location: [facility];  Service: Urology;  Laterality: N/A;    CYSTOSCOPY WITH URETHRAL DILATION  2021    Dr Recio    EPIDURAL STEROID INJECTION N/A 10/27/2022    Procedure: Injection, Steroid, Epidural, L5/S1;  Surgeon: Belkis Brown MD;  Location: [facility];  Service: Pain Management;  Laterality: N/A;    EPIDURAL STEROID INJECTION N/A 2023    Procedure: Injection, Steroid, Epidural, L5/S1;  Surgeon: Belkis Brown MD;  Location: [facility];  Service: Pain Management;  Laterality: N/A;    HYSTERECTOMY       LUMBAR SYMPATHETIC NERVE BLOCK Left 10/05/2020    Left Sympathetic Nerve Block - Dr Brown - 10/5/20, 9/21/20, 1/20/20. 1/6/20, 10/9/19, 9/25/19 and 7/11/18    right lumbar sympathetic nerve block Right 2020    X4 DR BROWN    THYROIDECTOMY      1990's     Social History     Socioeconomic History    Marital status:    Tobacco Use    Smoking status: Never     Passive exposure: Never    Smokeless tobacco: Never   Substance and Sexual Activity    Alcohol use: Never    Drug use: Never    Sexual activity: Not Currently     Social Determinants of Health     Financial Resource Strain: Low Risk  (9/19/2023)    Overall Financial Resource Strain (CARDIA)     Difficulty of Paying Living Expenses: Not very hard   Food Insecurity: No Food Insecurity (9/19/2023)    Hunger Vital Sign     Worried About Running Out of Food in the Last Year: Never true     Ran Out of Food in the Last Year: Never true   Transportation Needs: No Transportation Needs (9/19/2023)    PRAPARE - Transportation     Lack of Transportation (Medical): No     Lack of Transportation (Non-Medical): No   Physical Activity: Sufficiently Active (9/19/2023)    Exercise Vital Sign     Days of Exercise per Week: 4 days     Minutes of Exercise per Session: 60 min   Stress: No Stress Concern Present (9/19/2023)    [employer] [place] [employer] - Occupational Stress Questionnaire     Feeling of Stress : Not at all   Housing Stability: Low Risk  (9/19/2023)    Housing Stability Vital Sign     Unable to Pay for Housing in the Last Year: No     Number of Places Lived in the Last Year: 1     Unstable Housing in the Last Year: No     Family History   Problem Relation Name Age of Onset    Cancer Mother      Hypertension Mother      Cancer Father      Cancer Sister      Diabetes Sister      Hyperlipidemia Sister      Hypertension Sister      Cancer Brother      Diabetes Sister      No Known Problems Sister      Cancer Brother      No Known Problems Brother       "Cancer Brother       Review of patient's allergies indicates:   Allergen Reactions    Lipitor [atorvastatin] Other (See Comments)     Muscle aching    Pravachol [pravastatin] Other (See Comments)     Muscle aching        Objective:  Vitals:    06/25/24 0838   BP: 130/68   Pulse: 75   Resp: 18   Weight: 77.6 kg (171 lb)   Height: 5' 8" (1.727 m)   PainSc:   7           Physical Exam  Vitals and nursing note reviewed. Exam conducted with a chaperone present.   Constitutional:       General: She is awake. She is not in acute distress.     Appearance: Normal appearance. She is not ill-appearing or diaphoretic.   HENT:      Head: Normocephalic and atraumatic.      Nose: Nose normal.      Mouth/Throat:      Mouth: Mucous membranes are moist.      Pharynx: Oropharynx is clear.   Eyes:      Conjunctiva/sclera: Conjunctivae normal.      Pupils: Pupils are equal, round, and reactive to light.   Cardiovascular:      Rate and Rhythm: Normal rate.   Pulmonary:      Effort: Pulmonary effort is normal. No respiratory distress.   Abdominal:      Palpations: Abdomen is soft.   Musculoskeletal:      Cervical back: Normal range of motion and neck supple.      Thoracic back: Tenderness present.      Lumbar back: Tenderness present. Decreased range of motion.   Skin:     General: Skin is warm and dry.   Neurological:      General: No focal deficit present.      Mental Status: She is alert and oriented to person, place, and time. Mental status is at baseline.      Cranial Nerves: No cranial nerve deficit (II-XII).   Psychiatric:         Mood and Affect: Mood normal.         Behavior: Behavior normal. Behavior is cooperative.         Thought Content: Thought content normal.           X-Ray Foot Complete Right  See Procedure Notes for results.     IMPRESSION: Please see Ortho procedure notes for report.      This procedure was auto-finalized by: Virtual Radiologist       Office Visit on 06/18/2024   Component Date Value Ref Range Status    " Culture, Urine 06/18/2024 >100,000 Staphylococcus epidermidis (A)   Final   Office Visit on 05/08/2024   Component Date Value Ref Range Status    Sodium 05/08/2024 142  136 - 145 mmol/L Final    Potassium 05/08/2024 3.8  3.5 - 5.1 mmol/L Final    Chloride 05/08/2024 109 (H)  98 - 107 mmol/L Final    CO2 05/08/2024 29  21 - 32 mmol/L Final    Anion Gap 05/08/2024 8  7 - 16 mmol/L Final    Glucose 05/08/2024 107 (H)  74 - 106 mg/dL Final    BUN 05/08/2024 14  7 - 18 mg/dL Final    Creatinine 05/08/2024 0.85  0.55 - 1.02 mg/dL Final    BUN/Creatinine Ratio 05/08/2024 16  6 - 20 Final    Calcium 05/08/2024 9.6  8.5 - 10.1 mg/dL Final    eGFR 05/08/2024 71  >=60 mL/min/1.73m2 Final    Hemoglobin A1C 05/08/2024 6.3  4.5 - 6.6 % Final    Estimated Average Glucose 05/08/2024 134  mg/dL Final    Triglycerides 05/08/2024 100  35 - 150 mg/dL Final    Cholesterol 05/08/2024 203 (H)  0 - 200 mg/dL Final    HDL Cholesterol 05/08/2024 63 (H)  40 - 60 mg/dL Final    Cholesterol/HDL Ratio (Risk Factor) 05/08/2024 3.2   Final    Non-HDL 05/08/2024 140  mg/dL Final    LDL Calculated 05/08/2024 120  mg/dL Final    LDL/HDL 05/08/2024 1.9   Final    VLDL 05/08/2024 20  mg/dL Final   Office Visit on 03/26/2024   Component Date Value Ref Range Status    POC Amphetamines 03/26/2024 Negative  Negative, Inconclusive Final    POC Barbiturates 03/26/2024 Negative  Negative, Inconclusive Final    POC Benzodiazepines 03/26/2024 Negative  Negative, Inconclusive Final    POC Cocaine 03/26/2024 Negative  Negative, Inconclusive Final    POC THC 03/26/2024 Negative  Negative, Inconclusive Final    POC Methadone 03/26/2024 Negative  Negative, Inconclusive Final    POC Methamphetamine 03/26/2024 Negative  Negative, Inconclusive Final    POC Opiates 03/26/2024 Presumptive Positive (A)  Negative, Inconclusive Final    POC Oxycodone 03/26/2024 Negative  Negative, Inconclusive Final    POC Phencyclidine 03/26/2024 Negative  Negative, Inconclusive Final     POC Methylenedioxymethamphetamine * 03/26/2024 Negative  Negative, Inconclusive Final    POC Tricyclic Antidepressants 03/26/2024 Negative  Negative, Inconclusive Final    POC Buprenorphine 03/26/2024 Negative   Final     Acceptable 03/26/2024 Yes   Final    POC Temperature (Urine) 03/26/2024 92   Final   Office Visit on 03/16/2024   Component Date Value Ref Range Status    Sodium 03/16/2024 138  136 - 145 mmol/L Final    Potassium 03/16/2024 3.7  3.5 - 5.1 mmol/L Final    Chloride 03/16/2024 108 (H)  98 - 107 mmol/L Final    CO2 03/16/2024 25  21 - 32 mmol/L Final    Anion Gap 03/16/2024 9  7 - 16 mmol/L Final    Glucose 03/16/2024 178 (H)  74 - 106 mg/dL Final    BUN 03/16/2024 13  7 - 18 mg/dL Final    Creatinine 03/16/2024 0.97  0.55 - 1.02 mg/dL Final    BUN/Creatinine Ratio 03/16/2024 13  6 - 20 Final    Calcium 03/16/2024 9.5  8.5 - 10.1 mg/dL Final    Total Protein 03/16/2024 7.4  6.4 - 8.2 g/dL Final    Albumin 03/16/2024 3.4 (L)  3.5 - 5.0 g/dL Final    Globulin 03/16/2024 4.0  2.0 - 4.0 g/dL Final    A/G Ratio 03/16/2024 0.9   Final    Bilirubin, Total 03/16/2024 0.3  >0.0 - 1.2 mg/dL Final    Alk Phos 03/16/2024 105  55 - 142 U/L Final    ALT 03/16/2024 19  13 - 56 U/L Final    AST 03/16/2024 17  15 - 37 U/L Final    eGFR 03/16/2024 60  >=60 mL/min/1.73m2 Final    WBC 03/16/2024 7.13  4.50 - 11.00 K/uL Final    RBC 03/16/2024 4.81  4.20 - 5.40 M/uL Final    Hemoglobin 03/16/2024 12.8  12.0 - 16.0 g/dL Final    Hematocrit 03/16/2024 38.2  38.0 - 47.0 % Final    MCV 03/16/2024 79.4 (L)  80.0 - 96.0 fL Final    MCH 03/16/2024 26.6 (L)  27.0 - 31.0 pg Final    MCHC 03/16/2024 33.5  32.0 - 36.0 g/dL Final    RDW 03/16/2024 14.9 (H)  11.5 - 14.5 % Final    Platelet Count 03/16/2024 228  150 - 400 K/uL Final    MPV 03/16/2024 9.5  9.4 - 12.4 fL Final    Neutrophils % 03/16/2024 66.8 (H)  53.0 - 65.0 % Final    Lymphocytes % 03/16/2024 23.4 (L)  27.0 - 41.0 % Final    Monocytes % 03/16/2024 8.1  (H)  2.0 - 6.0 % Final    Eosinophils % 03/16/2024 0.7 (L)  1.0 - 4.0 % Final    Basophils % 03/16/2024 0.6  0.0 - 1.0 % Final    Immature Granulocytes % 03/16/2024 0.4  0.0 - 0.4 % Final    nRBC, Auto 03/16/2024 0.0  <=0.0 % Final    Neutrophils, Abs 03/16/2024 4.76  1.80 - 7.70 K/uL Final    Lymphocytes, Absolute 03/16/2024 1.67  1.00 - 4.80 K/uL Final    Monocytes, Absolute 03/16/2024 0.58  0.00 - 0.80 K/uL Final    Eosinophils, Absolute 03/16/2024 0.05  0.00 - 0.50 K/uL Final    Basophils, Absolute 03/16/2024 0.04  0.00 - 0.20 K/uL Final    Immature Granulocytes, Absolute 03/16/2024 0.03  0.00 - 0.04 K/uL Final    nRBC, Absolute 03/16/2024 0.00  <=0.00 x10e3/uL Final    Diff Type 03/16/2024 Auto   Final   Office Visit on 02/22/2024   Component Date Value Ref Range Status    SARS Coronavirus 2 Antigen 02/22/2024 Negative  Negative Final     Acceptable 02/22/2024 Yes   Final   Office Visit on 02/15/2024   Component Date Value Ref Range Status    Rapid Influenza A Ag 02/15/2024 Negative  Negative Final    Rapid Influenza B Ag 02/15/2024 Negative  Negative Final     Acceptable 02/15/2024 Yes   Final    SARS Coronavirus 2 Antigen 02/15/2024 Positive (A)  Negative Final     Acceptable 02/15/2024 Yes   Final    Rapid Strep A Screen 02/15/2024 Negative  Negative Final     Acceptable 02/15/2024 Yes   Final   Office Visit on 02/13/2024   Component Date Value Ref Range Status    Color, UA 02/13/2024 Yellow  Colorless, Straw, Yellow, Light Yellow, Dark Yellow Final    Clarity, UA 02/13/2024 Turbid  Clear Final    pH, UA 02/13/2024 6.5  5.0 to 8.0 pH Units Final    Leukocytes, UA 02/13/2024 Large (A)  Negative Final    Nitrites, UA 02/13/2024 Negative  Negative Final    Protein, UA 02/13/2024 Negative  Negative Final    Glucose, UA 02/13/2024 500 (A)  Normal mg/dL Final    Ketones, UA 02/13/2024 Negative  Negative mg/dL Final    Urobilinogen, UA 02/13/2024 Normal   0.2, 1.0, Normal mg/dL Final    Bilirubin, UA 02/13/2024 Negative  Negative Final    Blood, UA 02/13/2024 Negative  Negative Final    Specific Gravity, UA 02/13/2024 1.014  <=1.030 Final    WBC, UA 02/13/2024 47 (H)  <=5 /hpf Final    RBC, UA 02/13/2024 3  <=3 /hpf Final    Bacteria, UA 02/13/2024 Few (A)  None Seen /hpf Final    Squamous Epithelial Cells, UA 02/13/2024 Occasional (A)  None Seen /HPF Final    WBC Clumps 02/13/2024 Few (A)  None Seen /hpf Final    Yeast, UA 02/13/2024 Occasional (A)  None Seen /hpf Final    Mucous 02/13/2024 Occasional (A)  None Seen /LPF Final    Culture, Urine 02/13/2024 >100,000 Staphylococcus epidermidis (A)   Final   Office Visit on 02/05/2024   Component Date Value Ref Range Status    Sodium 02/05/2024 141  136 - 145 mmol/L Final    Potassium 02/05/2024 3.8  3.5 - 5.1 mmol/L Final    Chloride 02/05/2024 111 (H)  98 - 107 mmol/L Final    CO2 02/05/2024 29  21 - 32 mmol/L Final    Anion Gap 02/05/2024 5 (L)  7 - 16 mmol/L Final    Glucose 02/05/2024 160 (H)  74 - 106 mg/dL Final    BUN 02/05/2024 13  7 - 18 mg/dL Final    Creatinine 02/05/2024 0.84  0.55 - 1.02 mg/dL Final    BUN/Creatinine Ratio 02/05/2024 15  6 - 20 Final    Calcium 02/05/2024 9.3  8.5 - 10.1 mg/dL Final    eGFR 02/05/2024 72  >=60 mL/min/1.73m2 Final    Color, UA 02/05/2024 Yellow  Colorless, Straw, Yellow, Light Yellow, Dark Yellow Final    Clarity, UA 02/05/2024 Turbid  Clear Final    pH, UA 02/05/2024 5.5  5.0 to 8.0 pH Units Final    Leukocytes, UA 02/05/2024 Large (A)  Negative Final    Nitrites, UA 02/05/2024 Negative  Negative Final    Protein, UA 02/05/2024 10 (A)  Negative Final    Glucose, UA 02/05/2024 100 (A)  Normal mg/dL Final    Ketones, UA 02/05/2024 Negative  Negative mg/dL Final    Urobilinogen, UA 02/05/2024 Normal  0.2, 1.0, Normal mg/dL Final    Bilirubin, UA 02/05/2024 Negative  Negative Final    Blood, UA 02/05/2024 Negative  Negative Final    Specific Gravity, UA 02/05/2024 1.017   <=1.030 Final    TSH 02/05/2024 2.360  0.358 - 3.740 uIU/mL Final    Total T4 02/05/2024 6.2  4.8 - 13.9 µg/dL Final    Free T4 02/05/2024 1.01  0.76 - 1.46 ng/dL Final    WBC 02/05/2024 5.77  4.50 - 11.00 K/uL Final    RBC 02/05/2024 4.51  4.20 - 5.40 M/uL Final    Hemoglobin 02/05/2024 12.4  12.0 - 16.0 g/dL Final    Hematocrit 02/05/2024 38.2  38.0 - 47.0 % Final    MCV 02/05/2024 84.7  80.0 - 96.0 fL Final    MCH 02/05/2024 27.5  27.0 - 31.0 pg Final    MCHC 02/05/2024 32.5  32.0 - 36.0 g/dL Final    RDW 02/05/2024 15.1 (H)  11.5 - 14.5 % Final    Platelet Count 02/05/2024 265  150 - 400 K/uL Final    MPV 02/05/2024 10.4  9.4 - 12.4 fL Final    Neutrophils % 02/05/2024 57.0  53.0 - 65.0 % Final    Lymphocytes % 02/05/2024 32.9  27.0 - 41.0 % Final    Monocytes % 02/05/2024 6.9 (H)  2.0 - 6.0 % Final    Eosinophils % 02/05/2024 2.4  1.0 - 4.0 % Final    Basophils % 02/05/2024 0.5  0.0 - 1.0 % Final    Immature Granulocytes % 02/05/2024 0.3  0.0 - 0.4 % Final    nRBC, Auto 02/05/2024 0.0  <=0.0 % Final    Neutrophils, Abs 02/05/2024 3.28  1.80 - 7.70 K/uL Final    Lymphocytes, Absolute 02/05/2024 1.90  1.00 - 4.80 K/uL Final    Monocytes, Absolute 02/05/2024 0.40  0.00 - 0.80 K/uL Final    Eosinophils, Absolute 02/05/2024 0.14  0.00 - 0.50 K/uL Final    Basophils, Absolute 02/05/2024 0.03  0.00 - 0.20 K/uL Final    Immature Granulocytes, Absolute 02/05/2024 0.02  0.00 - 0.04 K/uL Final    nRBC, Absolute 02/05/2024 0.00  <=0.00 x10e3/uL Final    Diff Type 02/05/2024 Auto   Final    WBC, UA 02/05/2024 >182 (H)  <=5 /hpf Final    RBC, UA 02/05/2024 6 (H)  <=3 /hpf Final    Bacteria, UA 02/05/2024 Few (A)  None Seen /hpf Final    Squamous Epithelial Cells, UA 02/05/2024 Few (A)  None Seen /HPF Final    WBC Clumps 02/05/2024 Many (A)  None Seen /hpf Final    Calcium Oxalate Crystals, UA 02/05/2024 Moderate (A)  None Seen /HPF Final    Transitional Epithelial Cells, UA 02/05/2024 Occasional (A)  None Seen /lpf Final     Mucous 02/05/2024 Few (A)  None Seen /LPF Final    Culture, Urine 02/05/2024 >100,000 Yeast (A)   Final   Office Visit on 01/08/2024   Component Date Value Ref Range Status    Culture, Urine 01/08/2024 >100,000 Yeast (A)   Final         Orders Placed This Encounter   Procedures    POCT Urine Drug Screen Presump     Interpretive Information:     Negative:  No drug detected at the cut off level.   Positive:  This result represents presumptive positive for the   tested drug, other substances may yield a positive response other   than the analyte of interest. This result should be utilized for   diagnostic purpose only. Confirmation testing will be performed upon physician request only.            Requested Prescriptions     Signed Prescriptions Disp Refills    gabapentin (NEURONTIN) 400 MG capsule 90 capsule 2     Sig: TAKE 1 CAPSULE(400 MG) BY MOUTH EVERY 8 HOURS    HYDROcodone-acetaminophen (NORCO)  mg per tablet 120 tablet 0     Sig: Take 1 tablet by mouth every 6 (six) hours as needed for Pain.    HYDROcodone-acetaminophen (NORCO)  mg per tablet 120 tablet 0     Sig: Take 1 tablet by mouth every 6 (six) hours as needed for Pain.    HYDROcodone-acetaminophen (NORCO)  mg per tablet 120 tablet 0     Sig: Take 1 tablet by mouth every 6 (six) hours as needed for Pain.       Assessment:     1. Lumbosacral radiculopathy    2. Diabetic neuropathy with neurologic complication    3. Bilateral foot pain    4. Encounter for long-term (current) use of other medications           A's of Opioid Risk Assessment  Activity:Patient can perform ADL.   Analgesia:Patients pain is partially controlled by current medication. Patient has tried OTC medications such as Tylenol and Ibuprofen with out relief.   Adverse Effects: Patient denies constipation or sedation.  Aberrant Behavior:  reviewed with no aberrant drug seeking/taking behavior.  Overdose reversal drug naloxone discussed    Drug screen reviewed        X-ray  left knee degenerative changes no fracture noted      MRI lumbar spine [facility] March 25, 2021 spinal stenosis L5/S1 disc bulge facet joint arthropathy multiple level degenerative changes        Plan:    Narcan February 2023    Follows Neurology [facility] neuropathy symptoms lower extremities    She had lumbar L5/S1 TORIE # 1 September 12, 2023, she states she had 90% relief after procedure, she states procedure did help improve her level function    She has known scoliosis      Again today complaint bilateral lower extremity burning numbness tingling sensation neuropathy symptoms     Continues compartment lumbar radiculopathy symptoms    After discussing options she would like to continue conservative management     Not interested in procedures this time not interested in increasing gabapentin/Cymbalta    Continue home exercise program as directed     Continue current medication    Follow-up 3 months    Dr. Brown September 2024    Bring original prescription medication bottles/container/box with labels to each visit

## 2024-06-18 ENCOUNTER — OFFICE VISIT (OUTPATIENT)
Dept: UROLOGY | Facility: CLINIC | Age: 79
End: 2024-06-18
Payer: COMMERCIAL

## 2024-06-18 VITALS
WEIGHT: 173 LBS | HEIGHT: 68 IN | HEART RATE: 108 BPM | BODY MASS INDEX: 26.22 KG/M2 | SYSTOLIC BLOOD PRESSURE: 122 MMHG | DIASTOLIC BLOOD PRESSURE: 79 MMHG

## 2024-06-18 DIAGNOSIS — N39.0 URINARY TRACT INFECTION WITHOUT HEMATURIA, SITE UNSPECIFIED: ICD-10-CM

## 2024-06-18 DIAGNOSIS — N34.3 URETHRAL SYNDROME: Primary | ICD-10-CM

## 2024-06-18 DIAGNOSIS — R10.2 CHRONIC PELVIC PAIN IN FEMALE: ICD-10-CM

## 2024-06-18 DIAGNOSIS — G89.29 CHRONIC PELVIC PAIN IN FEMALE: ICD-10-CM

## 2024-06-18 DIAGNOSIS — N35.92 STRICTURE OF FEMALE URETHRA, UNSPECIFIED STRICTURE TYPE: ICD-10-CM

## 2024-06-18 PROCEDURE — 1126F AMNT PAIN NOTED NONE PRSNT: CPT | Mod: CPTII,,, | Performed by: UROLOGY

## 2024-06-18 PROCEDURE — 87086 URINE CULTURE/COLONY COUNT: CPT | Mod: ,,, | Performed by: CLINICAL MEDICAL LABORATORY

## 2024-06-18 PROCEDURE — 1159F MED LIST DOCD IN RCRD: CPT | Mod: CPTII,,, | Performed by: UROLOGY

## 2024-06-18 PROCEDURE — 87077 CULTURE AEROBIC IDENTIFY: CPT | Mod: ,,, | Performed by: CLINICAL MEDICAL LABORATORY

## 2024-06-18 PROCEDURE — 3074F SYST BP LT 130 MM HG: CPT | Mod: CPTII,,, | Performed by: UROLOGY

## 2024-06-18 PROCEDURE — 3288F FALL RISK ASSESSMENT DOCD: CPT | Mod: CPTII,,, | Performed by: UROLOGY

## 2024-06-18 PROCEDURE — 99215 OFFICE O/P EST HI 40 MIN: CPT | Mod: PBBFAC | Performed by: UROLOGY

## 2024-06-18 PROCEDURE — 53660 DILATION OF URETHRA: CPT | Mod: PBBFAC | Performed by: UROLOGY

## 2024-06-18 PROCEDURE — 3078F DIAST BP <80 MM HG: CPT | Mod: CPTII,,, | Performed by: UROLOGY

## 2024-06-18 PROCEDURE — 99999PBSHW PR PBB SHADOW TECHNICAL ONLY FILED TO HB: Mod: PBBFAC,,,

## 2024-06-18 PROCEDURE — 87186 SC STD MICRODIL/AGAR DIL: CPT | Mod: ,,, | Performed by: CLINICAL MEDICAL LABORATORY

## 2024-06-18 PROCEDURE — 99212 OFFICE O/P EST SF 10 MIN: CPT | Mod: S$PBB,25,, | Performed by: UROLOGY

## 2024-06-18 PROCEDURE — 53660 DILATION OF URETHRA: CPT | Mod: S$PBB,,, | Performed by: UROLOGY

## 2024-06-18 PROCEDURE — 96372 THER/PROPH/DIAG INJ SC/IM: CPT | Mod: PBBFAC | Performed by: UROLOGY

## 2024-06-18 PROCEDURE — 1160F RVW MEDS BY RX/DR IN RCRD: CPT | Mod: CPTII,,, | Performed by: UROLOGY

## 2024-06-18 PROCEDURE — 1101F PT FALLS ASSESS-DOCD LE1/YR: CPT | Mod: CPTII,,, | Performed by: UROLOGY

## 2024-06-18 PROCEDURE — 99999 PR PBB SHADOW E&M-EST. PATIENT-LVL V: CPT | Mod: PBBFAC,,, | Performed by: UROLOGY

## 2024-06-18 RX ORDER — GENTAMICIN 40 MG/ML
80 INJECTION, SOLUTION INTRAMUSCULAR; INTRAVENOUS ONCE
Status: COMPLETED | OUTPATIENT
Start: 2024-06-18 | End: 2024-06-18

## 2024-06-18 RX ORDER — LIDOCAINE HYDROCHLORIDE 20 MG/ML
JELLY TOPICAL
Status: COMPLETED | OUTPATIENT
Start: 2024-06-18 | End: 2024-06-18

## 2024-06-18 RX ADMIN — LIDOCAINE HYDROCHLORIDE 10 ML: 20 JELLY TOPICAL at 03:06

## 2024-06-18 RX ADMIN — GENTAMICIN SULFATE 80 MG: 40 INJECTION, SOLUTION INTRAMUSCULAR; INTRAVENOUS at 02:06

## 2024-06-18 NOTE — PROGRESS NOTES
Subjective     Patient ID: Lorraine De Jesus is a 79 y.o. female.    Chief Complaint: Follow-up (Urethral Dilatation, urethral stricture)    HPI  Review of Systems       Objective     Physical Exam       Assessment and Plan     {There are no diagnoses linked to this encounter. (Refresh or delete this SmartLink)}             No follow-ups on file.

## 2024-06-18 NOTE — PROGRESS NOTES
Subjective     Patient ID: Lorraine De Jesus is a 79 y.o. female.    Chief Complaint: Follow-up (Urethral Dilatation, urethral stricture)      UROLOGY HISTORY PER DR. COFFMAN:  6/9/2021  here for repeat dilation. patient has chronic pain that is improved with urination. serial dilations over the years. typically she does well for a few months but was dilated just over 30 days ago.        Cystoscopy: After informed consent was confirmed. preprocedural abx were administered and the patient was taken to the cystoscopy suite and prepped and draped in standard fashion. The urethra was injected with lidocaine. Cystoscopy revealed no urethral lesions, moderately obstructing prostate, large bladder with no trabeculations or mucosal lesions. Ureteral orifices were identified bilaterally and were noted to be in orthotopic position demonstrate a clear reflux bilaterally. Retroflexion maneuver was performed to evaluate the trigone. Patient tolerated procedure well.         dilated up to 28 F without issue. teaching on self dilation today. sent home with sample. instructions not to introduce dilator farther then 1in into the urethra. discussed risk of injury to bladder. infection. pain.     recommend q 1 month self dilation. teaching performed by RN staff.      start hiprex 1gm daily, pyridium 100mg daily prn.     follow up 3 months for symptom check.    06/09/2021)--------------------------------------------------------------------------     Ms. De Jesus has been transferred to my care in Dr. Coffman's absence for f/u from above-mentioned cystoscopy with Hydrostatic Dilatation.  She states that she is doing much better since procedure, compliant with Hiprex, and free of s/s of infection to date.  Adds that her incontinence and bladder spasms are controlled on Oxybutynin 5mg daily.  (7/8/2021)----------------------------------------------------------------------------------     Ms. De Jesus is here today for c/o bladder pressure.  She has  a history of known urethral stenosis, and   She denies fever, constitutional symptoms or sensation of infection today.  PVR  0 ml.     -  Urethral stenosis:  Not performing self dilatations, states unable. Though taught to perform monthly self dilation at last visit, states she is unable to do this.  Reports she was symptom free following last cystoscopy with dilatation, but strainiing and pain at voiding onset have returned over the last 2 weeks.  -  Personal history UTI:  Hiprex started in June of this year.  -  Chronic pelvic pain in female:  R/t bladder spasms.  Controlled last visit with oxybutynine 5mg daily.  Discussed increasing Ditropan to BID  [November 22, 2021]  -------------------------------------------------------------------------------------------------------------------------------------------------------------------------------------------------------------------------  The above notes are notes of Dr. Recio and Ms. Shaw.  Patient sent to me for urethral dilatation.  She has been having problems with urethral syndrome since she was in her 40s.  Last urethral dilatation done June 24th and she feels she needs another today.  Pressure and discomfort like she usually has.  She is familiar with urethral dilatations and wishes to proceed again.   ------------------------------------------------------------- [December 16, 2021]-----------------------------------------------------------------------------------------------------------------------     Ms. De Jesus is a very pleasant 78 yo AA female, who has returned for c/o severe exacerbation of chronic pelvic pain and difficulty emptying.  Patient states increase of straining to urinate, causing bladder spasms from bladder around to back.  PVR 47 l.  Requests repeat urethral dilatations.  Dr. Singleton to room to evaluate.  Patient to be worked into his schedule this after noon for procedure.     She was last seen by Dr. Singleton 10 months prior for urethral  "dilatation and following POC:  "Urethral dilatation performed as described.  Urine sent for culture since she has lots of amorphous crystals that could mask infection.  Patient given Cipro 500 mg b.i.d. for 3 days to cover instrumentation.  She is to call when she needs another urethral dilatation"  [9/15/2022]-----------------------------------------------------------------------------------------  --------------------------------------------------------------------------------------------------------------------------------------------------------------------------------------------------------------------------------------------------  The above notes are from SAMSON Shaw, Dr. ARIEL Singleton and Dr KARLI Recio in the EMR system     This pleasant 78 year old female presents to the clinic for follow up of urethral stricture and bladder spasms. Patient was last seen in September 2022 for urethral dilatation. Patient reports urinary frequency, urgency, incomplete bladder emptying, bladder spasms, and nocturia 3 to 4 times a night. She denies dysuria, hematuria, nausea, vomiting, fever or chills. Her PVR is 100 mls today. She is on Ditropan XL 5 mg one at night and is tolerating this medication without side effects. She reports it does help some with her symptoms. She request to have a urethral dilatation done today. This was discussed with Dr. ARIEL Singleton and they will do the dilatation. She will follow up with us PRN and call when she feels like she needs another dilatation. I discussed the plan in detail with the patient and she is in agreement with the plan. All her questions were answered at today's visit. ------------------------------------------------------[June 27, 2023].      See note of Mr. Benz above.  Patient wants another urethral dilatation which will be performed today. [June 27, 2023 Dr. ARIEL Singleton].      This pleasant 78 year old female presents to the clinic for renal stone, urethral stricture and bladder " spasms. Patient was seen by her PCP in July 2023 for abdominal pain. Patient had a KUB done on July 26, 2023 that showed a 8 mm inferior pole left renal calculus, Nonspecific bowel gas pattern and Mild levoconvex curvature of the lumbar spine. Her KUB today showed No acute process. Left nephrolithiasis without change. The KUB today also showed there is mild to moderate stool in the normal caliber colon. I reviewed both KUB's with Urologist Dr. ARIEL Singleton and the patient. Dr. Singleton does not feel the kidney stone is the source of her pain as there was no obstruction or hydronephrosis noted. She does have a urethral stricture that was dilated in the office at the last visit by Dr. ARIEL Singleton. She does report this helped with her symptoms. She desires to have another dilatation but wants to wait another month for this. She is on Ditropan XL 5 mg one at night and is tolerating this medication without side effects. She desires to continue this medication. She denies any symptoms of a UTI. Her urine culture in June and July 2023 were negative. She denies dysuria or hematuria. Her PVR today is 16 mls. I discussed the plan in detail with Urologist Dr. ARIEL Singleton and the patient and they are in agreement with the plan. All her questions were answered at today's visit. I spent 20 minutes counseling this patient.      KUB done on August 14, 2023 showed FINDINGS: The bowel gas pattern is nonobstructive without gross mass lesion.  There is mild to moderate stool in the normal caliber colon. Radiopaque calculus overlies lower pole left kidney without change Osseous structures are similar  Impression:  No acute process  Left nephrolithiasis without change. -----[ August 14, 2023].   ------------------------------------------------------------------------------------------------------------------------------------------------------------------------------------------------------------     This pleasant 78 year old female presents to the  clinic for follow up of urethral stricture, bladder spasms, and thinks she has a UTI. Patient desires to have another Urethral Dilatation. She reported at the last visit that the dilatation did help. She feels like she may have a UTI.  She does have some lower abdominal pressure from not emptying her bladder.  She reports nocturia 4 times a night, frequency, urgency, and incomplete bladder emptying. Her PVR is 200 mls. She denies dysuria, hematuria, fever, chills, nausea or vomiting. I discussed having the dilatation with Dr. Singleton and the patient and they are in agreement with doing the dilatation at today's visit. She is on Oxybutynin XL 5 mg and is tolerating this medication without side effects. She desires to continue this medication. She did not complain of stone pain at today's visit. Her KUB in August 2023 showed No acute process  Left nephrolithiasis without change and her KUB done on July 26, 2023 that showed a 8 mm inferior pole left renal calculus. She has some fatigue at today's visit and will follow up with her PCP for this. I discussed the plan in detail with the patient and she is in agreement with the plan. All her questions were answered at today's visit. I spent 20 minutes counseling this patient. ----------------------------------------------------------  [October 6, 2023].   ------------------------------------------------------------------------------------------------------------------------------------------------------------------------------------------------------------------     Procedures     Urethral dilatation     The patient was placed in the dorsal lithotomy position in clinic treatment room and prepared for urethral dilatation.  Urethra was anesthetized with lidocaine jelly.  No sedation was given.  Urethral dilatation was begun with Golden sounds starting with the 20 Iranian Golden sound.  She was dilated through 28 Iranian successfully and procedure terminated.  The residual in the  bladder was approximately 70 mL.  She tolerated reasonably well.     Urethral dilatation as described.  Urine culture collected.  See note of Mr. Benz.  Patient given Cipro 500 mg b.i.d. for 3 days to cover instrumentation.  -----------------------------------------------------------------------------------------------------  [October 6, 2023].   -------------------------------------------------------------------------------------------------------------------------------------------------------------------------------------------------------------------------------------  The above note is from Dr. ARIEL Singleton in the EMR system.      This pleasant 78 year old female presents to the clinic for follow up of urethral stricture, bladder spasms, UTI, and kidney stone. Patient desires to have another Urethral Dilatation. She reported at the last visit that the dilatation did help. I discussed having the dilatation with Dr. Singleton and the patient and they are in agreement with doing the dilatation. She will be scheduled for the urethral dilatation for January 9, 2024 at 3:15 pm with Dr. ARIEL Singleton since this could not be done today. She feels like she may have another UTI.  She reports being treated for a UTI in the ER with IV Gentamicin and PO Ceftin 500 mg on October 16, 2023. Her urine culture grew 20,000 Streptococcus agalactiae (Group B) on October 16, 2023.  She followed up with her PCP and repeat urine culture on November 6, 2023 was negative.   She reports nocturia 2 to 4 times a night, dysuria, urgency, incontinence, bladder spasms and incomplete bladder emptying. Her PVR is 121 mls. She denies frequency, hematuria, fever, chills, nausea or vomiting.  She is on Oxybutynin XL 5 mg and is tolerating this medication without side effects. We discussed increasing the Oxybutynin XL to 10 mg one at bedtime and she is agreeable.  She did not complain of stone pain at today's visit. Her KUB in August 2023 showed No acute  process  Left nephrolithiasis without change and her KUB done on July 26, 2023 that showed a 8 mm inferior pole left renal calculus. Her KUB done on October 2, 2023 showed An 8 mm calcification which overlies the upper pole of the left kidney in addition to Abundant stool.  I will culture her urine and treat if indicated. She may need to be back on a UTI suppressant if she continues to have frequent UTI's. I discussed the plan in detail with the patient and she is in agreement with the plan. All her questions were answered at today's visit. ----------------------------------------------------------------------  [January 8, 2024].         This pleasant 79 year old female presents to the clinic as a referral from Dr. RONDA Chen for kidney stone.  She is a regular Urology patient that is followed for urethral stricture, bladder spasms, UTI, and kidney stone.  Patient had a KUB on February 5, 2024 that showed 1 cm stone overlies the left kidney, unchanged.  Significant colonic stool.  Her PCP cultured her urine on February 13, 2024 and it is growing >100,000 Coagulase-negative Staphylococcus species but not final as of today.  She will follow up with PCP for treatment and we will be happy to assist if needed.  She does report dysuria, left lower back pain, bladder discomfort, nocturia 2 to 3 times a night, urgency and difficulty emptying her bladder.  Her PVR is 98 mls.  She denies hematuria or frequency.  She desires to have another urethral dilatation and I discussed this with the patient and Dr. Singleton.   Dr. Singleton recommends she waits until after the UTI is treated and we also recommend starting Hiprex for UTI suppression after the UTI is treated. We will go ahead and schedule the CT renal stone study to evaluate the kidney stone. Patient does not feel the Ditropan is helping as much with the bladder spasms.  I discussed increasing the Ditropan or we can try Gemtesa 75 mg one at bedtime.  She desires to try the Gemtesa  and will hold the Ditropan for now.  She was given samples of this medication and encouraged to read up on the side effects. I discussed the plan in detail with the patient and she is in agreement with the plan.  All her questions were answered at today's visit.  I spent 20 minutes counseling this patient.        XR KUB done on February 5, 2024. CLINICAL HISTORY:  Unspecified abdominal pain.  TECHNIQUE:  XR KUB.  COMPARISON:  10/2/23.  FINDINGS:  Lower lobes are clear.  There is no bowel obstruction.  No free air.  1 cm stone overlies the left kidney, unchanged.  Significant colonic stool.  No acute bone findings.  Degenerative change of the lumbar spine and hips.  Impression:  1 cm stone overlies the left kidney, unchanged.  Significant colonic stool.  Electronically signed by: Louie Pina.  ----------------------------------------------  [February 15, 2024].             This pleasant 79 year old female presents to the clinic for follow up of CT results, urethral stricture, bladder spasms, UTI, and kidney stone.  Patient states she is doing better today.  Her CT Renal Stone Study done on March 8, 2024 showed No acute CT findings. There is a 9 mm calculus within the inferior pole of left kidney with No convincing ureteral calculus or hydronephrosis.  I reviewed the CT with Dr. Singleton and discussed with the patient that the stone is still in the kidney without any hydronephrosis, obstruction or ureteral stone seen.  We discussed that her pain is not from the kidney stone and more likely from the constipation.  We recommended trying a bottle of mag citrate per the bottle instructions and if no improvement then follow up with her PCP for the constipation. Patient is agreeable and states she would like to schedule her next urethral dilatation.  This will be scheduled with Dr. ARIEL Singleton for April 30, 2024 at 2:30 pm.  She does report some incomplete bladder emptying, Nocturia 2 to 3 times a night and urinary  incontinence.  Her PVR today is 000 mls. She denies dysuria, hematuria, frequency or urgency. She denies any fever, chills, nausea or vomiting. She denies any back or flank pain but does have some abdominal discomfort that has improved since the last visit. We held the Oxybutynin at the last visit and gave her samples of the Gemtesa 75 mg one at bedtime.  She will let us know if this medication works better for her.  Her last urine culture in February 2024 grew >100,000 Staphylococcus epidermidis that was treated with bactrim ds by her PCP. She does not feel like she has a UTI today.  We may need to consider adding Hiprex in the future if she continues to have multiple UTI's.  I discussed the plan in detail with Urologist Dr. ARIEL Singleton and the patient and they are in agreement with the plan.  All her questions were answered at today's visit.      CT RENAL STONE STUDY ABD PELVIS WO done on March 8, 2024. CLINICAL HISTORY:  Calculus of kidneykidney stone;  COMPARISON:  CT abdomen pelvis November 30, 2020  TECHNIQUE:  Multiple axial tomographic images of the abdomen and pelvis were obtained without the use of intravenous contrast.  FINDINGS:  Lung bases clear.  1.1 cm hypodensity within the ventral left hepatic lobe, similar to prior, likely benign.  Visualized gallbladder grossly unremarkable.  Visualized pancreas appears unremarkable.  Spleen grossly unremarkable.  Bilateral adrenal glands grossly unremarkable.  9 mm calculus within the inferior pole of left kidney.  No convincing ureteral calculus or hydronephrosis.  Urinary bladder incompletely distended.  Prior hysterectomy.  No convincing evidence of gastrointestinal obstruction or acute appendicitis.  Atherosclerotic calcification demonstrated.  Scattered skeletal degenerative change.  Partially visualized probable hemangioma within T8 vertebral body.  Impression:  No acute CT findings. 9 mm calculus within the inferior pole of left kidney.  No convincing ureteral  calculus or hydronephrosis.  Prior hysterectomy.  Other/detailed findings as above.  The CT exam was performed using one or more of the following dose  reduction techniques- Automated exposure control, adjustment of the mA  and/or kV according to patient size, and/or use of iterative  reconstructed technique.  Point of Service: [facility].  Electronically signed by: Vadim Swanson.   ------------------------------------------------------------------------------------------------------------------------------------------  [March 8, 2024]                  Review of Systems   Constitutional:  Negative for activity change and fever.   HENT:  Negative for hearing loss and trouble swallowing.    Eyes:  Negative for visual disturbance.   Respiratory:  Negative for cough, shortness of breath and wheezing.    Cardiovascular:  Negative for chest pain.   Gastrointestinal:  Negative for abdominal pain, diarrhea, nausea and vomiting.   Endocrine: Negative for polyuria.   Genitourinary:  Positive for bladder incontinence and nocturia. Negative for decreased urine volume, difficulty urinating, dysuria, enuresis, flank pain, frequency, hematuria and urgency.        Left Kidney Stone        Urethral Stricture    Musculoskeletal:  Negative for back pain and gait problem.   Integumentary:  Negative for rash.   Neurological:  Negative for speech difficulty and weakness.   Psychiatric/Behavioral:  Negative for behavioral problems and confusion.     --------------------------------------------------------------------------------  The above note is the note of Mr. Vaughn Benz from March 8, 2024.  Patient had appointment with me in April but we postpone urethral dilatation because she was not feeling like she was in need of having it done at that time.  She returned today for urethral dilatation in is having more discomfort and problems from her urethral syndrome.  Frequency, urgency, and urethral discomfort etc..  Ms. De Jesus  suffered a break of her right foot after a fall about 4 weeks ago.  She wishes to proceed with  urethral dilatation today that we postponed on last visit. [June 18, 2024]    Review of Systems       Objective     Physical Exam  Constitutional:       Appearance: Normal appearance. She is normal weight.   Musculoskeletal:      Comments: Walking cast on right foot   Neurological:      Mental Status: She is alert.   Psychiatric:         Mood and Affect: Mood normal.         Behavior: Behavior normal.     Urinalysis revealed 2 or 3 pus cells per high-powered field with occasional red cells.  Dipstick had 2+ leukocytes and trace of protein with pH 5.0 and specific gravity 1.025     Assessment and Plan     1. Urethral syndrome    2. Urinary tract infection without hematuria, site unspecified  -     Urine culture; Future; Expected date: 06/18/2024  -     gentamicin injection 80 mg    3. Stricture of female urethra, unspecified stricture type  Overview:  history of known urethral stenosis, and was taught to perform monthly self dilation at home.    Orders:  -     LIDOcaine HCl 2% urojet    4. Chronic pelvic pain in female  Overview:  Ditropan XL 5mg controlling chronic bladder pain.    Hx of Urethral stenosis        Urethral dilatation as described.  Urine sent for culture.  Patient was g en gentamicin 80 mg IM.  Cipro 500 mg given to take 1 b.i.d. for 3 days while awaiting urine culture results.  Return on p.r.n. basis for additional therapy.  Ms. De Jesus understands I will be retiring within the next few months.         No follow-ups on file.

## 2024-06-18 NOTE — PATIENT INSTRUCTIONS
Urethral dilatation as described.  Urine sent for culture.  Patient was given gentamicin 80 mg IM.  Cipro 500 mg given to take 1 b.i.d. for 3 days while awaiting urine culture results.  Return on p.r.n. basis for additional therapy.  Ms. De Jesus understands I will be retiring within the next few months.

## 2024-06-18 NOTE — PROCEDURES
Procedures    Urethral dilatation    Preoperative diagnosis:  Urethral syndrome with worsening symptoms    Postoperative diagnosis:  Same    Description:  The patient was placed in the dorsal lithotomy position and prepared for urethral dilatation.  The preparation was carried out with Hibiclens and the urethra anesthetized with lidocaine jelly.  No sedation was given.  The urethral dilatation was begun with the 18 Sinhala Golden sound.  She was dilated through 26 Sinhala which is all she could tolerate today.  Residual in the bladder was relatively small being less than 2 oz. She did have lot of discomfort but tolerated reasonably well.  Procedure was concluded and she was allowed to re-dress.

## 2024-06-19 DIAGNOSIS — S92.354A CLOSED NONDISPLACED FRACTURE OF FIFTH METATARSAL BONE OF RIGHT FOOT, INITIAL ENCOUNTER: Primary | ICD-10-CM

## 2024-06-19 LAB — UA COMPLETE W REFLEX CULTURE PNL UR: ABNORMAL

## 2024-06-21 DIAGNOSIS — N39.0 URINARY TRACT INFECTION WITH HEMATURIA, SITE UNSPECIFIED: Primary | ICD-10-CM

## 2024-06-21 DIAGNOSIS — R31.9 URINARY TRACT INFECTION WITH HEMATURIA, SITE UNSPECIFIED: Primary | ICD-10-CM

## 2024-06-21 LAB — UA COMPLETE W REFLEX CULTURE PNL UR: ABNORMAL

## 2024-06-21 RX ORDER — AMOXICILLIN AND CLAVULANATE POTASSIUM 875; 125 MG/1; MG/1
1 TABLET, FILM COATED ORAL 2 TIMES DAILY
Qty: 20 TABLET | Refills: 0 | Status: SHIPPED | OUTPATIENT
Start: 2024-06-21

## 2024-06-21 NOTE — TELEPHONE ENCOUNTER
Patient's urine culture came back showing >100,000 Staphylococcus epidermidis, Dr. Singleton notified, order given for Augmentin 875mg one po bid #20 with no refills.  Patient was called and notified, she denies allergy to Augmentin and states she has taken it before without difficulty, pharmacy of choice is [facility] [address]

## 2024-06-24 ENCOUNTER — HOSPITAL ENCOUNTER (OUTPATIENT)
Dept: RADIOLOGY | Facility: HOSPITAL | Age: 79
Discharge: HOME | End: 2024-06-24
Attending: ORTHOPAEDIC SURGERY
Payer: COMMERCIAL

## 2024-06-24 ENCOUNTER — OFFICE VISIT (OUTPATIENT)
Dept: ORTHOPEDICS | Facility: CLINIC | Age: 79
End: 2024-06-24
Payer: COMMERCIAL

## 2024-06-24 DIAGNOSIS — S92.354A CLOSED NONDISPLACED FRACTURE OF FIFTH METATARSAL BONE OF RIGHT FOOT, INITIAL ENCOUNTER: ICD-10-CM

## 2024-06-24 DIAGNOSIS — S92.354D CLOSED NONDISPLACED FRACTURE OF FIFTH METATARSAL BONE OF RIGHT FOOT WITH ROUTINE HEALING, SUBSEQUENT ENCOUNTER: Primary | ICD-10-CM

## 2024-06-24 PROCEDURE — 1160F RVW MEDS BY RX/DR IN RCRD: CPT | Mod: CPTII,,, | Performed by: ORTHOPAEDIC SURGERY

## 2024-06-24 PROCEDURE — 73630 X-RAY EXAM OF FOOT: CPT | Mod: TC,RT

## 2024-06-24 PROCEDURE — 99212 OFFICE O/P EST SF 10 MIN: CPT | Mod: PBBFAC,25 | Performed by: ORTHOPAEDIC SURGERY

## 2024-06-24 PROCEDURE — 99999 PR PBB SHADOW E&M-EST. PATIENT-LVL II: CPT | Mod: PBBFAC,,, | Performed by: ORTHOPAEDIC SURGERY

## 2024-06-24 PROCEDURE — 1159F MED LIST DOCD IN RCRD: CPT | Mod: CPTII,,, | Performed by: ORTHOPAEDIC SURGERY

## 2024-06-24 PROCEDURE — 99024 POSTOP FOLLOW-UP VISIT: CPT | Mod: ,,, | Performed by: ORTHOPAEDIC SURGERY

## 2024-06-24 NOTE — PROGRESS NOTES
CLINIC NOTE       Chief Complaint   Patient presents with    Right Foot - Follow-up        Lorraine De Jesus is a 79 y.o. female seen today for recheck of her right foot.  She was now almost 5 weeks following closed treatment of a short oblique mildly displaced fracture of the 5th metatarsal diaphysis.  She was been ambulating with a cam walker boot.  She no longer has any significant pain with weight-bearing.    Past Medical History:   Diagnosis Date    Adenomatous polyp of ascending colon 6/22/2021    Adenomatous polyp of descending colon 6/22/2021    Age related osteoporosis 10/28/2020    Benign paroxysmal vertigo     Chronic pain syndrome     Chronic pelvic pain in female 7/28/2021    Ditropan XL 5mg    Colon, diverticulosis 6/22/2021    Depressive disorder 08/12/2019    External hemorrhoid 6/22/2021    Gastrointestinal hemorrhage associated with anorectal source 6/22/2021    GERD (gastroesophageal reflux disease) 11/21/2013    Hyperlipidemia 01/16/2012    Hypothyroidism 02/25/2019    Joint pain     Nonrheumatic tricuspid (valve) insufficiency 03/18/2018    Personal history UTI 7/28/2021    Controlled on Hiprex    Polyneuropathy 07/11/2018    PVD (peripheral vascular disease) 10/30/2018    Temporal arteritis     Type 2 diabetes mellitus 04/02/2014    Urethral meatal stenosis 7/11/2018    history of known urethral stenosis, and was taught to perform monthly self dilation at home.     Family History   Problem Relation Name Age of Onset    Cancer Mother      Hypertension Mother      Cancer Father      Cancer Sister      Diabetes Sister      Hyperlipidemia Sister      Hypertension Sister      Cancer Brother      Diabetes Sister      No Known Problems Sister      Cancer Brother      No Known Problems Brother      Cancer Brother       Current Outpatient Medications on File Prior to Visit   Medication Sig Dispense Refill    amLODIPine (NORVASC) 5 MG tablet Take 5 mg by mouth once daily.      amoxicillin-clavulanate  875-125mg (AUGMENTIN) 875-125 mg per tablet Take 1 tablet by mouth 2 (two) times daily. 20 tablet 0    blood sugar diagnostic Strp 1 strip by Misc.(Non-Drug; Combo Route) route 3 (three) times daily. 200 strip 3    blood-glucose meter kit Use as instructed 1 each 0    cloNIDine (CATAPRES) 0.1 MG tablet TAKE 1 TABLET BY MOUTH EVERY 4 HOURS AS NEEDED FOR BLOOD PRESSURE GREATER THAN 170/90      DULoxetine (CYMBALTA) 30 MG capsule Take 1 capsule (30 mg total) by mouth once daily. 30 capsule 11    EScitalopram oxalate (LEXAPRO) 10 MG tablet Take 1 tablet (10 mg total) by mouth once daily. (Patient not taking: Reported on 6/18/2024) 90 tablet 1    ezetimibe (ZETIA) 10 mg tablet Take 1 tablet (10 mg total) by mouth once daily. 90 tablet 3    fluconazole (DIFLUCAN) 100 MG tablet Take two tablets on day one then reduce to one tablet daily 5 tablet 1    gabapentin (NEURONTIN) 400 MG capsule TAKE 1 CAPSULE(400 MG) BY MOUTH EVERY 8 HOURS 90 capsule 2    glipiZIDE (GLUCOTROL) 10 MG TR24 TAKE 1 TABLET BY MOUTH TWICE DAILY 180 tablet 1    HYDROcodone-acetaminophen (NORCO)  mg per tablet Take 1 tablet by mouth every 6 (six) hours as needed for Pain. 120 tablet 0    HYDROcodone-acetaminophen (NORCO)  mg per tablet Take 1 tablet by mouth every 6 (six) hours as needed for Pain. 120 tablet 0    HYDROcodone-acetaminophen (NORCO)  mg per tablet Take 1 tablet by mouth every 6 (six) hours as needed for Pain. 120 tablet 0    lactulose (CHRONULAC) 20 gram/30 mL Soln Take 30 mLs (20 g total) by mouth 2 (two) times daily as needed (for constipation). 400 mL 0    lancets (ACCU-CHEK FASTCLIX LANCET DRUM [facility] [place]) Take 1 each by mouth once daily.      levothyroxine (SYNTHROID) 75 MCG tablet Take 1 tablet (75 mcg total) by mouth before breakfast. 90 each 1    LINZESS 72 mcg Cap capsule TAKE 1 CAPSULE(72 MCG) BY MOUTH BEFORE BREAKFAST 30 capsule 1    loratadine (CLARITIN) 10 mg tablet Take 1 tablet (10 mg total) by mouth once daily.  (Patient not taking: Reported on 2024) 15 tablet 3    olmesartan (BENICAR) 5 MG Tab Take 10 mg by mouth once daily.      ONETOUCH DELICA PLUS LANCET 33 gauge Misc Check blood sugar  each 11    ONETOUCH VERIO TEST STRIPS Strp TEST BLOOD GLUCOSE LEVELS TWICE DAILY 200 strip 4    oxybutynin (DITROPAN-XL) 10 MG 24 hr tablet Take one tablet at night 90 tablet 3    polyethylene glycol (GLYCOLAX) 17 gram PwPk Take 17 g by mouth once daily. (Patient taking differently: Take 17 g by mouth daily as needed for Constipation.) 30 each 0     No current facility-administered medications on file prior to visit.       ROS     There were no vitals filed for this visit.    Past Surgical History:   Procedure Laterality Date     left lumbar sympathetic nerve block Left 2020    X3  DR BROWN    CARDIAC CATHETERIZATION  10/14/2009     SECTION      x 2    COLONOSCOPY  2009    CYSTOSCOPY WITH HYDRODISTENSION OF BLADDER N/A 2021    Procedure: CYSTOSCOPY, WITH BLADDER HYDRODISTENSION;  Surgeon: Dequan Recio MD;  Location: [facility] OR;  Service: Urology;  Laterality: N/A;    CYSTOSCOPY WITH URETHRAL DILATION  2021    Dr Recio    EPIDURAL STEROID INJECTION N/A 10/27/2022    Procedure: Injection, Steroid, Epidural, L5/S1;  Surgeon: Belkis Brown MD;  Location: [facility];  Service: Pain Management;  Laterality: N/A;    EPIDURAL STEROID INJECTION N/A 2023    Procedure: Injection, Steroid, Epidural, L5/S1;  Surgeon: Belkis Brown MD;  Location: [facility];  Service: Pain Management;  Laterality: N/A;    HYSTERECTOMY      LUMBAR SYMPATHETIC NERVE BLOCK Left 10/05/2020    Left Sympathetic Nerve Block - Dr Brown - 10/5/20, 20, 20. 20, 10/9/19, 19 and 18    right lumbar sympathetic nerve block Right 2020    X4 DR BROWN    THYROIDECTOMY              Review of patient's allergies indicates:   Allergen Reactions    Lipitor [atorvastatin] Other (See  Comments)     Muscle aching    Pravachol [pravastatin] Other (See Comments)     Muscle aching        Ortho Exam :  Right foot is normal contour.  Minimal tenderness palpation of the mid 5th metatarsal fracture site.  In his intact.    Radiographic Examination:  Right foot 06/24/2024    Technique:  Three views AP lateral oblique    Findings:  Bones well mineralized.  There is a short oblique fracture of the mid shaft 5th metatarsal region remaining in overall good position alignment and showing callus formation radiographically.    Impression:   See Above    Assessment and Plan  Patient Active Problem List    Diagnosis Date Noted    Closed nondisplaced fracture of fifth right metatarsal bone 05/22/2024    Diarrhea 03/16/2024    Constipation 03/16/2024    Post-menopausal 03/09/2024    Mild episode of recurrent major depressive disorder 03/09/2024    Sleep apnea 02/22/2024    Kidney stone on left side 02/15/2024    Urge incontinence 02/15/2024    COVID-19 02/15/2024    Exposure to viral disease 02/15/2024    Yeast infection 02/14/2024    Vitamin D deficiency 11/08/2023    Chest pain 10/17/2023    Moderate episode of recurrent major depressive disorder 10/17/2023    Weakness 10/12/2023    Mild cognitive disorder 09/20/2023    Renal calculus 08/09/2023    Multiple thyroid nodules 07/31/2023    Abdominal pain 07/31/2023    Other amnesia 06/28/2023    B12 deficiency 06/28/2023    TSH deficiency 01/27/2023    Obese 01/27/2023    Obstructive sleep apnea 01/27/2023    Left genital labial abscess 12/05/2022    Hemangioma of spleen 10/20/2022    Gastroesophageal reflux disease 09/19/2022    DDD (degenerative disc disease), lumbar 09/19/2022    Screening for cervical cancer 09/19/2022    Screening for viral disease 09/19/2022    BMI 27.0-27.9,adult 09/19/2022    Hyperglycemia 12/06/2021    Dizziness 12/06/2021    Chronic pelvic pain in female 07/28/2021    Acute pharyngitis 07/13/2021    Lesion of spleen 07/13/2021     Gastrointestinal hemorrhage associated with anorectal source 06/22/2021    Colon, diverticulosis 06/22/2021    External hemorrhoid 06/22/2021    Adenomatous polyp of ascending colon 06/22/2021    Adenomatous polyp of descending colon 06/22/2021    Screening for STD (sexually transmitted disease) 06/14/2021    Essential (primary) hypertension 06/14/2021    Type 2 diabetes mellitus without complication 06/14/2021    Bladder spasms 05/05/2021    Dysuria 05/05/2021    Yeast infection involving the vagina and surrounding area 04/19/2021    Chronic pain syndrome 03/25/2021    Lumbosacral radiculopathy 03/25/2021    Diabetic neuropathy with neurologic complication 03/25/2021    Bilateral foot pain 03/25/2021    Right foot pain 03/25/2021    Left hip pain 03/25/2021    PVD (peripheral vascular disease) 10/30/2018    Polyneuropathy 07/11/2018    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    Plan: May slowly wean from the use of the cam walker boot and increase activities as tolerated.  Recheck ngoc Meza M.D.

## 2024-06-25 ENCOUNTER — OFFICE VISIT (OUTPATIENT)
Dept: PAIN MEDICINE | Facility: CLINIC | Age: 79
End: 2024-06-25
Payer: COMMERCIAL

## 2024-06-25 VITALS
BODY MASS INDEX: 25.91 KG/M2 | HEIGHT: 68 IN | WEIGHT: 171 LBS | RESPIRATION RATE: 18 BRPM | HEART RATE: 75 BPM | DIASTOLIC BLOOD PRESSURE: 68 MMHG | SYSTOLIC BLOOD PRESSURE: 130 MMHG

## 2024-06-25 DIAGNOSIS — M79.672 BILATERAL FOOT PAIN: ICD-10-CM

## 2024-06-25 DIAGNOSIS — Z79.899 ENCOUNTER FOR LONG-TERM (CURRENT) USE OF OTHER MEDICATIONS: ICD-10-CM

## 2024-06-25 DIAGNOSIS — M54.17 LUMBOSACRAL RADICULOPATHY: Primary | Chronic | ICD-10-CM

## 2024-06-25 DIAGNOSIS — M79.671 BILATERAL FOOT PAIN: ICD-10-CM

## 2024-06-25 DIAGNOSIS — E11.40 DIABETIC NEUROPATHY WITH NEUROLOGIC COMPLICATION: Chronic | ICD-10-CM

## 2024-06-25 DIAGNOSIS — E11.49 DIABETIC NEUROPATHY WITH NEUROLOGIC COMPLICATION: Chronic | ICD-10-CM

## 2024-06-25 PROCEDURE — 1125F AMNT PAIN NOTED PAIN PRSNT: CPT | Mod: CPTII,,, | Performed by: PHYSICIAN ASSISTANT

## 2024-06-25 PROCEDURE — 99215 OFFICE O/P EST HI 40 MIN: CPT | Mod: PBBFAC | Performed by: PHYSICIAN ASSISTANT

## 2024-06-25 PROCEDURE — 99999PBSHW POCT URINE DRUG SCREEN PRESUMP: Mod: PBBFAC,,,

## 2024-06-25 PROCEDURE — 80305 DRUG TEST PRSMV DIR OPT OBS: CPT | Mod: PBBFAC | Performed by: PHYSICIAN ASSISTANT

## 2024-06-25 PROCEDURE — 99214 OFFICE O/P EST MOD 30 MIN: CPT | Mod: S$PBB,,, | Performed by: PHYSICIAN ASSISTANT

## 2024-06-25 PROCEDURE — 3288F FALL RISK ASSESSMENT DOCD: CPT | Mod: CPTII,,, | Performed by: PHYSICIAN ASSISTANT

## 2024-06-25 PROCEDURE — 99999 PR PBB SHADOW E&M-EST. PATIENT-LVL V: CPT | Mod: PBBFAC,,, | Performed by: PHYSICIAN ASSISTANT

## 2024-06-25 PROCEDURE — 3075F SYST BP GE 130 - 139MM HG: CPT | Mod: CPTII,,, | Performed by: PHYSICIAN ASSISTANT

## 2024-06-25 PROCEDURE — 1159F MED LIST DOCD IN RCRD: CPT | Mod: CPTII,,, | Performed by: PHYSICIAN ASSISTANT

## 2024-06-25 PROCEDURE — 3078F DIAST BP <80 MM HG: CPT | Mod: CPTII,,, | Performed by: PHYSICIAN ASSISTANT

## 2024-06-25 PROCEDURE — 1100F PTFALLS ASSESS-DOCD GE2>/YR: CPT | Mod: CPTII,,, | Performed by: PHYSICIAN ASSISTANT

## 2024-06-25 RX ORDER — HYDROCODONE BITARTRATE AND ACETAMINOPHEN 10; 325 MG/1; MG/1
1 TABLET ORAL EVERY 6 HOURS PRN
Qty: 120 TABLET | Refills: 0 | Status: SHIPPED | OUTPATIENT
Start: 2024-06-27

## 2024-06-25 RX ORDER — GABAPENTIN 400 MG/1
CAPSULE ORAL
Qty: 90 CAPSULE | Refills: 2 | Status: SHIPPED | OUTPATIENT
Start: 2024-06-25

## 2024-06-25 RX ORDER — HYDROCODONE BITARTRATE AND ACETAMINOPHEN 10; 325 MG/1; MG/1
1 TABLET ORAL EVERY 6 HOURS PRN
Qty: 120 TABLET | Refills: 0 | Status: SHIPPED | OUTPATIENT
Start: 2024-08-26

## 2024-06-25 RX ORDER — HYDROCODONE BITARTRATE AND ACETAMINOPHEN 10; 325 MG/1; MG/1
1 TABLET ORAL EVERY 6 HOURS PRN
Qty: 120 TABLET | Refills: 0 | Status: SHIPPED | OUTPATIENT
Start: 2024-07-27

## 2024-06-30 ENCOUNTER — EXTERNAL CHRONIC CARE MANAGEMENT (OUTPATIENT)
Dept: FAMILY MEDICINE | Facility: CLINIC | Age: 79
End: 2024-06-30
Payer: COMMERCIAL

## 2024-06-30 PROCEDURE — G0511 CCM/BHI BY RHC/FQHC 20MIN MO: HCPCS | Mod: ,,, | Performed by: INTERNAL MEDICINE

## 2024-07-02 RX ORDER — LACTULOSE 10 G/15ML
SOLUTION ORAL; RECTAL
Qty: 600 ML | Refills: 1 | Status: SHIPPED | OUTPATIENT
Start: 2024-07-02

## 2024-07-31 ENCOUNTER — EXTERNAL CHRONIC CARE MANAGEMENT (OUTPATIENT)
Dept: FAMILY MEDICINE | Facility: CLINIC | Age: 79
End: 2024-07-31
Payer: COMMERCIAL

## 2024-07-31 PROCEDURE — G0511 CCM/BHI BY RHC/FQHC 20MIN MO: HCPCS | Mod: ,,, | Performed by: INTERNAL MEDICINE

## 2024-08-06 ENCOUNTER — OFFICE VISIT (OUTPATIENT)
Dept: FAMILY MEDICINE | Facility: CLINIC | Age: 79
End: 2024-08-06
Payer: COMMERCIAL

## 2024-08-06 VITALS
SYSTOLIC BLOOD PRESSURE: 133 MMHG | DIASTOLIC BLOOD PRESSURE: 80 MMHG | RESPIRATION RATE: 17 BRPM | HEIGHT: 68 IN | TEMPERATURE: 98 F | HEART RATE: 74 BPM | OXYGEN SATURATION: 100 % | BODY MASS INDEX: 25.61 KG/M2 | WEIGHT: 169 LBS

## 2024-08-06 DIAGNOSIS — E11.9 TYPE 2 DIABETES MELLITUS WITHOUT COMPLICATION, WITHOUT LONG-TERM CURRENT USE OF INSULIN: Primary | ICD-10-CM

## 2024-08-06 DIAGNOSIS — E03.9 HYPOTHYROIDISM, UNSPECIFIED TYPE: ICD-10-CM

## 2024-08-06 DIAGNOSIS — Z87.81 HISTORY OF FRACTURE: ICD-10-CM

## 2024-08-06 PROCEDURE — 1126F AMNT PAIN NOTED NONE PRSNT: CPT | Mod: ,,, | Performed by: INTERNAL MEDICINE

## 2024-08-06 PROCEDURE — 3079F DIAST BP 80-89 MM HG: CPT | Mod: ,,, | Performed by: INTERNAL MEDICINE

## 2024-08-06 PROCEDURE — 3075F SYST BP GE 130 - 139MM HG: CPT | Mod: ,,, | Performed by: INTERNAL MEDICINE

## 2024-08-06 PROCEDURE — 99214 OFFICE O/P EST MOD 30 MIN: CPT | Mod: ,,, | Performed by: INTERNAL MEDICINE

## 2024-08-06 PROCEDURE — 3288F FALL RISK ASSESSMENT DOCD: CPT | Mod: ,,, | Performed by: INTERNAL MEDICINE

## 2024-08-06 PROCEDURE — 1159F MED LIST DOCD IN RCRD: CPT | Mod: ,,, | Performed by: INTERNAL MEDICINE

## 2024-08-06 PROCEDURE — 1100F PTFALLS ASSESS-DOCD GE2>/YR: CPT | Mod: ,,, | Performed by: INTERNAL MEDICINE

## 2024-08-06 RX ORDER — LEVOTHYROXINE SODIUM 75 UG/1
75 TABLET ORAL
Qty: 90 TABLET | Refills: 1 | Status: SHIPPED | OUTPATIENT
Start: 2024-08-06

## 2024-08-06 RX ORDER — GLIPIZIDE 10 MG/1
10 TABLET, FILM COATED, EXTENDED RELEASE ORAL 2 TIMES DAILY
Qty: 180 TABLET | Refills: 1 | Status: SHIPPED | OUTPATIENT
Start: 2024-08-06

## 2024-08-13 PROBLEM — Z87.81 HISTORY OF FRACTURE: Status: ACTIVE | Noted: 2024-08-13

## 2024-08-13 NOTE — PROGRESS NOTES
Subjective:       Patient ID: Lorraine De Jesus is a 79 y.o. female.    Chief Complaint: Hyperglycemia (3 month follow-up )    HPI  .  Patient presents with history of fracture to the right foot proximally 1 month ago patient stated she is feeling well no acute distress patient also has chronic diabetes and also a hypothyroidism she does not chest breath fever chills today.  Current Medications:    Current Outpatient Medications:     amLODIPine (NORVASC) 5 MG tablet, Take 5 mg by mouth once daily., Disp: , Rfl:     blood sugar diagnostic Strp, 1 strip by Misc.(Non-Drug; Combo Route) route 3 (three) times daily., Disp: 200 strip, Rfl: 3    blood-glucose meter kit, Use as instructed, Disp: 1 each, Rfl: 0    cloNIDine (CATAPRES) 0.1 MG tablet, TAKE 1 TABLET BY MOUTH EVERY 4 HOURS AS NEEDED FOR BLOOD PRESSURE GREATER THAN 170/90, Disp: , Rfl:     DULoxetine (CYMBALTA) 30 MG capsule, Take 1 capsule (30 mg total) by mouth once daily., Disp: 30 capsule, Rfl: 11    EScitalopram oxalate (LEXAPRO) 10 MG tablet, Take 1 tablet (10 mg total) by mouth once daily., Disp: 90 tablet, Rfl: 1    ezetimibe (ZETIA) 10 mg tablet, Take 1 tablet (10 mg total) by mouth once daily., Disp: 90 tablet, Rfl: 3    fluconazole (DIFLUCAN) 100 MG tablet, Take two tablets on day one then reduce to one tablet daily, Disp: 5 tablet, Rfl: 1    gabapentin (NEURONTIN) 400 MG capsule, TAKE 1 CAPSULE(400 MG) BY MOUTH EVERY 8 HOURS, Disp: 90 capsule, Rfl: 2    [START ON 8/26/2024] HYDROcodone-acetaminophen (NORCO)  mg per tablet, Take 1 tablet by mouth every 6 (six) hours as needed for Pain., Disp: 120 tablet, Rfl: 0    lactulose (CHRONULAC) 10 gram/15 mL solution, TAKE 30 MLS BY MOUTH TWICE DAILY AS NEEDED FOR CONSTIPATION, Disp: 600 mL, Rfl: 1    lancets (ACCU-CHEK FASTCLIX LANCET DRUM [facility] [place]), Take 1 each by mouth once daily., Disp: , Rfl:     LINZESS 72 mcg Cap capsule, TAKE 1 CAPSULE(72 MCG) BY MOUTH BEFORE BREAKFAST, Disp: 30 capsule, Rfl: 1     "olmesartan (BENICAR) 5 MG Tab, Take 10 mg by mouth once daily., Disp: , Rfl:     ONETOUCH DELICA PLUS LANCET 33 gauge Misc, Check blood sugar TID, Disp: 100 each, Rfl: 11    ONETOUCH VERIO TEST STRIPS Strp, TEST BLOOD GLUCOSE LEVELS TWICE DAILY, Disp: 200 strip, Rfl: 4    oxybutynin (DITROPAN-XL) 10 MG 24 hr tablet, Take one tablet at night, Disp: 90 tablet, Rfl: 3    amoxicillin-clavulanate 875-125mg (AUGMENTIN) 875-125 mg per tablet, Take 1 tablet by mouth 2 (two) times daily. (Patient not taking: Reported on 8/6/2024), Disp: 20 tablet, Rfl: 0    glipiZIDE (GLUCOTROL) 10 MG TR24, Take 1 tablet (10 mg total) by mouth 2 (two) times daily., Disp: 180 tablet, Rfl: 1    HYDROcodone-acetaminophen (NORCO)  mg per tablet, Take 1 tablet by mouth every 6 (six) hours as needed for Pain. (Patient not taking: Reported on 8/6/2024), Disp: 120 tablet, Rfl: 0    HYDROcodone-acetaminophen (NORCO)  mg per tablet, Take 1 tablet by mouth every 6 (six) hours as needed for Pain. (Patient not taking: Reported on 8/6/2024), Disp: 120 tablet, Rfl: 0    levothyroxine (SYNTHROID) 75 MCG tablet, Take 1 tablet (75 mcg total) by mouth before breakfast., Disp: 90 tablet, Rfl: 1    loratadine (CLARITIN) 10 mg tablet, Take 1 tablet (10 mg total) by mouth once daily. (Patient not taking: Reported on 6/18/2024), Disp: 15 tablet, Rfl: 3    polyethylene glycol (GLYCOLAX) 17 gram PwPk, Take 17 g by mouth once daily. (Patient not taking: Reported on 8/6/2024), Disp: 30 each, Rfl: 0           ROS  Twelve point system reviewed, unremarkable except for stated above in HPI.        Objective:         Vitals:    08/06/24 1006   BP: 133/80   BP Location: Right arm   Patient Position: Sitting   BP Method: Medium (Automatic)   Pulse: 74   Resp: 17   Temp: 98.1 °F (36.7 °C)   TempSrc: Oral   SpO2: 100%   Weight: 76.7 kg (169 lb)   Height: 5' 8" (1.727 m)        Physical Exam     Patient is awake alert oriented person place and  Lungs are clear to " auscultation bilaterally no crackles or wheezes   Cardiovascular S1-S2 regular rate and rhythm no murmurs rubs or gallops   Abdomen is soft positive bowel sounds nontender, extremities no clubbing cyanosis edema  Neuro no focal neurological deficits  Skin warm and dry.     Last Labs:     No visits with results within 1 Month(s) from this visit.   Latest known visit with results is:   Office Visit on 06/25/2024   Component Date Value    POC Amphetamines 06/25/2024 Negative     POC Barbiturates 06/25/2024 Negative     POC Benzodiazepines 06/25/2024 Negative     POC Cocaine 06/25/2024 Negative     POC THC 06/25/2024 Negative     POC Methadone 06/25/2024 Negative     POC Methamphetamine 06/25/2024 Negative     POC Opiates 06/25/2024 Presumptive Positive (A)     POC Oxycodone 06/25/2024 Negative     POC Phencyclidine 06/25/2024 Negative     POC Methylenedioxymetham* 06/25/2024 Negative     POC Tricyclic Antidepres* 06/25/2024 Negative     POC Buprenorphine 06/25/2024 Negative      Acceptab* 06/25/2024 Yes     POC Temperature (Urine) 06/25/2024 94        Last Imaging:  X-Ray Foot Complete Right  See Procedure Notes for results.     IMPRESSION: Please see Ortho procedure notes for report.      This procedure was auto-finalized by: Virtual Radiologist         **Labs and x-rays personally reviewed by me    ** reviewed           Assessment & Plan:       1. Type 2 diabetes mellitus without complication, without long-term current use of insulin    2. History of fracture    3. Hypothyroidism, unspecified type    Other orders  -     glipiZIDE (GLUCOTROL) 10 MG TR24; Take 1 tablet (10 mg total) by mouth 2 (two) times daily.  Dispense: 180 tablet; Refill: 1  -     levothyroxine (SYNTHROID) 75 MCG tablet; Take 1 tablet (75 mcg total) by mouth before breakfast.  Dispense: 90 tablet; Refill: 1            Mateo Chen MD

## 2024-08-28 RX ORDER — ESCITALOPRAM OXALATE 10 MG/1
10 TABLET ORAL
Qty: 90 TABLET | Refills: 1 | Status: SHIPPED | OUTPATIENT
Start: 2024-08-28

## 2024-08-31 ENCOUNTER — EXTERNAL CHRONIC CARE MANAGEMENT (OUTPATIENT)
Dept: FAMILY MEDICINE | Facility: CLINIC | Age: 79
End: 2024-08-31
Payer: COMMERCIAL

## 2024-08-31 PROCEDURE — G0511 CCM/BHI BY RHC/FQHC 20MIN MO: HCPCS | Mod: ,,, | Performed by: INTERNAL MEDICINE

## 2024-09-11 ENCOUNTER — OFFICE VISIT (OUTPATIENT)
Dept: PAIN MEDICINE | Facility: CLINIC | Age: 79
End: 2024-09-11
Payer: COMMERCIAL

## 2024-09-11 VITALS
HEIGHT: 68 IN | WEIGHT: 175 LBS | HEART RATE: 59 BPM | RESPIRATION RATE: 18 BRPM | SYSTOLIC BLOOD PRESSURE: 159 MMHG | BODY MASS INDEX: 26.52 KG/M2 | DIASTOLIC BLOOD PRESSURE: 69 MMHG

## 2024-09-11 DIAGNOSIS — E11.40 DIABETIC NEUROPATHY WITH NEUROLOGIC COMPLICATION: Chronic | ICD-10-CM

## 2024-09-11 DIAGNOSIS — M54.17 LUMBOSACRAL RADICULOPATHY: Chronic | ICD-10-CM

## 2024-09-11 DIAGNOSIS — M79.671 BILATERAL FOOT PAIN: ICD-10-CM

## 2024-09-11 DIAGNOSIS — Z79.899 ENCOUNTER FOR LONG-TERM (CURRENT) USE OF OTHER MEDICATIONS: Primary | ICD-10-CM

## 2024-09-11 DIAGNOSIS — M79.672 BILATERAL FOOT PAIN: ICD-10-CM

## 2024-09-11 DIAGNOSIS — E11.49 DIABETIC NEUROPATHY WITH NEUROLOGIC COMPLICATION: Chronic | ICD-10-CM

## 2024-09-11 PROCEDURE — 99999 PR PBB SHADOW E&M-EST. PATIENT-LVL V: CPT | Mod: PBBFAC,,, | Performed by: PAIN MEDICINE

## 2024-09-11 PROCEDURE — 3078F DIAST BP <80 MM HG: CPT | Mod: CPTII,,, | Performed by: PAIN MEDICINE

## 2024-09-11 PROCEDURE — 1125F AMNT PAIN NOTED PAIN PRSNT: CPT | Mod: CPTII,,, | Performed by: PAIN MEDICINE

## 2024-09-11 PROCEDURE — 80305 DRUG TEST PRSMV DIR OPT OBS: CPT | Mod: PBBFAC | Performed by: PAIN MEDICINE

## 2024-09-11 PROCEDURE — 99999PBSHW POCT URINE DRUG SCREEN PRESUMP: Mod: PBBFAC,,,

## 2024-09-11 PROCEDURE — 3077F SYST BP >= 140 MM HG: CPT | Mod: CPTII,,, | Performed by: PAIN MEDICINE

## 2024-09-11 PROCEDURE — 3288F FALL RISK ASSESSMENT DOCD: CPT | Mod: CPTII,,, | Performed by: PAIN MEDICINE

## 2024-09-11 PROCEDURE — 99215 OFFICE O/P EST HI 40 MIN: CPT | Mod: PBBFAC | Performed by: PAIN MEDICINE

## 2024-09-11 PROCEDURE — 1159F MED LIST DOCD IN RCRD: CPT | Mod: CPTII,,, | Performed by: PAIN MEDICINE

## 2024-09-11 PROCEDURE — 99214 OFFICE O/P EST MOD 30 MIN: CPT | Mod: S$PBB,,, | Performed by: PAIN MEDICINE

## 2024-09-11 PROCEDURE — 1101F PT FALLS ASSESS-DOCD LE1/YR: CPT | Mod: CPTII,,, | Performed by: PAIN MEDICINE

## 2024-09-11 RX ORDER — HYDROCODONE BITARTRATE AND ACETAMINOPHEN 10; 325 MG/1; MG/1
1 TABLET ORAL EVERY 6 HOURS PRN
Qty: 120 TABLET | Refills: 0 | Status: SHIPPED | OUTPATIENT
Start: 2024-10-25 | End: 2024-11-24

## 2024-09-11 RX ORDER — GABAPENTIN 400 MG/1
CAPSULE ORAL
Qty: 90 CAPSULE | Refills: 2 | Status: SHIPPED | OUTPATIENT
Start: 2024-09-11

## 2024-09-11 RX ORDER — HYDROCODONE BITARTRATE AND ACETAMINOPHEN 10; 325 MG/1; MG/1
1 TABLET ORAL EVERY 6 HOURS PRN
Qty: 120 TABLET | Refills: 0 | Status: SHIPPED | OUTPATIENT
Start: 2024-09-25 | End: 2024-10-25

## 2024-09-11 RX ORDER — HYDROCODONE BITARTRATE AND ACETAMINOPHEN 10; 325 MG/1; MG/1
1 TABLET ORAL EVERY 6 HOURS PRN
Qty: 120 TABLET | Refills: 0 | Status: SHIPPED | OUTPATIENT
Start: 2024-11-24 | End: 2024-12-24

## 2024-09-11 NOTE — PROGRESS NOTES
She Disclaimer: This note has been generated using voice-recognition software. There may be typographical errors that have been missed during proof-reading        Patient ID: Lorraine De Jesus is a 79 y.o. female.      Chief Complaint: Leg Pain (Patient is having bilateral lower leg pain and bilateral foot pain.)      79-year-old female returns for re-evaluation of intractable bilateral neuropathy involving both feet.  Her pain is becoming more intolerable but she still declines a spinal cord stimulator trial.  I had a lengthy discussion about Qtenza and possibly receiving treatment in [place] due to lack of facility approval in [place].  She will consider treatment if her pain remains unrelenting.  Lumbar sympathetic and epidural steroid injections provided transient relief and she declines to repeat procedures at this time.  She returns today for medication refill.                Pain Assessment  Pain Assessment: 0-10  Pain Score:   7  Pain Location: Leg  Pain Orientation: Right, Left, Lower  Pain Descriptors: Aching, Constant, Sharp  Pain Frequency: Continuous  Pain Onset: On-going  Clinical Progression: Not changed  Aggravating Factors: Bending, Stretching, Exercise, Kneeling, Squatting, Standing, Walking  Pain Intervention(s): Medication (See eMAR), Rest      A's of Opioid Risk Assessment  Activity:Patient can not perform ADL.   Analgesia:Patients pain is not controlled by current medication.   Adverse Effects: Patient denies constipation or sedation.  Aberrant Behavior:  reviewed with no aberrant drug seeking/taking behavior.      Patient denies any suicidal or homicidal ideations              Review of Systems   Constitutional: Negative.    HENT: Negative.     Eyes: Negative.    Respiratory: Negative.     Cardiovascular: Negative.    Gastrointestinal: Negative.    Endocrine: Negative.    Genitourinary: Negative.    Musculoskeletal:  Positive for arthralgias and back pain.   Integumentary:  Negative.    Neurological:  Positive for numbness (BLE).   Hematological: Negative.    Psychiatric/Behavioral: Negative.               Past Medical History:   Diagnosis Date    Adenomatous polyp of ascending colon 2021    Adenomatous polyp of descending colon 2021    Age related osteoporosis 10/28/2020    Benign paroxysmal vertigo     Chronic pain syndrome     Chronic pelvic pain in female 2021    Ditropan XL 5mg    Colon, diverticulosis 2021    Depressive disorder 2019    External hemorrhoid 2021    Gastrointestinal hemorrhage associated with anorectal source 2021    GERD (gastroesophageal reflux disease) 2013    Hyperlipidemia 2012    Hypothyroidism 2019    Joint pain     Nonrheumatic tricuspid (valve) insufficiency 2018    Personal history UTI 2021    Controlled on Hiprex    Polyneuropathy 2018    PVD (peripheral vascular disease) 10/30/2018    Temporal arteritis     Type 2 diabetes mellitus 2014    Urethral meatal stenosis 2018    history of known urethral stenosis, and was taught to perform monthly self dilation at home.     Past Surgical History:   Procedure Laterality Date     left lumbar sympathetic nerve block Left 2020    X3  DR BROWN    CARDIAC CATHETERIZATION  10/14/2009     SECTION      x 2    COLONOSCOPY  2009    CYSTOSCOPY WITH HYDRODISTENSION OF BLADDER N/A 2021    Procedure: CYSTOSCOPY, WITH BLADDER HYDRODISTENSION;  Surgeon: Dequan Recio MD;  Location: [facility] OR;  Service: Urology;  Laterality: N/A;    CYSTOSCOPY WITH URETHRAL DILATION  2021    Dr Recio    EPIDURAL STEROID INJECTION N/A 10/27/2022    Procedure: Injection, Steroid, Epidural, L5/S1;  Surgeon: Belkis Brown MD;  Location: [facility];  Service: Pain Management;  Laterality: N/A;    EPIDURAL STEROID INJECTION N/A 2023    Procedure: Injection, Steroid, Epidural, L5/S1;  Surgeon: Belkis Brown MD;  Location: [facility];  Service: Pain Management;  Laterality: N/A;    HYSTERECTOMY  1980's    LUMBAR SYMPATHETIC NERVE BLOCK Left 10/05/2020    Left Sympathetic Nerve Block - Dr Brown - 10/5/20, 9/21/20, 1/20/20. 1/6/20, 10/9/19, 9/25/19 and 7/11/18    right lumbar sympathetic nerve block Right 2020    X4 DR BROWN    THYROIDECTOMY      1990's     Social History     Socioeconomic History    Marital status:    Tobacco Use    Smoking status: Never     Passive exposure: Never    Smokeless tobacco: Never   Substance and Sexual Activity    Alcohol use: Never    Drug use: Never    Sexual activity: Not Currently     Social Determinants of Health     Financial Resource Strain: Low Risk  (9/19/2023)    Overall Financial Resource Strain (CARDIA)     Difficulty of Paying Living Expenses: Not very hard   Food Insecurity: No Food Insecurity (9/19/2023)    Hunger Vital Sign     Worried About Running Out of Food in the Last Year: Never true     Ran Out of Food in the Last Year: Never true   Transportation Needs: No Transportation Needs (9/19/2023)    PRAPARE - Transportation     Lack of Transportation (Medical): No     Lack of Transportation (Non-Medical): No   Physical Activity: Sufficiently Active (9/19/2023)    Exercise Vital Sign     Days of Exercise per Week: 4 days     Minutes of Exercise per Session: 60 min   Stress: No Stress Concern Present (9/19/2023)    [employer] - Occupational Stress Questionnaire     Feeling of Stress : Not at all   Housing Stability: Low Risk  (9/19/2023)    Housing Stability Vital Sign     Unable to Pay for Housing in the Last Year: No     Number of Places Lived in the Last Year: 1     Unstable Housing in the Last Year: No     Family History   Problem Relation Name Age of Onset    Cancer Mother      Hypertension Mother      Cancer Father      Cancer Sister      Diabetes Sister      Hyperlipidemia Sister      Hypertension Sister      Cancer Brother      Diabetes Sister       No Known Problems Sister      Cancer Brother      No Known Problems Brother      Cancer Brother       Review of patient's allergies indicates:   Allergen Reactions    Lipitor [atorvastatin] Other (See Comments)     Muscle aching    Pravachol [pravastatin] Other (See Comments)     Muscle aching     has a current medication list which includes the following prescription(s): amlodipine, blood sugar diagnostic, blood-glucose meter, clonidine, duloxetine, escitalopram oxalate, ezetimibe, fluconazole, glipizide, hydrocodone-acetaminophen, lactulose, lancets, levothyroxine, linzess, olmesartan, onetouch delica plus lancet, onetouch verio test strips, oxybutynin, amoxicillin-clavulanate 875-125mg, gabapentin, hydrocodone-acetaminophen, hydrocodone-acetaminophen, [START ON 9/25/2024] hydrocodone-acetaminophen, [START ON 10/25/2024] hydrocodone-acetaminophen, [START ON 11/24/2024] hydrocodone-acetaminophen, loratadine, and polyethylene glycol.      Objective:  Vitals:    09/11/24 1241   BP: (!) 159/69   Pulse: (!) 59   Resp: 18        Physical Exam  Vitals and nursing note reviewed.   Constitutional:       General: She is not in acute distress.     Appearance: Normal appearance. She is not ill-appearing, toxic-appearing or diaphoretic.   HENT:      Head: Normocephalic and atraumatic.      Nose: Nose normal.      Mouth/Throat:      Mouth: Mucous membranes are moist.   Eyes:      Extraocular Movements: Extraocular movements intact.      Pupils: Pupils are equal, round, and reactive to light.   Cardiovascular:      Rate and Rhythm: Normal rate and regular rhythm.      Heart sounds: Normal heart sounds.   Pulmonary:      Effort: Pulmonary effort is normal. No respiratory distress.      Breath sounds: Normal breath sounds. No stridor. No wheezing or rhonchi.   Abdominal:      General: Bowel sounds are normal.      Palpations: Abdomen is soft.   Musculoskeletal:         General: No swelling, tenderness or deformity. Normal range  of motion.      Cervical back: Normal and normal range of motion. No spasms or tenderness. No pain with movement. Normal range of motion.      Thoracic back: Normal.      Lumbar back: No spasms, tenderness or bony tenderness. Normal range of motion. Negative right straight leg raise test and negative left straight leg raise test. No scoliosis.      Right lower leg: No edema.      Left lower leg: No edema.   Skin:     General: Skin is warm.   Neurological:      General: No focal deficit present.      Mental Status: She is oriented to person, place, and time. Mental status is at baseline.      Cranial Nerves: No cranial nerve deficit.      Sensory: Sensation is intact. No sensory deficit.      Motor: No weakness.      Coordination: Coordination normal.      Gait: Gait normal.      Deep Tendon Reflexes: Reflexes are normal and symmetric.   Psychiatric:         Mood and Affect: Mood normal.         Behavior: Behavior normal.           Assessment:      1. Encounter for long-term (current) use of other medications    2. Diabetic neuropathy with neurologic complication    3. Lumbosacral radiculopathy    4. Bilateral foot pain          Plan:  1. reviewed  2.Addiction, Dependency, Tolerance, Opioid abuse-misuse, Death, Diversion Discussed. Overdose reversal drug Naloxone discussed. Patient is prescribed opiates for chronic nonmalignant pain pathology.  Patient is receiving opiates which require greater than a 72 hour supply of therapy.  Patient was educated on potential dependency associated with long-term opioid use as well as decreasing efficacy with prolonged use.  Patient was advised of risks, benefits and side effects and how to utilize each medication.  Patient was also informed that any deviation from therapy protocol will  lead to discontinuation of opiates.  It is reasonable to prescribe opioid analgesics for patient based on positive response to opioid medications, lack of side effects and  limited aberrant  behavior.    3.Refill/Continue medications for pain control and function       Requested Prescriptions     Signed Prescriptions Disp Refills    HYDROcodone-acetaminophen (NORCO)  mg per tablet 120 tablet 0     Sig: Take 1 tablet by mouth every 6 (six) hours as needed for Pain (pain).    HYDROcodone-acetaminophen (NORCO)  mg per tablet 120 tablet 0     Sig: Take 1 tablet by mouth every 6 (six) hours as needed for Pain (pain).    HYDROcodone-acetaminophen (NORCO)  mg per tablet 120 tablet 0     Sig: Take 1 tablet by mouth every 6 (six) hours as needed for Pain (pain).    gabapentin (NEURONTIN) 400 MG capsule 90 capsule 2     Sig: TAKE 1 CAPSULE(400 MG) BY MOUTH EVERY 8 HOURS     4.Urine drug screen point of care obtained and consistent with prescribed medications and medication refill date.  Drug screen is to monitor compliance with prescribed opiates and to ensure that there was no misuse/abuse of other non-prescribed opioids or illicit drugs.  From this information I will determine if patient is suitable for opioid therapy      Orders Placed This Encounter   Procedures    POCT Urine Drug Screen Presump     Interpretive Information:     Negative:  No drug detected at the cut off level.   Positive:  This result represents presumptive positive for the   tested drug, other substances may yield a positive response other   than the analyte of interest. This result should be utilized for   diagnostic purpose only. Confirmation testing will be performed upon physician request only.         5.Follow with MARYBEL Bustillo in 3 months for re-evaluation and medication refill  6. Patient defers nerve block injections, physical therapy or surgical consultation  7. Consider Qtenza treatment in [place] with Dr. Srivastava           report:  Reviewed and consistent with medication use as prescribed.      The total time spent for evaluation and management on 09/11/2024 including reviewing separately obtained history,  performing a medically appropriate exam and evaluation, documenting clinical information in the health record, independently interpreting results and communicating them to the patient/family/caregiver, and ordering medications/tests/procedures was between 15-29 minutes.    The above plan and management options were discussed at length with patient. Patient is in agreement with the above and verbalized understanding. It will be communicated with the referring physician via electronic record, fax, or mail.

## 2024-09-16 NOTE — PROGRESS NOTES
Lorraine De Jesus presented for a  Medicare AWV and comprehensive Health Risk Assessment today. She is an established pt of Dr. Mateo Chen.    The following components were reviewed and updated:    Medical history  Family History  Social history  Allergies and Current Medications  Health Risk Assessment  Health Maintenance  Care Team         ** See Completed Assessments for Annual Wellness Visit within the encounter summary.**         The following assessments were completed:  Living Situation  CAGE  Depression Screening  Timed Get Up and Go  Whisper Test  Cognitive Function Screening  Nutrition Screening  ADL Screening  PAQ Screening      Opioid documentation:      Patient does have a current opioid prescription.      Patient accepted further discussion regarding opioid medication use.      Patient is currently taking hydrocodone narcotic for back pain.        Pain level today is 8/10.       In addition to narcotic pain medications, patient is also using Gabapentin for pain control.       Patient is followed by a specialist currently for their pain and will not be referred today.       Patient's opioid risk potential based on ORT-OUD tool:       Morgan each box that applies   No   Yes     Family history of substance abuse   Alcohol [x] []   Illegal drugs [x] []   Rx drugs [x] []     Personal history of substance abuse   Alcohol [x] []   Illegal drugs [x] []   Rx drugs [] [x]     Age between 16-45 years   [x]   []     Patient with ADD, OCD, Bipolar disorder, schizoprenia   [x]   []     Patient with depression   []   [x]                         Scoring total                                2                                 Non-opioid treatment options have been discussed today and added to the patient's after visit summary.        Vitals:    09/23/24 1104   BP: 122/64   BP Location: Right arm   Patient Position: Sitting   Pulse: 74   Resp: 16   Temp: 98.1 °F (36.7 °C)   TempSrc: Oral   SpO2: 99%   Weight: 78.9 kg (174 lb)  "  Height: 5' 8" (1.727 m)     Body mass index is 26.46 kg/m².  Physical Exam  Constitutional:       General: She is not in acute distress.     Appearance: Normal appearance.   HENT:      Head: Normocephalic.      Mouth/Throat:      Mouth: Mucous membranes are moist.   Cardiovascular:      Rate and Rhythm: Normal rate and regular rhythm.      Pulses: Normal pulses.      Heart sounds: Normal heart sounds. No murmur heard.  Pulmonary:      Effort: Pulmonary effort is normal. No respiratory distress.      Breath sounds: Normal breath sounds.   Abdominal:      Palpations: Abdomen is soft.      Tenderness: There is no abdominal tenderness.   Musculoskeletal:         General: Normal range of motion.      Cervical back: Neck supple.      Right lower leg: No edema.      Left lower leg: No edema.   Skin:     General: Skin is warm and dry.   Neurological:      Mental Status: She is alert and oriented to person, place, and time.   Psychiatric:         Mood and Affect: Mood normal.         Behavior: Behavior normal.             Diagnoses and health risks identified today and associated recommendations/orders:    1. Encounter for subsequent annual wellness visit (AWV) in Medicare patient  Screenings performed, as noted above. Personal preventative testing needs reviewed.  Return for AWV in 12 months or thereafter       2. Essential (primary) hypertension  Chronic - Stable - Followed up by PCP  B/P 122/64 today  Continue heart healthy diet intake     3. Type 2 diabetes mellitus without complication, without long-term current use of insulin  Chronic - Stable - Followed by PCP  Recent HA1C level 6.3, urine microalbumin/creatinine ratio normal  Continue glipizide 10 mg po daily    4. Hypothyroidism, unspecified type  Chronic - Stable - Followed by PCP  T4, TSH normal  Continue levothyroxine 75 mcg po daily    5. Lumbosacral radiculopathy  Chronic - stable -Followed by PCP  Followed by Pain Management  Continue " hydrocodone-acetaminophen  mg po q 6 hrs prn    6. Diabetic neuropathy with neurologic complication  Chronic - Stable - Followed by PCP  Recent HA1C level 6.3, urine microalbumin/creatinine ratio normal  Continue gabapentin 400 mg po q 8 hrs    7. Other reduced mobility  Chronic - Stable  Followed by PCP  No falls reported    8. BMI 26.0-26.9,adult  - encouraged heart healthy diet and some form of daily exercise for at least 30 minutes       Provided Lorraine with a 5-10 year written screening schedule and personal prevention plan. Recommendations were developed using the USPSTF age appropriate recommendations. Education, counseling, and referrals were provided as needed. After Visit Summary printed and given to patient which includes a list of additional screenings\tests needed.    Follow up in about 1 year (around 9/23/2025).    MARY Shepherd    Covid vaccine - declined, Tetanus vaccine - declined, Shingles vaccine - declined, RSV vaccine - declined, Influenza vaccine -declined states will take later, Pneumonia vaccine- declined states will take later, Eye exam - CHARLENE faxed, BMD- ordered 3/5/24 and scheduled this visit.    I offered to discuss advanced care planning, including how to pick a person who would make decisions for you if you were unable to make them for yourself, called a health care power of , and what kind of decisions you might make such as use of life sustaining treatments such as ventilators and tube feeding when faced with a life limiting illness recorded on a living will that they will need to know. (How you want to be cared for as you near the end of your natural life)     X  Patient is unwilling to engage in a discussion regarding advance directives at this time.

## 2024-09-20 ENCOUNTER — OFFICE VISIT (OUTPATIENT)
Dept: NEUROLOGY | Facility: CLINIC | Age: 79
End: 2024-09-20
Payer: COMMERCIAL

## 2024-09-20 VITALS
WEIGHT: 174.81 LBS | DIASTOLIC BLOOD PRESSURE: 74 MMHG | OXYGEN SATURATION: 99 % | BODY MASS INDEX: 26.49 KG/M2 | HEART RATE: 64 BPM | RESPIRATION RATE: 18 BRPM | SYSTOLIC BLOOD PRESSURE: 148 MMHG | HEIGHT: 68 IN

## 2024-09-20 DIAGNOSIS — F09 MILD COGNITIVE DISORDER: Primary | ICD-10-CM

## 2024-09-20 DIAGNOSIS — E11.40 DIABETIC NEUROPATHY WITH NEUROLOGIC COMPLICATION: ICD-10-CM

## 2024-09-20 DIAGNOSIS — E11.49 DIABETIC NEUROPATHY WITH NEUROLOGIC COMPLICATION: ICD-10-CM

## 2024-09-20 PROCEDURE — 3288F FALL RISK ASSESSMENT DOCD: CPT | Mod: CPTII,,, | Performed by: NURSE PRACTITIONER

## 2024-09-20 PROCEDURE — 1126F AMNT PAIN NOTED NONE PRSNT: CPT | Mod: CPTII,,, | Performed by: NURSE PRACTITIONER

## 2024-09-20 PROCEDURE — 3077F SYST BP >= 140 MM HG: CPT | Mod: CPTII,,, | Performed by: NURSE PRACTITIONER

## 2024-09-20 PROCEDURE — 1101F PT FALLS ASSESS-DOCD LE1/YR: CPT | Mod: CPTII,,, | Performed by: NURSE PRACTITIONER

## 2024-09-20 PROCEDURE — 1159F MED LIST DOCD IN RCRD: CPT | Mod: CPTII,,, | Performed by: NURSE PRACTITIONER

## 2024-09-20 PROCEDURE — 3078F DIAST BP <80 MM HG: CPT | Mod: CPTII,,, | Performed by: NURSE PRACTITIONER

## 2024-09-20 PROCEDURE — 99999 PR PBB SHADOW E&M-EST. PATIENT-LVL V: CPT | Mod: PBBFAC,,, | Performed by: NURSE PRACTITIONER

## 2024-09-20 PROCEDURE — 99215 OFFICE O/P EST HI 40 MIN: CPT | Mod: PBBFAC | Performed by: NURSE PRACTITIONER

## 2024-09-20 PROCEDURE — 99212 OFFICE O/P EST SF 10 MIN: CPT | Mod: S$PBB,,, | Performed by: NURSE PRACTITIONER

## 2024-09-20 PROCEDURE — 1160F RVW MEDS BY RX/DR IN RCRD: CPT | Mod: CPTII,,, | Performed by: NURSE PRACTITIONER

## 2024-09-20 NOTE — PROGRESS NOTES
Subjective:       Patient ID: Lorraine De Jesus is a 79 y.o. female     Chief Complaint:    No chief complaint on file.       Allergies:  Lipitor [atorvastatin] and Pravachol [pravastatin]    Current Medications:    Outpatient Encounter Medications as of 9/20/2024   Medication Sig Dispense Refill    amLODIPine (NORVASC) 5 MG tablet Take 5 mg by mouth once daily.      blood sugar diagnostic Strp 1 strip by Misc.(Non-Drug; Combo Route) route 3 (three) times daily. 200 strip 3    blood-glucose meter kit Use as instructed 1 each 0    cloNIDine (CATAPRES) 0.1 MG tablet TAKE 1 TABLET BY MOUTH EVERY 4 HOURS AS NEEDED FOR BLOOD PRESSURE GREATER THAN 170/90      DULoxetine (CYMBALTA) 30 MG capsule Take 1 capsule (30 mg total) by mouth once daily. 30 capsule 11    EScitalopram oxalate (LEXAPRO) 10 MG tablet TAKE 1 TABLET(10 MG) BY MOUTH DAILY 90 tablet 1    ezetimibe (ZETIA) 10 mg tablet Take 1 tablet (10 mg total) by mouth once daily. 90 tablet 3    fluconazole (DIFLUCAN) 100 MG tablet Take two tablets on day one then reduce to one tablet daily 5 tablet 1    gabapentin (NEURONTIN) 400 MG capsule TAKE 1 CAPSULE(400 MG) BY MOUTH EVERY 8 HOURS 90 capsule 2    glipiZIDE (GLUCOTROL) 10 MG TR24 Take 1 tablet (10 mg total) by mouth 2 (two) times daily. 180 tablet 1    HYDROcodone-acetaminophen (NORCO)  mg per tablet Take 1 tablet by mouth every 6 (six) hours as needed for Pain. 120 tablet 0    [START ON 9/25/2024] HYDROcodone-acetaminophen (NORCO)  mg per tablet Take 1 tablet by mouth every 6 (six) hours as needed for Pain (pain). 120 tablet 0    [START ON 10/25/2024] HYDROcodone-acetaminophen (NORCO)  mg per tablet Take 1 tablet by mouth every 6 (six) hours as needed for Pain (pain). 120 tablet 0    [START ON 11/24/2024] HYDROcodone-acetaminophen (NORCO)  mg per tablet Take 1 tablet by mouth every 6 (six) hours as needed for Pain (pain). 120 tablet 0    lactulose (CHRONULAC) 10 gram/15 mL solution TAKE 30  MLS BY MOUTH TWICE DAILY AS NEEDED FOR CONSTIPATION 600 mL 1    lancets (ACCU-CHEK FASTCLIX LANCET DRUM [facility] [place]) Take 1 each by mouth once daily.      levothyroxine (SYNTHROID) 75 MCG tablet Take 1 tablet (75 mcg total) by mouth before breakfast. 90 tablet 1    LINZESS 72 mcg Cap capsule TAKE 1 CAPSULE(72 MCG) BY MOUTH BEFORE BREAKFAST 30 capsule 1    olmesartan (BENICAR) 5 MG Tab Take 10 mg by mouth once daily.      ONETOUCH DELICA PLUS LANCET 33 gauge Misc Check blood sugar  each 11    ONETOUCH VERIO TEST STRIPS Strp TEST BLOOD GLUCOSE LEVELS TWICE DAILY 200 strip 4    oxybutynin (DITROPAN-XL) 10 MG 24 hr tablet Take one tablet at night 90 tablet 3    amoxicillin-clavulanate 875-125mg (AUGMENTIN) 875-125 mg per tablet Take 1 tablet by mouth 2 (two) times daily. (Patient not taking: Reported on 8/6/2024) 20 tablet 0    HYDROcodone-acetaminophen (NORCO)  mg per tablet Take 1 tablet by mouth every 6 (six) hours as needed for Pain. (Patient not taking: Reported on 8/6/2024) 120 tablet 0    HYDROcodone-acetaminophen (NORCO)  mg per tablet Take 1 tablet by mouth every 6 (six) hours as needed for Pain. (Patient not taking: Reported on 8/6/2024) 120 tablet 0    loratadine (CLARITIN) 10 mg tablet Take 1 tablet (10 mg total) by mouth once daily. (Patient not taking: Reported on 6/18/2024) 15 tablet 3    polyethylene glycol (GLYCOLAX) 17 gram PwPk Take 17 g by mouth once daily. (Patient not taking: Reported on 8/6/2024) 30 each 0    [DISCONTINUED] EScitalopram oxalate (LEXAPRO) 10 MG tablet Take 1 tablet (10 mg total) by mouth once daily. 90 tablet 1    [DISCONTINUED] gabapentin (NEURONTIN) 400 MG capsule TAKE 1 CAPSULE(400 MG) BY MOUTH EVERY 8 HOURS 90 capsule 2     No facility-administered encounter medications on file as of 9/20/2024.       History of Present Illness  78 y/o female following in neurology for reported cognitive impairment.    She has PMH of diabetes, chronic pain followed in pain treatment  here for lower back pain.    She is on neurontin, norco.  Symptoms of simple conversation forgetful, turning down wrong road, but no getting lost, is able to perform all her ADLs without complication.  No family history of alzheimer's dementia.    Her initial MMSE was 28/30, today, actually better at 29/30.  I obtained labs which were not revealing, her MRI did not indicate any acute findings, no prior CVA.    No prior sleep study    Symptoms essentially steady, denies worsening.  I suggest yearly followup and repeat MMSE, no medications recommended at this time.  Essentially no worsening in her symptoms over the last 3 years.           Review of Systems  Review of Systems   Constitutional:  Negative for diaphoresis and fever.   HENT:  Negative for congestion, hearing loss and tinnitus.    Eyes:  Negative for blurred vision, double vision, photophobia, discharge and redness.   Respiratory:  Negative for cough and shortness of breath.    Cardiovascular:  Negative for chest pain.   Gastrointestinal:  Negative for abdominal pain, nausea and vomiting.   Musculoskeletal:  Negative for back pain, joint pain, myalgias and neck pain.   Skin:  Negative for itching and rash.   Neurological:  Positive for tingling and sensory change. Negative for dizziness, tremors, speech change, focal weakness, seizures, loss of consciousness, weakness and headaches.   Psychiatric/Behavioral:  Positive for memory loss. Negative for depression and hallucinations. The patient does not have insomnia.    All other systems reviewed and are negative.     Objective:     NEUROLOGICAL EXAMINATION:     MENTAL STATUS   Oriented to person, place, and time.   Registration: recalls 3 of 3 objects. Recall at 5 minutes: recalls 2 of 3 objects.   Attention: normal. Concentration: normal.   Speech: speech is normal   Level of consciousness: alert  Knowledge: good and consistent with education.   Normal comprehension.     CRANIAL NERVES     CN II   Visual  fields full to confrontation.   Visual acuity: normal  Right visual field deficit: none  Left visual field deficit: none     CN III, IV, VI   Pupils are equal, round, and reactive to light.  Extraocular motions are normal.   Right pupil: Size: 3 mm. Shape: regular. Reactivity: brisk. Consensual response: intact. Accommodation: intact.   Left pupil: Size: 3 mm. Shape: regular. Reactivity: brisk. Consensual response: intact. Accommodation: intact.   CN III: no CN III palsy  CN VI: no CN VI palsy  Nystagmus: none   Diplopia: none  Upgaze: normal  Downgaze: normal  Conjugate gaze: present  Vestibulo-ocular reflex: present    CN V   Facial sensation intact.   Right facial sensation deficit: none  Left facial sensation deficit: none  Right corneal reflex: normal  Left corneal reflex: normal    CN VII   Facial expression full, symmetric.   Right facial weakness: none  Left facial weakness: none  Right taste: normal  Left taste: normal    CN VIII   CN VIII normal.   Hearing: intact    CN IX, X   CN IX normal.   CN X normal.   Palate: symmetric    CN XI   CN XI normal.   Right sternocleidomastoid strength: normal  Left sternocleidomastoid strength: normal  Right trapezius strength: normal  Left trapezius strength: normal    CN XII   CN XII normal.   Tongue: not atrophic  Fasciculations: absent  Tongue deviation: none    MOTOR EXAM   Muscle bulk: normal  Overall muscle tone: normal  Right arm tone: normal  Left arm tone: normal  Right arm pronator drift: absent  Left arm pronator drift: absent  Right leg tone: normal  Left leg tone: normal    Strength   Right neck flexion: 5/5  Left neck flexion: 5/5  Right neck extension: 5/5  Left neck extension: 5/5  Right deltoid: 5/5  Left deltoid: 5/5  Right biceps: 5/5  Left biceps: 5/5  Right triceps: 5/5  Left triceps: 5/5  Right wrist flexion: 5/5  Left wrist flexion: 5/5  Right wrist extension: 5/5  Left wrist extension: 5/5  Right interossei: 5/5  Left interossei: 5/5  Right  iliopsoas: 5/5  Left iliopsoas: 5/5  Right quadriceps: 5/5  Left quadriceps: 5/5  Right hamstrin/5  Left hamstrin/5  Right anterior tibial: 5/5  Left anterior tibial: 5/5  Right posterior tibial: 5/5  Left posterior tibial: 5/5  Right gastroc: 5/5  Left gastroc: 5/5    REFLEXES     Reflexes   Right brachioradialis: 2+  Left brachioradialis: 2+  Right biceps: 2+  Left biceps: 2+  Right triceps: 2+  Left triceps: 2+  Right patellar: 2+  Left patellar: 2+  Right achilles: 2+  Left achilles: 2+  Right plantar: normal  Left plantar: normal  Right Almeida: absent  Left Almeida: absent  Right ankle clonus: absent  Left ankle clonus: absent  Right pendular knee jerk: absent  Left pendular knee jerk: absent    SENSORY EXAM   Right arm light touch: normal  Left arm light touch: normal  Right leg light touch: decreased from toes  Left leg light touch: decreased from toes  Right arm vibration: normal  Left arm vibration: normal  Right leg vibration: decreased from toes  Left leg vibration: decreased from toes  Right arm proprioception: normal  Left arm proprioception: normal  Right leg proprioception: decreased from toes  Left leg proprioception: decreased from toes  Right arm pinprick: normal  Left arm pinprick: normal  Right leg pinprick: decreased from toes  Left leg pinprick: decreased from toes    GAIT AND COORDINATION     Gait  Gait: normal     Coordination   Finger to nose coordination: normal  Heel to shin coordination: normal  Tandem walking coordination: normal    Tremor   Resting tremor: absent  Intention tremor: absent  Action tremor: absent       Physical Exam  Vitals and nursing note reviewed.   Constitutional:       Appearance: Normal appearance.   HENT:      Head: Normocephalic.   Eyes:      Extraocular Movements: Extraocular movements intact and EOM normal.      Pupils: Pupils are equal, round, and reactive to light.   Cardiovascular:      Rate and Rhythm: Normal rate and regular rhythm.   Pulmonary:       Effort: Pulmonary effort is normal.      Breath sounds: Normal breath sounds.   Musculoskeletal:         General: No swelling or tenderness. Normal range of motion.      Cervical back: Normal range of motion and neck supple.      Right lower leg: No edema.      Left lower leg: No edema.   Skin:     General: Skin is warm and dry.      Coloration: Skin is not jaundiced.      Findings: No rash.   Neurological:      General: No focal deficit present.      Mental Status: She is alert and oriented to person, place, and time.      GCS: GCS eye subscore is 4. GCS verbal subscore is 5. GCS motor subscore is 6.      Cranial Nerves: No cranial nerve deficit.      Sensory: Sensory deficit present.      Motor: Motor function is intact. No weakness.      Coordination: Coordination is intact. Coordination normal. Finger-Nose-Finger Test and Heel to Shin Test normal.      Gait: Gait is intact. Gait and tandem walk normal.      Deep Tendon Reflexes: Reflexes normal.      Reflex Scores:       Tricep reflexes are 2+ on the right side and 2+ on the left side.       Bicep reflexes are 2+ on the right side and 2+ on the left side.       Brachioradialis reflexes are 2+ on the right side and 2+ on the left side.       Patellar reflexes are 2+ on the right side and 2+ on the left side.       Achilles reflexes are 2+ on the right side and 2+ on the left side.  Psychiatric:         Mood and Affect: Mood normal.         Speech: Speech normal.         Behavior: Behavior normal.          Assessment:     Problem List Items Addressed This Visit          Neuro    Diabetic neuropathy with neurologic complication (Chronic)    Mild cognitive disorder - Primary            Primary Diagnosis and ICD10  Mild cognitive disorder [F09]    Plan:     Patient Instructions   Continue current medications and plan of care  Encouraged good eating and sleep habits  Followup in one year for recheck, sooner if symptoms worsening    There are no discontinued  medications.      Requested Prescriptions      No prescriptions requested or ordered in this encounter       No orders of the defined types were placed in this encounter.

## 2024-09-23 ENCOUNTER — OFFICE VISIT (OUTPATIENT)
Dept: FAMILY MEDICINE | Facility: CLINIC | Age: 79
End: 2024-09-23
Payer: COMMERCIAL

## 2024-09-23 VITALS
TEMPERATURE: 98 F | BODY MASS INDEX: 26.37 KG/M2 | WEIGHT: 174 LBS | SYSTOLIC BLOOD PRESSURE: 122 MMHG | RESPIRATION RATE: 16 BRPM | HEIGHT: 68 IN | HEART RATE: 74 BPM | OXYGEN SATURATION: 99 % | DIASTOLIC BLOOD PRESSURE: 64 MMHG

## 2024-09-23 DIAGNOSIS — E11.9 TYPE 2 DIABETES MELLITUS WITHOUT COMPLICATION, WITHOUT LONG-TERM CURRENT USE OF INSULIN: ICD-10-CM

## 2024-09-23 DIAGNOSIS — E11.40 DIABETIC NEUROPATHY WITH NEUROLOGIC COMPLICATION: Chronic | ICD-10-CM

## 2024-09-23 DIAGNOSIS — E11.49 DIABETIC NEUROPATHY WITH NEUROLOGIC COMPLICATION: Chronic | ICD-10-CM

## 2024-09-23 DIAGNOSIS — I10 ESSENTIAL (PRIMARY) HYPERTENSION: ICD-10-CM

## 2024-09-23 DIAGNOSIS — Z00.00 ENCOUNTER FOR SUBSEQUENT ANNUAL WELLNESS VISIT (AWV) IN MEDICARE PATIENT: Primary | ICD-10-CM

## 2024-09-23 DIAGNOSIS — M54.17 LUMBOSACRAL RADICULOPATHY: Chronic | ICD-10-CM

## 2024-09-23 DIAGNOSIS — E03.9 HYPOTHYROIDISM, UNSPECIFIED TYPE: ICD-10-CM

## 2024-09-23 DIAGNOSIS — Z74.09 OTHER REDUCED MOBILITY: ICD-10-CM

## 2024-09-23 PROCEDURE — 3074F SYST BP LT 130 MM HG: CPT | Mod: ,,, | Performed by: NURSE PRACTITIONER

## 2024-09-23 PROCEDURE — 1124F ACP DISCUSS-NO DSCNMKR DOCD: CPT | Mod: ,,, | Performed by: NURSE PRACTITIONER

## 2024-09-23 PROCEDURE — 1100F PTFALLS ASSESS-DOCD GE2>/YR: CPT | Mod: ,,, | Performed by: NURSE PRACTITIONER

## 2024-09-23 PROCEDURE — 1170F FXNL STATUS ASSESSED: CPT | Mod: ,,, | Performed by: NURSE PRACTITIONER

## 2024-09-23 PROCEDURE — 1160F RVW MEDS BY RX/DR IN RCRD: CPT | Mod: ,,, | Performed by: NURSE PRACTITIONER

## 2024-09-23 PROCEDURE — 3078F DIAST BP <80 MM HG: CPT | Mod: ,,, | Performed by: NURSE PRACTITIONER

## 2024-09-23 PROCEDURE — 3288F FALL RISK ASSESSMENT DOCD: CPT | Mod: ,,, | Performed by: NURSE PRACTITIONER

## 2024-09-23 PROCEDURE — 1159F MED LIST DOCD IN RCRD: CPT | Mod: ,,, | Performed by: NURSE PRACTITIONER

## 2024-09-23 PROCEDURE — G0439 PPPS, SUBSEQ VISIT: HCPCS | Mod: ,,, | Performed by: NURSE PRACTITIONER

## 2024-09-23 NOTE — PATIENT INSTRUCTIONS
Counseling and Referral of Other Preventative  (Italic type indicates deductible and co-insurance are waived)    Patient Name: Lorraine De Jesus  Today's Date: 9/23/2024    Health Maintenance       Date Due Completion Date    Urine Drug Screen Never done ---    Naloxone Prescription Never done ---    RSV Vaccine (Age 60+ and Pregnant patients) (1 - 1-dose 60+ series) Never done ---    DEXA Scan 11/03/2023 11/3/2020    Eye Exam 05/01/2024 5/1/2023 (Done)    Override on 5/1/2023: Done (dr badillo)    Influenza Vaccine (1) 09/01/2024 9/19/2023 (Declined)    Override on 9/19/2023: Declined    Diabetes Urine Screening 10/04/2024 10/4/2023    TETANUS VACCINE 10/09/2024 (Originally 1/11/1963) ---    Shingles Vaccine (1 of 2) 10/09/2024 (Originally 1/11/1997) ---    Pneumococcal Vaccines (Age 65+) (1 of 2 - PCV) 10/09/2024 (Originally [birth year]11/1951) ---    COVID-19 Vaccine (3 - 2023-24 season) 12/12/2112 (Originally 9/1/2024) 3/22/2021    Hemoglobin A1c 11/08/2024 5/8/2024    Lipid Panel 05/08/2025 5/8/2024        No orders of the defined types were placed in this encounter.    The following information is provided to all patients.  This information is to help you find resources for any of the problems found today that may be affecting your health:                  Living healthy guide: ms.gov    Understanding Diabetes: www.diabetes.org      Eating healthy: www.cdc.gov/healthyweight      CDC home safety checklist: www.cdc.gov/steadi/patient.html      Agency on Aging: ms.gov    Alcoholics anonymous (AA): www.aa.org      Physical Activity: www.toni.nih.gov/wv1zpol      Tobacco use: ms.gov

## 2024-09-23 NOTE — LETTER
AUTHORIZATION FOR RELEASE OF   CONFIDENTIAL INFORMATION    [facility],    We are seeing Lorraine De Jesus, date of birth 1945, in the clinic at [facility]. Mateo Chen MD is the patient's PCP. Lorraine De Jesus has an outstanding lab/procedure at the time we reviewed her chart. In order to help keep her health information updated, she has authorized us to request the following medical record(s):        (  )  MAMMOGRAM                                      (  )  COLONOSCOPY      (  )  PAP SMEAR                                          (  )  OUTSIDE LAB RESULTS     (  )  DEXA SCAN                                          (x  )  EYE EXAM            (  )  FOOT EXAM                                          (  )  ENTIRE RECORD     (  )  OUTSIDE IMMUNIZATIONS                 (  )  _______________         Please fax records to [facility], [place], John Anthony, MD, [phone]     If you have any questions, please contact  at [phone].           Patient Name: Lorraine De Jesus  : 1945  Patient Phone #: [phone]

## 2024-09-24 PROBLEM — Z74.09 OTHER REDUCED MOBILITY: Status: ACTIVE | Noted: 2024-09-24

## 2024-09-30 ENCOUNTER — EXTERNAL CHRONIC CARE MANAGEMENT (OUTPATIENT)
Dept: FAMILY MEDICINE | Facility: CLINIC | Age: 79
End: 2024-09-30
Payer: COMMERCIAL

## 2024-09-30 PROCEDURE — G0511 CCM/BHI BY RHC/FQHC 20MIN MO: HCPCS | Mod: ,,, | Performed by: INTERNAL MEDICINE

## 2024-10-03 ENCOUNTER — HOSPITAL ENCOUNTER (OUTPATIENT)
Dept: RADIOLOGY | Facility: HOSPITAL | Age: 79
Discharge: HOME | End: 2024-10-03
Attending: INTERNAL MEDICINE
Payer: COMMERCIAL

## 2024-10-03 DIAGNOSIS — Z78.0 POST-MENOPAUSAL: ICD-10-CM

## 2024-10-03 PROCEDURE — 77080 DXA BONE DENSITY AXIAL: CPT | Mod: TC

## 2024-10-04 ENCOUNTER — OFFICE VISIT (OUTPATIENT)
Dept: UROLOGY | Facility: CLINIC | Age: 79
End: 2024-10-04
Payer: COMMERCIAL

## 2024-10-04 VITALS
TEMPERATURE: 98 F | OXYGEN SATURATION: 99 % | HEIGHT: 68 IN | SYSTOLIC BLOOD PRESSURE: 110 MMHG | DIASTOLIC BLOOD PRESSURE: 78 MMHG | RESPIRATION RATE: 18 BRPM | HEART RATE: 72 BPM | BODY MASS INDEX: 26.46 KG/M2

## 2024-10-04 DIAGNOSIS — N32.89 BLADDER SPASMS: ICD-10-CM

## 2024-10-04 DIAGNOSIS — R35.1 NOCTURIA: ICD-10-CM

## 2024-10-04 DIAGNOSIS — N20.0 RENAL CALCULUS: ICD-10-CM

## 2024-10-04 DIAGNOSIS — N35.82 OTHER STRICTURE OF URETHRA IN FEMALE: Primary | Chronic | ICD-10-CM

## 2024-10-04 DIAGNOSIS — N39.41 URGE INCONTINENCE: ICD-10-CM

## 2024-10-04 PROCEDURE — 3288F FALL RISK ASSESSMENT DOCD: CPT | Mod: CPTII,,, | Performed by: NURSE PRACTITIONER

## 2024-10-04 PROCEDURE — 1126F AMNT PAIN NOTED NONE PRSNT: CPT | Mod: CPTII,,, | Performed by: NURSE PRACTITIONER

## 2024-10-04 PROCEDURE — 99999 PR PBB SHADOW E&M-EST. PATIENT-LVL IV: CPT | Mod: PBBFAC,,, | Performed by: NURSE PRACTITIONER

## 2024-10-04 PROCEDURE — 1101F PT FALLS ASSESS-DOCD LE1/YR: CPT | Mod: CPTII,,, | Performed by: NURSE PRACTITIONER

## 2024-10-04 PROCEDURE — 3078F DIAST BP <80 MM HG: CPT | Mod: CPTII,,, | Performed by: NURSE PRACTITIONER

## 2024-10-04 PROCEDURE — 1159F MED LIST DOCD IN RCRD: CPT | Mod: CPTII,,, | Performed by: NURSE PRACTITIONER

## 2024-10-04 PROCEDURE — 99213 OFFICE O/P EST LOW 20 MIN: CPT | Mod: S$PBB,,, | Performed by: NURSE PRACTITIONER

## 2024-10-04 PROCEDURE — 3074F SYST BP LT 130 MM HG: CPT | Mod: CPTII,,, | Performed by: NURSE PRACTITIONER

## 2024-10-04 PROCEDURE — 99214 OFFICE O/P EST MOD 30 MIN: CPT | Mod: PBBFAC | Performed by: NURSE PRACTITIONER

## 2024-10-04 PROCEDURE — 1160F RVW MEDS BY RX/DR IN RCRD: CPT | Mod: CPTII,,, | Performed by: NURSE PRACTITIONER

## 2024-10-04 PROCEDURE — 87086 URINE CULTURE/COLONY COUNT: CPT | Mod: ,,, | Performed by: CLINICAL MEDICAL LABORATORY

## 2024-10-04 RX ORDER — OXYBUTYNIN CHLORIDE 10 MG/1
TABLET, EXTENDED RELEASE ORAL
Qty: 90 TABLET | Refills: 3 | Status: SHIPPED | OUTPATIENT
Start: 2024-10-04

## 2024-10-04 NOTE — PATIENT INSTRUCTIONS
Urine culture   Renew and Continue Oxybutynin (Ditropan) XL 10 mg one at bedtime   Schedule Urethral Dilatation with Dr. ARIEL Snigleton toward the end of this month -- will call you with date and time   Follow up with Urology NP JIMBO Luciano in 3 months or sooner if needed

## 2024-10-04 NOTE — PROGRESS NOTES
Subjective     Patient ID: Lorraine De Jesus is a 79 y.o. female.    Chief Complaint: Follow-up (Pt here for bladder spasms--states it is better since last dilitation--has low abdominal pressure, up some nights 3 to 4 x to void---Did not bring meds with her nor list of meds for review)    UROLOGY HISTORY PER DR. COFFMAN:  6/9/2021  here for repeat dilation. patient has chronic pain that is improved with urination. serial dilations over the years. typically she does well for a few months but was dilated just over 30 days ago.        Cystoscopy: After informed consent was confirmed. preprocedural abx were administered and the patient was taken to the cystoscopy suite and prepped and draped in standard fashion. The urethra was injected with lidocaine. Cystoscopy revealed no urethral lesions, moderately obstructing prostate, large bladder with no trabeculations or mucosal lesions. Ureteral orifices were identified bilaterally and were noted to be in orthotopic position demonstrate a clear reflux bilaterally. Retroflexion maneuver was performed to evaluate the trigone. Patient tolerated procedure well.         dilated up to 28 F without issue. teaching on self dilation today. sent home with sample. instructions not to introduce dilator farther then 1in into the urethra. discussed risk of injury to bladder. infection. pain.     recommend q 1 month self dilation. teaching performed by RN staff.      start hiprex 1gm daily, pyridium 100mg daily prn.     follow up 3 months for symptom check.    06/09/2021)--------------------------------------------------------------------------     Ms. De Jesus has been transferred to my care in Dr. Coffman's absence for f/u from above-mentioned cystoscopy with Hydrostatic Dilatation.  She states that she is doing much better since procedure, compliant with Hiprex, and free of s/s of infection to date.  Adds that her incontinence and bladder spasms are controlled on Oxybutynin 5mg daily.   (7/8/2021)----------------------------------------------------------------------------------     Ms. De Jesus is here today for c/o bladder pressure.  She has a history of known urethral stenosis, and   She denies fever, constitutional symptoms or sensation of infection today.  PVR  0 ml.     -  Urethral stenosis:  Not performing self dilatations, states unable. Though taught to perform monthly self dilation at last visit, states she is unable to do this.  Reports she was symptom free following last cystoscopy with dilatation, but strainiing and pain at voiding onset have returned over the last 2 weeks.  -  Personal history UTI:  Hiprex started in June of this year.  -  Chronic pelvic pain in female:  R/t bladder spasms.  Controlled last visit with oxybutynine 5mg daily.  Discussed increasing Ditropan to BID  [November 22, 2021]  -------------------------------------------------------------------------------------------------------------------------------------------------------------------------------------------------------------------------  The above notes are notes of Dr. Recio and Ms. Shaw.  Patient sent to me for urethral dilatation.  She has been having problems with urethral syndrome since she was in her 40s.  Last urethral dilatation done June 24th and she feels she needs another today.  Pressure and discomfort like she usually has.  She is familiar with urethral dilatations and wishes to proceed again.   ------------------------------------------------------------- [December 16, 2021]-----------------------------------------------------------------------------------------------------------------------     Ms. De Jesus is a very pleasant 76 yo AA female, who has returned for c/o severe exacerbation of chronic pelvic pain and difficulty emptying.  Patient states increase of straining to urinate, causing bladder spasms from bladder around to back.  PVR 47 l.  Requests repeat urethral dilatations.  Dr. Singleton to  "room to evaluate.  Patient to be worked into his schedule this after noon for procedure.     She was last seen by Dr. Singleton 10 months prior for urethral dilatation and following POC:  "Urethral dilatation performed as described.  Urine sent for culture since she has lots of amorphous crystals that could mask infection.  Patient given Cipro 500 mg b.i.d. for 3 days to cover instrumentation.  She is to call when she needs another urethral dilatation"  [9/15/2022]-----------------------------------------------------------------------------------------  --------------------------------------------------------------------------------------------------------------------------------------------------------------------------------------------------------------------------------------------------  The above notes are from SAMSON Shaw, Dr. ARIEL Singleton and Dr KARLI Recio in the EMR system     This pleasant 78 year old female presents to the clinic for follow up of urethral stricture and bladder spasms. Patient was last seen in September 2022 for urethral dilatation. Patient reports urinary frequency, urgency, incomplete bladder emptying, bladder spasms, and nocturia 3 to 4 times a night. She denies dysuria, hematuria, nausea, vomiting, fever or chills. Her PVR is 100 mls today. She is on Ditropan XL 5 mg one at night and is tolerating this medication without side effects. She reports it does help some with her symptoms. She request to have a urethral dilatation done today. This was discussed with Dr. ARIEL Singleton and they will do the dilatation. She will follow up with us PRN and call when she feels like she needs another dilatation. I discussed the plan in detail with the patient and she is in agreement with the plan. All her questions were answered at today's visit. ------------------------------------------------------[June 27, 2023].      See note of  Demar above.  Patient wants another urethral dilatation which will be performed " today. [June 27, 2023 Dr. ARIEL Singleton].      This pleasant 78 year old female presents to the clinic for renal stone, urethral stricture and bladder spasms. Patient was seen by her PCP in July 2023 for abdominal pain. Patient had a KUB done on July 26, 2023 that showed a 8 mm inferior pole left renal calculus, Nonspecific bowel gas pattern and Mild levoconvex curvature of the lumbar spine. Her KUB today showed No acute process. Left nephrolithiasis without change. The KUB today also showed there is mild to moderate stool in the normal caliber colon. I reviewed both KUB's with Urologist Dr. ARIEL Singleton and the patient. Dr. Singleton does not feel the kidney stone is the source of her pain as there was no obstruction or hydronephrosis noted. She does have a urethral stricture that was dilated in the office at the last visit by Dr. ARIEL Singleton. She does report this helped with her symptoms. She desires to have another dilatation but wants to wait another month for this. She is on Ditropan XL 5 mg one at night and is tolerating this medication without side effects. She desires to continue this medication. She denies any symptoms of a UTI. Her urine culture in June and July 2023 were negative. She denies dysuria or hematuria. Her PVR today is 16 mls. I discussed the plan in detail with Urologist Dr. ARIEL Singleton and the patient and they are in agreement with the plan. All her questions were answered at today's visit. I spent 20 minutes counseling this patient.      KUB done on August 14, 2023 showed FINDINGS: The bowel gas pattern is nonobstructive without gross mass lesion.  There is mild to moderate stool in the normal caliber colon. Radiopaque calculus overlies lower pole left kidney without change Osseous structures are similar  Impression:  No acute process  Left nephrolithiasis without change. -----[ August 14, 2023].    ------------------------------------------------------------------------------------------------------------------------------------------------------------------------------------------------------------     This pleasant 78 year old female presents to the clinic for follow up of urethral stricture, bladder spasms, and thinks she has a UTI. Patient desires to have another Urethral Dilatation. She reported at the last visit that the dilatation did help. She feels like she may have a UTI.  She does have some lower abdominal pressure from not emptying her bladder.  She reports nocturia 4 times a night, frequency, urgency, and incomplete bladder emptying. Her PVR is 200 mls. She denies dysuria, hematuria, fever, chills, nausea or vomiting. I discussed having the dilatation with Dr. Singleton and the patient and they are in agreement with doing the dilatation at today's visit. She is on Oxybutynin XL 5 mg and is tolerating this medication without side effects. She desires to continue this medication. She did not complain of stone pain at today's visit. Her KUB in August 2023 showed No acute process  Left nephrolithiasis without change and her KUB done on July 26, 2023 that showed a 8 mm inferior pole left renal calculus. She has some fatigue at today's visit and will follow up with her PCP for this. I discussed the plan in detail with the patient and she is in agreement with the plan. All her questions were answered at today's visit. I spent 20 minutes counseling this patient. ----------------------------------------------------------  [October 6, 2023].   ------------------------------------------------------------------------------------------------------------------------------------------------------------------------------------------------------------------     Procedures     Urethral dilatation     The patient was placed in the dorsal lithotomy position in clinic treatment room and prepared for urethral dilatation.   Urethra was anesthetized with lidocaine jelly.  No sedation was given.  Urethral dilatation was begun with Golden sounds starting with the 20 Guyanese Golden sound.  She was dilated through 28 Guyanese successfully and procedure terminated.  The residual in the bladder was approximately 70 mL.  She tolerated reasonably well.     Urethral dilatation as described.  Urine culture collected.  See note of Mr. Benz.  Patient given Cipro 500 mg b.i.d. for 3 days to cover instrumentation.  -----------------------------------------------------------------------------------------------------  [October 6, 2023].   -------------------------------------------------------------------------------------------------------------------------------------------------------------------------------------------------------------------------------------  The above note is from Dr. ARIEL Singleton in the EMR system.      This pleasant 78 year old female presents to the clinic for follow up of urethral stricture, bladder spasms, UTI, and kidney stone. Patient desires to have another Urethral Dilatation. She reported at the last visit that the dilatation did help. I discussed having the dilatation with Dr. Singleton and the patient and they are in agreement with doing the dilatation. She will be scheduled for the urethral dilatation for January 9, 2024 at 3:15 pm with Dr. ARIEL Singleton since this could not be done today. She feels like she may have another UTI.  She reports being treated for a UTI in the ER with IV Gentamicin and PO Ceftin 500 mg on October 16, 2023. Her urine culture grew 20,000 Streptococcus agalactiae (Group B) on October 16, 2023.  She followed up with her PCP and repeat urine culture on November 6, 2023 was negative.   She reports nocturia 2 to 4 times a night, dysuria, urgency, incontinence, bladder spasms and incomplete bladder emptying. Her PVR is 121 mls. She denies frequency, hematuria, fever, chills, nausea or vomiting.  She is on  Oxybutynin XL 5 mg and is tolerating this medication without side effects. We discussed increasing the Oxybutynin XL to 10 mg one at bedtime and she is agreeable.  She did not complain of stone pain at today's visit. Her KUB in August 2023 showed No acute process  Left nephrolithiasis without change and her KUB done on July 26, 2023 that showed a 8 mm inferior pole left renal calculus. Her KUB done on October 2, 2023 showed An 8 mm calcification which overlies the upper pole of the left kidney in addition to Abundant stool.  I will culture her urine and treat if indicated. She may need to be back on a UTI suppressant if she continues to have frequent UTI's. I discussed the plan in detail with the patient and she is in agreement with the plan. All her questions were answered at today's visit. ----------------------------------------------------------------------  [January 8, 2024].         This pleasant 79 year old female presents to the clinic as a referral from Dr. RONDA Chen for kidney stone.  She is a regular Urology patient that is followed for urethral stricture, bladder spasms, UTI, and kidney stone.  Patient had a KUB on February 5, 2024 that showed 1 cm stone overlies the left kidney, unchanged.  Significant colonic stool.  Her PCP cultured her urine on February 13, 2024 and it is growing >100,000 Coagulase-negative Staphylococcus species but not final as of today.  She will follow up with PCP for treatment and we will be happy to assist if needed.  She does report dysuria, left lower back pain, bladder discomfort, nocturia 2 to 3 times a night, urgency and difficulty emptying her bladder.  Her PVR is 98 mls.  She denies hematuria or frequency.  She desires to have another urethral dilatation and I discussed this with the patient and Dr. Singleton.   Dr. Singleton recommends she waits until after the UTI is treated and we also recommend starting Hiprex for UTI suppression after the UTI is treated. We will go ahead and  schedule the CT renal stone study to evaluate the kidney stone. Patient does not feel the Ditropan is helping as much with the bladder spasms.  I discussed increasing the Ditropan or we can try Gemtesa 75 mg one at bedtime.  She desires to try the Gemtesa and will hold the Ditropan for now.  She was given samples of this medication and encouraged to read up on the side effects. I discussed the plan in detail with the patient and she is in agreement with the plan.  All her questions were answered at today's visit.  I spent 20 minutes counseling this patient.        XR KUB done on February 5, 2024. CLINICAL HISTORY:  Unspecified abdominal pain.  TECHNIQUE:  XR KUB.  COMPARISON:  10/2/23.  FINDINGS:  Lower lobes are clear.  There is no bowel obstruction.  No free air.  1 cm stone overlies the left kidney, unchanged.  Significant colonic stool.  No acute bone findings.  Degenerative change of the lumbar spine and hips.  Impression:  1 cm stone overlies the left kidney, unchanged.  Significant colonic stool.  Electronically signed by: Louie Pina.  ----------------------------------------------  [February 15, 2024].             This pleasant 79 year old female presents to the clinic for follow up of CT results, urethral stricture, bladder spasms, UTI, and kidney stone.  Patient states she is doing better today.  Her CT Renal Stone Study done on March 8, 2024 showed No acute CT findings. There is a 9 mm calculus within the inferior pole of left kidney with No convincing ureteral calculus or hydronephrosis.  I reviewed the CT with Dr. Singleton and discussed with the patient that the stone is still in the kidney without any hydronephrosis, obstruction or ureteral stone seen.  We discussed that her pain is not from the kidney stone and more likely from the constipation.  We recommended trying a bottle of mag citrate per the bottle instructions and if no improvement then follow up with her PCP for the constipation. Patient  is agreeable and states she would like to schedule her next urethral dilatation.  This will be scheduled with Dr. ARIEL Singleton for April 30, 2024 at 2:30 pm.  She does report some incomplete bladder emptying, Nocturia 2 to 3 times a night and urinary incontinence.  Her PVR today is 000 mls. She denies dysuria, hematuria, frequency or urgency. She denies any fever, chills, nausea or vomiting. She denies any back or flank pain but does have some abdominal discomfort that has improved since the last visit. We held the Oxybutynin at the last visit and gave her samples of the Gemtesa 75 mg one at bedtime.  She will let us know if this medication works better for her.  Her last urine culture in February 2024 grew >100,000 Staphylococcus epidermidis that was treated with bactrim ds by her PCP. She does not feel like she has a UTI today.  We may need to consider adding Hiprex in the future if she continues to have multiple UTI's.  I discussed the plan in detail with Urologist Dr. ARIEL Singleton and the patient and they are in agreement with the plan.  All her questions were answered at today's visit.      CT RENAL STONE STUDY ABD PELVIS WO done on March 8, 2024. CLINICAL HISTORY:  Calculus of kidneykidney stone;  COMPARISON:  CT abdomen pelvis November 30, 2020  TECHNIQUE:  Multiple axial tomographic images of the abdomen and pelvis were obtained without the use of intravenous contrast.  FINDINGS:  Lung bases clear.  1.1 cm hypodensity within the ventral left hepatic lobe, similar to prior, likely benign.  Visualized gallbladder grossly unremarkable.  Visualized pancreas appears unremarkable.  Spleen grossly unremarkable.  Bilateral adrenal glands grossly unremarkable.  9 mm calculus within the inferior pole of left kidney.  No convincing ureteral calculus or hydronephrosis.  Urinary bladder incompletely distended.  Prior hysterectomy.  No convincing evidence of gastrointestinal obstruction or acute appendicitis.  Atherosclerotic  calcification demonstrated.  Scattered skeletal degenerative change.  Partially visualized probable hemangioma within T8 vertebral body.  Impression:  No acute CT findings. 9 mm calculus within the inferior pole of left kidney.  No convincing ureteral calculus or hydronephrosis.  Prior hysterectomy.  Other/detailed findings as above.  The CT exam was performed using one or more of the following dose  reduction techniques- Automated exposure control, adjustment of the mA  and/or kV according to patient size, and/or use of iterative  reconstructed technique.  Point of Service: [facility].  Electronically signed by: Vadim Swanson.   ------------------------------------------------------------------------------------------------------------------------------------------  [March 8, 2024]            This pleasant 79 year old female presents to the clinic for follow up of urethral stricture, bladder spasms, and kidney stone.  Patient states she is doing ok today.  She reports some bladder pressure, nocturia 3 to 4 times a night,  some incomplete bladder emptying and some urge incontinence.  Her PVR was 000 mls. She is on Oxybutynin XL 10 mg one daily and tolerating this medication without side effects. She denies any dysuria, hematuria, or frequency. She denies fever, chills, nausea or vomiting.  She denies any abdominal, bladder or back pain but does report some bladder spasms. She denies any stone pains. Her CT Renal Stone Study done on March 8, 2024 showed No acute CT findings. There is a 9 mm calculus within the inferior pole of left kidney with No convincing ureteral calculus or hydronephrosis.  I reviewed the CT with Dr. Singleton and discussed with the patient at the March 2024 visit that the stone is still in the kidney without any hydronephrosis, obstruction or ureteral stone seen.  Patient  states she would like to schedule her next urethral dilatation.  This will be scheduled with Dr. ARIEL Singleton and she  will be notified of the date and time.  She tried the Gemtesa without much benefit.  Her last urine culture in June 2024 grew >100,000 Staphylococcus epidermidis and she is unsure what she was treated with. . She does not feel like she has a UTI today.  We may need to consider adding Hiprex in the future if she continues to have multiple UTI's. I will culture her urine and treat if indicated.  We will schedule the dilatation as outlined in the plan.  She will continue the Oxybutynin XL 10 mg and request a refill.  I discussed the plan in detail with the patient and she is in agreement with the plan.  All her questions were answered at today's visit.   ------------------------------------------------------------------------------------------------------  [October 4, 2024].             Review of Systems   Constitutional:  Negative for activity change and fever.   HENT:  Negative for hearing loss and trouble swallowing.    Eyes:  Negative for visual disturbance.   Respiratory:  Negative for cough, shortness of breath and wheezing.    Cardiovascular:  Negative for chest pain.   Gastrointestinal:  Negative for abdominal pain, diarrhea, nausea and vomiting.   Endocrine: Negative for polyuria.   Genitourinary:  Positive for bladder incontinence and nocturia. Negative for decreased urine volume, difficulty urinating, dysuria, enuresis, flank pain, frequency, hematuria and urgency.        Urethral stricture        Bladder spasms         Renal Stone    Musculoskeletal:  Negative for back pain and gait problem.   Integumentary:  Negative for rash.   Neurological:  Negative for speech difficulty and weakness.   Psychiatric/Behavioral:  Negative for behavioral problems and confusion.           Objective     Physical Exam  Vitals and nursing note reviewed.   Constitutional:       General: She is not in acute distress.     Appearance: Normal appearance. She is not ill-appearing, toxic-appearing or diaphoretic.   HENT:      Head:  Normocephalic.   Eyes:      Extraocular Movements: Extraocular movements intact.   Cardiovascular:      Rate and Rhythm: Normal rate and regular rhythm.      Heart sounds: Normal heart sounds.   Pulmonary:      Effort: Pulmonary effort is normal.      Breath sounds: Normal breath sounds. No wheezing, rhonchi or rales.   Abdominal:      General: Bowel sounds are normal.      Palpations: Abdomen is soft.      Tenderness: There is no abdominal tenderness. There is no right CVA tenderness, left CVA tenderness, guarding or rebound.   Musculoskeletal:         General: Normal range of motion.      Cervical back: Normal range of motion. No rigidity.   Skin:     General: Skin is warm and dry.   Neurological:      General: No focal deficit present.      Mental Status: She is alert and oriented to person, place, and time.      Motor: No weakness.      Coordination: Coordination normal.      Gait: Gait normal.   Psychiatric:         Mood and Affect: Mood normal.         Behavior: Behavior normal.         Thought Content: Thought content normal.          Assessment and Plan     Problem List Items Addressed This Visit          Renal/    Stricture of female urethra - Primary (Chronic)    Bladder spasms    Relevant Medications    oxybutynin (DITROPAN-XL) 10 MG 24 hr tablet    Other Relevant Orders    Urine culture    Renal calculus    Urge incontinence    Relevant Orders    Urine culture     Other Visit Diagnoses       Nocturia        Relevant Medications    oxybutynin (DITROPAN-XL) 10 MG 24 hr tablet                   Urine culture   Renew and Continue Oxybutynin (Ditropan) XL 10 mg one at bedtime   Schedule Urethral Dilatation with Dr. ARIEL Singleton toward the end of this month -- will call you with date and time   Follow up with Urology NP JIMBO Luciano in 3 months or sooner if needed

## 2024-10-05 LAB — UA COMPLETE W REFLEX CULTURE PNL UR: NORMAL

## 2024-10-07 ENCOUNTER — TELEPHONE (OUTPATIENT)
Dept: UROLOGY | Facility: CLINIC | Age: 79
End: 2024-10-07
Payer: COMMERCIAL

## 2024-10-07 NOTE — TELEPHONE ENCOUNTER
----- Message from Andrea Benz NP sent at 10/7/2024  8:08 AM CDT -----  Please let patient know her urine culture was negative, thanks   I called and spoke with pt and relayed the above message to her.  She voiced understanding.

## 2024-10-16 ENCOUNTER — TELEPHONE (OUTPATIENT)
Dept: FAMILY MEDICINE | Facility: CLINIC | Age: 79
End: 2024-10-16
Payer: COMMERCIAL

## 2024-10-16 RX ORDER — EZETIMIBE 10 MG/1
10 TABLET ORAL DAILY
Qty: 90 TABLET | Refills: 3 | Status: SHIPPED | OUTPATIENT
Start: 2024-10-16

## 2024-10-16 NOTE — TELEPHONE ENCOUNTER
----- Message from Mateo Chen MD sent at 10/15/2024  1:02 PM CDT -----  Need to see in  1 week please  abnl results     0839 Pt is scheduled for 11/05/24

## 2024-10-29 ENCOUNTER — OFFICE VISIT (OUTPATIENT)
Dept: UROLOGY | Facility: CLINIC | Age: 79
End: 2024-10-29
Payer: COMMERCIAL

## 2024-10-29 VITALS
WEIGHT: 174 LBS | HEART RATE: 71 BPM | HEIGHT: 68 IN | DIASTOLIC BLOOD PRESSURE: 70 MMHG | SYSTOLIC BLOOD PRESSURE: 124 MMHG | TEMPERATURE: 98 F | BODY MASS INDEX: 26.37 KG/M2

## 2024-10-29 DIAGNOSIS — G89.29 CHRONIC PELVIC PAIN IN FEMALE: ICD-10-CM

## 2024-10-29 DIAGNOSIS — N39.41 URGE INCONTINENCE: ICD-10-CM

## 2024-10-29 DIAGNOSIS — N39.0 URINARY TRACT INFECTION WITHOUT HEMATURIA, SITE UNSPECIFIED: ICD-10-CM

## 2024-10-29 DIAGNOSIS — N34.2 CHRONIC URETHRITIS: Primary | ICD-10-CM

## 2024-10-29 DIAGNOSIS — R10.2 CHRONIC PELVIC PAIN IN FEMALE: ICD-10-CM

## 2024-10-29 DIAGNOSIS — N20.0 RENAL CALCULUS: ICD-10-CM

## 2024-10-29 DIAGNOSIS — N34.3 URETHRAL SYNDROME: ICD-10-CM

## 2024-10-29 PROCEDURE — 53660 DILATION OF URETHRA: CPT | Mod: PBBFAC | Performed by: UROLOGY

## 2024-10-29 PROCEDURE — 99999 PR PBB SHADOW E&M-EST. PATIENT-LVL III: CPT | Mod: PBBFAC,,, | Performed by: UROLOGY

## 2024-10-29 PROCEDURE — 87086 URINE CULTURE/COLONY COUNT: CPT | Mod: ,,, | Performed by: CLINICAL MEDICAL LABORATORY

## 2024-10-29 PROCEDURE — 99213 OFFICE O/P EST LOW 20 MIN: CPT | Mod: PBBFAC | Performed by: UROLOGY

## 2024-10-29 RX ORDER — NITROFURANTOIN 25; 75 MG/1; MG/1
100 CAPSULE ORAL 2 TIMES DAILY
Qty: 20 CAPSULE | Refills: 2 | Status: SHIPPED | OUTPATIENT
Start: 2024-10-29

## 2024-10-31 ENCOUNTER — TELEPHONE (OUTPATIENT)
Dept: UROLOGY | Facility: CLINIC | Age: 79
End: 2024-10-31
Payer: COMMERCIAL

## 2024-10-31 ENCOUNTER — EXTERNAL CHRONIC CARE MANAGEMENT (OUTPATIENT)
Dept: FAMILY MEDICINE | Facility: CLINIC | Age: 79
End: 2024-10-31
Payer: COMMERCIAL

## 2024-10-31 LAB — UA COMPLETE W REFLEX CULTURE PNL UR: NO GROWTH

## 2024-10-31 PROCEDURE — G0511 CCM/BHI BY RHC/FQHC 20MIN MO: HCPCS | Mod: ,,, | Performed by: INTERNAL MEDICINE

## 2024-11-05 ENCOUNTER — OFFICE VISIT (OUTPATIENT)
Dept: FAMILY MEDICINE | Facility: CLINIC | Age: 79
End: 2024-11-05
Payer: COMMERCIAL

## 2024-11-05 VITALS
SYSTOLIC BLOOD PRESSURE: 132 MMHG | OXYGEN SATURATION: 100 % | RESPIRATION RATE: 18 BRPM | HEART RATE: 78 BPM | DIASTOLIC BLOOD PRESSURE: 76 MMHG | BODY MASS INDEX: 26.37 KG/M2 | TEMPERATURE: 97 F | HEIGHT: 68 IN | WEIGHT: 174 LBS

## 2024-11-05 DIAGNOSIS — E11.9 TYPE 2 DIABETES MELLITUS WITHOUT COMPLICATION, WITHOUT LONG-TERM CURRENT USE OF INSULIN: Primary | ICD-10-CM

## 2024-11-05 DIAGNOSIS — M81.0 OSTEOPOROSIS, UNSPECIFIED OSTEOPOROSIS TYPE, UNSPECIFIED PATHOLOGICAL FRACTURE PRESENCE: ICD-10-CM

## 2024-11-05 LAB
CREAT UR-MCNC: 88 MG/DL (ref 28–219)
MICROALBUMIN UR-MCNC: 2.4 MG/DL (ref 0–2.8)
MICROALBUMIN/CREAT RATIO PNL UR: 27.3 MG/G (ref 0–30)
PROT UR-MCNC: 21.5 MG/DL (ref 0–11.9)

## 2024-11-05 PROCEDURE — 99214 OFFICE O/P EST MOD 30 MIN: CPT | Mod: ,,, | Performed by: INTERNAL MEDICINE

## 2024-11-05 PROCEDURE — 3078F DIAST BP <80 MM HG: CPT | Mod: ,,, | Performed by: INTERNAL MEDICINE

## 2024-11-05 PROCEDURE — 84156 ASSAY OF PROTEIN URINE: CPT | Mod: ,,, | Performed by: CLINICAL MEDICAL LABORATORY

## 2024-11-05 PROCEDURE — 82043 UR ALBUMIN QUANTITATIVE: CPT | Mod: ,,, | Performed by: CLINICAL MEDICAL LABORATORY

## 2024-11-05 PROCEDURE — 3075F SYST BP GE 130 - 139MM HG: CPT | Mod: ,,, | Performed by: INTERNAL MEDICINE

## 2024-11-05 PROCEDURE — 82570 ASSAY OF URINE CREATININE: CPT | Mod: ,,, | Performed by: CLINICAL MEDICAL LABORATORY

## 2024-11-05 PROCEDURE — 1126F AMNT PAIN NOTED NONE PRSNT: CPT | Mod: ,,, | Performed by: INTERNAL MEDICINE

## 2024-11-05 PROCEDURE — 1159F MED LIST DOCD IN RCRD: CPT | Mod: ,,, | Performed by: INTERNAL MEDICINE

## 2024-11-05 RX ORDER — MULTIVITAMIN
1 TABLET ORAL DAILY
Qty: 90 TABLET | Refills: 1 | Status: SHIPPED | OUTPATIENT
Start: 2024-11-05

## 2024-11-05 RX ORDER — GLIPIZIDE 10 MG/1
10 TABLET, FILM COATED, EXTENDED RELEASE ORAL 2 TIMES DAILY
Qty: 180 TABLET | Refills: 1 | Status: SHIPPED | OUTPATIENT
Start: 2024-11-05

## 2024-11-05 RX ORDER — LEVOTHYROXINE SODIUM 75 UG/1
75 TABLET ORAL
Qty: 90 TABLET | Refills: 1 | Status: SHIPPED | OUTPATIENT
Start: 2024-11-05

## 2024-11-05 NOTE — PROGRESS NOTES
Subjective:       Patient ID: Lorraine De Jesus is a 79 y.o. female.    Chief Complaint: Diabetes (3 month fu ) and Health Maintenance (Pt will have diabetes urine screening done today)    HPI  .  Patient presents after several falls.  She also complains of neuropathy both feet she has chronic hypothyroidism also has a new diagnosis of osteoporosis I recommended physical therapy but she does not want to pursue physical therapy at this time.  Also recommended a cane or walker she is not ready for cane or walker either.  Patient stated.    Current Medications:    Current Outpatient Medications:     blood sugar diagnostic Strp, 1 strip by Misc.(Non-Drug; Combo Route) route 3 (three) times daily., Disp: 200 strip, Rfl: 3    blood-glucose meter kit, Use as instructed, Disp: 1 each, Rfl: 0    cloNIDine (CATAPRES) 0.1 MG tablet, TAKE 1 TABLET BY MOUTH EVERY 4 HOURS AS NEEDED FOR BLOOD PRESSURE GREATER THAN 170/90, Disp: , Rfl:     ezetimibe (ZETIA) 10 mg tablet, Take 1 tablet (10 mg total) by mouth once daily., Disp: 90 tablet, Rfl: 3    gabapentin (NEURONTIN) 400 MG capsule, TAKE 1 CAPSULE(400 MG) BY MOUTH EVERY 8 HOURS, Disp: 90 capsule, Rfl: 2    HYDROcodone-acetaminophen (NORCO)  mg per tablet, Take 1 tablet by mouth every 6 (six) hours as needed for Pain (pain)., Disp: 120 tablet, Rfl: 0    lactulose (CHRONULAC) 10 gram/15 mL solution, TAKE 30 MLS BY MOUTH TWICE DAILY AS NEEDED FOR CONSTIPATION, Disp: 600 mL, Rfl: 1    lancets (ACCU-CHEK FASTCLIX LANCET DRUM [facility]), Take 1 each by mouth once daily., Disp: , Rfl:     nitrofurantoin, macrocrystal-monohydrate, (MACROBID) 100 MG capsule, Take 1 capsule (100 mg total) by mouth 2 (two) times daily. Take with food or milk, Disp: 20 capsule, Rfl: 2    olmesartan (BENICAR) 5 MG Tab, Take 10 mg by mouth once daily., Disp: , Rfl:     ONETOUCH DELICA PLUS LANCET 33 gauge Misc, Check blood sugar TID, Disp: 100 each, Rfl: 11    ONETOUCH VERIO TEST STRIPS Strp, TEST BLOOD GLUCOSE  "LEVELS TWICE DAILY, Disp: 200 strip, Rfl: 4    oxybutynin (DITROPAN-XL) 10 MG 24 hr tablet, Take one tablet at night, Disp: 90 tablet, Rfl: 3    calcium-vitamin D (CALCIUM WITH VITAMIN D) 600 mg-10 mcg (400 unit) Tab, Take 1 tablet by mouth Daily., Disp: 90 tablet, Rfl: 1    glipiZIDE (GLUCOTROL) 10 MG TR24, Take 1 tablet (10 mg total) by mouth 2 (two) times daily., Disp: 180 tablet, Rfl: 1    levothyroxine (SYNTHROID) 75 MCG tablet, Take 1 tablet (75 mcg total) by mouth before breakfast., Disp: 90 tablet, Rfl: 1           ROS  Twelve point system reviewed, unremarkable except for stated above in HPI.        Objective:         Vitals:    11/05/24 0915   BP: 132/76   BP Location: Left arm   Patient Position: Sitting   Pulse: 78   Resp: 18   Temp: 97.1 °F (36.2 °C)   TempSrc: Temporal   SpO2: 100%   Weight: 78.9 kg (174 lb)   Height: 5' 8" (1.727 m)        Physical Exam     Patient is awake alert oriented person place and  Lungs are clear to auscultation bilaterally no crackles or wheezes   Cardiovascular S1-S2 regular rate and rhythm no murmurs rubs or gallops   Abdomen is soft positive bowel sounds nontender, extremities no clubbing cyanosis edema  Neuro no focal neurological deficits  Skin warm and dry.     Last Labs:     Office Visit on 10/29/2024   Component Date Value    Culture, Urine 10/29/2024 No Growth        Last Imaging:  DXA Bone Density Axial Skeleton 1 or more sites  Narrative: EXAMINATION:  DXA BONE DENSITY AXIAL SKELETON 1 OR MORE SITES    CLINICAL HISTORY:  Asymptomatic menopausal state    TECHNIQUE:  DXA scanning was performed over the left hip and lumbar spine.  Review of the images confirms satisfactory positioning and technique.    COMPARISON:  11/03/2020.    FINDINGS:  The L1 to L4 vertebral bone mineral density is equal to 0.695 g/cm squared with a T score of -3.2.  There has been a 7.2% statistically significant decrease relative to the prior study.    The left femoral neck bone mineral " density is equal to 0.646 g/cm squared with a T score of -1.8.  There has been a 5.5% statistically significant decrease relative to the prior study.    The total hip bone mineral density is equal to 0.683 g/cm squared with a T score of -2.1. There has been a 1.2% non statistically significant decrease relative to the prior study.    There is a 6.3% risk of a major osteoporotic fracture and a 1.6% risk of hip fracture in the next 10 years (FRAX).  Impression: Osteoporosis    Consider FDA approved medical therapies in postmenopausal women and men aged 50 years and older, based on the following:    *A hip or vertebral (clinical or morphometric) fracture  *T score less than or equal to -2.5 at the femoral neck or spine after appropriate evaluation to exclude secondary causes.  *Low bone mass -- also known as osteopenia (T score between -1.0 and -2.5 at the femoral neck or spine) and a 10 year probability of hip fracture greater than or equal to 3% or a 10 year probability of major osteoporosis-related fracture greater than or equal to 20% based on the [place]-adapted WHO algorithm.  *Clinicians judgment and/or patient preference may indicate treatment for people with 10 year fracture probabilities is above or below these levels.    Electronically signed by: Jeremy Dubon  Date:    10/07/2024  Time:    00:38         **Labs and x-rays personally reviewed by me    ** reviewed           Assessment & Plan:       1. Type 2 diabetes mellitus without complication, without long-term current use of insulin  -     Protein, Random Urine; Future; Expected date: 11/05/2024  -     Microalbumin/Creatinine Ratio, Urine; Future; Expected date: 11/05/2024    2. Osteoporosis, unspecified osteoporosis type, unspecified pathological fracture presence  -     Ambulatory referral/consult to Gynecology; Future; Expected date: 11/12/2024    Chronic type 2 diabetes mellitus  Chronic hypothyroidism   Osteoporosis  -     glipiZIDE (GLUCOTROL) 10 MG  TR24; Take 1 tablet (10 mg total) by mouth 2 (two) times daily.  Dispense: 180 tablet; Refill: 1  -     levothyroxine (SYNTHROID) 75 MCG tablet; Take 1 tablet (75 mcg total) by mouth before breakfast.  Dispense: 90 tablet; Refill: 1  -     calcium-vitamin D (CALCIUM WITH VITAMIN D) 600 mg-10 mcg (400 unit) Tab; Take 1 tablet by mouth Daily.  Dispense: 90 tablet; Refill: 1            Mateo Chen MD

## 2024-11-12 ENCOUNTER — OFFICE VISIT (OUTPATIENT)
Dept: FAMILY MEDICINE | Facility: CLINIC | Age: 79
End: 2024-11-12
Payer: COMMERCIAL

## 2024-11-12 ENCOUNTER — HOSPITAL ENCOUNTER (EMERGENCY)
Facility: HOSPITAL | Age: 79
Discharge: HOME | End: 2024-11-12
Payer: COMMERCIAL

## 2024-11-12 VITALS
BODY MASS INDEX: 23.64 KG/M2 | HEIGHT: 68 IN | SYSTOLIC BLOOD PRESSURE: 120 MMHG | HEART RATE: 105 BPM | OXYGEN SATURATION: 99 % | DIASTOLIC BLOOD PRESSURE: 70 MMHG | RESPIRATION RATE: 18 BRPM | WEIGHT: 156 LBS | TEMPERATURE: 97 F

## 2024-11-12 VITALS
OXYGEN SATURATION: 99 % | WEIGHT: 160 LBS | TEMPERATURE: 98 F | SYSTOLIC BLOOD PRESSURE: 127 MMHG | HEART RATE: 96 BPM | DIASTOLIC BLOOD PRESSURE: 79 MMHG | HEIGHT: 68 IN | BODY MASS INDEX: 24.25 KG/M2 | RESPIRATION RATE: 17 BRPM

## 2024-11-12 DIAGNOSIS — R10.9 ABDOMINAL PAIN, UNSPECIFIED ABDOMINAL LOCATION: Primary | ICD-10-CM

## 2024-11-12 DIAGNOSIS — N39.0 ACUTE URINARY TRACT INFECTION: ICD-10-CM

## 2024-11-12 DIAGNOSIS — A08.4 VIRAL GASTROENTERITIS: Primary | ICD-10-CM

## 2024-11-12 DIAGNOSIS — R09.81 NASAL CONGESTION: ICD-10-CM

## 2024-11-12 LAB
ALBUMIN SERPL BCP-MCNC: 3.4 G/DL (ref 3.5–5)
ALBUMIN/GLOB SERPL: 0.7 {RATIO}
ALP SERPL-CCNC: 118 U/L (ref 55–142)
ALT SERPL W P-5'-P-CCNC: 20 U/L (ref 13–56)
AMYLASE SERPL-CCNC: 32 U/L (ref 25–115)
ANION GAP SERPL CALCULATED.3IONS-SCNC: 10 MMOL/L (ref 7–16)
AST SERPL W P-5'-P-CCNC: 15 U/L (ref 15–37)
BACTERIA #/AREA URNS HPF: ABNORMAL /HPF
BASOPHILS # BLD AUTO: 0.02 K/UL (ref 0–0.2)
BASOPHILS NFR BLD AUTO: 0.2 % (ref 0–1)
BILIRUB SERPL-MCNC: 0.6 MG/DL (ref ?–1.2)
BILIRUB UR QL STRIP: NEGATIVE
BUN SERPL-MCNC: 17 MG/DL (ref 7–18)
BUN/CREAT SERPL: 22 (ref 6–20)
CALCIUM SERPL-MCNC: 8.8 MG/DL (ref 8.5–10.1)
CHLORIDE SERPL-SCNC: 108 MMOL/L (ref 98–107)
CLARITY UR: CLEAR
CO2 SERPL-SCNC: 26 MMOL/L (ref 21–32)
COLOR UR: ABNORMAL
CREAT SERPL-MCNC: 0.77 MG/DL (ref 0.55–1.02)
CTP QC/QA: YES
DIFFERENTIAL METHOD BLD: ABNORMAL
EGFR (NO RACE VARIABLE) (RUSH/TITUS): 80 ML/MIN/1.73M2
EOSINOPHIL # BLD AUTO: 0 K/UL (ref 0–0.5)
EOSINOPHIL NFR BLD AUTO: 0 % (ref 1–4)
ERYTHROCYTE [DISTWIDTH] IN BLOOD BY AUTOMATED COUNT: 15.3 % (ref 11.5–14.5)
GLOBULIN SER-MCNC: 4.7 G/DL (ref 2–4)
GLUCOSE SERPL-MCNC: 178 MG/DL (ref 74–106)
GLUCOSE UR STRIP-MCNC: >1000 MG/DL
HCT VFR BLD AUTO: 40.3 % (ref 38–47)
HGB BLD-MCNC: 12.8 G/DL (ref 12–16)
IMM GRANULOCYTES # BLD AUTO: 0.04 K/UL (ref 0–0.04)
IMM GRANULOCYTES NFR BLD: 0.4 % (ref 0–0.4)
KETONES UR STRIP-SCNC: ABNORMAL MG/DL
LEUKOCYTE ESTERASE UR QL STRIP: ABNORMAL
LIPASE SERPL-CCNC: <19 U/L (ref 16–77)
LYMPHOCYTES # BLD AUTO: 0.33 K/UL (ref 1–4.8)
LYMPHOCYTES NFR BLD AUTO: 3.2 % (ref 27–41)
MAGNESIUM SERPL-MCNC: 2.3 MG/DL (ref 1.7–2.3)
MCH RBC QN AUTO: 26.1 PG (ref 27–31)
MCHC RBC AUTO-ENTMCNC: 31.8 G/DL (ref 32–36)
MCV RBC AUTO: 82.1 FL (ref 80–96)
MOLECULAR STREP A: NEGATIVE
MONOCYTES # BLD AUTO: 0.23 K/UL (ref 0–0.8)
MONOCYTES NFR BLD AUTO: 2.2 % (ref 2–6)
MPC BLD CALC-MCNC: 10.1 FL (ref 9.4–12.4)
MUCOUS, UA: ABNORMAL /LPF
NEUTROPHILS # BLD AUTO: 9.64 K/UL (ref 1.8–7.7)
NEUTROPHILS NFR BLD AUTO: 94 % (ref 53–65)
NITRITE UR QL STRIP: NEGATIVE
NRBC # BLD AUTO: 0 X10E3/UL
NRBC, AUTO (.00): 0 %
PH UR STRIP: 5.5 PH UNITS
PLATELET # BLD AUTO: 293 K/UL (ref 150–400)
POC MOLECULAR INFLUENZA A AGN: NEGATIVE
POC MOLECULAR INFLUENZA B AGN: NEGATIVE
POTASSIUM SERPL-SCNC: 4 MMOL/L (ref 3.5–5.1)
PROT SERPL-MCNC: 8.1 G/DL (ref 6.4–8.2)
PROT UR QL STRIP: 10
RBC # BLD AUTO: 4.91 M/UL (ref 4.2–5.4)
RBC # UR STRIP: NEGATIVE /UL
RBC #/AREA URNS HPF: 2 /HPF
SARS-COV-2 RDRP RESP QL NAA+PROBE: NEGATIVE
SODIUM SERPL-SCNC: 140 MMOL/L (ref 136–145)
SP GR UR STRIP: 1.02
SQUAMOUS #/AREA URNS LPF: ABNORMAL /HPF
UROBILINOGEN UR STRIP-ACNC: NORMAL MG/DL
WBC # BLD AUTO: 10.26 K/UL (ref 4.5–11)
WBC #/AREA URNS HPF: 28 /HPF
YEAST #/AREA URNS HPF: ABNORMAL /HPF

## 2024-11-12 PROCEDURE — 3074F SYST BP LT 130 MM HG: CPT | Mod: ,,, | Performed by: INTERNAL MEDICINE

## 2024-11-12 PROCEDURE — 81003 URINALYSIS AUTO W/O SCOPE: CPT | Performed by: NURSE PRACTITIONER

## 2024-11-12 PROCEDURE — 99214 OFFICE O/P EST MOD 30 MIN: CPT | Mod: 25,,, | Performed by: INTERNAL MEDICINE

## 2024-11-12 PROCEDURE — 96365 THER/PROPH/DIAG IV INF INIT: CPT

## 2024-11-12 PROCEDURE — 36415 COLL VENOUS BLD VENIPUNCTURE: CPT | Performed by: NURSE PRACTITIONER

## 2024-11-12 PROCEDURE — 87635 SARS-COV-2 COVID-19 AMP PRB: CPT | Mod: QW,,, | Performed by: INTERNAL MEDICINE

## 2024-11-12 PROCEDURE — 85025 COMPLETE CBC W/AUTO DIFF WBC: CPT | Performed by: NURSE PRACTITIONER

## 2024-11-12 PROCEDURE — 87086 URINE CULTURE/COLONY COUNT: CPT | Performed by: NURSE PRACTITIONER

## 2024-11-12 PROCEDURE — 25000003 PHARM REV CODE 250: Performed by: NURSE PRACTITIONER

## 2024-11-12 PROCEDURE — 87502 INFLUENZA DNA AMP PROBE: CPT | Mod: QW,,, | Performed by: INTERNAL MEDICINE

## 2024-11-12 PROCEDURE — 87651 STREP A DNA AMP PROBE: CPT | Mod: QW,,, | Performed by: INTERNAL MEDICINE

## 2024-11-12 PROCEDURE — 82150 ASSAY OF AMYLASE: CPT | Performed by: NURSE PRACTITIONER

## 2024-11-12 PROCEDURE — 96372 THER/PROPH/DIAG INJ SC/IM: CPT | Mod: ,,, | Performed by: INTERNAL MEDICINE

## 2024-11-12 PROCEDURE — 3078F DIAST BP <80 MM HG: CPT | Mod: ,,, | Performed by: INTERNAL MEDICINE

## 2024-11-12 PROCEDURE — 83735 ASSAY OF MAGNESIUM: CPT | Performed by: NURSE PRACTITIONER

## 2024-11-12 PROCEDURE — 80053 COMPREHEN METABOLIC PANEL: CPT | Performed by: NURSE PRACTITIONER

## 2024-11-12 PROCEDURE — 1125F AMNT PAIN NOTED PAIN PRSNT: CPT | Mod: ,,, | Performed by: INTERNAL MEDICINE

## 2024-11-12 PROCEDURE — 83690 ASSAY OF LIPASE: CPT | Performed by: NURSE PRACTITIONER

## 2024-11-12 PROCEDURE — 63600175 PHARM REV CODE 636 W HCPCS: Performed by: NURSE PRACTITIONER

## 2024-11-12 PROCEDURE — 99284 EMERGENCY DEPT VISIT MOD MDM: CPT | Mod: 25

## 2024-11-12 RX ORDER — OLMESARTAN MEDOXOMIL 20 MG/1
20 TABLET ORAL
COMMUNITY
Start: 2024-10-27

## 2024-11-12 RX ORDER — KETOROLAC TROMETHAMINE 30 MG/ML
15 INJECTION, SOLUTION INTRAMUSCULAR; INTRAVENOUS
Status: COMPLETED | OUTPATIENT
Start: 2024-11-12 | End: 2024-11-12

## 2024-11-12 RX ORDER — ONDANSETRON 4 MG/1
4 TABLET, ORALLY DISINTEGRATING ORAL EVERY 6 HOURS PRN
Qty: 15 TABLET | Refills: 0 | Status: SHIPPED | OUTPATIENT
Start: 2024-11-12

## 2024-11-12 RX ORDER — ONDANSETRON 4 MG/1
4 TABLET, ORALLY DISINTEGRATING ORAL
Status: COMPLETED | OUTPATIENT
Start: 2024-11-12 | End: 2024-11-12

## 2024-11-12 RX ADMIN — ONDANSETRON 4 MG: 4 TABLET, ORALLY DISINTEGRATING ORAL at 12:11

## 2024-11-12 RX ADMIN — GENTAMICIN SULFATE 340 MG: 40 INJECTION, SOLUTION INTRAMUSCULAR; INTRAVENOUS at 03:11

## 2024-11-12 RX ADMIN — KETOROLAC TROMETHAMINE 15 MG: 30 INJECTION, SOLUTION INTRAMUSCULAR; INTRAVENOUS at 11:11

## 2024-11-12 NOTE — DISCHARGE INSTRUCTIONS
Take medication as needed for nausea and vomiting.  You were given a one time dose of IV gentamicin to treat your urinary tract infection.  You will need to increase your fluid intake over the next 3-5 days to flush her urinary tract.  You will not have medication to take at home for this infection.  You will follow up with Dr. Chen in 1 week for repeat urinalysis to ensure that your infection has cleared.  Return to the ER with new or worsening symptoms.

## 2024-11-12 NOTE — PROGRESS NOTES
911 called; Pt sent to ER for acute abdomen pain per Dr Chen; pt states pain is 10 out of 10; pt also states she has been vomiting since last night; [facility].

## 2024-11-14 LAB — UA COMPLETE W REFLEX CULTURE PNL UR: NORMAL

## 2024-11-14 NOTE — PROGRESS NOTES
Subjective:       Patient ID: Lorraine De Jesus is a 79 y.o. female.    Chief Complaint: Emesis, Diarrhea, and Health Maintenance (Pt refused care gaps )    HPI  .  Patient presents with severe abdominal pain complains of diarrhea.  Stated he started middle night woke her up.  Patient is in the office in she is tearful.    Current Medications:    Current Outpatient Medications:     blood sugar diagnostic Strp, 1 strip by Misc.(Non-Drug; Combo Route) route 3 (three) times daily., Disp: 200 strip, Rfl: 3    blood-glucose meter kit, Use as instructed, Disp: 1 each, Rfl: 0    calcium-vitamin D (CALCIUM WITH VITAMIN D) 600 mg-10 mcg (400 unit) Tab, Take 1 tablet by mouth Daily., Disp: 90 tablet, Rfl: 1    cloNIDine (CATAPRES) 0.1 MG tablet, TAKE 1 TABLET BY MOUTH EVERY 4 HOURS AS NEEDED FOR BLOOD PRESSURE GREATER THAN 170/90, Disp: , Rfl:     ezetimibe (ZETIA) 10 mg tablet, Take 1 tablet (10 mg total) by mouth once daily., Disp: 90 tablet, Rfl: 3    gabapentin (NEURONTIN) 400 MG capsule, TAKE 1 CAPSULE(400 MG) BY MOUTH EVERY 8 HOURS, Disp: 90 capsule, Rfl: 2    glipiZIDE (GLUCOTROL) 10 MG TR24, Take 1 tablet (10 mg total) by mouth 2 (two) times daily., Disp: 180 tablet, Rfl: 1    HYDROcodone-acetaminophen (NORCO)  mg per tablet, Take 1 tablet by mouth every 6 (six) hours as needed for Pain (pain)., Disp: 120 tablet, Rfl: 0    lactulose (CHRONULAC) 10 gram/15 mL solution, TAKE 30 MLS BY MOUTH TWICE DAILY AS NEEDED FOR CONSTIPATION, Disp: 600 mL, Rfl: 1    lancets (ACCU-CHEK FASTCLIX LANCET DRUM [facility]), Take 1 each by mouth once daily., Disp: , Rfl:     levothyroxine (SYNTHROID) 75 MCG tablet, Take 1 tablet (75 mcg total) by mouth before breakfast., Disp: 90 tablet, Rfl: 1    nitrofurantoin, macrocrystal-monohydrate, (MACROBID) 100 MG capsule, Take 1 capsule (100 mg total) by mouth 2 (two) times daily. Take with food or milk, Disp: 20 capsule, Rfl: 2    olmesartan (BENICAR) 20 MG tablet, Take 20 mg by mouth., Disp: , Rfl:  "    olmesartan (BENICAR) 5 MG Tab, Take 10 mg by mouth once daily., Disp: , Rfl:     ONETOUCH DELICA PLUS LANCET 33 gauge Misc, Check blood sugar TID, Disp: 100 each, Rfl: 11    ONETOUCH VERIO TEST STRIPS Strp, TEST BLOOD GLUCOSE LEVELS TWICE DAILY, Disp: 200 strip, Rfl: 4    oxybutynin (DITROPAN-XL) 10 MG 24 hr tablet, Take one tablet at night, Disp: 90 tablet, Rfl: 3    ondansetron (ZOFRAN-ODT) 4 MG TbDL, Take 1 tablet (4 mg total) by mouth every 6 (six) hours as needed (nausea and vomting)., Disp: 15 tablet, Rfl: 0           ROS  Twelve point system reviewed, unremarkable except for stated above in HPI.        Objective:         Vitals:    11/12/24 1053   BP: 120/70   BP Location: Left arm   Patient Position: Sitting   Pulse: 105   Resp: 18   Temp: 97.4 °F (36.3 °C)   TempSrc: Temporal   SpO2: 99%   Weight: 70.8 kg (156 lb)   Height: 5' 8" (1.727 m)        Physical Exam     Patient is awake alert oriented person place and  Lungs are clear to auscultation bilaterally no crackles or wheezes   Cardiovascular S1-S2 regular rate and rhythm no murmurs rubs or gallops   Abdomen is soft positive bowel sounds diffuse abdominal tenderness with evidence of rebound guarding    , extremities no clubbing cyanosis edema  Neuro no focal neurological deficits  Skin warm and dry.     Last Labs:     Admission on 11/12/2024, Discharged on 11/12/2024   Component Date Value    Sodium 11/12/2024 140     Potassium 11/12/2024 4.0     Chloride 11/12/2024 108 (H)     CO2 11/12/2024 26     Anion Gap 11/12/2024 10     Glucose 11/12/2024 178 (H)     BUN 11/12/2024 17     Creatinine 11/12/2024 0.77     BUN/Creatinine Ratio 11/12/2024 22 (H)     Calcium 11/12/2024 8.8     Total Protein 11/12/2024 8.1     Albumin 11/12/2024 3.4 (L)     Globulin 11/12/2024 4.7 (H)     A/G Ratio 11/12/2024 0.7     Bilirubin, Total 11/12/2024 0.6     Alk Phos 11/12/2024 118     ALT 11/12/2024 20     AST 11/12/2024 15     eGFR 11/12/2024 80     Amylase 11/12/2024 32 "     Lipase 11/12/2024 <19     Magnesium 11/12/2024 2.3     Color, UA 11/12/2024 Light-Yellow     Clarity, UA 11/12/2024 Clear     pH, UA 11/12/2024 5.5     Leukocytes, UA 11/12/2024 Trace (A)     Nitrites, UA 11/12/2024 Negative     Protein, UA 11/12/2024 10 (A)     Glucose, UA 11/12/2024 >1000 (A)     Ketones, UA 11/12/2024 Trace     Urobilinogen, UA 11/12/2024 Normal     Bilirubin, UA 11/12/2024 Negative     Blood, UA 11/12/2024 Negative     Specific Gravity, UA 11/12/2024 1.019     WBC 11/12/2024 10.26     RBC 11/12/2024 4.91     Hemoglobin 11/12/2024 12.8     Hematocrit 11/12/2024 40.3     MCV 11/12/2024 82.1     MCH 11/12/2024 26.1 (L)     MCHC 11/12/2024 31.8 (L)     RDW 11/12/2024 15.3 (H)     Platelet Count 11/12/2024 293     MPV 11/12/2024 10.1     Neutrophils % 11/12/2024 94.0 (H)     Lymphocytes % 11/12/2024 3.2 (L)     Monocytes % 11/12/2024 2.2     Eosinophils % 11/12/2024 0.0 (L)     Basophils % 11/12/2024 0.2     Immature Granulocytes % 11/12/2024 0.4     nRBC, Auto 11/12/2024 0.0     Neutrophils, Abs 11/12/2024 9.64 (H)     Lymphocytes, Absolute 11/12/2024 0.33 (L)     Monocytes, Absolute 11/12/2024 0.23     Eosinophils, Absolute 11/12/2024 0.00     Basophils, Absolute 11/12/2024 0.02     Immature Granulocytes, A* 11/12/2024 0.04     nRBC, Absolute 11/12/2024 0.00     Diff Type 11/12/2024 Auto     WBC, UA 11/12/2024 28 (H)     RBC, UA 11/12/2024 2     Bacteria, UA 11/12/2024 Occasional (A)     Squamous Epithelial Cell* 11/12/2024 Occasional (A)     Yeast, UA 11/12/2024 Occasional (A)     Mucous 11/12/2024 Occasional (A)     Culture, Urine 11/12/2024 Skin/Urogenital Louise Isolated, no further workup.    Office Visit on 11/12/2024   Component Date Value    Molecular Strep A, POC 11/12/2024 Negative      Acceptab* 11/12/2024 Yes     POC Rapid COVID 11/12/2024 Negative      Acceptab* 11/12/2024 Yes     POC Molecular Influenza * 11/12/2024 Negative     POC Molecular Influenza  * 11/12/2024 Negative      Acceptab* 11/12/2024 Yes    Office Visit on 11/05/2024   Component Date Value    Protein, Urine 11/05/2024 21.5 (H)     Creatinine, Urine 11/05/2024 88     Microalbumin 11/05/2024 2.4     Microalbumin/Creatinine * 11/05/2024 27.3    Office Visit on 10/29/2024   Component Date Value    Culture, Urine 10/29/2024 No Growth        Last Imaging:  XR ABDOMEN, ACUTE 2 OR MORE VIEWS WITH CHEST  Narrative: EXAMINATION:  XR ABDOMEN ACUTE 2 OR MORE VIEWS WITH CHEST    CLINICAL HISTORY:  abd pain - nausea and vomiting;    TECHNIQUE:  PA chest and three views of the abdomen.    COMPARISON:  Abdominal radiograph performed 03/25/2024.    FINDINGS:  Cardiac contours appear to be within normal limits.  Lungs essentially clear.  No definite pneumothorax or large volume pleural effusion.    No acute findings in the visualized abdomen.  Osseous and soft tissue structures appear without definite acute abnormality.  Scoliotic curvature of the spine.    No definite dilated gas-filled loops of small or large bowel appreciated.  Moderate volume colonic stool.    Grossly unchanged appearance of left nephrolithiasis relative to 03/25/2024 radiograph.  Impression: 1. No convincing evidence of acute cardiopulmonary disease.  2. Nonspecific, nonobstructive bowel gas pattern.  Moderate volume colonic stool.    Electronically signed by: Doug Saavedra  Date:    11/12/2024  Time:    14:12         **Labs and x-rays personally reviewed by me    ** reviewed           Assessment & Plan:       1. Abdominal pain, unspecified abdominal location/acute abdomen  -     ketorolac injection 15 mg    2. Nasal congestion  -     POCT Strep A, Molecular  -     POCT COVID-19 Rapid Screening  -     POCT Influenza A/B Molecular      We have called 911 to take her to the hospital, potential hospitalization has been discussed with the patient      Mateo Chen MD

## 2024-11-19 ENCOUNTER — TELEPHONE (OUTPATIENT)
Dept: GASTROENTEROLOGY | Facility: CLINIC | Age: 79
End: 2024-11-19
Payer: COMMERCIAL

## 2024-11-19 ENCOUNTER — OFFICE VISIT (OUTPATIENT)
Dept: GASTROENTEROLOGY | Facility: CLINIC | Age: 79
End: 2024-11-19
Payer: COMMERCIAL

## 2024-11-19 VITALS
SYSTOLIC BLOOD PRESSURE: 144 MMHG | HEART RATE: 66 BPM | HEIGHT: 68 IN | WEIGHT: 170.63 LBS | BODY MASS INDEX: 25.86 KG/M2 | OXYGEN SATURATION: 100 % | DIASTOLIC BLOOD PRESSURE: 72 MMHG

## 2024-11-19 DIAGNOSIS — L85.3 DRY SKIN: ICD-10-CM

## 2024-11-19 DIAGNOSIS — D12.2 ADENOMATOUS POLYP OF ASCENDING COLON: ICD-10-CM

## 2024-11-19 DIAGNOSIS — Z86.0100 PERSONAL HISTORY OF COLON POLYPS, UNSPECIFIED: ICD-10-CM

## 2024-11-19 DIAGNOSIS — D12.4 ADENOMATOUS POLYP OF DESCENDING COLON: ICD-10-CM

## 2024-11-19 DIAGNOSIS — K59.00 CONSTIPATION, UNSPECIFIED CONSTIPATION TYPE: Primary | ICD-10-CM

## 2024-11-19 PROCEDURE — 99999 PR PBB SHADOW E&M-EST. PATIENT-LVL V: CPT | Mod: PBBFAC,,, | Performed by: NURSE PRACTITIONER

## 2024-11-19 PROCEDURE — 99214 OFFICE O/P EST MOD 30 MIN: CPT | Mod: S$PBB,,, | Performed by: NURSE PRACTITIONER

## 2024-11-19 PROCEDURE — 99215 OFFICE O/P EST HI 40 MIN: CPT | Mod: PBBFAC | Performed by: NURSE PRACTITIONER

## 2024-11-19 PROCEDURE — 3077F SYST BP >= 140 MM HG: CPT | Mod: CPTII,,, | Performed by: NURSE PRACTITIONER

## 2024-11-19 PROCEDURE — 3288F FALL RISK ASSESSMENT DOCD: CPT | Mod: CPTII,,, | Performed by: NURSE PRACTITIONER

## 2024-11-19 PROCEDURE — 1100F PTFALLS ASSESS-DOCD GE2>/YR: CPT | Mod: CPTII,,, | Performed by: NURSE PRACTITIONER

## 2024-11-19 PROCEDURE — 3078F DIAST BP <80 MM HG: CPT | Mod: CPTII,,, | Performed by: NURSE PRACTITIONER

## 2024-11-19 PROCEDURE — 1159F MED LIST DOCD IN RCRD: CPT | Mod: CPTII,,, | Performed by: NURSE PRACTITIONER

## 2024-11-19 NOTE — TELEPHONE ENCOUNTER
Results called to patient. Verbalized understanding.          ----- Message from OLGA Spears sent at 11/19/2024 12:49 PM CST -----  Thyroid labs are normal.

## 2024-11-19 NOTE — PROGRESS NOTES
Lorraine De Jesus is a 79 y.o. female here for Constipation        PCP: Mateo Chen  Referring Provider: Mateo Chen Md  [address] [place] [address]     HPI:  Report of abdominal pain and constipation.  Patient was evaluated in the ER on 11 12, ER note is not visible.  X-ray reviewed from 11/12, moderate volume stool noted throughout the colon.  She did have a UTI.  Antibiotics were prescribed.  No anemia, amylase and lipase are normal, liver enzymes normal.  Patient reports fatigue. Is not taking a regular laxative for constipation.  States that she takes Metamucil, lactulose, and MiraLax intermittently.  Reports that she is also taking lactulose but not regularly. Advised that Lactulose can increase gas bloating. Would recommend that she take MiraLax daily.  Also advised if no contraindications that she increase water intake to 64 oz daily.  Is also reporting dry skin despite use of lotion.  Discussed that we will check thyroid levels today.  Last colonoscopy was 06/22/2021, report reviewed, recommendation to repeat colonoscopy in 3 years.  No hematochezia or melena.  No nausea or vomiting.  Discussed repeating colonoscopy with the patient.  Prep and procedure reviewed with the patient.  She agrees to schedule.    Constipation  Pertinent negatives include no abdominal pain, diarrhea, fever, nausea, rectal pain or vomiting.         ROS:  Review of Systems   Constitutional:  Positive for appetite change and fatigue. Negative for fever and unexpected weight change.   HENT:  Negative for trouble swallowing.    Respiratory:  Negative for shortness of breath.    Cardiovascular:  Negative for chest pain.   Gastrointestinal:  Positive for constipation. Negative for abdominal pain, anal bleeding, blood in stool, change in bowel habit, diarrhea, nausea, rectal pain, vomiting and reflux. Abdominal distention: abdominal bloating.  Musculoskeletal:  Negative for gait problem.   Integumentary:   Negative for pallor.   Psychiatric/Behavioral:  The patient is not nervous/anxious.           PMHX:  has a past medical history of Adenomatous polyp of ascending colon (2021), Adenomatous polyp of descending colon (2021), Age related osteoporosis (10/28/2020), Benign paroxysmal vertigo, Chronic pain syndrome, Chronic pelvic pain in female (2021), Colon, diverticulosis (2021), Depressive disorder (2019), External hemorrhoid (2021), Gastrointestinal hemorrhage associated with anorectal source (2021), GERD (gastroesophageal reflux disease) (2013), Hyperlipidemia (2012), Hypothyroidism (2019), Joint pain, Nonrheumatic tricuspid (valve) insufficiency (2018), Personal history UTI (2021), Polyneuropathy (2018), PVD (peripheral vascular disease) (10/30/2018), Temporal arteritis, Type 2 diabetes mellitus (2014), and Urethral meatal stenosis (2018).    PSHX:  has a past surgical history that includes Hysterectomy (); Thyroidectomy; Cardiac catheterization (10/14/2009);  section; Colonoscopy (2009); Cystoscopy with urethral dilation (2021); Lumbar sympathetic nerve block (Left, 10/05/2020); Cystoscopy with hydrodistension of bladder (N/A, 2021); right lumbar sympathetic nerve block (Right, );  left lumbar sympathetic nerve block (Left, ); Epidural steroid injection (N/A, 10/27/2022); and Epidural steroid injection (N/A, 2023).    PFHX: family history includes Cancer in her brother, brother, brother, father, mother, and sister; Diabetes in her sister and sister; Hyperlipidemia in her sister; Hypertension in her mother and sister; No Known Problems in her brother and sister.    PSlHX:  reports that she has never smoked. She has never been exposed to tobacco smoke. She has never used smokeless tobacco. She reports that she does not drink alcohol and does not use drugs.        Review of patient's allergies  indicates:   Allergen Reactions    Lipitor [atorvastatin] Other (See Comments)     Muscle aching    Pravachol [pravastatin] Other (See Comments)     Muscle aching       Medication List with Changes/Refills   Current Medications    BLOOD SUGAR DIAGNOSTIC STRP    1 strip by Misc.(Non-Drug; Combo Route) route 3 (three) times daily.    BLOOD-GLUCOSE METER KIT    Use as instructed    CALCIUM-VITAMIN D (CALCIUM WITH VITAMIN D) 600 MG-10 MCG (400 UNIT) TAB    Take 1 tablet by mouth Daily.    CLONIDINE (CATAPRES) 0.1 MG TABLET    TAKE 1 TABLET BY MOUTH EVERY 4 HOURS AS NEEDED FOR BLOOD PRESSURE GREATER THAN 170/90    EZETIMIBE (ZETIA) 10 MG TABLET    Take 1 tablet (10 mg total) by mouth once daily.    GABAPENTIN (NEURONTIN) 400 MG CAPSULE    TAKE 1 CAPSULE(400 MG) BY MOUTH EVERY 8 HOURS    GLIPIZIDE (GLUCOTROL) 10 MG TR24    Take 1 tablet (10 mg total) by mouth 2 (two) times daily.    HYDROCODONE-ACETAMINOPHEN (NORCO)  MG PER TABLET    Take 1 tablet by mouth every 6 (six) hours as needed for Pain (pain).    LACTULOSE (CHRONULAC) 10 GRAM/15 ML SOLUTION    TAKE 30 MLS BY MOUTH TWICE DAILY AS NEEDED FOR CONSTIPATION    LANCETS (ACCU-CHEK FASTCLIX LANCET [place] [facility] [place]    Take 1 each by mouth once daily.    LEVOTHYROXINE (SYNTHROID) 75 MCG TABLET    Take 1 tablet (75 mcg total) by mouth before breakfast.    NITROFURANTOIN, MACROCRYSTAL-MONOHYDRATE, (MACROBID) 100 MG CAPSULE    Take 1 capsule (100 mg total) by mouth 2 (two) times daily. Take with food or milk    OLMESARTAN (BENICAR) 20 MG TABLET    Take 20 mg by mouth.    OLMESARTAN (BENICAR) 5 MG TAB    Take 10 mg by mouth once daily.    ONDANSETRON (ZOFRAN-ODT) 4 MG TBDL    Take 1 tablet (4 mg total) by mouth every 6 (six) hours as needed (nausea and vomting).    ONETOUCH DELICA PLUS LANCET 33 GAUGE MISC    Check blood sugar TID    ONETOUCH VERIO TEST STRIPS STRP    TEST BLOOD GLUCOSE LEVELS TWICE DAILY    OXYBUTYNIN (DITROPAN-XL) 10 MG 24 HR TABLET    Take one tablet at  "night        Objective Findings:  Vital Signs:  BP (!) 144/72   Pulse 66   Ht 5' 8" (1.727 m)   Wt 77.4 kg (170 lb 9.6 oz)   LMP  (LMP Unknown)   SpO2 100%   BMI 25.94 kg/m²  Body mass index is 25.94 kg/m².    Physical Exam:  Physical Exam  Vitals and nursing note reviewed.   Constitutional:       General: She is not in acute distress.     Appearance: Normal appearance.   HENT:      Mouth/Throat:      Mouth: Mucous membranes are moist.   Cardiovascular:      Rate and Rhythm: Normal rate.   Pulmonary:      Effort: Pulmonary effort is normal.   Abdominal:      General: Bowel sounds are normal. There is no distension.      Palpations: Abdomen is soft. There is no mass.      Tenderness: There is no abdominal tenderness.   Skin:     General: Skin is warm and dry.      Coloration: Skin is not jaundiced or pale.   Neurological:      Mental Status: She is alert and oriented to person, place, and time.   Psychiatric:         Mood and Affect: Mood normal.          Labs:  Lab Results   Component Value Date    WBC 10.26 11/12/2024    HGB 12.8 11/12/2024    HCT 40.3 11/12/2024    MCV 82.1 11/12/2024    RDW 15.3 (H) 11/12/2024     11/12/2024    LYMPH 3.2 (L) 11/12/2024    LYMPH 0.33 (L) 11/12/2024    MONO 2.2 11/12/2024    EOS 0.00 11/12/2024    BASO 0.02 11/12/2024     Lab Results   Component Value Date     11/12/2024    K 4.0 11/12/2024     (H) 11/12/2024    CO2 26 11/12/2024     (H) 11/12/2024    BUN 17 11/12/2024    CREATININE 0.77 11/12/2024    CALCIUM 8.8 11/12/2024    PROT 8.1 11/12/2024    ALBUMIN 3.4 (L) 11/12/2024    BILITOT 0.6 11/12/2024    ALKPHOS 118 11/12/2024    AST 15 11/12/2024    ALT 20 11/12/2024         Imaging: XR ABDOMEN, ACUTE 2 OR MORE VIEWS WITH CHEST    Result Date: 11/12/2024  EXAMINATION: XR ABDOMEN ACUTE 2 OR MORE VIEWS WITH CHEST CLINICAL HISTORY: abd pain - nausea and vomiting; TECHNIQUE: PA chest and three views of the abdomen. COMPARISON: Abdominal radiograph " "performed 03/25/2024. FINDINGS: Cardiac contours appear to be within normal limits.  Lungs essentially clear.  No definite pneumothorax or large volume pleural effusion. No acute findings in the visualized abdomen.  Osseous and soft tissue structures appear without definite acute abnormality.  Scoliotic curvature of the spine. No definite dilated gas-filled loops of small or large bowel appreciated.  Moderate volume colonic stool. Grossly unchanged appearance of left nephrolithiasis relative to 03/25/2024 radiograph.     1. No convincing evidence of acute cardiopulmonary disease. 2. Nonspecific, nonobstructive bowel gas pattern.  Moderate volume colonic stool. Electronically signed by: Doug Saavedra Date:    11/12/2024 Time:    14:12        Assessment:  Lorraine De Jesus is a 79 y.o. female here with:  1. Constipation, unspecified constipation type    2. Personal history of colon polyps, unspecified    3. Dry skin    4. Adenomatous polyp of descending colon    5. Adenomatous polyp of ascending colon          Recommendations:  1. TSH, free T4  2. Schedule colonoscopy  3. MiraLax powder 17 g in 8 oz of liquid daily  4. Increase water intake to 64 ounces daily if no contraindication  5.Follow up after colonoscopy    Diagnosis, risks, benefits, and side effects of any medications and treatment plan were discussed with the patient.  All questions were answered to the satisfaction of the patient, and patient verbalized understanding and agreement to the treatment plan.    Portions of this note may have been created with voice recognition software.  Occasional wrong word or "sound a like substitutions may have occurred due to inherent limitations of voice recognition software.  Please read the note carefully and recognize using contexts, where substitutions have occurred.        Follow up in about 3 months (around 2/19/2025).      Order summary:  Orders Placed This Encounter    TSH    T4, Free    Colonoscopy       Thank " you for allowing me to participate in the care of Lorraine De Jesus.      Loni Mcclendon, OLGA-C

## 2024-11-25 NOTE — PROGRESS NOTES
Subjective:         Patient ID: Lorraine De Jesus is a 79 y.o. female.    Chief Complaint: Leg Pain and Foot Pain      Pain  This is a chronic problem. The current episode started more than 1 year ago. The problem occurs daily. The problem has been waxing and waning. Associated symptoms include arthralgias and neck pain. Pertinent negatives include no anorexia, chest pain, chills, coughing, fever, sore throat, vertigo or vomiting.     Review of Systems   Constitutional:  Negative for activity change, appetite change, chills, fever and unexpected weight change.   HENT:  Negative for drooling, ear discharge, ear pain, facial swelling, nosebleeds, sore throat, trouble swallowing, voice change and goiter.    Eyes:  Negative for photophobia, pain, discharge, redness and visual disturbance.   Respiratory:  Negative for apnea, cough, choking, chest tightness, shortness of breath, wheezing and stridor.    Cardiovascular:  Negative for chest pain, palpitations and leg swelling.   Gastrointestinal:  Negative for abdominal distention, anorexia, diarrhea, rectal pain, vomiting and fecal incontinence.   Endocrine: Negative for cold intolerance, heat intolerance, polydipsia, polyphagia and polyuria.   Genitourinary:  Negative for bladder incontinence, dysuria, flank pain, frequency and hot flashes.   Musculoskeletal:  Positive for arthralgias, back pain, leg pain and neck pain.   Integumentary:  Negative for color change and pallor.   Allergic/Immunologic: Negative for immunocompromised state.   Neurological:  Negative for dizziness, vertigo, seizures, syncope, facial asymmetry, speech difficulty, light-headedness, memory loss and coordination difficulties.   Hematological:  Negative for adenopathy. Does not bruise/bleed easily.   Psychiatric/Behavioral:  Negative for agitation, behavioral problems, confusion, decreased concentration, dysphoric mood, hallucinations, self-injury and suicidal ideas. The patient is not nervous/anxious  and is not hyperactive.            Past Medical History:   Diagnosis Date    Adenomatous polyp of ascending colon 2021    Adenomatous polyp of descending colon 2021    Age related osteoporosis 10/28/2020    Benign paroxysmal vertigo     Chronic pain syndrome     Chronic pelvic pain in female 2021    Ditropan XL 5mg    Colon, diverticulosis 2021    Depressive disorder 2019    External hemorrhoid 2021    Gastrointestinal hemorrhage associated with anorectal source 2021    GERD (gastroesophageal reflux disease) 2013    Hyperlipidemia 2012    Hypothyroidism 2019    Joint pain     Nonrheumatic tricuspid (valve) insufficiency 2018    Personal history UTI 2021    Controlled on Hiprex    Polyneuropathy 2018    PVD (peripheral vascular disease) 10/30/2018    Temporal arteritis     Type 2 diabetes mellitus 2014    Urethral meatal stenosis 2018    history of known urethral stenosis, and was taught to perform monthly self dilation at home.     Past Surgical History:   Procedure Laterality Date     left lumbar sympathetic nerve block Left 2020    X3  DR BROWN    CARDIAC CATHETERIZATION  10/14/2009     SECTION      x 2    COLONOSCOPY  2009    CYSTOSCOPY WITH HYDRODISTENSION OF BLADDER N/A 2021    Procedure: CYSTOSCOPY, WITH BLADDER HYDRODISTENSION;  Surgeon: Dequan Recio MD;  Location: [facility];  Service: Urology;  Laterality: N/A;    CYSTOSCOPY WITH URETHRAL DILATION  2021    Dr Recio    EPIDURAL STEROID INJECTION N/A 10/27/2022    Procedure: Injection, Steroid, Epidural, L5/S1;  Surgeon: Belkis Brown MD;  Location: [facility];  Service: Pain Management;  Laterality: N/A;    EPIDURAL STEROID INJECTION N/A 2023    Procedure: Injection, Steroid, Epidural, L5/S1;  Surgeon: Belkis Brown MD;  Location: [facility];  Service: Pain Management;  Laterality: N/A;    HYSTERECTOMY       LUMBAR SYMPATHETIC NERVE BLOCK Left 10/05/2020    Left Sympathetic Nerve Block - Dr Brown - 10/5/20, 9/21/20, 1/20/20. 1/6/20, 10/9/19, 9/25/19 and 7/11/18    right lumbar sympathetic nerve block Right 2020    X4 DR BROWN    THYROIDECTOMY      1990's     Social History     Socioeconomic History    Marital status:    Tobacco Use    Smoking status: Never     Passive exposure: Never    Smokeless tobacco: Never   Substance and Sexual Activity    Alcohol use: Never    Drug use: Never    Sexual activity: Not Currently     Partners: Male     Social Drivers of Health     Financial Resource Strain: Low Risk  (9/23/2024)    Overall Financial Resource Strain (CARDIA)     Difficulty of Paying Living Expenses: Not very hard   Food Insecurity: No Food Insecurity (9/23/2024)    Hunger Vital Sign     Worried About Running Out of Food in the Last Year: Never true     Ran Out of Food in the Last Year: Never true   Transportation Needs: No Transportation Needs (9/23/2024)    PRAPARE - Transportation     Lack of Transportation (Medical): No     Lack of Transportation (Non-Medical): No   Physical Activity: Sufficiently Active (9/23/2024)    Exercise Vital Sign     Days of Exercise per Week: 3 days     Minutes of Exercise per Session: 60 min   Stress: No Stress Concern Present (9/23/2024)    [employer] - Occupational Stress Questionnaire     Feeling of Stress : Not at all   Housing Stability: Low Risk  (9/23/2024)    Housing Stability Vital Sign     Unable to Pay for Housing in the Last Year: No     Homeless in the Last Year: No     Family History   Problem Relation Name Age of Onset    Cancer Mother      Hypertension Mother      Cancer Father      Cancer Sister      Diabetes Sister      Hyperlipidemia Sister      Hypertension Sister      Diabetes Sister      No Known Problems Sister      Cancer Brother      Cancer Brother      No Known Problems Brother      Cancer Brother       Review of patient's  "allergies indicates:   Allergen Reactions    Lipitor [atorvastatin] Other (See Comments)     Muscle aching    Pravachol [pravastatin] Other (See Comments)     Muscle aching        Objective:  Vitals:    12/10/24 1028   BP: 122/66   Pulse: 64   Resp: 18   Weight: 78.9 kg (174 lb)   Height: 5' 8" (1.727 m)   PainSc:   7             Physical Exam  Vitals and nursing note reviewed. Exam conducted with a chaperone present.   Constitutional:       General: She is awake. She is not in acute distress.     Appearance: Normal appearance. She is not ill-appearing or diaphoretic.   HENT:      Head: Normocephalic and atraumatic.      Nose: Nose normal.      Mouth/Throat:      Mouth: Mucous membranes are moist.      Pharynx: Oropharynx is clear.   Eyes:      Conjunctiva/sclera: Conjunctivae normal.      Pupils: Pupils are equal, round, and reactive to light.   Cardiovascular:      Rate and Rhythm: Normal rate.   Pulmonary:      Effort: Pulmonary effort is normal. No respiratory distress.   Abdominal:      Palpations: Abdomen is soft.   Musculoskeletal:      Cervical back: Normal range of motion and neck supple.      Thoracic back: Tenderness present.      Lumbar back: Tenderness present. Decreased range of motion.   Skin:     General: Skin is warm and dry.   Neurological:      General: No focal deficit present.      Mental Status: She is alert and oriented to person, place, and time. Mental status is at baseline.      Cranial Nerves: No cranial nerve deficit (II-XII).   Psychiatric:         Mood and Affect: Mood normal.         Behavior: Behavior normal. Behavior is cooperative.         Thought Content: Thought content normal.           XR ABDOMEN, ACUTE 2 OR MORE VIEWS WITH CHEST  Narrative: EXAMINATION:  XR ABDOMEN ACUTE 2 OR MORE VIEWS WITH CHEST    CLINICAL HISTORY:  abd pain - nausea and vomiting;    TECHNIQUE:  PA chest and three views of the abdomen.    COMPARISON:  Abdominal radiograph performed " 03/25/2024.    FINDINGS:  Cardiac contours appear to be within normal limits.  Lungs essentially clear.  No definite pneumothorax or large volume pleural effusion.    No acute findings in the visualized abdomen.  Osseous and soft tissue structures appear without definite acute abnormality.  Scoliotic curvature of the spine.    No definite dilated gas-filled loops of small or large bowel appreciated.  Moderate volume colonic stool.    Grossly unchanged appearance of left nephrolithiasis relative to 03/25/2024 radiograph.  Impression: 1. No convincing evidence of acute cardiopulmonary disease.  2. Nonspecific, nonobstructive bowel gas pattern.  Moderate volume colonic stool.    Electronically signed by: Doug Saavedra  Date:    11/12/2024  Time:    14:12       Lab Visit on 11/19/2024   Component Date Value Ref Range Status    TSH 11/19/2024 2.073  0.350 - 4.940 uIU/mL Final    Free T4 11/19/2024 1.21  0.70 - 1.48 ng/dL Final   Admission on 11/12/2024, Discharged on 11/12/2024   Component Date Value Ref Range Status    Sodium 11/12/2024 140  136 - 145 mmol/L Final    Potassium 11/12/2024 4.0  3.5 - 5.1 mmol/L Final    Chloride 11/12/2024 108 (H)  98 - 107 mmol/L Final    CO2 11/12/2024 26  21 - 32 mmol/L Final    Anion Gap 11/12/2024 10  7 - 16 mmol/L Final    Glucose 11/12/2024 178 (H)  74 - 106 mg/dL Final    BUN 11/12/2024 17  7 - 18 mg/dL Final    Creatinine 11/12/2024 0.77  0.55 - 1.02 mg/dL Final    BUN/Creatinine Ratio 11/12/2024 22 (H)  6 - 20 Final    Calcium 11/12/2024 8.8  8.5 - 10.1 mg/dL Final    Total Protein 11/12/2024 8.1  6.4 - 8.2 g/dL Final    Albumin 11/12/2024 3.4 (L)  3.5 - 5.0 g/dL Final    Globulin 11/12/2024 4.7 (H)  2.0 - 4.0 g/dL Final    A/G Ratio 11/12/2024 0.7   Final    Bilirubin, Total 11/12/2024 0.6  >0.0 - 1.2 mg/dL Final    Alk Phos 11/12/2024 118  55 - 142 U/L Final    ALT 11/12/2024 20  13 - 56 U/L Final    AST 11/12/2024 15  15 - 37 U/L Final    eGFR 11/12/2024 80  >=60  mL/min/1.73m2 Final    Amylase 11/12/2024 32  25 - 115 U/L Final    Lipase 11/12/2024 <19  16 - 77 U/L Final    Magnesium 11/12/2024 2.3  1.7 - 2.3 mg/dL Final    Color, UA 11/12/2024 Light-Yellow  Colorless, Straw, Yellow, Light Yellow, Dark Yellow Final    Clarity, UA 11/12/2024 Clear  Clear Final    pH, UA 11/12/2024 5.5  5.0 to 8.0 pH Units Final    Leukocytes, UA 11/12/2024 Trace (A)  Negative Final    Nitrites, UA 11/12/2024 Negative  Negative Final    Protein, UA 11/12/2024 10 (A)  Negative Final    Glucose, UA 11/12/2024 >1000 (A)  Normal mg/dL Final    Ketones, UA 11/12/2024 Trace  Negative mg/dL Final    Urobilinogen, UA 11/12/2024 Normal  0.2, 1.0, Normal mg/dL Final    Bilirubin, UA 11/12/2024 Negative  Negative Final    Blood, UA 11/12/2024 Negative  Negative Final    Specific [place], UA 11/12/2024 1.019  <=1.030 Final    WBC 11/12/2024 10.26  4.50 - 11.00 K/uL Final    RBC 11/12/2024 4.91  4.20 - 5.40 M/uL Final    Hemoglobin 11/12/2024 12.8  12.0 - 16.0 g/dL Final    Hematocrit 11/12/2024 40.3  38.0 - 47.0 % Final    MCV 11/12/2024 82.1  80.0 - 96.0 fL Final    MCH 11/12/2024 26.1 (L)  27.0 - 31.0 pg Final    MCHC 11/12/2024 31.8 (L)  32.0 - 36.0 g/dL Final    RDW 11/12/2024 15.3 (H)  11.5 - 14.5 % Final    Platelet Count 11/12/2024 293  150 - 400 K/uL Final    MPV 11/12/2024 10.1  9.4 - 12.4 fL Final    Neutrophils % 11/12/2024 94.0 (H)  53.0 - 65.0 % Final    Lymphocytes % 11/12/2024 3.2 (L)  27.0 - 41.0 % Final    Monocytes % 11/12/2024 2.2  2.0 - 6.0 % Final    Eosinophils % 11/12/2024 0.0 (L)  1.0 - 4.0 % Final    Basophils % 11/12/2024 0.2  0.0 - 1.0 % Final    Immature Granulocytes % 11/12/2024 0.4  0.0 - 0.4 % Final    nRBC, Auto 11/12/2024 0.0  <=0.0 % Final    Neutrophils, Abs 11/12/2024 9.64 (H)  1.80 - 7.70 K/uL Final    Lymphocytes, Absolute 11/12/2024 0.33 (L)  1.00 - 4.80 K/uL Final    Monocytes, Absolute 11/12/2024 0.23  0.00 - 0.80 K/uL Final    Eosinophils, Absolute 11/12/2024 0.00   0.00 - 0.50 K/uL Final    Basophils, Absolute 11/12/2024 0.02  0.00 - 0.20 K/uL Final    Immature Granulocytes, Absolute 11/12/2024 0.04  0.00 - 0.04 K/uL Final    nRBC, Absolute 11/12/2024 0.00  <=0.00 x10e3/uL Final    Diff Type 11/12/2024 Auto   Final    WBC, UA 11/12/2024 28 (H)  <=5 /hpf Final    RBC, UA 11/12/2024 2  <=3 /hpf Final    Bacteria, UA 11/12/2024 Occasional (A)  None Seen /hpf Final    Squamous Epithelial Cells, UA 11/12/2024 Occasional (A)  None Seen /HPF Final    Yeast, UA 11/12/2024 Occasional (A)  None Seen /hpf Final    Mucous 11/12/2024 Occasional (A)  None Seen /LPF Final    Culture, Urine 11/12/2024 Skin/Urogenital Louise Isolated, no further workup.   Final   Office Visit on 11/12/2024   Component Date Value Ref Range Status    Molecular Strep A, POC 11/12/2024 Negative  Negative Final     Acceptable 11/12/2024 Yes   Final    POC Rapid COVID 11/12/2024 Negative  Negative Final     Acceptable 11/12/2024 Yes   Final    POC Molecular Influenza A Ag 11/12/2024 Negative  Negative Final    POC Molecular Influenza B Ag 11/12/2024 Negative  Negative Final     Acceptable 11/12/2024 Yes   Final   Office Visit on 11/05/2024   Component Date Value Ref Range Status    Protein, Urine 11/05/2024 21.5 (H)  0.0 - 11.9 mg/dL Final    Creatinine, Urine 11/05/2024 88  28 - 219 mg/dL Final    Microalbumin 11/05/2024 2.4  0.0 - 2.8 mg/dL Final    Microalbumin/Creatinine Ratio 11/05/2024 27.3  0.0 - 30.0 mg/g Final   Office Visit on 10/29/2024   Component Date Value Ref Range Status    Culture, Urine 10/29/2024 No Growth   Final   Office Visit on 10/04/2024   Component Date Value Ref Range Status    Culture, Urine 10/04/2024 Skin/Urogenital Louise Isolated, no further workup.   Corrected   Office Visit on 09/11/2024   Component Date Value Ref Range Status    POC Amphetamines 09/11/2024 Negative  Negative, Inconclusive Final    POC Barbiturates 09/11/2024 Negative   Negative, Inconclusive Final    POC Benzodiazepines 09/11/2024 Negative  Negative, Inconclusive Final    POC Cocaine 09/11/2024 Negative  Negative, Inconclusive Final    POC THC 09/11/2024 Negative  Negative, Inconclusive Final    POC Methadone 09/11/2024 Negative  Negative, Inconclusive Final    POC Methamphetamine 09/11/2024 Negative  Negative, Inconclusive Final    POC Opiates 09/11/2024 Presumptive Positive (A)  Negative, Inconclusive Final    POC Oxycodone 09/11/2024 Negative  Negative, Inconclusive Final    POC Phencyclidine 09/11/2024 Negative  Negative, Inconclusive Final    POC Methylenedioxymethamphetamine * 09/11/2024 Negative  Negative, Inconclusive Final    POC Tricyclic Antidepressants 09/11/2024 Negative  Negative, Inconclusive Final    POC Buprenorphine 09/11/2024 Negative   Final     Acceptable 09/11/2024 Yes   Final    POC Temperature (Urine) 09/11/2024 92   Final   Office Visit on 06/25/2024   Component Date Value Ref Range Status    POC Amphetamines 06/25/2024 Negative  Negative, Inconclusive Final    POC Barbiturates 06/25/2024 Negative  Negative, Inconclusive Final    POC Benzodiazepines 06/25/2024 Negative  Negative, Inconclusive Final    POC Cocaine 06/25/2024 Negative  Negative, Inconclusive Final    POC THC 06/25/2024 Negative  Negative, Inconclusive Final    POC Methadone 06/25/2024 Negative  Negative, Inconclusive Final    POC Methamphetamine 06/25/2024 Negative  Negative, Inconclusive Final    POC Opiates 06/25/2024 Presumptive Positive (A)  Negative, Inconclusive Final    POC Oxycodone 06/25/2024 Negative  Negative, Inconclusive Final    POC Phencyclidine 06/25/2024 Negative  Negative, Inconclusive Final    POC Methylenedioxymethamphetamine * 06/25/2024 Negative  Negative, Inconclusive Final    POC Tricyclic Antidepressants 06/25/2024 Negative  Negative, Inconclusive Final    POC Buprenorphine 06/25/2024 Negative   Final     Acceptable 06/25/2024 Yes    Final    POC Temperature (Urine) 06/25/2024 94   Final   Office Visit on 06/18/2024   Component Date Value Ref Range Status    Culture, Urine 06/18/2024 >100,000 Staphylococcus epidermidis (A)   Final         Orders Placed This Encounter   Procedures    POCT Urine Drug Screen Presump     Interpretive Information:     Negative:  No drug detected at the cut off level.   Positive:  This result represents presumptive positive for the   tested drug, other substances may yield a positive response other   than the analyte of interest. This result should be utilized for   diagnostic purpose only. Confirmation testing will be performed upon physician request only.            Requested Prescriptions     Signed Prescriptions Disp Refills    gabapentin (NEURONTIN) 400 MG capsule 90 capsule 2     Sig: TAKE 1 CAPSULE(400 MG) BY MOUTH EVERY 8 HOURS    HYDROcodone-acetaminophen (NORCO)  mg per tablet 120 tablet 0     Sig: Take 1 tablet by mouth every 6 (six) hours as needed for Pain (pain).    HYDROcodone-acetaminophen (NORCO)  mg per tablet 120 tablet 0     Sig: Take 1 tablet by mouth every 6 (six) hours as needed for Pain (pain).    HYDROcodone-acetaminophen (NORCO)  mg per tablet 120 tablet 0     Sig: Take 1 tablet by mouth every 6 (six) hours as needed for Pain (pain).    DULoxetine (CYMBALTA) 30 MG capsule 90 capsule 0     Sig: Take 1 capsule (30 mg total) by mouth once daily.       Assessment:     1. Lumbosacral radiculopathy    2. Diabetic neuropathy with neurologic complication    3. Bilateral foot pain    4. Encounter for long-term (current) use of medications             A's of Opioid Risk Assessment  Activity:Patient can perform ADL.   Analgesia:Patients pain is partially controlled by current medication. Patient has tried OTC medications such as Tylenol and Ibuprofen with out relief.   Adverse Effects: Patient denies constipation or sedation.  Aberrant Behavior:  reviewed with no aberrant drug  seeking/taking behavior.  Overdose reversal drug naloxone discussed    Drug screen reviewed        X-ray left knee degenerative changes no fracture noted      MRI lumbar spine [facility] March 25, 2021 spinal stenosis L5/S1 disc bulge facet joint arthropathy multiple level degenerative changes        Plan:    Narcan February 2023    Follows Neurology [facility] neuropathy symptoms lower extremities    She had lumbar L5/S1 TORIE # 1 September 12, 2023, she states she had 90% relief after procedure, she states procedure did help improve her level function    She has known scoliosis      Complaint bilateral lower extremity burning numbness tingling sensation neuropathy symptoms keeping her awake at night     She is requesting options     Discussed increasing gabapentin or trying Cymbalta     She would like to try Cymbalta    Cymbalta 30 mg 1 p.o. Q day    Reluctant to increase gabapentin, Qtenza     Considering lumbar sympathetic nerve block    Continue home exercise program as directed     Continue current medication    Follow-up 3 months    Dr. Brown September 2025    Bring original prescription medication bottles/container/box with labels to each visit

## 2024-11-30 ENCOUNTER — EXTERNAL CHRONIC CARE MANAGEMENT (OUTPATIENT)
Dept: FAMILY MEDICINE | Facility: CLINIC | Age: 79
End: 2024-11-30
Payer: COMMERCIAL

## 2024-11-30 PROCEDURE — G0511 CCM/BHI BY RHC/FQHC 20MIN MO: HCPCS | Mod: ,,, | Performed by: INTERNAL MEDICINE

## 2024-12-10 ENCOUNTER — OFFICE VISIT (OUTPATIENT)
Dept: PAIN MEDICINE | Facility: CLINIC | Age: 79
End: 2024-12-10
Payer: COMMERCIAL

## 2024-12-10 VITALS
HEIGHT: 68 IN | WEIGHT: 174 LBS | DIASTOLIC BLOOD PRESSURE: 66 MMHG | BODY MASS INDEX: 26.37 KG/M2 | HEART RATE: 64 BPM | SYSTOLIC BLOOD PRESSURE: 122 MMHG | RESPIRATION RATE: 18 BRPM

## 2024-12-10 DIAGNOSIS — M79.671 BILATERAL FOOT PAIN: ICD-10-CM

## 2024-12-10 DIAGNOSIS — Z79.899 ENCOUNTER FOR LONG-TERM (CURRENT) USE OF MEDICATIONS: ICD-10-CM

## 2024-12-10 DIAGNOSIS — E11.40 DIABETIC NEUROPATHY WITH NEUROLOGIC COMPLICATION: Chronic | ICD-10-CM

## 2024-12-10 DIAGNOSIS — M79.672 BILATERAL FOOT PAIN: ICD-10-CM

## 2024-12-10 DIAGNOSIS — E11.49 DIABETIC NEUROPATHY WITH NEUROLOGIC COMPLICATION: Chronic | ICD-10-CM

## 2024-12-10 DIAGNOSIS — M54.17 LUMBOSACRAL RADICULOPATHY: Primary | Chronic | ICD-10-CM

## 2024-12-10 PROCEDURE — 99999PBSHW PR PBB SHADOW TECHNICAL ONLY FILED TO HB: Mod: PBBFAC,,,

## 2024-12-10 PROCEDURE — 99214 OFFICE O/P EST MOD 30 MIN: CPT | Mod: PBBFAC | Performed by: PHYSICIAN ASSISTANT

## 2024-12-10 PROCEDURE — 3074F SYST BP LT 130 MM HG: CPT | Mod: CPTII,,, | Performed by: PHYSICIAN ASSISTANT

## 2024-12-10 PROCEDURE — 3078F DIAST BP <80 MM HG: CPT | Mod: CPTII,,, | Performed by: PHYSICIAN ASSISTANT

## 2024-12-10 PROCEDURE — 1125F AMNT PAIN NOTED PAIN PRSNT: CPT | Mod: CPTII,,, | Performed by: PHYSICIAN ASSISTANT

## 2024-12-10 PROCEDURE — 96372 THER/PROPH/DIAG INJ SC/IM: CPT | Mod: PBBFAC | Performed by: PHYSICIAN ASSISTANT

## 2024-12-10 PROCEDURE — 1159F MED LIST DOCD IN RCRD: CPT | Mod: CPTII,,, | Performed by: PHYSICIAN ASSISTANT

## 2024-12-10 PROCEDURE — 1101F PT FALLS ASSESS-DOCD LE1/YR: CPT | Mod: CPTII,,, | Performed by: PHYSICIAN ASSISTANT

## 2024-12-10 PROCEDURE — 99214 OFFICE O/P EST MOD 30 MIN: CPT | Mod: S$PBB,25,, | Performed by: PHYSICIAN ASSISTANT

## 2024-12-10 PROCEDURE — 99999 PR PBB SHADOW E&M-EST. PATIENT-LVL IV: CPT | Mod: PBBFAC,,, | Performed by: PHYSICIAN ASSISTANT

## 2024-12-10 PROCEDURE — 3288F FALL RISK ASSESSMENT DOCD: CPT | Mod: CPTII,,, | Performed by: PHYSICIAN ASSISTANT

## 2024-12-10 PROCEDURE — 80305 DRUG TEST PRSMV DIR OPT OBS: CPT | Mod: PBBFAC | Performed by: PHYSICIAN ASSISTANT

## 2024-12-10 PROCEDURE — 99999PBSHW POCT URINE DRUG SCREEN PRESUMP: Mod: PBBFAC,,,

## 2024-12-10 RX ORDER — HYDROCODONE BITARTRATE AND ACETAMINOPHEN 10; 325 MG/1; MG/1
1 TABLET ORAL EVERY 6 HOURS PRN
Qty: 120 TABLET | Refills: 0 | Status: SHIPPED | OUTPATIENT
Start: 2025-01-24 | End: 2025-02-23

## 2024-12-10 RX ORDER — HYDROCODONE BITARTRATE AND ACETAMINOPHEN 10; 325 MG/1; MG/1
1 TABLET ORAL EVERY 6 HOURS PRN
Qty: 120 TABLET | Refills: 0 | Status: SHIPPED | OUTPATIENT
Start: 2024-12-24 | End: 2025-01-23

## 2024-12-10 RX ORDER — KETOROLAC TROMETHAMINE 30 MG/ML
30 INJECTION, SOLUTION INTRAMUSCULAR; INTRAVENOUS
Status: COMPLETED | OUTPATIENT
Start: 2024-12-10 | End: 2024-12-10

## 2024-12-10 RX ORDER — GABAPENTIN 400 MG/1
CAPSULE ORAL
Qty: 90 CAPSULE | Refills: 2 | Status: SHIPPED | OUTPATIENT
Start: 2024-12-10

## 2024-12-10 RX ORDER — DULOXETIN HYDROCHLORIDE 30 MG/1
30 CAPSULE, DELAYED RELEASE ORAL DAILY
Qty: 90 CAPSULE | Refills: 0 | Status: SHIPPED | OUTPATIENT
Start: 2024-12-10

## 2024-12-10 RX ORDER — KETOROLAC TROMETHAMINE 30 MG/ML
30 INJECTION, SOLUTION INTRAMUSCULAR; INTRAVENOUS
Status: DISCONTINUED | OUTPATIENT
Start: 2024-12-10 | End: 2024-12-10

## 2024-12-10 RX ORDER — HYDROCODONE BITARTRATE AND ACETAMINOPHEN 10; 325 MG/1; MG/1
1 TABLET ORAL EVERY 6 HOURS PRN
Qty: 120 TABLET | Refills: 0 | Status: SHIPPED | OUTPATIENT
Start: 2025-02-23 | End: 2025-03-25

## 2024-12-10 RX ADMIN — KETOROLAC TROMETHAMINE 30 MG: 30 INJECTION, SOLUTION INTRAMUSCULAR at 11:12

## 2024-12-16 DIAGNOSIS — M79.672 BILATERAL FOOT PAIN: ICD-10-CM

## 2024-12-16 DIAGNOSIS — M54.17 LUMBOSACRAL RADICULOPATHY: Chronic | ICD-10-CM

## 2024-12-16 DIAGNOSIS — M79.671 BILATERAL FOOT PAIN: ICD-10-CM

## 2024-12-16 DIAGNOSIS — E11.49 DIABETIC NEUROPATHY WITH NEUROLOGIC COMPLICATION: Chronic | ICD-10-CM

## 2024-12-16 DIAGNOSIS — E11.40 DIABETIC NEUROPATHY WITH NEUROLOGIC COMPLICATION: Chronic | ICD-10-CM

## 2024-12-16 RX ORDER — EZETIMIBE 10 MG/1
10 TABLET ORAL DAILY
Qty: 90 TABLET | Refills: 3 | Status: SHIPPED | OUTPATIENT
Start: 2024-12-16

## 2024-12-31 ENCOUNTER — EXTERNAL CHRONIC CARE MANAGEMENT (OUTPATIENT)
Dept: FAMILY MEDICINE | Facility: CLINIC | Age: 79
End: 2024-12-31
Payer: COMMERCIAL

## 2024-12-31 PROCEDURE — G0511 CCM/BHI BY RHC/FQHC 20MIN MO: HCPCS | Mod: ,,, | Performed by: INTERNAL MEDICINE

## 2025-01-07 ENCOUNTER — OFFICE VISIT (OUTPATIENT)
Dept: FAMILY MEDICINE | Facility: CLINIC | Age: 80
End: 2025-01-07
Payer: COMMERCIAL

## 2025-01-07 VITALS
TEMPERATURE: 97 F | HEIGHT: 68 IN | HEART RATE: 68 BPM | RESPIRATION RATE: 18 BRPM | DIASTOLIC BLOOD PRESSURE: 70 MMHG | OXYGEN SATURATION: 100 % | SYSTOLIC BLOOD PRESSURE: 128 MMHG | BODY MASS INDEX: 26.22 KG/M2 | WEIGHT: 173 LBS

## 2025-01-07 DIAGNOSIS — E11.9 TYPE 2 DIABETES MELLITUS WITHOUT COMPLICATION, WITHOUT LONG-TERM CURRENT USE OF INSULIN: Primary | ICD-10-CM

## 2025-01-07 LAB
AMORPHOUS CRYSTALS, UA (OHS): ABNORMAL /HPF
BACTERIA #/AREA URNS HPF: ABNORMAL /HPF
BILIRUB UR QL STRIP: NEGATIVE
CLARITY UR: ABNORMAL
COLOR UR: ABNORMAL
EST. AVERAGE GLUCOSE BLD GHB EST-MCNC: 140 MG/DL
GLUCOSE UR STRIP-MCNC: 300 MG/DL
HBA1C MFR BLD HPLC: 6.5 %
KETONES UR STRIP-SCNC: NEGATIVE MG/DL
LEUKOCYTE ESTERASE UR QL STRIP: ABNORMAL
MUCOUS, UA: ABNORMAL /LPF
NITRITE UR QL STRIP: NEGATIVE
PH UR STRIP: 5.5 PH UNITS
PROT UR QL STRIP: 10
RBC # UR STRIP: NEGATIVE /UL
RBC #/AREA URNS HPF: 3 /HPF
SP GR UR STRIP: 1.02
SQUAMOUS #/AREA URNS LPF: ABNORMAL /HPF
UROBILINOGEN UR STRIP-ACNC: NORMAL MG/DL
WBC #/AREA URNS HPF: 40 /HPF

## 2025-01-07 PROCEDURE — 87086 URINE CULTURE/COLONY COUNT: CPT | Mod: GZ,,, | Performed by: CLINICAL MEDICAL LABORATORY

## 2025-01-07 PROCEDURE — 3078F DIAST BP <80 MM HG: CPT | Mod: ,,, | Performed by: INTERNAL MEDICINE

## 2025-01-07 PROCEDURE — 3288F FALL RISK ASSESSMENT DOCD: CPT | Mod: ,,, | Performed by: INTERNAL MEDICINE

## 2025-01-07 PROCEDURE — 83036 HEMOGLOBIN GLYCOSYLATED A1C: CPT | Mod: ,,, | Performed by: CLINICAL MEDICAL LABORATORY

## 2025-01-07 PROCEDURE — 99213 OFFICE O/P EST LOW 20 MIN: CPT | Mod: ,,, | Performed by: INTERNAL MEDICINE

## 2025-01-07 PROCEDURE — 1159F MED LIST DOCD IN RCRD: CPT | Mod: ,,, | Performed by: INTERNAL MEDICINE

## 2025-01-07 PROCEDURE — 1125F AMNT PAIN NOTED PAIN PRSNT: CPT | Mod: ,,, | Performed by: INTERNAL MEDICINE

## 2025-01-07 PROCEDURE — 81001 URINALYSIS AUTO W/SCOPE: CPT | Mod: ,,, | Performed by: CLINICAL MEDICAL LABORATORY

## 2025-01-07 PROCEDURE — 87186 SC STD MICRODIL/AGAR DIL: CPT | Mod: ,,, | Performed by: CLINICAL MEDICAL LABORATORY

## 2025-01-07 PROCEDURE — 3074F SYST BP LT 130 MM HG: CPT | Mod: ,,, | Performed by: INTERNAL MEDICINE

## 2025-01-07 PROCEDURE — 1101F PT FALLS ASSESS-DOCD LE1/YR: CPT | Mod: ,,, | Performed by: INTERNAL MEDICINE

## 2025-01-07 PROCEDURE — 87077 CULTURE AEROBIC IDENTIFY: CPT | Mod: ,,, | Performed by: CLINICAL MEDICAL LABORATORY

## 2025-01-08 DIAGNOSIS — N39.0 URINARY TRACT INFECTION WITHOUT HEMATURIA, SITE UNSPECIFIED: ICD-10-CM

## 2025-01-08 RX ORDER — NITROFURANTOIN 25; 75 MG/1; MG/1
100 CAPSULE ORAL 2 TIMES DAILY
Qty: 14 CAPSULE | Refills: 0 | Status: SHIPPED | OUTPATIENT
Start: 2025-01-08

## 2025-01-08 NOTE — TELEPHONE ENCOUNTER
----- Message from Mateo Chen MD sent at 1/8/2025  9:33 AM CST -----  Macrobid 100 mg 1 p.o. b.i.d. x7 days    0938 Rx sent to pharmacy; pt voices understanding and has no further questions.

## 2025-01-08 NOTE — PROGRESS NOTES
Subjective     Patient ID: Lorraine De Jesus is a 79 y.o. female.    Chief Complaint:  Urethral stricture    This pleasant 80-year-old female presents to the clinic for follow-up of urethral stricture, bladder spasm and kidney stone.  Patient states she is doing okay today.  She is currently being treated for UTI by her primary care provider with Macrobid 100 mg 1 twice a day.  Her urine culture on January 7, 2025 grew 7,000 Escherichia coli.  We discussed adding Hiprex 1 g twice a day for UTI suppression.  Her last urethral dilatation was done by Dr. Singleton on October 29, 2024.  She denies any urethral stricture complaints today.  She reports she has a good stream.  She denies any complaints of kidney stone or stone pain.  Her CT renal stone study done March 8, 2024 showed No acute CT findings.  There was a 9 mm calculus within the inferior pole of left kidney with No convincing ureteral calculus or hydronephrosis.  We discussed doing the KUB today to monitor the renal stone.  She does report nocturia 1-2 times a night and some incomplete bladder emptying.  Her PVR was 107 MLS.  She reports bladder spasms are controlled with the oxybutynin XL 10 mg 1 tablet at bedtime.  She is tolerating this medication without side effects.  She denies dysuria, hematuria, frequency, urgency or incontinence.  She denies fever, chills, nausea, or vomiting.  She denies any  pains.  Through shared decision-making with the patient, she desires to continue the Macrobid for the current UTI.  She desires to continue the oxybutynin XL 10 mg 1 at bedtime.  She desires to wait on the Hiprex for UTI suppression.  She does not feel like she needs another urethral dilatation at this time.  She was encouraged to stay hydrated.  I discussed the plan in detail with the patient and she is in agreement with the plan.  All her questions were answered at today's visit.  I spent 30 minutes counseling this patient, reviewing the chart, imaging, and labs.    --------------------------------------------  [January 13, 2025].         Review of Systems   Constitutional:  Negative for activity change and fever.   HENT:  Negative for hearing loss and trouble swallowing.    Eyes:  Negative for visual disturbance.   Respiratory:  Negative for cough, shortness of breath and wheezing.    Cardiovascular:  Negative for chest pain.   Gastrointestinal:  Negative for abdominal pain, diarrhea, nausea and vomiting.   Endocrine: Negative for polyuria.   Genitourinary:  Positive for nocturia. Negative for bladder incontinence, decreased urine volume, difficulty urinating, dysuria, enuresis, flank pain, frequency, hematuria and urgency.        Urethral stricture       Kidney stone       Bladder spasms   Musculoskeletal:  Negative for back pain and gait problem.   Integumentary:  Negative for rash.   Neurological:  Negative for speech difficulty and weakness.   Psychiatric/Behavioral:  Negative for behavioral problems and confusion.           Objective     Physical Exam  Vitals and nursing note reviewed.   Constitutional:       General: She is not in acute distress.     Appearance: Normal appearance. She is not ill-appearing, toxic-appearing or diaphoretic.   HENT:      Head: Normocephalic.   Eyes:      Extraocular Movements: Extraocular movements intact.   Cardiovascular:      Rate and Rhythm: Normal rate and regular rhythm.      Heart sounds: Normal heart sounds.   Pulmonary:      Effort: Pulmonary effort is normal.      Breath sounds: Normal breath sounds. No wheezing, rhonchi or rales.   Abdominal:      General: Bowel sounds are normal.      Palpations: Abdomen is soft.      Tenderness: There is no abdominal tenderness. There is no right CVA tenderness, left CVA tenderness, guarding or rebound.   Musculoskeletal:         General: Normal range of motion.      Cervical back: Normal range of motion. No rigidity.   Skin:     General: Skin is warm and dry.   Neurological:      General: No  focal deficit present.      Mental Status: She is alert and oriented to person, place, and time.      Motor: No weakness.      Coordination: Coordination normal.      Gait: Gait normal.   Psychiatric:         Mood and Affect: Mood normal.         Behavior: Behavior normal.         Thought Content: Thought content normal.          Assessment and Plan     1. Other stricture of urethra in female  Overview:  history of known urethral stenosis, and was taught to perform monthly self dilation at home.      2. Bladder spasms  Overview:  Ditropan 5mg daily not controlling spasms      3. Renal calculus  -     X-Ray KUB; Future; Expected date: 04/13/2025    4. Nocturia             1. Finish Macrobid as prescribed  2. Continue oxybutynin (Ditropan) XL 10 mg 1 tablet at bedtime  3. Stay hydrated  4. KUB at next visit  5. Consider adding Hiprex for UTI suppression if patient continues to have breakthrough UTIs  6. Follow-up with urology in 3 months

## 2025-01-09 LAB — UA COMPLETE W REFLEX CULTURE PNL UR: ABNORMAL

## 2025-01-09 NOTE — PROGRESS NOTES
Subjective:       Patient ID: Lorraine De Jesus is a 79 y.o. female.    Chief Complaint: Abdominal Pain (2 month fu ), Medication Management (Pt wants  amlodipine for BP ), and Health Maintenance (Pt refused other care gaps, A1C will be checked during today's visit )    History of Present Illness    CHIEF COMPLAINT:  Patient presents today for follow-up regarding osteoporosis management.    OSTEOPOROSIS:  She has not seen Dr. Garcia for osteoporosis management in at least five years. She reports her legs give out, leading to falls. She does not use any assistive devices such as a walker or cane.    DIABETES:  Her blood sugar was 178 one month ago. She requests a refill for One Touch supplies.    LABS:  TSH was 2.3. Lipase and amylase levels were normal.         Current Medications:    Current Outpatient Medications:     blood sugar diagnostic Strp, 1 strip by Misc.(Non-Drug; Combo Route) route 3 (three) times daily., Disp: 200 strip, Rfl: 3    blood-glucose meter kit, Use as instructed, Disp: 1 each, Rfl: 0    calcium-vitamin D (CALCIUM WITH VITAMIN D) 600 mg-10 mcg (400 unit) Tab, Take 1 tablet by mouth Daily., Disp: 90 tablet, Rfl: 1    cloNIDine (CATAPRES) 0.1 MG tablet, TAKE 1 TABLET BY MOUTH EVERY 4 HOURS AS NEEDED FOR BLOOD PRESSURE GREATER THAN 170/90, Disp: , Rfl:     DULoxetine (CYMBALTA) 30 MG capsule, Take 1 capsule (30 mg total) by mouth once daily., Disp: 90 capsule, Rfl: 0    ezetimibe (ZETIA) 10 mg tablet, Take 1 tablet (10 mg total) by mouth once daily., Disp: 90 tablet, Rfl: 3    gabapentin (NEURONTIN) 400 MG capsule, TAKE 1 CAPSULE(400 MG) BY MOUTH EVERY 8 HOURS, Disp: 90 capsule, Rfl: 2    glipiZIDE (GLUCOTROL) 10 MG TR24, Take 1 tablet (10 mg total) by mouth 2 (two) times daily., Disp: 180 tablet, Rfl: 1    HYDROcodone-acetaminophen (NORCO)  mg per tablet, Take 1 tablet by mouth every 6 (six) hours as needed for Pain (pain)., Disp: 120 tablet, Rfl: 0    [START ON 1/24/2025]  "HYDROcodone-acetaminophen (NORCO)  mg per tablet, Take 1 tablet by mouth every 6 (six) hours as needed for Pain (pain)., Disp: 120 tablet, Rfl: 0    [START ON 2/23/2025] HYDROcodone-acetaminophen (NORCO)  mg per tablet, Take 1 tablet by mouth every 6 (six) hours as needed for Pain (pain)., Disp: 120 tablet, Rfl: 0    lactulose (CHRONULAC) 10 gram/15 mL solution, TAKE 30 MLS BY MOUTH TWICE DAILY AS NEEDED FOR CONSTIPATION, Disp: 600 mL, Rfl: 1    lancets (ACCU-CHEK FASTCLIX LANCET DRUM MIS), Take 1 each by mouth once daily., Disp: , Rfl:     levothyroxine (SYNTHROID) 75 MCG tablet, Take 1 tablet (75 mcg total) by mouth before breakfast., Disp: 90 tablet, Rfl: 1    olmesartan (BENICAR) 20 MG tablet, Take 20 mg by mouth., Disp: , Rfl:     olmesartan (BENICAR) 5 MG Tab, Take 10 mg by mouth once daily., Disp: , Rfl:     ondansetron (ZOFRAN-ODT) 4 MG TbDL, Take 1 tablet (4 mg total) by mouth every 6 (six) hours as needed (nausea and vomting)., Disp: 15 tablet, Rfl: 0    ONETOUCH DELICA PLUS LANCET 33 gauge Misc, Check blood sugar TID, Disp: 100 each, Rfl: 11    ONETOUCH VERIO TEST STRIPS Strp, TEST BLOOD GLUCOSE LEVELS TWICE DAILY, Disp: 200 strip, Rfl: 4    oxybutynin (DITROPAN-XL) 10 MG 24 hr tablet, Take one tablet at night, Disp: 90 tablet, Rfl: 3    nitrofurantoin, macrocrystal-monohydrate, (MACROBID) 100 MG capsule, Take 1 capsule (100 mg total) by mouth 2 (two) times daily. Take with food or milk, Disp: 14 capsule, Rfl: 0           ROS  Twelve point system reviewed, unremarkable except for stated above in HPI.        Objective:         Vitals:    01/07/25 0928   BP: 128/70   BP Location: Left arm   Patient Position: Sitting   Pulse: 68   Resp: 18   Temp: 97.2 °F (36.2 °C)   TempSrc: Temporal   SpO2: 100%   Weight: 78.5 kg (173 lb)   Height: 5' 8" (1.727 m)        Physical Exam     Patient is awake alert oriented person place and  Lungs are clear to auscultation bilaterally no crackles or wheezes "   Cardiovascular S1-S2 regular rate and rhythm no murmurs rubs or gallops   Abdomen is soft positive bowel sounds nontender, extremities no clubbing cyanosis edema  Neuro no focal neurological deficits  Skin warm and dry.     Last Labs:     Office Visit on 01/07/2025   Component Date Value    Color, UA 01/07/2025 Light-Yellow     Clarity, UA 01/07/2025 Turbid     pH, UA 01/07/2025 5.5     Leukocytes, UA 01/07/2025 Small (A)     Nitrites, UA 01/07/2025 Negative     Protein, UA 01/07/2025 10 (A)     Glucose, UA 01/07/2025 300 (A)     Ketones, UA 01/07/2025 Negative     Urobilinogen, UA 01/07/2025 Normal     Bilirubin, UA 01/07/2025 Negative     Blood, UA 01/07/2025 Negative     Specific Gravity, UA 01/07/2025 1.016     Hemoglobin A1C 01/07/2025 6.5     Estimated Average Glucose 01/07/2025 140     WBC, UA 01/07/2025 40 (H)     RBC, UA 01/07/2025 3     Bacteria, UA 01/07/2025 Few (A)     Squamous Epithelial Cell* 01/07/2025 Occasional (A)     Amorphous Crystals, UA 01/07/2025 Occasional     Mucous 01/07/2025 Occasional (A)     Culture, Urine 01/07/2025 7,000 Escherichia coli (A)    Office Visit on 12/10/2024   Component Date Value    POC Amphetamines 12/10/2024 Negative     POC Barbiturates 12/10/2024 Negative     POC Benzodiazepines 12/10/2024 Negative     POC Cocaine 12/10/2024 Negative     POC THC 12/10/2024 Negative     POC Methadone 12/10/2024 Negative     POC Methamphetamine 12/10/2024 Negative     POC Opiates 12/10/2024 Presumptive Positive (A)     POC Oxycodone 12/10/2024 Negative     POC Phencyclidine 12/10/2024 Negative     POC Methylenedioxymetham* 12/10/2024 Negative     POC Tricyclic Antidepres* 12/10/2024 Negative     POC Buprenorphine 12/10/2024 Negative      Acceptab* 12/10/2024 Yes     POC Temperature (Urine) 12/10/2024 90        Last Imaging:  XR ABDOMEN, ACUTE 2 OR MORE VIEWS WITH CHEST  Narrative: EXAMINATION:  XR ABDOMEN ACUTE 2 OR MORE VIEWS WITH CHEST    CLINICAL HISTORY:  abd pain  - nausea and vomiting;    TECHNIQUE:  PA chest and three views of the abdomen.    COMPARISON:  Abdominal radiograph performed 03/25/2024.    FINDINGS:  Cardiac contours appear to be within normal limits.  Lungs essentially clear.  No definite pneumothorax or large volume pleural effusion.    No acute findings in the visualized abdomen.  Osseous and soft tissue structures appear without definite acute abnormality.  Scoliotic curvature of the spine.    No definite dilated gas-filled loops of small or large bowel appreciated.  Moderate volume colonic stool.    Grossly unchanged appearance of left nephrolithiasis relative to 03/25/2024 radiograph.  Impression: 1. No convincing evidence of acute cardiopulmonary disease.  2. Nonspecific, nonobstructive bowel gas pattern.  Moderate volume colonic stool.    Electronically signed by: Doug Saavedra  Date:    11/12/2024  Time:    14:12         **Labs and x-rays personally reviewed by me    ** reviewed           Assessment & Plan:   Assessment & Plan    IMPRESSION:  - Reviewed osteoporosis diagnosis and treatment plan  - Considered Prolia injections for osteoporosis management, to be administered by GYN  - Assessed fall risk and recommended preventive measures  - Evaluated thyroid function, noting normal TSH of 2.3 1 month ago  - Reviewed recent imaging studies, including normal x-ray from November  - Determined thyroid ultrasound due in 1 year based on previous findings    FALL RISK:  - Advised the patient to consistently use a cane or walker to prevent falls.  - Recommend arranging for installation of rails in the house, particularly in areas prone to falls.  - Noted that the patient has a history of falling, with a recent fall due to legs giving out.  - Observed that the patient does not use a walker or cane consistently.  - Encouraged the use of mobility aids and home modifications to reduce fall risk.    OSTEOPOROSIS:  - Explained the role of gynecologists in  administering Prolia injections for osteoporosis management.  - Clarified that osteoporosis is a new diagnosis, possibly not present during previous visits with Dr. Garcia.  - Advised continuing vitamin D and calcium supplementation.  - Referred the patient to a gynecologist for consideration of Prolia injections in April.  - Noted that the osteoporosis diagnosis was confirmed through a bone density test.    DIABETES:  - Refilled One Touch supplies for diabetes management.  - Ordered an A1C test to assess current diabetes control.  - Noted that the patient's blood sugar was elevated at 178 mg/dL a month ago.  - Will review A1C results to determine if any adjustments to the diabetes management plan are necessary.    THYROID DISORDER:  - Performed a physical exam of the patient's neck and thyroid.  - Reviewed previous thyroid ultrasound results.  - Noted that the TSH level was normal at 2.3 one month ago.  - Scheduled a repeat thyroid ultrasound in 1 year, following the recommended 2-year interval between scans.  - Will continue to monitor thyroid function and nodule status.           1. Type 2 diabetes mellitus without complication, without long-term current use of insulin  -     Hemoglobin A1C; Future; Expected date: 01/07/2025  -     Urinalysis, Reflex to Urine Culture  -     Urinalysis, Microscopic  -     Urine culture            Mateo Chen MD  This note was generated with the assistance of ambient listening technology. Verbal consent was obtained by the patient and accompanying visitor(s) for the recording of patient appointment to facilitate this note. I attest to having reviewed and edited the generated note for accuracy, though some syntax or spelling errors may persist. Please contact the author of this note for any clarification.

## 2025-01-13 ENCOUNTER — OFFICE VISIT (OUTPATIENT)
Dept: UROLOGY | Facility: CLINIC | Age: 80
End: 2025-01-13
Payer: COMMERCIAL

## 2025-01-13 VITALS
RESPIRATION RATE: 16 BRPM | BODY MASS INDEX: 25.85 KG/M2 | DIASTOLIC BLOOD PRESSURE: 66 MMHG | SYSTOLIC BLOOD PRESSURE: 145 MMHG | WEIGHT: 170 LBS | TEMPERATURE: 98 F | HEART RATE: 65 BPM

## 2025-01-13 DIAGNOSIS — N20.0 RENAL CALCULUS: Chronic | ICD-10-CM

## 2025-01-13 DIAGNOSIS — N32.89 BLADDER SPASMS: ICD-10-CM

## 2025-01-13 DIAGNOSIS — R35.1 NOCTURIA: ICD-10-CM

## 2025-01-13 DIAGNOSIS — N35.82 OTHER STRICTURE OF URETHRA IN FEMALE: Primary | Chronic | ICD-10-CM

## 2025-01-13 PROCEDURE — 3078F DIAST BP <80 MM HG: CPT | Mod: CPTII,,, | Performed by: NURSE PRACTITIONER

## 2025-01-13 PROCEDURE — 99214 OFFICE O/P EST MOD 30 MIN: CPT | Mod: S$PBB,,, | Performed by: NURSE PRACTITIONER

## 2025-01-13 PROCEDURE — 99215 OFFICE O/P EST HI 40 MIN: CPT | Mod: PBBFAC | Performed by: NURSE PRACTITIONER

## 2025-01-13 PROCEDURE — 3077F SYST BP >= 140 MM HG: CPT | Mod: CPTII,,, | Performed by: NURSE PRACTITIONER

## 2025-01-13 PROCEDURE — 1160F RVW MEDS BY RX/DR IN RCRD: CPT | Mod: CPTII,,, | Performed by: NURSE PRACTITIONER

## 2025-01-13 PROCEDURE — 1159F MED LIST DOCD IN RCRD: CPT | Mod: CPTII,,, | Performed by: NURSE PRACTITIONER

## 2025-01-13 PROCEDURE — 99999 PR PBB SHADOW E&M-EST. PATIENT-LVL V: CPT | Mod: PBBFAC,,, | Performed by: NURSE PRACTITIONER

## 2025-01-13 NOTE — PATIENT INSTRUCTIONS
1. Finish Macrobid as prescribed  2. Continue oxybutynin (Ditropan) XL 10 mg 1 tablet at bedtime  3. Stay hydrated  4. KUB at next visit  5. Consider adding Hiprex for UTI suppression if patient continues to have breakthrough UTIs  6. Follow-up with urology in 3 months

## 2025-01-28 ENCOUNTER — ANESTHESIA (OUTPATIENT)
Dept: GASTROENTEROLOGY | Facility: HOSPITAL | Age: 80
End: 2025-01-28
Payer: COMMERCIAL

## 2025-01-28 ENCOUNTER — HOSPITAL ENCOUNTER (OUTPATIENT)
Dept: GASTROENTEROLOGY | Facility: HOSPITAL | Age: 80
Discharge: HOME | End: 2025-01-28
Attending: NURSE PRACTITIONER | Admitting: INTERNAL MEDICINE
Payer: COMMERCIAL

## 2025-01-28 ENCOUNTER — ANESTHESIA EVENT (OUTPATIENT)
Dept: GASTROENTEROLOGY | Facility: HOSPITAL | Age: 80
End: 2025-01-28
Payer: COMMERCIAL

## 2025-01-28 VITALS
HEIGHT: 68 IN | HEART RATE: 71 BPM | DIASTOLIC BLOOD PRESSURE: 52 MMHG | OXYGEN SATURATION: 100 % | WEIGHT: 170 LBS | BODY MASS INDEX: 25.76 KG/M2 | SYSTOLIC BLOOD PRESSURE: 89 MMHG | RESPIRATION RATE: 17 BRPM | TEMPERATURE: 98 F

## 2025-01-28 DIAGNOSIS — Z12.11 SCREENING FOR MALIGNANT NEOPLASM OF COLON: Primary | ICD-10-CM

## 2025-01-28 DIAGNOSIS — K57.30 COLON, DIVERTICULOSIS: ICD-10-CM

## 2025-01-28 DIAGNOSIS — Z86.0100 PERSONAL HISTORY OF COLON POLYPS, UNSPECIFIED: ICD-10-CM

## 2025-01-28 DIAGNOSIS — K63.5 POLYP OF SIGMOID COLON, UNSPECIFIED TYPE: ICD-10-CM

## 2025-01-28 LAB — GLUCOSE SERPL-MCNC: 136 MG/DL (ref 70–105)

## 2025-01-28 PROCEDURE — 63600175 PHARM REV CODE 636 W HCPCS: Performed by: NURSE ANESTHETIST, CERTIFIED REGISTERED

## 2025-01-28 PROCEDURE — D9220A PRA ANESTHESIA: Mod: PT,,, | Performed by: NURSE ANESTHETIST, CERTIFIED REGISTERED

## 2025-01-28 PROCEDURE — 45380 COLONOSCOPY AND BIOPSY: CPT | Mod: PT,,, | Performed by: INTERNAL MEDICINE

## 2025-01-28 PROCEDURE — 82962 GLUCOSE BLOOD TEST: CPT

## 2025-01-28 PROCEDURE — 88305 TISSUE EXAM BY PATHOLOGIST: CPT | Mod: 26,,, | Performed by: PATHOLOGY

## 2025-01-28 PROCEDURE — 45380 COLONOSCOPY AND BIOPSY: CPT | Mod: PT | Performed by: INTERNAL MEDICINE

## 2025-01-28 PROCEDURE — 88305 TISSUE EXAM BY PATHOLOGIST: CPT | Mod: TC,SUR | Performed by: INTERNAL MEDICINE

## 2025-01-28 PROCEDURE — 27201423 OPTIME MED/SURG SUP & DEVICES STERILE SUPPLY

## 2025-01-28 PROCEDURE — 37000009 HC ANESTHESIA EA ADD 15 MINS

## 2025-01-28 PROCEDURE — 37000008 HC ANESTHESIA 1ST 15 MINUTES

## 2025-01-28 RX ORDER — PROPOFOL 10 MG/ML
VIAL (ML) INTRAVENOUS
Status: DISCONTINUED | OUTPATIENT
Start: 2025-01-28 | End: 2025-01-28

## 2025-01-28 RX ORDER — SODIUM CHLORIDE 0.9 % (FLUSH) 0.9 %
10 SYRINGE (ML) INJECTION EVERY 6 HOURS PRN
Status: DISCONTINUED | OUTPATIENT
Start: 2025-01-28 | End: 2025-01-29 | Stop reason: HOSPADM

## 2025-01-28 RX ORDER — SODIUM CHLORIDE, SODIUM LACTATE, POTASSIUM CHLORIDE, CALCIUM CHLORIDE 600; 310; 30; 20 MG/100ML; MG/100ML; MG/100ML; MG/100ML
INJECTION, SOLUTION INTRAVENOUS CONTINUOUS
Status: DISCONTINUED | OUTPATIENT
Start: 2025-01-28 | End: 2025-01-29 | Stop reason: HOSPADM

## 2025-01-28 RX ORDER — LIDOCAINE HYDROCHLORIDE 20 MG/ML
INJECTION, SOLUTION EPIDURAL; INFILTRATION; INTRACAUDAL; PERINEURAL
Status: DISCONTINUED | OUTPATIENT
Start: 2025-01-28 | End: 2025-01-28

## 2025-01-28 RX ADMIN — PROPOFOL 50 MG: 10 INJECTION, EMULSION INTRAVENOUS at 09:01

## 2025-01-28 RX ADMIN — LIDOCAINE HYDROCHLORIDE 50 MG: 20 INJECTION, SOLUTION EPIDURAL; INFILTRATION; INTRACAUDAL; PERINEURAL at 09:01

## 2025-01-28 NOTE — TRANSFER OF CARE
"Anesthesia Transfer of Care Note    Patient: Lorraine De Jesus    Procedure(s) Performed: Colonoscopy    Patient location: GI    Anesthesia Type: MAC    Transport from OR: Transported from OR on room air with adequate spontaneous ventilation. Continuous ECG monitoring in transport. Continuous SpO2 monitoring in transport    Post pain: adequate analgesia    Post assessment: no apparent anesthetic complications    Post vital signs: stable    Level of consciousness: responds to stimulation, awake and sedated    Nausea/Vomiting: no nausea/vomiting    Complications: none    Transfer of care protocol was followed      Last vitals: Visit Vitals  /62 (BP Location: Right arm, Patient Position: Lying)   Pulse 76   Temp 36.6 °C (97.9 °F) (Oral)   Resp 15   Ht 5' 8" (1.727 m)   Wt 77.1 kg (170 lb)   LMP  (LMP Unknown)   SpO2 100%   Breastfeeding No   BMI 25.85 kg/m²     "

## 2025-01-28 NOTE — H&P
[facility]  Gastroenterology  H&P    Patient Name: Lorraine De Jesus  MRN: [id number]  Admission Date: 2025  Code Status: Prior    Attending Provider: Loni Mcclendon FNP   Primary Care Physician: Mateo Chen MD  Principal Problem:<principal problem not specified>    Subjective:     History of Present Illness:  This patient is a 80-year-old female with history of colon polyps.  Her last colonoscopy was in .    Past Medical History:   Diagnosis Date    Adenomatous polyp of ascending colon 2021    Adenomatous polyp of descending colon 2021    Age related osteoporosis 10/28/2020    Benign paroxysmal vertigo     Chronic pain syndrome     Chronic pelvic pain in female 2021    Ditropan XL 5mg    Colon, diverticulosis 2021    Depressive disorder 2019    External hemorrhoid 2021    Gastrointestinal hemorrhage associated with anorectal source 2021    GERD (gastroesophageal reflux disease) 2013    Hyperlipidemia 2012    Hypothyroidism 2019    Joint pain     Nonrheumatic tricuspid (valve) insufficiency 2018    Personal history UTI 2021    Controlled on Hiprex    Polyneuropathy 2018    PVD (peripheral vascular disease) 10/30/2018    Temporal arteritis     Type 2 diabetes mellitus 2014    Urethral meatal stenosis 2018    history of known urethral stenosis, and was taught to perform monthly self dilation at home.       Past Surgical History:   Procedure Laterality Date     left lumbar sympathetic nerve block Left 2020    X3  DR LANDEROS    CARDIAC CATHETERIZATION  10/14/2009     SECTION      x 2    COLONOSCOPY  2009    CYSTOSCOPY WITH HYDRODISTENSION OF BLADDER N/A 2021    Procedure: CYSTOSCOPY, WITH BLADDER HYDRODISTENSION;  Surgeon: Dequan Recio MD;  Location: [facility];  Service: Urology;  Laterality: N/A;    CYSTOSCOPY WITH URETHRAL DILATION  2021    Dr Recio    EPIDURAL STEROID INJECTION  N/A 10/27/2022    Procedure: Injection, Steroid, Epidural, L5/S1;  Surgeon: Belkis Brown MD;  Location: [facility];  Service: Pain Management;  Laterality: N/A;    EPIDURAL STEROID INJECTION N/A 9/12/2023    Procedure: Injection, Steroid, Epidural, L5/S1;  Surgeon: Belkis Brown MD;  Location: [facility];  Service: Pain Management;  Laterality: N/A;    HYSTERECTOMY  1980's    LUMBAR SYMPATHETIC NERVE BLOCK Left 10/05/2020    Left Sympathetic Nerve Block - Dr Brown - 10/5/20, 9/21/20, 1/20/20. 1/6/20, 10/9/19, 9/25/19 and 7/11/18    right lumbar sympathetic nerve block Right 2020    X4 DR BROWN    THYROIDECTOMY      1990's       Review of patient's allergies indicates:   Allergen Reactions    Lipitor [atorvastatin] Other (See Comments)     Muscle aching    Pravachol [pravastatin] Other (See Comments)     Muscle aching     Family History       Problem Relation (Age of Onset)    Cancer Mother, Father, Sister, Brother, Brother, Brother    Diabetes Sister, Sister    Hyperlipidemia Sister    Hypertension Mother, Sister    No Known Problems Sister, Brother          Tobacco Use    Smoking status: Never     Passive exposure: Never    Smokeless tobacco: Never   Substance and Sexual Activity    Alcohol use: Never    Drug use: Never    Sexual activity: Not Currently     Partners: Male     Review of Systems   Respiratory: Negative.     Cardiovascular: Negative.    Gastrointestinal: Negative.      Objective:     Vital Signs (Most Recent):  Temp: 97.6 °F (36.4 °C) (01/28/25 0911)  Pulse: 84 (01/28/25 0911)  Resp: 12 (01/28/25 0911)  BP: 135/81 (01/28/25 0911)  SpO2: 100 % (01/28/25 0911) Vital Signs (24h Range):  Temp:  [97.6 °F (36.4 °C)] 97.6 °F (36.4 °C)  Pulse:  [84] 84  Resp:  [12] 12  SpO2:  [100 %] 100 %  BP: (135)/(81) 135/81     Weight: 77.1 kg (170 lb) (01/28/25 0911)  Body mass index is 25.85 kg/m².    No intake or output data in the 24 hours ending 01/28/25 0936    Lines/Drains/Airways        "Peripheral Intravenous Line  Duration                  Peripheral IV - Single Lumen 01/28/25 0911 24 G Right Antecubital <1 day                    Physical Exam  Vitals reviewed.   Constitutional:       General: She is not in acute distress.     Appearance: Normal appearance. She is well-developed. She is not ill-appearing.   HENT:      Head: Normocephalic and atraumatic.      Nose: Nose normal.   Eyes:      Pupils: Pupils are equal, round, and reactive to light.   Cardiovascular:      Rate and Rhythm: Normal rate and regular rhythm.   Pulmonary:      Effort: Pulmonary effort is normal.      Breath sounds: Normal breath sounds. No wheezing.   Abdominal:      General: Abdomen is flat. Bowel sounds are normal. There is no distension.      Palpations: Abdomen is soft.      Tenderness: There is no abdominal tenderness. There is no guarding.   Skin:     General: Skin is warm and dry.      Coloration: Skin is not jaundiced.   Neurological:      Mental Status: She is alert.   Psychiatric:         Attention and Perception: Attention normal.         Mood and Affect: Affect normal.         Speech: Speech normal.         Behavior: Behavior is cooperative.      Comments: Pt was calm while speaking.         Significant Labs:  CBC: No results for input(s): "WBC", "HGB", "HCT", "PLT" in the last 48 hours.  CMP: No results for input(s): "GLU", "CALCIUM", "ALBUMIN", "PROT", "NA", "K", "CO2", "CL", "BUN", "CREATININE", "ALKPHOS", "ALT", "AST", "BILITOT" in the last 48 hours.    Significant Imaging:  Imaging results within the past 24 hours have been reviewed.    Assessment/Plan:     There are no hospital problems to display for this patient.        Impression: Screening for colon neoplasm, history of colon polyp  Plan: Colonoscopy today    Francis Gonzalez MD  Gastroenterology  [facility]  "

## 2025-01-28 NOTE — ANESTHESIA PREPROCEDURE EVALUATION
01/28/2025  Lorraine De Jesus is a 80 y.o., female.      Pre-op Assessment    I have reviewed the Patient Summary Reports.     I have reviewed the Nursing Notes. I have reviewed the NPO Status.   I have reviewed the Medications.     Review of Systems  Anesthesia Hx:  No problems with previous Anesthesia             Denies Family Hx of Anesthesia complications.    Denies Personal Hx of Anesthesia complications.                    Social:  Non-Smoker       Hematology/Oncology:  Hematology Normal   Oncology Normal                                   EENT/Dental:  EENT/Dental Normal           Cardiovascular:     Hypertension              ECG has been reviewed.                      Hypertension         Pulmonary:        Sleep Apnea, CPAP     Obstructive Sleep Apnea (CANDI).      Education provided regarding risk of obstructive sleep apnea            Renal/:  Chronic Renal Disease        Kidney Function/Disease             Hepatic/GI:     GERD         Gerd          Musculoskeletal:  Arthritis        Arthritis     Spine Disorders:  Degenerative disease, Disc disease and Chronic Pain           Neurological:    Neuromuscular Disease,             Chronic Pain Syndrome Arthritis                         Neuromuscular Disease   Endocrine:  Diabetes, type 2 Hypothyroidism   Diabetes                   Hypothyroidism          Dermatological:  Skin Normal    Psych:  Psychiatric History  depression            Physical Exam  General: Well nourished    Airway:  Mallampati: II   Mouth Opening: Normal  TM Distance: Normal  Tongue: Normal  Neck ROM: Normal ROM    Dental:  Intact    Anesthesia Plan  Type of Anesthesia, risks & benefits discussed:    Anesthesia Type: MAC  Intra-op Monitoring Plan: Standard ASA Monitors  Post Op Pain Control Plan: multimodal analgesia  Induction:  IV  Informed Consent: Informed consent signed with the  Patient and all parties understand the risks and agree with anesthesia plan.  All questions answered. Patient consented to blood products? Yes  ASA Score: 3  Day of Surgery Review of History & Physical: H&P Update referred to the surgeon/provider.I have interviewed and examined the patient. I have reviewed the patient's H&P dated: There are no significant changes.     Ready For Surgery From Anesthesia Perspective.   .

## 2025-01-28 NOTE — ANESTHESIA POSTPROCEDURE EVALUATION
Anesthesia Post Evaluation    Patient: Lorraine De Jesus    Procedure(s) Performed: Colonoscopy    Final Anesthesia Type: MAC      Patient location during evaluation: GI PACU  Patient participation: Yes- Able to Participate  Level of consciousness: awake and alert and oriented  Post-procedure vital signs: reviewed and stable  Pain management: adequate  Airway patency: patent    PONV status at discharge: No PONV  Anesthetic complications: no      Cardiovascular status: blood pressure returned to baseline and stable  Respiratory status: unassisted, spontaneous ventilation and room air  Hydration status: euvolemic  Follow-up not needed.  Comments: Refer to nursing note for pain/carolina score upon discharge from recovery.               Vitals Value Taken Time   /62 01/28/25 1000   Temp 36.6 °C (97.9 °F) 01/28/25 1000   Pulse 76 01/28/25 1000   Resp 15 01/28/25 1000   SpO2 100 % 01/28/25 1000         No case tracking events are documented in the log.      Pain/Carolina Score: No data recorded

## 2025-01-28 NOTE — DISCHARGE INSTRUCTIONS
Procedure Date  1/28/25     Impression  Overall Impression:   Polyp in the sigmoid colon; performed cold forceps biopsy  Diverticulosis in the ascending colon, descending colon and sigmoid colon  Digital rectal exam revealed no mass.  The colon was examined from the rectum to the cecum and pan diverticulosis was present.  A diminutive polyp was removed with biopsy from the sigmoid colon.  Impression:  Screening for colon neoplasm, history of colon polyps, colon diverticulosis, 1 polyp was removed during this exam.     Recommendation  Await pathology results, due: 1/30/2025   Repeat colonoscopy in 5 years      Disposition:  Discharge patient to home in stable condition.  High-fiber diet.  No driving until tomorrow.  Follow up pathology results in 2 days.  Follow up with PCP as scheduled, return GI clinic p.r.n..  Consider repeat screening colonoscopy in 5 years if the patient is in good health at that time.     Indication  Personal history of colon polyps, unspecified

## 2025-01-29 ENCOUNTER — TELEPHONE (OUTPATIENT)
Dept: FAMILY MEDICINE | Facility: CLINIC | Age: 80
End: 2025-01-29
Payer: COMMERCIAL

## 2025-01-29 LAB
ESTROGEN SERPL-MCNC: NORMAL PG/ML
INSULIN SERPL-ACNC: NORMAL U[IU]/ML
LAB AP GROSS DESCRIPTION: NORMAL
LAB AP LABORATORY NOTES: NORMAL
T3RU NFR SERPL: NORMAL %

## 2025-01-29 NOTE — PROGRESS NOTES
The colon polyp was a tubular adenoma.  Recommendation:  Consider repeat colonoscopy in 5 years if patient is in good health at that time.

## 2025-01-29 NOTE — TELEPHONE ENCOUNTER
----- Message from Mateo Chen MD sent at 1/28/2025 12:23 PM CST -----  Need to see in  1 week please  abnl results     [id number] Pt follows up with gastro provider on 02/21/25

## 2025-01-30 ENCOUNTER — TELEPHONE (OUTPATIENT)
Dept: GASTROENTEROLOGY | Facility: CLINIC | Age: 80
End: 2025-01-30
Payer: COMMERCIAL

## 2025-01-30 NOTE — TELEPHONE ENCOUNTER
Results and recommendations called to patient. Verbalized understanding.            ----- Message from Francis Gonzalez MD sent at 1/29/2025 10:38 AM CST -----  The colon polyp was a tubular adenoma.  Recommendation:  Consider repeat colonoscopy in 5 years if patient is in good health at that time.

## 2025-01-31 ENCOUNTER — EXTERNAL CHRONIC CARE MANAGEMENT (OUTPATIENT)
Dept: FAMILY MEDICINE | Facility: CLINIC | Age: 80
End: 2025-01-31
Payer: COMMERCIAL

## 2025-01-31 PROCEDURE — G0511 CCM/BHI BY RHC/FQHC 20MIN MO: HCPCS | Mod: ,,, | Performed by: INTERNAL MEDICINE

## 2025-02-10 RX ORDER — GLIPIZIDE 10 MG/1
10 TABLET, FILM COATED, EXTENDED RELEASE ORAL 2 TIMES DAILY
Qty: 180 TABLET | Refills: 1 | Status: SHIPPED | OUTPATIENT
Start: 2025-02-10

## 2025-02-21 ENCOUNTER — OFFICE VISIT (OUTPATIENT)
Dept: GASTROENTEROLOGY | Facility: CLINIC | Age: 80
End: 2025-02-21
Payer: COMMERCIAL

## 2025-02-21 VITALS
DIASTOLIC BLOOD PRESSURE: 79 MMHG | BODY MASS INDEX: 26.07 KG/M2 | SYSTOLIC BLOOD PRESSURE: 168 MMHG | HEART RATE: 59 BPM | OXYGEN SATURATION: 100 % | HEIGHT: 68 IN | WEIGHT: 172 LBS

## 2025-02-21 DIAGNOSIS — K21.9 GASTROESOPHAGEAL REFLUX DISEASE, UNSPECIFIED WHETHER ESOPHAGITIS PRESENT: ICD-10-CM

## 2025-02-21 DIAGNOSIS — K59.04 CHRONIC IDIOPATHIC CONSTIPATION: Primary | ICD-10-CM

## 2025-02-21 PROCEDURE — 1159F MED LIST DOCD IN RCRD: CPT | Mod: CPTII,,, | Performed by: NURSE PRACTITIONER

## 2025-02-21 PROCEDURE — 3078F DIAST BP <80 MM HG: CPT | Mod: CPTII,,, | Performed by: NURSE PRACTITIONER

## 2025-02-21 PROCEDURE — 3077F SYST BP >= 140 MM HG: CPT | Mod: CPTII,,, | Performed by: NURSE PRACTITIONER

## 2025-02-21 PROCEDURE — 99999 PR PBB SHADOW E&M-EST. PATIENT-LVL V: CPT | Mod: PBBFAC,,, | Performed by: NURSE PRACTITIONER

## 2025-02-21 PROCEDURE — 99214 OFFICE O/P EST MOD 30 MIN: CPT | Mod: S$PBB,,, | Performed by: NURSE PRACTITIONER

## 2025-02-21 PROCEDURE — 99215 OFFICE O/P EST HI 40 MIN: CPT | Mod: PBBFAC | Performed by: NURSE PRACTITIONER

## 2025-02-21 PROCEDURE — 1100F PTFALLS ASSESS-DOCD GE2>/YR: CPT | Mod: CPTII,,, | Performed by: NURSE PRACTITIONER

## 2025-02-21 PROCEDURE — 3288F FALL RISK ASSESSMENT DOCD: CPT | Mod: CPTII,,, | Performed by: NURSE PRACTITIONER

## 2025-02-21 NOTE — PROGRESS NOTES
Lorraine De Jesus is a 80 y.o. female here for Follow-up        PCP: Mateo Chen  Referring Provider: No referring provider defined for this encounter.     HPI:  Presents for follow-up after colonoscopy. Colonoscopy on 01/28/2025, report reviewed, diverticulosis, tubular adenoma removed from the sigmoid colon.  Recommendation to repeat colonoscopy in 5 years. Can be further determined by patient's health status. Patient has been using MiraLax, Metamucil, and occasionally lactulose. Patient reports that she continue to have constipation. Recommend increasing Miralax to 17 grams two-three times daily. TSH is normal. Denies hematochezia or melena. Reflux symptoms are controlled. No unintentional weight loss.    Follow-up  Pertinent negatives include no abdominal pain, change in bowel habit, fatigue, fever, nausea or vomiting.         ROS:  Review of Systems   Constitutional:  Negative for appetite change, fatigue, fever and unexpected weight change.   HENT:  Negative for trouble swallowing.    Gastrointestinal:  Positive for constipation. Negative for abdominal pain, anal bleeding, blood in stool, change in bowel habit, diarrhea, nausea, vomiting and reflux.   Musculoskeletal:  Negative for gait problem.   Integumentary:  Negative for pallor.   Psychiatric/Behavioral:  The patient is not nervous/anxious.           PMHX:  has a past medical history of Adenomatous polyp of ascending colon (6/22/2021), Adenomatous polyp of descending colon (6/22/2021), Age related osteoporosis (10/28/2020), Benign paroxysmal vertigo, Chronic pain syndrome, Chronic pelvic pain in female (7/28/2021), Colon, diverticulosis (6/22/2021), Depressive disorder (08/12/2019), External hemorrhoid (6/22/2021), Gastrointestinal hemorrhage associated with anorectal source (6/22/2021), GERD (gastroesophageal reflux disease) (11/21/2013), Hyperlipidemia (01/16/2012), Hypothyroidism (02/25/2019), Joint pain, Nonrheumatic tricuspid (valve)  insufficiency (2018), Personal history UTI (2021), Polyneuropathy (2018), PVD (peripheral vascular disease) (10/30/2018), Temporal arteritis, Type 2 diabetes mellitus (2014), and Urethral meatal stenosis (2018).    PSHX:  has a past surgical history that includes Hysterectomy (); Thyroidectomy; Cardiac catheterization (10/14/2009);  section; Colonoscopy (2009); Cystoscopy with urethral dilation (2021); Lumbar sympathetic nerve block (Left, 10/05/2020); Cystoscopy with hydrodistension of bladder (N/A, 2021); right lumbar sympathetic nerve block (Right, );  left lumbar sympathetic nerve block (Left, ); Epidural steroid injection (N/A, 10/27/2022); and Epidural steroid injection (N/A, 2023).    PFHX: family history includes Cancer in her brother, brother, brother, father, mother, and sister; Diabetes in her sister and sister; Hyperlipidemia in her sister; Hypertension in her mother and sister; No Known Problems in her brother and sister.    PSlHX:  reports that she has never smoked. She has never been exposed to tobacco smoke. She has never used smokeless tobacco. She reports that she does not drink alcohol and does not use drugs.        Review of patient's allergies indicates:   Allergen Reactions    Lipitor [atorvastatin] Other (See Comments)     Muscle aching    Pravachol [pravastatin] Other (See Comments)     Muscle aching       Medication List with Changes/Refills   Current Medications    BLOOD SUGAR DIAGNOSTIC STRP    1 strip by Misc.(Non-Drug; Combo Route) route 3 (three) times daily.    BLOOD-GLUCOSE METER KIT    Use as instructed    CALCIUM-VITAMIN D (CALCIUM WITH VITAMIN D) 600 MG-10 MCG (400 UNIT) TAB    Take 1 tablet by mouth Daily.    CLONIDINE (CATAPRES) 0.1 MG TABLET    TAKE 1 TABLET BY MOUTH EVERY 4 HOURS AS NEEDED FOR BLOOD PRESSURE GREATER THAN 170/90    DULOXETINE (CYMBALTA) 30 MG CAPSULE    Take 1 capsule (30 mg total) by mouth  "once daily.    EZETIMIBE (ZETIA) 10 MG TABLET    Take 1 tablet (10 mg total) by mouth once daily.    GABAPENTIN (NEURONTIN) 400 MG CAPSULE    TAKE 1 CAPSULE(400 MG) BY MOUTH EVERY 8 HOURS    GLIPIZIDE (GLUCOTROL) 10 MG TR24    TAKE 1 TABLET(10 MG) BY MOUTH TWICE DAILY    HYDROCODONE-ACETAMINOPHEN (NORCO)  MG PER TABLET    Take 1 tablet by mouth every 6 (six) hours as needed for Pain (pain).    HYDROCODONE-ACETAMINOPHEN (NORCO)  MG PER TABLET    Take 1 tablet by mouth every 6 (six) hours as needed for Pain (pain).    LACTULOSE (CHRONULAC) 10 GRAM/15 ML SOLUTION    TAKE 30 MLS BY MOUTH TWICE DAILY AS NEEDED FOR CONSTIPATION    LANCETS (ACCU-CHEK FASTCLIX LANCET DRUM [facility] [place])    Take 1 each by mouth once daily.    LEVOTHYROXINE (SYNTHROID) 75 MCG TABLET    Take 1 tablet (75 mcg total) by mouth before breakfast.    NITROFURANTOIN, MACROCRYSTAL-MONOHYDRATE, (MACROBID) 100 MG CAPSULE    Take 1 capsule (100 mg total) by mouth 2 (two) times daily. Take with food or milk    OLMESARTAN (BENICAR) 20 MG TABLET    Take 20 mg by mouth.    OLMESARTAN (BENICAR) 5 MG TAB    Take 10 mg by mouth once daily.    ONDANSETRON (ZOFRAN-ODT) 4 MG TBDL    Take 1 tablet (4 mg total) by mouth every 6 (six) hours as needed (nausea and vomting).    ONETOUCH DELICA PLUS LANCET 33 GAUGE MISC    Check blood sugar TID    ONETOUCH VERIO TEST STRIPS STRP    TEST BLOOD GLUCOSE LEVELS TWICE DAILY    OXYBUTYNIN (DITROPAN-XL) 10 MG 24 HR TABLET    Take one tablet at night        Objective Findings:  Vital Signs:  BP (!) 168/79   Pulse (!) 59   Ht 5' 8" (1.727 m)   Wt 78 kg (172 lb)   LMP  (LMP Unknown)   SpO2 100%   BMI 26.15 kg/m²  Body mass index is 26.15 kg/m².    Physical Exam:  Physical Exam  Vitals and nursing note reviewed.   Constitutional:       General: She is not in acute distress.     Appearance: Normal appearance.   HENT:      Mouth/Throat:      Mouth: Mucous membranes are moist.   Cardiovascular:      Rate and Rhythm: " Bradycardia present.   Pulmonary:      Effort: Pulmonary effort is normal.   Abdominal:      General: Bowel sounds are normal. There is no distension.      Palpations: Abdomen is soft. There is no mass.      Tenderness: There is no abdominal tenderness.   Skin:     General: Skin is warm and dry.      Coloration: Skin is not jaundiced or pale.   Neurological:      Mental Status: She is alert and oriented to person, place, and time.   Psychiatric:         Mood and Affect: Mood normal.          Labs:  Lab Results   Component Value Date    WBC 10.26 11/12/2024    HGB 12.8 11/12/2024    HCT 40.3 11/12/2024    MCV 82.1 11/12/2024    RDW 15.3 (H) 11/12/2024     11/12/2024    LYMPH 3.2 (L) 11/12/2024    LYMPH 0.33 (L) 11/12/2024    MONO 2.2 11/12/2024    EOS 0.00 11/12/2024    BASO 0.02 11/12/2024     Lab Results   Component Value Date     11/12/2024    K 4.0 11/12/2024     (H) 11/12/2024    CO2 26 11/12/2024     (H) 11/12/2024    BUN 17 11/12/2024    CREATININE 0.77 11/12/2024    CALCIUM 8.8 11/12/2024    PROT 8.1 11/12/2024    ALBUMIN 3.4 (L) 11/12/2024    BILITOT 0.6 11/12/2024    ALKPHOS 118 11/12/2024    AST 15 11/12/2024    ALT 20 11/12/2024         Imaging: Colonoscopy  Result Date: 1/28/2025  Table formatting from the original result was not included. Procedure Date 1/28/25 Impression Overall      Polyp in the sigmoid colon; performed cold forceps biopsy Diverticulosis in the ascending colon, descending colon and sigmoid colon Digital rectal exam revealed no mass.  The colon was examined from the rectum to the cecum and pan diverticulosis was present.  A diminutive polyp was removed with biopsy from the sigmoid colon. Impression:  Screening for colon neoplasm, history of colon polyps, colon diverticulosis, 1 polyp was removed during this exam. Recommendation Await pathology results, due: 1/30/2025 Repeat colonoscopy in 5 years Disposition:  Discharge patient to home in stable condition.   High-fiber diet.  No driving until tomorrow.  Follow up pathology results in 2 days.  Follow up with PCP as scheduled, return GI clinic p.r.n..  Consider repeat screening colonoscopy in 5 years if the patient is in good health at that time. Indication Personal history of colon polyps, unspecified Providers Francis Gonzalez MD Proceduralist Chad Parker, Patient Care  Francis Choi RN Registered Nurse Frandy Chin, CRNA CRNA Medications Moderate sedation administered by anesthesia staff - See anesthesia record. Preprocedure A history and physical has been performed, and patient medication allergies have been reviewed. The patient's tolerance of previous anesthesia has been reviewed. The risks and benefits of the procedure and the sedation options and risks were discussed with the patient. All questions were answered and informed consent obtained. Details of the Procedure The patient underwent monitored anesthesia care, which was administered by an anesthesia professional. The patient's heart rate, blood pressure, level of consciousness, respirations, oxygen, ECG and ETCO2 were monitored throughout the procedure. A digital rectal exam was performed. A perianal exam was performed. The scope was introduced through the anus and advanced to the cecum. Retroflexion was performed in the rectum. The quality of bowel preparation was evaluated using the [place] Bowel Preparation Scale with scores of: right colon = 2, transverse colon = 2, left colon = 2. The total BBPS score was 6. Bowel prep was adequate. The patient's estimated blood loss was minimal (<5 mL). The procedure was not difficult. The patient tolerated the procedure well. There were no apparent adverse events. Scope: Colonoscope Scope Serial: [id number] Events Procedure Events Event Event Time Procedure Events Event Event Time ENDO SCOPE IN TIME 1/28/2025  9:45 AM ENDO CECUM REACHED 1/28/2025  9:51 AM ENDO SCOPE OUT TIME 1/28/2025   9:59 AM CECAL WITHDRAWAL TIME: 7m 19s Findings Polyp in the sigmoid colon; performed cold forceps biopsy Diverticula in the ascending colon, descending colon and sigmoid colon; no bleeding was observed         Assessment:  Lorraine De Jesus is a 80 y.o. female here with:  1. Chronic idiopathic constipation    2. Gastroesophageal reflux disease, unspecified whether esophagitis present          Recommendations:  1. Increase Miralax to 17 grams two-three times daily if needed for constipation  2. Increase water intake if no contraindications  3. Avoid spicy, greasy foods  Avoid caffeine, citric acid, chocolate, peppermint, and carbonated drinks  Do not lay down within 3 hours of eating  Increase fluid to 64 ounces daily  Avoid antiinflammatory medications such as motrin, advil, aleve, ibuprofen, and BC powder      Follow up in about 6 months (around 8/21/2025).      Order summary:       Thank you for allowing me to participate in the care of Lorraine De Jesus.      FERMIN Mortensen

## 2025-02-28 ENCOUNTER — EXTERNAL CHRONIC CARE MANAGEMENT (OUTPATIENT)
Dept: FAMILY MEDICINE | Facility: CLINIC | Age: 80
End: 2025-02-28
Payer: COMMERCIAL

## 2025-02-28 PROCEDURE — G0511 CCM/BHI BY RHC/FQHC 20MIN MO: HCPCS | Mod: ,,, | Performed by: INTERNAL MEDICINE

## 2025-03-03 NOTE — PROGRESS NOTES
Subjective:         Patient ID: Lorraine De Jesus is a 80 y.o. female.    Chief Complaint: Leg Pain (bilateral) and Foot Pain (bilateral)      Pain  This is a chronic problem. The current episode started more than 1 year ago. The problem occurs daily. The problem has been waxing and waning. Associated symptoms include arthralgias and neck pain. Pertinent negatives include no anorexia, chest pain, chills, fever, sore throat, vertigo or vomiting.     Review of Systems   Constitutional:  Negative for activity change, appetite change, chills, fever and unexpected weight change.   HENT:  Negative for drooling, ear discharge, ear pain, facial swelling, nosebleeds, sore throat, trouble swallowing, voice change and goiter.    Eyes:  Negative for photophobia, pain, discharge, redness and visual disturbance.   Respiratory:  Negative for apnea, choking, chest tightness, shortness of breath, wheezing and stridor.    Cardiovascular:  Negative for chest pain, palpitations and leg swelling.   Gastrointestinal:  Negative for abdominal distention, anorexia, diarrhea, rectal pain, vomiting and fecal incontinence.   Endocrine: Negative for cold intolerance, heat intolerance, polydipsia, polyphagia and polyuria.   Genitourinary:  Negative for bladder incontinence, dysuria, flank pain, frequency and hot flashes.   Musculoskeletal:  Positive for arthralgias, back pain, leg pain and neck pain.   Integumentary:  Negative for color change and pallor.   Allergic/Immunologic: Negative for immunocompromised state.   Neurological:  Negative for dizziness, vertigo, seizures, syncope, facial asymmetry, speech difficulty, light-headedness, memory loss and coordination difficulties.   Hematological:  Negative for adenopathy. Does not bruise/bleed easily.   Psychiatric/Behavioral:  Negative for agitation, behavioral problems, confusion, decreased concentration, dysphoric mood, hallucinations, self-injury and suicidal ideas. The patient is not  nervous/anxious and is not hyperactive.            Past Medical History:   Diagnosis Date    Adenomatous polyp of ascending colon 2021    Adenomatous polyp of descending colon 2021    Age related osteoporosis 10/28/2020    Benign paroxysmal vertigo     Chronic pain syndrome     Chronic pelvic pain in female 2021    Ditropan XL 5mg    Colon, diverticulosis 2021    Depressive disorder 2019    External hemorrhoid 2021    Gastrointestinal hemorrhage associated with anorectal source 2021    GERD (gastroesophageal reflux disease) 2013    Hyperlipidemia 2012    Hypothyroidism 2019    Joint pain     Nonrheumatic tricuspid (valve) insufficiency 2018    Personal history UTI 2021    Controlled on Hiprex    Polyneuropathy 2018    PVD (peripheral vascular disease) 10/30/2018    Temporal arteritis     Type 2 diabetes mellitus 2014    Urethral meatal stenosis 2018    history of known urethral stenosis, and was taught to perform monthly self dilation at home.     Past Surgical History:   Procedure Laterality Date     left lumbar sympathetic nerve block Left 2020    X3  DR BROWN    CARDIAC CATHETERIZATION  10/14/2009     SECTION      x 2    COLONOSCOPY  2009    CYSTOSCOPY WITH HYDRODISTENSION OF BLADDER N/A 2021    Procedure: CYSTOSCOPY, WITH BLADDER HYDRODISTENSION;  Surgeon: Dequan Recio MD;  Location: [facility];  Service: Urology;  Laterality: N/A;    CYSTOSCOPY WITH URETHRAL DILATION  2021    Dr Recio    EPIDURAL STEROID INJECTION N/A 10/27/2022    Procedure: Injection, Steroid, Epidural, L5/S1;  Surgeon: Belkis Brown MD;  Location: [facility];  Service: Pain Management;  Laterality: N/A;    EPIDURAL STEROID INJECTION N/A 2023    Procedure: Injection, Steroid, Epidural, L5/S1;  Surgeon: Belkis Brown MD;  Location: [facility];  Service: Pain Management;  Laterality: N/A;    HYSTERECTOMY   1980's    LUMBAR SYMPATHETIC NERVE BLOCK Left 10/05/2020    Left Sympathetic Nerve Block - Dr Brown - 10/5/20, 9/21/20, 1/20/20. 1/6/20, 10/9/19, 9/25/19 and 7/11/18    right lumbar sympathetic nerve block Right 2020    X4 DR BORWN    THYROIDECTOMY      1990's     Social History     Socioeconomic History    Marital status:    Tobacco Use    Smoking status: Never     Passive exposure: Never    Smokeless tobacco: Never   Substance and Sexual Activity    Alcohol use: Never    Drug use: Never    Sexual activity: Not Currently     Partners: Male     Social Drivers of Health     Financial Resource Strain: Low Risk  (9/23/2024)    Overall Financial Resource Strain (CARDIA)     Difficulty of Paying Living Expenses: Not very hard   Food Insecurity: No Food Insecurity (9/23/2024)    Hunger Vital Sign     Worried About Running Out of Food in the Last Year: Never true     Ran Out of Food in the Last Year: Never true   Transportation Needs: No Transportation Needs (9/23/2024)    PRAPARE - Transportation     Lack of Transportation (Medical): No     Lack of Transportation (Non-Medical): No   Physical Activity: Sufficiently Active (9/23/2024)    Exercise Vital Sign     Days of Exercise per Week: 3 days     Minutes of Exercise per Session: 60 min   Stress: No Stress Concern Present (9/23/2024)    [employer] - Occupational Stress Questionnaire     Feeling of Stress : Not at all   Housing Stability: Unknown (9/23/2024)    Housing Stability Vital Sign     Unable to Pay for Housing in the Last Year: No     Homeless in the Last Year: No     Family History   Problem Relation Name Age of Onset    Cancer Mother      Hypertension Mother      Cancer Father      Cancer Sister      Diabetes Sister      Hyperlipidemia Sister      Hypertension Sister      Diabetes Sister      No Known Problems Sister      Cancer Brother      Cancer Brother      No Known Problems Brother      Cancer Brother       Review of  "patient's allergies indicates:   Allergen Reactions    Lipitor [atorvastatin] Other (See Comments)     Muscle aching    Pravachol [pravastatin] Other (See Comments)     Muscle aching        Objective:  Vitals:    03/07/25 0855   BP: (!) 167/76   Pulse: 67   Resp: 18   Weight: 77.6 kg (171 lb)   Height: 5' 8" (1.727 m)   PainSc:   6   PainLoc: Leg               Physical Exam  Vitals and nursing note reviewed. Exam conducted with a chaperone present.   Constitutional:       General: She is awake. She is not in acute distress.     Appearance: Normal appearance. She is not ill-appearing or diaphoretic.   HENT:      Head: Normocephalic and atraumatic.      Nose: Nose normal.      Mouth/Throat:      Mouth: Mucous membranes are moist.      Pharynx: Oropharynx is clear.   Eyes:      Conjunctiva/sclera: Conjunctivae normal.      Pupils: Pupils are equal, round, and reactive to light.   Cardiovascular:      Rate and Rhythm: Normal rate.   Pulmonary:      Effort: Pulmonary effort is normal. No respiratory distress.   Abdominal:      Palpations: Abdomen is soft.   Musculoskeletal:      Cervical back: Normal range of motion and neck supple.      Thoracic back: Tenderness present.      Lumbar back: Tenderness present. Decreased range of motion.   Skin:     General: Skin is warm and dry.   Neurological:      General: No focal deficit present.      Mental Status: She is alert and oriented to person, place, and time. Mental status is at baseline.      Cranial Nerves: No cranial nerve deficit (II-XII).   Psychiatric:         Mood and Affect: Mood normal.         Behavior: Behavior normal. Behavior is cooperative.         Thought Content: Thought content normal.           Colonoscopy  Narrative: Table formatting from the original result was not included.  Procedure Date  1/28/25    Impression  Overall   Impression:    Polyp in the sigmoid colon; performed cold forceps biopsy  Diverticulosis in the ascending colon, descending colon and " sigmoid colon  Digital rectal exam revealed no mass.  The colon was examined from the   rectum to the cecum and pan diverticulosis was present.  A diminutive   polyp was removed with biopsy from the sigmoid colon.  Impression:  Screening for colon neoplasm, history of colon polyps, colon   diverticulosis, 1 polyp was removed during this exam.    Recommendation  Await pathology results, due: 1/30/2025   Repeat colonoscopy in 5 years     Disposition:  Discharge patient to home in stable condition.  High-fiber   diet.  No driving until tomorrow.  Follow up pathology results in 2 days.    Follow up with PCP as scheduled, return GI clinic p.r.n..  Consider repeat   screening colonoscopy in 5 years if the patient is in good health at that   time.    Indication  Personal history of colon polyps, unspecified    Providers  Francis Gonzalez MD Proceduralist   Chad Parker, Patient Care    Francis Choi RN Registered Nurse   Frandy Chin CRNA CRNA     Medications  Moderate sedation administered by anesthesia staff - See anesthesia   record.    Preprocedure  A history and physical has been performed, and patient medication   allergies have been reviewed. The patient's tolerance of previous   anesthesia has been reviewed. The risks and benefits of the procedure and   the sedation options and risks were discussed with the patient. All   questions were answered and informed consent obtained.    Details of the Procedure  The patient underwent monitored anesthesia care, which was administered by   an anesthesia professional. The patient's heart rate, blood pressure,   level of consciousness, respirations, oxygen, ECG and ETCO2 were monitored   throughout the procedure. A digital rectal exam was performed. A perianal   exam was performed. The scope was introduced through the anus and advanced   to the cecum. Retroflexion was performed in the rectum. The quality of   bowel preparation was  evaluated using the [place] Bowel Preparation Scale   with scores of: right colon = 2, transverse colon = 2, left colon = 2. The   total BBPS score was 6. Bowel prep was adequate. The patient's estimated   blood loss was minimal (<5 mL). The procedure was not difficult. The   patient tolerated the procedure well. There were no apparent adverse   events.     Scope: Colonoscope  Scope Serial: [id number]    Events    Procedure Events   Event Event Time     Procedure Events   Event Event Time   ENDO SCOPE IN TIME 1/28/2025  9:45 AM   ENDO CECUM REACHED 1/28/2025  9:51 AM   ENDO SCOPE OUT TIME 1/28/2025  9:59 AM     CECAL WITHDRAWAL TIME: 7m 19s    Findings  Polyp in the sigmoid colon; performed cold forceps biopsy  Diverticula in the ascending colon, descending colon and sigmoid colon; no   bleeding was observed       Hospital Outpatient Visit on 01/28/2025   Component Date Value Ref Range Status    POC Glucose 01/28/2025 136 (H)  70 - 105 mg/dL Final    Case Report 01/28/2025    Final                    Value:Surgical Pathology                                Case: [id number]                                   Authorizing Provider:  Francis Gonzalez MD     Collected:           01/28/2025 09:56 AM          Ordering Location:     [facility] -         Received:            01/28/2025 10:36 AM                                 Endoscopy                                                                    Pathologist:           Brad Kenney MD                                                           Specimen:    Large intestine, Sigmoid, a. sigmoid polyp                                                 Final Diagnosis 01/28/2025    Final                    Value:A. Sigmoid colon polyp, polypectomy:  Tubular adenoma      Gross Description 01/28/2025    Final                    Value:A. Large intestine, Sigmoid: a. sigmoid polyp  The specimen is received in formalin designated a. sigmoid polyp and consists of a single  pink-tan tissue fragment that measures 0.3 cm in greatest dimension and is entirely submitted in cassette A1.    Grossing was completed by Wesley Mancia.      Microscopic Description 01/28/2025    Final                    Value:A microscopic examination was performed and the diagnosis reflects the findings.          Laboratory Notes 01/28/2025    Final                    Value:If this report includes immunohistochemical (IHC) test results, please note the following: IHC studies were interpreted in conjunction with appropriate positive and negative controls which demonstrate the expected positive and negative reactivity. This laboratory is regulated under CLIA as qualified to perform high-complexity testing. IHC tests are used for clinical purposes. They should not be regarded as investigational or research.       Office Visit on 01/07/2025   Component Date Value Ref Range Status    Color, UA 01/07/2025 Light-Yellow  Colorless, Straw, Yellow, Light Yellow, Dark Yellow Final    Clarity, UA 01/07/2025 Turbid  Clear Final    pH, UA 01/07/2025 5.5  5.0 to 8.0 pH Units Final    Leukocytes, UA 01/07/2025 Small (A)  Negative Final    Nitrites, UA 01/07/2025 Negative  Negative Final    Protein, UA 01/07/2025 10 (A)  Negative Final    Glucose, UA 01/07/2025 300 (A)  Normal mg/dL Final    Ketones, UA 01/07/2025 Negative  Negative mg/dL Final    Urobilinogen, UA 01/07/2025 Normal  0.2, 1.0, Normal mg/dL Final    Bilirubin, UA 01/07/2025 Negative  Negative Final    Blood, UA 01/07/2025 Negative  Negative Final    Specific Gravity, UA 01/07/2025 1.016  <=1.030 Final    Hemoglobin A1C 01/07/2025 6.5  <=7.0 % Final    Estimated Average Glucose 01/07/2025 140  mg/dL Final    WBC, UA 01/07/2025 40 (H)  <=5 /hpf Final    RBC, UA 01/07/2025 3  <=3 /hpf Final    Bacteria, UA 01/07/2025 Few (A)  None Seen /hpf Final    Squamous Epithelial Cells, UA 01/07/2025 Occasional (A)  None Seen /HPF Final    Amorphous Crystals, UA 01/07/2025  Occasional  None Seen /HPF Final    Mucous 01/07/2025 Occasional (A)  None Seen /LPF Final    Culture, Urine 01/07/2025 7,000 Escherichia coli (A)   Final   Office Visit on 12/10/2024   Component Date Value Ref Range Status    POC Amphetamines 12/10/2024 Negative  Negative, Inconclusive Final    POC Barbiturates 12/10/2024 Negative  Negative, Inconclusive Final    POC Benzodiazepines 12/10/2024 Negative  Negative, Inconclusive Final    POC Cocaine 12/10/2024 Negative  Negative, Inconclusive Final    POC THC 12/10/2024 Negative  Negative, Inconclusive Final    POC Methadone 12/10/2024 Negative  Negative, Inconclusive Final    POC Methamphetamine 12/10/2024 Negative  Negative, Inconclusive Final    POC Opiates 12/10/2024 Presumptive Positive (A)  Negative, Inconclusive Final    POC Oxycodone 12/10/2024 Negative  Negative, Inconclusive Final    POC Phencyclidine 12/10/2024 Negative  Negative, Inconclusive Final    POC Methylenedioxymethamphetamine * 12/10/2024 Negative  Negative, Inconclusive Final    POC Tricyclic Antidepressants 12/10/2024 Negative  Negative, Inconclusive Final    POC Buprenorphine 12/10/2024 Negative   Final     Acceptable 12/10/2024 Yes   Final    POC Temperature (Urine) 12/10/2024 90   Final   Lab Visit on 11/19/2024   Component Date Value Ref Range Status    TSH 11/19/2024 2.073  0.350 - 4.940 uIU/mL Final    Free T4 11/19/2024 1.21  0.70 - 1.48 ng/dL Final   Admission on 11/12/2024, Discharged on 11/12/2024   Component Date Value Ref Range Status    Sodium 11/12/2024 140  136 - 145 mmol/L Final    Potassium 11/12/2024 4.0  3.5 - 5.1 mmol/L Final    Chloride 11/12/2024 108 (H)  98 - 107 mmol/L Final    CO2 11/12/2024 26  21 - 32 mmol/L Final    Anion Gap 11/12/2024 10  7 - 16 mmol/L Final    Glucose 11/12/2024 178 (H)  74 - 106 mg/dL Final    BUN 11/12/2024 17  7 - 18 mg/dL Final    Creatinine 11/12/2024 0.77  0.55 - 1.02 mg/dL Final    BUN/Creatinine Ratio 11/12/2024 22 (H)  6 -  20 Final    Calcium 11/12/2024 8.8  8.5 - 10.1 mg/dL Final    Total Protein 11/12/2024 8.1  6.4 - 8.2 g/dL Final    Albumin 11/12/2024 3.4 (L)  3.5 - 5.0 g/dL Final    Globulin 11/12/2024 4.7 (H)  2.0 - 4.0 g/dL Final    A/G Ratio 11/12/2024 0.7   Final    Bilirubin, Total 11/12/2024 0.6  >0.0 - 1.2 mg/dL Final    Alk Phos 11/12/2024 118  55 - 142 U/L Final    ALT 11/12/2024 20  13 - 56 U/L Final    AST 11/12/2024 15  15 - 37 U/L Final    eGFR 11/12/2024 80  >=60 mL/min/1.73m2 Final    Amylase 11/12/2024 32  25 - 115 U/L Final    Lipase 11/12/2024 <19  16 - 77 U/L Final    Magnesium 11/12/2024 2.3  1.7 - 2.3 mg/dL Final    Color, UA 11/12/2024 Light-Yellow  Colorless, Straw, Yellow, Light Yellow, Dark Yellow Final    Clarity, UA 11/12/2024 Clear  Clear Final    pH, UA 11/12/2024 5.5  5.0 to 8.0 pH Units Final    Leukocytes, UA 11/12/2024 Trace (A)  Negative Final    Nitrites, UA 11/12/2024 Negative  Negative Final    Protein, UA 11/12/2024 10 (A)  Negative Final    Glucose, UA 11/12/2024 >1000 (A)  Normal mg/dL Final    Ketones, UA 11/12/2024 Trace  Negative mg/dL Final    Urobilinogen, UA 11/12/2024 Normal  0.2, 1.0, Normal mg/dL Final    Bilirubin, UA 11/12/2024 Negative  Negative Final    Blood, UA 11/12/2024 Negative  Negative Final    Specific [place], UA 11/12/2024 1.019  <=1.030 Final    WBC 11/12/2024 10.26  4.50 - 11.00 K/uL Final    RBC 11/12/2024 4.91  4.20 - 5.40 M/uL Final    Hemoglobin 11/12/2024 12.8  12.0 - 16.0 g/dL Final    Hematocrit 11/12/2024 40.3  38.0 - 47.0 % Final    MCV 11/12/2024 82.1  80.0 - 96.0 fL Final    MCH 11/12/2024 26.1 (L)  27.0 - 31.0 pg Final    MCHC 11/12/2024 31.8 (L)  32.0 - 36.0 g/dL Final    RDW 11/12/2024 15.3 (H)  11.5 - 14.5 % Final    Platelet Count 11/12/2024 293  150 - 400 K/uL Final    MPV 11/12/2024 10.1  9.4 - 12.4 fL Final    Neutrophils % 11/12/2024 94.0 (H)  53.0 - 65.0 % Final    Lymphocytes % 11/12/2024 3.2 (L)  27.0 - 41.0 % Final    Monocytes % 11/12/2024  2.2  2.0 - 6.0 % Final    Eosinophils % 11/12/2024 0.0 (L)  1.0 - 4.0 % Final    Basophils % 11/12/2024 0.2  0.0 - 1.0 % Final    Immature Granulocytes % 11/12/2024 0.4  0.0 - 0.4 % Final    nRBC, Auto 11/12/2024 0.0  <=0.0 % Final    Neutrophils, Abs 11/12/2024 9.64 (H)  1.80 - 7.70 K/uL Final    Lymphocytes, Absolute 11/12/2024 0.33 (L)  1.00 - 4.80 K/uL Final    Monocytes, Absolute 11/12/2024 0.23  0.00 - 0.80 K/uL Final    Eosinophils, Absolute 11/12/2024 0.00  0.00 - 0.50 K/uL Final    Basophils, Absolute 11/12/2024 0.02  0.00 - 0.20 K/uL Final    Immature Granulocytes, Absolute 11/12/2024 0.04  0.00 - 0.04 K/uL Final    nRBC, Absolute 11/12/2024 0.00  <=0.00 x10e3/uL Final    Diff Type 11/12/2024 Auto   Final    WBC, UA 11/12/2024 28 (H)  <=5 /hpf Final    RBC, UA 11/12/2024 2  <=3 /hpf Final    Bacteria, UA 11/12/2024 Occasional (A)  None Seen /hpf Final    Squamous Epithelial Cells, UA 11/12/2024 Occasional (A)  None Seen /HPF Final    Yeast, UA 11/12/2024 Occasional (A)  None Seen /hpf Final    Mucous 11/12/2024 Occasional (A)  None Seen /LPF Final    Culture, Urine 11/12/2024 Skin/Urogenital Louise Isolated, no further workup.   Final   Office Visit on 11/12/2024   Component Date Value Ref Range Status    Molecular Strep A, POC 11/12/2024 Negative  Negative Final     Acceptable 11/12/2024 Yes   Final    POC Rapid COVID 11/12/2024 Negative  Negative Final     Acceptable 11/12/2024 Yes   Final    POC Molecular Influenza A Ag 11/12/2024 Negative  Negative Final    POC Molecular Influenza B Ag 11/12/2024 Negative  Negative Final     Acceptable 11/12/2024 Yes   Final   Office Visit on 11/05/2024   Component Date Value Ref Range Status    Protein, Urine 11/05/2024 21.5 (H)  0.0 - 11.9 mg/dL Final    Creatinine, Urine 11/05/2024 88  28 - 219 mg/dL Final    Microalbumin 11/05/2024 2.4  0.0 - 2.8 mg/dL Final    Microalbumin/Creatinine Ratio 11/05/2024 27.3  0.0 - 30.0  mg/g Final   Office Visit on 10/29/2024   Component Date Value Ref Range Status    Culture, Urine 10/29/2024 No Growth   Final   Office Visit on 10/04/2024   Component Date Value Ref Range Status    Culture, Urine 10/04/2024 Skin/Urogenital Louise Isolated, no further workup.   Corrected   Office Visit on 09/11/2024   Component Date Value Ref Range Status    POC Amphetamines 09/11/2024 Negative  Negative, Inconclusive Final    POC Barbiturates 09/11/2024 Negative  Negative, Inconclusive Final    POC Benzodiazepines 09/11/2024 Negative  Negative, Inconclusive Final    POC Cocaine 09/11/2024 Negative  Negative, Inconclusive Final    POC THC 09/11/2024 Negative  Negative, Inconclusive Final    POC Methadone 09/11/2024 Negative  Negative, Inconclusive Final    POC Methamphetamine 09/11/2024 Negative  Negative, Inconclusive Final    POC Opiates 09/11/2024 Presumptive Positive (A)  Negative, Inconclusive Final    POC Oxycodone 09/11/2024 Negative  Negative, Inconclusive Final    POC Phencyclidine 09/11/2024 Negative  Negative, Inconclusive Final    POC Methylenedioxymethamphetamine * 09/11/2024 Negative  Negative, Inconclusive Final    POC Tricyclic Antidepressants 09/11/2024 Negative  Negative, Inconclusive Final    POC Buprenorphine 09/11/2024 Negative   Final     Acceptable 09/11/2024 Yes   Final    POC Temperature (Urine) 09/11/2024 92   Final   There may be more visits with results that are not included.         Orders Placed This Encounter   Procedures    POCT Urine Drug Screen Presump     Interpretive Information:     Negative:  No drug detected at the cut off level.   Positive:  This result represents presumptive positive for the   tested drug, other substances may yield a positive response other   than the analyte of interest. This result should be utilized for   diagnostic purpose only. Confirmation testing will be performed upon physician request only.            Requested Prescriptions     Signed  Prescriptions Disp Refills    gabapentin (NEURONTIN) 400 MG capsule 90 capsule 2     Sig: TAKE 1 CAPSULE(400 MG) BY MOUTH EVERY 8 HOURS    HYDROcodone-acetaminophen (NORCO)  mg per tablet 120 tablet 0     Sig: Take 1 tablet by mouth every 6 (six) hours as needed for Pain (pain).    HYDROcodone-acetaminophen (NORCO)  mg per tablet 120 tablet 0     Sig: Take 1 tablet by mouth every 6 (six) hours as needed for Pain (pain).    HYDROcodone-acetaminophen (NORCO)  mg per tablet 120 tablet 0     Sig: Take 1 tablet by mouth every 6 (six) hours as needed for Pain (pain).       Assessment:     1. Diabetic neuropathy with neurologic complication    2. Lumbosacral radiculopathy    3. Bilateral foot pain    4. Encounter for long-term (current) use of medications               A's of Opioid Risk Assessment  Activity:Patient can perform ADL.   Analgesia:Patients pain is partially controlled by current medication. Patient has tried OTC medications such as Tylenol and Ibuprofen with out relief.   Adverse Effects: Patient denies constipation or sedation.  Aberrant Behavior:  reviewed with no aberrant drug seeking/taking behavior.  Overdose reversal drug naloxone discussed    Drug screen reviewed        X-ray left knee degenerative changes no fracture noted      MRI lumbar spine [facility] March 25, 2021 spinal stenosis L5/S1 disc bulge facet joint arthropathy multiple level degenerative changes        Plan:    Narcan February 2023    Follows Neurology [facility] neuropathy symptoms lower extremities    She had lumbar L5/S1 TORIE # 1 September 12, 2023, she states she had 90% relief after procedure, she states procedure did help improve her level function    She has known scoliosis      Again today complaining of bilateral lower extremity burning numbness tingling sensation neuropathy symptoms keeping her awake at night     Requesting discontinue Cymbalta    Declined increase gabapentin, Qtenza     Considering  lumbar sympathetic nerve block    Continue home exercise program as directed     Continue current medication    Follow-up 3 months    Dr. Brown September 2025    Bring original prescription medication bottles/container/box with labels to each visit

## 2025-03-07 ENCOUNTER — OFFICE VISIT (OUTPATIENT)
Dept: PAIN MEDICINE | Facility: CLINIC | Age: 80
End: 2025-03-07
Payer: COMMERCIAL

## 2025-03-07 VITALS
HEIGHT: 68 IN | WEIGHT: 171 LBS | RESPIRATION RATE: 18 BRPM | BODY MASS INDEX: 25.91 KG/M2 | SYSTOLIC BLOOD PRESSURE: 167 MMHG | HEART RATE: 67 BPM | DIASTOLIC BLOOD PRESSURE: 76 MMHG

## 2025-03-07 DIAGNOSIS — E11.49 DIABETIC NEUROPATHY WITH NEUROLOGIC COMPLICATION: Primary | Chronic | ICD-10-CM

## 2025-03-07 DIAGNOSIS — M79.672 BILATERAL FOOT PAIN: Chronic | ICD-10-CM

## 2025-03-07 DIAGNOSIS — M79.671 BILATERAL FOOT PAIN: Chronic | ICD-10-CM

## 2025-03-07 DIAGNOSIS — E11.40 DIABETIC NEUROPATHY WITH NEUROLOGIC COMPLICATION: Primary | Chronic | ICD-10-CM

## 2025-03-07 DIAGNOSIS — Z79.899 ENCOUNTER FOR LONG-TERM (CURRENT) USE OF MEDICATIONS: ICD-10-CM

## 2025-03-07 DIAGNOSIS — M54.17 LUMBOSACRAL RADICULOPATHY: Chronic | ICD-10-CM

## 2025-03-07 PROCEDURE — 1125F AMNT PAIN NOTED PAIN PRSNT: CPT | Mod: CPTII,,, | Performed by: PHYSICIAN ASSISTANT

## 2025-03-07 PROCEDURE — 3288F FALL RISK ASSESSMENT DOCD: CPT | Mod: CPTII,,, | Performed by: PHYSICIAN ASSISTANT

## 2025-03-07 PROCEDURE — 3077F SYST BP >= 140 MM HG: CPT | Mod: CPTII,,, | Performed by: PHYSICIAN ASSISTANT

## 2025-03-07 PROCEDURE — 1159F MED LIST DOCD IN RCRD: CPT | Mod: CPTII,,, | Performed by: PHYSICIAN ASSISTANT

## 2025-03-07 PROCEDURE — 99214 OFFICE O/P EST MOD 30 MIN: CPT | Mod: PBBFAC | Performed by: PHYSICIAN ASSISTANT

## 2025-03-07 PROCEDURE — 99999 PR PBB SHADOW E&M-EST. PATIENT-LVL IV: CPT | Mod: PBBFAC,,, | Performed by: PHYSICIAN ASSISTANT

## 2025-03-07 PROCEDURE — 1101F PT FALLS ASSESS-DOCD LE1/YR: CPT | Mod: CPTII,,, | Performed by: PHYSICIAN ASSISTANT

## 2025-03-07 PROCEDURE — 99214 OFFICE O/P EST MOD 30 MIN: CPT | Mod: S$PBB,,, | Performed by: PHYSICIAN ASSISTANT

## 2025-03-07 PROCEDURE — 3078F DIAST BP <80 MM HG: CPT | Mod: CPTII,,, | Performed by: PHYSICIAN ASSISTANT

## 2025-03-07 PROCEDURE — 80305 DRUG TEST PRSMV DIR OPT OBS: CPT | Mod: PBBFAC | Performed by: PHYSICIAN ASSISTANT

## 2025-03-07 PROCEDURE — 99999PBSHW POCT URINE DRUG SCREEN PRESUMP: Mod: PBBFAC,,,

## 2025-03-07 RX ORDER — HYDROCODONE BITARTRATE AND ACETAMINOPHEN 10; 325 MG/1; MG/1
1 TABLET ORAL EVERY 6 HOURS PRN
Qty: 120 TABLET | Refills: 0 | Status: SHIPPED | OUTPATIENT
Start: 2025-04-26 | End: 2025-05-26

## 2025-03-07 RX ORDER — GABAPENTIN 400 MG/1
CAPSULE ORAL
Qty: 90 CAPSULE | Refills: 2 | Status: SHIPPED | OUTPATIENT
Start: 2025-03-07

## 2025-03-07 RX ORDER — HYDROCODONE BITARTRATE AND ACETAMINOPHEN 10; 325 MG/1; MG/1
1 TABLET ORAL EVERY 6 HOURS PRN
Qty: 120 TABLET | Refills: 0 | Status: SHIPPED | OUTPATIENT
Start: 2025-05-26 | End: 2025-06-25

## 2025-03-07 RX ORDER — HYDROCODONE BITARTRATE AND ACETAMINOPHEN 10; 325 MG/1; MG/1
1 TABLET ORAL EVERY 6 HOURS PRN
Qty: 120 TABLET | Refills: 0 | Status: SHIPPED | OUTPATIENT
Start: 2025-03-27 | End: 2025-04-26

## 2025-03-31 ENCOUNTER — EXTERNAL CHRONIC CARE MANAGEMENT (OUTPATIENT)
Dept: FAMILY MEDICINE | Facility: CLINIC | Age: 80
End: 2025-03-31
Payer: COMMERCIAL

## 2025-03-31 PROCEDURE — G0511 CCM/BHI BY RHC/FQHC 20MIN MO: HCPCS | Mod: ,,, | Performed by: INTERNAL MEDICINE

## 2025-04-08 ENCOUNTER — OFFICE VISIT (OUTPATIENT)
Dept: FAMILY MEDICINE | Facility: CLINIC | Age: 80
End: 2025-04-08
Payer: COMMERCIAL

## 2025-04-08 VITALS
RESPIRATION RATE: 17 BRPM | HEART RATE: 65 BPM | BODY MASS INDEX: 25.91 KG/M2 | WEIGHT: 171 LBS | HEIGHT: 68 IN | DIASTOLIC BLOOD PRESSURE: 80 MMHG | TEMPERATURE: 98 F | OXYGEN SATURATION: 99 % | SYSTOLIC BLOOD PRESSURE: 145 MMHG

## 2025-04-08 DIAGNOSIS — K59.00 CONSTIPATION, UNSPECIFIED CONSTIPATION TYPE: ICD-10-CM

## 2025-04-08 DIAGNOSIS — R10.9 ABDOMINAL PAIN, UNSPECIFIED ABDOMINAL LOCATION: ICD-10-CM

## 2025-04-08 DIAGNOSIS — E78.5 HYPERLIPIDEMIA, UNSPECIFIED HYPERLIPIDEMIA TYPE: Primary | ICD-10-CM

## 2025-04-08 DIAGNOSIS — E03.8 TSH DEFICIENCY: ICD-10-CM

## 2025-04-08 DIAGNOSIS — E11.9 DIABETES MELLITUS WITHOUT COMPLICATION: ICD-10-CM

## 2025-04-08 DIAGNOSIS — E11.9 TYPE 2 DIABETES MELLITUS WITHOUT COMPLICATION, WITHOUT LONG-TERM CURRENT USE OF INSULIN: ICD-10-CM

## 2025-04-08 LAB
ANION GAP SERPL CALCULATED.3IONS-SCNC: 11 MMOL/L (ref 7–16)
BUN SERPL-MCNC: 12 MG/DL (ref 10–20)
BUN/CREAT SERPL: 18 (ref 6–20)
CALCIUM SERPL-MCNC: 9.2 MG/DL (ref 8.4–10.2)
CHLORIDE SERPL-SCNC: 106 MMOL/L (ref 98–107)
CHOLEST SERPL-MCNC: 213 MG/DL
CHOLEST/HDLC SERPL: 4.7 {RATIO}
CO2 SERPL-SCNC: 27 MMOL/L (ref 23–31)
CREAT SERPL-MCNC: 0.67 MG/DL (ref 0.55–1.02)
CREAT UR-MCNC: 52 MG/DL (ref 15–325)
EGFR (NO RACE VARIABLE) (RUSH/TITUS): 90 ML/MIN/1.73M2
GLUCOSE SERPL-MCNC: 120 MG/DL (ref 82–115)
HDLC SERPL-MCNC: 45 MG/DL (ref 35–60)
LDLC SERPL CALC-MCNC: 145 MG/DL
LDLC/HDLC SERPL: 3.2 {RATIO}
MICROALBUMIN UR-MCNC: 1.8 MG/DL
MICROALBUMIN/CREAT RATIO PNL UR: 34.6 MG/G (ref 0–30)
NONHDLC SERPL-MCNC: 168 MG/DL
POTASSIUM SERPL-SCNC: 3.8 MMOL/L (ref 3.5–5.1)
SODIUM SERPL-SCNC: 140 MMOL/L (ref 136–145)
TRIGL SERPL-MCNC: 113 MG/DL (ref 37–140)
TSH SERPL DL<=0.005 MIU/L-ACNC: 2.17 UIU/ML (ref 0.35–4.94)
VLDLC SERPL-MCNC: 23 MG/DL

## 2025-04-08 PROCEDURE — 84443 ASSAY THYROID STIM HORMONE: CPT | Mod: ,,, | Performed by: CLINICAL MEDICAL LABORATORY

## 2025-04-08 PROCEDURE — 3288F FALL RISK ASSESSMENT DOCD: CPT | Mod: ,,, | Performed by: INTERNAL MEDICINE

## 2025-04-08 PROCEDURE — 3077F SYST BP >= 140 MM HG: CPT | Mod: ,,, | Performed by: INTERNAL MEDICINE

## 2025-04-08 PROCEDURE — 82570 ASSAY OF URINE CREATININE: CPT | Mod: ,,, | Performed by: CLINICAL MEDICAL LABORATORY

## 2025-04-08 PROCEDURE — 80061 LIPID PANEL: CPT | Mod: ,,, | Performed by: CLINICAL MEDICAL LABORATORY

## 2025-04-08 PROCEDURE — 96372 THER/PROPH/DIAG INJ SC/IM: CPT | Mod: ,,, | Performed by: INTERNAL MEDICINE

## 2025-04-08 PROCEDURE — 99214 OFFICE O/P EST MOD 30 MIN: CPT | Mod: 25,,, | Performed by: INTERNAL MEDICINE

## 2025-04-08 PROCEDURE — 1159F MED LIST DOCD IN RCRD: CPT | Mod: ,,, | Performed by: INTERNAL MEDICINE

## 2025-04-08 PROCEDURE — 82043 UR ALBUMIN QUANTITATIVE: CPT | Mod: ,,, | Performed by: CLINICAL MEDICAL LABORATORY

## 2025-04-08 PROCEDURE — 1125F AMNT PAIN NOTED PAIN PRSNT: CPT | Mod: ,,, | Performed by: INTERNAL MEDICINE

## 2025-04-08 PROCEDURE — 1101F PT FALLS ASSESS-DOCD LE1/YR: CPT | Mod: ,,, | Performed by: INTERNAL MEDICINE

## 2025-04-08 PROCEDURE — 80048 BASIC METABOLIC PNL TOTAL CA: CPT | Mod: ,,, | Performed by: CLINICAL MEDICAL LABORATORY

## 2025-04-08 PROCEDURE — 3079F DIAST BP 80-89 MM HG: CPT | Mod: ,,, | Performed by: INTERNAL MEDICINE

## 2025-04-08 RX ORDER — INSULIN PUMP SYRINGE, 3 ML
EACH MISCELLANEOUS
Qty: 1 EACH | Refills: 0 | Status: SHIPPED | OUTPATIENT
Start: 2025-04-08

## 2025-04-08 RX ORDER — BLOOD-GLUCOSE SENSOR
1 EACH MISCELLANEOUS DAILY
Qty: 3 EACH | Refills: 0 | Status: SHIPPED | OUTPATIENT
Start: 2025-04-08

## 2025-04-08 RX ORDER — KETOROLAC TROMETHAMINE 30 MG/ML
15 INJECTION, SOLUTION INTRAMUSCULAR; INTRAVENOUS
Status: COMPLETED | OUTPATIENT
Start: 2025-04-08 | End: 2025-04-08

## 2025-04-08 RX ORDER — LACTULOSE 10 G/15ML
20 SOLUTION ORAL; RECTAL 2 TIMES DAILY
Qty: 600 ML | Refills: 1 | Status: SHIPPED | OUTPATIENT
Start: 2025-04-08

## 2025-04-08 RX ORDER — LANCETS 33 GAUGE
EACH MISCELLANEOUS
Qty: 100 EACH | Refills: 11 | Status: SHIPPED | OUTPATIENT
Start: 2025-04-08

## 2025-04-08 RX ADMIN — KETOROLAC TROMETHAMINE 15 MG: 30 INJECTION, SOLUTION INTRAMUSCULAR; INTRAVENOUS at 10:04

## 2025-04-08 NOTE — PROGRESS NOTES
"Subjective:       Patient ID: Lorraine De Jesus is a 80 y.o. female.    Chief Complaint: Diabetes (3 month fu ) and Health Maintenance (TETANUS VACCINE Never done/Pneumococcal Vaccines (Age 50+)(1 of 2 - PCV) Never done/Shingles Vaccine(1 of 2) Never done/RSV Vaccine (Age 60+ and Pregnant patients)(1 - 1-dose 75+ series) Never done/Diabetic Eye Exam due on 05/01/2024/Influenza Vaccine(1) due on 09/01/2024/Lipid Panel due on 05/08/2025/)    History of Present Illness    CHIEF COMPLAINT:  Patient presents today with dry skin concerns.    INTEGUMENTARY:  She reports persistent and severe dry skin, particularly affecting the neck area, requiring frequent moisturization. Current skin care regimen includes Dove soap for washing, Vaseline Intensive Care Lotion and cocoa butter lotion for moisturizing, and sensitive skin washing powder for laundry. Despite these measures, the dryness persists.    GASTROINTESTINAL:  She reports abdominal pain and constipation. Current treatments include Colace, Miralax, and Metamucil without adequate relief. She denies diarrhea.    MEDICAL HISTORY:  She has osteoporosis and is scheduled to see an orthopedist on May 8th. She acknowledges previous recommendations to start calcium and vitamin D supplements.    DIABETES MANAGEMENT:  She requires prescription renewal for glucose monitoring supplies, including lancets and test strips.         Current Medications:  Current Medications[1]           ROS  Twelve point system reviewed, unremarkable except for stated above in HPI.        Objective:         Vitals:    04/08/25 0953   BP: (!) 145/80   BP Location: Left arm   Patient Position: Sitting   Pulse: 65   Resp: 17   Temp: 97.6 °F (36.4 °C)   TempSrc: Temporal   SpO2: 99%   Weight: 77.6 kg (171 lb)   Height: 5' 8" (1.727 m)        Physical Exam     Patient is awake alert oriented person place and  Lungs are clear to auscultation bilaterally no crackles or wheezes   Cardiovascular S1-S2 regular rate " and rhythm no murmurs rubs or gallops   Abdomen is soft positive bowel sounds nontender, extremities no clubbing cyanosis edema  Neuro no focal neurological deficits  Skin warm and dry.     Last Labs:     No visits with results within 1 Month(s) from this visit.   Latest known visit with results is:   Office Visit on 03/07/2025   Component Date Value    POC Amphetamines 03/07/2025 Negative     POC Barbiturates 03/07/2025 Negative     POC Benzodiazepines 03/07/2025 Negative     POC Cocaine 03/07/2025 Negative     POC THC 03/07/2025 Negative     POC Methadone 03/07/2025 Negative     POC Methamphetamine 03/07/2025 Negative     POC Opiates 03/07/2025 Presumptive Positive (A)     POC Oxycodone 03/07/2025 Negative     POC Phencyclidine 03/07/2025 Negative     POC Methylenedioxymetham* 03/07/2025 Negative     POC Tricyclic Antidepres* 03/07/2025 Negative     POC Buprenorphine 03/07/2025 Negative      Acceptab* 03/07/2025 Yes     POC Temperature (Urine) 03/07/2025 90        Last Imaging:  Colonoscopy  Narrative: Table formatting from the original result was not included.  Procedure Date  1/28/25    Impression  Overall   Impression:    Polyp in the sigmoid colon; performed cold forceps biopsy  Diverticulosis in the ascending colon, descending colon and sigmoid colon  Digital rectal exam revealed no mass.  The colon was examined from the   rectum to the cecum and pan diverticulosis was present.  A diminutive   polyp was removed with biopsy from the sigmoid colon.  Impression:  Screening for colon neoplasm, history of colon polyps, colon   diverticulosis, 1 polyp was removed during this exam.    Recommendation  Await pathology results, due: 1/30/2025   Repeat colonoscopy in 5 years     Disposition:  Discharge patient to home in stable condition.  High-fiber   diet.  No driving until tomorrow.  Follow up pathology results in 2 days.    Follow up with PCP as scheduled, return GI clinic p.r.n..  Consider repeat    screening colonoscopy in 5 years if the patient is in good health at that   time.    Indication  Personal history of colon polyps, unspecified    Providers  Francis Gonzalez MD Proceduralist   Chad Parker, Patient Care    Francis Choi RN Registered Nurse   Frandy Chin, CRNA CRNA     Medications  Moderate sedation administered by anesthesia staff - See anesthesia   record.    Preprocedure  A history and physical has been performed, and patient medication   allergies have been reviewed. The patient's tolerance of previous   anesthesia has been reviewed. The risks and benefits of the procedure and   the sedation options and risks were discussed with the patient. All   questions were answered and informed consent obtained.    Details of the Procedure  The patient underwent monitored anesthesia care, which was administered by   an anesthesia professional. The patient's heart rate, blood pressure,   level of consciousness, respirations, oxygen, ECG and ETCO2 were monitored   throughout the procedure. A digital rectal exam was performed. A perianal   exam was performed. The scope was introduced through the anus and advanced   to the cecum. Retroflexion was performed in the rectum. The quality of   bowel preparation was evaluated using the Stafford Bowel Preparation Scale   with scores of: right colon = 2, transverse colon = 2, left colon = 2. The   total BBPS score was 6. Bowel prep was adequate. The patient's estimated   blood loss was minimal (<5 mL). The procedure was not difficult. The   patient tolerated the procedure well. There were no apparent adverse   events.     Scope: Colonoscope  Scope Serial: [id number]    Events    Procedure Events   Event Event Time     Procedure Events   Event Event Time   ENDO SCOPE IN TIME 1/28/2025  9:45 AM   ENDO CECUM REACHED 1/28/2025  9:51 AM   ENDO SCOPE OUT TIME 1/28/2025  9:59 AM     CECAL WITHDRAWAL TIME: 7m 19s    Findings  Polyp in the  sigmoid colon; performed cold forceps biopsy  Diverticula in the ascending colon, descending colon and sigmoid colon; no   bleeding was observed         **Labs and x-rays personally reviewed by me    ** reviewed           Assessment & Plan:   Assessment & Plan    E78.5 Hyperlipidemia, unspecified hyperlipidemia type    IMPRESSION:  - Considered thyroid dysfunction as potential cause of dry skin  - Assessed skincare routine  - Evaluated abdominal discomfort  - Addressed chronic constipation  - Noted elevated BP  - Discussed osteoporosis management           1. Hyperlipidemia, unspecified hyperlipidemia type  -     Lipid Panel; Future; Expected date: 04/08/2025    2. Abdominal pain, unspecified abdominal location  -     ketorolac injection 15 mg  -     X-Ray KUB; Future; Expected date: 04/08/2025    3. Type 2 diabetes mellitus without complication, without long-term current use of insulin  -     Basic Metabolic Panel; Future; Expected date: 04/08/2025  -     Microalbumin/Creatinine Ratio, Urine; Future; Expected date: 04/08/2025  -     blood-glucose meter kit; Use as instructed  Dispense: 1 each; Refill: 0  -     blood sugar diagnostic (ONETOUCH VERIO TEST STRIPS) Strp; 1 each by Misc.(Non-Drug; Combo Route) route 2 (two) times a day.  Dispense: 200 strip; Refill: 4  -     ONETOUCH DELICA PLUS LANCET 33 gauge Misc; Check blood sugar TID  Dispense: 100 each; Refill: 11  -     blood-glucose sensor (FREESTYLE SIMBA 3 SENSOR) Lizzie; 1 each by Misc.(Non-Drug; Combo Route) route Daily.  Dispense: 3 each; Refill: 0    4. TSH deficiency  -     TSH; Future; Expected date: 04/08/2025    5. Diabetes mellitus without complication  -     ONETOUCH DELICA PLUS LANCET 33 gauge Misc; Check blood sugar TID  Dispense: 100 each; Refill: 11    6. Constipation, unspecified constipation type  -     lactulose (CHRONULAC) 10 gram/15 mL solution; Take 30 mLs (20 g total) by mouth 2 (two) times a day.  Dispense: 600 mL; Refill:  1            Mateo Chen MD  This note was generated with the assistance of ambient listening technology. Verbal consent was obtained by the patient and accompanying visitor(s) for the recording of patient appointment to facilitate this note. I attest to having reviewed and edited the generated note for accuracy, though some syntax or spelling errors may persist. Please contact the author of this note for any clarification.            [1]   Current Outpatient Medications:     blood sugar diagnostic Strp, 1 strip by Misc.(Non-Drug; Combo Route) route 3 (three) times daily., Disp: 200 strip, Rfl: 3    calcium-vitamin D (CALCIUM WITH VITAMIN D) 600 mg-10 mcg (400 unit) Tab, Take 1 tablet by mouth Daily., Disp: 90 tablet, Rfl: 1    cloNIDine (CATAPRES) 0.1 MG tablet, TAKE 1 TABLET BY MOUTH EVERY 4 HOURS AS NEEDED FOR BLOOD PRESSURE GREATER THAN 170/90, Disp: , Rfl:     ezetimibe (ZETIA) 10 mg tablet, Take 1 tablet (10 mg total) by mouth once daily., Disp: 90 tablet, Rfl: 3    gabapentin (NEURONTIN) 400 MG capsule, TAKE 1 CAPSULE(400 MG) BY MOUTH EVERY 8 HOURS, Disp: 90 capsule, Rfl: 2    glipiZIDE (GLUCOTROL) 10 MG TR24, TAKE 1 TABLET(10 MG) BY MOUTH TWICE DAILY, Disp: 180 tablet, Rfl: 1    [START ON 4/26/2025] HYDROcodone-acetaminophen (NORCO)  mg per tablet, Take 1 tablet by mouth every 6 (six) hours as needed for Pain (pain)., Disp: 120 tablet, Rfl: 0    HYDROcodone-acetaminophen (NORCO)  mg per tablet, Take 1 tablet by mouth every 6 (six) hours as needed for Pain (pain)., Disp: 120 tablet, Rfl: 0    [START ON 5/26/2025] HYDROcodone-acetaminophen (NORCO)  mg per tablet, Take 1 tablet by mouth every 6 (six) hours as needed for Pain (pain)., Disp: 120 tablet, Rfl: 0    lancets (ACCU-CHEK FASTCLIX LANCET DRUM [facility]), Take 1 each by mouth once daily., Disp: , Rfl:     levothyroxine (SYNTHROID) 75 MCG tablet, Take 1 tablet (75 mcg total) by mouth before breakfast., Disp: 90 tablet, Rfl: 1     olmesartan (BENICAR) 20 MG tablet, Take 20 mg by mouth., Disp: , Rfl:     olmesartan (BENICAR) 5 MG Tab, Take 10 mg by mouth once daily., Disp: , Rfl:     ondansetron (ZOFRAN-ODT) 4 MG TbDL, Take 1 tablet (4 mg total) by mouth every 6 (six) hours as needed (nausea and vomting)., Disp: 15 tablet, Rfl: 0    oxybutynin (DITROPAN-XL) 10 MG 24 hr tablet, Take one tablet at night, Disp: 90 tablet, Rfl: 3    blood sugar diagnostic (ONETOUCH VERIO TEST STRIPS) Strp, 1 each by Misc.(Non-Drug; Combo Route) route 2 (two) times a day., Disp: 200 strip, Rfl: 4    blood-glucose meter kit, Use as instructed, Disp: 1 each, Rfl: 0    blood-glucose sensor (FREESTYLE SIMBA 3 SENSOR) Lizzie, 1 each by Misc.(Non-Drug; Combo Route) route Daily., Disp: 3 each, Rfl: 0    lactulose (CHRONULAC) 10 gram/15 mL solution, Take 30 mLs (20 g total) by mouth 2 (two) times a day., Disp: 600 mL, Rfl: 1    nitrofurantoin, macrocrystal-monohydrate, (MACROBID) 100 MG capsule, Take 1 capsule (100 mg total) by mouth 2 (two) times daily. Take with food or milk (Patient not taking: Reported on 2/21/2025), Disp: 14 capsule, Rfl: 0    ONETOUCH DELICA PLUS LANCET 33 gauge Misc, Check blood sugar TID, Disp: 100 each, Rfl: 11  No current facility-administered medications for this visit.

## 2025-04-09 DIAGNOSIS — N32.89 BLADDER SPASMS: ICD-10-CM

## 2025-04-09 DIAGNOSIS — R35.1 NOCTURIA: ICD-10-CM

## 2025-04-09 RX ORDER — OXYBUTYNIN CHLORIDE 10 MG/1
TABLET, EXTENDED RELEASE ORAL
Qty: 90 TABLET | Refills: 3 | Status: SHIPPED | OUTPATIENT
Start: 2025-04-09

## 2025-04-10 RX ORDER — FLASH GLUCOSE SCANNING READER
1 EACH MISCELLANEOUS DAILY
Qty: 1 EACH | Refills: 0 | Status: SHIPPED | OUTPATIENT
Start: 2025-04-10

## 2025-04-15 ENCOUNTER — OFFICE VISIT (OUTPATIENT)
Dept: FAMILY MEDICINE | Facility: CLINIC | Age: 80
End: 2025-04-15
Payer: COMMERCIAL

## 2025-04-15 VITALS
WEIGHT: 167 LBS | HEART RATE: 70 BPM | TEMPERATURE: 97 F | RESPIRATION RATE: 16 BRPM | BODY MASS INDEX: 25.31 KG/M2 | OXYGEN SATURATION: 97 % | DIASTOLIC BLOOD PRESSURE: 89 MMHG | SYSTOLIC BLOOD PRESSURE: 154 MMHG | HEIGHT: 68 IN

## 2025-04-15 DIAGNOSIS — I10 ESSENTIAL (PRIMARY) HYPERTENSION: Primary | ICD-10-CM

## 2025-04-15 PROCEDURE — 3077F SYST BP >= 140 MM HG: CPT | Mod: ,,, | Performed by: INTERNAL MEDICINE

## 2025-04-15 PROCEDURE — 3288F FALL RISK ASSESSMENT DOCD: CPT | Mod: ,,, | Performed by: INTERNAL MEDICINE

## 2025-04-15 PROCEDURE — 1101F PT FALLS ASSESS-DOCD LE1/YR: CPT | Mod: ,,, | Performed by: INTERNAL MEDICINE

## 2025-04-15 PROCEDURE — 99213 OFFICE O/P EST LOW 20 MIN: CPT | Mod: ,,, | Performed by: INTERNAL MEDICINE

## 2025-04-15 PROCEDURE — 1125F AMNT PAIN NOTED PAIN PRSNT: CPT | Mod: ,,, | Performed by: INTERNAL MEDICINE

## 2025-04-15 PROCEDURE — 1159F MED LIST DOCD IN RCRD: CPT | Mod: ,,, | Performed by: INTERNAL MEDICINE

## 2025-04-15 PROCEDURE — 3079F DIAST BP 80-89 MM HG: CPT | Mod: ,,, | Performed by: INTERNAL MEDICINE

## 2025-04-15 RX ORDER — OLMESARTAN MEDOXOMIL 40 MG/1
40 TABLET ORAL DAILY
Qty: 90 TABLET | Refills: 1 | Status: SHIPPED | OUTPATIENT
Start: 2025-04-15

## 2025-04-15 NOTE — PROGRESS NOTES
"Subjective:       Patient ID: Lorraine De Jesus is a 80 y.o. female.    Chief Complaint: Abdominal Pain and Health Maintenance (TETANUS VACCINE Never done/Pneumococcal Vaccines (Age 50+)(1 of 2 - PCV) Never done/Shingles Vaccine(1 of 2) Never done/RSV Vaccine (Age 60+ and Pregnant patients)(1 - 1-dose 75+ series) Never done/Diabetic Eye Exam due on 05/01/2024/Influenza Vaccine(1) due on 09/01/2024/)    History of Present Illness    CHIEF COMPLAINT:  Patient presents today for follow-up on blood pressure management    HYPERTENSION:  She reports home blood pressure reading of 159/84 this morning. She continues Olmesartan 20 mg.    GI CONCERNS:  She reports constipation with significant stool burden in the colon. Recently started lactulose without noticeable improvement yet. She has a history of diverticulosis documented on previous colonoscopy.    LABS:  Recent labs showed normal sodium, potassium, kidney function, thyroid function, insulin levels, and cholesterol. A1C was notably good.    DRYNESS:  She reports experiencing persistent dryness and expresses frustration with its severity.         Current Medications:  Current Medications[1]           ROS  Twelve point system reviewed, unremarkable except for stated above in HPI.        Objective:         Vitals:    04/15/25 0906 04/15/25 0908   BP: (!) 169/90 (!) 154/89   BP Location: Right arm Right arm   Patient Position: Sitting Sitting   Pulse: 70 70   Resp: 16    Temp: 97.3 °F (36.3 °C) 97.3 °F (36.3 °C)   TempSrc: Temporal Temporal   SpO2: 97%    Weight: 75.8 kg (167 lb)    Height: 5' 8" (1.727 m)         Physical Exam     Patient is awake alert oriented person place and  Lungs are clear to auscultation bilaterally no crackles or wheezes   Cardiovascular S1-S2 regular rate and rhythm no murmurs rubs or gallops   Abdomen is soft positive bowel sounds nontender, extremities no clubbing cyanosis edema  Neuro no focal neurological deficits  Skin warm and dry.     Last " Labs:     Office Visit on 04/08/2025   Component Date Value    Triglycerides 04/08/2025 113     Cholesterol 04/08/2025 213 (H)     HDL Cholesterol 04/08/2025 45     Cholesterol/HDL Ratio (R* 04/08/2025 4.7     Non-HDL 04/08/2025 168     LDL Calculated 04/08/2025 145     LDL/HDL 04/08/2025 3.2     VLDL 04/08/2025 23     Sodium 04/08/2025 140     Potassium 04/08/2025 3.8     Chloride 04/08/2025 106     CO2 04/08/2025 27     Anion Gap 04/08/2025 11     Glucose 04/08/2025 120 (H)     BUN 04/08/2025 12     Creatinine 04/08/2025 0.67     BUN/Creatinine Ratio 04/08/2025 18     Calcium 04/08/2025 9.2     eGFR 04/08/2025 90     TSH 04/08/2025 2.167     Creatinine, Urine 04/08/2025 52     Microalbumin 04/08/2025 1.8     Microalbumin/Creatinine * 04/08/2025 34.6 (H)        Last Imaging:  X-Ray KUB  Addendum: Calcific density identified LEFT upper quadrant 1 cm likely renal in  origin, similar to 11/12/2024.  Review of CT examination of 03/08/2024  demonstrates lower pole LEFT renal calculus corresponding to this  finding.     Electronically signed by: Neo Diaz MD   Date:    04/09/2025   Time:    09:58  Narrative: EXAMINATION:  XR KUB    TECHNIQUE:  XR KUB    FINDINGS:  The bowel gas pattern is non-specific.Fecal material from the colon.    No airspace consolidation at the lung bases.No acute bony abnormality.No abnormal soft tissue masses.Calcific densities.  Impression: Moderate amount of fecal material consistent with constipation    Electronically signed by: Neo Diaz MD  Date:    04/09/2025  Time:    09:05         **Labs and x-rays personally reviewed by me    ** reviewed           Assessment & Plan:   Assessment & Plan    IMPRESSION:  - BP elevated at 159/84 this morning.  - Recent lab results, including sodium, potassium, renal function, thyroid, insulin, and cholesterol, WNL.  - Abnormal urine creatinine ratio, not clinically significant at this time.  - Recent colonoscopy showed diverticulosis.  - A1C  at a good level.  - Significant stool burden on KUB.    DIVERTICULOSIS:  - Explained importance of increasing fiber intake, focusing on fruits and vegetables for managing diverticulosis.    HYPERTENSION:  - Increased Olmesartan from 20 mg to 40 mg daily.    CONSTIPATION:  - Continued Lactulose, up to 2-3 times daily for constipation management.    FOLLOW-UP:  - Follow up in 2 months.           1. Essential (primary) hypertension  -     olmesartan (BENICAR) 40 MG tablet; Take 1 tablet (40 mg total) by mouth once daily.  Dispense: 90 tablet; Refill: 1            Mateo Chen MD  This note was generated with the assistance of ambient listening technology. Verbal consent was obtained by the patient and accompanying visitor(s) for the recording of patient appointment to facilitate this note. I attest to having reviewed and edited the generated note for accuracy, though some syntax or spelling errors may persist. Please contact the author of this note for any clarification.            [1]   Current Outpatient Medications:     blood sugar diagnostic (ONETOUCH VERIO TEST STRIPS) Strp, 1 each by Misc.(Non-Drug; Combo Route) route 2 (two) times a day., Disp: 200 strip, Rfl: 4    blood sugar diagnostic Strp, 1 strip by Misc.(Non-Drug; Combo Route) route 3 (three) times daily., Disp: 200 strip, Rfl: 3    blood-glucose meter kit, Use as instructed, Disp: 1 each, Rfl: 0    blood-glucose sensor (FREESTYLE SIMBA 3 SENSOR) Lizzie, 1 each by Misc.(Non-Drug; Combo Route) route Daily., Disp: 3 each, Rfl: 0    calcium-vitamin D (CALCIUM WITH VITAMIN D) 600 mg-10 mcg (400 unit) Tab, Take 1 tablet by mouth Daily., Disp: 90 tablet, Rfl: 1    cloNIDine (CATAPRES) 0.1 MG tablet, TAKE 1 TABLET BY MOUTH EVERY 4 HOURS AS NEEDED FOR BLOOD PRESSURE GREATER THAN 170/90, Disp: , Rfl:     ezetimibe (ZETIA) 10 mg tablet, Take 1 tablet (10 mg total) by mouth once daily., Disp: 90 tablet, Rfl: 3    flash glucose scanning reader (FREESTYLE SIMBA 2  READER) Misc, 1 each by Misc.(Non-Drug; Combo Route) route once daily., Disp: 1 each, Rfl: 0    gabapentin (NEURONTIN) 400 MG capsule, TAKE 1 CAPSULE(400 MG) BY MOUTH EVERY 8 HOURS, Disp: 90 capsule, Rfl: 2    glipiZIDE (GLUCOTROL) 10 MG TR24, TAKE 1 TABLET(10 MG) BY MOUTH TWICE DAILY, Disp: 180 tablet, Rfl: 1    [START ON 4/26/2025] HYDROcodone-acetaminophen (NORCO)  mg per tablet, Take 1 tablet by mouth every 6 (six) hours as needed for Pain (pain)., Disp: 120 tablet, Rfl: 0    HYDROcodone-acetaminophen (NORCO)  mg per tablet, Take 1 tablet by mouth every 6 (six) hours as needed for Pain (pain)., Disp: 120 tablet, Rfl: 0    [START ON 5/26/2025] HYDROcodone-acetaminophen (NORCO)  mg per tablet, Take 1 tablet by mouth every 6 (six) hours as needed for Pain (pain)., Disp: 120 tablet, Rfl: 0    lactulose (CHRONULAC) 10 gram/15 mL solution, Take 30 mLs (20 g total) by mouth 2 (two) times a day., Disp: 600 mL, Rfl: 1    lancets (ACCU-CHEK FASTCLIX LANCET DRUM [facility] [place]), Take 1 each by mouth once daily., Disp: , Rfl:     levothyroxine (SYNTHROID) 75 MCG tablet, Take 1 tablet (75 mcg total) by mouth before breakfast., Disp: 90 tablet, Rfl: 1    ondansetron (ZOFRAN-ODT) 4 MG TbDL, Take 1 tablet (4 mg total) by mouth every 6 (six) hours as needed (nausea and vomting)., Disp: 15 tablet, Rfl: 0    ONETOUCH DELICA PLUS LANCET 33 gauge Misc, Check blood sugar TID, Disp: 100 each, Rfl: 11    oxybutynin (DITROPAN-XL) 10 MG 24 hr tablet, Take one tablet at night, Disp: 90 tablet, Rfl: 3    nitrofurantoin, macrocrystal-monohydrate, (MACROBID) 100 MG capsule, Take 1 capsule (100 mg total) by mouth 2 (two) times daily. Take with food or milk (Patient not taking: Reported on 4/15/2025), Disp: 14 capsule, Rfl: 0    olmesartan (BENICAR) 40 MG tablet, Take 1 tablet (40 mg total) by mouth once daily., Disp: 90 tablet, Rfl: 1

## 2025-04-23 DIAGNOSIS — K59.00 CONSTIPATION, UNSPECIFIED CONSTIPATION TYPE: ICD-10-CM

## 2025-04-23 RX ORDER — LACTULOSE 10 G/15ML
SOLUTION ORAL; RECTAL
Qty: 600 ML | Refills: 1 | Status: SHIPPED | OUTPATIENT
Start: 2025-04-23

## 2025-04-30 ENCOUNTER — EXTERNAL CHRONIC CARE MANAGEMENT (OUTPATIENT)
Dept: FAMILY MEDICINE | Facility: CLINIC | Age: 80
End: 2025-04-30
Payer: COMMERCIAL

## 2025-04-30 PROCEDURE — 99490 CHRNC CARE MGMT STAFF 1ST 20: CPT | Mod: ,,, | Performed by: INTERNAL MEDICINE

## 2025-05-08 ENCOUNTER — OFFICE VISIT (OUTPATIENT)
Dept: OBSTETRICS AND GYNECOLOGY | Facility: CLINIC | Age: 80
End: 2025-05-08
Payer: COMMERCIAL

## 2025-05-08 ENCOUNTER — TELEPHONE (OUTPATIENT)
Dept: OBSTETRICS AND GYNECOLOGY | Facility: CLINIC | Age: 80
End: 2025-05-08
Payer: COMMERCIAL

## 2025-05-08 VITALS
WEIGHT: 167 LBS | BODY MASS INDEX: 25.39 KG/M2 | SYSTOLIC BLOOD PRESSURE: 145 MMHG | HEART RATE: 67 BPM | DIASTOLIC BLOOD PRESSURE: 66 MMHG

## 2025-05-08 DIAGNOSIS — M81.0 OSTEOPOROSIS, UNSPECIFIED OSTEOPOROSIS TYPE, UNSPECIFIED PATHOLOGICAL FRACTURE PRESENCE: ICD-10-CM

## 2025-05-08 DIAGNOSIS — N95.8 GENITOURINARY SYNDROME OF MENOPAUSE: Primary | ICD-10-CM

## 2025-05-08 PROCEDURE — 3078F DIAST BP <80 MM HG: CPT | Mod: CPTII,,, | Performed by: STUDENT IN AN ORGANIZED HEALTH CARE EDUCATION/TRAINING PROGRAM

## 2025-05-08 PROCEDURE — 1159F MED LIST DOCD IN RCRD: CPT | Mod: CPTII,,, | Performed by: STUDENT IN AN ORGANIZED HEALTH CARE EDUCATION/TRAINING PROGRAM

## 2025-05-08 PROCEDURE — 99203 OFFICE O/P NEW LOW 30 MIN: CPT | Mod: S$PBB,,, | Performed by: STUDENT IN AN ORGANIZED HEALTH CARE EDUCATION/TRAINING PROGRAM

## 2025-05-08 PROCEDURE — 99215 OFFICE O/P EST HI 40 MIN: CPT | Mod: PBBFAC | Performed by: STUDENT IN AN ORGANIZED HEALTH CARE EDUCATION/TRAINING PROGRAM

## 2025-05-08 PROCEDURE — 99999 PR PBB SHADOW E&M-EST. PATIENT-LVL V: CPT | Mod: PBBFAC,,, | Performed by: STUDENT IN AN ORGANIZED HEALTH CARE EDUCATION/TRAINING PROGRAM

## 2025-05-08 PROCEDURE — 3077F SYST BP >= 140 MM HG: CPT | Mod: CPTII,,, | Performed by: STUDENT IN AN ORGANIZED HEALTH CARE EDUCATION/TRAINING PROGRAM

## 2025-05-08 RX ORDER — ESTRADIOL 0.1 MG/G
CREAM VAGINAL
Qty: 42.5 G | Refills: 1 | Status: SHIPPED | OUTPATIENT
Start: 2025-05-08 | End: 2025-08-20

## 2025-05-08 NOTE — PROGRESS NOTES
Gynecology History and Physical    Assessment/Plan:   Problem List Items Addressed This Visit    None  Visit Diagnoses         Genitourinary syndrome of menopause    -  Primary      Osteoporosis, unspecified osteoporosis type, unspecified pathological fracture presence        Relevant Orders    Ambulatory referral/consult to Internal Medicine              CC:   Chief Complaint   Patient presents with    Follow-up     Pt is a new pt and in for Osteoporosis, unspecified osteoporosis type, unspecified pathological fracture , problems and concerns. Pt stated she she's having problems with skin issues and hormones issues.     HPI: Lorraine De Jesus is a 80 y.o. female who presents with complaints of vaginal dryness and discomfort. States her PCP referred her due to osteoporosis.      Review of Systems: The following ROS was otherwise negative, except as noted in the HPI:  constitutional, HEENT, respiratory, cardiovascular, gastrointestinal, genitourinary, skin, musculoskeletal, neurological, psych    Gynecologic History:   denies history of abnormal pap smears  denies history of of STIs  Menstrual history:       Obstetrical History:  OB History          3    Para   2    Term                AB        Living             SAB        IAB        Ectopic        Multiple        Live Births                     Past Medical History:   Past Medical History:   Diagnosis Date    Adenomatous polyp of ascending colon 2021    Adenomatous polyp of descending colon 2021    Age related osteoporosis 10/28/2020    Benign paroxysmal vertigo     Chronic pain syndrome     Chronic pelvic pain in female 2021    Ditropan XL 5mg    Colon, diverticulosis 2021    Depressive disorder 2019    External hemorrhoid 2021    Gastrointestinal hemorrhage associated with anorectal source 2021    GERD (gastroesophageal reflux disease) 2013    Hyperlipidemia 2012    Hypothyroidism 2019    Joint  pain     Nonrheumatic tricuspid (valve) insufficiency 03/18/2018    Personal history UTI 7/28/2021    Controlled on Hiprex    Polyneuropathy 07/11/2018    PVD (peripheral vascular disease) 10/30/2018    Temporal arteritis     Type 2 diabetes mellitus 04/02/2014    Urethral meatal stenosis 7/11/2018    history of known urethral stenosis, and was taught to perform monthly self dilation at home.       Medications:  Medication List with Changes/Refills   New Medications    ESTRADIOL (ESTRACE) 0.01 % (0.1 MG/GRAM) VAGINAL CREAM    Place 1 g vaginally once daily for 14 days, THEN 1 g twice a week.   Current Medications    BLOOD SUGAR DIAGNOSTIC (ONETOUCH VERIO TEST STRIPS) STRP    1 each by Misc.(Non-Drug; Combo Route) route 2 (two) times a day.    BLOOD SUGAR DIAGNOSTIC STRP    1 strip by Misc.(Non-Drug; Combo Route) route 3 (three) times daily.    BLOOD-GLUCOSE METER KIT    Use as instructed    BLOOD-GLUCOSE SENSOR (FREESTYLE SIMBA 3 SENSOR) JAMAL    1 each by Misc.(Non-Drug; Combo Route) route Daily.    CALCIUM-VITAMIN D (CALCIUM WITH VITAMIN D) 600 MG-10 MCG (400 UNIT) TAB    Take 1 tablet by mouth Daily.    CLONIDINE (CATAPRES) 0.1 MG TABLET    TAKE 1 TABLET BY MOUTH EVERY 4 HOURS AS NEEDED FOR BLOOD PRESSURE GREATER THAN 170/90    EZETIMIBE (ZETIA) 10 MG TABLET    Take 1 tablet (10 mg total) by mouth once daily.    FLASH GLUCOSE SCANNING READER (FREESTYLE SIMBA 2 READER) MISC    1 each by Misc.(Non-Drug; Combo Route) route once daily.    GABAPENTIN (NEURONTIN) 400 MG CAPSULE    TAKE 1 CAPSULE(400 MG) BY MOUTH EVERY 8 HOURS    GLIPIZIDE (GLUCOTROL) 10 MG TR24    TAKE 1 TABLET(10 MG) BY MOUTH TWICE DAILY    HYDROCODONE-ACETAMINOPHEN (NORCO)  MG PER TABLET    Take 1 tablet by mouth every 6 (six) hours as needed for Pain (pain).    HYDROCODONE-ACETAMINOPHEN (NORCO)  MG PER TABLET    Take 1 tablet by mouth every 6 (six) hours as needed for Pain (pain).    LACTULOSE (CHRONULAC) 10 GRAM/15 ML SOLUTION    TAKE 30  ML(20 GRAMS TOTAL) BY MOUTH TWICE DAILY    LANCETS (ACCU-CHEK FASTCLIX LANCET DRUM MISC)    Take 1 each by mouth once daily.    LEVOTHYROXINE (SYNTHROID) 75 MCG TABLET    Take 1 tablet (75 mcg total) by mouth before breakfast.    NITROFURANTOIN, MACROCRYSTAL-MONOHYDRATE, (MACROBID) 100 MG CAPSULE    Take 1 capsule (100 mg total) by mouth 2 (two) times daily. Take with food or milk    OLMESARTAN (BENICAR) 40 MG TABLET    Take 1 tablet (40 mg total) by mouth once daily.    ONDANSETRON (ZOFRAN-ODT) 4 MG TBDL    Take 1 tablet (4 mg total) by mouth every 6 (six) hours as needed (nausea and vomting).    ONETOUCH DELICA PLUS LANCET 33 GAUGE MISC    Check blood sugar TID    OXYBUTYNIN (DITROPAN-XL) 10 MG 24 HR TABLET    Take one tablet at night       Allergies:  Lipitor [atorvastatin] and Pravachol [pravastatin]    Surgical History:  Past Surgical History:   Procedure Laterality Date     left lumbar sympathetic nerve block Left 2020    X3  DR BROWN    CARDIAC CATHETERIZATION  10/14/2009     SECTION      x 2    COLONOSCOPY  2009    CYSTOSCOPY WITH HYDRODISTENSION OF BLADDER N/A 2021    Procedure: CYSTOSCOPY, WITH BLADDER HYDRODISTENSION;  Surgeon: Dequan Recio MD;  Location: [facility] OR;  Service: Urology;  Laterality: N/A;    CYSTOSCOPY WITH URETHRAL DILATION  2021    Dr Recio    EPIDURAL STEROID INJECTION N/A 10/27/2022    Procedure: Injection, Steroid, Epidural, L5/S1;  Surgeon: Belkis Brown MD;  [facility]: [facility];  Service: Pain Management;  Laterality: N/A;    EPIDURAL STEROID INJECTION N/A 2023    Procedure: Injection, Steroid, Epidural, L5/S1;  Surgeon: Belkis Brown MD;  Location: [facility];  Service: Pain Management;  Laterality: N/A;    HYSTERECTOMY      LUMBAR SYMPATHETIC NERVE BLOCK Left 10/05/2020    Left Sympathetic Nerve Block - Dr Brown - 10/5/20, 20, 20. 20, 10/9/19, 19 and 18    right lumbar sympathetic nerve  block Right 2020    X4 DR LANDEROS    THYROIDECTOMY      1990's       Family History:  Family History   Problem Relation Name Age of Onset    Cancer Mother      Hypertension Mother      Cancer Father      Cancer Sister      Diabetes Sister      Hyperlipidemia Sister      Hypertension Sister      Diabetes Sister      No Known Problems Sister      Cancer Brother      Cancer Brother      No Known Problems Brother      Cancer Brother         Social History:  Social History     Substance and Sexual Activity   Alcohol Use Never     Social History     Substance and Sexual Activity   Drug Use Never     Tobacco Use History[1]    Physical Exam:  BP (!) 145/66   Pulse 67   Wt 75.8 kg (167 lb)   LMP  (LMP Unknown)   BMI 25.39 kg/m²     General: Alert, well appearing, no acute distress  Head: Normocephalic, atraumatic  Lungs: Unlabored respirations  Abdomen: Soft, nontender, nondistended   Pelvic: deferred  Extremities: No redness or tenderness  Skin: Well perfused, normal coloration and turgor, no lesions or rashes visualized  Neuro: Alert, oriented, normal speech, no focal deficits, moves extremities appropriately  Osteopathic: No TART changes    Labs:  No visits with results within 1 Day(s) from this visit.   Latest known visit with results is:   Office Visit on 04/08/2025   Component Date Value    Triglycerides 04/08/2025 113     Cholesterol 04/08/2025 213 (H)     HDL Cholesterol 04/08/2025 45     Cholesterol/HDL Ratio (R* 04/08/2025 4.7     Non-HDL 04/08/2025 168     LDL Calculated 04/08/2025 145     LDL/HDL 04/08/2025 3.2     VLDL 04/08/2025 23     Sodium 04/08/2025 140     Potassium 04/08/2025 3.8     Chloride 04/08/2025 106     CO2 04/08/2025 27     Anion Gap 04/08/2025 11     Glucose 04/08/2025 120 (H)     BUN 04/08/2025 12     Creatinine 04/08/2025 0.67     BUN/Creatinine Ratio 04/08/2025 18     Calcium 04/08/2025 9.2     eGFR 04/08/2025 90     TSH 04/08/2025 2.167     Creatinine, Urine 04/08/2025 52     Microalbumin  04/08/2025 1.8     Microalbumin/Creatinine * 04/08/2025 34.6 (H)      Will refer to Elma Yang for management of osteoporosis  Rx for vaginal estrace sent to pharmacy  Return in 3 mo       [1]   Social History  Tobacco Use   Smoking Status Never    Passive exposure: Never   Smokeless Tobacco Never

## 2025-05-08 NOTE — TELEPHONE ENCOUNTER
----- Message from Nurse Kraus sent at 5/8/2025 10:57 AM CDT -----  Regarding: RE: Patient needs appointment  Appt made-thanks for otifying pt-  ----- Message -----  From: Lali Cortes  Sent: 5/8/2025  10:47 AM CDT  To: Trey [facility] LA [facility] Staff  Subject: Patient needs appointment                        Dr. Morales put in a referral for Ms. De Jesus. I tried to schedule the patient but was not able to on opening scheduling .  I let the patient know I would call her later with the appointment .ThanksLali

## 2025-05-31 ENCOUNTER — EXTERNAL CHRONIC CARE MANAGEMENT (OUTPATIENT)
Dept: INTERNAL MEDICINE | Facility: CLINIC | Age: 80
End: 2025-05-31
Payer: COMMERCIAL

## 2025-05-31 PROCEDURE — 99439 CHRNC CARE MGMT STAF EA ADDL: CPT | Mod: PBBFAC | Performed by: INTERNAL MEDICINE

## 2025-05-31 PROCEDURE — 99439 CHRNC CARE MGMT STAF EA ADDL: CPT | Mod: S$PBB,,, | Performed by: INTERNAL MEDICINE

## 2025-05-31 PROCEDURE — 99490 CHRNC CARE MGMT STAFF 1ST 20: CPT | Mod: PBBFAC | Performed by: INTERNAL MEDICINE

## 2025-05-31 PROCEDURE — 99490 CHRNC CARE MGMT STAFF 1ST 20: CPT | Mod: S$PBB,,, | Performed by: INTERNAL MEDICINE

## 2025-06-02 ENCOUNTER — HOSPITAL ENCOUNTER (INPATIENT)
Facility: HOSPITAL | Age: 80
LOS: 4 days | Discharge: HOME | DRG: 378 | End: 2025-06-06
Attending: HOSPITALIST | Admitting: HOSPITALIST
Payer: COMMERCIAL

## 2025-06-02 ENCOUNTER — OFFICE VISIT (OUTPATIENT)
Dept: FAMILY MEDICINE | Facility: CLINIC | Age: 80
End: 2025-06-02
Payer: COMMERCIAL

## 2025-06-02 VITALS
HEART RATE: 81 BPM | RESPIRATION RATE: 18 BRPM | DIASTOLIC BLOOD PRESSURE: 75 MMHG | SYSTOLIC BLOOD PRESSURE: 120 MMHG | OXYGEN SATURATION: 100 % | BODY MASS INDEX: 25.46 KG/M2 | HEIGHT: 68 IN | WEIGHT: 168 LBS

## 2025-06-02 DIAGNOSIS — K92.2 GI BLEEDING: ICD-10-CM

## 2025-06-02 DIAGNOSIS — K92.2 GASTROINTESTINAL HEMORRHAGE, UNSPECIFIED GASTROINTESTINAL HEMORRHAGE TYPE: ICD-10-CM

## 2025-06-02 DIAGNOSIS — K92.2 LOWER GI BLEED: ICD-10-CM

## 2025-06-02 DIAGNOSIS — K92.2 GASTROINTESTINAL HEMORRHAGE, UNSPECIFIED GASTROINTESTINAL HEMORRHAGE TYPE: Primary | ICD-10-CM

## 2025-06-02 DIAGNOSIS — D50.0 ANEMIA DUE TO GI BLOOD LOSS: ICD-10-CM

## 2025-06-02 DIAGNOSIS — K59.04 CHRONIC IDIOPATHIC CONSTIPATION: ICD-10-CM

## 2025-06-02 DIAGNOSIS — E03.9 HYPOTHYROIDISM, UNSPECIFIED TYPE: Primary | ICD-10-CM

## 2025-06-02 DIAGNOSIS — I10 SYSTOLIC HYPERTENSION: ICD-10-CM

## 2025-06-02 DIAGNOSIS — E11.9 TYPE 2 DIABETES MELLITUS WITHOUT COMPLICATION, WITHOUT LONG-TERM CURRENT USE OF INSULIN: ICD-10-CM

## 2025-06-02 DIAGNOSIS — I10 ESSENTIAL (PRIMARY) HYPERTENSION: ICD-10-CM

## 2025-06-02 LAB
ABORH RETYPE: NORMAL
ALBUMIN SERPL BCP-MCNC: 3.4 G/DL (ref 3.4–4.8)
ALBUMIN/GLOB SERPL: 1 {RATIO}
ALP SERPL-CCNC: 76 U/L (ref 40–150)
ALT SERPL W P-5'-P-CCNC: 10 U/L
ANION GAP SERPL CALCULATED.3IONS-SCNC: 12 MMOL/L (ref 7–16)
AST SERPL W P-5'-P-CCNC: 19 U/L (ref 11–45)
BACTERIA #/AREA URNS HPF: ABNORMAL /HPF
BASOPHILS # BLD AUTO: 0.03 K/UL (ref 0–0.2)
BASOPHILS NFR BLD AUTO: 0.4 % (ref 0–1)
BILIRUB SERPL-MCNC: 0.6 MG/DL
BILIRUB UR QL STRIP: NEGATIVE
BUN SERPL-MCNC: 21 MG/DL (ref 10–20)
BUN/CREAT SERPL: 27 (ref 6–20)
CALCIUM SERPL-MCNC: 9.4 MG/DL (ref 8.4–10.2)
CHLORIDE SERPL-SCNC: 109 MMOL/L (ref 98–107)
CLARITY UR: ABNORMAL
CO2 SERPL-SCNC: 25 MMOL/L (ref 23–31)
COLOR UR: YELLOW
CREAT SERPL-MCNC: 0.77 MG/DL (ref 0.55–1.02)
DIFFERENTIAL METHOD BLD: ABNORMAL
EGFR (NO RACE VARIABLE) (RUSH/TITUS): 79 ML/MIN/1.73M2
EOSINOPHIL # BLD AUTO: 0.09 K/UL (ref 0–0.5)
EOSINOPHIL NFR BLD AUTO: 1.1 % (ref 1–4)
ERYTHROCYTE [DISTWIDTH] IN BLOOD BY AUTOMATED COUNT: 15.7 % (ref 11.5–14.5)
EST. AVERAGE GLUCOSE BLD GHB EST-MCNC: 128 MG/DL
GLOBULIN SER-MCNC: 3.5 G/DL (ref 2–4)
GLUCOSE SERPL-MCNC: 111 MG/DL (ref 82–115)
GLUCOSE SERPL-MCNC: 121 MG/DL (ref 70–105)
GLUCOSE SERPL-MCNC: 174 MG/DL (ref 70–105)
GLUCOSE UR STRIP-MCNC: NORMAL MG/DL
HBA1C MFR BLD HPLC: 6.1 %
HCT VFR BLD AUTO: 32.4 % (ref 38–47)
HGB BLD-MCNC: 10.2 G/DL (ref 12–16)
HYALINE CASTS #/AREA URNS LPF: ABNORMAL /LPF
IMM GRANULOCYTES # BLD AUTO: 0.03 K/UL (ref 0–0.04)
IMM GRANULOCYTES NFR BLD: 0.4 % (ref 0–0.4)
INDIRECT COOMBS: NORMAL
KETONES UR STRIP-SCNC: NEGATIVE MG/DL
LEUKOCYTE ESTERASE UR QL STRIP: ABNORMAL
LYMPHOCYTES # BLD AUTO: 2.21 K/UL (ref 1–4.8)
LYMPHOCYTES NFR BLD AUTO: 27.8 % (ref 27–41)
MAGNESIUM SERPL-MCNC: 2.5 MG/DL (ref 1.6–2.6)
MCH RBC QN AUTO: 26.6 PG (ref 27–31)
MCHC RBC AUTO-ENTMCNC: 31.5 G/DL (ref 32–36)
MCV RBC AUTO: 84.6 FL (ref 80–96)
MONOCYTES # BLD AUTO: 0.58 K/UL (ref 0–0.8)
MONOCYTES NFR BLD AUTO: 7.3 % (ref 2–6)
MPC BLD CALC-MCNC: 10.2 FL (ref 9.4–12.4)
MUCOUS, UA: ABNORMAL /LPF
NEUTROPHILS # BLD AUTO: 5 K/UL (ref 1.8–7.7)
NEUTROPHILS NFR BLD AUTO: 63 % (ref 53–65)
NITRITE UR QL STRIP: NEGATIVE
NRBC # BLD AUTO: 0 X10E3/UL
NRBC, AUTO (.00): 0 %
PH UR STRIP: 7.5 PH UNITS
PLATELET # BLD AUTO: 237 K/UL (ref 150–400)
POTASSIUM SERPL-SCNC: 4.3 MMOL/L (ref 3.5–5.1)
PROT SERPL-MCNC: 6.9 G/DL (ref 5.8–7.6)
PROT UR QL STRIP: 10
RBC # BLD AUTO: 3.83 M/UL (ref 4.2–5.4)
RBC # UR STRIP: NEGATIVE /UL
RBC #/AREA URNS HPF: 9 /HPF
RH BLD: NORMAL
SODIUM SERPL-SCNC: 142 MMOL/L (ref 136–145)
SP GR UR STRIP: 1.02
SPECIMEN OUTDATE: NORMAL
SQUAMOUS #/AREA URNS LPF: ABNORMAL /HPF
UROBILINOGEN UR STRIP-ACNC: NORMAL MG/DL
WBC # BLD AUTO: 7.94 K/UL (ref 4.5–11)
WBC #/AREA URNS HPF: 46 /HPF
YEAST #/AREA URNS HPF: ABNORMAL /HPF

## 2025-06-02 PROCEDURE — 25000003 PHARM REV CODE 250: Performed by: HOSPITALIST

## 2025-06-02 PROCEDURE — 36415 COLL VENOUS BLD VENIPUNCTURE: CPT | Performed by: NURSE PRACTITIONER

## 2025-06-02 PROCEDURE — 63600175 PHARM REV CODE 636 W HCPCS: Performed by: HOSPITALIST

## 2025-06-02 PROCEDURE — 86900 BLOOD TYPING SEROLOGIC ABO: CPT | Performed by: NURSE PRACTITIONER

## 2025-06-02 PROCEDURE — 99285 EMERGENCY DEPT VISIT HI MDM: CPT | Mod: 25

## 2025-06-02 PROCEDURE — 83735 ASSAY OF MAGNESIUM: CPT | Performed by: NURSE PRACTITIONER

## 2025-06-02 PROCEDURE — 93005 ELECTROCARDIOGRAM TRACING: CPT

## 2025-06-02 PROCEDURE — 96361 HYDRATE IV INFUSION ADD-ON: CPT

## 2025-06-02 PROCEDURE — 99223 1ST HOSP IP/OBS HIGH 75: CPT | Mod: ,,, | Performed by: HOSPITALIST

## 2025-06-02 PROCEDURE — 99213 OFFICE O/P EST LOW 20 MIN: CPT | Mod: ,,, | Performed by: INTERNAL MEDICINE

## 2025-06-02 PROCEDURE — 63600175 PHARM REV CODE 636 W HCPCS: Performed by: NURSE PRACTITIONER

## 2025-06-02 PROCEDURE — 25500020 PHARM REV CODE 255: Performed by: HOSPITALIST

## 2025-06-02 PROCEDURE — 3074F SYST BP LT 130 MM HG: CPT | Mod: ,,, | Performed by: INTERNAL MEDICINE

## 2025-06-02 PROCEDURE — 27000207 HC ISOLATION

## 2025-06-02 PROCEDURE — 11000001 HC ACUTE MED/SURG PRIVATE ROOM

## 2025-06-02 PROCEDURE — 3078F DIAST BP <80 MM HG: CPT | Mod: ,,, | Performed by: INTERNAL MEDICINE

## 2025-06-02 PROCEDURE — 93010 ELECTROCARDIOGRAM REPORT: CPT | Mod: ,,, | Performed by: INTERNAL MEDICINE

## 2025-06-02 PROCEDURE — 1101F PT FALLS ASSESS-DOCD LE1/YR: CPT | Mod: ,,, | Performed by: INTERNAL MEDICINE

## 2025-06-02 PROCEDURE — 82962 GLUCOSE BLOOD TEST: CPT

## 2025-06-02 PROCEDURE — 96374 THER/PROPH/DIAG INJ IV PUSH: CPT

## 2025-06-02 PROCEDURE — 25000003 PHARM REV CODE 250: Performed by: NURSE PRACTITIONER

## 2025-06-02 PROCEDURE — 81001 URINALYSIS AUTO W/SCOPE: CPT | Performed by: NURSE PRACTITIONER

## 2025-06-02 PROCEDURE — 87086 URINE CULTURE/COLONY COUNT: CPT | Performed by: NURSE PRACTITIONER

## 2025-06-02 PROCEDURE — 83036 HEMOGLOBIN GLYCOSYLATED A1C: CPT | Performed by: HOSPITALIST

## 2025-06-02 PROCEDURE — 85025 COMPLETE CBC W/AUTO DIFF WBC: CPT | Performed by: NURSE PRACTITIONER

## 2025-06-02 PROCEDURE — 1125F AMNT PAIN NOTED PAIN PRSNT: CPT | Mod: ,,, | Performed by: INTERNAL MEDICINE

## 2025-06-02 PROCEDURE — 3288F FALL RISK ASSESSMENT DOCD: CPT | Mod: ,,, | Performed by: INTERNAL MEDICINE

## 2025-06-02 PROCEDURE — 80053 COMPREHEN METABOLIC PANEL: CPT | Performed by: NURSE PRACTITIONER

## 2025-06-02 RX ORDER — HYDROCODONE BITARTRATE AND ACETAMINOPHEN 10; 325 MG/1; MG/1
1 TABLET ORAL EVERY 6 HOURS PRN
Refills: 0 | Status: DISCONTINUED | OUTPATIENT
Start: 2025-06-02 | End: 2025-06-06 | Stop reason: HOSPADM

## 2025-06-02 RX ORDER — SODIUM CHLORIDE, SODIUM LACTATE, POTASSIUM CHLORIDE, CALCIUM CHLORIDE 600; 310; 30; 20 MG/100ML; MG/100ML; MG/100ML; MG/100ML
INJECTION, SOLUTION INTRAVENOUS CONTINUOUS
Status: DISCONTINUED | OUTPATIENT
Start: 2025-06-02 | End: 2025-06-04

## 2025-06-02 RX ORDER — GABAPENTIN 400 MG/1
400 CAPSULE ORAL 2 TIMES DAILY
Status: DISCONTINUED | OUTPATIENT
Start: 2025-06-02 | End: 2025-06-06 | Stop reason: HOSPADM

## 2025-06-02 RX ORDER — ACETAMINOPHEN 325 MG/1
650 TABLET ORAL EVERY 8 HOURS PRN
Status: DISCONTINUED | OUTPATIENT
Start: 2025-06-02 | End: 2025-06-06 | Stop reason: HOSPADM

## 2025-06-02 RX ORDER — IOPAMIDOL 755 MG/ML
100 INJECTION, SOLUTION INTRAVASCULAR
Status: COMPLETED | OUTPATIENT
Start: 2025-06-02 | End: 2025-06-02

## 2025-06-02 RX ORDER — LEVOTHYROXINE SODIUM 75 UG/1
75 TABLET ORAL
Status: DISCONTINUED | OUTPATIENT
Start: 2025-06-03 | End: 2025-06-06 | Stop reason: HOSPADM

## 2025-06-02 RX ORDER — LOSARTAN POTASSIUM 50 MG/1
50 TABLET ORAL DAILY
Status: DISCONTINUED | OUTPATIENT
Start: 2025-06-03 | End: 2025-06-06 | Stop reason: HOSPADM

## 2025-06-02 RX ORDER — IBUPROFEN 200 MG
24 TABLET ORAL
Status: DISCONTINUED | OUTPATIENT
Start: 2025-06-02 | End: 2025-06-06 | Stop reason: HOSPADM

## 2025-06-02 RX ORDER — IBUPROFEN 200 MG
16 TABLET ORAL
Status: DISCONTINUED | OUTPATIENT
Start: 2025-06-02 | End: 2025-06-06 | Stop reason: HOSPADM

## 2025-06-02 RX ORDER — PANTOPRAZOLE SODIUM 40 MG/10ML
40 INJECTION, POWDER, LYOPHILIZED, FOR SOLUTION INTRAVENOUS 2 TIMES DAILY
Status: DISCONTINUED | OUTPATIENT
Start: 2025-06-02 | End: 2025-06-04

## 2025-06-02 RX ORDER — ONDANSETRON HYDROCHLORIDE 2 MG/ML
4 INJECTION, SOLUTION INTRAVENOUS EVERY 12 HOURS PRN
Status: DISCONTINUED | OUTPATIENT
Start: 2025-06-02 | End: 2025-06-06 | Stop reason: HOSPADM

## 2025-06-02 RX ORDER — CEFTRIAXONE 1 G/1
1 INJECTION, POWDER, FOR SOLUTION INTRAMUSCULAR; INTRAVENOUS
Status: COMPLETED | OUTPATIENT
Start: 2025-06-02 | End: 2025-06-02

## 2025-06-02 RX ORDER — PROMETHAZINE HYDROCHLORIDE 25 MG/1
25 TABLET ORAL EVERY 6 HOURS PRN
Status: DISCONTINUED | OUTPATIENT
Start: 2025-06-02 | End: 2025-06-06 | Stop reason: HOSPADM

## 2025-06-02 RX ORDER — OXYBUTYNIN CHLORIDE 10 MG/1
10 TABLET, EXTENDED RELEASE ORAL DAILY
Status: DISCONTINUED | OUTPATIENT
Start: 2025-06-03 | End: 2025-06-06 | Stop reason: HOSPADM

## 2025-06-02 RX ORDER — INSULIN ASPART 100 [IU]/ML
0-10 INJECTION, SOLUTION INTRAVENOUS; SUBCUTANEOUS
Status: DISCONTINUED | OUTPATIENT
Start: 2025-06-02 | End: 2025-06-06 | Stop reason: HOSPADM

## 2025-06-02 RX ORDER — MORPHINE SULFATE 4 MG/ML
4 INJECTION, SOLUTION INTRAMUSCULAR; INTRAVENOUS EVERY 4 HOURS PRN
Refills: 0 | Status: DISCONTINUED | OUTPATIENT
Start: 2025-06-02 | End: 2025-06-06 | Stop reason: HOSPADM

## 2025-06-02 RX ORDER — GLUCAGON 1 MG
1 KIT INJECTION
Status: DISCONTINUED | OUTPATIENT
Start: 2025-06-02 | End: 2025-06-06 | Stop reason: HOSPADM

## 2025-06-02 RX ADMIN — CEFTRIAXONE SODIUM 1 G: 1 INJECTION, POWDER, FOR SOLUTION INTRAMUSCULAR; INTRAVENOUS at 05:06

## 2025-06-02 RX ADMIN — PANTOPRAZOLE SODIUM 40 MG: 40 INJECTION, POWDER, FOR SOLUTION INTRAVENOUS at 08:06

## 2025-06-02 RX ADMIN — MORPHINE SULFATE 4 MG: 4 INJECTION INTRAVENOUS at 11:06

## 2025-06-02 RX ADMIN — INSULIN ASPART 1 UNITS: 100 INJECTION, SOLUTION INTRAVENOUS; SUBCUTANEOUS at 10:06

## 2025-06-02 RX ADMIN — SODIUM CHLORIDE, POTASSIUM CHLORIDE, SODIUM LACTATE AND CALCIUM CHLORIDE: 600; 310; 30; 20 INJECTION, SOLUTION INTRAVENOUS at 06:06

## 2025-06-02 RX ADMIN — GABAPENTIN 400 MG: 400 CAPSULE ORAL at 08:06

## 2025-06-02 RX ADMIN — IOPAMIDOL 100 ML: 755 INJECTION, SOLUTION INTRAVENOUS at 05:06

## 2025-06-02 RX ADMIN — SODIUM CHLORIDE 1000 ML: 9 INJECTION, SOLUTION INTRAVENOUS at 03:06

## 2025-06-02 RX ADMIN — HYDROCODONE BITARTRATE AND ACETAMINOPHEN 1 TABLET: 10; 325 TABLET ORAL at 06:06

## 2025-06-02 NOTE — ASSESSMENT & PLAN NOTE
Continue Synthroid   [FreeTextEntry1] : 1.  Palpitations  - Patient underwent an MCOT which revealed no significant arrhythmias and a PVC burden <1%.  - Will continue to monitor symptoms. If there is recurrence of symptoms in the future, will consider further work up. At this time, no further cardiac intervention warranted at the current time.  2.  Dyspnea on Exertion:  - Patient underwent stress echocardiogram today demonstrating normal augmentation with no evidence of ischemia. Baseline echocardiogram did demonstrate mild eccentric MR that I will follow with serial echos as clinically indicated.  - Given resolution of symptoms and normal stress echo, no further cardiac intervention warranted at the current time.  3.  HLD  - The patient would like to hold off on antihyperlipidemic medications at this time  - Therefore, recommend a low-fat/low-cholesterol diet. - Other planned treatment includes exercise and weight loss.  4. Health Maintenance: - Patient has strong family hx and is requesting a CACS to assess cardiac risk. Recommend a CAC score to risk stratify the patient.  Instructed to follow up in 4-6 weeks, or sooner for new or worsening symptoms.  Plan was discussed with the patient.

## 2025-06-02 NOTE — SUBJECTIVE & OBJECTIVE
Past Medical History:   Diagnosis Date    Adenomatous polyp of ascending colon 2021    Adenomatous polyp of descending colon 2021    Age related osteoporosis 10/28/2020    Benign paroxysmal vertigo     Chronic pain syndrome     Chronic pelvic pain in female 2021    Ditropan XL 5mg    Colon, diverticulosis 2021    Depressive disorder 2019    External hemorrhoid 2021    Gastrointestinal hemorrhage associated with anorectal source 2021    GERD (gastroesophageal reflux disease) 2013    Hyperlipidemia 2012    Hypothyroidism 2019    Joint pain     Nonrheumatic tricuspid (valve) insufficiency 2018    Personal history UTI 2021    Controlled on Hiprex    Polyneuropathy 2018    PVD (peripheral vascular disease) 10/30/2018    Temporal arteritis     Type 2 diabetes mellitus 2014    Urethral meatal stenosis 2018    history of known urethral stenosis, and was taught to perform monthly self dilation at home.       Past Surgical History:   Procedure Laterality Date     left lumbar sympathetic nerve block Left 2020    X3  DR BROWN    CARDIAC CATHETERIZATION  10/14/2009     SECTION      x 2    COLONOSCOPY  2009    CYSTOSCOPY WITH HYDRODISTENSION OF BLADDER N/A 2021    Procedure: CYSTOSCOPY, WITH BLADDER HYDRODISTENSION;  Surgeon: Dequan Recio MD;  Location: [facility];  Service: Urology;  Laterality: N/A;    CYSTOSCOPY WITH URETHRAL DILATION  2021    Dr Recio    EPIDURAL STEROID INJECTION N/A 10/27/2022    Procedure: Injection, Steroid, Epidural, L5/S1;  Surgeon: Belkis Brown MD;  Location: [facility];  Service: Pain Management;  Laterality: N/A;    EPIDURAL STEROID INJECTION N/A 2023    Procedure: Injection, Steroid, Epidural, L5/S1;  Surgeon: Belkis Brown MD;  Location: [facility];  Service: Pain Management;  Laterality: N/A;    HYSTERECTOMY      LUMBAR SYMPATHETIC NERVE BLOCK Left  10/05/2020    Left Sympathetic Nerve Block - Dr Brown - 10/5/20, 9/21/20, 1/20/20. 1/6/20, 10/9/19, 9/25/19 and 7/11/18    right lumbar sympathetic nerve block Right 2020    X4 DR BROWN    THYROIDECTOMY      1990's       Review of patient's allergies indicates:   Allergen Reactions    Lipitor [atorvastatin] Other (See Comments)     Muscle aching    Pravachol [pravastatin] Other (See Comments)     Muscle aching       No current facility-administered medications on file prior to encounter.     Current Outpatient Medications on File Prior to Encounter   Medication Sig    estradioL (ESTRACE) 0.01 % (0.1 mg/gram) vaginal cream Place 1 g vaginally once daily for 14 days, THEN 1 g twice a week.    gabapentin (NEURONTIN) 400 MG capsule TAKE 1 CAPSULE(400 MG) BY MOUTH EVERY 8 HOURS    glipiZIDE (GLUCOTROL) 10 MG TR24 TAKE 1 TABLET(10 MG) BY MOUTH TWICE DAILY    HYDROcodone-acetaminophen (NORCO)  mg per tablet Take 1 tablet by mouth every 6 (six) hours as needed for Pain (pain).    lactulose (CHRONULAC) 10 gram/15 mL solution TAKE 30 ML(20 GRAMS TOTAL) BY MOUTH TWICE DAILY (Patient taking differently: Take 20 g by mouth 2 (two) times daily as needed.)    levothyroxine (SYNTHROID) 75 MCG tablet Take 1 tablet (75 mcg total) by mouth before breakfast.    olmesartan (BENICAR) 40 MG tablet Take 1 tablet (40 mg total) by mouth once daily. (Patient taking differently: Take 20 mg by mouth once daily.)    oxybutynin (DITROPAN-XL) 10 MG 24 hr tablet Take one tablet at night (Patient taking differently: 10 mg every evening. Take one tablet at night)    blood sugar diagnostic (ONETOUCH VERIO TEST STRIPS) Strp 1 each by Misc.(Non-Drug; Combo Route) route 2 (two) times a day.    blood-glucose meter kit Use as instructed    blood-glucose sensor (FREESTYLE SIMBA 3 SENSOR) Lizzie 1 each by Misc.(Non-Drug; Combo Route) route Daily.    flash glucose scanning reader (FREESTYLE SIMBA 2 READER) Misc 1 each by Misc.(Non-Drug; Combo Route)  route once daily.    ONETOUCH DELICA PLUS LANCET 33 gauge Misc Check blood sugar TID    [DISCONTINUED] blood sugar diagnostic Strp 1 strip by Misc.(Non-Drug; Combo Route) route 3 (three) times daily.    [DISCONTINUED] calcium-vitamin D (CALCIUM WITH VITAMIN D) 600 mg-10 mcg (400 unit) Tab Take 1 tablet by mouth Daily.    [DISCONTINUED] cloNIDine (CATAPRES) 0.1 MG tablet TAKE 1 TABLET BY MOUTH EVERY 4 HOURS AS NEEDED FOR BLOOD PRESSURE GREATER THAN 170/90    [DISCONTINUED] ezetimibe (ZETIA) 10 mg tablet Take 1 tablet (10 mg total) by mouth once daily.    [DISCONTINUED] lancets (ACCU-CHEK FASTCLIX LANCET DRUM [facility]) Take 1 each by mouth once daily.    [DISCONTINUED] nitrofurantoin, macrocrystal-monohydrate, (MACROBID) 100 MG capsule Take 1 capsule (100 mg total) by mouth 2 (two) times daily. Take with food or milk (Patient not taking: Reported on 6/2/2025)    [DISCONTINUED] ondansetron (ZOFRAN-ODT) 4 MG TbDL Take 1 tablet (4 mg total) by mouth every 6 (six) hours as needed (nausea and vomting).     Family History       Problem Relation (Age of Onset)    Cancer Mother, Father, Sister, Brother, Brother, Brother    Diabetes Sister, Sister    Hyperlipidemia Sister    Hypertension Mother, Sister    No Known Problems Sister, Brother          Tobacco Use    Smoking status: Never     Passive exposure: Never    Smokeless tobacco: Never   Substance and Sexual Activity    Alcohol use: Never    Drug use: Never    Sexual activity: Not Currently     Partners: Male     Review of Systems   Constitutional:  Positive for fatigue. Negative for appetite change and fever.   HENT:  Negative for congestion, hearing loss and trouble swallowing.    Respiratory:  Negative for chest tightness, shortness of breath and wheezing.    Cardiovascular:  Negative for chest pain and palpitations.   Gastrointestinal:  Positive for blood in stool. Negative for abdominal pain, constipation and nausea.   Genitourinary:  Negative for difficulty urinating and  dysuria.   Musculoskeletal:  Positive for back pain. Negative for neck stiffness.   Skin:  Negative for pallor and rash.   Neurological:  Positive for dizziness. Negative for speech difficulty and headaches.   Psychiatric/Behavioral:  Negative for confusion and suicidal ideas.      Objective:     Vital Signs (Most Recent):  Temp: 97.5 °F (36.4 °C) (06/02/25 1755)  Pulse: 84 (06/02/25 1755)  Resp: 14 (06/02/25 1755)  BP: (!) 179/98 (06/02/25 1755)  SpO2: 100 % (06/02/25 1755) Vital Signs (24h Range):  Temp:  [97.5 °F (36.4 °C)-98 °F (36.7 °C)] 97.5 °F (36.4 °C)  Pulse:  [] 84  Resp:  [14-18] 14  SpO2:  [98 %-100 %] 100 %  BP: (102-179)/(61-98) 179/98     Weight: 76.2 kg (168 lb)  Body mass index is 25.54 kg/m².     Physical Exam  Vitals reviewed.   Constitutional:       General: She is awake. She is not in acute distress.     Appearance: She is well-developed. She is not toxic-appearing.   HENT:      Head: Normocephalic.      Nose: Nose normal.      Mouth/Throat:      Pharynx: Oropharynx is clear.   Eyes:      Extraocular Movements: Extraocular movements intact.      Pupils: Pupils are equal, round, and reactive to light.   Neck:      Thyroid: No thyroid mass.      Vascular: No carotid bruit.   Cardiovascular:      Rate and Rhythm: Normal rate and regular rhythm.      Pulses: Normal pulses.      Heart sounds: Normal heart sounds. No murmur heard.  Pulmonary:      Effort: Pulmonary effort is normal.      Breath sounds: Normal breath sounds and air entry. No wheezing.   Abdominal:      General: Bowel sounds are normal. There is no distension.      Palpations: Abdomen is soft.      Tenderness: There is no abdominal tenderness.   Musculoskeletal:         General: Normal range of motion.      Cervical back: Neck supple. No rigidity.   Skin:     General: Skin is warm.      Coloration: Skin is not jaundiced.      Findings: No lesion.   Neurological:      General: No focal deficit present.      Mental Status: She is  alert and oriented to person, place, and time.      Cranial Nerves: No cranial nerve deficit.   Psychiatric:         Attention and Perception: Attention normal.         Mood and Affect: Mood normal.         Behavior: Behavior normal. Behavior is cooperative.         Thought Content: Thought content normal.         Cognition and Memory: Cognition normal.              CRANIAL NERVES     CN III, IV, VI   Pupils are equal, round, and reactive to light.       Significant Labs: All pertinent labs within the past 24 hours have been reviewed.  BMP:   Recent Labs   Lab 06/02/25  1511         K 4.3   *   CO2 25   BUN 21*   CREATININE 0.77   CALCIUM 9.4   MG 2.5     CBC:   Recent Labs   Lab 06/02/25  1511   WBC 7.94   HGB 10.2*   HCT 32.4*        CMP:   Recent Labs   Lab 06/02/25  1511      K 4.3   *   CO2 25      BUN 21*   CREATININE 0.77   CALCIUM 9.4   PROT 6.9   ALBUMIN 3.4   BILITOT 0.6   ALKPHOS 76   AST 19   ALT 10   ANIONGAP 12       Significant Imaging: I have reviewed all pertinent imaging results/findings within the past 24 hours.    Imaging Results              CT Abdomen Pelvis With IV Contrast NO Oral Contrast (In process)  Result time 06/02/25 17:51:43                     X-Ray Chest AP Portable (In process)                   Intake/Output - Last 3 Shifts         05/31 0700 06/01 0659 06/01 0700 06/02 0659 06/02 0700  06/03 0659    IV Piggyback   1000    Total Intake(mL/kg)   1000 (13.1)    Net   +1000                 Microbiology Results (last 7 days)       Procedure Component Value Units Date/Time    Urine culture [[id number]] Collected: 06/02/25 1447    Order Status: Sent Specimen: Urine Updated: 06/02/25 1520

## 2025-06-02 NOTE — HPI
Chief complaint:  Rectal bleeding    History of present illness:     Ms. De Jesus is a 80-year-old presented to the emergency room on referral from Dr. Chen's office after presenting there complaining of recurrent rectal bleeding starting a.m. of admission.  Patient states she had gotten up and going to the bathroom proximally 10:00 a.m..  Had a bowel movement and noted there was a lot of clots of dark red blood.  No black.  States complain of some dizziness.  Awhile later had another bowel movement with similar results.  Had been taken a Dr. Kirk office and then was referred to the emergency department.  Patient has not been on any nonsteroidals.  No blood thinners.  No prior history of GI bleeding.  In January this year had colonoscopy by Dr. Gonzalez and had colon polyps removed.  Patient to be admitted to hospitalist service with GI consulted.     Review of system:  See above.  Wears glasses without recent change in vision.  Has some chronic low back pain followed at [facility].  Denies any issues with her sleepy in the past.  Review of systems otherwise.    Past medical history:  -hypertension times 10 years  -chronic pain syndrome with chronic low back pain followed by Dr. Brown at [facility]  -diabetes mellitus type 2 treated with orally  -states gets we check us by Dr. Doss but does not have any heart issue she is aware  -hypothyroidism    Past surgical history:  Hysterectomy bilateral salpingo-oophorectomy  Bilateral cataract surgery    Allergy:  Lipitor which causes myopathy    Social history:  No history of tobacco alcohol use  Lives by herself  Has 2 children and daughter is in the emergency room with her  No history of tobacco alcohol use    Family history:  No one in family premature coronary artery disease  No one in family with colon or GI issues    Health maintenance issues:  Primary care providers Dr. Mateo chen   Does not get flu shots nor Pneumovax  Gets yearly  mammogram  No recent Pap smear  Colonoscopy last January by Dr. Espinal with polyps removed

## 2025-06-02 NOTE — ED PROVIDER NOTES
Encounter Date: 6/2/2025       History     Chief Complaint   Patient presents with    Rectal Bleeding     Pt reports rectal bleeding that started this AM. She reports passing a lot of clots and feeling weak and dizzy.     Abdominal Pain     Pt reports lower abd pain.     80-year-old female presents to the emergency department to be evaluated for rectal bleeding that started earlier this morning, as well as dizziness.  She states her most recent colonoscopy was performed 1 year ago.  She endorses lower abdominal pain and tenderness on palpation, and denies nausea or vomiting.  She denies constipation, and states that her stools have become more liquid this morning.  When obtaining a stool sample, it is noted that there are clots and bright red blood.  She is not currently on blood thinners.  She has prescribed lactulose to assist with constipation, but notes her last dose being approximately 1 week ago.  Upon review of records, it is noted that her most recent colonoscopy was in January 2025, having a colon polyp that was a tubular adenoma.  It was recommended that she have a follow up colonoscopy in 5 years.    The history is provided by the patient.   GI Problem  The current episode started 3 to 5 hours ago. The onset of the illness was abrupt. The problem has not changed since onset.The abdominal pain is located in the RLQ, LLQ and suprapubic region. The abdominal pain does not radiate. The other symptoms of the illness include fatigue, melena and diarrhea. The other symptoms of the illness do not include fever, jaundice, shortness of breath, nausea, vomiting, dysuria, hematemesis, hematochezia, vaginal discharge or vaginal bleeding.   The diarrhea began today. The diarrhea is watery and bloody. The diarrhea occurs 2 - 4 times per day.   The fatigue began today.   The episodes of melena began today. The melena has been unchanged since the its onset. The melena is a new problem. The melena has occurred 2 - 5 times  per day.   The patient states that she believes she is currently not pregnant. The patient has had a change in bowel habit. Risk factors for an acute abdominal problem include being elderly. Symptoms associated with the illness do not include constipation.     Review of patient's allergies indicates:   Allergen Reactions    Lipitor [atorvastatin] Other (See Comments)     Muscle aching    Pravachol [pravastatin] Other (See Comments)     Muscle aching     Past Medical History:   Diagnosis Date    Adenomatous polyp of ascending colon 2021    Adenomatous polyp of descending colon 2021    Age related osteoporosis 10/28/2020    Benign paroxysmal vertigo     Chronic pain syndrome     Chronic pelvic pain in female 2021    Ditropan XL 5mg    Colon, diverticulosis 2021    Depressive disorder 2019    External hemorrhoid 2021    Gastrointestinal hemorrhage associated with anorectal source 2021    GERD (gastroesophageal reflux disease) 2013    Hyperlipidemia 2012    Hypothyroidism 2019    Joint pain     Nonrheumatic tricuspid (valve) insufficiency 2018    Personal history UTI 2021    Controlled on Hiprex    Polyneuropathy 2018    PVD (peripheral vascular disease) 10/30/2018    Temporal arteritis     Type 2 diabetes mellitus 2014    Urethral meatal stenosis 2018    history of known urethral stenosis, and was taught to perform monthly self dilation at home.     Past Surgical History:   Procedure Laterality Date     left lumbar sympathetic nerve block Left 2020    X3  DR LANDEROS    CARDIAC CATHETERIZATION  10/14/2009     SECTION      x 2    COLONOSCOPY  2009    CYSTOSCOPY WITH HYDRODISTENSION OF BLADDER N/A 2021    Procedure: CYSTOSCOPY, WITH BLADDER HYDRODISTENSION;  Surgeon: Dequan Recio MD;  Location: [facility];  Service: Urology;  Laterality: N/A;    CYSTOSCOPY WITH URETHRAL DILATION  2021    Dr Recio    EPIDURAL  STEROID INJECTION N/A 10/27/2022    Procedure: Injection, Steroid, Epidural, L5/S1;  Surgeon: Belkis Brown MD;  Location: [facility];  Service: Pain Management;  Laterality: N/A;    EPIDURAL STEROID INJECTION N/A 9/12/2023    Procedure: Injection, Steroid, Epidural, L5/S1;  Surgeon: Belkis Brown MD;  Location: [facility];  Service: Pain Management;  Laterality: N/A;    HYSTERECTOMY  1980's    LUMBAR SYMPATHETIC NERVE BLOCK Left 10/05/2020    Left Sympathetic Nerve Block - Dr Brown - 10/5/20, 9/21/20, 1/20/20. 1/6/20, 10/9/19, 9/25/19 and 7/11/18    right lumbar sympathetic nerve block Right 2020    X4 DR BROWN    THYROIDECTOMY      1990's     Family History   Problem Relation Name Age of Onset    Cancer Mother      Hypertension Mother      Cancer Father      Cancer Sister      Diabetes Sister      Hyperlipidemia Sister      Hypertension Sister      Diabetes Sister      No Known Problems Sister      Cancer Brother      Cancer Brother      No Known Problems Brother      Cancer Brother       Social History[1]  Review of Systems   Constitutional:  Positive for fatigue. Negative for fever.   HENT: Negative.     Eyes: Negative.    Respiratory: Negative.  Negative for chest tightness and shortness of breath.    Gastrointestinal:  Positive for abdominal pain, blood in stool, diarrhea and melena. Negative for abdominal distention, constipation, hematemesis, hematochezia, jaundice, nausea, rectal pain and vomiting.   Genitourinary: Negative.  Negative for dysuria, vaginal bleeding and vaginal discharge.   Musculoskeletal: Negative.    Skin: Negative.    Neurological:  Positive for dizziness and light-headedness. Negative for weakness and headaches.   Hematological: Negative.    Psychiatric/Behavioral: Negative.         Physical Exam     Initial Vitals [06/02/25 1307]   BP Pulse Resp Temp SpO2   102/61 102 18 98 °F (36.7 °C) 98 %      MAP       --         Physical Exam    Vitals  reviewed.  Constitutional: She appears well-developed and well-nourished. She is not diaphoretic. She is cooperative.  Non-toxic appearance. No distress.   HENT:   Head: Normocephalic and atraumatic.   Right Ear: External ear normal.   Left Ear: External ear normal.   Nose: Nose normal. Mouth/Throat: Oropharynx is clear and moist. No oropharyngeal exudate.   Eyes: EOM are normal. Pupils are equal, round, and reactive to light.   Neck: Neck supple. No tracheal deviation present. No JVD present.   Normal range of motion.  Cardiovascular:  Regular rhythm and intact distal pulses.   Tachycardia present.         Pulmonary/Chest: Breath sounds normal. No stridor. No respiratory distress.   Abdominal: Abdomen is soft. Bowel sounds are normal. She exhibits no distension and no mass. There is abdominal tenderness (lower abdominal pain and tenderness on light palpation) in the right lower quadrant, suprapubic area and left lower quadrant.   No right CVA tenderness.  No left CVA tenderness. There is guarding. There is no rebound.   Musculoskeletal:         General: No tenderness or edema. Normal range of motion.      Cervical back: Normal range of motion and neck supple.     Lymphadenopathy:     She has no cervical adenopathy.   Neurological: She is alert and oriented to person, place, and time. She has normal strength. No cranial nerve deficit or sensory deficit. GCS score is 15. GCS eye subscore is 4. GCS verbal subscore is 5. GCS motor subscore is 6.   Skin: Skin is warm and dry. Capillary refill takes less than 2 seconds.   Psychiatric: She has a normal mood and affect. Thought content normal.         Medical Screening Exam   See Full Note    ED Course   Procedures  Labs Reviewed   COMPREHENSIVE METABOLIC PANEL - Abnormal       Result Value    Sodium 142      Potassium 4.3      Chloride 109 (*)     CO2 25      Anion Gap 12      Glucose 111      BUN 21 (*)     Creatinine 0.77      BUN/Creatinine Ratio 27 (*)     Calcium 9.4       Total Protein 6.9      Albumin 3.4      Globulin 3.5      A/G Ratio 1.0      Bilirubin, Total 0.6      Alk Phos 76      ALT 10      AST 19      eGFR 79     CBC WITH DIFFERENTIAL - Abnormal    WBC 7.94      RBC 3.83 (*)     Hemoglobin 10.2 (*)     Hematocrit 32.4 (*)     MCV 84.6      MCH 26.6 (*)     MCHC 31.5 (*)     RDW 15.7 (*)     Platelet Count 237      MPV 10.2      Neutrophils % 63.0      Lymphocytes % 27.8      Monocytes % 7.3 (*)     Eosinophils % 1.1      Basophils % 0.4      Immature Granulocytes % 0.4      nRBC, Auto 0.0      Neutrophils, Abs 5.00      Lymphocytes, Absolute 2.21      Monocytes, Absolute 0.58      Eosinophils, Absolute 0.09      Basophils, Absolute 0.03      Immature Granulocytes, Absolute 0.03      nRBC, Absolute 0.00      Diff Type Auto     URINALYSIS, REFLEX TO URINE CULTURE - Abnormal    Color, UA Yellow      Clarity, UA Turbid      pH, UA 7.5      Leukocytes, UA Moderate (*)     Nitrites, UA Negative      Protein, UA 10 (*)     Glucose, UA Normal      Ketones, UA Negative      Urobilinogen, UA Normal      Bilirubin, UA Negative      Blood, UA Negative      Specific Gravity, UA 1.020     URINALYSIS, MICROSCOPIC - Abnormal    WBC, UA 46 (*)     RBC, UA 9 (*)     Bacteria, UA Few (*)     Squamous Epithelial Cells, UA Occasional (*)     Hyaline Casts, UA 2-5 (*)     Yeast, UA Occasional (*)     Mucous Occasional (*)    POCT GLUCOSE MONITORING CONTINUOUS - Abnormal    POC Glucose 121 (*)    MAGNESIUM - Normal    Magnesium 2.5     CULTURE, URINE   CBC W/ AUTO DIFFERENTIAL    Narrative:     The following orders were created for panel order CBC auto differential.  Procedure                               Abnormality         Status                     ---------                               -----------         ------                     CBC with Differential[[id number]]       Abnormal            Final result                 Please view results for these tests on the individual orders.    TYPE & SCREEN    Specimen Outdate 06/05/2025 23:59      Group & Rh O POS      Indirect Jeff NEG     ABORH RETYPE    ABORH Retype O POS     POCT GLUCOSE MONITORING CONTINUOUS          Imaging Results              CT Abdomen Pelvis With IV Contrast NO Oral Contrast (Final result)  Result time 06/02/25 18:28:39      Final result by Markie Blackmon MD (06/02/25 18:28:39)                   Impression:      1. Nonobstructing nephrolithiasis of the left kidney.  2. Probable minimal hydronephrosis on the right.  No obstruction is detected.  Recommend surveillance.  3. Diverticulosis of the colon.  4. Small subcentimeter pulmonary nodules in the right lower lobe.  See above comments.  5. Small hiatal hernia.      Electronically signed by: Markie Blackmon  Date:    06/02/2025  Time:    18:28               Narrative:    EXAMINATION:  CTA ABDOMEN PELVIS WITH and without IV CONTRAST    CLINICAL HISTORY:  GI bleed;    TECHNIQUE:  Low dose axial images, sagittal and coronal reformations were obtained from the lung bases to the pubic symphysis with and without the IV administration of 100 mL of Isovue 370 .  Oral contrast was not administered..  Precontrast, arterial and portal venous phases.  CTA protocol.  MIPS utilized.    COMPARISON:  11/30/2020    FINDINGS:  Abdomen:    - Lower thorax:    - Lung bases: 3 mm pulmonary nodule right lower lobe on axial image 1 incompletely visualized.  4 mm nodule right lower lobe on axial 4.    For multiple solid nodules all <6 mm, Fleischner Society 2017 guidelines recommend no routine follow up for a low risk patient, or follow up with non-contrast chest CT at 12 months after discovery in a high risk patient.    Mild bibasilar atelectasis.    - Liver: Small probable hepatic cyst anteriorly in the left lobe.    Small hiatal hernia    - Gallbladder: No calcified gallstones.    - Bile Ducts: No evidence of intra or extra hepatic biliary ductal dilation.    - Spleen: Negative.    - Kidneys: 8  mm nonobstructing stone at the lower pole the left kidney.  No stones on the right.  Probable minimal hydronephrosis on the right.  No obstruction is detected.  Recommend surveillance.  No hydronephrosis or hydroureter on the left.    - Adrenals: Unremarkable.    - Pancreas: No mass or peripancreatic fat stranding.    - Retroperitoneum:  No significant adenopathy.    - Vascular: No abdominal aortic aneurysm.    - Abdominal wall:  Unremarkable.    Pelvis:    Urinary bladder is nondistended.    Status post hysterectomy.    Diverticulosis of the colon.  No evidence of acute diverticulitis.    No evidence of any focal significant GI bleed.    Bowel/Mesentery:    No evidence of bowel obstruction or inflammation.    Bones:  No acute osseous abnormality and no suspicious lytic or blastic lesion.                                       X-Ray Chest AP Portable (Final result)  Result time 06/02/25 19:44:21      Final result by Markie Blackmon MD (06/02/25 19:44:21)                   Impression:      No acute radiographic abnormality.      Electronically signed by: Markie Blackmon  Date:    06/02/2025  Time:    19:44               Narrative:    EXAMINATION:  XR CHEST AP PORTABLE    CLINICAL HISTORY:  Gastrointestinal hemorrhage, unspecified    TECHNIQUE:  Single frontal view of the chest was performed.    COMPARISON:  10/16/2023    FINDINGS:  The lungs are clear, with normal appearance of pulmonary vasculature and no pleural effusion or pneumothorax.    The cardiac silhouette is normal in size. The hilar and mediastinal contours are unremarkable.    Bones are intact.                                       Medications   gabapentin capsule 400 mg (400 mg Oral Given 6/2/25 2028)   HYDROcodone-acetaminophen  mg per tablet 1 tablet (1 tablet Oral Given 6/2/25 1848)   levothyroxine tablet 75 mcg (has no administration in time range)   losartan tablet 50 mg (has no administration in time range)   oxybutynin 24 hr tablet 10 mg (has  no administration in time range)   lactated ringers infusion ( Intravenous New Bag 6/2/25 1847)   pantoprazole injection 40 mg (40 mg Intravenous Given 6/2/25 2028)   ondansetron injection 4 mg (has no administration in time range)   promethazine tablet 25 mg (has no administration in time range)   acetaminophen tablet 650 mg (has no administration in time range)   glucose chewable tablet 16 g (has no administration in time range)   glucose chewable tablet 24 g (has no administration in time range)   dextrose 50% injection 12.5 g (has no administration in time range)   dextrose 50% injection 25 g (has no administration in time range)   glucagon (human recombinant) injection 1 mg (has no administration in time range)   insulin aspart U-100 injection 0-10 Units (1 Units Subcutaneous Given 6/2/25 2205)   sodium chloride 0.9% bolus 1,000 mL 1,000 mL (0 mLs Intravenous Stopped 6/2/25 1753)   cefTRIAXone injection 1 g (1 g Intravenous Given 6/2/25 1741)   iopamidoL (ISOVUE-370) injection 100 mL (100 mLs Intravenous Given 6/2/25 1732)     Medical Decision Making  80-year-old female presents to the emergency department to be evaluated for rectal bleeding that started earlier this morning, as well as dizziness.  She states her most recent colonoscopy was performed 1 year ago.  She endorses lower abdominal pain and tenderness on palpation, and denies nausea or vomiting.  She denies constipation, and states that her stools have become more liquid this morning.  When obtaining a stool sample, it is noted that there are clots and bright red blood.  She is not currently on blood thinners.  She has prescribed lactulose to assist with constipation, but notes her last dose being approximately 1 week ago.  Upon review of records, it is noted that her most recent colonoscopy was in January 2025, having a colon polyp that was a tubular adenoma.  It was recommended that she have a follow up colonoscopy in 5 years.    Labs ordered and  reviewed  Chest xray ordered and reviewed with radiologist's interpretation significant for no acute abnormalities.  EKG ordered and reviewed with Dr. Velasco.  Rate 108, tachycardia, no STEMI  CT abdomen ordered and reviewed with radiologist's interpretation significant for Nonobstructing nephrolithiasis of the left kidney. Probable minimal hydronephrosis on the right.  No obstruction is detected.  Recommend surveillance. Diverticulosis of the colon. Small subcentimeter pulmonary nodules in the right lower lobe. Small hiatal hernia.    1735 - Dr Olson in with patient to evaluate and discuss admission.    Admit diagnosis: GI bleeding    Amount and/or Complexity of Data Reviewed  Labs: ordered.  Radiology: ordered.    Risk  Prescription drug management.  Decision regarding hospitalization.                                      Clinical Impression:   Final diagnoses:  [K92.2] GI bleeding        ED Disposition Condition    Admit                     [1]   Social History  Tobacco Use    Smoking status: Never     Passive exposure: Never    Smokeless tobacco: Never   Substance Use Topics    Alcohol use: Never    Drug use: Never        Smith Guadalupe FNP  06/02/25 [id number]

## 2025-06-02 NOTE — PHARMACY MED REC
"Admission Medication History     The home medication history was taken by Alexa Johnson.    You may go to "Admission" then "Reconcile Home Medications" tabs to review and/or act upon these items.     The home medication list has been updated by the Pharmacy department.   Please read ALL comments highlighted in yellow.   Please address this information as you see fit.    Feel free to contact us if you have any questions or require assistance.    The patient stated she takes 1/2 tablet of olmesartan 40 mg (20 mg daily)      The medications listed below were removed from the home medication list. Please reorder if appropriate:  Calcium w/ Vit D 600 mg-10 mcg  Clonidine 0.1 mg  Ezetimibe 10 mg  Macrobid 100 mg  Ondansetron 4 mg    Medications listed below were obtained from: Patient/family, Analytic software- [employer], and Medical records  (Not in a hospital admission)        Current Outpatient Medications on File Prior to Encounter   Medication Sig Dispense Refill Last Dose/Taking    estradioL (ESTRACE) 0.01 % (0.1 mg/gram) vaginal cream Place 1 g vaginally once daily for 14 days, THEN 1 g twice a week. 42.5 g 1 Taking    gabapentin (NEURONTIN) 400 MG capsule TAKE 1 CAPSULE(400 MG) BY MOUTH EVERY 8 HOURS 90 capsule 2 6/1/2025    glipiZIDE (GLUCOTROL) 10 MG TR24 TAKE 1 TABLET(10 MG) BY MOUTH TWICE DAILY 180 tablet 1 6/2/2025    HYDROcodone-acetaminophen (NORCO)  mg per tablet Take 1 tablet by mouth every 6 (six) hours as needed for Pain (pain). 120 tablet 0 6/2/2025    lactulose (CHRONULAC) 10 gram/15 mL solution TAKE 30 ML(20 GRAMS TOTAL) BY MOUTH TWICE DAILY (Patient taking differently: Take 20 g by mouth 2 (two) times daily as needed.) 600 mL 1 Taking Differently    levothyroxine (SYNTHROID) 75 MCG tablet Take 1 tablet (75 mcg total) by mouth before breakfast. 90 tablet 1 6/2/2025    olmesartan (BENICAR) 40 MG tablet Take 1 tablet (40 mg total) by mouth once daily. (Patient taking differently: Take 20 mg by " From: Suzanne Rowan  To: Anais Cee PA-C  Sent: 12/2/2022 12:53 PM EST  Subject: Dexcom      Does the decxom  require a written prescription?   mouth once daily.) 90 tablet 1 6/2/2025    oxybutynin (DITROPAN-XL) 10 MG 24 hr tablet Take one tablet at night (Patient taking differently: 10 mg every evening. Take one tablet at night) 90 tablet 3 6/1/2025    blood sugar diagnostic (ONETOUCH VERIO TEST STRIPS) Strp 1 each by Misc.(Non-Drug; Combo Route) route 2 (two) times a day. 200 strip 4     blood-glucose meter kit Use as instructed 1 each 0     blood-glucose sensor (FREESTYLE SIMBA 3 SENSOR) Lizzie 1 each by Misc.(Non-Drug; Combo Route) route Daily. 3 each 0     flash glucose scanning reader (FREESTYLE SIMBA 2 READER) Misc 1 each by Misc.(Non-Drug; Combo Route) route once daily. 1 each 0     ONETOUCH DELICA PLUS LANCET 33 gauge Misc Check blood sugar  each 11     [DISCONTINUED] blood sugar diagnostic Strp 1 strip by Misc.(Non-Drug; Combo Route) route 3 (three) times daily. 200 strip 3     [DISCONTINUED] calcium-vitamin D (CALCIUM WITH VITAMIN D) 600 mg-10 mcg (400 unit) Tab Take 1 tablet by mouth Daily. 90 tablet 1     [DISCONTINUED] cloNIDine (CATAPRES) 0.1 MG tablet TAKE 1 TABLET BY MOUTH EVERY 4 HOURS AS NEEDED FOR BLOOD PRESSURE GREATER THAN 170/90       [DISCONTINUED] ezetimibe (ZETIA) 10 mg tablet Take 1 tablet (10 mg total) by mouth once daily. 90 tablet 3     [DISCONTINUED] lancets (ACCU-CHEK FASTCLIX LANCET DRUM [facility]) Take 1 each by mouth once daily.       [DISCONTINUED] nitrofurantoin, macrocrystal-monohydrate, (MACROBID) 100 MG capsule Take 1 capsule (100 mg total) by mouth 2 (two) times daily. Take with food or milk (Patient not taking: Reported on 6/2/2025) 14 capsule 0     [DISCONTINUED] ondansetron (ZOFRAN-ODT) 4 MG TbDL Take 1 tablet (4 mg total) by mouth every 6 (six) hours as needed (nausea and vomting). 15 tablet 0        Potential issues to be addressed PRIOR TO DISCHARGE  N/A    Alexa Johnson  EXT [id number]                 .

## 2025-06-02 NOTE — H&P
[facility] Emergency Department  Hospital Medicine  History & Physical    Patient Name: Lorraine De Jesus  MRN: [id number]  Patient Class: IP- Inpatient  Admission Date: 6/2/2025  Attending Physician: Ovidio Olson MD   Primary Care Provider: Mateo Chen MD         Patient information was obtained from patient, past medical records, and ER records.     Subjective:     Principal Problem:<principal problem not specified>    Chief Complaint:   Chief Complaint   Patient presents with    Rectal Bleeding     Pt reports rectal bleeding that started this AM. She reports passing a lot of clots and feeling weak and dizzy.     Abdominal Pain     Pt reports lower abd pain.        HPI:   Chief complaint:  Rectal bleeding    History of present illness:     Ms. De Jesus is a 80-year-old presented to the emergency room on referral from Dr. Chen's office after presenting there complaining of recurrent rectal bleeding starting a.m. of admission.  Patient states she had gotten up and going to the bathroom proximally 10:00 a.m..  Had a bowel movement and noted there was a lot of clots of dark red blood.  No black.  States complain of some dizziness.  Awhile later had another bowel movement with similar results.  Had been taken a Dr. Kirk office and then was referred to the emergency department.  Patient has not been on any nonsteroidals.  No blood thinners.  No prior history of GI bleeding.  In January this year had colonoscopy by Dr. Gonzalez and had colon polyps removed.  Patient to be admitted to hospitalist service with GI consulted.     Review of system:  See above.  Wears glasses without recent change in vision.  Has some chronic low back pain followed at pain treatment Center.  Denies any issues with her sleepy in the past.  Review of systems otherwise.    Past medical history:  -hypertension times 10 years  -chronic pain syndrome with chronic low back pain followed by Dr. Brown at pain treatment  Center  -diabetes mellitus type 2 treated with orally  -states gets we check us by Dr. Doss but does not have any heart issue she is aware  -hypothyroidism    Past surgical history:  Hysterectomy bilateral salpingo-oophorectomy  Bilateral cataract surgery    Allergy:  Lipitor which causes myopathy    Social history:  No history of tobacco alcohol use  Lives by herself  Has 2 children and daughter is in the emergency room with her  No history of tobacco alcohol use    Family history:  No one in family premature coronary artery disease  No one in family with colon or GI issues    Health maintenance issues:  Primary care providers Dr. Mateo dumont   Does not get flu shots nor Pneumovax  Gets yearly mammogram  No recent Pap smear  Colonoscopy last January by Dr. Espinal with polyps removed        Past Medical History:   Diagnosis Date    Adenomatous polyp of ascending colon 6/22/2021    Adenomatous polyp of descending colon 6/22/2021    Age related osteoporosis 10/28/2020    Benign paroxysmal vertigo     Chronic pain syndrome     Chronic pelvic pain in female 7/28/2021    Ditropan XL 5mg    Colon, diverticulosis 6/22/2021    Depressive disorder 08/12/2019    External hemorrhoid 6/22/2021    Gastrointestinal hemorrhage associated with anorectal source 6/22/2021    GERD (gastroesophageal reflux disease) 11/21/2013    Hyperlipidemia 01/16/2012    Hypothyroidism 02/25/2019    Joint pain     Nonrheumatic tricuspid (valve) insufficiency 03/18/2018    Personal history UTI 7/28/2021    Controlled on Hiprex    Polyneuropathy 07/11/2018    PVD (peripheral vascular disease) 10/30/2018    Temporal arteritis     Type 2 diabetes mellitus 04/02/2014    Urethral meatal stenosis 7/11/2018    history of known urethral stenosis, and was taught to perform monthly self dilation at home.       Past Surgical History:   Procedure Laterality Date     left lumbar sympathetic nerve block Left 2020    X3  DR LANDEROS    CARDIAC CATHETERIZATION   10/14/2009     SECTION      x 2    COLONOSCOPY  2009    CYSTOSCOPY WITH HYDRODISTENSION OF BLADDER N/A 2021    Procedure: CYSTOSCOPY, WITH BLADDER HYDRODISTENSION;  Surgeon: Dequan Recio MD;  Location: [facility] OR;  Service: Urology;  Laterality: N/A;    CYSTOSCOPY WITH URETHRAL DILATION  2021    Dr Recio    EPIDURAL STEROID INJECTION N/A 10/27/2022    Procedure: Injection, Steroid, Epidural, L5/S1;  Surgeon: Belkis Brown MD;  Location: [facility];  Service: Pain Management;  Laterality: N/A;    EPIDURAL STEROID INJECTION N/A 2023    Procedure: Injection, Steroid, Epidural, L5/S1;  Surgeon: Belkis Brown MD;  Location: [facility];  Service: Pain Management;  Laterality: N/A;    HYSTERECTOMY      LUMBAR SYMPATHETIC NERVE BLOCK Left 10/05/2020    Left Sympathetic Nerve Block - Dr Brown - 10/5/20, 20, 20. 20, 10/9/19, 19 and 18    right lumbar sympathetic nerve block Right 2020    X4 DR BROWN    THYROIDECTOMY             Review of patient's allergies indicates:   Allergen Reactions    Lipitor [atorvastatin] Other (See Comments)     Muscle aching    Pravachol [pravastatin] Other (See Comments)     Muscle aching       No current facility-administered medications on file prior to encounter.     Current Outpatient Medications on File Prior to Encounter   Medication Sig    estradioL (ESTRACE) 0.01 % (0.1 mg/gram) vaginal cream Place 1 g vaginally once daily for 14 days, THEN 1 g twice a week.    gabapentin (NEURONTIN) 400 MG capsule TAKE 1 CAPSULE(400 MG) BY MOUTH EVERY 8 HOURS    glipiZIDE (GLUCOTROL) 10 MG TR24 TAKE 1 TABLET(10 MG) BY MOUTH TWICE DAILY    HYDROcodone-acetaminophen (NORCO)  mg per tablet Take 1 tablet by mouth every 6 (six) hours as needed for Pain (pain).    lactulose (CHRONULAC) 10 gram/15 mL solution TAKE 30 ML(20 GRAMS TOTAL) BY MOUTH TWICE DAILY (Patient taking differently: Take 20 g by mouth 2 (two)  times daily as needed.)    levothyroxine (SYNTHROID) 75 MCG tablet Take 1 tablet (75 mcg total) by mouth before breakfast.    olmesartan (BENICAR) 40 MG tablet Take 1 tablet (40 mg total) by mouth once daily. (Patient taking differently: Take 20 mg by mouth once daily.)    oxybutynin (DITROPAN-XL) 10 MG 24 hr tablet Take one tablet at night (Patient taking differently: 10 mg every evening. Take one tablet at night)    blood sugar diagnostic (ONETOUCH VERIO TEST STRIPS) Strp 1 each by Misc.(Non-Drug; Combo Route) route 2 (two) times a day.    blood-glucose meter kit Use as instructed    blood-glucose sensor (NexthinkSTYLE SIMBA 3 SENSOR) Lizzie 1 each by Misc.(Non-Drug; Combo Route) route Daily.    flash glucose scanning reader (FREESTYLE SIMBA 2 READER) Misc 1 each by Misc.(Non-Drug; Combo Route) route once daily.    ONETOUCH DELICA PLUS LANCET 33 gauge Misc Check blood sugar TID    [DISCONTINUED] blood sugar diagnostic Strp 1 strip by Misc.(Non-Drug; Combo Route) route 3 (three) times daily.    [DISCONTINUED] calcium-vitamin D (CALCIUM WITH VITAMIN D) 600 mg-10 mcg (400 unit) Tab Take 1 tablet by mouth Daily.    [DISCONTINUED] cloNIDine (CATAPRES) 0.1 MG tablet TAKE 1 TABLET BY MOUTH EVERY 4 HOURS AS NEEDED FOR BLOOD PRESSURE GREATER THAN 170/90    [DISCONTINUED] ezetimibe (ZETIA) 10 mg tablet Take 1 tablet (10 mg total) by mouth once daily.    [DISCONTINUED] lancets (ACCU-CHEK FASTCLIX LANCET DRUM [facility] [place]) Take 1 each by mouth once daily.    [DISCONTINUED] nitrofurantoin, macrocrystal-monohydrate, (MACROBID) 100 MG capsule Take 1 capsule (100 mg total) by mouth 2 (two) times daily. Take with food or milk (Patient not taking: Reported on 6/2/2025)    [DISCONTINUED] ondansetron (ZOFRAN-ODT) 4 MG TbDL Take 1 tablet (4 mg total) by mouth every 6 (six) hours as needed (nausea and vomting).     Family History       Problem Relation (Age of Onset)    Cancer Mother, Father, Sister, Brother, Brother, Brother    Diabetes Sister,  Sister    Hyperlipidemia Sister    Hypertension Mother, Sister    No Known Problems Sister, Brother          Tobacco Use    Smoking status: Never     Passive exposure: Never    Smokeless tobacco: Never   Substance and Sexual Activity    Alcohol use: Never    Drug use: Never    Sexual activity: Not Currently     Partners: Male     Review of Systems   Constitutional:  Positive for fatigue. Negative for appetite change and fever.   HENT:  Negative for congestion, hearing loss and trouble swallowing.    Respiratory:  Negative for chest tightness, shortness of breath and wheezing.    Cardiovascular:  Negative for chest pain and palpitations.   Gastrointestinal:  Positive for blood in stool. Negative for abdominal pain, constipation and nausea.   Genitourinary:  Negative for difficulty urinating and dysuria.   Musculoskeletal:  Positive for back pain. Negative for neck stiffness.   Skin:  Negative for pallor and rash.   Neurological:  Positive for dizziness. Negative for speech difficulty and headaches.   Psychiatric/Behavioral:  Negative for confusion and suicidal ideas.      Objective:     Vital Signs (Most Recent):  Temp: 97.5 °F (36.4 °C) (06/02/25 1755)  Pulse: 84 (06/02/25 1755)  Resp: 14 (06/02/25 1755)  BP: (!) 179/98 (06/02/25 1755)  SpO2: 100 % (06/02/25 1755) Vital Signs (24h Range):  Temp:  [97.5 °F (36.4 °C)-98 °F (36.7 °C)] 97.5 °F (36.4 °C)  Pulse:  [] 84  Resp:  [14-18] 14  SpO2:  [98 %-100 %] 100 %  BP: (102-179)/(61-98) 179/98     Weight: 76.2 kg (168 lb)  Body mass index is 25.54 kg/m².     Physical Exam  Vitals reviewed.   Constitutional:       General: She is awake. She is not in acute distress.     Appearance: She is well-developed. She is not toxic-appearing.   HENT:      Head: Normocephalic.      Nose: Nose normal.      Mouth/Throat:      Pharynx: Oropharynx is clear.   Eyes:      Extraocular Movements: Extraocular movements intact.      Pupils: Pupils are equal, round, and reactive to light.    Neck:      Thyroid: No thyroid mass.      Vascular: No carotid bruit.   Cardiovascular:      Rate and Rhythm: Normal rate and regular rhythm.      Pulses: Normal pulses.      Heart sounds: Normal heart sounds. No murmur heard.  Pulmonary:      Effort: Pulmonary effort is normal.      Breath sounds: Normal breath sounds and air entry. No wheezing.   Abdominal:      General: Bowel sounds are normal. There is no distension.      Palpations: Abdomen is soft.      Tenderness: There is no abdominal tenderness.   Musculoskeletal:         General: Normal range of motion.      Cervical back: Neck supple. No rigidity.   Skin:     General: Skin is warm.      Coloration: Skin is not jaundiced.      Findings: No lesion.   Neurological:      General: No focal deficit present.      Mental Status: She is alert and oriented to person, place, and time.      Cranial Nerves: No cranial nerve deficit.   Psychiatric:         Attention and Perception: Attention normal.         Mood and Affect: Mood normal.         Behavior: Behavior normal. Behavior is cooperative.         Thought Content: Thought content normal.         Cognition and Memory: Cognition normal.              CRANIAL NERVES     CN III, IV, VI   Pupils are equal, round, and reactive to light.       Significant Labs: All pertinent labs within the past 24 hours have been reviewed.  BMP:   Recent Labs   Lab 06/02/25  1511         K 4.3   *   CO2 25   BUN 21*   CREATININE 0.77   CALCIUM 9.4   MG 2.5     CBC:   Recent Labs   Lab 06/02/25  1511   WBC 7.94   HGB 10.2*   HCT 32.4*        CMP:   Recent Labs   Lab 06/02/25  1511      K 4.3   *   CO2 25      BUN 21*   CREATININE 0.77   CALCIUM 9.4   PROT 6.9   ALBUMIN 3.4   BILITOT 0.6   ALKPHOS 76   AST 19   ALT 10   ANIONGAP 12       Significant Imaging: I have reviewed all pertinent imaging results/findings within the past 24 hours.    Imaging Results              CT Abdomen Pelvis With  "IV Contrast NO Oral Contrast (In process)  Result time 06/02/25 17:51:43                     X-Ray Chest AP Portable (In process)                   Intake/Output - Last 3 Shifts         05/31 0700 06/01 0659 06/01 0700 06/02 0659 06/02 0700 06/03 0659    IV Piggyback   1000    Total Intake(mL/kg)   1000 (13.1)    Net   +1000                 Microbiology Results (last 7 days)       Procedure Component Value Units Date/Time    Urine culture [[id number]] Collected: 06/02/25 1447    Order Status: Sent Specimen: Urine Updated: 06/02/25 1529            Assessment/Plan:     Assessment & Plan  Chronic pain syndrome    Chronic low back pain followed by Dr. Brown, no nonsteroidal anti-inflammatory agents.  States takes Neurontin and Norco  Systolic hypertension  Patient's blood pressure range in the last 24 hours was: BP  Min: 102/61  Max: 179/98.The patient's inpatient anti-hypertensive regimen is listed below:  Current Antihypertensives  losartan tablet 50 mg, Daily, Oral    Plan  - BP is controlled, no changes needed to their regimen  - monitor and adjust meds as needed  Type 2 diabetes mellitus without complication  Patient's FSGs are controlled on current medication regimen.  Last A1c reviewed-   Lab Results   Component Value Date    HGBA1C 6.5 01/07/2025     Most recent fingerstick glucose reviewed- No results for input(s): "POCTGLUCOSE" in the last 24 hours.  Current correctional scale  Medium  Maintain anti-hyperglycemic dose as follows-   Antihyperglycemics (From admission, onward)      None          Hold Oral hypoglycemics while patient is in the hospital.  Hypothyroidism    Continue Synthroid  VTE Risk Mitigation (From admission, onward)           Ordered     IP VTE HIGH RISK PATIENT  Once         06/02/25 1807     Place sequential compression device  Until discontinued         06/02/25 1807                                    Ovidio Olosn MD  Department of Hospital Medicine  [facility] - Emergency " Department

## 2025-06-02 NOTE — ASSESSMENT & PLAN NOTE
Patient's blood pressure range in the last 24 hours was: BP  Min: 102/61  Max: 179/98.The patient's inpatient anti-hypertensive regimen is listed below:  Current Antihypertensives  losartan tablet 50 mg, Daily, Oral    Plan  - BP is controlled, no changes needed to their regimen  - monitor and adjust meds as needed

## 2025-06-03 ENCOUNTER — ANESTHESIA EVENT (OUTPATIENT)
Dept: GASTROENTEROLOGY | Facility: HOSPITAL | Age: 80
End: 2025-06-03
Payer: COMMERCIAL

## 2025-06-03 ENCOUNTER — ANESTHESIA (OUTPATIENT)
Dept: GASTROENTEROLOGY | Facility: HOSPITAL | Age: 80
End: 2025-06-03
Payer: COMMERCIAL

## 2025-06-03 LAB
25(OH)D3 SERPL-MCNC: 42.3 NG/ML (ref 30–80)
ANION GAP SERPL CALCULATED.3IONS-SCNC: 10 MMOL/L (ref 7–16)
BASOPHILS # BLD AUTO: 0.02 K/UL (ref 0–0.2)
BASOPHILS # BLD AUTO: 0.02 K/UL (ref 0–0.2)
BASOPHILS NFR BLD AUTO: 0.3 % (ref 0–1)
BASOPHILS NFR BLD AUTO: 0.3 % (ref 0–1)
BUN SERPL-MCNC: 14 MG/DL (ref 10–20)
BUN/CREAT SERPL: 21 (ref 6–20)
CALCIUM SERPL-MCNC: 8.1 MG/DL (ref 8.4–10.2)
CHLORIDE SERPL-SCNC: 112 MMOL/L (ref 98–107)
CO2 SERPL-SCNC: 23 MMOL/L (ref 23–31)
CREAT SERPL-MCNC: 0.67 MG/DL (ref 0.55–1.02)
DIFFERENTIAL METHOD BLD: ABNORMAL
DIFFERENTIAL METHOD BLD: ABNORMAL
EGFR (NO RACE VARIABLE) (RUSH/TITUS): 90 ML/MIN/1.73M2
EOSINOPHIL # BLD AUTO: 0.1 K/UL (ref 0–0.5)
EOSINOPHIL # BLD AUTO: 0.15 K/UL (ref 0–0.5)
EOSINOPHIL NFR BLD AUTO: 1.7 % (ref 1–4)
EOSINOPHIL NFR BLD AUTO: 2.3 % (ref 1–4)
ERYTHROCYTE [DISTWIDTH] IN BLOOD BY AUTOMATED COUNT: 15.8 % (ref 11.5–14.5)
ERYTHROCYTE [DISTWIDTH] IN BLOOD BY AUTOMATED COUNT: 15.9 % (ref 11.5–14.5)
GLUCOSE SERPL-MCNC: 106 MG/DL (ref 82–115)
GLUCOSE SERPL-MCNC: 118 MG/DL (ref 70–105)
GLUCOSE SERPL-MCNC: 121 MG/DL (ref 70–105)
GLUCOSE SERPL-MCNC: 131 MG/DL (ref 70–105)
GLUCOSE SERPL-MCNC: 159 MG/DL (ref 70–105)
HCT VFR BLD AUTO: 24.9 % (ref 38–47)
HCT VFR BLD AUTO: 26.7 % (ref 38–47)
HGB BLD-MCNC: 7.9 G/DL (ref 12–16)
HGB BLD-MCNC: 8.4 G/DL (ref 12–16)
IMM GRANULOCYTES # BLD AUTO: 0.02 K/UL (ref 0–0.04)
IMM GRANULOCYTES # BLD AUTO: 0.02 K/UL (ref 0–0.04)
IMM GRANULOCYTES NFR BLD: 0.3 % (ref 0–0.4)
IMM GRANULOCYTES NFR BLD: 0.3 % (ref 0–0.4)
LYMPHOCYTES # BLD AUTO: 1.92 K/UL (ref 1–4.8)
LYMPHOCYTES # BLD AUTO: 2.24 K/UL (ref 1–4.8)
LYMPHOCYTES NFR BLD AUTO: 32.9 % (ref 27–41)
LYMPHOCYTES NFR BLD AUTO: 34.9 % (ref 27–41)
MCH RBC QN AUTO: 26.7 PG (ref 27–31)
MCH RBC QN AUTO: 26.9 PG (ref 27–31)
MCHC RBC AUTO-ENTMCNC: 31.5 G/DL (ref 32–36)
MCHC RBC AUTO-ENTMCNC: 31.7 G/DL (ref 32–36)
MCV RBC AUTO: 84.1 FL (ref 80–96)
MCV RBC AUTO: 85.6 FL (ref 80–96)
MONOCYTES # BLD AUTO: 0.4 K/UL (ref 0–0.8)
MONOCYTES # BLD AUTO: 0.43 K/UL (ref 0–0.8)
MONOCYTES NFR BLD AUTO: 6.7 % (ref 2–6)
MONOCYTES NFR BLD AUTO: 6.9 % (ref 2–6)
MPC BLD CALC-MCNC: 10.1 FL (ref 9.4–12.4)
MPC BLD CALC-MCNC: 9.9 FL (ref 9.4–12.4)
NEUTROPHILS # BLD AUTO: 3.37 K/UL (ref 1.8–7.7)
NEUTROPHILS # BLD AUTO: 3.56 K/UL (ref 1.8–7.7)
NEUTROPHILS NFR BLD AUTO: 55.5 % (ref 53–65)
NEUTROPHILS NFR BLD AUTO: 57.9 % (ref 53–65)
NRBC # BLD AUTO: 0 X10E3/UL
NRBC # BLD AUTO: 0 X10E3/UL
NRBC, AUTO (.00): 0 %
NRBC, AUTO (.00): 0 %
PLATELET # BLD AUTO: 195 K/UL (ref 150–400)
PLATELET # BLD AUTO: 199 K/UL (ref 150–400)
POTASSIUM SERPL-SCNC: 3.6 MMOL/L (ref 3.5–5.1)
RBC # BLD AUTO: 2.96 M/UL (ref 4.2–5.4)
RBC # BLD AUTO: 3.12 M/UL (ref 4.2–5.4)
SODIUM SERPL-SCNC: 141 MMOL/L (ref 136–145)
WBC # BLD AUTO: 5.83 K/UL (ref 4.5–11)
WBC # BLD AUTO: 6.42 K/UL (ref 4.5–11)

## 2025-06-03 PROCEDURE — 82962 GLUCOSE BLOOD TEST: CPT

## 2025-06-03 PROCEDURE — 88305 TISSUE EXAM BY PATHOLOGIST: CPT | Mod: 26,,, | Performed by: PATHOLOGY

## 2025-06-03 PROCEDURE — 88342 IMHCHEM/IMCYTCHM 1ST ANTB: CPT | Mod: 26,,, | Performed by: PATHOLOGY

## 2025-06-03 PROCEDURE — 82306 VITAMIN D 25 HYDROXY: CPT | Performed by: HOSPITALIST

## 2025-06-03 PROCEDURE — 80048 BASIC METABOLIC PNL TOTAL CA: CPT | Performed by: HOSPITALIST

## 2025-06-03 PROCEDURE — 25000003 PHARM REV CODE 250: Performed by: HOSPITALIST

## 2025-06-03 PROCEDURE — 63600175 PHARM REV CODE 636 W HCPCS: Performed by: HOSPITALIST

## 2025-06-03 PROCEDURE — 0DB68ZX EXCISION OF STOMACH, VIA NATURAL OR ARTIFICIAL OPENING ENDOSCOPIC, DIAGNOSTIC: ICD-10-PCS | Performed by: INTERNAL MEDICINE

## 2025-06-03 PROCEDURE — 43270 EGD LESION ABLATION: CPT | Mod: ,,, | Performed by: INTERNAL MEDICINE

## 2025-06-03 PROCEDURE — 63600175 PHARM REV CODE 636 W HCPCS: Performed by: NURSE ANESTHETIST, CERTIFIED REGISTERED

## 2025-06-03 PROCEDURE — 85025 COMPLETE CBC W/AUTO DIFF WBC: CPT | Performed by: HOSPITALIST

## 2025-06-03 PROCEDURE — 99233 SBSQ HOSP IP/OBS HIGH 50: CPT | Mod: ,,, | Performed by: HOSPITALIST

## 2025-06-03 PROCEDURE — 88342 IMHCHEM/IMCYTCHM 1ST ANTB: CPT | Mod: TC,SUR | Performed by: INTERNAL MEDICINE

## 2025-06-03 PROCEDURE — 27000284 HC CANNULA NASAL: Performed by: NURSE ANESTHETIST, CERTIFIED REGISTERED

## 2025-06-03 PROCEDURE — 27000716 HC OXISENSOR PROBE, ANY SIZE: Performed by: NURSE ANESTHETIST, CERTIFIED REGISTERED

## 2025-06-03 PROCEDURE — 27000207 HC ISOLATION

## 2025-06-03 PROCEDURE — 25000003 PHARM REV CODE 250: Performed by: INTERNAL MEDICINE

## 2025-06-03 PROCEDURE — 43270 EGD LESION ABLATION: CPT | Performed by: INTERNAL MEDICINE

## 2025-06-03 PROCEDURE — 36415 COLL VENOUS BLD VENIPUNCTURE: CPT | Performed by: HOSPITALIST

## 2025-06-03 PROCEDURE — 0D598ZZ DESTRUCTION OF DUODENUM, VIA NATURAL OR ARTIFICIAL OPENING ENDOSCOPIC: ICD-10-PCS | Performed by: INTERNAL MEDICINE

## 2025-06-03 PROCEDURE — 11000001 HC ACUTE MED/SURG PRIVATE ROOM

## 2025-06-03 RX ORDER — PHENAZOPYRIDINE HYDROCHLORIDE 100 MG/1
100 TABLET, FILM COATED ORAL
Status: DISCONTINUED | OUTPATIENT
Start: 2025-06-03 | End: 2025-06-06 | Stop reason: HOSPADM

## 2025-06-03 RX ORDER — LIDOCAINE HYDROCHLORIDE 20 MG/ML
INJECTION, SOLUTION EPIDURAL; INFILTRATION; INTRACAUDAL; PERINEURAL
Status: DISCONTINUED | OUTPATIENT
Start: 2025-06-03 | End: 2025-06-03

## 2025-06-03 RX ORDER — PROPOFOL 10 MG/ML
VIAL (ML) INTRAVENOUS
Status: DISCONTINUED | OUTPATIENT
Start: 2025-06-03 | End: 2025-06-03

## 2025-06-03 RX ADMIN — HYDROCODONE BITARTRATE AND ACETAMINOPHEN 1 TABLET: 10; 325 TABLET ORAL at 08:06

## 2025-06-03 RX ADMIN — SODIUM CHLORIDE, POTASSIUM CHLORIDE, SODIUM LACTATE AND CALCIUM CHLORIDE: 600; 310; 30; 20 INJECTION, SOLUTION INTRAVENOUS at 02:06

## 2025-06-03 RX ADMIN — PROPOFOL 100 MG: 10 INJECTION, EMULSION INTRAVENOUS at 02:06

## 2025-06-03 RX ADMIN — SODIUM CHLORIDE, POTASSIUM CHLORIDE, SODIUM LACTATE AND CALCIUM CHLORIDE: 600; 310; 30; 20 INJECTION, SOLUTION INTRAVENOUS at 11:06

## 2025-06-03 RX ADMIN — ONDANSETRON 4 MG: 2 INJECTION INTRAMUSCULAR; INTRAVENOUS at 09:06

## 2025-06-03 RX ADMIN — HYDROCODONE BITARTRATE AND ACETAMINOPHEN 1 TABLET: 10; 325 TABLET ORAL at 02:06

## 2025-06-03 RX ADMIN — PANTOPRAZOLE SODIUM 40 MG: 40 INJECTION, POWDER, FOR SOLUTION INTRAVENOUS at 08:06

## 2025-06-03 RX ADMIN — MORPHINE SULFATE 4 MG: 4 INJECTION INTRAVENOUS at 09:06

## 2025-06-03 RX ADMIN — ONDANSETRON 4 MG: 2 INJECTION INTRAMUSCULAR; INTRAVENOUS at 10:06

## 2025-06-03 RX ADMIN — PHENAZOPYRIDINE 100 MG: 100 TABLET ORAL at 08:06

## 2025-06-03 RX ADMIN — PROPOFOL 50 MG: 10 INJECTION, EMULSION INTRAVENOUS at 02:06

## 2025-06-03 RX ADMIN — POLYETHYLENE GLYCOL-3350 AND ELECTROLYTES WITH FLAVOR PACK 4000 ML: 240; 5.84; 2.98; 6.72; 22.72 POWDER, FOR SOLUTION ORAL at 04:06

## 2025-06-03 RX ADMIN — GABAPENTIN 400 MG: 400 CAPSULE ORAL at 08:06

## 2025-06-03 RX ADMIN — LIDOCAINE HYDROCHLORIDE 100 MG: 20 INJECTION, SOLUTION EPIDURAL; INFILTRATION; INTRACAUDAL; PERINEURAL at 02:06

## 2025-06-03 RX ADMIN — MORPHINE SULFATE 4 MG: 4 INJECTION INTRAVENOUS at 06:06

## 2025-06-03 NOTE — PLAN OF CARE
Problem: Gastrointestinal Bleeding  Goal: Optimal Coping with Acute Illness  Outcome: Progressing  Goal: Hemostasis  Outcome: Progressing     Problem: Adult Inpatient Plan of Care  Goal: Plan of Care Review  Outcome: Progressing  Goal: Patient-Specific Goal (Individualized)  Outcome: Progressing  Goal: Absence of Hospital-Acquired Illness or Injury  Outcome: Progressing  Goal: Optimal Comfort and Wellbeing  Outcome: Progressing  Goal: Readiness for Transition of Care  Outcome: Progressing     Problem: Diabetes Comorbidity  Goal: Blood Glucose Level Within Targeted Range  Outcome: Progressing     Problem: Infection  Goal: Absence of Infection Signs and Symptoms  Outcome: Progressing     Problem: Fall Injury Risk  Goal: Absence of Fall and Fall-Related Injury  Outcome: Progressing

## 2025-06-03 NOTE — PLAN OF CARE
[facility]  Initial Discharge Assessment       Primary Care Provider: Mateo Chen MD    Admission Diagnosis: GI bleeding [K92.2]    Admission Date: 6/2/2025  Expected Discharge Date:          Payor: WELL[employer] / Plan: [employer] MEDICARE HMO / Product Type: Medicare Advantage /     Extended Emergency Contact Information  Primary Emergency Contact: Andrea De Jesus  Mobile Phone: [phone]  Relation: Son  Preferred language: English   needed? No  Secondary Emergency Contact: YUNG MOISE  Mobile Phone: [phone]  Relation: Daughter  Preferred language: English   needed? No    Discharge Plan A: (P) Home Health, Home  Discharge Plan B: (P) Home, Home Health, Long-term acute care facility (LTAC), Rehab, Skilled Nursing Facility      [id number] [place] [facility] [address] AT [facility] [address]  [address]  [place] [address]  Phone: [phone] Fax: [phone]      Initial Assessment (most recent)       Adult Discharge Assessment - 06/03/25 1021          Discharge Assessment    Assessment Type Discharge Planning Assessment     Source of Information patient     People in Home alone     Do you expect to return to your current living situation? Yes     Do you have help at home or someone to help you manage your care at home? Yes     Who are your caregiver(s) and their phone number(s)? maximilian Lord,   [phone] (P)      Prior to hospitilization cognitive status: Unable to Assess (P)      Current cognitive status: Alert/Oriented (P)      Walking or Climbing Stairs Difficulty no (P)      Dressing/Bathing Difficulty no (P)      Home Accessibility wheelchair accessible (P)      Home Layout Able to live on 1st floor (P)      Equipment Currently Used at Home none (P)      Readmission within 30 days? No (P)      Patient currently being followed by outpatient case management? No (P)      Do you currently have service(s) that  help you manage your care at home? No (P)      Do you take prescription medications? Yes (P)      Do you have prescription coverage? Yes (P)      Do you have any problems affording any of your prescribed medications? No (P)      Is the patient taking medications as prescribed? yes (P)      Who is going to help you get home at discharge? Risa, daughter, [phone] (P)      How do you get to doctors appointments? car, drives self (P)      Are you on dialysis? No (P)      Discharge Plan A Home Health;Home (P)      Discharge Plan B Home;Home Health;Long-term acute care facility (LTAC);Rehab;Skilled Nursing Facility (P)      DME Needed Upon Discharge  none (P)      Discharge Plan discussed with: Patient (P)                  Cm spoke with patient at bedside. Pt lives alone and plans on returning when medically ready for discharge. Pt not current with home health and does not require DME. Verbal IM consent obtained due to patient being on contact precautions. CM following for anticipated discharge needs.

## 2025-06-03 NOTE — PROGRESS NOTES
[facility]  Wound Care    Patient Name:  Lorraine De Jesus   MRN:  [id number]  Date: 6/3/2025  Diagnosis: <principal problem not specified>    History:     Past Medical History:   Diagnosis Date    Adenomatous polyp of ascending colon 6/22/2021    Adenomatous polyp of descending colon 6/22/2021    Age related osteoporosis 10/28/2020    Benign paroxysmal vertigo     Chronic pain syndrome     Chronic pelvic pain in female 7/28/2021    Ditropan XL 5mg    Colon, diverticulosis 6/22/2021    Depressive disorder 08/12/2019    External hemorrhoid 6/22/2021    Gastrointestinal hemorrhage associated with anorectal source 6/22/2021    GERD (gastroesophageal reflux disease) 11/21/2013    Hyperlipidemia 01/16/2012    Hypothyroidism 02/25/2019    Joint pain     Nonrheumatic tricuspid (valve) insufficiency 03/18/2018    Personal history UTI 7/28/2021    Controlled on Hiprex    Polyneuropathy 07/11/2018    PVD (peripheral vascular disease) 10/30/2018    Temporal arteritis     Type 2 diabetes mellitus 04/02/2014    Urethral meatal stenosis 7/11/2018    history of known urethral stenosis, and was taught to perform monthly self dilation at home.       Social History[1]    Precautions:     Allergies as of 06/02/2025 - Reviewed 06/02/2025   Allergen Reaction Noted    Lipitor [atorvastatin] Other (See Comments) 03/23/2021    Pravachol [pravastatin] Other (See Comments) 03/23/2021       [facility] Assessment Details/Treatment     Narrative: Seen patient for initiation of preventative skin care measures    Patient up in bed, Alert. Family present. States skin is okay. C/o itching to lower legs. Instructed to let her nurse know.   Mino score 20    Consult wound care for any skin issues     06/03/2025         [1]   Social History  Socioeconomic History    Marital status:    Tobacco Use    Smoking status: Never     Passive exposure: Never    Smokeless tobacco: Never   Substance and Sexual Activity    Alcohol use: Never     Drug use: Never    Sexual activity: Not Currently     Partners: Male     Social Drivers of Health     Financial Resource Strain: Low Risk  (6/2/2025)    Overall Financial Resource Strain (CARDIA)     Difficulty of Paying Living Expenses: Not hard at all   Food Insecurity: No Food Insecurity (6/2/2025)    Hunger Vital Sign     Worried About Running Out of Food in the Last Year: Never true     Ran Out of Food in the Last Year: Never true   Transportation Needs: No Transportation Needs (6/2/2025)    PRAPARE - Transportation     Lack of Transportation (Medical): No     Lack of Transportation (Non-Medical): No   Physical Activity: Sufficiently Active (9/23/2024)    Exercise Vital Sign     Days of Exercise per Week: 3 days     Minutes of Exercise per Session: 60 min   Stress: No Stress Concern Present (6/2/2025)    [employer] - Occupational Stress Questionnaire     Feeling of Stress : Not at all   Housing Stability: Low Risk  (6/2/2025)    Housing Stability Vital Sign     Unable to Pay for Housing in the Last Year: No     Homeless in the Last Year: No

## 2025-06-03 NOTE — PLAN OF CARE
Problem: Diabetes Comorbidity  Goal: Blood Glucose Level Within Targeted Range  Outcome: Progressing     Problem: Infection  Goal: Absence of Infection Signs and Symptoms  Outcome: Progressing     Problem: Fall Injury Risk  Goal: Absence of Fall and Fall-Related Injury  Outcome: Progressing

## 2025-06-03 NOTE — NURSING
Patient laying in the bed awake alert resp even and unlabored skin warm dry no skin breakdown noted. Patient able to voice needs and concerns. Assisted patient to stand to bathroom dizziness and unstable gait noted. Remain at arm length while ambulate. Informed with understanding not to get up or ambulate without staff assistance. Lines in place. Also informed the patient to remain NPO until scheduled procedure by the doctor. Provide care and safety measures. Call bell personal items in reach. Bed low side rails up.    1020 Patient laying in the bed resting daughter at the bedside. Resp even and unlabored needs are met safety measures in place.     1620 Patient arrived back from scheduled procedure awake alert resp even and unlabored skin warm dry. Needs are met no signs of discomfort noted provide care. Informed patient of new medication and continued care and treatment. Provide patient with clear liquid diet. Call bell personal items in reach.

## 2025-06-03 NOTE — DISCHARGE INSTRUCTIONS
Procedure Date  6/3/25     Impression  Overall Impression:   The esophagus appeared normal.  The stomach appeared normal.  Performed forceps biopsies in the stomach to rule out H. 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; tissue was ablated with argon plasma coagulation        Recommendation  - No stigmata of recent blood loss  - Angioectasia seen in the duodenum which was ablated with APC  - Colonoscopy tomorrow  - Clear liquid diet  - Golytely to start this evening           Indication  Hematochezia       Procedure Date  6/4/25     Impression  Overall Impression:   Skin tag  Hemorrhoids  Extensive diverticulosis of severe severity in the ascending colon, transverse colon, descending colon and sigmoid colon  Old blood and hematin present throughout but mainly in descending and sigmoid colon        Recommendation  Repeat colonoscopy in 5 years         - Old blood, hematin and clots present throughout; Bleeding is most likely diverticular in nature  - Recommend fiber supplementation and miralax daily  - If re-bleeds then consider CTA Abd/Pelvis vs. Tagged RBC study  - Transfusion as needed  - Advance diet as tolerated  - Continue present medications

## 2025-06-04 ENCOUNTER — ANESTHESIA (OUTPATIENT)
Dept: GASTROENTEROLOGY | Facility: HOSPITAL | Age: 80
End: 2025-06-04
Payer: COMMERCIAL

## 2025-06-04 ENCOUNTER — RESULTS FOLLOW-UP (OUTPATIENT)
Dept: FAMILY MEDICINE | Facility: CLINIC | Age: 80
End: 2025-06-04
Payer: COMMERCIAL

## 2025-06-04 ENCOUNTER — ANESTHESIA EVENT (OUTPATIENT)
Dept: GASTROENTEROLOGY | Facility: HOSPITAL | Age: 80
End: 2025-06-04
Payer: COMMERCIAL

## 2025-06-04 PROBLEM — D50.0 ANEMIA DUE TO GI BLOOD LOSS: Status: ACTIVE | Noted: 2025-06-04

## 2025-06-04 LAB
BASOPHILS # BLD AUTO: 0.02 K/UL (ref 0–0.2)
BASOPHILS NFR BLD AUTO: 0.4 % (ref 0–1)
DIFFERENTIAL METHOD BLD: ABNORMAL
EOSINOPHIL # BLD AUTO: 0.13 K/UL (ref 0–0.5)
EOSINOPHIL NFR BLD AUTO: 2.3 % (ref 1–4)
ERYTHROCYTE [DISTWIDTH] IN BLOOD BY AUTOMATED COUNT: 15.8 % (ref 11.5–14.5)
ESTROGEN SERPL-MCNC: NORMAL PG/ML
GLUCOSE SERPL-MCNC: 127 MG/DL (ref 70–105)
GLUCOSE SERPL-MCNC: 159 MG/DL (ref 70–105)
GLUCOSE SERPL-MCNC: 159 MG/DL (ref 70–105)
HCT VFR BLD AUTO: 24.6 % (ref 38–47)
HGB BLD-MCNC: 7.7 G/DL (ref 12–16)
IMM GRANULOCYTES # BLD AUTO: 0.02 K/UL (ref 0–0.04)
IMM GRANULOCYTES NFR BLD: 0.4 % (ref 0–0.4)
INSULIN SERPL-ACNC: NORMAL U[IU]/ML
LAB AP GROSS DESCRIPTION: NORMAL
LAB AP LABORATORY NOTES: NORMAL
LYMPHOCYTES # BLD AUTO: 1.67 K/UL (ref 1–4.8)
LYMPHOCYTES NFR BLD AUTO: 29.3 % (ref 27–41)
MCH RBC QN AUTO: 26.4 PG (ref 27–31)
MCHC RBC AUTO-ENTMCNC: 31.3 G/DL (ref 32–36)
MCV RBC AUTO: 84.2 FL (ref 80–96)
MONOCYTES # BLD AUTO: 0.43 K/UL (ref 0–0.8)
MONOCYTES NFR BLD AUTO: 7.5 % (ref 2–6)
MPC BLD CALC-MCNC: 10.4 FL (ref 9.4–12.4)
NEUTROPHILS # BLD AUTO: 3.43 K/UL (ref 1.8–7.7)
NEUTROPHILS NFR BLD AUTO: 60.1 % (ref 53–65)
NRBC # BLD AUTO: 0 X10E3/UL
NRBC, AUTO (.00): 0 %
PLATELET # BLD AUTO: 207 K/UL (ref 150–400)
RBC # BLD AUTO: 2.92 M/UL (ref 4.2–5.4)
T3RU NFR SERPL: NORMAL %
UA COMPLETE W REFLEX CULTURE PNL UR: NORMAL
WBC # BLD AUTO: 5.7 K/UL (ref 4.5–11)

## 2025-06-04 PROCEDURE — 99233 SBSQ HOSP IP/OBS HIGH 50: CPT | Mod: ,,, | Performed by: HOSPITALIST

## 2025-06-04 PROCEDURE — 45378 DIAGNOSTIC COLONOSCOPY: CPT | Performed by: HOSPITALIST

## 2025-06-04 PROCEDURE — 25000003 PHARM REV CODE 250: Performed by: NURSE ANESTHETIST, CERTIFIED REGISTERED

## 2025-06-04 PROCEDURE — 63600175 PHARM REV CODE 636 W HCPCS: Performed by: NURSE ANESTHETIST, CERTIFIED REGISTERED

## 2025-06-04 PROCEDURE — 82962 GLUCOSE BLOOD TEST: CPT

## 2025-06-04 PROCEDURE — 85025 COMPLETE CBC W/AUTO DIFF WBC: CPT | Performed by: HOSPITALIST

## 2025-06-04 PROCEDURE — 99223 1ST HOSP IP/OBS HIGH 75: CPT | Mod: 25,,, | Performed by: INTERNAL MEDICINE

## 2025-06-04 PROCEDURE — 25000003 PHARM REV CODE 250: Performed by: HOSPITALIST

## 2025-06-04 PROCEDURE — 27000207 HC ISOLATION

## 2025-06-04 PROCEDURE — 63600175 PHARM REV CODE 636 W HCPCS: Performed by: HOSPITALIST

## 2025-06-04 PROCEDURE — 45378 DIAGNOSTIC COLONOSCOPY: CPT | Mod: ,,, | Performed by: HOSPITALIST

## 2025-06-04 PROCEDURE — 36415 COLL VENOUS BLD VENIPUNCTURE: CPT | Performed by: HOSPITALIST

## 2025-06-04 PROCEDURE — 0DJD8ZZ INSPECTION OF LOWER INTESTINAL TRACT, VIA NATURAL OR ARTIFICIAL OPENING ENDOSCOPIC: ICD-10-PCS | Performed by: INTERNAL MEDICINE

## 2025-06-04 PROCEDURE — 25000003 PHARM REV CODE 250: Performed by: INTERNAL MEDICINE

## 2025-06-04 PROCEDURE — 11000001 HC ACUTE MED/SURG PRIVATE ROOM

## 2025-06-04 RX ORDER — PANTOPRAZOLE SODIUM 40 MG/1
40 TABLET, DELAYED RELEASE ORAL DAILY
Status: DISCONTINUED | OUTPATIENT
Start: 2025-06-05 | End: 2025-06-04

## 2025-06-04 RX ORDER — PROPOFOL 10 MG/ML
VIAL (ML) INTRAVENOUS
Status: DISCONTINUED | OUTPATIENT
Start: 2025-06-04 | End: 2025-06-04

## 2025-06-04 RX ORDER — LIDOCAINE HYDROCHLORIDE 20 MG/ML
INJECTION, SOLUTION EPIDURAL; INFILTRATION; INTRACAUDAL; PERINEURAL
Status: DISCONTINUED | OUTPATIENT
Start: 2025-06-04 | End: 2025-06-04

## 2025-06-04 RX ORDER — PANTOPRAZOLE SODIUM 40 MG/1
40 TABLET, DELAYED RELEASE ORAL DAILY
Status: DISCONTINUED | OUTPATIENT
Start: 2025-06-04 | End: 2025-06-06 | Stop reason: HOSPADM

## 2025-06-04 RX ADMIN — INSULIN ASPART 1 UNITS: 100 INJECTION, SOLUTION INTRAVENOUS; SUBCUTANEOUS at 08:06

## 2025-06-04 RX ADMIN — PHENAZOPYRIDINE 100 MG: 100 TABLET ORAL at 05:06

## 2025-06-04 RX ADMIN — PROPOFOL 50 MG: 10 INJECTION, EMULSION INTRAVENOUS at 11:06

## 2025-06-04 RX ADMIN — SODIUM CHLORIDE, POTASSIUM CHLORIDE, SODIUM LACTATE AND CALCIUM CHLORIDE: 600; 310; 30; 20 INJECTION, SOLUTION INTRAVENOUS at 06:06

## 2025-06-04 RX ADMIN — PANTOPRAZOLE SODIUM 40 MG: 40 TABLET, DELAYED RELEASE ORAL at 08:06

## 2025-06-04 RX ADMIN — PROPOFOL 100 MG: 10 INJECTION, EMULSION INTRAVENOUS at 10:06

## 2025-06-04 RX ADMIN — PROPOFOL 75 MG: 10 INJECTION, EMULSION INTRAVENOUS at 11:06

## 2025-06-04 RX ADMIN — TRAZODONE HYDROCHLORIDE 25 MG: 50 TABLET ORAL at 10:06

## 2025-06-04 RX ADMIN — HYDROCODONE BITARTRATE AND ACETAMINOPHEN 1 TABLET: 10; 325 TABLET ORAL at 08:06

## 2025-06-04 RX ADMIN — LIDOCAINE HYDROCHLORIDE 80 MG: 20 INJECTION, SOLUTION EPIDURAL; INFILTRATION; INTRACAUDAL; PERINEURAL at 10:06

## 2025-06-04 RX ADMIN — SODIUM CHLORIDE: 9 INJECTION, SOLUTION INTRAVENOUS at 10:06

## 2025-06-04 RX ADMIN — GABAPENTIN 400 MG: 400 CAPSULE ORAL at 08:06

## 2025-06-04 NOTE — PLAN OF CARE
Problem: Gastrointestinal Bleeding  Goal: Optimal Coping with Acute Illness  Outcome: Progressing  Goal: Hemostasis  Outcome: Progressing     Problem: Adult Inpatient Plan of Care  Goal: Plan of Care Review  Outcome: Progressing  Goal: Patient-Specific Goal (Individualized)  Outcome: Progressing  Goal: Absence of Hospital-Acquired Illness or Injury  Outcome: Progressing  Goal: Optimal Comfort and Wellbeing  Outcome: Progressing  Goal: Readiness for Transition of Care  Outcome: Progressing     Problem: Diabetes Comorbidity  Goal: Blood Glucose Level Within Targeted Range  Outcome: Progressing     Problem: Infection  Goal: Absence of Infection Signs and Symptoms  Outcome: Progressing

## 2025-06-04 NOTE — CONSULTS
CC: gi bleeding    HPI 80 y.o. female presented with complaints of acute onset bright red bleeding that started morning of admission associated with dizziness and mild abdominal discomfort. She states that she started having dark clots mixed with dark red blood. No NSAIDs, no anticoagulation. No prior history of GI bleeding. In January this year had colonoscopy by Dr. Gonzalez and had colon polyp removed from sigmoid colon. Path TA. Diverticulosis noted in AC, DC, SC. Recommended 5 year f/u exam. CT A/P with diverticulosis of colon, otherwise no focal significant GI bleeding. H/H 10.2/32.4. on admission, declined to 7.9/24.9.     Medical records reviewed. Additional history supplemented by nursing.     EGD performed without any findings of blood loss. Angioectasia noted in duodenum which was ablated with APC.       Past Medical History:   Diagnosis Date    Adenomatous polyp of ascending colon 6/22/2021    Adenomatous polyp of descending colon 6/22/2021    Age related osteoporosis 10/28/2020    Benign paroxysmal vertigo     Chronic pain syndrome     Chronic pelvic pain in female 7/28/2021    Ditropan XL 5mg    Colon, diverticulosis 6/22/2021    Depressive disorder 08/12/2019    External hemorrhoid 6/22/2021    Gastrointestinal hemorrhage associated with anorectal source 6/22/2021    GERD (gastroesophageal reflux disease) 11/21/2013    Hyperlipidemia 01/16/2012    Hypothyroidism 02/25/2019    Joint pain     Nonrheumatic tricuspid (valve) insufficiency 03/18/2018    Personal history UTI 7/28/2021    Controlled on Hiprex    Polyneuropathy 07/11/2018    PVD (peripheral vascular disease) 10/30/2018    Temporal arteritis     Type 2 diabetes mellitus 04/02/2014    Urethral meatal stenosis 7/11/2018    history of known urethral stenosis, and was taught to perform monthly self dilation at home.     Past Surgical History:   Procedure Laterality Date     left lumbar sympathetic nerve block Left 2020    X3  DR LANDEROS    CARDIAC  "CATHETERIZATION  10/14/2009     SECTION      x 2    COLONOSCOPY  2009    CYSTOSCOPY WITH HYDRODISTENSION OF BLADDER N/A 2021    Procedure: CYSTOSCOPY, WITH BLADDER HYDRODISTENSION;  Surgeon: Dequan Recio MD;  Location: [facility] OR;  Service: Urology;  Laterality: N/A;    CYSTOSCOPY WITH URETHRAL DILATION  2021    Dr Recio    EPIDURAL STEROID INJECTION N/A 10/27/2022    Procedure: Injection, Steroid, Epidural, L5/S1;  Surgeon: Belkis Brown MD;  [facility];  Service: Pain Management;  Laterality: N/A;    EPIDURAL STEROID INJECTION N/A 2023    Procedure: Injection, Steroid, Epidural, L5/S1;  Surgeon: Belkis Brown MD;  [facility];  Service: Pain Management;  Laterality: N/A;    HYSTERECTOMY      LUMBAR SYMPATHETIC NERVE BLOCK Left 10/05/2020    Left Sympathetic Nerve Block - Dr Brown - 10/5/20, 20, 20. 20, 10/9/19, 19 and 18    right lumbar sympathetic nerve block Right 2020    X4 DR BROWN    THYROIDECTOMY           Social History  Social History     Tobacco Use    Smoking status: Never     Passive exposure: Never    Smokeless tobacco: Never   Substance Use Topics    Alcohol use: Never    Drug use: Never     Family History   Problem Relation Name Age of Onset    Cancer Mother      Hypertension Mother      Cancer Father      Cancer Sister      Diabetes Sister      Hyperlipidemia Sister      Hypertension Sister      Diabetes Sister      No Known Problems Sister      Cancer Brother      Cancer Brother      No Known Problems Brother      Cancer Brother       Physical Examination  BP (!) 141/71   Pulse 77   Temp 98 °F (36.7 °C) (Oral)   Resp 18   Ht 5' 8" (1.727 m)   Wt 74.8 kg (165 lb)   LMP  (LMP Unknown)   SpO2 98%   Breastfeeding No   BMI 25.09 kg/m²   General appearance: alert, cooperative, no distress  HENT: Normocephalic, atraumatic, neck symmetrical, no nasal discharge   Eyes: " conjunctivae/corneas clear, PERRL, EOM's intact  Lungs: no labored breathing, symmetric chest wall expansion bilaterally  Heart: regular rate and rhythm without rub; no displacement of the PMI   Abdomen: soft, non-tender; bowel sounds normoactive; no organomegaly  Extremities: extremities symmetric; no clubbing, cyanosis, or edema  Integument: Skin color, texture, turgor normal; no rashes; hair distrubution normal  Neurologic: Alert and oriented X 3, normal strength, normal coordination and gait  Psychiatric: no pressured speech; normal affect; no evidence of impaired cognition     Labs:  Lab Results   Component Value Date    WBC 5.70 06/04/2025    HGB 7.7 (L) 06/04/2025    HCT 24.6 (L) 06/04/2025    MCV 84.2 06/04/2025     06/04/2025     CMP  Sodium   Date Value Ref Range Status   06/03/2025 141 136 - 145 mmol/L Final     Potassium   Date Value Ref Range Status   06/03/2025 3.6 3.5 - 5.1 mmol/L Final     Chloride   Date Value Ref Range Status   06/03/2025 112 (H) 98 - 107 mmol/L Final     CO2   Date Value Ref Range Status   06/03/2025 23 23 - 31 mmol/L Final     Glucose   Date Value Ref Range Status   06/03/2025 106 82 - 115 mg/dL Final     BUN   Date Value Ref Range Status   06/03/2025 14 10 - 20 mg/dL Final     Creatinine   Date Value Ref Range Status   06/03/2025 0.67 0.55 - 1.02 mg/dL Final     Calcium   Date Value Ref Range Status   06/03/2025 8.1 (L) 8.4 - 10.2 mg/dL Final     Total Protein   Date Value Ref Range Status   06/02/2025 6.9 5.8 - 7.6 g/dL Final     Albumin   Date Value Ref Range Status   06/02/2025 3.4 3.4 - 4.8 g/dL Final     Bilirubin, Total   Date Value Ref Range Status   06/02/2025 0.6 <=1.5 mg/dL Final     Alk Phos   Date Value Ref Range Status   06/02/2025 76 40 - 150 U/L Final     AST   Date Value Ref Range Status   06/02/2025 19 11 - 45 U/L Final     ALT   Date Value Ref Range Status   06/02/2025 10 <=55 U/L Final     Anion Gap   Date Value Ref Range Status   06/03/2025 10 7 - 16  mmol/L Final     eGFR    Date Value Ref Range Status   12/06/2021 89 >=60 mL/min/1.73m² Final     eGFR   Date Value Ref Range Status   06/25/2022 79 >=60 mL/min/1.73m² Final     Imaging:  CT Abdomen Pelvis With IV Contrast NO Oral Contrast   Final Result      1. Nonobstructing nephrolithiasis of the left kidney.   2. Probable minimal hydronephrosis on the right.  No obstruction is detected.  Recommend surveillance.   3. Diverticulosis of the colon.   4. Small subcentimeter pulmonary nodules in the right lower lobe.  See above comments.   5. Small hiatal hernia.         Electronically signed by: Markie Esqueda   Date:    06/02/2025   Time:    18:28      X-Ray Chest AP Portable   Final Result      No acute radiographic abnormality.         Electronically signed by: Markie Esqueda   Date:    06/02/2025   Time:    19:44        Independently reviewed    Assessment:   Blood per rectum  Anemia due to GI hemorrhage      Plan:   -EGD unrevealing for cause of bleeding, plan clear liquid diet and colonoscopy for further evaluation  -Golytely prep   -Transfusion for Hgb <7  -Recommendations to follow colonoscopy      Luisito Mendez MD  [facility]

## 2025-06-04 NOTE — ASSESSMENT & PLAN NOTE
"Patient's FSGs are controlled on current medication regimen.  Last A1c reviewed-   Lab Results   Component Value Date    HGBA1C 6.1 06/02/2025     Most recent fingerstick glucose reviewed- No results for input(s): "POCTGLUCOSE" in the last 24 hours.  Current correctional scale  Medium  Maintain anti-hyperglycemic dose as follows-   Antihyperglycemics (From admission, onward)      Start     Stop Route Frequency Ordered    06/02/25 1926  insulin aspart U-100 injection 0-10 Units         -- SubQ Before meals & nightly PRN 06/02/25 1826          Hold Oral hypoglycemics while patient is in the hospital.  "

## 2025-06-04 NOTE — ASSESSMENT & PLAN NOTE
Chronic low back pain followed by Dr. Brown, no nonsteroidal anti-inflammatory agents.  States takes Neurontin and Norco

## 2025-06-04 NOTE — ASSESSMENT & PLAN NOTE
Patient's blood pressure range in the last 24 hours was: BP  Min: 112/62  Max: 170/96.The patient's inpatient anti-hypertensive regimen is listed below:  Current Antihypertensives  losartan tablet 50 mg, Daily, Oral    Plan  - BP is controlled, no changes needed to their regimen  - monitor and adjust meds as needed

## 2025-06-04 NOTE — SUBJECTIVE & OBJECTIVE
Past Medical History:   Diagnosis Date    Adenomatous polyp of ascending colon 2021    Adenomatous polyp of descending colon 2021    Age related osteoporosis 10/28/2020    Benign paroxysmal vertigo     Chronic pain syndrome     Chronic pelvic pain in female 2021    Ditropan XL 5mg    Colon, diverticulosis 2021    Depressive disorder 2019    External hemorrhoid 2021    Gastrointestinal hemorrhage associated with anorectal source 2021    GERD (gastroesophageal reflux disease) 2013    Hyperlipidemia 2012    Hypothyroidism 2019    Joint pain     Nonrheumatic tricuspid (valve) insufficiency 2018    Personal history UTI 2021    Controlled on Hiprex    Polyneuropathy 2018    PVD (peripheral vascular disease) 10/30/2018    Temporal arteritis     Type 2 diabetes mellitus 2014    Urethral meatal stenosis 2018    history of known urethral stenosis, and was taught to perform monthly self dilation at home.       Past Surgical History:   Procedure Laterality Date     left lumbar sympathetic nerve block Left 2020    X3  DR BROWN    CARDIAC CATHETERIZATION  10/14/2009     SECTION      x 2    COLONOSCOPY  2009    CYSTOSCOPY WITH HYDRODISTENSION OF BLADDER N/A 2021    Procedure: CYSTOSCOPY, WITH BLADDER HYDRODISTENSION;  Surgeon: Dequan Recio MD;  Location: [facility];  Service: Urology;  Laterality: N/A;    CYSTOSCOPY WITH URETHRAL DILATION  2021    Dr Recio    EPIDURAL STEROID INJECTION N/A 10/27/2022    Procedure: Injection, Steroid, Epidural, L5/S1;  Surgeon: Belkis Brown MD;  Location: [facility];  Service: Pain Management;  Laterality: N/A;    EPIDURAL STEROID INJECTION N/A 2023    Procedure: Injection, Steroid, Epidural, L5/S1;  Surgeon: Belkis Brown MD;  Location: [facility];  Service: Pain Management;  Laterality: N/A;    HYSTERECTOMY      LUMBAR SYMPATHETIC NERVE BLOCK Left  10/05/2020    Left Sympathetic Nerve Block - Dr Brown - 10/5/20, 9/21/20, 1/20/20. 1/6/20, 10/9/19, 9/25/19 and 7/11/18    right lumbar sympathetic nerve block Right 2020    X4 DR BROWN    THYROIDECTOMY      1990's       Review of patient's allergies indicates:   Allergen Reactions    Lipitor [atorvastatin] Other (See Comments)     Muscle aching    Pravachol [pravastatin] Other (See Comments)     Muscle aching       No current facility-administered medications on file prior to encounter.     Current Outpatient Medications on File Prior to Encounter   Medication Sig    estradioL (ESTRACE) 0.01 % (0.1 mg/gram) vaginal cream Place 1 g vaginally once daily for 14 days, THEN 1 g twice a week.    gabapentin (NEURONTIN) 400 MG capsule TAKE 1 CAPSULE(400 MG) BY MOUTH EVERY 8 HOURS    glipiZIDE (GLUCOTROL) 10 MG TR24 TAKE 1 TABLET(10 MG) BY MOUTH TWICE DAILY    HYDROcodone-acetaminophen (NORCO)  mg per tablet Take 1 tablet by mouth every 6 (six) hours as needed for Pain (pain).    lactulose (CHRONULAC) 10 gram/15 mL solution TAKE 30 ML(20 GRAMS TOTAL) BY MOUTH TWICE DAILY (Patient taking differently: Take 20 g by mouth 2 (two) times daily as needed.)    levothyroxine (SYNTHROID) 75 MCG tablet Take 1 tablet (75 mcg total) by mouth before breakfast.    olmesartan (BENICAR) 40 MG tablet Take 1 tablet (40 mg total) by mouth once daily. (Patient taking differently: Take 20 mg by mouth once daily.)    oxybutynin (DITROPAN-XL) 10 MG 24 hr tablet Take one tablet at night (Patient taking differently: 10 mg every evening. Take one tablet at night)    blood sugar diagnostic (ONETOUCH VERIO TEST STRIPS) Strp 1 each by Misc.(Non-Drug; Combo Route) route 2 (two) times a day.    blood-glucose meter kit Use as instructed    blood-glucose sensor (FREESTYLE SIMBA 3 SENSOR) Lizzie 1 each by Misc.(Non-Drug; Combo Route) route Daily.    flash glucose scanning reader (FREESTYLE SIMBA 2 READER) Misc 1 each by Misc.(Non-Drug; Combo Route)  route once daily.    ONETOUCH DELICA PLUS LANCET 33 gauge Misc Check blood sugar TID     Family History       Problem Relation (Age of Onset)    Cancer Mother, Father, Sister, Brother, Brother, Brother    Diabetes Sister, Sister    Hyperlipidemia Sister    Hypertension Mother, Sister    No Known Problems Sister, Brother          Tobacco Use    Smoking status: Never     Passive exposure: Never    Smokeless tobacco: Never   Substance and Sexual Activity    Alcohol use: Never    Drug use: Never    Sexual activity: Not Currently     Partners: Male     Review of Systems   Constitutional:  Positive for fatigue. Negative for appetite change and fever.   HENT:  Negative for congestion, hearing loss and trouble swallowing.    Respiratory:  Negative for chest tightness, shortness of breath and wheezing.    Cardiovascular:  Negative for chest pain and palpitations.   Gastrointestinal:  Positive for blood in stool. Negative for abdominal pain, constipation and nausea.   Genitourinary:  Negative for difficulty urinating and dysuria.   Musculoskeletal:  Positive for back pain. Negative for neck stiffness.   Skin:  Negative for pallor and rash.   Neurological:  Positive for dizziness. Negative for speech difficulty and headaches.   Psychiatric/Behavioral:  Negative for confusion and suicidal ideas.      Objective:     Vital Signs (Most Recent):  Temp: 97.9 °F (36.6 °C) (06/03/25 2029)  Pulse: 91 (06/03/25 2029)  Resp: 20 (06/03/25 2044)  BP: (!) 170/96 (06/03/25 2029)  SpO2: 98 % (06/03/25 2029) Vital Signs (24h Range):  Temp:  [97 °F (36.1 °C)-98.3 °F (36.8 °C)] 97.9 °F (36.6 °C)  Pulse:  [] 91  Resp:  [11-26] 20  SpO2:  [96 %-100 %] 98 %  BP: (112-170)/(53-96) 170/96     Weight: 74.8 kg (165 lb)  Body mass index is 25.09 kg/m².     Physical Exam  Vitals reviewed.   Constitutional:       General: She is awake. She is not in acute distress.     Appearance: She is well-developed. She is not toxic-appearing.   HENT:      Head:  Normocephalic.      Nose: Nose normal.      Mouth/Throat:      Pharynx: Oropharynx is clear.   Eyes:      Extraocular Movements: Extraocular movements intact.      Pupils: Pupils are equal, round, and reactive to light.   Neck:      Thyroid: No thyroid mass.      Vascular: No carotid bruit.   Cardiovascular:      Rate and Rhythm: Normal rate and regular rhythm.      Pulses: Normal pulses.      Heart sounds: Normal heart sounds. No murmur heard.  Pulmonary:      Effort: Pulmonary effort is normal.      Breath sounds: Normal breath sounds and air entry. No wheezing.   Abdominal:      General: Bowel sounds are normal. There is no distension.      Palpations: Abdomen is soft.      Tenderness: There is no abdominal tenderness.   Musculoskeletal:         General: Normal range of motion.      Cervical back: Neck supple. No rigidity.   Skin:     General: Skin is warm.      Coloration: Skin is not jaundiced.      Findings: No lesion.   Neurological:      General: No focal deficit present.      Mental Status: She is alert and oriented to person, place, and time.      Cranial Nerves: No cranial nerve deficit.   Psychiatric:         Attention and Perception: Attention normal.         Mood and Affect: Mood normal.         Behavior: Behavior normal. Behavior is cooperative.         Thought Content: Thought content normal.         Cognition and Memory: Cognition normal.              CRANIAL NERVES     CN III, IV, VI   Pupils are equal, round, and reactive to light.       Significant Labs: All pertinent labs within the past 24 hours have been reviewed.  BMP:   Recent Labs   Lab 06/02/25  1511 06/03/25  0359    106    141   K 4.3 3.6   * 112*   CO2 25 23   BUN 21* 14   CREATININE 0.77 0.67   CALCIUM 9.4 8.1*   MG 2.5  --      CBC:   Recent Labs   Lab 06/02/25  1511 06/03/25  0359 06/03/25  1616   WBC 7.94 6.42 5.83   HGB 10.2* 7.9* 8.4*   HCT 32.4* 24.9* 26.7*    195 199     CMP:   Recent Labs   Lab  06/02/25  1511 06/03/25  0359    141   K 4.3 3.6   * 112*   CO2 25 23    106   BUN 21* 14   CREATININE 0.77 0.67   CALCIUM 9.4 8.1*   PROT 6.9  --    ALBUMIN 3.4  --    BILITOT 0.6  --    ALKPHOS 76  --    AST 19  --    ALT 10  --    ANIONGAP 12 10       Significant Imaging: I have reviewed all pertinent imaging results/findings within the past 24 hours.    Imaging Results              CT Abdomen Pelvis With IV Contrast NO Oral Contrast (Final result)  Result time 06/02/25 18:28:39      Final result by Markie Blackmon MD (06/02/25 18:28:39)                   Impression:      1. Nonobstructing nephrolithiasis of the left kidney.  2. Probable minimal hydronephrosis on the right.  No obstruction is detected.  Recommend surveillance.  3. Diverticulosis of the colon.  4. Small subcentimeter pulmonary nodules in the right lower lobe.  See above comments.  5. Small hiatal hernia.      Electronically signed by: Markie Blackmon  Date:    06/02/2025  Time:    18:28               Narrative:    EXAMINATION:  CTA ABDOMEN PELVIS WITH and without IV CONTRAST    CLINICAL HISTORY:  GI bleed;    TECHNIQUE:  Low dose axial images, sagittal and coronal reformations were obtained from the lung bases to the pubic symphysis with and without the IV administration of 100 mL of Isovue 370 .  Oral contrast was not administered..  Precontrast, arterial and portal venous phases.  CTA protocol.  MIPS utilized.    COMPARISON:  11/30/2020    FINDINGS:  Abdomen:    - Lower thorax:    - Lung bases: 3 mm pulmonary nodule right lower lobe on axial image 1 incompletely visualized.  4 mm nodule right lower lobe on axial 4.    For multiple solid nodules all <6 mm, Fleischner Society 2017 guidelines recommend no routine follow up for a low risk patient, or follow up with non-contrast chest CT at 12 months after discovery in a high risk patient.    Mild bibasilar atelectasis.    - Liver: Small probable hepatic cyst anteriorly in the left  lobe.    Small hiatal hernia    - Gallbladder: No calcified gallstones.    - Bile Ducts: No evidence of intra or extra hepatic biliary ductal dilation.    - Spleen: Negative.    - Kidneys: 8 mm nonobstructing stone at the lower pole the left kidney.  No stones on the right.  Probable minimal hydronephrosis on the right.  No obstruction is detected.  Recommend surveillance.  No hydronephrosis or hydroureter on the left.    - Adrenals: Unremarkable.    - Pancreas: No mass or peripancreatic fat stranding.    - Retroperitoneum:  No significant adenopathy.    - Vascular: No abdominal aortic aneurysm.    - Abdominal wall:  Unremarkable.    Pelvis:    Urinary bladder is nondistended.    Status post hysterectomy.    Diverticulosis of the colon.  No evidence of acute diverticulitis.    No evidence of any focal significant GI bleed.    Bowel/Mesentery:    No evidence of bowel obstruction or inflammation.    Bones:  No acute osseous abnormality and no suspicious lytic or blastic lesion.                                       X-Ray Chest AP Portable (Final result)  Result time 06/02/25 19:44:21      Final result by Markie Blackmon MD (06/02/25 19:44:21)                   Impression:      No acute radiographic abnormality.      Electronically signed by: Markie Blackmon  Date:    06/02/2025  Time:    19:44               Narrative:    EXAMINATION:  XR CHEST AP PORTABLE    CLINICAL HISTORY:  Gastrointestinal hemorrhage, unspecified    TECHNIQUE:  Single frontal view of the chest was performed.    COMPARISON:  10/16/2023    FINDINGS:  The lungs are clear, with normal appearance of pulmonary vasculature and no pleural effusion or pneumothorax.    The cardiac silhouette is normal in size. The hilar and mediastinal contours are unremarkable.    Bones are intact.                                    Intake/Output - Last 3 Shifts         06/02 0700  06/03 0659 06/03 0700  06/04 0659    I.V. (mL/kg) 1303.1 (17.4)     IV Piggyback 1000      Total Intake(mL/kg) 2303.1 (30.7)     Urine (mL/kg/hr)  0 (0)    Blood  0    Total Output  0    Net +2303.1 0          Unmeasured Urine Occurrence 2 x 1 x          Microbiology Results (last 7 days)       Procedure Component Value Units Date/Time    Urine culture [[id number]] Collected: 06/02/25 1447    Order Status: Completed Specimen: Urine Updated: 06/03/25 0754     Culture, Urine No Growth To Date    Clostridioides difficile toxin/antigen with reflex to PCR [[id number]]     Order Status: Sent Specimen: Stool

## 2025-06-04 NOTE — ASSESSMENT & PLAN NOTE
- Patient's hemorrhage is due to gastrointestinal bleed, this bleeding is associated with lower source.     Colonoscopy for 06/04

## 2025-06-04 NOTE — PLAN OF CARE
At 1148 pt is awk and alert, vs are stable, her daughter is at bedside, waiting on MD to speak to her before going back to room.

## 2025-06-04 NOTE — HOSPITAL COURSE
06/03 No obvious active bleeding currently Fall in HH from admission. CT abd questionable right hydronephrosis. EGD with Angioectasia in the 2nd part of the duodenum. Plan colonoscopy tomorrow.   06/04 Colonoscopy blood without bleeding. H/H stable then home soon.   06/05 Fatigue. No more bleeding. Some fall in H/H. Offered transfusion and pt wished to proceed.     06/06 patient feels better after transfusion.  Up in room.  Wanting to go home.  No more bleeding.  Improvement in CBC.  Will discharge home, she is concerned about constipation going forward, talk to her about low residue diet for a couple weeks and then start a high residue high-fiber diet after that point.  She has Linzess ordered that she can take.  Will go home on iron therapy.  Discuss stool changes with iron versus bleeding.  If recurrent bleeding can come back.  Discharged.  To have primary care provider rechecked H&H on follow up 1 week    Patient counseled on the importance of keeping all hospital follow up appointments. Stressed compliance with medications, therapies, or devices as prescribed in order to provide for the best health outcomes and decrease the risk of reoccurrence or further progression of issues.    Additionally, patient given written literature regarding their current disease processes and home care recommendations.   Patient given opportunity to ask questions and verbalized understanding of all information discussed.  Reinforced patient's primary care provider can be a big assets in monitoring and organizing issues after discharge.

## 2025-06-04 NOTE — PLAN OF CARE
Problem: Gastrointestinal Bleeding  Goal: Optimal Coping with Acute Illness  6/4/2025 0605 by Noah Spence RN  Outcome: Progressing  6/4/2025 0019 by Noah Spence RN  Outcome: Progressing  Goal: Hemostasis  6/4/2025 0605 by Noah Spence RN  Outcome: Progressing  6/4/2025 0019 by Noah Spence RN  Outcome: Progressing     Problem: Adult Inpatient Plan of Care  Goal: Plan of Care Review  6/4/2025 0605 by Noah Spence RN  Outcome: Progressing  6/4/2025 0019 by Noah Spence RN  Outcome: Progressing  Goal: Patient-Specific Goal (Individualized)  6/4/2025 0605 by Noah Spence RN  Outcome: Progressing  6/4/2025 0019 by Noah Spence RN  Outcome: Progressing  Goal: Absence of Hospital-Acquired Illness or Injury  6/4/2025 0605 by Noah Spence RN  Outcome: Progressing  6/4/2025 0019 by Noah Spence RN  Outcome: Progressing  Goal: Optimal Comfort and Wellbeing  6/4/2025 0605 by Noah Spence RN  Outcome: Progressing  6/4/2025 0019 by Noah Spence RN  Outcome: Progressing  Goal: Readiness for Transition of Care  6/4/2025 0605 by Noah Spence RN  Outcome: Progressing  6/4/2025 0019 by Noah Spence RN  Outcome: Progressing     Problem: Diabetes Comorbidity  Goal: Blood Glucose Level Within Targeted Range  6/4/2025 0605 by Noah Spence RN  Outcome: Progressing  6/4/2025 0019 by Noah Spence RN  Outcome: Progressing     Problem: Infection  Goal: Absence of Infection Signs and Symptoms  6/4/2025 0605 by Noah Spence RN  Outcome: Progressing  6/4/2025 0019 by Noah Spence RN  Outcome: Progressing     Problem: Fall Injury Risk  Goal: Absence of Fall and Fall-Related Injury  6/4/2025 0605 by Noah Spence RN  Outcome: Progressing  6/4/2025 0019 by Noah Spence RN  Outcome: Progressing

## 2025-06-04 NOTE — PROGRESS NOTES
[facility] Medicine  Progress Note    Patient Name: Lorraine De Jesus  MRN: [id number]  Patient Class: IP- Inpatient   Admission Date: 6/2/2025  Length of Stay: 1 days  Attending Physician: Ovidio Olson MD  Primary Care Provider: Mateo Chen MD        Subjective     Principal Problem:<principal problem not specified>        HPI:    Chief complaint:  Rectal bleeding    History of present illness:     Ms. De Jesus is a 80-year-old presented to the emergency room on referral from Dr. Chen's office after presenting there complaining of recurrent rectal bleeding starting a.m. of admission.  Patient states she had gotten up and going to the bathroom proximally 10:00 a.m..  Had a bowel movement and noted there was a lot of clots of dark red blood.  No black.  States complain of some dizziness.  Awhile later had another bowel movement with similar results.  Had been taken a Dr. Kirk office and then was referred to the emergency department.  Patient has not been on any nonsteroidals.  No blood thinners.  No prior history of GI bleeding.  In January this year had colonoscopy by Dr. Gonzalez and had colon polyps removed.  Patient to be admitted to hospitalist service with GI consulted.     Review of system:  See above.  Wears glasses without recent change in vision.  Has some chronic low back pain followed at pain treatment [facility].  Denies any issues with her sleepy in the past.  Review of systems otherwise.    Past medical history:  -hypertension times 10 years  -chronic pain syndrome with chronic low back pain followed by Dr. Brown at pain treatment Center  -diabetes mellitus type 2 treated with orally  -states gets we check us by Dr. Doss but does not have any heart issue she is aware  -hypothyroidism    Past surgical history:  Hysterectomy bilateral salpingo-oophorectomy  Bilateral cataract surgery    Allergy:  Lipitor which causes myopathy    Social history:  No history of tobacco  alcohol use  Lives by herself  Has 2 children and daughter is in the emergency room with her  No history of tobacco alcohol use    Family history:  No one in family premature coronary artery disease  No one in family with colon or GI issues    Health maintenance issues:  Primary care providers Dr. Mateo dumont   Does not get flu shots nor Pneumovax  Gets yearly mammogram  No recent Pap smear  Colonoscopy last January by Dr. Espinal with polyps removed        Overview/Hospital Course:   No obvious active bleeding currently Fall in HH from admission. CT abd questionable right hydronephrosis. EGD with Angioectasia in the 2nd part of the duodenum. Plan colonoscopy tomorrow.     Past Medical History:   Diagnosis Date    Adenomatous polyp of ascending colon 2021    Adenomatous polyp of descending colon 2021    Age related osteoporosis 10/28/2020    Benign paroxysmal vertigo     Chronic pain syndrome     Chronic pelvic pain in female 2021    Ditropan XL 5mg    Colon, diverticulosis 2021    Depressive disorder 2019    External hemorrhoid 2021    Gastrointestinal hemorrhage associated with anorectal source 2021    GERD (gastroesophageal reflux disease) 2013    Hyperlipidemia 2012    Hypothyroidism 2019    Joint pain     Nonrheumatic tricuspid (valve) insufficiency 2018    Personal history UTI 2021    Controlled on Hiprex    Polyneuropathy 2018    PVD (peripheral vascular disease) 10/30/2018    Temporal arteritis     Type 2 diabetes mellitus 2014    Urethral meatal stenosis 2018    history of known urethral stenosis, and was taught to perform monthly self dilation at home.       Past Surgical History:   Procedure Laterality Date     left lumbar sympathetic nerve block Left 2020    X3  DR LANDEROS    CARDIAC CATHETERIZATION  10/14/2009     SECTION      x 2    COLONOSCOPY  2009    CYSTOSCOPY WITH HYDRODISTENSION OF BLADDER N/A  06/24/2021    Procedure: CYSTOSCOPY, WITH BLADDER HYDRODISTENSION;  Surgeon: Dequan Recio MD;  Location: [facility] OR;  Service: Urology;  Laterality: N/A;    CYSTOSCOPY WITH URETHRAL DILATION  02/25/2021    Dr Recio    EPIDURAL STEROID INJECTION N/A 10/27/2022    Procedure: Injection, Steroid, Epidural, L5/S1;  Surgeon: Belkis Brown MD;  Location: [facility];  Service: Pain Management;  Laterality: N/A;    EPIDURAL STEROID INJECTION N/A 9/12/2023    Procedure: Injection, Steroid, Epidural, L5/S1;  Surgeon: Belkis Brown MD;  Location: [facility];  Service: Pain Management;  Laterality: N/A;    HYSTERECTOMY  1980's    LUMBAR SYMPATHETIC NERVE BLOCK Left 10/05/2020    Left Sympathetic Nerve Block - Dr Brown - 10/5/20, 9/21/20, 1/20/20. 1/6/20, 10/9/19, 9/25/19 and 7/11/18    right lumbar sympathetic nerve block Right 2020    X4 DR BROWN    THYROIDECTOMY      1990's       Review of patient's allergies indicates:   Allergen Reactions    Lipitor [atorvastatin] Other (See Comments)     Muscle aching    Pravachol [pravastatin] Other (See Comments)     Muscle aching       No current facility-administered medications on file prior to encounter.     Current Outpatient Medications on File Prior to Encounter   Medication Sig    estradioL (ESTRACE) 0.01 % (0.1 mg/gram) vaginal cream Place 1 g vaginally once daily for 14 days, THEN 1 g twice a week.    gabapentin (NEURONTIN) 400 MG capsule TAKE 1 CAPSULE(400 MG) BY MOUTH EVERY 8 HOURS    glipiZIDE (GLUCOTROL) 10 MG TR24 TAKE 1 TABLET(10 MG) BY MOUTH TWICE DAILY    HYDROcodone-acetaminophen (NORCO)  mg per tablet Take 1 tablet by mouth every 6 (six) hours as needed for Pain (pain).    lactulose (CHRONULAC) 10 gram/15 mL solution TAKE 30 ML(20 GRAMS TOTAL) BY MOUTH TWICE DAILY (Patient taking differently: Take 20 g by mouth 2 (two) times daily as needed.)    levothyroxine (SYNTHROID) 75 MCG tablet Take 1 tablet (75 mcg total) by mouth before  breakfast.    olmesartan (BENICAR) 40 MG tablet Take 1 tablet (40 mg total) by mouth once daily. (Patient taking differently: Take 20 mg by mouth once daily.)    oxybutynin (DITROPAN-XL) 10 MG 24 hr tablet Take one tablet at night (Patient taking differently: 10 mg every evening. Take one tablet at night)    blood sugar diagnostic (ONETOUCH VERIO TEST STRIPS) Strp 1 each by Misc.(Non-Drug; Combo Route) route 2 (two) times a day.    blood-glucose meter kit Use as instructed    blood-glucose sensor (FREESTYLE SIMBA 3 SENSOR) Lizzie 1 each by Misc.(Non-Drug; Combo Route) route Daily.    flash glucose scanning reader (FREESTYLE SIMBA 2 READER) Misc 1 each by Misc.(Non-Drug; Combo Route) route once daily.    ONETOUCH DELICA PLUS LANCET 33 gauge Misc Check blood sugar TID     Family History       Problem Relation (Age of Onset)    Cancer Mother, Father, Sister, Brother, Brother, Brother    Diabetes Sister, Sister    Hyperlipidemia Sister    Hypertension Mother, Sister    No Known Problems Sister, Brother          Tobacco Use    Smoking status: Never     Passive exposure: Never    Smokeless tobacco: Never   Substance and Sexual Activity    Alcohol use: Never    Drug use: Never    Sexual activity: Not Currently     Partners: Male     Review of Systems   Constitutional:  Positive for fatigue. Negative for appetite change and fever.   HENT:  Negative for congestion, hearing loss and trouble swallowing.    Respiratory:  Negative for chest tightness, shortness of breath and wheezing.    Cardiovascular:  Negative for chest pain and palpitations.   Gastrointestinal:  Positive for blood in stool. Negative for abdominal pain, constipation and nausea.   Genitourinary:  Negative for difficulty urinating and dysuria.   Musculoskeletal:  Positive for back pain. Negative for neck stiffness.   Skin:  Negative for pallor and rash.   Neurological:  Positive for dizziness. Negative for speech difficulty and headaches.    Psychiatric/Behavioral:  Negative for confusion and suicidal ideas.      Objective:     Vital Signs (Most Recent):  Temp: 97.9 °F (36.6 °C) (06/03/25 2029)  Pulse: 91 (06/03/25 2029)  Resp: 20 (06/03/25 2044)  BP: (!) 170/96 (06/03/25 2029)  SpO2: 98 % (06/03/25 2029) Vital Signs (24h Range):  Temp:  [97 °F (36.1 °C)-98.3 °F (36.8 °C)] 97.9 °F (36.6 °C)  Pulse:  [] 91  Resp:  [11-26] 20  SpO2:  [96 %-100 %] 98 %  BP: (112-170)/(53-96) 170/96     Weight: 74.8 kg (165 lb)  Body mass index is 25.09 kg/m².     Physical Exam  Vitals reviewed.   Constitutional:       General: She is awake. She is not in acute distress.     Appearance: She is well-developed. She is not toxic-appearing.   HENT:      Head: Normocephalic.      Nose: Nose normal.      Mouth/Throat:      Pharynx: Oropharynx is clear.   Eyes:      Extraocular Movements: Extraocular movements intact.      Pupils: Pupils are equal, round, and reactive to light.   Neck:      Thyroid: No thyroid mass.      Vascular: No carotid bruit.   Cardiovascular:      Rate and Rhythm: Normal rate and regular rhythm.      Pulses: Normal pulses.      Heart sounds: Normal heart sounds. No murmur heard.  Pulmonary:      Effort: Pulmonary effort is normal.      Breath sounds: Normal breath sounds and air entry. No wheezing.   Abdominal:      General: Bowel sounds are normal. There is no distension.      Palpations: Abdomen is soft.      Tenderness: There is no abdominal tenderness.   Musculoskeletal:         General: Normal range of motion.      Cervical back: Neck supple. No rigidity.   Skin:     General: Skin is warm.      Coloration: Skin is not jaundiced.      Findings: No lesion.   Neurological:      General: No focal deficit present.      Mental Status: She is alert and oriented to person, place, and time.      Cranial Nerves: No cranial nerve deficit.   Psychiatric:         Attention and Perception: Attention normal.         Mood and Affect: Mood normal.          Behavior: Behavior normal. Behavior is cooperative.         Thought Content: Thought content normal.         Cognition and Memory: Cognition normal.              CRANIAL NERVES     CN III, IV, VI   Pupils are equal, round, and reactive to light.       Significant Labs: All pertinent labs within the past 24 hours have been reviewed.  BMP:   Recent Labs   Lab 06/02/25  1511 06/03/25  0359    106    141   K 4.3 3.6   * 112*   CO2 25 23   BUN 21* 14   CREATININE 0.77 0.67   CALCIUM 9.4 8.1*   MG 2.5  --      CBC:   Recent Labs   Lab 06/02/25  1511 06/03/25  0359 06/03/25  1616   WBC 7.94 6.42 5.83   HGB 10.2* 7.9* 8.4*   HCT 32.4* 24.9* 26.7*    195 199     CMP:   Recent Labs   Lab 06/02/25 1511 06/03/25  0359    141   K 4.3 3.6   * 112*   CO2 25 23    106   BUN 21* 14   CREATININE 0.77 0.67   CALCIUM 9.4 8.1*   PROT 6.9  --    ALBUMIN 3.4  --    BILITOT 0.6  --    ALKPHOS 76  --    AST 19  --    ALT 10  --    ANIONGAP 12 10       Significant Imaging: I have reviewed all pertinent imaging results/findings within the past 24 hours.    Imaging Results              CT Abdomen Pelvis With IV Contrast NO Oral Contrast (Final result)  Result time 06/02/25 18:28:39      Final result by Markie Blackmon MD (06/02/25 18:28:39)                   Impression:      1. Nonobstructing nephrolithiasis of the left kidney.  2. Probable minimal hydronephrosis on the right.  No obstruction is detected.  Recommend surveillance.  3. Diverticulosis of the colon.  4. Small subcentimeter pulmonary nodules in the right lower lobe.  See above comments.  5. Small hiatal hernia.      Electronically signed by: Markie Blackmon  Date:    06/02/2025  Time:    18:28               Narrative:    EXAMINATION:  CTA ABDOMEN PELVIS WITH and without IV CONTRAST    CLINICAL HISTORY:  GI bleed;    TECHNIQUE:  Low dose axial images, sagittal and coronal reformations were obtained from the lung bases to the pubic  symphysis with and without the IV administration of 100 mL of Isovue 370 .  Oral contrast was not administered..  Precontrast, arterial and portal venous phases.  CTA protocol.  MIPS utilized.    COMPARISON:  11/30/2020    FINDINGS:  Abdomen:    - Lower thorax:    - Lung bases: 3 mm pulmonary nodule right lower lobe on axial image 1 incompletely visualized.  4 mm nodule right lower lobe on axial 4.    For multiple solid nodules all <6 mm, Fleischner Society 2017 guidelines recommend no routine follow up for a low risk patient, or follow up with non-contrast chest CT at 12 months after discovery in a high risk patient.    Mild bibasilar atelectasis.    - Liver: Small probable hepatic cyst anteriorly in the left lobe.    Small hiatal hernia    - Gallbladder: No calcified gallstones.    - Bile Ducts: No evidence of intra or extra hepatic biliary ductal dilation.    - Spleen: Negative.    - Kidneys: 8 mm nonobstructing stone at the lower pole the left kidney.  No stones on the right.  Probable minimal hydronephrosis on the right.  No obstruction is detected.  Recommend surveillance.  No hydronephrosis or hydroureter on the left.    - Adrenals: Unremarkable.    - Pancreas: No mass or peripancreatic fat stranding.    - Retroperitoneum:  No significant adenopathy.    - Vascular: No abdominal aortic aneurysm.    - Abdominal wall:  Unremarkable.    Pelvis:    Urinary bladder is nondistended.    Status post hysterectomy.    Diverticulosis of the colon.  No evidence of acute diverticulitis.    No evidence of any focal significant GI bleed.    Bowel/Mesentery:    No evidence of bowel obstruction or inflammation.    Bones:  No acute osseous abnormality and no suspicious lytic or blastic lesion.                                       X-Ray Chest AP Portable (Final result)  Result time 06/02/25 19:44:21      Final result by Markie Blackmon MD (06/02/25 19:44:21)                   Impression:      No acute radiographic  abnormality.      Electronically signed by: Markie Esqueda  Date:    06/02/2025  Time:    19:44               Narrative:    EXAMINATION:  XR CHEST AP PORTABLE    CLINICAL HISTORY:  Gastrointestinal hemorrhage, unspecified    TECHNIQUE:  Single frontal view of the chest was performed.    COMPARISON:  10/16/2023    FINDINGS:  The lungs are clear, with normal appearance of pulmonary vasculature and no pleural effusion or pneumothorax.    The cardiac silhouette is normal in size. The hilar and mediastinal contours are unremarkable.    Bones are intact.                                    Intake/Output - Last 3 Shifts         06/02 0700  06/03 0659 06/03 0700  06/04 0659    I.V. (mL/kg) 1303.1 (17.4)     IV Piggyback 1000     Total Intake(mL/kg) 2303.1 (30.7)     Urine (mL/kg/hr)  0 (0)    Blood  0    Total Output  0    Net +2303.1 0          Unmeasured Urine Occurrence 2 x 1 x          Microbiology Results (last 7 days)       Procedure Component Value Units Date/Time    Urine culture [[id number]] Collected: 06/02/25 1447    Order Status: Completed Specimen: Urine Updated: 06/03/25 0754     Culture, Urine No Growth To Date    Clostridioides difficile toxin/antigen with reflex to PCR [[id number]]     Order Status: Sent Specimen: Stool                 Assessment & Plan  Chronic pain syndrome    Chronic low back pain followed by Dr. Brown, no nonsteroidal anti-inflammatory agents.  States takes Neurontin and Norco  Systolic hypertension  Patient's blood pressure range in the last 24 hours was: BP  Min: 112/62  Max: 170/96.The patient's inpatient anti-hypertensive regimen is listed below:  Current Antihypertensives  losartan tablet 50 mg, Daily, Oral    Plan  - BP is controlled, no changes needed to their regimen  - monitor and adjust meds as needed  Type 2 diabetes mellitus without complication  Patient's FSGs are controlled on current medication regimen.  Last A1c reviewed-   Lab Results   Component Value Date    HGBA1C  "6.1 06/02/2025     Most recent fingerstick glucose reviewed- No results for input(s): "POCTGLUCOSE" in the last 24 hours.  Current correctional scale  Medium  Maintain anti-hyperglycemic dose as follows-   Antihyperglycemics (From admission, onward)      Start     Stop Route Frequency Ordered    06/02/25 1926  insulin aspart U-100 injection 0-10 Units         -- SubQ Before meals & nightly PRN 06/02/25 1826          Hold Oral hypoglycemics while patient is in the hospital.  Hypothyroidism    Continue Synthroid  Lower GI bleed  - Patient's hemorrhage is due to gastrointestinal bleed, this bleeding is associated with lower source.     Colonoscopy for 06/04  VTE Risk Mitigation (From admission, onward)           Ordered     IP VTE HIGH RISK PATIENT  Once         06/02/25 1807     Place sequential compression device  Until discontinued         06/02/25 1807                    Discharge Planning   GUZMAN: 6/10/2025     Code Status: Full Code   Medical Readiness for Discharge Date:   Discharge Plan A: Home Health, Home                        Ovidio Olson MD  Department of Hospital Medicine   [facility]    "

## 2025-06-05 DIAGNOSIS — K59.04 CHRONIC IDIOPATHIC CONSTIPATION: Primary | ICD-10-CM

## 2025-06-05 LAB
ABO + RH BLD: NORMAL
ANION GAP SERPL CALCULATED.3IONS-SCNC: 7 MMOL/L (ref 7–16)
BASOPHILS # BLD AUTO: 0.03 K/UL (ref 0–0.2)
BASOPHILS NFR BLD AUTO: 0.5 % (ref 0–1)
BLD PROD TYP BPU: NORMAL
BLOOD UNIT EXPIRATION DATE: NORMAL
BLOOD UNIT TYPE CODE: 5100
BUN SERPL-MCNC: 9 MG/DL (ref 10–20)
BUN/CREAT SERPL: 10 (ref 6–20)
CALCIUM SERPL-MCNC: 8.1 MG/DL (ref 8.4–10.2)
CHLORIDE SERPL-SCNC: 109 MMOL/L (ref 98–107)
CO2 SERPL-SCNC: 26 MMOL/L (ref 23–31)
CREAT SERPL-MCNC: 0.91 MG/DL (ref 0.55–1.02)
CROSSMATCH INTERPRETATION: NORMAL
DIFFERENTIAL METHOD BLD: ABNORMAL
DISPENSE STATUS: NORMAL
EGFR (NO RACE VARIABLE) (RUSH/TITUS): 65 ML/MIN/1.73M2
EOSINOPHIL # BLD AUTO: 0.15 K/UL (ref 0–0.5)
EOSINOPHIL NFR BLD AUTO: 2.7 % (ref 1–4)
ERYTHROCYTE [DISTWIDTH] IN BLOOD BY AUTOMATED COUNT: 15.8 % (ref 11.5–14.5)
GLUCOSE SERPL-MCNC: 114 MG/DL (ref 82–115)
GLUCOSE SERPL-MCNC: 123 MG/DL (ref 70–105)
GLUCOSE SERPL-MCNC: 145 MG/DL (ref 70–105)
GLUCOSE SERPL-MCNC: 155 MG/DL (ref 70–105)
GLUCOSE SERPL-MCNC: 221 MG/DL (ref 70–105)
HCT VFR BLD AUTO: 22.7 % (ref 38–47)
HGB BLD-MCNC: 7.3 G/DL (ref 12–16)
IMM GRANULOCYTES # BLD AUTO: 0.01 K/UL (ref 0–0.04)
IMM GRANULOCYTES NFR BLD: 0.2 % (ref 0–0.4)
LYMPHOCYTES # BLD AUTO: 2.15 K/UL (ref 1–4.8)
LYMPHOCYTES NFR BLD AUTO: 38.7 % (ref 27–41)
MCH RBC QN AUTO: 27 PG (ref 27–31)
MCHC RBC AUTO-ENTMCNC: 32.2 G/DL (ref 32–36)
MCV RBC AUTO: 84.1 FL (ref 80–96)
MONOCYTES # BLD AUTO: 0.47 K/UL (ref 0–0.8)
MONOCYTES NFR BLD AUTO: 8.5 % (ref 2–6)
MPC BLD CALC-MCNC: 10.1 FL (ref 9.4–12.4)
NEUTROPHILS # BLD AUTO: 2.75 K/UL (ref 1.8–7.7)
NEUTROPHILS NFR BLD AUTO: 49.4 % (ref 53–65)
NRBC # BLD AUTO: 0 X10E3/UL
NRBC, AUTO (.00): 0 %
PLATELET # BLD AUTO: 206 K/UL (ref 150–400)
POTASSIUM SERPL-SCNC: 3.4 MMOL/L (ref 3.5–5.1)
RBC # BLD AUTO: 2.7 M/UL (ref 4.2–5.4)
SODIUM SERPL-SCNC: 139 MMOL/L (ref 136–145)
UNIT NUMBER: NORMAL
WBC # BLD AUTO: 5.56 K/UL (ref 4.5–11)

## 2025-06-05 PROCEDURE — P9016 RBC LEUKOCYTES REDUCED: HCPCS | Performed by: HOSPITALIST

## 2025-06-05 PROCEDURE — 25000003 PHARM REV CODE 250: Performed by: HOSPITALIST

## 2025-06-05 PROCEDURE — 86923 COMPATIBILITY TEST ELECTRIC: CPT | Performed by: HOSPITALIST

## 2025-06-05 PROCEDURE — 30233N1 TRANSFUSION OF NONAUTOLOGOUS RED BLOOD CELLS INTO PERIPHERAL VEIN, PERCUTANEOUS APPROACH: ICD-10-PCS | Performed by: HOSPITALIST

## 2025-06-05 PROCEDURE — 99233 SBSQ HOSP IP/OBS HIGH 50: CPT | Mod: ,,, | Performed by: HOSPITALIST

## 2025-06-05 PROCEDURE — 36415 COLL VENOUS BLD VENIPUNCTURE: CPT | Performed by: HOSPITALIST

## 2025-06-05 PROCEDURE — 82962 GLUCOSE BLOOD TEST: CPT

## 2025-06-05 PROCEDURE — 11000001 HC ACUTE MED/SURG PRIVATE ROOM

## 2025-06-05 PROCEDURE — 80048 BASIC METABOLIC PNL TOTAL CA: CPT | Performed by: HOSPITALIST

## 2025-06-05 PROCEDURE — 85025 COMPLETE CBC W/AUTO DIFF WBC: CPT | Performed by: HOSPITALIST

## 2025-06-05 PROCEDURE — 25000003 PHARM REV CODE 250: Performed by: INTERNAL MEDICINE

## 2025-06-05 PROCEDURE — 63600175 PHARM REV CODE 636 W HCPCS: Performed by: HOSPITALIST

## 2025-06-05 RX ORDER — HYDROCODONE BITARTRATE AND ACETAMINOPHEN 500; 5 MG/1; MG/1
TABLET ORAL
Status: DISCONTINUED | OUTPATIENT
Start: 2025-06-05 | End: 2025-06-06 | Stop reason: HOSPADM

## 2025-06-05 RX ORDER — POTASSIUM CHLORIDE 20 MEQ/1
40 TABLET, EXTENDED RELEASE ORAL ONCE
Status: COMPLETED | OUTPATIENT
Start: 2025-06-06 | End: 2025-06-06

## 2025-06-05 RX ADMIN — GABAPENTIN 400 MG: 400 CAPSULE ORAL at 09:06

## 2025-06-05 RX ADMIN — INSULIN ASPART 2 UNITS: 100 INJECTION, SOLUTION INTRAVENOUS; SUBCUTANEOUS at 09:06

## 2025-06-05 RX ADMIN — TRAZODONE HYDROCHLORIDE 25 MG: 50 TABLET ORAL at 09:06

## 2025-06-05 RX ADMIN — PANTOPRAZOLE SODIUM 40 MG: 40 TABLET, DELAYED RELEASE ORAL at 10:06

## 2025-06-05 RX ADMIN — LOSARTAN POTASSIUM 50 MG: 50 TABLET, FILM COATED ORAL at 10:06

## 2025-06-05 RX ADMIN — LEVOTHYROXINE SODIUM 75 MCG: 75 TABLET ORAL at 05:06

## 2025-06-05 RX ADMIN — GABAPENTIN 400 MG: 400 CAPSULE ORAL at 10:06

## 2025-06-05 RX ADMIN — PHENAZOPYRIDINE 100 MG: 100 TABLET ORAL at 10:06

## 2025-06-05 RX ADMIN — HYDROCODONE BITARTRATE AND ACETAMINOPHEN 1 TABLET: 10; 325 TABLET ORAL at 10:06

## 2025-06-05 RX ADMIN — SODIUM CHLORIDE: 9 INJECTION, SOLUTION INTRAVENOUS at 11:06

## 2025-06-05 RX ADMIN — OXYBUTYNIN CHLORIDE 10 MG: 10 TABLET, EXTENDED RELEASE ORAL at 10:06

## 2025-06-05 NOTE — PLAN OF CARE
CM noted that pt is no longer on contact precautions.  CM at bedside.  IM explained to patient and understanding shown by signing IM.  CM following.

## 2025-06-05 NOTE — PROGRESS NOTES
Subjective:         Patient ID: Lorraine De Jesus is a 80 y.o. female.    Chief Complaint: Foot Pain      Pain  This is a chronic problem. The current episode started more than 1 year ago. The problem occurs daily. The problem has been unchanged. Associated symptoms include arthralgias and neck pain. Pertinent negatives include no anorexia, chest pain, chills, fever, sore throat, vertigo or vomiting.     Review of Systems   Constitutional:  Negative for activity change, appetite change, chills, fever and unexpected weight change.   HENT:  Negative for drooling, ear discharge, ear pain, facial swelling, nosebleeds, sore throat, trouble swallowing, voice change and goiter.    Eyes:  Negative for photophobia, pain, discharge, redness and visual disturbance.   Respiratory:  Negative for apnea, choking, chest tightness, shortness of breath, wheezing and stridor.    Cardiovascular:  Negative for chest pain, palpitations and leg swelling.   Gastrointestinal:  Negative for abdominal distention, anorexia, diarrhea, rectal pain, vomiting and fecal incontinence.   Endocrine: Negative for cold intolerance, heat intolerance, polydipsia, polyphagia and polyuria.   Genitourinary:  Negative for bladder incontinence, dysuria, flank pain, frequency and hot flashes.   Musculoskeletal:  Positive for arthralgias, back pain, leg pain and neck pain.   Integumentary:  Negative for color change and pallor.   Allergic/Immunologic: Negative for immunocompromised state.   Neurological:  Negative for dizziness, vertigo, seizures, syncope, facial asymmetry, speech difficulty, light-headedness, coordination difficulties and memory loss.   Hematological:  Negative for adenopathy. Does not bruise/bleed easily.   Psychiatric/Behavioral:  Negative for agitation, behavioral problems, confusion, decreased concentration, dysphoric mood, hallucinations, self-injury and suicidal ideas. The patient is not nervous/anxious and is not hyperactive.             Past Medical History:   Diagnosis Date    Adenomatous polyp of ascending colon 2021    Adenomatous polyp of descending colon 2021    Age related osteoporosis 10/28/2020    Benign paroxysmal vertigo     Chronic pain syndrome     Chronic pelvic pain in female 2021    Ditropan XL 5mg    Colon, diverticulosis 2021    Depressive disorder 2019    External hemorrhoid 2021    Gastrointestinal hemorrhage associated with anorectal source 2021    GERD (gastroesophageal reflux disease) 2013    Hyperlipidemia 2012    Hypothyroidism 2019    Joint pain     Nonrheumatic tricuspid (valve) insufficiency 2018    Personal history UTI 2021    Controlled on Hiprex    Polyneuropathy 2018    PVD (peripheral vascular disease) 10/30/2018    Temporal arteritis     Type 2 diabetes mellitus 2014    Urethral meatal stenosis 2018    history of known urethral stenosis, and was taught to perform monthly self dilation at home.     Past Surgical History:   Procedure Laterality Date     left lumbar sympathetic nerve block Left 2020    X3  DR BROWN    CARDIAC CATHETERIZATION  10/14/2009     SECTION      x 2    COLONOSCOPY  2009    CYSTOSCOPY WITH HYDRODISTENSION OF BLADDER N/A 2021    Procedure: CYSTOSCOPY, WITH BLADDER HYDRODISTENSION;  Surgeon: Dequan Recio MD;  Location: [facility];  Service: Urology;  Laterality: N/A;    CYSTOSCOPY WITH URETHRAL DILATION  2021    Dr Recio    EPIDURAL STEROID INJECTION N/A 10/27/2022    Procedure: Injection, Steroid, Epidural, L5/S1;  Surgeon: Belkis Brown MD;  Location: [facility];  Service: Pain Management;  Laterality: N/A;    EPIDURAL STEROID INJECTION N/A 2023    Procedure: Injection, Steroid, Epidural, L5/S1;  Surgeon: Belkis Brown MD;  Location: [facility];  Service: Pain Management;  Laterality: N/A;    HYSTERECTOMY      LUMBAR SYMPATHETIC NERVE BLOCK Left  10/05/2020    Left Sympathetic Nerve Block - Dr Brown - 10/5/20, 9/21/20, 1/20/20. 1/6/20, 10/9/19, 9/25/19 and 7/11/18    right lumbar sympathetic nerve block Right 2020    X4 DR BROWN    THYROIDECTOMY      1990's     Social History     Socioeconomic History    Marital status:    Tobacco Use    Smoking status: Never     Passive exposure: Never    Smokeless tobacco: Never   Substance and Sexual Activity    Alcohol use: Never    Drug use: Never    Sexual activity: Not Currently     Partners: Male     Social Drivers of Health     Financial Resource Strain: Low Risk  (6/2/2025)    Overall Financial Resource Strain (CARDIA)     Difficulty of Paying Living Expenses: Not hard at all   Food Insecurity: No Food Insecurity (6/2/2025)    Hunger Vital Sign     Worried About Running Out of Food in the Last Year: Never true     Ran Out of Food in the Last Year: Never true   Transportation Needs: No Transportation Needs (6/2/2025)    PRAPARE - Transportation     Lack of Transportation (Medical): No     Lack of Transportation (Non-Medical): No   Physical Activity: Sufficiently Active (9/23/2024)    Exercise Vital Sign     Days of Exercise per Week: 3 days     Minutes of Exercise per Session: 60 min   Stress: No Stress Concern Present (6/2/2025)    [employer] - Occupational Stress Questionnaire     Feeling of Stress : Not at all   Housing Stability: Low Risk  (6/2/2025)    Housing Stability Vital Sign     Unable to Pay for Housing in the Last Year: No     Homeless in the Last Year: No     Family History   Problem Relation Name Age of Onset    Cancer Mother      Hypertension Mother      Cancer Father      Cancer Sister      Diabetes Sister      Hyperlipidemia Sister      Hypertension Sister      Diabetes Sister      No Known Problems Sister      Cancer Brother      Cancer Brother      No Known Problems Brother      Cancer Brother       Review of patient's allergies indicates:   Allergen  "Reactions    Lipitor [atorvastatin] Other (See Comments)     Muscle aching    Pravachol [pravastatin] Other (See Comments)     Muscle aching        Objective:  Vitals:    06/16/25 1329 06/16/25 1330   BP: (!) 146/67    Pulse: 69    Resp: 17    Weight: 76.7 kg (169 lb)    Height: 5' 8" (1.727 m)    PainSc:   8   8                 Physical Exam  Vitals and nursing note reviewed. Exam conducted with a chaperone present.   Constitutional:       General: She is awake. She is not in acute distress.     Appearance: Normal appearance. She is not ill-appearing or diaphoretic.   HENT:      Head: Normocephalic and atraumatic.      Nose: Nose normal.      Mouth/Throat:      Mouth: Mucous membranes are moist.      Pharynx: Oropharynx is clear.   Eyes:      Conjunctiva/sclera: Conjunctivae normal.      Pupils: Pupils are equal, round, and reactive to light.   Cardiovascular:      Rate and Rhythm: Normal rate.   Pulmonary:      Effort: Pulmonary effort is normal. No respiratory distress.   Abdominal:      Palpations: Abdomen is soft.   Musculoskeletal:      Cervical back: Normal range of motion and neck supple.      Thoracic back: Tenderness present.      Lumbar back: Tenderness present. Decreased range of motion.   Skin:     General: Skin is warm and dry.   Neurological:      General: No focal deficit present.      Mental Status: She is alert and oriented to person, place, and time. Mental status is at baseline.      Cranial Nerves: No cranial nerve deficit (II-XII).   Psychiatric:         Mood and Affect: Mood normal.         Behavior: Behavior normal. Behavior is cooperative.         Thought Content: Thought content normal.           X-Ray KUB  Narrative: EXAMINATION:  XR KUB    CLINICAL HISTORY:  Calculus of kidney    TECHNIQUE:  Single AP supine view of the abdomen (KUB) was performed    COMPARISON:  None    FINDINGS:  The bowel gas pattern is within normal limits.  The psoas shadows are sharp.  A 10 mm stone overlies the " lower pole of the left kidney.  Atherosclerotic calcification is noted in the pelvic arteries.  No free air is seen.  No masses are noted.  Impression: 10 mm left intrarenal stone.  Atherosclerotic calcification of the pelvic vessels.  Otherwise negative abdomen x-ray    Electronically signed by: Wilfrido Friend MD  Date:    06/12/2025  Time:    10:01       Office Visit on 06/16/2025   Component Date Value Ref Range Status    Sodium 06/16/2025 140  136 - 145 mmol/L Final    Potassium 06/16/2025 4.5  3.5 - 5.1 mmol/L Final    Chloride 06/16/2025 109 (H)  98 - 107 mmol/L Final    CO2 06/16/2025 25  23 - 31 mmol/L Final    Anion Gap 06/16/2025 11  7 - 16 mmol/L Final    Glucose 06/16/2025 180 (H)  82 - 115 mg/dL Final    BUN 06/16/2025 11  10 - 20 mg/dL Final    Creatinine 06/16/2025 0.83  0.55 - 1.02 mg/dL Final    BUN/Creatinine Ratio 06/16/2025 13  6 - 20 Final    Calcium 06/16/2025 8.8  8.4 - 10.2 mg/dL Final    Total Protein 06/16/2025 7.0  5.8 - 7.6 g/dL Final    Albumin 06/16/2025 3.3 (L)  3.4 - 4.8 g/dL Final    Globulin 06/16/2025 3.7  2.0 - 4.0 g/dL Final    A/G Ratio 06/16/2025 0.9   Final    Bilirubin, Total 06/16/2025 0.4  <=1.5 mg/dL Final    Alk Phos 06/16/2025 80  40 - 150 U/L Final    ALT 06/16/2025 11  <=55 U/L Final    AST 06/16/2025 18  11 - 45 U/L Final    eGFR 06/16/2025 72  >=60 mL/min/1.73m2 Final    Color, UA 06/16/2025 Light Yellow  Colorless, Straw, Yellow, Dark Yellow, Light Yellow Final    Clarity, UA 06/16/2025 Turbid  Clear Final    pH, UA 06/16/2025 7.0  5.0 to 8.0 pH Units Final    Leukocytes, UA 06/16/2025 Large (A)  Negative Final    Nitrites, UA 06/16/2025 Negative  Negative Final    Protein, UA 06/16/2025 Negative  Negative Final    Glucose, UA 06/16/2025 50 (A)  Normal mg/dL Final    Ketones, UA 06/16/2025 Negative  Negative mg/dL Final    Urobilinogen, UA 06/16/2025 Normal  0.2, 1.0, Normal mg/dL Final    Bilirubin, UA 06/16/2025 Negative  Negative Final    Blood, UA 06/16/2025  Negative  Negative Final    Specific Gravity, UA 06/16/2025 1.012  <=1.030 Final    Vitamin B12 06/16/2025 930 (H)  213 - 816 pg/mL Final    WBC 06/16/2025 5.89  4.50 - 11.00 K/uL Final    RBC 06/16/2025 3.76 (L)  4.20 - 5.40 M/uL Final    Hemoglobin 06/16/2025 10.3 (L)  12.0 - 16.0 g/dL Final    Hematocrit 06/16/2025 34.5 (L)  38.0 - 47.0 % Final    MCV 06/16/2025 91.8  80.0 - 96.0 fL Final    MCH 06/16/2025 27.4  27.0 - 31.0 pg Final    MCHC 06/16/2025 29.9 (L)  32.0 - 36.0 g/dL Final    RDW 06/16/2025 18.2 (H)  11.5 - 14.5 % Final    Platelet Count 06/16/2025 336  150 - 400 K/uL Final    MPV 06/16/2025 10.0  9.4 - 12.4 fL Final    Neutrophils % 06/16/2025 63.0  53.0 - 65.0 % Final    Lymphocytes % 06/16/2025 26.8 (L)  27.0 - 41.0 % Final    Monocytes % 06/16/2025 6.8 (H)  2.0 - 6.0 % Final    Eosinophils % 06/16/2025 2.7  1.0 - 4.0 % Final    Basophils % 06/16/2025 0.5  0.0 - 1.0 % Final    Immature Granulocytes % 06/16/2025 0.2  0.0 - 0.4 % Final    nRBC, Auto 06/16/2025 0.0  <=0.0 % Final    Neutrophils, Abs 06/16/2025 3.71  1.80 - 7.70 K/uL Final    Lymphocytes, Absolute 06/16/2025 1.58  1.00 - 4.80 K/uL Final    Monocytes, Absolute 06/16/2025 0.40  0.00 - 0.80 K/uL Final    Eosinophils, Absolute 06/16/2025 0.16  0.00 - 0.50 K/uL Final    Basophils, Absolute 06/16/2025 0.03  0.00 - 0.20 K/uL Final    Immature Granulocytes, Absolute 06/16/2025 0.01  0.00 - 0.04 K/uL Final    nRBC, Absolute 06/16/2025 0.00  <=0.00 x10e3/uL Final    Diff Type 06/16/2025 Auto   Final    WBC, UA 06/16/2025 92 (H)  <=5 /hpf Final    RBC, UA 06/16/2025 6 (H)  <=3 /hpf Final    Bacteria, UA 06/16/2025 Occasional (A)  None Seen /hpf Final    Squamous Epithelial Cells, UA 06/16/2025 Occasional (A)  None Seen /HPF Final    Mucous 06/16/2025 Occasional (A)  None Seen /LPF Final    Culture, Urine 06/16/2025 No Growth To Date   Preliminary   No results displayed because visit has over 200 results.      Office Visit on 04/08/2025   Component  Date Value Ref Range Status    Triglycerides 04/08/2025 113  37 - 140 mg/dL Final    Cholesterol 04/08/2025 213 (H)  <=200 mg/dL Final    HDL Cholesterol 04/08/2025 45  35 - 60 mg/dL Final    Cholesterol/HDL Ratio (Risk Factor) 04/08/2025 4.7   Final    Non-HDL 04/08/2025 168  mg/dL Final    LDL Calculated 04/08/2025 145  mg/dL Final    LDL/HDL 04/08/2025 3.2   Final    VLDL 04/08/2025 23  mg/dL Final    Sodium 04/08/2025 140  136 - 145 mmol/L Final    Potassium 04/08/2025 3.8  3.5 - 5.1 mmol/L Final    Chloride 04/08/2025 106  98 - 107 mmol/L Final    CO2 04/08/2025 27  23 - 31 mmol/L Final    Anion Gap 04/08/2025 11  7 - 16 mmol/L Final    Glucose 04/08/2025 120 (H)  82 - 115 mg/dL Final    BUN 04/08/2025 12  10 - 20 mg/dL Final    Creatinine 04/08/2025 0.67  0.55 - 1.02 mg/dL Final    BUN/Creatinine Ratio 04/08/2025 18  6 - 20 Final    Calcium 04/08/2025 9.2  8.4 - 10.2 mg/dL Final    eGFR 04/08/2025 90  >=60 mL/min/1.73m2 Final    TSH 04/08/2025 2.167  0.350 - 4.940 uIU/mL Final    Creatinine, Urine 04/08/2025 52  15 - 325 mg/dL Final    Microalbumin 04/08/2025 1.8  <=3.0 mg/dL Final    Microalbumin/Creatinine Ratio 04/08/2025 34.6 (H)  0.0 - 30.0 mg/g Final   Office Visit on 03/07/2025   Component Date Value Ref Range Status    POC Amphetamines 03/07/2025 Negative  Negative, Inconclusive Final    POC Barbiturates 03/07/2025 Negative  Negative, Inconclusive Final    POC Benzodiazepines 03/07/2025 Negative  Negative, Inconclusive Final    POC Cocaine 03/07/2025 Negative  Negative, Inconclusive Final    POC THC 03/07/2025 Negative  Negative, Inconclusive Final    POC Methadone 03/07/2025 Negative  Negative, Inconclusive Final    POC Methamphetamine 03/07/2025 Negative  Negative, Inconclusive Final    POC Opiates 03/07/2025 Presumptive Positive (A)  Negative, Inconclusive Final    POC Oxycodone 03/07/2025 Negative  Negative, Inconclusive Final    POC Phencyclidine 03/07/2025 Negative  Negative, Inconclusive Final     POC Methylenedioxymethamphetamine * 03/07/2025 Negative  Negative, Inconclusive Final    POC Tricyclic Antidepressants 03/07/2025 Negative  Negative, Inconclusive Final    POC Buprenorphine 03/07/2025 Negative   Final     Acceptable 03/07/2025 Yes   Final    POC Temperature (Urine) 03/07/2025 90   Final   Hospital Outpatient Visit on 01/28/2025   Component Date Value Ref Range Status    POC Glucose 01/28/2025 136 (H)  70 - 105 mg/dL Final    Case Report 01/28/2025    Final                    Value:Surgical Pathology                                Case: [id number]                                   Authorizing Provider:  Francis Gonzalez MD     Collected:           01/28/2025 09:56 AM          Ordering Location:     [facility] -         Received:            01/28/2025 10:36 AM                                 Endoscopy                                                                    Pathologist:           Brad Kenney MD                                                           Specimen:    Large intestine, Sigmoid, a. sigmoid polyp                                                 Final Diagnosis 01/28/2025    Final                    Value:A. Sigmoid colon polyp, polypectomy:  Tubular adenoma      Gross Description 01/28/2025    Final                    Value:A. Large intestine, Sigmoid: a. sigmoid polyp  The specimen is received in formalin designated a. sigmoid polyp and consists of a single pink-tan tissue fragment that measures 0.3 cm in greatest dimension and is entirely submitted in cassette A1.    Grossing was completed by Wesley Mancia.      Microscopic Description 01/28/2025    Final                    Value:A microscopic examination was performed and the diagnosis reflects the findings.          Laboratory Notes 01/28/2025    Final                    Value:If this report includes immunohistochemical (IHC) test results, please note the following: IHC studies were interpreted  in conjunction with appropriate positive and negative controls which demonstrate the expected positive and negative reactivity. This laboratory is regulated under CLIA as qualified to perform high-complexity testing. IHC tests are used for clinical purposes. They should not be regarded as investigational or research.       Office Visit on 01/07/2025   Component Date Value Ref Range Status    Color, UA 01/07/2025 Light-Yellow  Colorless, Straw, Yellow, Light Yellow, Dark Yellow Final    Clarity, UA 01/07/2025 Turbid  Clear Final    pH, UA 01/07/2025 5.5  5.0 to 8.0 pH Units Final    Leukocytes, UA 01/07/2025 Small (A)  Negative Final    Nitrites, UA 01/07/2025 Negative  Negative Final    Protein, UA 01/07/2025 10 (A)  Negative Final    Glucose, UA 01/07/2025 300 (A)  Normal mg/dL Final    Ketones, UA 01/07/2025 Negative  Negative mg/dL Final    Urobilinogen, UA 01/07/2025 Normal  0.2, 1.0, Normal mg/dL Final    Bilirubin, UA 01/07/2025 Negative  Negative Final    Blood, UA 01/07/2025 Negative  Negative Final    Specific Gravity, UA 01/07/2025 1.016  <=1.030 Final    Hemoglobin A1C 01/07/2025 6.5  <=7.0 % Final    Estimated Average Glucose 01/07/2025 140  mg/dL Final    WBC, UA 01/07/2025 40 (H)  <=5 /hpf Final    RBC, UA 01/07/2025 3  <=3 /hpf Final    Bacteria, UA 01/07/2025 Few (A)  None Seen /hpf Final    Squamous Epithelial Cells, UA 01/07/2025 Occasional (A)  None Seen /HPF Final    Amorphous Crystals, UA 01/07/2025 Occasional  None Seen /HPF Final    Mucous 01/07/2025 Occasional (A)  None Seen /LPF Final    Culture, Urine 01/07/2025 7,000 Escherichia coli (A)   Final         Orders Placed This Encounter   Procedures    Ambulatory referral/consult to Diabetic Advanced Practice Providers (Medical Management)     Referral Priority:   Routine     Referral Type:   Consultation     Referral Reason:   Specialty Services Required     Referred to Provider:   Marissa Flaherty, FNP-AGACNP     Requested Specialty:    Endocrinology     Number of Visits Requested:   1    Prior authorization Order     What medications need authorization?:   OTHER MEDICATION [[id number]]              qutenza     Dose:   8% patch kit              4 patches     Frequency:   91 days     J-Codes:   [id number]    POCT Urine Drug Screen Presump     Interpretive Information:     Negative:  No drug detected at the cut off level.   Positive:  This result represents presumptive positive for the   tested drug, other substances may yield a positive response other   than the analyte of interest. This result should be utilized for   diagnostic purpose only. Confirmation testing will be performed upon physician request only.            Requested Prescriptions     Signed Prescriptions Disp Refills    gabapentin (NEURONTIN) 400 MG capsule 90 capsule 0     Sig: TAKE 1 CAPSULE(400 MG) BY MOUTH EVERY 8 HOURS    HYDROcodone-acetaminophen (NORCO)  mg per tablet 120 tablet 0     Sig: Take 1 tablet by mouth every 6 (six) hours as needed for Pain (pain).       Assessment:     1. Diabetic neuropathy with neurologic complication    2. Lumbosacral radiculopathy    3. Bilateral foot pain    4. Encounter for long-term (current) use of medications    5. 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   Analgesia:Patients pain is partially controlled by current medication. Patient has tried OTC medications such as Tylenol and Ibuprofen with out relief.   Adverse Effects: Patient denies constipation or sedation.  Aberrant Behavior:  reviewed with no aberrant drug seeking/taking behavior.  Overdose reversal drug naloxone discussed    Drug screen reviewed          MRI lumbar spine [facility] March 25, 2021 spinal stenosis L5/S1 disc bulge facet joint arthropathy multiple level degenerative changes        Plan:    Narcan February 2023    Follows Neurology [facility] neuropathy  symptoms lower extremities    She had lumbar L5/S1 TORIE # 1 September 12, 2023, she states she had 90% relief after procedure, she states procedure did help improve her level function    She has known scoliosis      Complaining of bilateral lower extremity burning numbness tingling sensation neuropathy symptoms keeping her awake at night     Declined increase gabapentin    Requesting referral diabetic educator    Requesting Qutenza patch    Considering lumbar sympathetic nerve block    Continue home exercise program as directed     Continue current medication    Will try Qutenza  Patient has failed at least 2 modalities for neuropathy in the past  Patient's pain score is greater than 4 and has been persistent for 6 months or longer     Qutenza quantity 4 patches, Q90 days to affected area     Qutenza to affected area bottom bilateral feet     Patient verbalized understanding procedure     Qutenza every 3 months     Follow-up as directed Qutenza bilateral feet diabetic neuropathy    Dr. Brown September 2025    Bring original prescription medication bottles/container/box with labels to each visit

## 2025-06-05 NOTE — PLAN OF CARE
Problem: Gastrointestinal Bleeding  Goal: Optimal Coping with Acute Illness  Outcome: Progressing  Goal: Hemostasis  Outcome: Progressing     Problem: Adult Inpatient Plan of Care  Goal: Plan of Care Review  Outcome: Progressing  Goal: Patient-Specific Goal (Individualized)  Outcome: Progressing  Goal: Absence of Hospital-Acquired Illness or Injury  Outcome: Progressing  Goal: Optimal Comfort and Wellbeing  Outcome: Progressing  Goal: Readiness for Transition of Care  Outcome: Progressing     Problem: Infection  Goal: Absence of Infection Signs and Symptoms  Outcome: Progressing

## 2025-06-05 NOTE — PROGRESS NOTES
[facility] Medicine  Progress Note    Patient Name: Lorraine De Jesus  MRN: [id number]  Patient Class: IP- Inpatient   Admission Date: 6/2/2025  Length of Stay: 2 days  Attending Physician: Ovidio Olson MD  Primary Care Provider: Mateo Chen MD        Subjective     Principal Problem:<principal problem not specified>        HPI:    Chief complaint:  Rectal bleeding    History of present illness:     Ms. De Jesus is a 80-year-old presented to the emergency room on referral from Dr. Chen's office after presenting there complaining of recurrent rectal bleeding starting a.m. of admission.  Patient states she had gotten up and going to the bathroom proximally 10:00 a.m..  Had a bowel movement and noted there was a lot of clots of dark red blood.  No black.  States complain of some dizziness.  Awhile later had another bowel movement with similar results.  Had been taken a Dr. Kirk office and then was referred to the emergency department.  Patient has not been on any nonsteroidals.  No blood thinners.  No prior history of GI bleeding.  In January this year had colonoscopy by Dr. Gonzalez and had colon polyps removed.  Patient to be admitted to hospitalist service with GI consulted.     Review of system:  See above.  Wears glasses without recent change in vision.  Has some chronic low back pain followed at pain treatment [facility].  Denies any issues with her sleepy in the past.  Review of systems otherwise.    Past medical history:  -hypertension times 10 years  -chronic pain syndrome with chronic low back pain followed by Dr. Brown at pain treatment [facility]  -diabetes mellitus type 2 treated with orally  -states gets we check us by Dr. Doss but does not have any heart issue she is aware  -hypothyroidism    Past surgical history:  Hysterectomy bilateral salpingo-oophorectomy  Bilateral cataract surgery    Allergy:  Lipitor which causes myopathy    Social history:  No history of tobacco  alcohol use  Lives by herself  Has 2 children and daughter is in the emergency room with her  No history of tobacco alcohol use    Family history:  No one in family premature coronary artery disease  No one in family with colon or GI issues    Health maintenance issues:  Primary care providers Dr. Mateo duomnt   Does not get flu shots nor Pneumovax  Gets yearly mammogram  No recent Pap smear  Colonoscopy last January by Dr. Espinal with polyps removed        Overview/Hospital Course:  06/03 No obvious active bleeding currently Fall in HH from admission. CT abd questionable right hydronephrosis. EGD with Angioectasia in the 2nd part of the duodenum. Plan colonoscopy tomorrow.   06/04 Colonoscopy blood without bleeding. H/H stable then home soon.   Pt had refused IV to be restarted earlier.    Past Medical History:   Diagnosis Date    Adenomatous polyp of ascending colon 6/22/2021    Adenomatous polyp of descending colon 6/22/2021    Age related osteoporosis 10/28/2020    Benign paroxysmal vertigo     Chronic pain syndrome     Chronic pelvic pain in female 7/28/2021    Ditropan XL 5mg    Colon, diverticulosis 6/22/2021    Depressive disorder 08/12/2019    External hemorrhoid 6/22/2021    Gastrointestinal hemorrhage associated with anorectal source 6/22/2021    GERD (gastroesophageal reflux disease) 11/21/2013    Hyperlipidemia 01/16/2012    Hypothyroidism 02/25/2019    Joint pain     Nonrheumatic tricuspid (valve) insufficiency 03/18/2018    Personal history UTI 7/28/2021    Controlled on Hiprex    Polyneuropathy 07/11/2018    PVD (peripheral vascular disease) 10/30/2018    Temporal arteritis     Type 2 diabetes mellitus 04/02/2014    Urethral meatal stenosis 7/11/2018    history of known urethral stenosis, and was taught to perform monthly self dilation at home.       Past Surgical History:   Procedure Laterality Date     left lumbar sympathetic nerve block Left 2020    X3  DR LANDEROS    CARDIAC CATHETERIZATION   10/14/2009     SECTION      x 2    COLONOSCOPY  2009    CYSTOSCOPY WITH HYDRODISTENSION OF BLADDER N/A 2021    Procedure: CYSTOSCOPY, WITH BLADDER HYDRODISTENSION;  Surgeon: Dequan Recio MD;  Location: [facility] OR;  Service: Urology;  Laterality: N/A;    CYSTOSCOPY WITH URETHRAL DILATION  2021    Dr Recio    EPIDURAL STEROID INJECTION N/A 10/27/2022    Procedure: Injection, Steroid, Epidural, L5/S1;  Surgeon: Belkis Brown MD;  Location: [facility];  Service: Pain Management;  Laterality: N/A;    EPIDURAL STEROID INJECTION N/A 2023    Procedure: Injection, Steroid, Epidural, L5/S1;  Surgeon: Belkis Brown MD;  Location: [facility];  Service: Pain Management;  Laterality: N/A;    HYSTERECTOMY      LUMBAR SYMPATHETIC NERVE BLOCK Left 10/05/2020    Left Sympathetic Nerve Block - Dr Brown - 10/5/20, 20, 20. 20, 10/9/19, 19 and 18    right lumbar sympathetic nerve block Right 2020    X4 DR BROWN    THYROIDECTOMY             Review of patient's allergies indicates:   Allergen Reactions    Lipitor [atorvastatin] Other (See Comments)     Muscle aching    Pravachol [pravastatin] Other (See Comments)     Muscle aching       No current facility-administered medications on file prior to encounter.     Current Outpatient Medications on File Prior to Encounter   Medication Sig    estradioL (ESTRACE) 0.01 % (0.1 mg/gram) vaginal cream Place 1 g vaginally once daily for 14 days, THEN 1 g twice a week.    gabapentin (NEURONTIN) 400 MG capsule TAKE 1 CAPSULE(400 MG) BY MOUTH EVERY 8 HOURS    glipiZIDE (GLUCOTROL) 10 MG TR24 TAKE 1 TABLET(10 MG) BY MOUTH TWICE DAILY    HYDROcodone-acetaminophen (NORCO)  mg per tablet Take 1 tablet by mouth every 6 (six) hours as needed for Pain (pain).    lactulose (CHRONULAC) 10 gram/15 mL solution TAKE 30 ML(20 GRAMS TOTAL) BY MOUTH TWICE DAILY (Patient taking differently: Take 20 g by mouth 2 (two)  times daily as needed.)    levothyroxine (SYNTHROID) 75 MCG tablet Take 1 tablet (75 mcg total) by mouth before breakfast.    olmesartan (BENICAR) 40 MG tablet Take 1 tablet (40 mg total) by mouth once daily. (Patient taking differently: Take 20 mg by mouth once daily.)    oxybutynin (DITROPAN-XL) 10 MG 24 hr tablet Take one tablet at night (Patient taking differently: 10 mg every evening. Take one tablet at night)    blood sugar diagnostic (ONETOUCH VERIO TEST STRIPS) Strp 1 each by Misc.(Non-Drug; Combo Route) route 2 (two) times a day.    blood-glucose meter kit Use as instructed    blood-glucose sensor (FREESTYLE SIMBA 3 SENSOR) Lizzie 1 each by Misc.(Non-Drug; Combo Route) route Daily.    flash glucose scanning reader (FREESTYLE SIMBA 2 READER) Misc 1 each by Misc.(Non-Drug; Combo Route) route once daily.    ONETOUCH DELICA PLUS LANCET 33 gauge Misc Check blood sugar TID     Family History       Problem Relation (Age of Onset)    Cancer Mother, Father, Sister, Brother, Brother, Brother    Diabetes Sister, Sister    Hyperlipidemia Sister    Hypertension Mother, Sister    No Known Problems Sister, Brother          Tobacco Use    Smoking status: Never     Passive exposure: Never    Smokeless tobacco: Never   Substance and Sexual Activity    Alcohol use: Never    Drug use: Never    Sexual activity: Not Currently     Partners: Male     Review of Systems   Constitutional:  Positive for fatigue. Negative for appetite change and fever.   HENT:  Negative for congestion, hearing loss and trouble swallowing.    Respiratory:  Negative for chest tightness, shortness of breath and wheezing.    Cardiovascular:  Negative for chest pain and palpitations.   Gastrointestinal:  Positive for blood in stool. Negative for abdominal pain, constipation and nausea.   Genitourinary:  Negative for difficulty urinating and dysuria.   Musculoskeletal:  Positive for back pain. Negative for neck stiffness.   Skin:  Negative for pallor and rash.    Neurological:  Positive for dizziness. Negative for speech difficulty and headaches.   Psychiatric/Behavioral:  Negative for confusion and suicidal ideas.      Objective:     Vital Signs (Most Recent):  Temp: 99 °F (37.2 °C) (06/04/25 2001)  Pulse: 90 (06/04/25 2001)  Resp: 20 (06/04/25 2042)  BP: (!) 157/92 (06/04/25 2001)  SpO2: 99 % (06/04/25 2001) Vital Signs (24h Range):  Temp:  [97.8 °F (36.6 °C)-99 °F (37.2 °C)] 99 °F (37.2 °C)  Pulse:  [] 90  Resp:  [14-20] 20  SpO2:  [96 %-100 %] 99 %  BP: (100-160)/(44-92) 157/92     Weight: 74.8 kg (165 lb)  Body mass index is 25.09 kg/m².     Physical Exam  Vitals reviewed.   Constitutional:       General: She is awake. She is not in acute distress.     Appearance: She is well-developed. She is not toxic-appearing.   HENT:      Head: Normocephalic.      Nose: Nose normal.      Mouth/Throat:      Pharynx: Oropharynx is clear.   Eyes:      Extraocular Movements: Extraocular movements intact.      Pupils: Pupils are equal, round, and reactive to light.   Neck:      Thyroid: No thyroid mass.      Vascular: No carotid bruit.   Cardiovascular:      Rate and Rhythm: Normal rate and regular rhythm.      Pulses: Normal pulses.      Heart sounds: Normal heart sounds. No murmur heard.  Pulmonary:      Effort: Pulmonary effort is normal.      Breath sounds: Normal breath sounds and air entry. No wheezing.   Abdominal:      General: Bowel sounds are normal. There is no distension.      Palpations: Abdomen is soft.      Tenderness: There is no abdominal tenderness.   Musculoskeletal:         General: Normal range of motion.      Cervical back: Neck supple. No rigidity.   Skin:     General: Skin is warm.      Coloration: Skin is not jaundiced.      Findings: No lesion.   Neurological:      General: No focal deficit present.      Mental Status: She is alert and oriented to person, place, and time.      Cranial Nerves: No cranial nerve deficit.   Psychiatric:         Attention  and Perception: Attention normal.         Mood and Affect: Mood normal.         Behavior: Behavior normal. Behavior is cooperative.         Thought Content: Thought content normal.         Cognition and Memory: Cognition normal.              CRANIAL NERVES     CN III, IV, VI   Pupils are equal, round, and reactive to light.       Significant Labs: All pertinent labs within the past 24 hours have been reviewed.  BMP:   Recent Labs   Lab 06/03/25  0359         K 3.6   *   CO2 23   BUN 14   CREATININE 0.67   CALCIUM 8.1*     CBC:   Recent Labs   Lab 06/03/25  0359 06/03/25  1616 06/04/25  0440   WBC 6.42 5.83 5.70   HGB 7.9* 8.4* 7.7*   HCT 24.9* 26.7* 24.6*    199 207     CMP:   Recent Labs   Lab 06/03/25  0359      K 3.6   *   CO2 23      BUN 14   CREATININE 0.67   CALCIUM 8.1*   ANIONGAP 10       Significant Imaging: I have reviewed all pertinent imaging results/findings within the past 24 hours.    Imaging Results              CT Abdomen Pelvis With IV Contrast NO Oral Contrast (Final result)  Result time 06/02/25 18:28:39      Final result by Markie Blackmon MD (06/02/25 18:28:39)                   Impression:      1. Nonobstructing nephrolithiasis of the left kidney.  2. Probable minimal hydronephrosis on the right.  No obstruction is detected.  Recommend surveillance.  3. Diverticulosis of the colon.  4. Small subcentimeter pulmonary nodules in the right lower lobe.  See above comments.  5. Small hiatal hernia.      Electronically signed by: Markie Blackmon  Date:    06/02/2025  Time:    18:28               Narrative:    EXAMINATION:  CTA ABDOMEN PELVIS WITH and without IV CONTRAST    CLINICAL HISTORY:  GI bleed;    TECHNIQUE:  Low dose axial images, sagittal and coronal reformations were obtained from the lung bases to the pubic symphysis with and without the IV administration of 100 mL of Isovue 370 .  Oral contrast was not administered..  Precontrast, arterial and  portal venous phases.  CTA protocol.  MIPS utilized.    COMPARISON:  11/30/2020    FINDINGS:  Abdomen:    - Lower thorax:    - Lung bases: 3 mm pulmonary nodule right lower lobe on axial image 1 incompletely visualized.  4 mm nodule right lower lobe on axial 4.    For multiple solid nodules all <6 mm, Fleischner Society 2017 guidelines recommend no routine follow up for a low risk patient, or follow up with non-contrast chest CT at 12 months after discovery in a high risk patient.    Mild bibasilar atelectasis.    - Liver: Small probable hepatic cyst anteriorly in the left lobe.    Small hiatal hernia    - Gallbladder: No calcified gallstones.    - Bile Ducts: No evidence of intra or extra hepatic biliary ductal dilation.    - Spleen: Negative.    - Kidneys: 8 mm nonobstructing stone at the lower pole the left kidney.  No stones on the right.  Probable minimal hydronephrosis on the right.  No obstruction is detected.  Recommend surveillance.  No hydronephrosis or hydroureter on the left.    - Adrenals: Unremarkable.    - Pancreas: No mass or peripancreatic fat stranding.    - Retroperitoneum:  No significant adenopathy.    - Vascular: No abdominal aortic aneurysm.    - Abdominal wall:  Unremarkable.    Pelvis:    Urinary bladder is nondistended.    Status post hysterectomy.    Diverticulosis of the colon.  No evidence of acute diverticulitis.    No evidence of any focal significant GI bleed.    Bowel/Mesentery:    No evidence of bowel obstruction or inflammation.    Bones:  No acute osseous abnormality and no suspicious lytic or blastic lesion.                                       X-Ray Chest AP Portable (Final result)  Result time 06/02/25 19:44:21      Final result by Markie Blackmon MD (06/02/25 19:44:21)                   Impression:      No acute radiographic abnormality.      Electronically signed by: Markie Blackmon  Date:    06/02/2025  Time:    19:44               Narrative:    EXAMINATION:  XR CHEST AP  PORTABLE    CLINICAL HISTORY:  Gastrointestinal hemorrhage, unspecified    TECHNIQUE:  Single frontal view of the chest was performed.    COMPARISON:  10/16/2023    FINDINGS:  The lungs are clear, with normal appearance of pulmonary vasculature and no pleural effusion or pneumothorax.    The cardiac silhouette is normal in size. The hilar and mediastinal contours are unremarkable.    Bones are intact.                                    Intake/Output - Last 3 Shifts         06/03 0700  06/04 0659 06/04 0700  06/05 0659    I.V. (mL/kg) 742 (9.9)     IV Piggyback      Total Intake(mL/kg) 742 (9.9)     Urine (mL/kg/hr) 0 (0)     Blood 0     Total Output 0     Net +742           Unmeasured Urine Occurrence 1 x           Microbiology Results (last 7 days)       Procedure Component Value Units Date/Time    Urine culture [[id number]] Collected: 06/02/25 1447    Order Status: Completed Specimen: Urine Updated: 06/04/25 0731     Culture, Urine Skin/Urogenital Louise Isolated, no further workup.    Clostridioides difficile toxin/antigen with reflex to PCR [[id number]]     Order Status: Canceled Specimen: Stool                 Assessment & Plan  Chronic pain syndrome    Chronic low back pain followed by Dr. Brown, no nonsteroidal anti-inflammatory agents.  States takes Neurontin and Norco  Systolic hypertension  Vitals:    06/04/25 0406 06/04/25 0746 06/04/25 1037 06/04/25 1126   BP: 127/75 (!) 141/71 (!) 158/75 (!) 107/55    06/04/25 1129 06/04/25 1134 06/04/25 1231 06/04/25 1247   BP: (!) 101/47 (!) 100/44 (!) 149/64 (!) 153/59    06/04/25 1315 06/04/25 2001   BP: (!) 160/49 (!) 157/92         Patient's blood pressure range in the last 24 hours was: BP  Min: 100/44  Max: 160/49.The patient's inpatient anti-hypertensive regimen is listed below:  Current Antihypertensives  losartan tablet 50 mg, Daily, Oral    Plan  - BP is controlled, no changes needed to their regimen  - monitor and adjust meds as needed  Type 2 diabetes  "mellitus without complication  Patient's FSGs are controlled on current medication regimen.  Last A1c reviewed-   Lab Results   Component Value Date    HGBA1C 6.1 06/02/2025     Most recent fingerstick glucose reviewed- No results for input(s): "POCTGLUCOSE" in the last 24 hours.  Current correctional scale  Medium  Maintain anti-hyperglycemic dose as follows-   Antihyperglycemics (From admission, onward)      Start     Stop Route Frequency Ordered    06/02/25 1926  insulin aspart U-100 injection 0-10 Units         -- SubQ Before meals & nightly PRN 06/02/25 1826          Hold Oral hypoglycemics while patient is in the hospital.  Hypothyroidism    Continue Synthroid  Lower GI bleed  - Patient's hemorrhage is due to gastrointestinal bleed, this bleeding is associated with lower source.     Colonoscopy for 06/04  Anemia due to GI blood loss  Anemia is likely due to acute blood loss which was from lower GI. Most recent hemoglobin and hematocrit are listed below.  Recent Labs     06/03/25  0359 06/03/25  1616 06/04/25  0440   HGB 7.9* 8.4* 7.7*   HCT 24.9* 26.7* 24.6*     Plan  - Monitor serial CBC: Daily  - Transfuse PRBC if patient becomes hemodynamically unstable, symptomatic or H/H drops below 7/21.  - Patient has not received any PRBC transfusions to date  - Patient's anemia is currently improving    VTE Risk Mitigation (From admission, onward)           Ordered     IP VTE HIGH RISK PATIENT  Once         06/02/25 1807     Place sequential compression device  Until discontinued         06/02/25 1807                    Discharge Planning   GUZMAN: 6/5/2025     Code Status: Full Code   Medical Readiness for Discharge Date:   Discharge Plan A: Home Health, Home                        Ovidio Olson MD  Department of Hospital Medicine   [facility]    "

## 2025-06-05 NOTE — ASSESSMENT & PLAN NOTE
Anemia is likely due to acute blood loss which was from lower GI. Most recent hemoglobin and hematocrit are listed below.  Recent Labs     06/03/25  0359 06/03/25  1616 06/04/25  0440   HGB 7.9* 8.4* 7.7*   HCT 24.9* 26.7* 24.6*     Plan  - Monitor serial CBC: Daily  - Transfuse PRBC if patient becomes hemodynamically unstable, symptomatic or H/H drops below 7/21.  - Patient has not received any PRBC transfusions to date  - Patient's anemia is currently improving

## 2025-06-05 NOTE — NURSING
Nursing supervisor at the bedside with the ultrasound machine to attempt to place an IV for blood administration. Attempts unsuccessful

## 2025-06-05 NOTE — ASSESSMENT & PLAN NOTE
Vitals:    06/04/25 0406 06/04/25 0746 06/04/25 1037 06/04/25 1126   BP: 127/75 (!) 141/71 (!) 158/75 (!) 107/55    06/04/25 1129 06/04/25 1134 06/04/25 1231 06/04/25 1247   BP: (!) 101/47 (!) 100/44 (!) 149/64 (!) 153/59    06/04/25 1315 06/04/25 2001   BP: (!) 160/49 (!) 157/92         Patient's blood pressure range in the last 24 hours was: BP  Min: 100/44  Max: 160/49.The patient's inpatient anti-hypertensive regimen is listed below:  Current Antihypertensives  losartan tablet 50 mg, Daily, Oral    Plan  - BP is controlled, no changes needed to their regimen  - monitor and adjust meds as needed

## 2025-06-05 NOTE — SUBJECTIVE & OBJECTIVE
Past Medical History:   Diagnosis Date    Adenomatous polyp of ascending colon 2021    Adenomatous polyp of descending colon 2021    Age related osteoporosis 10/28/2020    Benign paroxysmal vertigo     Chronic pain syndrome     Chronic pelvic pain in female 2021    Ditropan XL 5mg    Colon, diverticulosis 2021    Depressive disorder 2019    External hemorrhoid 2021    Gastrointestinal hemorrhage associated with anorectal source 2021    GERD (gastroesophageal reflux disease) 2013    Hyperlipidemia 2012    Hypothyroidism 2019    Joint pain     Nonrheumatic tricuspid (valve) insufficiency 2018    Personal history UTI 2021    Controlled on Hiprex    Polyneuropathy 2018    PVD (peripheral vascular disease) 10/30/2018    Temporal arteritis     Type 2 diabetes mellitus 2014    Urethral meatal stenosis 2018    history of known urethral stenosis, and was taught to perform monthly self dilation at home.       Past Surgical History:   Procedure Laterality Date     left lumbar sympathetic nerve block Left 2020    X3  DR BROWN    CARDIAC CATHETERIZATION  10/14/2009     SECTION      x 2    COLONOSCOPY  2009    CYSTOSCOPY WITH HYDRODISTENSION OF BLADDER N/A 2021    Procedure: CYSTOSCOPY, WITH BLADDER HYDRODISTENSION;  Surgeon: Dequan Recio MD;  Location: [facility];  Service: Urology;  Laterality: N/A;    CYSTOSCOPY WITH URETHRAL DILATION  2021    Dr Recio    EPIDURAL STEROID INJECTION N/A 10/27/2022    Procedure: Injection, Steroid, Epidural, L5/S1;  Surgeon: Belkis Brown MD;  Location: [facility];  Service: Pain Management;  Laterality: N/A;    EPIDURAL STEROID INJECTION N/A 2023    Procedure: Injection, Steroid, Epidural, L5/S1;  Surgeon: Belkis Brown MD;  Location: [facility];  Service: Pain Management;  Laterality: N/A;    HYSTERECTOMY      LUMBAR SYMPATHETIC NERVE BLOCK Left  10/05/2020    Left Sympathetic Nerve Block - Dr Brown - 10/5/20, 9/21/20, 1/20/20. 1/6/20, 10/9/19, 9/25/19 and 7/11/18    right lumbar sympathetic nerve block Right 2020    X4 DR BROWN    THYROIDECTOMY      1990's       Review of patient's allergies indicates:   Allergen Reactions    Lipitor [atorvastatin] Other (See Comments)     Muscle aching    Pravachol [pravastatin] Other (See Comments)     Muscle aching       No current facility-administered medications on file prior to encounter.     Current Outpatient Medications on File Prior to Encounter   Medication Sig    estradioL (ESTRACE) 0.01 % (0.1 mg/gram) vaginal cream Place 1 g vaginally once daily for 14 days, THEN 1 g twice a week.    gabapentin (NEURONTIN) 400 MG capsule TAKE 1 CAPSULE(400 MG) BY MOUTH EVERY 8 HOURS    glipiZIDE (GLUCOTROL) 10 MG TR24 TAKE 1 TABLET(10 MG) BY MOUTH TWICE DAILY    HYDROcodone-acetaminophen (NORCO)  mg per tablet Take 1 tablet by mouth every 6 (six) hours as needed for Pain (pain).    lactulose (CHRONULAC) 10 gram/15 mL solution TAKE 30 ML(20 GRAMS TOTAL) BY MOUTH TWICE DAILY (Patient taking differently: Take 20 g by mouth 2 (two) times daily as needed.)    levothyroxine (SYNTHROID) 75 MCG tablet Take 1 tablet (75 mcg total) by mouth before breakfast.    olmesartan (BENICAR) 40 MG tablet Take 1 tablet (40 mg total) by mouth once daily. (Patient taking differently: Take 20 mg by mouth once daily.)    oxybutynin (DITROPAN-XL) 10 MG 24 hr tablet Take one tablet at night (Patient taking differently: 10 mg every evening. Take one tablet at night)    blood sugar diagnostic (ONETOUCH VERIO TEST STRIPS) Strp 1 each by Misc.(Non-Drug; Combo Route) route 2 (two) times a day.    blood-glucose meter kit Use as instructed    blood-glucose sensor (FREESTYLE SIMBA 3 SENSOR) Lizzie 1 each by Misc.(Non-Drug; Combo Route) route Daily.    flash glucose scanning reader (FREESTYLE SIMBA 2 READER) Misc 1 each by Misc.(Non-Drug; Combo Route)  route once daily.    ONETOUCH DELICA PLUS LANCET 33 gauge Misc Check blood sugar TID     Family History       Problem Relation (Age of Onset)    Cancer Mother, Father, Sister, Brother, Brother, Brother    Diabetes Sister, Sister    Hyperlipidemia Sister    Hypertension Mother, Sister    No Known Problems Sister, Brother          Tobacco Use    Smoking status: Never     Passive exposure: Never    Smokeless tobacco: Never   Substance and Sexual Activity    Alcohol use: Never    Drug use: Never    Sexual activity: Not Currently     Partners: Male     Review of Systems   Constitutional:  Positive for fatigue. Negative for appetite change and fever.   HENT:  Negative for congestion, hearing loss and trouble swallowing.    Respiratory:  Negative for chest tightness, shortness of breath and wheezing.    Cardiovascular:  Negative for chest pain and palpitations.   Gastrointestinal:  Positive for blood in stool. Negative for abdominal pain, constipation and nausea.   Genitourinary:  Negative for difficulty urinating and dysuria.   Musculoskeletal:  Positive for back pain. Negative for neck stiffness.   Skin:  Negative for pallor and rash.   Neurological:  Positive for dizziness. Negative for speech difficulty and headaches.   Psychiatric/Behavioral:  Negative for confusion and suicidal ideas.      Objective:     Vital Signs (Most Recent):  Temp: 99 °F (37.2 °C) (06/04/25 2001)  Pulse: 90 (06/04/25 2001)  Resp: 20 (06/04/25 2042)  BP: (!) 157/92 (06/04/25 2001)  SpO2: 99 % (06/04/25 2001) Vital Signs (24h Range):  Temp:  [97.8 °F (36.6 °C)-99 °F (37.2 °C)] 99 °F (37.2 °C)  Pulse:  [] 90  Resp:  [14-20] 20  SpO2:  [96 %-100 %] 99 %  BP: (100-160)/(44-92) 157/92     Weight: 74.8 kg (165 lb)  Body mass index is 25.09 kg/m².     Physical Exam  Vitals reviewed.   Constitutional:       General: She is awake. She is not in acute distress.     Appearance: She is well-developed. She is not toxic-appearing.   HENT:      Head:  Normocephalic.      Nose: Nose normal.      Mouth/Throat:      Pharynx: Oropharynx is clear.   Eyes:      Extraocular Movements: Extraocular movements intact.      Pupils: Pupils are equal, round, and reactive to light.   Neck:      Thyroid: No thyroid mass.      Vascular: No carotid bruit.   Cardiovascular:      Rate and Rhythm: Normal rate and regular rhythm.      Pulses: Normal pulses.      Heart sounds: Normal heart sounds. No murmur heard.  Pulmonary:      Effort: Pulmonary effort is normal.      Breath sounds: Normal breath sounds and air entry. No wheezing.   Abdominal:      General: Bowel sounds are normal. There is no distension.      Palpations: Abdomen is soft.      Tenderness: There is no abdominal tenderness.   Musculoskeletal:         General: Normal range of motion.      Cervical back: Neck supple. No rigidity.   Skin:     General: Skin is warm.      Coloration: Skin is not jaundiced.      Findings: No lesion.   Neurological:      General: No focal deficit present.      Mental Status: She is alert and oriented to person, place, and time.      Cranial Nerves: No cranial nerve deficit.   Psychiatric:         Attention and Perception: Attention normal.         Mood and Affect: Mood normal.         Behavior: Behavior normal. Behavior is cooperative.         Thought Content: Thought content normal.         Cognition and Memory: Cognition normal.              CRANIAL NERVES     CN III, IV, VI   Pupils are equal, round, and reactive to light.       Significant Labs: All pertinent labs within the past 24 hours have been reviewed.  BMP:   Recent Labs   Lab 06/03/25  0359         K 3.6   *   CO2 23   BUN 14   CREATININE 0.67   CALCIUM 8.1*     CBC:   Recent Labs   Lab 06/03/25  0359 06/03/25  1616 06/04/25  0440   WBC 6.42 5.83 5.70   HGB 7.9* 8.4* 7.7*   HCT 24.9* 26.7* 24.6*    199 207     CMP:   Recent Labs   Lab 06/03/25  0359      K 3.6   *   CO2 23      BUN 14    CREATININE 0.67   CALCIUM 8.1*   ANIONGAP 10       Significant Imaging: I have reviewed all pertinent imaging results/findings within the past 24 hours.    Imaging Results              CT Abdomen Pelvis With IV Contrast NO Oral Contrast (Final result)  Result time 06/02/25 18:28:39      Final result by Markie Blackmon MD (06/02/25 18:28:39)                   Impression:      1. Nonobstructing nephrolithiasis of the left kidney.  2. Probable minimal hydronephrosis on the right.  No obstruction is detected.  Recommend surveillance.  3. Diverticulosis of the colon.  4. Small subcentimeter pulmonary nodules in the right lower lobe.  See above comments.  5. Small hiatal hernia.      Electronically signed by: Markie Blackmon  Date:    06/02/2025  Time:    18:28               Narrative:    EXAMINATION:  CTA ABDOMEN PELVIS WITH and without IV CONTRAST    CLINICAL HISTORY:  GI bleed;    TECHNIQUE:  Low dose axial images, sagittal and coronal reformations were obtained from the lung bases to the pubic symphysis with and without the IV administration of 100 mL of Isovue 370 .  Oral contrast was not administered..  Precontrast, arterial and portal venous phases.  CTA protocol.  MIPS utilized.    COMPARISON:  11/30/2020    FINDINGS:  Abdomen:    - Lower thorax:    - Lung bases: 3 mm pulmonary nodule right lower lobe on axial image 1 incompletely visualized.  4 mm nodule right lower lobe on axial 4.    For multiple solid nodules all <6 mm, Fleischner Society 2017 guidelines recommend no routine follow up for a low risk patient, or follow up with non-contrast chest CT at 12 months after discovery in a high risk patient.    Mild bibasilar atelectasis.    - Liver: Small probable hepatic cyst anteriorly in the left lobe.    Small hiatal hernia    - Gallbladder: No calcified gallstones.    - Bile Ducts: No evidence of intra or extra hepatic biliary ductal dilation.    - Spleen: Negative.    - Kidneys: 8 mm nonobstructing stone at  the lower pole the left kidney.  No stones on the right.  Probable minimal hydronephrosis on the right.  No obstruction is detected.  Recommend surveillance.  No hydronephrosis or hydroureter on the left.    - Adrenals: Unremarkable.    - Pancreas: No mass or peripancreatic fat stranding.    - Retroperitoneum:  No significant adenopathy.    - Vascular: No abdominal aortic aneurysm.    - Abdominal wall:  Unremarkable.    Pelvis:    Urinary bladder is nondistended.    Status post hysterectomy.    Diverticulosis of the colon.  No evidence of acute diverticulitis.    No evidence of any focal significant GI bleed.    Bowel/Mesentery:    No evidence of bowel obstruction or inflammation.    Bones:  No acute osseous abnormality and no suspicious lytic or blastic lesion.                                       X-Ray Chest AP Portable (Final result)  Result time 06/02/25 19:44:21      Final result by Markie Blackmon MD (06/02/25 19:44:21)                   Impression:      No acute radiographic abnormality.      Electronically signed by: Markie Blackmon  Date:    06/02/2025  Time:    19:44               Narrative:    EXAMINATION:  XR CHEST AP PORTABLE    CLINICAL HISTORY:  Gastrointestinal hemorrhage, unspecified    TECHNIQUE:  Single frontal view of the chest was performed.    COMPARISON:  10/16/2023    FINDINGS:  The lungs are clear, with normal appearance of pulmonary vasculature and no pleural effusion or pneumothorax.    The cardiac silhouette is normal in size. The hilar and mediastinal contours are unremarkable.    Bones are intact.                                    Intake/Output - Last 3 Shifts         06/03 0700  06/04 0659 06/04 0700  06/05 0659    I.V. (mL/kg) 742 (9.9)     IV Piggyback      Total Intake(mL/kg) 742 (9.9)     Urine (mL/kg/hr) 0 (0)     Blood 0     Total Output 0     Net +742           Unmeasured Urine Occurrence 1 x           Microbiology Results (last 7 days)       Procedure Component Value Units  Date/Time    Urine culture [[id number]] Collected: 06/02/25 1447    Order Status: Completed Specimen: Urine Updated: 06/04/25 0731     Culture, Urine Skin/Urogenital Louise Isolated, no further workup.    Clostridioides difficile toxin/antigen with reflex to PCR [[id number]]     Order Status: Canceled Specimen: Stool

## 2025-06-06 VITALS
OXYGEN SATURATION: 99 % | DIASTOLIC BLOOD PRESSURE: 77 MMHG | HEIGHT: 68 IN | TEMPERATURE: 98 F | HEART RATE: 63 BPM | BODY MASS INDEX: 25.01 KG/M2 | WEIGHT: 165 LBS | RESPIRATION RATE: 16 BRPM | SYSTOLIC BLOOD PRESSURE: 155 MMHG

## 2025-06-06 PROBLEM — Z12.4 SCREENING FOR CERVICAL CANCER: Status: RESOLVED | Noted: 2022-09-19 | Resolved: 2025-06-06

## 2025-06-06 PROBLEM — R19.7 DIARRHEA: Status: RESOLVED | Noted: 2024-03-16 | Resolved: 2025-06-06

## 2025-06-06 PROBLEM — R30.0 DYSURIA: Status: RESOLVED | Noted: 2021-05-05 | Resolved: 2025-06-06

## 2025-06-06 PROBLEM — B37.9 YEAST INFECTION: Status: RESOLVED | Noted: 2024-02-14 | Resolved: 2025-06-06

## 2025-06-06 PROBLEM — Z12.11 SCREENING FOR MALIGNANT NEOPLASM OF COLON: Status: RESOLVED | Noted: 2025-01-28 | Resolved: 2025-06-06

## 2025-06-06 PROBLEM — M25.552 LEFT HIP PAIN: Status: RESOLVED | Noted: 2021-03-25 | Resolved: 2025-06-06

## 2025-06-06 PROBLEM — B37.31 YEAST INFECTION INVOLVING THE VAGINA AND SURROUNDING AREA: Status: RESOLVED | Noted: 2021-04-19 | Resolved: 2025-06-06

## 2025-06-06 PROBLEM — K64.4 EXTERNAL HEMORRHOID: Status: RESOLVED | Noted: 2021-06-22 | Resolved: 2025-06-06

## 2025-06-06 PROBLEM — J02.9 ACUTE PHARYNGITIS: Status: RESOLVED | Noted: 2021-07-13 | Resolved: 2025-06-06

## 2025-06-06 PROBLEM — N76.4 LEFT GENITAL LABIAL ABSCESS: Status: RESOLVED | Noted: 2022-12-05 | Resolved: 2025-06-06

## 2025-06-06 PROBLEM — R10.9 ABDOMINAL PAIN: Status: RESOLVED | Noted: 2023-07-31 | Resolved: 2025-06-06

## 2025-06-06 PROBLEM — Z20.828 EXPOSURE TO VIRAL DISEASE: Status: RESOLVED | Noted: 2024-02-15 | Resolved: 2025-06-06

## 2025-06-06 PROBLEM — R42 DIZZINESS: Status: RESOLVED | Noted: 2021-12-06 | Resolved: 2025-06-06

## 2025-06-06 PROBLEM — M79.671 RIGHT FOOT PAIN: Chronic | Status: RESOLVED | Noted: 2021-03-25 | Resolved: 2025-06-06

## 2025-06-06 PROBLEM — N20.0 RENAL CALCULUS: Chronic | Status: RESOLVED | Noted: 2023-08-09 | Resolved: 2025-06-06

## 2025-06-06 PROBLEM — Z11.59 SCREENING FOR VIRAL DISEASE: Status: RESOLVED | Noted: 2022-09-19 | Resolved: 2025-06-06

## 2025-06-06 PROBLEM — N32.89 BLADDER SPASMS: Status: RESOLVED | Noted: 2021-05-05 | Resolved: 2025-06-06

## 2025-06-06 LAB
BASOPHILS # BLD AUTO: 0.03 K/UL (ref 0–0.2)
BASOPHILS NFR BLD AUTO: 0.4 % (ref 0–1)
DIFFERENTIAL METHOD BLD: ABNORMAL
EOSINOPHIL # BLD AUTO: 0.15 K/UL (ref 0–0.5)
EOSINOPHIL NFR BLD AUTO: 2.1 % (ref 1–4)
ERYTHROCYTE [DISTWIDTH] IN BLOOD BY AUTOMATED COUNT: 16.1 % (ref 11.5–14.5)
GLUCOSE SERPL-MCNC: 137 MG/DL (ref 70–105)
GLUCOSE SERPL-MCNC: 147 MG/DL (ref 70–105)
HCT VFR BLD AUTO: 25.6 % (ref 38–47)
HGB BLD-MCNC: 8.2 G/DL (ref 12–16)
IMM GRANULOCYTES # BLD AUTO: 0.02 K/UL (ref 0–0.04)
IMM GRANULOCYTES NFR BLD: 0.3 % (ref 0–0.4)
LYMPHOCYTES # BLD AUTO: 2.55 K/UL (ref 1–4.8)
LYMPHOCYTES NFR BLD AUTO: 36.2 % (ref 27–41)
MCH RBC QN AUTO: 27.6 PG (ref 27–31)
MCHC RBC AUTO-ENTMCNC: 32 G/DL (ref 32–36)
MCV RBC AUTO: 86.2 FL (ref 80–96)
MONOCYTES # BLD AUTO: 0.53 K/UL (ref 0–0.8)
MONOCYTES NFR BLD AUTO: 7.5 % (ref 2–6)
MPC BLD CALC-MCNC: 10 FL (ref 9.4–12.4)
NEUTROPHILS # BLD AUTO: 3.77 K/UL (ref 1.8–7.7)
NEUTROPHILS NFR BLD AUTO: 53.5 % (ref 53–65)
NRBC # BLD AUTO: 0 X10E3/UL
NRBC, AUTO (.00): 0 %
PLATELET # BLD AUTO: 207 K/UL (ref 150–400)
RBC # BLD AUTO: 2.97 M/UL (ref 4.2–5.4)
WBC # BLD AUTO: 7.05 K/UL (ref 4.5–11)

## 2025-06-06 PROCEDURE — 25000003 PHARM REV CODE 250: Performed by: HOSPITALIST

## 2025-06-06 PROCEDURE — 36415 COLL VENOUS BLD VENIPUNCTURE: CPT | Performed by: HOSPITALIST

## 2025-06-06 PROCEDURE — 25000003 PHARM REV CODE 250: Performed by: INTERNAL MEDICINE

## 2025-06-06 PROCEDURE — 36430 TRANSFUSION BLD/BLD COMPNT: CPT

## 2025-06-06 PROCEDURE — 85025 COMPLETE CBC W/AUTO DIFF WBC: CPT | Performed by: HOSPITALIST

## 2025-06-06 PROCEDURE — 82962 GLUCOSE BLOOD TEST: CPT

## 2025-06-06 RX ORDER — PANTOPRAZOLE SODIUM 40 MG/1
40 TABLET, DELAYED RELEASE ORAL DAILY
Qty: 90 TABLET | Refills: 0 | Status: SHIPPED | OUTPATIENT
Start: 2025-06-07 | End: 2026-06-07

## 2025-06-06 RX ORDER — DOCUSATE SODIUM 100 MG/1
100 CAPSULE, LIQUID FILLED ORAL 2 TIMES DAILY
Qty: 60 CAPSULE | Refills: 3 | Status: SHIPPED | OUTPATIENT
Start: 2025-06-06

## 2025-06-06 RX ORDER — FERROUS SULFATE 325(65) MG
325 TABLET ORAL 2 TIMES DAILY
Qty: 60 TABLET | Refills: 3 | Status: SHIPPED | OUTPATIENT
Start: 2025-06-06

## 2025-06-06 RX ORDER — OLMESARTAN MEDOXOMIL 20 MG/1
20 TABLET ORAL DAILY
Qty: 90 TABLET | Refills: 3 | Status: SHIPPED | OUTPATIENT
Start: 2025-06-06 | End: 2026-06-06

## 2025-06-06 RX ADMIN — GABAPENTIN 400 MG: 400 CAPSULE ORAL at 09:06

## 2025-06-06 RX ADMIN — PHENAZOPYRIDINE 100 MG: 100 TABLET ORAL at 01:06

## 2025-06-06 RX ADMIN — PHENAZOPYRIDINE 100 MG: 100 TABLET ORAL at 09:06

## 2025-06-06 RX ADMIN — POTASSIUM CHLORIDE 40 MEQ: 1500 TABLET, EXTENDED RELEASE ORAL at 09:06

## 2025-06-06 RX ADMIN — PANTOPRAZOLE SODIUM 40 MG: 40 TABLET, DELAYED RELEASE ORAL at 09:06

## 2025-06-06 RX ADMIN — LEVOTHYROXINE SODIUM 75 MCG: 75 TABLET ORAL at 06:06

## 2025-06-06 RX ADMIN — LOSARTAN POTASSIUM 50 MG: 50 TABLET, FILM COATED ORAL at 09:06

## 2025-06-06 RX ADMIN — OXYBUTYNIN CHLORIDE 10 MG: 10 TABLET, EXTENDED RELEASE ORAL at 09:06

## 2025-06-06 NOTE — ASSESSMENT & PLAN NOTE
- Patient's hemorrhage is due to gastrointestinal bleed, this bleeding is associated with lower source.     Colonoscopy for 06/04 06/05 No more bleeding obvious

## 2025-06-06 NOTE — ASSESSMENT & PLAN NOTE
Vitals:    06/06/25 0058 06/06/25 0113 06/06/25 0128 06/06/25 0143   BP: (!) 104/56 114/62 105/62 102/63    06/06/25 0158 06/06/25 0210 06/06/25 0241 06/06/25 0315   BP: 101/61 112/65 113/63 (!) 105/53    06/06/25 0515 06/06/25 0746   BP: (!) 100/54 114/66         Patient's blood pressure range in the last 24 hours was: BP  Min: 96/58  Max: 147/76.The patient's inpatient anti-hypertensive regimen is listed below:  Current Antihypertensives  losartan tablet 50 mg, Daily, Oral  olmesartan (BENICAR) tablet, Daily, Oral    Plan  - BP is controlled, no changes needed to their regimen  - monitor and adjust meds as needed

## 2025-06-06 NOTE — PROGRESS NOTES
[facility] Medicine  Progress Note    Patient Name: Lorraine De Jesus  MRN: [id number]  Patient Class: IP- Inpatient   Admission Date: 6/2/2025  Length of Stay: 3 days  Attending Physician: Ovidio Olson MD  Primary Care Provider: Mateo Chen MD        Subjective     Principal Problem:Lower GI bleed        HPI:    Chief complaint:  Rectal bleeding    History of present illness:     Ms. De Jesus is a 80-year-old presented to the emergency room on referral from Dr. Chen's office after presenting there complaining of recurrent rectal bleeding starting a.m. of admission.  Patient states she had gotten up and going to the bathroom proximally 10:00 a.m..  Had a bowel movement and noted there was a lot of clots of dark red blood.  No black.  States complain of some dizziness.  Awhile later had another bowel movement with similar results.  Had been taken a Dr. Kirk office and then was referred to the emergency department.  Patient has not been on any nonsteroidals.  No blood thinners.  No prior history of GI bleeding.  In January this year had colonoscopy by Dr. Gonzalez and had colon polyps removed.  Patient to be admitted to hospitalist service with GI consulted.     Review of system:  See above.  Wears glasses without recent change in vision.  Has some chronic low back pain followed at pain treatment [facility].  Denies any issues with her sleepy in the past.  Review of systems otherwise.    Past medical history:  -hypertension times 10 years  -chronic pain syndrome with chronic low back pain followed by Dr. Brown at pain treatment Center  -diabetes mellitus type 2 treated with orally  -states gets we check us by Dr. Doss but does not have any heart issue she is aware  -hypothyroidism    Past surgical history:  Hysterectomy bilateral salpingo-oophorectomy  Bilateral cataract surgery    Allergy:  Lipitor which causes myopathy    Social history:  No history of tobacco alcohol use  Lives  by herself  Has 2 children and daughter is in the emergency room with her  No history of tobacco alcohol use    Family history:  No one in family premature coronary artery disease  No one in family with colon or GI issues    Health maintenance issues:  Primary care providers Dr. Mateo dumont   Does not get flu shots nor Pneumovax  Gets yearly mammogram  No recent Pap smear  Colonoscopy last January by Dr. Espinal with polyps removed        Overview/Hospital Course:  06/03 No obvious active bleeding currently Fall in HH from admission. CT abd questionable right hydronephrosis. EGD with Angioectasia in the 2nd part of the duodenum. Plan colonoscopy tomorrow.   06/04 Colonoscopy blood without bleeding. H/H stable then home soon.   06/05 Fatigue. No more bleeding. Some fall in H/H. Offered transfusion and pt wished to proceed.     Past Medical History:   Diagnosis Date    Adenomatous polyp of ascending colon 6/22/2021    Adenomatous polyp of descending colon 6/22/2021    Age related osteoporosis 10/28/2020    Benign paroxysmal vertigo     Chronic pain syndrome     Chronic pelvic pain in female 7/28/2021    Ditropan XL 5mg    Colon, diverticulosis 6/22/2021    Depressive disorder 08/12/2019    External hemorrhoid 6/22/2021    Gastrointestinal hemorrhage associated with anorectal source 6/22/2021    GERD (gastroesophageal reflux disease) 11/21/2013    Hyperlipidemia 01/16/2012    Hypothyroidism 02/25/2019    Joint pain     Nonrheumatic tricuspid (valve) insufficiency 03/18/2018    Personal history UTI 7/28/2021    Controlled on Hiprex    Polyneuropathy 07/11/2018    PVD (peripheral vascular disease) 10/30/2018    Temporal arteritis     Type 2 diabetes mellitus 04/02/2014    Urethral meatal stenosis 7/11/2018    history of known urethral stenosis, and was taught to perform monthly self dilation at home.       Past Surgical History:   Procedure Laterality Date     left lumbar sympathetic nerve block Left 2020    X3  DR LANDEROS     CARDIAC CATHETERIZATION  10/14/2009     SECTION      x 2    COLONOSCOPY  2009    CYSTOSCOPY WITH HYDRODISTENSION OF BLADDER N/A 2021    Procedure: CYSTOSCOPY, WITH BLADDER HYDRODISTENSION;  Surgeon: Dequan Recio MD;  Location: [facility] OR;  Service: Urology;  Laterality: N/A;    CYSTOSCOPY WITH URETHRAL DILATION  2021    Dr Recio    EPIDURAL STEROID INJECTION N/A 10/27/2022    Procedure: Injection, Steroid, Epidural, L5/S1;  Surgeon: Belkis Brown MD;  Location: [facility];  Service: Pain Management;  Laterality: N/A;    EPIDURAL STEROID INJECTION N/A 2023    Procedure: Injection, Steroid, Epidural, L5/S1;  Surgeon: Belkis Brown MD;  Location: [facility];  Service: Pain Management;  Laterality: N/A;    HYSTERECTOMY      LUMBAR SYMPATHETIC NERVE BLOCK Left 10/05/2020    Left Sympathetic Nerve Block - Dr Brown - 10/5/20, 20, 20. 20, 10/9/19, 19 and 18    right lumbar sympathetic nerve block Right 2020    X4 DR BROWN    THYROIDECTOMY             Review of patient's allergies indicates:   Allergen Reactions    Lipitor [atorvastatin] Other (See Comments)     Muscle aching    Pravachol [pravastatin] Other (See Comments)     Muscle aching       No current facility-administered medications on file prior to encounter.     Current Outpatient Medications on File Prior to Encounter   Medication Sig    estradioL (ESTRACE) 0.01 % (0.1 mg/gram) vaginal cream Place 1 g vaginally once daily for 14 days, THEN 1 g twice a week.    gabapentin (NEURONTIN) 400 MG capsule TAKE 1 CAPSULE(400 MG) BY MOUTH EVERY 8 HOURS    glipiZIDE (GLUCOTROL) 10 MG TR24 TAKE 1 TABLET(10 MG) BY MOUTH TWICE DAILY    HYDROcodone-acetaminophen (NORCO)  mg per tablet Take 1 tablet by mouth every 6 (six) hours as needed for Pain (pain).    lactulose (CHRONULAC) 10 gram/15 mL solution TAKE 30 ML(20 GRAMS TOTAL) BY MOUTH TWICE DAILY (Patient taking  differently: Take 20 g by mouth 2 (two) times daily as needed.)    levothyroxine (SYNTHROID) 75 MCG tablet Take 1 tablet (75 mcg total) by mouth before breakfast.    olmesartan (BENICAR) 40 MG tablet Take 1 tablet (40 mg total) by mouth once daily. (Patient taking differently: Take 20 mg by mouth once daily.)    oxybutynin (DITROPAN-XL) 10 MG 24 hr tablet Take one tablet at night (Patient taking differently: 10 mg every evening. Take one tablet at night)    blood sugar diagnostic (ONETOUCH VERIO TEST STRIPS) Strp 1 each by Misc.(Non-Drug; Combo Route) route 2 (two) times a day.    blood-glucose meter kit Use as instructed    blood-glucose sensor (FREESTYLE SIMBA 3 SENSOR) Lizzie 1 each by Misc.(Non-Drug; Combo Route) route Daily.    flash glucose scanning reader (FREESTYLE SIMBA 2 READER) Misc 1 each by Misc.(Non-Drug; Combo Route) route once daily.    ONETOUCH DELICA PLUS LANCET 33 gauge Misc Check blood sugar TID     Family History       Problem Relation (Age of Onset)    Cancer Mother, Father, Sister, Brother, Brother, Brother    Diabetes Sister, Sister    Hyperlipidemia Sister    Hypertension Mother, Sister    No Known Problems Sister, Brother          Tobacco Use    Smoking status: Never     Passive exposure: Never    Smokeless tobacco: Never   Substance and Sexual Activity    Alcohol use: Never    Drug use: Never    Sexual activity: Not Currently     Partners: Male     Review of Systems   Constitutional:  Positive for fatigue. Negative for appetite change and fever.   HENT:  Negative for congestion, hearing loss and trouble swallowing.    Respiratory:  Negative for chest tightness, shortness of breath and wheezing.    Cardiovascular:  Negative for chest pain and palpitations.   Gastrointestinal:  Positive for blood in stool. Negative for abdominal pain, constipation and nausea.   Genitourinary:  Negative for difficulty urinating and dysuria.   Musculoskeletal:  Positive for back pain. Negative for neck  stiffness.   Skin:  Negative for pallor and rash.   Neurological:  Positive for dizziness. Negative for speech difficulty and headaches.   Psychiatric/Behavioral:  Negative for confusion and suicidal ideas.      Objective:     Vital Signs (Most Recent):  Temp: 98.1 °F (36.7 °C) (06/05/25 2241)  Pulse: 100 (06/05/25 2241)  Resp: 16 (06/05/25 2206)  BP: 139/74 (06/05/25 2241)  SpO2: 97 % (06/05/25 2241) Vital Signs (24h Range):  Temp:  [97.5 °F (36.4 °C)-98.7 °F (37.1 °C)] 98.1 °F (36.7 °C)  Pulse:  [] 100  Resp:  [13-18] 16  SpO2:  [95 %-98 %] 97 %  BP: (103-147)/(62-80) 139/74     Weight: 74.8 kg (165 lb)  Body mass index is 25.09 kg/m².     Physical Exam  Vitals reviewed.   Constitutional:       General: She is awake. She is not in acute distress.     Appearance: She is well-developed. She is not toxic-appearing.   HENT:      Head: Normocephalic.      Nose: Nose normal.      Mouth/Throat:      Pharynx: Oropharynx is clear.   Eyes:      Extraocular Movements: Extraocular movements intact.      Pupils: Pupils are equal, round, and reactive to light.   Neck:      Thyroid: No thyroid mass.      Vascular: No carotid bruit.   Cardiovascular:      Rate and Rhythm: Normal rate and regular rhythm.      Pulses: Normal pulses.      Heart sounds: Normal heart sounds. No murmur heard.  Pulmonary:      Effort: Pulmonary effort is normal.      Breath sounds: Normal breath sounds and air entry. No wheezing.   Abdominal:      General: Bowel sounds are normal. There is no distension.      Palpations: Abdomen is soft.      Tenderness: There is no abdominal tenderness.   Musculoskeletal:         General: Normal range of motion.      Cervical back: Neck supple. No rigidity.   Skin:     General: Skin is warm.      Coloration: Skin is not jaundiced.      Findings: No lesion.   Neurological:      General: No focal deficit present.      Mental Status: She is alert and oriented to person, place, and time.      Cranial Nerves: No  cranial nerve deficit.   Psychiatric:         Attention and Perception: Attention normal.         Mood and Affect: Mood normal.         Behavior: Behavior normal. Behavior is cooperative.         Thought Content: Thought content normal.         Cognition and Memory: Cognition normal.              CRANIAL NERVES     CN III, IV, VI   Pupils are equal, round, and reactive to light.       Significant Labs: All pertinent labs within the past 24 hours have been reviewed.  BMP:   Recent Labs   Lab 06/05/25  0430         K 3.4*   *   CO2 26   BUN 9*   CREATININE 0.91   CALCIUM 8.1*     CBC:   Recent Labs   Lab 06/04/25  0440 06/05/25  0430   WBC 5.70 5.56   HGB 7.7* 7.3*   HCT 24.6* 22.7*    206     CMP:   Recent Labs   Lab 06/05/25  0430      K 3.4*   *   CO2 26      BUN 9*   CREATININE 0.91   CALCIUM 8.1*   ANIONGAP 7       Significant Imaging: I have reviewed all pertinent imaging results/findings within the past 24 hours.    Imaging Results              CT Abdomen Pelvis With IV Contrast NO Oral Contrast (Final result)  Result time 06/02/25 18:28:39      Final result by Markie Blackmon MD (06/02/25 18:28:39)                   Impression:      1. Nonobstructing nephrolithiasis of the left kidney.  2. Probable minimal hydronephrosis on the right.  No obstruction is detected.  Recommend surveillance.  3. Diverticulosis of the colon.  4. Small subcentimeter pulmonary nodules in the right lower lobe.  See above comments.  5. Small hiatal hernia.      Electronically signed by: Markie Blackmon  Date:    06/02/2025  Time:    18:28               Narrative:    EXAMINATION:  CTA ABDOMEN PELVIS WITH and without IV CONTRAST    CLINICAL HISTORY:  GI bleed;    TECHNIQUE:  Low dose axial images, sagittal and coronal reformations were obtained from the lung bases to the pubic symphysis with and without the IV administration of 100 mL of Isovue 370 .  Oral contrast was not administered..   Precontrast, arterial and portal venous phases.  CTA protocol.  MIPS utilized.    COMPARISON:  11/30/2020    FINDINGS:  Abdomen:    - Lower thorax:    - Lung bases: 3 mm pulmonary nodule right lower lobe on axial image 1 incompletely visualized.  4 mm nodule right lower lobe on axial 4.    For multiple solid nodules all <6 mm, Fleischner Society 2017 guidelines recommend no routine follow up for a low risk patient, or follow up with non-contrast chest CT at 12 months after discovery in a high risk patient.    Mild bibasilar atelectasis.    - Liver: Small probable hepatic cyst anteriorly in the left lobe.    Small hiatal hernia    - Gallbladder: No calcified gallstones.    - Bile Ducts: No evidence of intra or extra hepatic biliary ductal dilation.    - Spleen: Negative.    - Kidneys: 8 mm nonobstructing stone at the lower pole the left kidney.  No stones on the right.  Probable minimal hydronephrosis on the right.  No obstruction is detected.  Recommend surveillance.  No hydronephrosis or hydroureter on the left.    - Adrenals: Unremarkable.    - Pancreas: No mass or peripancreatic fat stranding.    - Retroperitoneum:  No significant adenopathy.    - Vascular: No abdominal aortic aneurysm.    - Abdominal wall:  Unremarkable.    Pelvis:    Urinary bladder is nondistended.    Status post hysterectomy.    Diverticulosis of the colon.  No evidence of acute diverticulitis.    No evidence of any focal significant GI bleed.    Bowel/Mesentery:    No evidence of bowel obstruction or inflammation.    Bones:  No acute osseous abnormality and no suspicious lytic or blastic lesion.                                       X-Ray Chest AP Portable (Final result)  Result time 06/02/25 19:44:21      Final result by Markie Blackmon MD (06/02/25 19:44:21)                   Impression:      No acute radiographic abnormality.      Electronically signed by: Markie Blackmon  Date:    06/02/2025  Time:    19:44               Narrative:     EXAMINATION:  XR CHEST AP PORTABLE    CLINICAL HISTORY:  Gastrointestinal hemorrhage, unspecified    TECHNIQUE:  Single frontal view of the chest was performed.    COMPARISON:  10/16/2023    FINDINGS:  The lungs are clear, with normal appearance of pulmonary vasculature and no pleural effusion or pneumothorax.    The cardiac silhouette is normal in size. The hilar and mediastinal contours are unremarkable.    Bones are intact.                                    Intake/Output - Last 3 Shifts         06/04 0700 06/05 0659 06/05 0700  06/06 0659    I.V. (mL/kg)      Total Intake(mL/kg)      Urine (mL/kg/hr)      Blood      Total Output      Net                  Microbiology Results (last 7 days)       Procedure Component Value Units Date/Time    Urine culture [[id number]] Collected: 06/02/25 1447    Order Status: Completed Specimen: Urine Updated: 06/04/25 0731     Culture, Urine Skin/Urogenital Louise Isolated, no further workup.    Clostridioides difficile toxin/antigen with reflex to PCR [[id number]]     Order Status: Canceled Specimen: Stool                 Assessment & Plan  Chronic pain syndrome    Chronic low back pain followed by Dr. Brown, no nonsteroidal anti-inflammatory agents.  States takes Neurontin and Norco  Systolic hypertension  Vitals:    06/04/25 1247 06/04/25 1315 06/04/25 2001 06/05/25 0014   BP: (!) 153/59 (!) 160/49 (!) 157/92 103/66    06/05/25 0418 06/05/25 0738 06/05/25 1136 06/05/25 1701   BP: 103/64 109/62 129/69 134/80    06/05/25 1902 06/05/25 2241   BP: (!) 147/76 139/74         Patient's blood pressure range in the last 24 hours was: BP  Min: 103/64  Max: 147/76.The patient's inpatient anti-hypertensive regimen is listed below:  Current Antihypertensives  losartan tablet 50 mg, Daily, Oral    Plan  - BP is controlled, no changes needed to their regimen  - monitor and adjust meds as needed  Type 2 diabetes mellitus without complication  Patient's FSGs are controlled on current  "medication regimen.  Last A1c reviewed-   Lab Results   Component Value Date    HGBA1C 6.1 06/02/2025     Most recent fingerstick glucose reviewed- No results for input(s): "POCTGLUCOSE" in the last 24 hours.  Current correctional scale  Medium  Maintain anti-hyperglycemic dose as follows-   Antihyperglycemics (From admission, onward)      Start     Stop Route Frequency Ordered    06/02/25 1926  insulin aspart U-100 injection 0-10 Units         -- SubQ Before meals & nightly PRN 06/02/25 1826          Hold Oral hypoglycemics while patient is in the hospital.  Hypothyroidism    Continue Synthroid  Lower GI bleed  - Patient's hemorrhage is due to gastrointestinal bleed, this bleeding is associated with lower source.     Colonoscopy for 06/04 06/05 No more bleeding obvious  Anemia due to GI blood loss  Anemia is likely due to acute blood loss which was from lower GI. Most recent hemoglobin and hematocrit are listed below.  Recent Labs     06/03/25  1616 06/04/25  0440 06/05/25  0430   HGB 8.4* 7.7* 7.3*   HCT 26.7* 24.6* 22.7*     Plan  - Monitor serial CBC: Daily  - Transfuse PRBC if patient becomes hemodynamically unstable, symptomatic or H/H drops below 7/21.  - Patient has not received any PRBC transfusions to date  - Patient's anemia is currently improving    VTE Risk Mitigation (From admission, onward)           Ordered     IP VTE HIGH RISK PATIENT  Once         06/02/25 1807     Place sequential compression device  Until discontinued         06/02/25 1807                    Discharge Planning   GUZMAN: 6/5/2025     Code Status: Full Code   Medical Readiness for Discharge Date: 6/5/2025  Discharge Plan A: Home Health, Home                        Ovidio Olson MD  Department of Hospital Medicine   [facility]    "

## 2025-06-06 NOTE — SUBJECTIVE & OBJECTIVE
Past Medical History:   Diagnosis Date    Adenomatous polyp of ascending colon 2021    Adenomatous polyp of descending colon 2021    Age related osteoporosis 10/28/2020    Benign paroxysmal vertigo     Chronic pain syndrome     Chronic pelvic pain in female 2021    Ditropan XL 5mg    Colon, diverticulosis 2021    Depressive disorder 2019    External hemorrhoid 2021    Gastrointestinal hemorrhage associated with anorectal source 2021    GERD (gastroesophageal reflux disease) 2013    Hyperlipidemia 2012    Hypothyroidism 2019    Joint pain     Nonrheumatic tricuspid (valve) insufficiency 2018    Personal history UTI 2021    Controlled on Hiprex    Polyneuropathy 2018    PVD (peripheral vascular disease) 10/30/2018    Temporal arteritis     Type 2 diabetes mellitus 2014    Urethral meatal stenosis 2018    history of known urethral stenosis, and was taught to perform monthly self dilation at home.       Past Surgical History:   Procedure Laterality Date     left lumbar sympathetic nerve block Left 2020    X3  DR BROWN    CARDIAC CATHETERIZATION  10/14/2009     SECTION      x 2    COLONOSCOPY  2009    CYSTOSCOPY WITH HYDRODISTENSION OF BLADDER N/A 2021    Procedure: CYSTOSCOPY, WITH BLADDER HYDRODISTENSION;  Surgeon: Dequan Recio MD;  Location: [facility];  Service: Urology;  Laterality: N/A;    CYSTOSCOPY WITH URETHRAL DILATION  2021    Dr Recio    EPIDURAL STEROID INJECTION N/A 10/27/2022    Procedure: Injection, Steroid, Epidural, L5/S1;  Surgeon: Belkis Brown MD;  Location: [facility];  Service: Pain Management;  Laterality: N/A;    EPIDURAL STEROID INJECTION N/A 2023    Procedure: Injection, Steroid, Epidural, L5/S1;  Surgeon: Belkis Brown MD;  Location: [facility];  Service: Pain Management;  Laterality: N/A;    HYSTERECTOMY      LUMBAR SYMPATHETIC NERVE BLOCK Left  10/05/2020    Left Sympathetic Nerve Block - Dr Brown - 10/5/20, 9/21/20, 1/20/20. 1/6/20, 10/9/19, 9/25/19 and 7/11/18    right lumbar sympathetic nerve block Right 2020    X4 DR BROWN    THYROIDECTOMY      1990's       Review of patient's allergies indicates:   Allergen Reactions    Lipitor [atorvastatin] Other (See Comments)     Muscle aching    Pravachol [pravastatin] Other (See Comments)     Muscle aching       No current facility-administered medications on file prior to encounter.     Current Outpatient Medications on File Prior to Encounter   Medication Sig    estradioL (ESTRACE) 0.01 % (0.1 mg/gram) vaginal cream Place 1 g vaginally once daily for 14 days, THEN 1 g twice a week.    gabapentin (NEURONTIN) 400 MG capsule TAKE 1 CAPSULE(400 MG) BY MOUTH EVERY 8 HOURS    glipiZIDE (GLUCOTROL) 10 MG TR24 TAKE 1 TABLET(10 MG) BY MOUTH TWICE DAILY    HYDROcodone-acetaminophen (NORCO)  mg per tablet Take 1 tablet by mouth every 6 (six) hours as needed for Pain (pain).    lactulose (CHRONULAC) 10 gram/15 mL solution TAKE 30 ML(20 GRAMS TOTAL) BY MOUTH TWICE DAILY (Patient taking differently: Take 20 g by mouth 2 (two) times daily as needed.)    levothyroxine (SYNTHROID) 75 MCG tablet Take 1 tablet (75 mcg total) by mouth before breakfast.    olmesartan (BENICAR) 40 MG tablet Take 1 tablet (40 mg total) by mouth once daily. (Patient taking differently: Take 20 mg by mouth once daily.)    oxybutynin (DITROPAN-XL) 10 MG 24 hr tablet Take one tablet at night (Patient taking differently: 10 mg every evening. Take one tablet at night)    blood sugar diagnostic (ONETOUCH VERIO TEST STRIPS) Strp 1 each by Misc.(Non-Drug; Combo Route) route 2 (two) times a day.    blood-glucose meter kit Use as instructed    blood-glucose sensor (FREESTYLE SIMBA 3 SENSOR) Lizzie 1 each by Misc.(Non-Drug; Combo Route) route Daily.    flash glucose scanning reader (FREESTYLE SIMBA 2 READER) Misc 1 each by Misc.(Non-Drug; Combo Route)  route once daily.    ONETOUCH DELICA PLUS LANCET 33 gauge Misc Check blood sugar TID     Family History       Problem Relation (Age of Onset)    Cancer Mother, Father, Sister, Brother, Brother, Brother    Diabetes Sister, Sister    Hyperlipidemia Sister    Hypertension Mother, Sister    No Known Problems Sister, Brother          Tobacco Use    Smoking status: Never     Passive exposure: Never    Smokeless tobacco: Never   Substance and Sexual Activity    Alcohol use: Never    Drug use: Never    Sexual activity: Not Currently     Partners: Male     Review of Systems   Constitutional:  Positive for fatigue. Negative for appetite change and fever.   HENT:  Negative for congestion, hearing loss and trouble swallowing.    Respiratory:  Negative for chest tightness, shortness of breath and wheezing.    Cardiovascular:  Negative for chest pain and palpitations.   Gastrointestinal:  Positive for blood in stool. Negative for abdominal pain, constipation and nausea.   Genitourinary:  Negative for difficulty urinating and dysuria.   Musculoskeletal:  Positive for back pain. Negative for neck stiffness.   Skin:  Negative for pallor and rash.   Neurological:  Positive for dizziness. Negative for speech difficulty and headaches.   Psychiatric/Behavioral:  Negative for confusion and suicidal ideas.      Objective:     Vital Signs (Most Recent):  Temp: 98.1 °F (36.7 °C) (06/05/25 2241)  Pulse: 100 (06/05/25 2241)  Resp: 16 (06/05/25 2206)  BP: 139/74 (06/05/25 2241)  SpO2: 97 % (06/05/25 2241) Vital Signs (24h Range):  Temp:  [97.5 °F (36.4 °C)-98.7 °F (37.1 °C)] 98.1 °F (36.7 °C)  Pulse:  [] 100  Resp:  [13-18] 16  SpO2:  [95 %-98 %] 97 %  BP: (103-147)/(62-80) 139/74     Weight: 74.8 kg (165 lb)  Body mass index is 25.09 kg/m².     Physical Exam  Vitals reviewed.   Constitutional:       General: She is awake. She is not in acute distress.     Appearance: She is well-developed. She is not toxic-appearing.   HENT:      Head:  Normocephalic.      Nose: Nose normal.      Mouth/Throat:      Pharynx: Oropharynx is clear.   Eyes:      Extraocular Movements: Extraocular movements intact.      Pupils: Pupils are equal, round, and reactive to light.   Neck:      Thyroid: No thyroid mass.      Vascular: No carotid bruit.   Cardiovascular:      Rate and Rhythm: Normal rate and regular rhythm.      Pulses: Normal pulses.      Heart sounds: Normal heart sounds. No murmur heard.  Pulmonary:      Effort: Pulmonary effort is normal.      Breath sounds: Normal breath sounds and air entry. No wheezing.   Abdominal:      General: Bowel sounds are normal. There is no distension.      Palpations: Abdomen is soft.      Tenderness: There is no abdominal tenderness.   Musculoskeletal:         General: Normal range of motion.      Cervical back: Neck supple. No rigidity.   Skin:     General: Skin is warm.      Coloration: Skin is not jaundiced.      Findings: No lesion.   Neurological:      General: No focal deficit present.      Mental Status: She is alert and oriented to person, place, and time.      Cranial Nerves: No cranial nerve deficit.   Psychiatric:         Attention and Perception: Attention normal.         Mood and Affect: Mood normal.         Behavior: Behavior normal. Behavior is cooperative.         Thought Content: Thought content normal.         Cognition and Memory: Cognition normal.              CRANIAL NERVES     CN III, IV, VI   Pupils are equal, round, and reactive to light.       Significant Labs: All pertinent labs within the past 24 hours have been reviewed.  BMP:   Recent Labs   Lab 06/05/25  0430         K 3.4*   *   CO2 26   BUN 9*   CREATININE 0.91   CALCIUM 8.1*     CBC:   Recent Labs   Lab 06/04/25  0440 06/05/25  0430   WBC 5.70 5.56   HGB 7.7* 7.3*   HCT 24.6* 22.7*    206     CMP:   Recent Labs   Lab 06/05/25  0430      K 3.4*   *   CO2 26      BUN 9*   CREATININE 0.91   CALCIUM 8.1*    ANIONGAP 7       Significant Imaging: I have reviewed all pertinent imaging results/findings within the past 24 hours.    Imaging Results              CT Abdomen Pelvis With IV Contrast NO Oral Contrast (Final result)  Result time 06/02/25 18:28:39      Final result by Markie Blackmon MD (06/02/25 18:28:39)                   Impression:      1. Nonobstructing nephrolithiasis of the left kidney.  2. Probable minimal hydronephrosis on the right.  No obstruction is detected.  Recommend surveillance.  3. Diverticulosis of the colon.  4. Small subcentimeter pulmonary nodules in the right lower lobe.  See above comments.  5. Small hiatal hernia.      Electronically signed by: Markie Blackmon  Date:    06/02/2025  Time:    18:28               Narrative:    EXAMINATION:  CTA ABDOMEN PELVIS WITH and without IV CONTRAST    CLINICAL HISTORY:  GI bleed;    TECHNIQUE:  Low dose axial images, sagittal and coronal reformations were obtained from the lung bases to the pubic symphysis with and without the IV administration of 100 mL of Isovue 370 .  Oral contrast was not administered..  Precontrast, arterial and portal venous phases.  CTA protocol.  MIPS utilized.    COMPARISON:  11/30/2020    FINDINGS:  Abdomen:    - Lower thorax:    - Lung bases: 3 mm pulmonary nodule right lower lobe on axial image 1 incompletely visualized.  4 mm nodule right lower lobe on axial 4.    For multiple solid nodules all <6 mm, Fleischner Society 2017 guidelines recommend no routine follow up for a low risk patient, or follow up with non-contrast chest CT at 12 months after discovery in a high risk patient.    Mild bibasilar atelectasis.    - Liver: Small probable hepatic cyst anteriorly in the left lobe.    Small hiatal hernia    - Gallbladder: No calcified gallstones.    - Bile Ducts: No evidence of intra or extra hepatic biliary ductal dilation.    - Spleen: Negative.    - Kidneys: 8 mm nonobstructing stone at the lower pole the left kidney.  No  stones on the right.  Probable minimal hydronephrosis on the right.  No obstruction is detected.  Recommend surveillance.  No hydronephrosis or hydroureter on the left.    - Adrenals: Unremarkable.    - Pancreas: No mass or peripancreatic fat stranding.    - Retroperitoneum:  No significant adenopathy.    - Vascular: No abdominal aortic aneurysm.    - Abdominal wall:  Unremarkable.    Pelvis:    Urinary bladder is nondistended.    Status post hysterectomy.    Diverticulosis of the colon.  No evidence of acute diverticulitis.    No evidence of any focal significant GI bleed.    Bowel/Mesentery:    No evidence of bowel obstruction or inflammation.    Bones:  No acute osseous abnormality and no suspicious lytic or blastic lesion.                                       X-Ray Chest AP Portable (Final result)  Result time 06/02/25 19:44:21      Final result by Markie Blackmon MD (06/02/25 19:44:21)                   Impression:      No acute radiographic abnormality.      Electronically signed by: Markie Blackmon  Date:    06/02/2025  Time:    19:44               Narrative:    EXAMINATION:  XR CHEST AP PORTABLE    CLINICAL HISTORY:  Gastrointestinal hemorrhage, unspecified    TECHNIQUE:  Single frontal view of the chest was performed.    COMPARISON:  10/16/2023    FINDINGS:  The lungs are clear, with normal appearance of pulmonary vasculature and no pleural effusion or pneumothorax.    The cardiac silhouette is normal in size. The hilar and mediastinal contours are unremarkable.    Bones are intact.                                    Intake/Output - Last 3 Shifts         06/04 0700  06/05 0659 06/05 0700  06/06 0659    I.V. (mL/kg)      Total Intake(mL/kg)      Urine (mL/kg/hr)      Blood      Total Output      Net                  Microbiology Results (last 7 days)       Procedure Component Value Units Date/Time    Urine culture [[id number]] Collected: 06/02/25 1447    Order Status: Completed Specimen: Urine Updated:  06/04/25 0731     Culture, Urine Skin/Urogenital Louise Isolated, no further workup.    Clostridioides difficile toxin/antigen with reflex to PCR [[id number]]     Order Status: Canceled Specimen: Stool

## 2025-06-06 NOTE — ASSESSMENT & PLAN NOTE
Anemia is likely due to acute blood loss which was from lower GI. Most recent hemoglobin and hematocrit are listed below.  Recent Labs     06/03/25  1616 06/04/25  0440 06/05/25  0430   HGB 8.4* 7.7* 7.3*   HCT 26.7* 24.6* 22.7*     Plan  - Monitor serial CBC: Daily  - Transfuse PRBC if patient becomes hemodynamically unstable, symptomatic or H/H drops below 7/21.  - Patient has not received any PRBC transfusions to date  - Patient's anemia is currently improving

## 2025-06-06 NOTE — PLAN OF CARE
[facility]  Discharge Final Note    Primary Care Provider: Mateo Chen MD    Expected Discharge Date: 6/6/2025    Final Discharge Note (most recent)       Final Note - 06/06/25 1455          Final Note    Assessment Type Final Discharge Note                     Important Message from Medicare  Important Message from Medicare regarding Discharge Appeal Rights: Given to patient/caregiver, Explained to patient/caregiver, Signed/date by patient/caregiver     Date IMM was signed: 06/05/25  Time IMM was signed: 1150    Contact Info       Mateo Chen MD   Specialty: Internal Medicine, Family Medicine, Hospitalist   Relationship: PCP - General  Consulting Physician    [address] [place] [address] [address] [facility] [address]   Phone: [phone]       Next Steps: Schedule an appointment as soon as possible for a visit in 1 week(s)    Instructions: Rechecked H&H on follow up    Loni Mcclendon FNP   Specialty: Gastroenterology    [address] [place] [id number]   Phone: [phone]       Next Steps: Schedule an appointment as soon as possible for a visit in 3 week(s)        Patient discharged home self care. No further needs noted.

## 2025-06-06 NOTE — DISCHARGE SUMMARY
[facility]  [facility] Medicine  Discharge Summary      Patient Name: Lorraine De Jesus  MRN: [id number]  LUX: [id number]  Patient Class: IP- Inpatient  Admission Date: 6/2/2025  Hospital Length of Stay: 4 days  Discharge Date and Time: 06/06/2025 12:25 PM  Attending Physician: Ovidio Olson MD   Discharging Provider: Ovidio Olson MD  Primary Care Provider: Mateo Chen MD    Primary Care Team: Networked reference to record PCT     HPI:     Chief complaint:  Rectal bleeding    History of present illness:     Ms. De Jesus is a 80-year-old presented to the emergency room on referral from Dr. Chen's office after presenting there complaining of recurrent rectal bleeding starting a.m. of admission.  Patient states she had gotten up and going to the bathroom proximally 10:00 a.m..  Had a bowel movement and noted there was a lot of clots of dark red blood.  No black.  States complain of some dizziness.  Awhile later had another bowel movement with similar results.  Had been taken a Dr. Kirk office and then was referred to the emergency department.  Patient has not been on any nonsteroidals.  No blood thinners.  No prior history of GI bleeding.  In January this year had colonoscopy by Dr. Gonzalez and had colon polyps removed.  Patient to be admitted to hospitalist service with GI consulted.     Review of system:  See above.  Wears glasses without recent change in vision.  Has some chronic low back pain followed at [facility] treatment [facility].  Denies any issues with her sleepy in the past.  Review of systems otherwise.    Past medical history:  -hypertension times 10 years  -chronic pain syndrome with chronic low back pain followed by Dr. Brown at [facility]  -diabetes mellitus type 2 treated with orally  -states gets we check us by Dr. Doss but does not have any heart issue she is aware  -hypothyroidism    Past surgical history:  Hysterectomy bilateral salpingo-oophorectomy  Bilateral  cataract surgery    Allergy:  Lipitor which causes myopathy    Social history:  No history of tobacco alcohol use  Lives by herself  Has 2 children and daughter is in the emergency room with her  No history of tobacco alcohol use    Family history:  No one in family premature coronary artery disease  No one in family with colon or GI issues    Health maintenance issues:  Primary care providers Dr. Mateo dumont   Does not get flu shots nor Pneumovax  Gets yearly mammogram  No recent Pap smear  Colonoscopy last January by Dr. Espinal with polyps removed        * No surgery found *      Hospital Course:   06/03 No obvious active bleeding currently Fall in HH from admission. CT abd questionable right hydronephrosis. EGD with Angioectasia in the 2nd part of the duodenum. Plan colonoscopy tomorrow.   06/04 Colonoscopy blood without bleeding. H/H stable then home soon.   06/05 Fatigue. No more bleeding. Some fall in H/H. Offered transfusion and pt wished to proceed.     06/06 patient feels better after transfusion.  Up in room.  Wanting to go home.  No more bleeding.  Improvement in CBC.  Will discharge home, she is concerned about constipation going forward, talk to her about low residue diet for a couple weeks and then start a high residue high-fiber diet after that point.  She has Linzess ordered that she can take.  Will go home on iron therapy.  Discuss stool changes with iron versus bleeding.  If recurrent bleeding can come back.  Discharged.  To have primary care provider rechecked H&H on follow up 1 week    Patient counseled on the importance of keeping all hospital follow up appointments. Stressed compliance with medications, therapies, or devices as prescribed in order to provide for the best health outcomes and decrease the risk of reoccurrence or further progression of issues.    Additionally, patient given written literature regarding their current disease processes and home care recommendations.   Patient given  "opportunity to ask questions and verbalized understanding of all information discussed.  Reinforced patient's primary care provider can be a big assets in monitoring and organizing issues after discharge.       Goals of Care Treatment Preferences:  Code Status: Full Code      SDOH Screening:  The patient was screened for utility difficulties, food insecurity, transport difficulties, housing insecurity, and interpersonal safety and there were no concerns identified this admission.     Consults:   Consults (From admission, onward)          Status Ordering Provider     Inpatient consult to Gastroenterology  Once        Provider:  (Not yet assigned)    Completed MELANY ESTRADA            Assessment & Plan  Chronic pain syndrome    Chronic low back pain followed by Dr. Brown, no nonsteroidal anti-inflammatory agents.  States takes Neurontin and Norco  Systolic hypertension  Vitals:    06/06/25 0058 06/06/25 0113 06/06/25 0128 06/06/25 0143   BP: (!) 104/56 114/62 105/62 102/63    06/06/25 0158 06/06/25 0210 06/06/25 0241 06/06/25 0315   BP: 101/61 112/65 113/63 (!) 105/53    06/06/25 0515 06/06/25 0746   BP: (!) 100/54 114/66         Patient's blood pressure range in the last 24 hours was: BP  Min: 96/58  Max: 147/76.The patient's inpatient anti-hypertensive regimen is listed below:  Current Antihypertensives  losartan tablet 50 mg, Daily, Oral  olmesartan (BENICAR) tablet, Daily, Oral    Plan  - BP is controlled, no changes needed to their regimen  - monitor and adjust meds as needed  Type 2 diabetes mellitus without complication  Patient's FSGs are controlled on current medication regimen.  Last A1c reviewed-   Lab Results   Component Value Date    HGBA1C 6.1 06/02/2025     Most recent fingerstick glucose reviewed- No results for input(s): "POCTGLUCOSE" in the last 24 hours.  Current correctional scale  Medium  Maintain anti-hyperglycemic dose as follows-   Antihyperglycemics (From admission, onward)      Start     " Stop Route Frequency Ordered    06/02/25 1926  insulin aspart U-100 injection 0-10 Units         -- SubQ Before meals & nightly PRN 06/02/25 1826          Hold Oral hypoglycemics while patient is in the hospital.  Hypothyroidism    Continue Synthroid  Lower GI bleed  - Patient's hemorrhage is due to gastrointestinal bleed, this bleeding is associated with lower source.     Colonoscopy for 06/04 06/05 No more bleeding obvious  Anemia due to GI blood loss  Anemia is likely due to acute blood loss which was from lower GI. Most recent hemoglobin and hematocrit are listed below.  Recent Labs     06/04/25  0440 06/05/25  0430 06/06/25  0436   HGB 7.7* 7.3* 8.2*   HCT 24.6* 22.7* 25.6*     Plan  - Monitor serial CBC: Daily  - Transfuse PRBC if patient becomes hemodynamically unstable, symptomatic or H/H drops below 7/21.  - Patient has not received any PRBC transfusions to date  - Patient's anemia is currently improving      Final Active Diagnoses:    Diagnosis Date Noted POA    PRINCIPAL PROBLEM:  Lower GI bleed [K92.2] 06/22/2021 Yes    Anemia due to GI blood loss [D50.0] 06/04/2025 Yes    Hypothyroidism [E03.9] 01/27/2023 Yes    Systolic hypertension [I10] 06/14/2021 Yes    Type 2 diabetes mellitus without complication [E11.9] 06/14/2021 Yes    Chronic pain syndrome [G89.4] 03/25/2021 Yes     Chronic      Problems Resolved During this Admission:       Discharged Condition: fair    Disposition: Home or Self Care    Follow Up:   Follow-up Information       Mateo Chen MD. Schedule an appointment as soon as possible for a visit in 1 week(s).    Specialties: Internal Medicine, Family Medicine, Hospitalist  Why: Rechecked H&H on follow up  Contact information:  [address] [facility] [id number] [phone]               Loni Mcclendon FNP. Schedule an appointment as soon as possible for a visit in 3 week(s).    Specialty: Gastroenterology  Contact information:  [address] [place] [address] -  GI  [place] [address]  [phone]                           Patient Instructions:   No discharge procedures on file.    Significant Diagnostic Studies: Labs: BMP:   Recent Labs   Lab 06/05/25  0430         K 3.4*   *   CO2 26   BUN 9*   CREATININE 0.91   CALCIUM 8.1*   , CMP   Recent Labs   Lab 06/05/25  0430      K 3.4*   *   CO2 26      BUN 9*   CREATININE 0.91   CALCIUM 8.1*   ANIONGAP 7   , and CBC   Recent Labs   Lab 06/05/25  0430 06/06/25  0436   WBC 5.56 7.05   HGB 7.3* 8.2*   HCT 22.7* 25.6*    207       Imaging Results              CT Abdomen Pelvis With IV Contrast NO Oral Contrast (Final result)  Result time 06/02/25 18:28:39      Final result by Markie Blackmon MD (06/02/25 18:28:39)                   Impression:      1. Nonobstructing nephrolithiasis of the left kidney.  2. Probable minimal hydronephrosis on the right.  No obstruction is detected.  Recommend surveillance.  3. Diverticulosis of the colon.  4. Small subcentimeter pulmonary nodules in the right lower lobe.  See above comments.  5. Small hiatal hernia.      Electronically signed by: Markie Blackmon  Date:    06/02/2025  Time:    18:28               Narrative:    EXAMINATION:  CTA ABDOMEN PELVIS WITH and without IV CONTRAST    CLINICAL HISTORY:  GI bleed;    TECHNIQUE:  Low dose axial images, sagittal and coronal reformations were obtained from the lung bases to the pubic symphysis with and without the IV administration of 100 mL of Isovue 370 .  Oral contrast was not administered..  Precontrast, arterial and portal venous phases.  CTA protocol.  MIPS utilized.    COMPARISON:  11/30/2020    FINDINGS:  Abdomen:    - Lower thorax:    - Lung bases: 3 mm pulmonary nodule right lower lobe on axial image 1 incompletely visualized.  4 mm nodule right lower lobe on axial 4.    For multiple solid nodules all <6 mm, Fleischner Society 2017 guidelines recommend no routine follow up for a low risk patient,  or follow up with non-contrast chest CT at 12 months after discovery in a high risk patient.    Mild bibasilar atelectasis.    - Liver: Small probable hepatic cyst anteriorly in the left lobe.    Small hiatal hernia    - Gallbladder: No calcified gallstones.    - Bile Ducts: No evidence of intra or extra hepatic biliary ductal dilation.    - Spleen: Negative.    - Kidneys: 8 mm nonobstructing stone at the lower pole the left kidney.  No stones on the right.  Probable minimal hydronephrosis on the right.  No obstruction is detected.  Recommend surveillance.  No hydronephrosis or hydroureter on the left.    - Adrenals: Unremarkable.    - Pancreas: No mass or peripancreatic fat stranding.    - Retroperitoneum:  No significant adenopathy.    - Vascular: No abdominal aortic aneurysm.    - Abdominal wall:  Unremarkable.    Pelvis:    Urinary bladder is nondistended.    Status post hysterectomy.    Diverticulosis of the colon.  No evidence of acute diverticulitis.    No evidence of any focal significant GI bleed.    Bowel/Mesentery:    No evidence of bowel obstruction or inflammation.    Bones:  No acute osseous abnormality and no suspicious lytic or blastic lesion.                                       X-Ray Chest AP Portable (Final result)  Result time 06/02/25 19:44:21      Final result by Markie Blackmon MD (06/02/25 19:44:21)                   Impression:      No acute radiographic abnormality.      Electronically signed by: Markie Blackmon  Date:    06/02/2025  Time:    19:44               Narrative:    EXAMINATION:  XR CHEST AP PORTABLE    CLINICAL HISTORY:  Gastrointestinal hemorrhage, unspecified    TECHNIQUE:  Single frontal view of the chest was performed.    COMPARISON:  10/16/2023    FINDINGS:  The lungs are clear, with normal appearance of pulmonary vasculature and no pleural effusion or pneumothorax.    The cardiac silhouette is normal in size. The hilar and mediastinal contours are unremarkable.    Bones  are intact.                                    Intake/Output - Last 3 Shifts         06/04 0700 06/05 0659 06/05 0700 06/06 0659 06/06 0700 06/07 0659    I.V. (mL/kg)       Blood  720.8     Total Intake(mL/kg)  720.8 (9.6)     Urine (mL/kg/hr)       Blood       Total Output       Net  +720.8                  Microbiology Results (last 7 days)       Procedure Component Value Units Date/Time    Urine culture [[id number]] Collected: 06/02/25 1447    Order Status: Completed Specimen: Urine Updated: 06/04/25 0731     Culture, Urine Skin/Urogenital Louise Isolated, no further workup.    Clostridioides difficile toxin/antigen with reflex to PCR [[id number]]     Order Status: Canceled Specimen: Stool                 Pending Diagnostic Studies:       Procedure Component Value Units Date/Time    EXTRA TUBES [[id number]] Collected: 06/04/25 0440    Order Status: Sent Lab Status: In process Updated: 06/04/25 0548    Specimen: Blood, Venous     Narrative:      The following orders were created for panel order EXTRA TUBES.  Procedure                               Abnormality         Status                     ---------                               -----------         ------                     Light Green Top Hold[[id number]]                            In process                   Please view results for these tests on the individual orders.           Medications:  Reconciled Home Medications:      Medication List        START taking these medications      docusate sodium 100 MG capsule  Commonly known as: COLACE  Take 1 capsule (100 mg total) by mouth 2 (two) times daily.     ferrous sulfate 325 mg (65 mg iron) Tab tablet  Commonly known as: IRON (FERROUS SULFATE)  Take 1 tablet (325 mg total) by mouth 2 (two) times daily.     linaCLOtide 145 mcg Cap capsule  Commonly known as: LINZESS  Take 1 capsule (145 mcg total) by mouth before breakfast.     pantoprazole 40 MG tablet  Commonly known as: PROTONIX  Take 1 tablet (40 mg  total) by mouth once daily.  Start taking on: June 7, 2025            CHANGE how you take these medications      lactulose 10 gram/15 mL solution  Commonly known as: CHRONULAC  TAKE 30 ML(20 GRAMS TOTAL) BY MOUTH TWICE DAILY  What changed: See the new instructions.     olmesartan 20 MG tablet  Commonly known as: BENICAR  Take 1 tablet (20 mg total) by mouth once daily.  What changed:   medication strength  how much to take     oxybutynin 10 MG 24 hr tablet  Commonly known as: DITROPAN-XL  Take one tablet at night  What changed:   how much to take  when to take this            CONTINUE taking these medications      blood sugar diagnostic Strp  Commonly known as: ONETOUCH VERIO TEST STRIPS  1 each by Misc.(Non-Drug; Combo Route) route 2 (two) times a day.     blood-glucose meter kit  Use as instructed     estradioL 0.01 % (0.1 mg/gram) vaginal cream  Commonly known as: ESTRACE  Place 1 g vaginally once daily for 14 days, THEN 1 g twice a week.  Start taking on: May 8, 2025     FREESTYLE SIMBA 2 READER [facility]  Generic drug: flash glucose scanning reader  1 each by Misc.(Non-Drug; Combo Route) route once daily.     FREESTYLE SIMBA 3 SENSOR Lizzie  Generic drug: blood-glucose sensor  1 each by Misc.(Non-Drug; Combo Route) route Daily.     gabapentin 400 MG capsule  Commonly known as: NEURONTIN  TAKE 1 CAPSULE(400 MG) BY MOUTH EVERY 8 HOURS     glipiZIDE 10 MG Tr24  Commonly known as: GLUCOTROL  TAKE 1 TABLET(10 MG) BY MOUTH TWICE DAILY     HYDROcodone-acetaminophen  mg per tablet  Commonly known as: NORCO  Take 1 tablet by mouth every 6 (six) hours as needed for Pain (pain).     levothyroxine 75 MCG tablet  Commonly known as: SYNTHROID  Take 1 tablet (75 mcg total) by mouth before breakfast.     ONETOUCH DELICA PLUS LANCET 33 gauge Misc  Generic drug: lancets  Check blood sugar TID              Indwelling Lines/Drains at time of discharge:   Lines/Drains/Airways       None                   Time spent on the discharge  of patient: 40 minutes         Ovidio Olson MD  Department of Hospital Medicine  [facility]

## 2025-06-06 NOTE — ASSESSMENT & PLAN NOTE
Anemia is likely due to acute blood loss which was from lower GI. Most recent hemoglobin and hematocrit are listed below.  Recent Labs     06/04/25  0440 06/05/25  0430 06/06/25  0436   HGB 7.7* 7.3* 8.2*   HCT 24.6* 22.7* 25.6*     Plan  - Monitor serial CBC: Daily  - Transfuse PRBC if patient becomes hemodynamically unstable, symptomatic or H/H drops below 7/21.  - Patient has not received any PRBC transfusions to date  - Patient's anemia is currently improving

## 2025-06-06 NOTE — ASSESSMENT & PLAN NOTE
Vitals:    06/04/25 1247 06/04/25 1315 06/04/25 2001 06/05/25 0014   BP: (!) 153/59 (!) 160/49 (!) 157/92 103/66    06/05/25 0418 06/05/25 0738 06/05/25 1136 06/05/25 1701   BP: 103/64 109/62 129/69 134/80    06/05/25 1902 06/05/25 2241   BP: (!) 147/76 139/74         Patient's blood pressure range in the last 24 hours was: BP  Min: 103/64  Max: 147/76.The patient's inpatient anti-hypertensive regimen is listed below:  Current Antihypertensives  losartan tablet 50 mg, Daily, Oral    Plan  - BP is controlled, no changes needed to their regimen  - monitor and adjust meds as needed

## 2025-06-08 LAB
OHS QRS DURATION: 78 MS
OHS QTC CALCULATION: 471 MS

## 2025-06-09 ENCOUNTER — PATIENT OUTREACH (OUTPATIENT)
Dept: ADMINISTRATIVE | Facility: CLINIC | Age: 80
End: 2025-06-09
Payer: COMMERCIAL

## 2025-06-09 NOTE — PROGRESS NOTES
C3 nurse spoke with Lorraine De Jesus  for a TCC post hospital discharge follow up call. The patient has a scheduled HOSFU appointment with Mateo Chen MD  on 6/16/25 @ 1000.

## 2025-06-11 ENCOUNTER — OFFICE VISIT (OUTPATIENT)
Dept: UROLOGY | Facility: CLINIC | Age: 80
End: 2025-06-11
Payer: COMMERCIAL

## 2025-06-11 ENCOUNTER — HOSPITAL ENCOUNTER (OUTPATIENT)
Dept: RADIOLOGY | Facility: HOSPITAL | Age: 80
Discharge: HOME | End: 2025-06-11
Attending: NURSE PRACTITIONER
Payer: COMMERCIAL

## 2025-06-11 VITALS
OXYGEN SATURATION: 98 % | DIASTOLIC BLOOD PRESSURE: 67 MMHG | HEART RATE: 75 BPM | WEIGHT: 165 LBS | BODY MASS INDEX: 25.01 KG/M2 | HEIGHT: 68 IN | SYSTOLIC BLOOD PRESSURE: 142 MMHG

## 2025-06-11 DIAGNOSIS — N20.0 KIDNEY STONE ON LEFT SIDE: Primary | ICD-10-CM

## 2025-06-11 DIAGNOSIS — N20.0 RENAL CALCULUS: Chronic | ICD-10-CM

## 2025-06-11 PROCEDURE — 1159F MED LIST DOCD IN RCRD: CPT | Mod: CPTII,,, | Performed by: UROLOGY

## 2025-06-11 PROCEDURE — 99213 OFFICE O/P EST LOW 20 MIN: CPT | Mod: S$PBB,,, | Performed by: UROLOGY

## 2025-06-11 PROCEDURE — 3078F DIAST BP <80 MM HG: CPT | Mod: CPTII,,, | Performed by: UROLOGY

## 2025-06-11 PROCEDURE — 74018 RADEX ABDOMEN 1 VIEW: CPT | Mod: TC

## 2025-06-11 PROCEDURE — 99214 OFFICE O/P EST MOD 30 MIN: CPT | Mod: PBBFAC,25 | Performed by: UROLOGY

## 2025-06-11 PROCEDURE — 3077F SYST BP >= 140 MM HG: CPT | Mod: CPTII,,, | Performed by: UROLOGY

## 2025-06-11 PROCEDURE — 74018 RADEX ABDOMEN 1 VIEW: CPT | Mod: 26,,, | Performed by: RADIOLOGY

## 2025-06-11 PROCEDURE — 99999 PR PBB SHADOW E&M-EST. PATIENT-LVL IV: CPT | Mod: PBBFAC,,, | Performed by: UROLOGY

## 2025-06-11 PROCEDURE — 1160F RVW MEDS BY RX/DR IN RCRD: CPT | Mod: CPTII,,, | Performed by: UROLOGY

## 2025-06-11 PROCEDURE — 3288F FALL RISK ASSESSMENT DOCD: CPT | Mod: CPTII,,, | Performed by: UROLOGY

## 2025-06-11 PROCEDURE — 1101F PT FALLS ASSESS-DOCD LE1/YR: CPT | Mod: CPTII,,, | Performed by: UROLOGY

## 2025-06-11 PROCEDURE — 1111F DSCHRG MED/CURRENT MED MERGE: CPT | Mod: CPTII,,, | Performed by: UROLOGY

## 2025-06-12 ENCOUNTER — RESULTS FOLLOW-UP (OUTPATIENT)
Dept: UROLOGY | Facility: CLINIC | Age: 80
End: 2025-06-12

## 2025-06-12 NOTE — PROGRESS NOTES
Assessment:   1. Kidney stone on left side         Plan:     We discussed her CT scan and KUB today and there was no change of the nonobstructing left renal stone.  We discussed plans to continue observation and have her return in 12 months with a KUB on her next visit.             Figueroa Interiano MD  Urology  2025          UROLOGY HISTORY AND PHYSICAL EXAM    Subjective:      Patient ID: Lorraine De Jesus is a 80 y.o. female.    Chief Complaint::   Follow-up    The patient is a 80-year-old female with a history of nephrolithiasis.  She is being observed.  She presents today for review of the KUB.  She is not having any flank pain or hematuria.     Past Medical History:   Diagnosis Date    Adenomatous polyp of ascending colon 2021    Adenomatous polyp of descending colon 2021    Age related osteoporosis 10/28/2020    Benign paroxysmal vertigo     Chronic pain syndrome     Chronic pelvic pain in female 2021    Ditropan XL 5mg    Colon, diverticulosis 2021    Depressive disorder 2019    External hemorrhoid 2021    Gastrointestinal hemorrhage associated with anorectal source 2021    GERD (gastroesophageal reflux disease) 2013    Hyperlipidemia 2012    Hypothyroidism 2019    Joint pain     Nonrheumatic tricuspid (valve) insufficiency 2018    Personal history UTI 2021    Controlled on [employer]    Polyneuropathy 2018    PVD (peripheral vascular disease) 10/30/2018    Temporal arteritis     Type 2 diabetes mellitus 2014    Urethral meatal stenosis 2018    history of known urethral stenosis, and was taught to perform monthly self dilation at home.     Past Surgical History:   Procedure Laterality Date     left lumbar sympathetic nerve block Left 2020    X3  DR LANDEROS    CARDIAC CATHETERIZATION  10/14/2009     SECTION      x 2    COLONOSCOPY  2009    CYSTOSCOPY WITH HYDRODISTENSION OF BLADDER N/A  06/24/2021    Procedure: CYSTOSCOPY, WITH BLADDER HYDRODISTENSION;  Surgeon: Dequan Recio MD;  Location: [facility] OR;  Service: Urology;  Laterality: N/A;    CYSTOSCOPY WITH URETHRAL DILATION  02/25/2021    Dr Recio    EPIDURAL STEROID INJECTION N/A 10/27/2022    Procedure: Injection, Steroid, Epidural, L5/S1;  Surgeon: Belkis Brown MD;  [facility];  Service: Pain Management;  Laterality: N/A;    EPIDURAL STEROID INJECTION N/A 9/12/2023    Procedure: Injection, Steroid, Epidural, L5/S1;  Surgeon: Belkis Brown MD;  Location: [facility];  Service: Pain Management;  Laterality: N/A;    HYSTERECTOMY  1980's    LUMBAR SYMPATHETIC NERVE BLOCK Left 10/05/2020    Left Sympathetic Nerve Block - Dr Brown - 10/5/20, 9/21/20, 1/20/20. 1/6/20, 10/9/19, 9/25/19 and 7/11/18    right lumbar sympathetic nerve block Right 2020    X4 DR BROWN    THYROIDECTOMY      1990's        Medications Ordered Prior to Encounter[1]     Review of patient's allergies indicates:   Allergen Reactions    Lipitor [atorvastatin] Other (See Comments)     Muscle aching    Pravachol [pravastatin] Other (See Comments)     Muscle aching     Vitals:    06/11/25 1355   BP: (!) 142/67   Pulse: 75          Review of Systems   Genitourinary:  Negative for flank pain and hematuria.      Objective:     Physical Exam  Vitals reviewed.   Cardiovascular:      Rate and Rhythm: Normal rate.   Pulmonary:      Effort: No respiratory distress.      Breath sounds: No wheezing.   Abdominal:      Tenderness: There is no abdominal tenderness. There is no left CVA tenderness.   Musculoskeletal:         General: No deformity.   Skin:     General: Skin is warm.   Neurological:      Mental Status: She is alert and oriented to person, place, and time. Mental status is at baseline.   Psychiatric:         Mood and Affect: Mood normal.         Behavior: Behavior normal.         Thought Content: Thought content normal.         Judgment:  Judgment normal.            CMP  Sodium   Date Value Ref Range Status   06/05/2025 139 136 - 145 mmol/L Final     Potassium   Date Value Ref Range Status   06/05/2025 3.4 (L) 3.5 - 5.1 mmol/L Final     Chloride   Date Value Ref Range Status   06/05/2025 109 (H) 98 - 107 mmol/L Final     CO2   Date Value Ref Range Status   06/05/2025 26 23 - 31 mmol/L Final     Glucose   Date Value Ref Range Status   06/05/2025 114 82 - 115 mg/dL Final     BUN   Date Value Ref Range Status   06/05/2025 9 (L) 10 - 20 mg/dL Final     Creatinine   Date Value Ref Range Status   06/05/2025 0.91 0.55 - 1.02 mg/dL Final     Calcium   Date Value Ref Range Status   06/05/2025 8.1 (L) 8.4 - 10.2 mg/dL Final     Total Protein   Date Value Ref Range Status   06/02/2025 6.9 5.8 - 7.6 g/dL Final     Albumin   Date Value Ref Range Status   06/02/2025 3.4 3.4 - 4.8 g/dL Final     Bilirubin, Total   Date Value Ref Range Status   06/02/2025 0.6 <=1.5 mg/dL Final     Alk Phos   Date Value Ref Range Status   06/02/2025 76 40 - 150 U/L Final     AST   Date Value Ref Range Status   06/02/2025 19 11 - 45 U/L Final     ALT   Date Value Ref Range Status   06/02/2025 10 <=55 U/L Final     Anion Gap   Date Value Ref Range Status   06/05/2025 7 7 - 16 mmol/L Final     eGFR   Date Value Ref Range Status   06/05/2025 65 >=60 mL/min/1.73m2 Final     Comment:     Estimated GFR calculated using the CKD-EPI creatinine (2021) equation.      BMP  Lab Results   Component Value Date     06/05/2025    K 3.4 (L) 06/05/2025     (H) 06/05/2025    CO2 26 06/05/2025    BUN 9 (L) 06/05/2025    CREATININE 0.91 06/05/2025    CALCIUM 8.1 (L) 06/05/2025    ANIONGAP 7 06/05/2025    EGFRNORACEVR 65 06/05/2025                [1]  Current Outpatient Medications on File Prior to Visit   Medication Sig Dispense Refill    blood sugar diagnostic (ONETOUCH VERIO TEST STRIPS) Strp 1 each by Misc.(Non-Drug; Combo Route) route 2 (two) times a day. 200 strip 4    blood-glucose  meter kit Use as instructed 1 each 0    blood-glucose sensor (FREESTYLE SIMBA 3 SENSOR) Lizzie 1 each by Misc.(Non-Drug; Combo Route) route Daily. 3 each 0    docusate sodium (COLACE) 100 MG capsule Take 1 capsule (100 mg total) by mouth 2 (two) times daily. 60 capsule 3    estradioL (ESTRACE) 0.01 % (0.1 mg/gram) vaginal cream Place 1 g vaginally once daily for 14 days, THEN 1 g twice a week. 42.5 g 1    ferrous sulfate (FEOSOL) 325 mg (65 mg iron) Tab tablet Take 1 tablet (325 mg total) by mouth 2 (two) times daily. 60 tablet 3    flash glucose scanning reader (FREESTYLE SIMBA 2 READER) Misc 1 each by Misc.(Non-Drug; Combo Route) route once daily. 1 each 0    gabapentin (NEURONTIN) 400 MG capsule TAKE 1 CAPSULE(400 MG) BY MOUTH EVERY 8 HOURS 90 capsule 2    glipiZIDE (GLUCOTROL) 10 MG TR24 TAKE 1 TABLET(10 MG) BY MOUTH TWICE DAILY 180 tablet 1    HYDROcodone-acetaminophen (NORCO)  mg per tablet Take 1 tablet by mouth every 6 (six) hours as needed for Pain (pain). 120 tablet 0    levothyroxine (SYNTHROID) 75 MCG tablet Take 1 tablet (75 mcg total) by mouth before breakfast. 90 tablet 1    linaCLOtide (LINZESS) 145 mcg Cap capsule Take 1 capsule (145 mcg total) by mouth before breakfast. 90 capsule 1    olmesartan (BENICAR) 20 MG tablet Take 1 tablet (20 mg total) by mouth once daily. 90 tablet 3    ONETOUCH DELICA PLUS LANCET 33 gauge Misc Check blood sugar  each 11    oxybutynin (DITROPAN-XL) 10 MG 24 hr tablet Take one tablet at night 90 tablet 3    pantoprazole (PROTONIX) 40 MG tablet Take 1 tablet (40 mg total) by mouth once daily. 90 tablet 0    lactulose (CHRONULAC) 10 gram/15 mL solution TAKE 30 ML(20 GRAMS TOTAL) BY MOUTH TWICE DAILY (Patient not taking: Reported on 6/11/2025) 600 mL 1     No current facility-administered medications on file prior to visit.

## 2025-06-13 ENCOUNTER — TELEPHONE (OUTPATIENT)
Dept: GASTROENTEROLOGY | Facility: CLINIC | Age: 80
End: 2025-06-13
Payer: COMMERCIAL

## 2025-06-13 NOTE — TELEPHONE ENCOUNTER
----- Message from Luisito Mendez MD sent at 6/12/2025  8:56 PM CDT -----  Please notify patient.  ----- Message -----  From: Lab, Background User  Sent: 6/4/2025  11:38 AM CDT  To: Luisito Mendez MD

## 2025-06-16 ENCOUNTER — OFFICE VISIT (OUTPATIENT)
Dept: FAMILY MEDICINE | Facility: CLINIC | Age: 80
End: 2025-06-16
Payer: COMMERCIAL

## 2025-06-16 ENCOUNTER — OFFICE VISIT (OUTPATIENT)
Dept: PAIN MEDICINE | Facility: CLINIC | Age: 80
End: 2025-06-16
Payer: COMMERCIAL

## 2025-06-16 VITALS
HEART RATE: 65 BPM | DIASTOLIC BLOOD PRESSURE: 80 MMHG | RESPIRATION RATE: 17 BRPM | BODY MASS INDEX: 24.55 KG/M2 | HEIGHT: 68 IN | OXYGEN SATURATION: 98 % | TEMPERATURE: 99 F | SYSTOLIC BLOOD PRESSURE: 140 MMHG | WEIGHT: 162 LBS

## 2025-06-16 VITALS
WEIGHT: 169 LBS | HEART RATE: 69 BPM | HEIGHT: 68 IN | SYSTOLIC BLOOD PRESSURE: 146 MMHG | DIASTOLIC BLOOD PRESSURE: 67 MMHG | RESPIRATION RATE: 17 BRPM | BODY MASS INDEX: 25.61 KG/M2

## 2025-06-16 DIAGNOSIS — Z79.899 ENCOUNTER FOR LONG-TERM (CURRENT) USE OF MEDICATIONS: ICD-10-CM

## 2025-06-16 DIAGNOSIS — M79.671 BILATERAL FOOT PAIN: Chronic | ICD-10-CM

## 2025-06-16 DIAGNOSIS — G89.29 OTHER CHRONIC PAIN: ICD-10-CM

## 2025-06-16 DIAGNOSIS — M54.17 LUMBOSACRAL RADICULOPATHY: Chronic | ICD-10-CM

## 2025-06-16 DIAGNOSIS — E08.21 DIABETES DUE TO UNDERLYING CONDITION W DIABETIC NEPHROPATHY: ICD-10-CM

## 2025-06-16 DIAGNOSIS — M79.672 BILATERAL FOOT PAIN: Chronic | ICD-10-CM

## 2025-06-16 DIAGNOSIS — K92.2 LOWER GI BLEED: Primary | ICD-10-CM

## 2025-06-16 DIAGNOSIS — E87.6 HYPOKALEMIA: ICD-10-CM

## 2025-06-16 DIAGNOSIS — E11.40 DIABETIC NEUROPATHY WITH NEUROLOGIC COMPLICATION: Primary | Chronic | ICD-10-CM

## 2025-06-16 DIAGNOSIS — M54.17 LUMBOSACRAL RADICULOPATHY: ICD-10-CM

## 2025-06-16 DIAGNOSIS — N39.0 URINARY TRACT INFECTION WITHOUT HEMATURIA, SITE UNSPECIFIED: ICD-10-CM

## 2025-06-16 DIAGNOSIS — E11.49 DIABETIC NEUROPATHY WITH NEUROLOGIC COMPLICATION: Primary | Chronic | ICD-10-CM

## 2025-06-16 DIAGNOSIS — E53.8 VITAMIN B 12 DEFICIENCY: ICD-10-CM

## 2025-06-16 LAB
ALBUMIN SERPL BCP-MCNC: 3.3 G/DL (ref 3.4–4.8)
ALBUMIN/GLOB SERPL: 0.9 {RATIO}
ALP SERPL-CCNC: 80 U/L (ref 40–150)
ALT SERPL W P-5'-P-CCNC: 11 U/L
ANION GAP SERPL CALCULATED.3IONS-SCNC: 11 MMOL/L (ref 7–16)
AST SERPL W P-5'-P-CCNC: 18 U/L (ref 11–45)
BACTERIA #/AREA URNS HPF: ABNORMAL /HPF
BASOPHILS # BLD AUTO: 0.03 K/UL (ref 0–0.2)
BASOPHILS NFR BLD AUTO: 0.5 % (ref 0–1)
BILIRUB SERPL-MCNC: 0.4 MG/DL
BILIRUB UR QL STRIP: NEGATIVE
BUN SERPL-MCNC: 11 MG/DL (ref 10–20)
BUN/CREAT SERPL: 13 (ref 6–20)
CALCIUM SERPL-MCNC: 8.8 MG/DL (ref 8.4–10.2)
CHLORIDE SERPL-SCNC: 109 MMOL/L (ref 98–107)
CLARITY UR: ABNORMAL
CO2 SERPL-SCNC: 25 MMOL/L (ref 23–31)
COLOR UR: ABNORMAL
CREAT SERPL-MCNC: 0.83 MG/DL (ref 0.55–1.02)
CTP QC/QA: YES
DIFFERENTIAL METHOD BLD: ABNORMAL
EGFR (NO RACE VARIABLE) (RUSH/TITUS): 72 ML/MIN/1.73M2
EOSINOPHIL # BLD AUTO: 0.16 K/UL (ref 0–0.5)
EOSINOPHIL NFR BLD AUTO: 2.7 % (ref 1–4)
ERYTHROCYTE [DISTWIDTH] IN BLOOD BY AUTOMATED COUNT: 18.2 % (ref 11.5–14.5)
GLOBULIN SER-MCNC: 3.7 G/DL (ref 2–4)
GLUCOSE SERPL-MCNC: 180 MG/DL (ref 82–115)
GLUCOSE UR STRIP-MCNC: 50 MG/DL
HCT VFR BLD AUTO: 34.5 % (ref 38–47)
HGB BLD-MCNC: 10.3 G/DL (ref 12–16)
IMM GRANULOCYTES # BLD AUTO: 0.01 K/UL (ref 0–0.04)
IMM GRANULOCYTES NFR BLD: 0.2 % (ref 0–0.4)
KETONES UR STRIP-SCNC: NEGATIVE MG/DL
LEUKOCYTE ESTERASE UR QL STRIP: ABNORMAL
LYMPHOCYTES # BLD AUTO: 1.58 K/UL (ref 1–4.8)
LYMPHOCYTES NFR BLD AUTO: 26.8 % (ref 27–41)
MCH RBC QN AUTO: 27.4 PG (ref 27–31)
MCHC RBC AUTO-ENTMCNC: 29.9 G/DL (ref 32–36)
MCV RBC AUTO: 91.8 FL (ref 80–96)
MONOCYTES # BLD AUTO: 0.4 K/UL (ref 0–0.8)
MONOCYTES NFR BLD AUTO: 6.8 % (ref 2–6)
MPC BLD CALC-MCNC: 10 FL (ref 9.4–12.4)
MUCOUS, UA: ABNORMAL /LPF
NEUTROPHILS # BLD AUTO: 3.71 K/UL (ref 1.8–7.7)
NEUTROPHILS NFR BLD AUTO: 63 % (ref 53–65)
NITRITE UR QL STRIP: NEGATIVE
NRBC # BLD AUTO: 0 X10E3/UL
NRBC, AUTO (.00): 0 %
PH UR STRIP: 7 PH UNITS
PLATELET # BLD AUTO: 336 K/UL (ref 150–400)
POC (AMP) AMPHETAMINE: NEGATIVE
POC (BAR) BARBITURATES: NEGATIVE
POC (BUP) BUPRENORPHINE: NEGATIVE
POC (BZO) BENZODIAZEPINES: NEGATIVE
POC (COC) COCAINE: NEGATIVE
POC (MDMA) METHYLENEDIOXYMETHAMPHETAMINE 3,4: NEGATIVE
POC (MET) METHAMPHETAMINE: NEGATIVE
POC (MOP) OPIATES: ABNORMAL
POC (MTD) METHADONE: NEGATIVE
POC (OXY) OXYCODONE: NEGATIVE
POC (PCP) PHENCYCLIDINE: NEGATIVE
POC (TCA) TRICYCLIC ANTIDEPRESSANTS: NEGATIVE
POC TEMPERATURE (URINE): 94
POC THC: NEGATIVE
POTASSIUM SERPL-SCNC: 4.5 MMOL/L (ref 3.5–5.1)
PROT SERPL-MCNC: 7 G/DL (ref 5.8–7.6)
PROT UR QL STRIP: NEGATIVE
RBC # BLD AUTO: 3.76 M/UL (ref 4.2–5.4)
RBC # UR STRIP: NEGATIVE /UL
RBC #/AREA URNS HPF: 6 /HPF
SODIUM SERPL-SCNC: 140 MMOL/L (ref 136–145)
SP GR UR STRIP: 1.01
SQUAMOUS #/AREA URNS LPF: ABNORMAL /HPF
UROBILINOGEN UR STRIP-ACNC: NORMAL MG/DL
VIT B12 SERPL-MCNC: 930 PG/ML (ref 213–816)
WBC # BLD AUTO: 5.89 K/UL (ref 4.5–11)
WBC #/AREA URNS HPF: 92 /HPF

## 2025-06-16 PROCEDURE — 3079F DIAST BP 80-89 MM HG: CPT | Mod: ,,, | Performed by: INTERNAL MEDICINE

## 2025-06-16 PROCEDURE — 81001 URINALYSIS AUTO W/SCOPE: CPT | Mod: ,,, | Performed by: CLINICAL MEDICAL LABORATORY

## 2025-06-16 PROCEDURE — 80305 DRUG TEST PRSMV DIR OPT OBS: CPT | Mod: PBBFAC | Performed by: PHYSICIAN ASSISTANT

## 2025-06-16 PROCEDURE — 99999 PR PBB SHADOW E&M-EST. PATIENT-LVL V: CPT | Mod: PBBFAC,,, | Performed by: PHYSICIAN ASSISTANT

## 2025-06-16 PROCEDURE — 3077F SYST BP >= 140 MM HG: CPT | Mod: ,,, | Performed by: INTERNAL MEDICINE

## 2025-06-16 PROCEDURE — 1125F AMNT PAIN NOTED PAIN PRSNT: CPT | Mod: CPTII,,, | Performed by: PHYSICIAN ASSISTANT

## 2025-06-16 PROCEDURE — 87086 URINE CULTURE/COLONY COUNT: CPT | Mod: ,,, | Performed by: CLINICAL MEDICAL LABORATORY

## 2025-06-16 PROCEDURE — 3078F DIAST BP <80 MM HG: CPT | Mod: CPTII,,, | Performed by: PHYSICIAN ASSISTANT

## 2025-06-16 PROCEDURE — 3077F SYST BP >= 140 MM HG: CPT | Mod: CPTII,,, | Performed by: PHYSICIAN ASSISTANT

## 2025-06-16 PROCEDURE — 99214 OFFICE O/P EST MOD 30 MIN: CPT | Mod: S$PBB,,, | Performed by: PHYSICIAN ASSISTANT

## 2025-06-16 PROCEDURE — 99999PBSHW POCT URINE DRUG SCREEN PRESUMP: Mod: PBBFAC,,,

## 2025-06-16 PROCEDURE — 1125F AMNT PAIN NOTED PAIN PRSNT: CPT | Mod: ,,, | Performed by: INTERNAL MEDICINE

## 2025-06-16 PROCEDURE — 99214 OFFICE O/P EST MOD 30 MIN: CPT | Mod: ,,, | Performed by: INTERNAL MEDICINE

## 2025-06-16 PROCEDURE — 99215 OFFICE O/P EST HI 40 MIN: CPT | Mod: PBBFAC | Performed by: PHYSICIAN ASSISTANT

## 2025-06-16 PROCEDURE — 82607 VITAMIN B-12: CPT | Mod: ,,, | Performed by: CLINICAL MEDICAL LABORATORY

## 2025-06-16 PROCEDURE — 1159F MED LIST DOCD IN RCRD: CPT | Mod: CPTII,,, | Performed by: PHYSICIAN ASSISTANT

## 2025-06-16 PROCEDURE — 80053 COMPREHEN METABOLIC PANEL: CPT | Mod: ,,, | Performed by: CLINICAL MEDICAL LABORATORY

## 2025-06-16 PROCEDURE — 1111F DSCHRG MED/CURRENT MED MERGE: CPT | Mod: ,,, | Performed by: INTERNAL MEDICINE

## 2025-06-16 PROCEDURE — 85025 COMPLETE CBC W/AUTO DIFF WBC: CPT | Mod: ,,, | Performed by: CLINICAL MEDICAL LABORATORY

## 2025-06-16 PROCEDURE — 1111F DSCHRG MED/CURRENT MED MERGE: CPT | Mod: CPTII,,, | Performed by: PHYSICIAN ASSISTANT

## 2025-06-16 RX ORDER — GABAPENTIN 400 MG/1
CAPSULE ORAL
Qty: 90 CAPSULE | Refills: 0 | Status: SHIPPED | OUTPATIENT
Start: 2025-06-16

## 2025-06-16 RX ORDER — HYDROCODONE BITARTRATE AND ACETAMINOPHEN 10; 325 MG/1; MG/1
1 TABLET ORAL EVERY 6 HOURS PRN
Qty: 120 TABLET | Refills: 0 | Status: SHIPPED | OUTPATIENT
Start: 2025-06-26 | End: 2025-07-26

## 2025-06-16 RX ORDER — LEVOTHYROXINE SODIUM 75 UG/1
75 TABLET ORAL
Qty: 90 TABLET | Refills: 1 | Status: SHIPPED | OUTPATIENT
Start: 2025-06-16

## 2025-06-16 NOTE — PROGRESS NOTES
"Subjective:       Patient ID: Lorraine De Jesus is a 80 y.o. female.    Chief Complaint: Hospital Follow Up (Thomas presents to clinic for hospital follow up. She was admitted to [facility] on 06/02/25 for rectal bleeding and abdominal pain. Repeat CBC and CMP needed. ), Fatigue, and Anorexia    History of Present Illness    CHIEF COMPLAINT:  Patient presents today for follow up after recent hospitalization for diverticulitis    HISTORY OF PRESENT ILLNESS:  She was recently hospitalized for diverticulitis requiring IV antibiotics and blood transfusion. Surgical intervention was discussed during hospitalization. She currently reports feeling weak.    GASTROINTESTINAL:  She reports constipation, noting bowel movements only every three days, which is unusual for her. She has been taking Linzess without improvement in symptoms. She discontinued iron tablets due to worsening constipation.    GENITOURINARY:  She has a history of UTI for which she was prescribed antibiotics and presents for follow-up evaluation of these symptoms.    MEDICATIONS:  She is currently taking Linzess and a multivitamin.         Current Medications:  Current Medications[1]           ROS  Twelve point system reviewed, unremarkable except for stated above in HPI.        Objective:         Vitals:    06/16/25 0949 06/16/25 0953   BP: (!) 148/84 (!) 140/80   BP Location: Left arm    Patient Position: Sitting    Pulse: 65    Resp: 17    Temp: 98.5 °F (36.9 °C)    TempSrc: Temporal    SpO2: 98%    Weight: 73.5 kg (162 lb)    Height: 5' 8" (1.727 m)         Physical Exam     Patient is awake alert oriented person place and  Lungs are clear to auscultation bilaterally no crackles or wheezes   Cardiovascular S1-S2 regular rate and rhythm no murmurs rubs or gallops   Abdomen is soft positive bowel sounds nontender, extremities no clubbing cyanosis edema  Neuro no focal neurological deficits  Skin warm and dry.     Last Labs:     No results displayed " because visit has over 200 results.          Last Imaging:  X-Ray KUB  Narrative: EXAMINATION:  XR KUB    CLINICAL HISTORY:  Calculus of kidney    TECHNIQUE:  Single AP supine view of the abdomen (KUB) was performed    COMPARISON:  None    FINDINGS:  The bowel gas pattern is within normal limits.  The psoas shadows are sharp.  A 10 mm stone overlies the lower pole of the left kidney.  Atherosclerotic calcification is noted in the pelvic arteries.  No free air is seen.  No masses are noted.  Impression: 10 mm left intrarenal stone.  Atherosclerotic calcification of the pelvic vessels.  Otherwise negative abdomen x-ray    Electronically signed by: Wilfrido Friend MD  Date:    06/12/2025  Time:    10:01         **Labs and x-rays personally reviewed by me    ** reviewed           Assessment & Plan:   Assessment & Plan    K92.2 Lower GI bleed  E87.6 Hypokalemia    IMPRESSION:  - Assessed recovery from recent diverticulitis episode requiring hospitalization.  - Evaluated current symptoms and treatment efficacy for diverticulitis.  - Considered vitamin B12 supplementation pending level check.  - Determined need for physical therapy to address weakness.  - Assessed constipation and current treatment efficacy.  - Discontinued iron supplementation due to potential side effects.    K92.2 LOWER GI BLEED:  - Explained importance of dietary modifications for preventing diverticulitis flare-ups, including high fiber diet (fruits, vegetables, salads), increased water intake, baked chicken and fish, and avoidance of red meat and pork.  - Continued Lactulose for constipation, though noted as ineffective - this will be addressed by GI specialist.  - Ordered CBC to check for anemia and infection.  - Patient to follow up with GI specialist on the 25th as scheduled.           1. Lower GI bleed  -     CBC Auto Differential; Future; Expected date: 06/16/2025    2. Hypokalemia  -     Comprehensive Metabolic Panel; Future; Expected date:  06/16/2025    3. Urinary tract infection without hematuria, site unspecified  -     Urinalysis ($); Future; Expected date: 06/16/2025    4. Vitamin B 12 deficiency  -     Vitamin B12; Future; Expected date: 06/16/2025    5. Lumbosacral radiculopathy  -     Ambulatory Referral/Consult to Physical Therapy    6. Other chronic pain  -     Ambulatory referral/consult to Gastroenterology; Future; Expected date: 06/23/2025    Other orders  -     levothyroxine (SYNTHROID) 75 MCG tablet; Take 1 tablet (75 mcg total) by mouth before breakfast.  Dispense: 90 tablet; Refill: 1            Mateo Chen MD  This note was generated with the assistance of ambient listening technology. Verbal consent was obtained by the patient and accompanying visitor(s) for the recording of patient appointment to facilitate this note. I attest to having reviewed and edited the generated note for accuracy, though some syntax or spelling errors may persist. Please contact the author of this note for any clarification.            [1]   Current Outpatient Medications:     blood sugar diagnostic (ONETOUCH VERIO TEST STRIPS) Strp, 1 each by Misc.(Non-Drug; Combo Route) route 2 (two) times a day., Disp: 200 strip, Rfl: 4    blood-glucose meter kit, Use as instructed, Disp: 1 each, Rfl: 0    blood-glucose sensor (FREESTYLE SIMBA 3 SENSOR) Lizzie, 1 each by Misc.(Non-Drug; Combo Route) route Daily., Disp: 3 each, Rfl: 0    docusate sodium (COLACE) 100 MG capsule, Take 1 capsule (100 mg total) by mouth 2 (two) times daily., Disp: 60 capsule, Rfl: 3    estradioL (ESTRACE) 0.01 % (0.1 mg/gram) vaginal cream, Place 1 g vaginally once daily for 14 days, THEN 1 g twice a week., Disp: 42.5 g, Rfl: 1    ferrous sulfate (FEOSOL) 325 mg (65 mg iron) Tab tablet, Take 1 tablet (325 mg total) by mouth 2 (two) times daily., Disp: 60 tablet, Rfl: 3    flash glucose scanning reader (FREESTYLE SIMBA 2 READER) Misc, 1 each by Misc.(Non-Drug; Combo Route) route once  daily., Disp: 1 each, Rfl: 0    gabapentin (NEURONTIN) 400 MG capsule, TAKE 1 CAPSULE(400 MG) BY MOUTH EVERY 8 HOURS, Disp: 90 capsule, Rfl: 2    glipiZIDE (GLUCOTROL) 10 MG TR24, TAKE 1 TABLET(10 MG) BY MOUTH TWICE DAILY, Disp: 180 tablet, Rfl: 1    HYDROcodone-acetaminophen (NORCO)  mg per tablet, Take 1 tablet by mouth every 6 (six) hours as needed for Pain (pain)., Disp: 120 tablet, Rfl: 0    linaCLOtide (LINZESS) 145 mcg Cap capsule, Take 1 capsule (145 mcg total) by mouth before breakfast., Disp: 90 capsule, Rfl: 1    olmesartan (BENICAR) 20 MG tablet, Take 1 tablet (20 mg total) by mouth once daily., Disp: 90 tablet, Rfl: 3    ONETOUCH DELICA PLUS LANCET 33 gauge Misc, Check blood sugar TID, Disp: 100 each, Rfl: 11    oxybutynin (DITROPAN-XL) 10 MG 24 hr tablet, Take one tablet at night, Disp: 90 tablet, Rfl: 3    pantoprazole (PROTONIX) 40 MG tablet, Take 1 tablet (40 mg total) by mouth once daily., Disp: 90 tablet, Rfl: 0    levothyroxine (SYNTHROID) 75 MCG tablet, Take 1 tablet (75 mcg total) by mouth before breakfast., Disp: 90 tablet, Rfl: 1

## 2025-06-17 ENCOUNTER — RESULTS FOLLOW-UP (OUTPATIENT)
Dept: FAMILY MEDICINE | Facility: CLINIC | Age: 80
End: 2025-06-17
Payer: COMMERCIAL

## 2025-06-18 LAB — UA COMPLETE W REFLEX CULTURE PNL UR: NORMAL

## 2025-06-18 RX ORDER — NITROFURANTOIN 25; 75 MG/1; MG/1
100 CAPSULE ORAL 2 TIMES DAILY
Qty: 20 CAPSULE | Refills: 0 | Status: CANCELLED | OUTPATIENT
Start: 2025-06-18

## 2025-06-18 RX ORDER — NITROFURANTOIN 25; 75 MG/1; MG/1
100 CAPSULE ORAL 2 TIMES DAILY
COMMUNITY
Start: 2025-06-19 | End: 2025-06-26

## 2025-06-18 NOTE — TELEPHONE ENCOUNTER
Received verbal order to call in Macrobid 100 mg PO BID x 7 days for UTI. Called in T.O with Pharmacist Carmela at [facility] in [place]. Patient notified of medication and labs.

## 2025-06-25 ENCOUNTER — OFFICE VISIT (OUTPATIENT)
Dept: INTERNAL MEDICINE | Facility: CLINIC | Age: 80
End: 2025-06-25
Payer: COMMERCIAL

## 2025-06-25 VITALS — DIASTOLIC BLOOD PRESSURE: 86 MMHG | HEART RATE: 72 BPM | OXYGEN SATURATION: 99 % | SYSTOLIC BLOOD PRESSURE: 130 MMHG

## 2025-06-25 DIAGNOSIS — M81.0 OSTEOPOROSIS, UNSPECIFIED OSTEOPOROSIS TYPE, UNSPECIFIED PATHOLOGICAL FRACTURE PRESENCE: ICD-10-CM

## 2025-06-25 DIAGNOSIS — M81.0 POSTMENOPAUSAL OSTEOPOROSIS: Primary | ICD-10-CM

## 2025-06-25 DIAGNOSIS — E11.9 TYPE 2 DIABETES MELLITUS WITHOUT COMPLICATION, WITHOUT LONG-TERM CURRENT USE OF INSULIN: ICD-10-CM

## 2025-06-25 DIAGNOSIS — Z87.81 HISTORY OF FRACTURE OF FOOT: ICD-10-CM

## 2025-06-25 PROCEDURE — 99999 PR PBB SHADOW E&M-EST. PATIENT-LVL V: CPT | Mod: PBBFAC,,, | Performed by: NURSE PRACTITIONER

## 2025-06-25 PROCEDURE — 1101F PT FALLS ASSESS-DOCD LE1/YR: CPT | Mod: CPTII,,, | Performed by: NURSE PRACTITIONER

## 2025-06-25 PROCEDURE — 1159F MED LIST DOCD IN RCRD: CPT | Mod: CPTII,,, | Performed by: NURSE PRACTITIONER

## 2025-06-25 PROCEDURE — 3288F FALL RISK ASSESSMENT DOCD: CPT | Mod: CPTII,,, | Performed by: NURSE PRACTITIONER

## 2025-06-25 PROCEDURE — 99214 OFFICE O/P EST MOD 30 MIN: CPT | Mod: S$PBB,,, | Performed by: NURSE PRACTITIONER

## 2025-06-25 PROCEDURE — 1160F RVW MEDS BY RX/DR IN RCRD: CPT | Mod: CPTII,,, | Performed by: NURSE PRACTITIONER

## 2025-06-25 PROCEDURE — 99215 OFFICE O/P EST HI 40 MIN: CPT | Mod: PBBFAC | Performed by: NURSE PRACTITIONER

## 2025-06-25 PROCEDURE — 1111F DSCHRG MED/CURRENT MED MERGE: CPT | Mod: CPTII,,, | Performed by: NURSE PRACTITIONER

## 2025-06-25 PROCEDURE — 3075F SYST BP GE 130 - 139MM HG: CPT | Mod: CPTII,,, | Performed by: NURSE PRACTITIONER

## 2025-06-25 PROCEDURE — 3079F DIAST BP 80-89 MM HG: CPT | Mod: CPTII,,, | Performed by: NURSE PRACTITIONER

## 2025-06-27 ENCOUNTER — OFFICE VISIT (OUTPATIENT)
Dept: DIABETES SERVICES | Facility: CLINIC | Age: 80
End: 2025-06-27
Payer: COMMERCIAL

## 2025-06-27 VITALS
WEIGHT: 167 LBS | DIASTOLIC BLOOD PRESSURE: 88 MMHG | RESPIRATION RATE: 18 BRPM | BODY MASS INDEX: 25.31 KG/M2 | SYSTOLIC BLOOD PRESSURE: 138 MMHG | OXYGEN SATURATION: 98 % | HEIGHT: 68 IN | HEART RATE: 75 BPM

## 2025-06-27 DIAGNOSIS — E03.9 ACQUIRED HYPOTHYROIDISM: ICD-10-CM

## 2025-06-27 DIAGNOSIS — E11.9 TYPE 2 DIABETES MELLITUS WITHOUT COMPLICATION, WITHOUT LONG-TERM CURRENT USE OF INSULIN: Primary | ICD-10-CM

## 2025-06-27 DIAGNOSIS — E11.49 DIABETIC NEUROPATHY WITH NEUROLOGIC COMPLICATION: Chronic | ICD-10-CM

## 2025-06-27 DIAGNOSIS — M51.369 DEGENERATION OF INTERVERTEBRAL DISC OF LUMBAR REGION, UNSPECIFIED WHETHER PAIN PRESENT: ICD-10-CM

## 2025-06-27 DIAGNOSIS — E78.5 HYPERLIPIDEMIA, UNSPECIFIED HYPERLIPIDEMIA TYPE: ICD-10-CM

## 2025-06-27 DIAGNOSIS — I73.9 PVD (PERIPHERAL VASCULAR DISEASE): ICD-10-CM

## 2025-06-27 DIAGNOSIS — E11.40 DIABETIC NEUROPATHY WITH NEUROLOGIC COMPLICATION: Chronic | ICD-10-CM

## 2025-06-27 DIAGNOSIS — K21.9 GASTROESOPHAGEAL REFLUX DISEASE, UNSPECIFIED WHETHER ESOPHAGITIS PRESENT: ICD-10-CM

## 2025-06-27 LAB — GLUCOSE SERPL-MCNC: 161 MG/DL (ref 70–110)

## 2025-06-27 PROCEDURE — 3079F DIAST BP 80-89 MM HG: CPT | Mod: CPTII,,, | Performed by: NURSE PRACTITIONER

## 2025-06-27 PROCEDURE — 3075F SYST BP GE 130 - 139MM HG: CPT | Mod: CPTII,,, | Performed by: NURSE PRACTITIONER

## 2025-06-27 PROCEDURE — 1101F PT FALLS ASSESS-DOCD LE1/YR: CPT | Mod: CPTII,,, | Performed by: NURSE PRACTITIONER

## 2025-06-27 PROCEDURE — 82962 GLUCOSE BLOOD TEST: CPT | Mod: PBBFAC | Performed by: NURSE PRACTITIONER

## 2025-06-27 PROCEDURE — 99214 OFFICE O/P EST MOD 30 MIN: CPT | Mod: S$PBB,,, | Performed by: NURSE PRACTITIONER

## 2025-06-27 PROCEDURE — 99999 PR PBB SHADOW E&M-EST. PATIENT-LVL V: CPT | Mod: PBBFAC,,, | Performed by: NURSE PRACTITIONER

## 2025-06-27 PROCEDURE — 1160F RVW MEDS BY RX/DR IN RCRD: CPT | Mod: CPTII,,, | Performed by: NURSE PRACTITIONER

## 2025-06-27 PROCEDURE — 1111F DSCHRG MED/CURRENT MED MERGE: CPT | Mod: CPTII,,, | Performed by: NURSE PRACTITIONER

## 2025-06-27 PROCEDURE — 3288F FALL RISK ASSESSMENT DOCD: CPT | Mod: CPTII,,, | Performed by: NURSE PRACTITIONER

## 2025-06-27 PROCEDURE — 1125F AMNT PAIN NOTED PAIN PRSNT: CPT | Mod: CPTII,,, | Performed by: NURSE PRACTITIONER

## 2025-06-27 PROCEDURE — 99215 OFFICE O/P EST HI 40 MIN: CPT | Mod: PBBFAC | Performed by: NURSE PRACTITIONER

## 2025-06-27 PROCEDURE — 1159F MED LIST DOCD IN RCRD: CPT | Mod: CPTII,,, | Performed by: NURSE PRACTITIONER

## 2025-06-27 PROCEDURE — 99999PBSHW POCT GLUCOSE, HAND-HELD DEVICE: Mod: PBBFAC,,,

## 2025-06-27 RX ORDER — GLIPIZIDE 10 MG/1
10 TABLET, FILM COATED, EXTENDED RELEASE ORAL 2 TIMES DAILY
Qty: 180 TABLET | Refills: 1 | Status: SHIPPED | OUTPATIENT
Start: 2025-06-27

## 2025-06-27 NOTE — PROGRESS NOTES
Subjective:         Patient ID: Lorraine De Jesus is a 80 y.o. female.  Patient's current PCP is Mateo Chen MD.     Chief Complaint: Diabetes Mellitus (Patient is here to establish care with provider and glucose check. Patient has been a diabetic for about 5 years. She is checking glucose once daily. No patient complaints. )    HPI  Lorraine De Jesus is a 80 y.o. Black or  female presenting for a new consult with me, previously seen by LORIN Morse for diabetes. Patient has been diagnosed with type 2 diabetes for several years. She was diagnosed during a routine visit.   Received diabetes education: no     CURRENT DM MEDICATIONS:   Diabetes Medications              glipiZIDE (GLUCOTROL) 10 MG TR24 TAKE 1 TABLET(10 MG) BY MOUTH TWICE DAILY        Once daily       Past failed treatment include: N/A     Blood glucose testing is performed regularly. Patient is testing 1 times per day.  Meter:   Preferred lab: [facility]     Any episodes of hypoglycemia? no     Complications related to diabetes: peripheral neuropathy, cardiovascular disease, and peripheral vascular disease    Her blood sugar in the clinic today was:   Lab Results   Component Value Date    POCGLU 161 (A) 06/27/2025       Lorraine De Jesus presents today for follow up visit to discuss diabetes management.   She denies any complaints. She states she only checks once daily and her numbers run from 80s to 150s. Her higher numbers are usually after she has had higher sugar/carb foods the night before.     Reviewed labs.   Discussed goal glucose and A1C, diet and exercise.   No changes to medications today.     Current diet: lower carb/sugar diet   Activity Level: light     Lab Results   Component Value Date    HGBA1C 6.1 06/02/2025    HGBA1C 6.5 01/07/2025    HGBA1C 6.3 05/08/2024       STANDARDS OF CARE  Diabetes Management Status    Statin: Not taking  ACE/ARB: Taking    Screening or Prevention Patient's value Goal Complete/Controlled?   HgA1C Testing and  "Control   Lab Results   Component Value Date    HGBA1C 6.1 06/02/2025      Annually/Less than 8% Yes   Lipid profile : 04/08/2025 Annually Yes   LDL control Lab Results   Component Value Date    LDLCALC 145 04/08/2025    Annually/Less than 100 mg/dl  No   Nephropathy screening Lab Results   Component Value Date    LABMICR 34.6 (H) 04/08/2025     Lab Results   Component Value Date    PROTEINUA Negative 06/16/2025     No results found for: "UTPCR"   Annually Yes   Blood pressure BP Readings from Last 1 Encounters:   06/27/25 138/88    Less than 140/90 Yes   Dilated retinal exam : 05/01/2023  Dr Phoenix  Annually No   Foot exam   Most Recent Foot Exam Date: Not Found Annually No          Labs reviewed and are noted below.    Lab Results   Component Value Date    WBC 5.89 06/16/2025    HGB 10.3 (L) 06/16/2025    HCT 34.5 (L) 06/16/2025     06/16/2025    CHOL 213 (H) 04/08/2025    TRIG 113 04/08/2025    HDL 45 04/08/2025    LDLCALC 145 04/08/2025    ALT 11 06/16/2025    AST 18 06/16/2025     06/16/2025    K 4.5 06/16/2025     (H) 06/16/2025    ANIONGAP 11 06/16/2025    CREATININE 0.83 06/16/2025    ESTGFRAFRICA 89 12/06/2021    EGFRNONAA 79 06/25/2022    BUN 11 06/16/2025    CO2 25 06/16/2025    TSH 2.167 04/08/2025    INR 1.05 10/16/2023     (H) 06/16/2025    MICROALBUR 1.8 04/08/2025     Lab Results   Component Value Date    FREET4 1.21 11/19/2024    TSH 2.167 04/08/2025    DBAFCXUC64 930 (H) 06/16/2025    CALCIUM 8.8 06/16/2025     No results found for: "CPEPTIDE"  No results found for: "GLUTAMICACID"  Glucose   Date Value Ref Range Status   06/16/2025 180 (H) 82 - 115 mg/dL Final     Anion Gap   Date Value Ref Range Status   06/16/2025 11 7 - 16 mmol/L Final     eGFR    Date Value Ref Range Status   12/06/2021 89 >=60 mL/min/1.73m² Final     eGFR   Date Value Ref Range Status   06/25/2022 79 >=60 mL/min/1.73m² Final       The following portions of the patient's history were " reviewed and updated as appropriate: allergies, current medications, past family history, past medical history, past social history, past surgical history, and problem list.    Review of patient's allergies indicates:   Allergen Reactions    Lipitor [atorvastatin] Other (See Comments)     Muscle aching    Pravachol [pravastatin] Other (See Comments)     Muscle aching     Social History[1]  Past Medical History:   Diagnosis Date    Adenomatous polyp of ascending colon 6/22/2021    Adenomatous polyp of descending colon 6/22/2021    Age related osteoporosis 10/28/2020    Benign paroxysmal vertigo     Chronic pain syndrome     Chronic pelvic pain in female 7/28/2021    Ditropan XL 5mg    Colon, diverticulosis 6/22/2021    Depressive disorder 08/12/2019    External hemorrhoid 6/22/2021    Gastrointestinal hemorrhage associated with anorectal source 6/22/2021    GERD (gastroesophageal reflux disease) 11/21/2013    Hyperlipidemia 01/16/2012    Hypothyroidism 02/25/2019    Joint pain     Nonrheumatic tricuspid (valve) insufficiency 03/18/2018    Personal history UTI 7/28/2021    Controlled on Hiprex    Polyneuropathy 07/11/2018    PVD (peripheral vascular disease) 10/30/2018    Temporal arteritis     Type 2 diabetes mellitus 04/02/2014    Urethral meatal stenosis 7/11/2018    history of known urethral stenosis, and was taught to perform monthly self dilation at home.       REVIEW OF SYSTEMS:  Eyes No history of DR.  Cardiovascular: History of HLD   GI: Denies nausea,vomiting,constipation,or diarrhea.  : Denies dysuria.  SKIN: Denies rashes and lesions.  Neuro: Neuropathy.  PSYCH: No tobacco use.  ENDO: See HPI.        Objective:      Vitals:    06/27/25 0851   BP: 138/88   Pulse: 75   Resp: 18     RESPIRATORY: No respiratory distress  NEUROLOGIC: Cranial nerves II-XII grossly intact.   PSYCHIATRIC: Alert & oriented x3. Normal mood and affect.  FOOT EXAMINATION: Protective Sensation (w/ 10 gram monofilament):  Right:  "Decreased  Left: Decreased    Visual Inspection:  Normal -  Bilateral    Pedal Pulses:   Right: Present  Left: Present    Posterior Tibialis Pulses:   Right:Present  Left: Present    Assessment:       1. Type 2 diabetes mellitus without complication, without long-term current use of insulin    2. Diabetic neuropathy with neurologic complication    3. Degeneration of intervertebral disc of lumbar region, unspecified whether pain present    4. Hyperlipidemia, unspecified hyperlipidemia type    5. PVD (peripheral vascular disease)    6. Acquired hypothyroidism    7. Gastroesophageal reflux disease, unspecified whether esophagitis present        Plan:   Type 2 diabetes mellitus without complication, without long-term current use of insulin  -     POCT Glucose, Hand-Held Device  -     glipiZIDE (GLUCOTROL) 10 MG TR24; Take 1 tablet (10 mg total) by mouth 2 (two) times daily.  Dispense: 180 tablet; Refill: 1    Diabetic neuropathy with neurologic complication  - noted  - follows with pain clinic  - on gabapentin     Degeneration of intervertebral disc of lumbar region, unspecified whether pain present  - noted     Hyperlipidemia, unspecified hyperlipidemia type  - not on a statin?  - she thinks she has one at home-- will check and call back     PVD (peripheral vascular disease)  - noted     Acquired hypothyroidism  - noted     Gastroesophageal reflux disease, unspecified whether esophagitis present  - noted         - Follow up: 6 months       Portions of this note may have been created with voice recognition software. Occasional "wrong-word" or "sound-a-like" substitutions may have occurred due to the inherent limitations of voice recognition software. Please, read the note carefully and recognize, using context, where substitutions have occurred.         Marissa ESPINOZA/JORI  [facility]          [1]   Social History  Socioeconomic History    Marital status:    Tobacco Use    Smoking " status: Never     Passive exposure: Never    Smokeless tobacco: Never   Substance and Sexual Activity    Alcohol use: Never    Drug use: Never    Sexual activity: Not Currently     Partners: Male     Social Drivers of Health     Financial Resource Strain: Low Risk  (6/2/2025)    Overall Financial Resource Strain (CARDIA)     Difficulty of Paying Living Expenses: Not hard at all   Food Insecurity: No Food Insecurity (6/2/2025)    Hunger Vital Sign     Worried About Running Out of Food in the Last Year: Never true     Ran Out of Food in the Last Year: Never true   Transportation Needs: No Transportation Needs (6/2/2025)    PRAPARE - Transportation     Lack of Transportation (Medical): No     Lack of Transportation (Non-Medical): No   Physical Activity: Sufficiently Active (9/23/2024)    Exercise Vital Sign     Days of Exercise per Week: 3 days     Minutes of Exercise per Session: 60 min   Stress: No Stress Concern Present (6/2/2025)    Citizen of [place] [employer] - Occupational Stress Questionnaire     Feeling of Stress : Not at all   Housing Stability: Low Risk  (6/2/2025)    Housing Stability Vital Sign     Unable to Pay for Housing in the Last Year: No     Homeless in the Last Year: No

## 2025-06-27 NOTE — PATIENT INSTRUCTIONS
-- Medications adjusted for today's visit include:    Continue your glipizide     -- Limit carbs to no more than 30-45 grams with each meal. Never eat carbs by themselves, always add protein. Make snacks low carb or non-carb only.    -- Continue checking blood sugar 3 times daily: Fasting blood sugar and vary your next 2 readings: Before lunch, before supper, 2 hours after any meal, or bedtime.     -Blood sugar goals should be a fasting blood sugar between , and no blood sugars throughout the day over 180 is good, less than 160 better, less than 140 perfect.    --Carry glucose tablets/soft peppermints/regular juice or Coke product with you at all times to treat a low blood sugar episode (less than 70). If your blood sugar is between 50-70, Chew 4 tablets or drink 1/2 cup of juice or regular Coke product. If your blood sugar is below 50, double the treatment. Re-check blood sugar in 15 minutes. If still low, repeat this. Always call the clinic to give an update for any low blood sugar episodes.    --Exercise as tolerated: Goal 30 minutes/day, 5 days/week. Start slowly and increase as tolerated.    --Follow-up for your next visit in 6 months     --Please either drop off, fax, or MyChart your readings to me as needed.

## 2025-06-30 ENCOUNTER — EXTERNAL CHRONIC CARE MANAGEMENT (OUTPATIENT)
Dept: INTERNAL MEDICINE | Facility: CLINIC | Age: 80
End: 2025-06-30
Payer: COMMERCIAL

## 2025-06-30 PROCEDURE — 99439 CHRNC CARE MGMT STAF EA ADDL: CPT | Mod: ,,, | Performed by: INTERNAL MEDICINE

## 2025-06-30 PROCEDURE — 99490 CHRNC CARE MGMT STAFF 1ST 20: CPT | Mod: ,,, | Performed by: INTERNAL MEDICINE

## 2025-07-01 ENCOUNTER — CLINICAL SUPPORT (OUTPATIENT)
Dept: REHABILITATION | Facility: HOSPITAL | Age: 80
End: 2025-07-01
Attending: INTERNAL MEDICINE
Payer: COMMERCIAL

## 2025-07-01 DIAGNOSIS — M54.16 LUMBAR BACK PAIN WITH RADICULOPATHY AFFECTING RIGHT LOWER EXTREMITY: Primary | ICD-10-CM

## 2025-07-01 PROCEDURE — 97161 PT EVAL LOW COMPLEX 20 MIN: CPT

## 2025-07-01 NOTE — PROGRESS NOTES
Outpatient Rehab    Physical Therapy Evaluation    Patient Name: Lorraine De Jesus  MRN: [id number]  YOB: 1945  Encounter Date: 7/1/2025    Therapy Diagnosis:   Encounter Diagnosis   Name Primary?    Lumbar back pain with radiculopathy affecting right lower extremity Yes     Physician: Mateo Chen MD    Physician Orders: Eval and Treat  Medical Diagnosis: Lumbosacral radiculopathy  Surgical Diagnosis: Not applicable for this Episode   Surgical Date: Not applicable for this Episode  Days Since Last Surgery: Not applicable for this Episode    Visit # / Visits Authorized:  1 / 1  Insurance Authorization Period: 6/16/2025 to 6/16/2026  Date of Evaluation: 7/1/2025  Plan of Care Certification: 7/1/2025 to 8/29/2025     Time In: 0840   Time Out: 0920  Total Time (in minutes): 40   Total Billable Time (in minutes): 40    Intake Outcome Measure for FOTO Survey    Therapist reviewed FOTO scores for Lorraine De Jesus on 7/1/2025.   FOTO report - see Media section or FOTO account episode details.     Intake Score: 50%    Precautions:       Subjective   History of Present Illness  Lorraine is a 80 y.o. female who reports to physical therapy with a chief concern of Lumbosacral Radiculopathy.     The patient reports a medical diagnosis of Lumbosacral Radiculopathy.    Diagnostic tests related to this condition: MRI studies.   MRI Studies Details: Lumbar MRI from 2021: MRI LUMBAR SPINE WITHOUT CONTRAST     CLINICAL HISTORY:  Back pain or radiculopathy, > 6 wks;.  Dorsalgia, unspecified     TECHNIQUE:  The lumbar spine was imaged in the sagittal and axial planes on a 1.5 Dee Dee magnet without IV contrast.  Sequences include T1, T2, and STIR images.     COMPARISON:  October 7, 2014 MRI lumbar spine     FINDINGS:  There is no compression fracture or spondylolisthesis.  There is a 16 mm lobular hyperintense T1 and T2 hemangioma within the L3 vertebral body, present on the previous study.  There is mild scattered lumbar  spondylosis.  Lumbar disc spaces are fairly well maintained.  There is mild scattered disc desiccation.     There is a fat signal intensity lipoma associated with the filum terminale, the bulk of which measures 16 x 4 mm in the sagittal plane, similar to the previous study.  Conus medullaris terminates at the mid L2 level and is otherwise without gross mass lesion.     There is no significant spinal or neural foraminal stenosis or significant disc protrusion at T12-L1 through L3-L4.  There is scattered mild ligamentum flavum and facet hypertrophy at these levels.     There is slight posterior diffuse disc bulging at L4-L5 without significant spinal stenosis.  There is no high-grade foraminal stenosis.  There is mild ligamentum flavum and facet hypertrophy.     There is mild narrowing of the spinal canal at L5-S1 secondary to mild posterior diffuse disc bulging as well as moderate ligamentum flavum and facet hypertrophy.  The facet hypertrophy is asymmetrically more prominent on the right.  There is moderate right and left neural foraminal narrowing related to posterolateral bulging disc as well as uncovertebral hypertrophy.  There is facet joint effusion on the right related to facet arthropathy.     Impression:     Mild spinal stenosis at L5-S1 related to mild posterior diffuse disc bulging as well as facet arthropathy.  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 She also reports the pain travels up between her shoulder blades. She reports now she is no longer able to take a bath as she cannot get down in the bathtub any longer. She has to take  showers. She has increased pain anytime she has to do any prolonged standing and walking. There is an MRI from 2021 that showed a hemangioma within L3, disc bulge at L4-L5, and stenosis at L5/S1. No other imaging regarding the back was found. MD ordered Outpatient Physical Therapy and patient is here today for the Evaluation.      Activities of Daily Living  Social history was obtained from Patient.    General Prior Level of Function Comments: Independent  General Current Level of Function Comments: Difficulty with performing ADLs due to back pain. No longer able to take a bath as she cannot get up/down out of the tub           Pain     Patient reports a current pain level of 7/10. Pain at best is reported as 0/10. Pain at worst is reported as 10/10.   Location: Low Back pain is consistent and Right LE pain is on occasion  Clinical Progression (since onset): Worsening  Pain Qualities: Aching, Burning, Dull, Throbbing  Pain-Relieving Factors: Activity modification, Change in position, Rest, Heat, Medications - prescription, Other (Comment)  Other Pain-Relieving Factors: Norco and Gabapentin  Pain-Aggravating Factors: Bending, Cooking, Standing         Living Arrangements  Living Situation  Housing: Home independently  Living Arrangements: Alone          Past Medical History/Physical Systems Review:   Lorraine De Jesus  has a past medical history of Adenomatous polyp of ascending colon, Adenomatous polyp of descending colon, Age related osteoporosis, Benign paroxysmal vertigo, Chronic pain syndrome, Chronic pelvic pain in female, Colon, diverticulosis, Depressive disorder, External hemorrhoid, Gastrointestinal hemorrhage associated with anorectal source, GERD (gastroesophageal reflux disease), Hyperlipidemia, Hypothyroidism, Joint pain, Nonrheumatic tricuspid (valve) insufficiency, Personal history UTI, Polyneuropathy, PVD (peripheral vascular disease), Temporal arteritis, Type 2 diabetes mellitus, and Urethral meatal  stenosis.    Lorraine De Jesus  has a past surgical history that includes Hysterectomy (); Thyroidectomy; Cardiac catheterization (10/14/2009);  section; Colonoscopy (2009); Cystoscopy with urethral dilation (2021); Lumbar sympathetic nerve block (Left, 10/05/2020); Cystoscopy with hydrodistension of bladder (N/A, 2021); right lumbar sympathetic nerve block (Right, );  left lumbar sympathetic nerve block (Left, ); Epidural steroid injection (N/A, 10/27/2022); and Epidural steroid injection (N/A, 2023).    Lorraine has a current medication list which includes the following prescription(s): blood sugar diagnostic, blood-glucose meter, docusate sodium, estradiol, ferrous sulfate, gabapentin, glipizide, hydrocodone-acetaminophen, levothyroxine, linaclotide, olmesartan, onetouch delica plus lancet, oxybutynin, and pantoprazole.    Review of patient's allergies indicates:   Allergen Reactions    Lipitor [atorvastatin] Other (See Comments)     Muscle aching    Pravachol [pravastatin] Other (See Comments)     Muscle aching        Objective   Posture  Patient presents with a Forward head position. Flat lumbar spine is observed.   Shoulders are Rounded.             Lower Extremity Sensation            Diabetic Neuropathy in bilateral feet and occasional pain and burning in the Right Lower Extremity associated with the low back dysfunction        Lumbar Range of Motion   Active (deg) Passive (deg) Pain   Flexion 40       Extension 0   Yes   Right Lateral Flexion 10   Yes   Right Rotation 25       Left Lateral Flexion 20       Left Rotation 25                     Thoracic Trunk Strength  3/5    Lumbar Strength   Strength Pain   Flexion 3     Extension 3     Right Lateral Flexion 3     Left Lateral Flexion 3     Right Rotation 3     Left Rotation 3     Transverse Abdominus Strength Testing: 3/5            Hip Strength - Planes of Motion   Right Strength Right Pain Left Strength Left  Pain    Flexion (L2) 4-   4     Extension 4-   4     ABduction 4-   4     ADduction 4-   4     Internal Rotation 4-   4     External Rotation 4-   4         Knee Strength   Right Strength Right Pain Left Strength Left  Pain   Flexion (S2) 4   4+     Prone Flexion           Extension (L3) 4   4+                Lumbar/Pelvic Girdle Special Tests  Lumbar Tests - Repeated  Positive: Extension                            Transfers Assessment  Sit to Stand Assistance: Minimal assist  Sit to Stand Assistance Details: Difficulty with sit to stand especially from low surfaces  Bathtub/Shower Transfer Assistance: Dependent  Bathtub/Shower Transfer Assistance Details: Unable to perform tub transfers, She takes showers instead  Ground/Fall Recovery Assistance: Dependent            Treatment:       Time Entry(in minutes):  PT Evaluation (Low) Time Entry: 40    Assessment & Plan   Assessment  Lorraine presents with a condition of Low complexity.   Presentation of Symptoms: Stable  Will Comorbidities Impact Care: No       Functional Limitations: Activity tolerance, Completing work/school activities, Pain when reaching, Pain with ADLs/IADLs, Sitting tolerance, Disrupted sleep pattern, Range of motion, Performing household chores, Participating in leisure activities, Painful locomotion/ambulation, Carrying objects, Decreased ambulation distance/endurance, Standing tolerance  Impairments: Activity intolerance, Abnormal or restricted range of motion, Impaired physical strength, Lack of appropriate home exercise program, Pain with functional activity, Abnormal muscle firing, Abnormal muscle tone    Patient Goal for Therapy (PT): I want to be able to stand up in my kitchen for 5 minutes to do dishes and cook without hurting so bad.  Prognosis: Good  Assessment Details: Patient reports she had had back pain for the past 5 years but recently it has gotten worse and the pain is now traveling down the Right Lower Extremity all the way to her calf.  She also reports the pain travels up between her shoulder blades. She reports now she is no longer able to take a bath as she cannot get down in the bathtub any longer. She has to take showers. She has increased pain anytime she has to do any prolonged standing and walking. There is an MRI from 2021 that showed a hemangioma within L3, disc bulge at L4-L5, and stenosis at L5/S1. No other imaging regarding the back was found. Patient has decreased lumbosacral mobility in all directions with most painful being extension and Right Lateral Flexion. She has decreased strength in the abdominals, paraspinals, and bilateral Lower Extremities with Right Lower Extremity weaker than the Left. She has increased pain with prolonged standing and she has difficulty with sit to stand especially from low surfaces.  Patient demonstrates deficits with range of motion, strength, and function that limit ability to participate in school, work, and recreational activities. They would benefit from skilled PT services to normalize kinetic chain mobility, strength, and function to safely return to their prior level of activity.    Plan  From a physical therapy perspective, the patient would benefit from: Skilled Rehab Services    Planned therapy interventions include: Therapeutic exercise, Therapeutic activities, Neuromuscular re-education, and Manual therapy.    Planned modalities to include: Electrical stimulation - passive/unattended, Cryotherapy (cold pack), Vasopneumatic pump, Thermotherapy (hot pack), Mechanical traction, and Other (Comment). Dry Needling      Visit Frequency: 2 times Per Week for 8 Weeks.       This plan was discussed with Patient.   Discussion participants: Agreed Upon Plan of Care  Plan details: Patient will benefit from skilled Physical Therapy intervention to address all deficits and help patient to return to their prior level of function.  Sup Visit performed today with BOUBACAR Ogden, BOUBACAR Malin,  and BOUBACAR Joseph.  All goals and treatment plan reviewed. Will work toward completion of all goals set.           The patient's spiritual, cultural, and educational needs were considered, and the patient is agreeable to the plan of care and goals.           Goals:   Active       Changing body position       Patient will demonstrate moving sit to stand Independently from standing dining room chair.       Start:  07/01/25    Expected End:  08/29/25               Functional outcome       Patient stated goal: I want to be able to stand up in my kitchen for 5 minutes to do dishes and cook without hurting so bad.        Start:  07/01/25    Expected End:  08/29/25            Patient will demonstrate independence in home program for support of progression       Start:  07/01/25    Expected End:  08/08/25               Pain       Patient will report pain of 3/10 demonstrating a reduction of overall pain       Start:  07/01/25    Expected End:  08/29/25               Range of Motion       Patient will achieve spinal flexion to 50 degrees        Start:  07/01/25    Expected End:  08/29/25            Patient will achieve spinal extension to 10 degrees        Start:  07/01/25    Expected End:  08/29/25               Strength       Patient will demonstrate 4/5 abdominal strength       Start:  07/01/25    Expected End:  08/29/25            Patient will demonstrate 4/5 spinal strength       Start:  07/01/25    Expected End:  08/29/25            Patient will achieve bilateral hip strength of 4+/5       Start:  07/01/25    Expected End:  08/29/25                JACKIE SHER PT, DPT      Clinical Information for Insurance Authorization     Dates of Services: 7/1/2025 to 8/29/2025  Discharge Plan: Patient will be discharged from skilled physical therapy treatment once all goals have been met, patient has plateaued, or physician/insurance requests discontinuation of care. Patient will be discharged with a home exercise program.    Type of therapy: Rehabilitative  ICD-10 Diagnosis Code(s): M54.16  Which side is symptomatic? Not applicable and/or symptoms are not localized  Surgical: No  Surgical procedure: N/A  Surgery date: N/A.  Presenting symptoms/diagnoses are repetitive in nature.  Presenting symptoms are radiating in nature.   The rehabilitation is not related to a diagnosis of cancer.  The rehabilitation is not related to a diagnosis of lymphedema.  Patient's clinical presentation is:  Moderate objective and functional deficits: consistent symptoms and/or symptoms that are intensified with activity with moderate loss of range of motion, strength, or ability to perform daily tasks  CPT Codes Requested:  31268 [therapeutic exercise], 59903 [neuromuscular re-education], 06597 [manual therapy], 95499 [therapeutic activities], 39007 [unattended electrical stimulation], and 62000 [mechanical traction]

## 2025-07-09 ENCOUNTER — CLINICAL SUPPORT (OUTPATIENT)
Dept: REHABILITATION | Facility: HOSPITAL | Age: 80
End: 2025-07-09
Payer: COMMERCIAL

## 2025-07-09 ENCOUNTER — OFFICE VISIT (OUTPATIENT)
Dept: FAMILY MEDICINE | Facility: CLINIC | Age: 80
End: 2025-07-09
Payer: COMMERCIAL

## 2025-07-09 VITALS
DIASTOLIC BLOOD PRESSURE: 76 MMHG | WEIGHT: 169 LBS | OXYGEN SATURATION: 98 % | RESPIRATION RATE: 18 BRPM | BODY MASS INDEX: 25.61 KG/M2 | HEIGHT: 68 IN | TEMPERATURE: 99 F | SYSTOLIC BLOOD PRESSURE: 133 MMHG | HEART RATE: 79 BPM

## 2025-07-09 DIAGNOSIS — E11.9 TYPE 2 DIABETES MELLITUS WITHOUT COMPLICATION, WITHOUT LONG-TERM CURRENT USE OF INSULIN: ICD-10-CM
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
## 2025-07-09 DIAGNOSIS — M54.16 LUMBAR BACK PAIN WITH RADICULOPATHY AFFECTING RIGHT LOWER EXTREMITY: Primary | ICD-10-CM

## 2025-07-09 DIAGNOSIS — I10 ESSENTIAL (PRIMARY) HYPERTENSION: ICD-10-CM

## 2025-07-09 DIAGNOSIS — S09.90XA HEAD TRAUMA, INITIAL ENCOUNTER: Primary | ICD-10-CM

## 2025-07-09 LAB
BACTERIA #/AREA URNS HPF: ABNORMAL /HPF
BILIRUB UR QL STRIP: NEGATIVE
CLARITY UR: ABNORMAL
COLOR UR: ABNORMAL
GLUCOSE UR STRIP-MCNC: NORMAL MG/DL
HYALINE CASTS #/AREA URNS LPF: ABNORMAL /LPF
KETONES UR STRIP-SCNC: NEGATIVE MG/DL
LEUKOCYTE ESTERASE UR QL STRIP: ABNORMAL
MUCOUS, UA: ABNORMAL /LPF
NITRITE UR QL STRIP: NEGATIVE
PH UR STRIP: 6.5 PH UNITS
PROT UR QL STRIP: NEGATIVE
RBC # UR STRIP: NEGATIVE /UL
RBC #/AREA URNS HPF: 6 /HPF
SP GR UR STRIP: 1.01
SQUAMOUS #/AREA URNS LPF: ABNORMAL /HPF
UROBILINOGEN UR STRIP-ACNC: NORMAL MG/DL
WBC #/AREA URNS HPF: 158 /HPF
WBC CLUMPS, UA: ABNORMAL /HPF

## 2025-07-09 PROCEDURE — 3075F SYST BP GE 130 - 139MM HG: CPT | Mod: ,,, | Performed by: INTERNAL MEDICINE

## 2025-07-09 PROCEDURE — 81001 URINALYSIS AUTO W/SCOPE: CPT | Mod: ,,, | Performed by: CLINICAL MEDICAL LABORATORY

## 2025-07-09 PROCEDURE — 87086 URINE CULTURE/COLONY COUNT: CPT | Mod: ,,, | Performed by: CLINICAL MEDICAL LABORATORY

## 2025-07-09 PROCEDURE — 97112 NEUROMUSCULAR REEDUCATION: CPT | Mod: CQ

## 2025-07-09 PROCEDURE — 99214 OFFICE O/P EST MOD 30 MIN: CPT | Mod: ,,, | Performed by: INTERNAL MEDICINE

## 2025-07-09 PROCEDURE — 1159F MED LIST DOCD IN RCRD: CPT | Mod: ,,, | Performed by: INTERNAL MEDICINE

## 2025-07-09 PROCEDURE — 3078F DIAST BP <80 MM HG: CPT | Mod: ,,, | Performed by: INTERNAL MEDICINE

## 2025-07-09 PROCEDURE — 97110 THERAPEUTIC EXERCISES: CPT | Mod: CQ

## 2025-07-09 PROCEDURE — 1125F AMNT PAIN NOTED PAIN PRSNT: CPT | Mod: ,,, | Performed by: INTERNAL MEDICINE

## 2025-07-09 NOTE — PROGRESS NOTES
Outpatient Rehab    Physical Therapy Visit    Patient Name: Lorraine De Jesus  MRN: [id number]  YOB: 1945  Encounter Date: 7/9/2025    Therapy Diagnosis:   Encounter Diagnosis   Name Primary?    Lumbar back pain with radiculopathy affecting right lower extremity Yes     Physician: Mateo Chen MD    Physician Orders: Eval and Treat  Medical Diagnosis: Lumbosacral radiculopathy  Surgical Diagnosis: Not applicable for this Episode   Surgical Date: Not applicable for this Episode  Days Since Last Surgery: Not applicable for this Episode    Visit # / Visits Authorized:  1 / 10  Insurance Authorization Period: 7/1/2025 to 9/7/2025  Date of Evaluation: 7/1/2025  Plan of Care Certification: 7/1/2025 to 8/29/2025      PT/PTA: PTA   Number of PTA visits since last PT visit:1  Time In: 0832   Time Out: 0914  Total Time (in minutes): 42   Total Billable Time (in minutes): 42    FOTO:  Intake Score: 50%  Survey Score 2:  %  Survey Score 3:  %    Precautions:       Subjective   Pt reports constant pain down that right leg and in the lower back today..  Pain reported as 5/10.      Objective            Treatment:  Therapeutic Exercise  TE 1: NuStep x 5 minutes  TE 2: Slantboard Stretch 4 x 15 second hold  TE 3: Hamstring Stretch 4 x 15 second hold each (B)  TE 4: Seated low back stretch 4 x 15 sevcond hold  TE 5: seated piriformis stretch 4 x 15 second hold (B)  TE 6: LTR Stretch 4 x 15 second hold  TE 7: SKTC Stretch 4 x 15 second hold  TE 8: DKTC Stretch 4 x 15 second hold  Balance/Neuromuscular Re-Education  NMR 1: pelvic tilt x 10  NMR 2: bridges x 10  NMR 3: pelvic tilt + march x 10  NMR 4: pelvic tilt + knee extension x 10 (B)  NMR 5: LTR x 20    Time Entry(in minutes):  Neuromuscular Re-Education Time Entry: 12  Therapeutic Exercise Time Entry: 30    Assessment & Plan   Assessment: Today is the patient's first treatment since the Evaluation. Therapist Initiated the Plan of Care and instructed patient on  proper form for all exercises. Therapist initiated the Basic Back Mat and Stretching Home Exercise Program today. Verbal and tactile cues provided for proper form for all the exercises. She was able to return demonstration well. Patient was given a written copy of the Home Exercise Program that included pictures and written instructions. We will review these with her again at the next visit to ensure she is able to perform these Independently and correctly. Patient reports that the exercises felt good, however she was fatigued but it did not increase her lumbar/lower extremity pain.    Evaluation/Treatment Tolerance: Patient limited by fatigue    The patient will continue to benefit from skilled outpatient physical therapy in order to address the deficits listed in the problem list on the initial evaluation, provide patient and family education, and maximize the patients level of independence in the home and community environments.     The patient's spiritual, cultural, and educational needs were considered, and the patient is agreeable to the plan of care and goals.     Education  Education was done with Patient. The patient's learning style includes Demonstration, Listening, and Pictures/video. The patient Verbalizes understanding and Demonstrates understanding.                 Plan: Will cont to progress core stabilization, LE and posterior chain strengthening as able.    Goals:   Active       Changing body position       Patient will demonstrate moving sit to stand Independently from standing dining room chair.       Start:  07/01/25    Expected End:  08/29/25               Functional outcome       Patient stated goal: I want to be able to stand up in my kitchen for 5 minutes to do dishes and cook without hurting so bad.        Start:  07/01/25    Expected End:  08/29/25            Patient will demonstrate independence in home program for support of progression       Start:  07/01/25    Expected End:  08/08/25                Pain       Patient will report pain of 3/10 demonstrating a reduction of overall pain       Start:  07/01/25    Expected End:  08/29/25               Range of Motion       Patient will achieve spinal flexion to 50 degrees        Start:  07/01/25    Expected End:  08/29/25            Patient will achieve spinal extension to 10 degrees        Start:  07/01/25    Expected End:  08/29/25               Strength       Patient will demonstrate 4/5 abdominal strength       Start:  07/01/25    Expected End:  08/29/25            Patient will demonstrate 4/5 spinal strength       Start:  07/01/25    Expected End:  08/29/25            Patient will achieve bilateral hip strength of 4+/5       Start:  07/01/25    Expected End:  08/29/25                Cristino Staton PTA

## 2025-07-09 NOTE — PROGRESS NOTES
"Subjective:       Patient ID: Lorraine De Jesus is a 80 y.o. female.    Chief Complaint: Results (Abnl lab results ) and Health Maintenance (TETANUS VACCINE Never done/Pneumococcal Vaccines (Age 50+)(1 of 2 - PCV) Never done/Shingles Vaccine(1 of 2) Never done/RSV Vaccine (Age 60+ and Pregnant patients)(1 - 1-dose 75+ series) Never done/Diabetic Eye Exam due on 05/01/2024 )    History of Present Illness    CHIEF COMPLAINT:  Patient presents today for follow up after recent hospitalization    RECENT FALL WITH INJURY:  She reports a recent fall with injury to bilateral knees and neck. She sustained head impact during the fall but did not seek emergency medical attention. She describes neck soreness and knee injury but denies significant head injury symptoms. She reports feeling weak and tired following the incident and continues to experience residual fatigue despite feeling "better" overall.    POST-HOSPITALIZATION FATIGUE:  She reports persistent fatigue following recent hospitalization and describes feeling weak and unable to regain her previous energy levels. She notes improvement in mood but continues to experience significant exhaustion that is impacting her daily functioning and expresses difficulty in recovering her pre-hospitalization strength and vitality.    PHYSICAL THERAPY:  She is currently engaged in physical therapy sessions, which is anticipated to assist with improving her energy levels.    GENITOURINARY:  She reports ongoing urinary symptoms with associated lower abdominal soreness and describes mild discomfort in lower stomach region. She is scheduled for repeat urinalysis.    LABS / TEST RESULTS:  Blood glucose was 160 mg/dL. Urine culture showed occasional bacteria. Vitamin B12 level was normal.         Current Medications:  Current Medications[1]           ROS  Twelve point system reviewed, unremarkable except for stated above in HPI.        Objective:         Vitals:    07/09/25 1050   BP: 133/76 " "  BP Location: Left arm   Patient Position: Sitting   Pulse: 79   Resp: 18   Temp: 98.5 °F (36.9 °C)   TempSrc: Temporal   SpO2: 98%   Weight: 76.7 kg (169 lb)   Height: 5' 8" (1.727 m)        Physical Exam     Patient is awake alert oriented person place and  Lungs are clear to auscultation bilaterally no crackles or wheezes   Cardiovascular S1-S2 regular rate and rhythm no murmurs rubs or gallops   Abdomen is soft positive bowel sounds nontender, extremities no clubbing cyanosis edema  Neuro no focal neurological deficits  Skin warm and dry.     Last Labs:     Office Visit on 06/27/2025   Component Date Value    POC Glucose 06/27/2025 161 (A)    Office Visit on 06/16/2025   Component Date Value    POC Amphetamines 06/16/2025 Negative     POC Barbiturates 06/16/2025 Negative     POC Benzodiazepines 06/16/2025 Negative     POC Cocaine 06/16/2025 Negative     POC THC 06/16/2025 Negative     POC Methadone 06/16/2025 Negative     POC Methamphetamine 06/16/2025 Negative     POC Opiates 06/16/2025 Presumptive Positive (A)     POC Oxycodone 06/16/2025 Negative     POC Phencyclidine 06/16/2025 Negative     POC Methylenedioxymetham* 06/16/2025 Negative     POC Tricyclic Antidepres* 06/16/2025 Negative     POC Buprenorphine 06/16/2025 Negative      Acceptab* 06/16/2025 Yes     POC Temperature (Urine) 06/16/2025 94    Office Visit on 06/16/2025   Component Date Value    Sodium 06/16/2025 140     Potassium 06/16/2025 4.5     Chloride 06/16/2025 109 (H)     CO2 06/16/2025 25     Anion Gap 06/16/2025 11     Glucose 06/16/2025 180 (H)     BUN 06/16/2025 11     Creatinine 06/16/2025 0.83     BUN/Creatinine Ratio 06/16/2025 13     Calcium 06/16/2025 8.8     Total Protein 06/16/2025 7.0     Albumin 06/16/2025 3.3 (L)     Globulin 06/16/2025 3.7     A/G Ratio 06/16/2025 0.9     Bilirubin, Total 06/16/2025 0.4     Alk Phos 06/16/2025 80     ALT 06/16/2025 11     AST 06/16/2025 18     eGFR 06/16/2025 72     Color, UA " 06/16/2025 Light Yellow     Clarity, UA 06/16/2025 Turbid     pH, UA 06/16/2025 7.0     Leukocytes, UA 06/16/2025 Large (A)     Nitrites, UA 06/16/2025 Negative     Protein, UA 06/16/2025 Negative     Glucose, UA 06/16/2025 50 (A)     Ketones, UA 06/16/2025 Negative     Urobilinogen, UA 06/16/2025 Normal     Bilirubin, UA 06/16/2025 Negative     Blood, UA 06/16/2025 Negative     Specific Gravity, UA 06/16/2025 1.012     Vitamin B12 06/16/2025 930 (H)     WBC 06/16/2025 5.89     RBC 06/16/2025 3.76 (L)     Hemoglobin 06/16/2025 10.3 (L)     Hematocrit 06/16/2025 34.5 (L)     MCV 06/16/2025 91.8     MCH 06/16/2025 27.4     MCHC 06/16/2025 29.9 (L)     RDW 06/16/2025 18.2 (H)     Platelet Count 06/16/2025 336     MPV 06/16/2025 10.0     Neutrophils % 06/16/2025 63.0     Lymphocytes % 06/16/2025 26.8 (L)     Monocytes % 06/16/2025 6.8 (H)     Eosinophils % 06/16/2025 2.7     Basophils % 06/16/2025 0.5     Immature Granulocytes % 06/16/2025 0.2     nRBC, Auto 06/16/2025 0.0     Neutrophils, Abs 06/16/2025 3.71     Lymphocytes, Absolute 06/16/2025 1.58     Monocytes, Absolute 06/16/2025 0.40     Eosinophils, Absolute 06/16/2025 0.16     Basophils, Absolute 06/16/2025 0.03     Immature Granulocytes, A* 06/16/2025 0.01     nRBC, Absolute 06/16/2025 0.00     Diff Type 06/16/2025 Auto     WBC, UA 06/16/2025 92 (H)     RBC, UA 06/16/2025 6 (H)     Bacteria, UA 06/16/2025 Occasional (A)     Squamous Epithelial Cell* 06/16/2025 Occasional (A)     Mucous 06/16/2025 Occasional (A)     Culture, Urine 06/16/2025 Mixed Skin/Urogenital Louise Isolated, no further workup.        Last Imaging:  X-Ray KUB  Narrative: EXAMINATION:  XR KUB    CLINICAL HISTORY:  Calculus of kidney    TECHNIQUE:  Single AP supine view of the abdomen (KUB) was performed    COMPARISON:  None    FINDINGS:  The bowel gas pattern is within normal limits.  The psoas shadows are sharp.  A 10 mm stone overlies the lower pole of the left kidney.  Atherosclerotic  calcification is noted in the pelvic arteries.  No free air is seen.  No masses are noted.  Impression: 10 mm left intrarenal stone.  Atherosclerotic calcification of the pelvic vessels.  Otherwise negative abdomen x-ray    Electronically signed by: Wilfrido Friend MD  Date:    06/12/2025  Time:    10:01         **Labs and x-rays personally reviewed by me    ** reviewed           Assessment & Plan:   Assessment & Plan    IMPRESSION:  - Assessed fall risk and mobility needs, recommending cane use initially with potential progression to walker if needed.  - Considered age as a factor increasing risk for bone fractures.  - Reviewed recent urinalysis showing occasional bacteria, but no significant findings.  - Noted elevated glucose level of 160.  - Evaluated vitamin B12 levels, determining supplementation unnecessary.    FALL RISK:  - Discussed potential risks associated with falls, particularly head impacts in older adults.  - Patient to use cane for mobility assistance.  - Contact the office if cane is not sufficient after 1 month of use, to potentially progress to walker.    PHYSICAL THERAPY:  - Patient to continue attending physical therapy sessions to improve energy levels and balance.    HEAD INJURY:  - Ordered CT Brain due to reported head impact during fall, to rule out potential intracranial bleeding.    FRACTURE:  - Ordered XR Neck and XR Left Knee to evaluate for possible fractures resulting from fall.    DIABETES MELLITUS:  - Ordered repeat A1C test as previous result not readily available.    FOLLOW-UP:  - Follow up in 6 weeks.           1. Head trauma, initial encounter  -     CT Head Without Contrast; Future; Expected date: 07/09/2025    2. Fall, initial encounter  -     X-Ray Cervical Spine 2 or 3 Views; Future; Expected date: 07/09/2025  -     X-Ray Knee 1 or 2 View Left; Future; Expected date: 07/09/2025    3. Type 2 diabetes mellitus without complication, without long-term current use of  insulin  -     Hemoglobin A1C; Future; Expected date: 07/09/2025    4. Recurrent urinary tract infections  -     Urinalysis, Reflex to Urine Culture; Future; Expected date: 07/09/2025            Mateo Chen MD  This note was generated with the assistance of ambient listening technology. Verbal consent was obtained by the patient and accompanying visitor(s) for the recording of patient appointment to facilitate this note. I attest to having reviewed and edited the generated note for accuracy, though some syntax or spelling errors may persist. Please contact the author of this note for any clarification.            [1]   Current Outpatient Medications:     blood sugar diagnostic (ONETOUCH VERIO TEST STRIPS) Strp, 1 each by Misc.(Non-Drug; Combo Route) route 2 (two) times a day., Disp: 200 strip, Rfl: 4    blood-glucose meter kit, Use as instructed, Disp: 1 each, Rfl: 0    docusate sodium (COLACE) 100 MG capsule, Take 1 capsule (100 mg total) by mouth 2 (two) times daily., Disp: 60 capsule, Rfl: 3    estradioL (ESTRACE) 0.01 % (0.1 mg/gram) vaginal cream, Place 1 g vaginally once daily for 14 days, THEN 1 g twice a week., Disp: 42.5 g, Rfl: 1    gabapentin (NEURONTIN) 400 MG capsule, TAKE 1 CAPSULE(400 MG) BY MOUTH EVERY 8 HOURS, Disp: 90 capsule, Rfl: 0    glipiZIDE (GLUCOTROL) 10 MG TR24, Take 1 tablet (10 mg total) by mouth 2 (two) times daily., Disp: 180 tablet, Rfl: 1    HYDROcodone-acetaminophen (NORCO)  mg per tablet, Take 1 tablet by mouth every 6 (six) hours as needed for Pain (pain)., Disp: 120 tablet, Rfl: 0    levothyroxine (SYNTHROID) 75 MCG tablet, Take 1 tablet (75 mcg total) by mouth before breakfast., Disp: 90 tablet, Rfl: 1    linaCLOtide (LINZESS) 145 mcg Cap capsule, Take 1 capsule (145 mcg total) by mouth before breakfast., Disp: 90 capsule, Rfl: 1    olmesartan (BENICAR) 20 MG tablet, Take 1 tablet (20 mg total) by mouth once daily., Disp: 90 tablet, Rfl: 3    ONETOUCH DELICA PLUS  LANCET 33 gauge Misc, Check blood sugar TID, Disp: 100 each, Rfl: 11    oxybutynin (DITROPAN-XL) 10 MG 24 hr tablet, Take one tablet at night, Disp: 90 tablet, Rfl: 3    pantoprazole (PROTONIX) 40 MG tablet, Take 1 tablet (40 mg total) by mouth once daily., Disp: 90 tablet, Rfl: 0    ferrous sulfate (FEOSOL) 325 mg (65 mg iron) Tab tablet, Take 1 tablet (325 mg total) by mouth 2 (two) times daily. (Patient not taking: Reported on 6/25/2025), Disp: 60 tablet, Rfl: 3

## 2025-07-11 LAB — UA COMPLETE W REFLEX CULTURE PNL UR: NO GROWTH

## 2025-07-14 ENCOUNTER — CLINICAL SUPPORT (OUTPATIENT)
Dept: REHABILITATION | Facility: HOSPITAL | Age: 80
End: 2025-07-14
Payer: COMMERCIAL

## 2025-07-14 DIAGNOSIS — M54.16 LUMBAR BACK PAIN WITH RADICULOPATHY AFFECTING RIGHT LOWER EXTREMITY: Primary | ICD-10-CM

## 2025-07-14 PROCEDURE — 97110 THERAPEUTIC EXERCISES: CPT | Mod: CQ

## 2025-07-14 PROCEDURE — 97112 NEUROMUSCULAR REEDUCATION: CPT | Mod: CQ

## 2025-07-14 NOTE — PROGRESS NOTES
Outpatient Rehab    Physical Therapy Visit    Patient Name: Lorraine De Jesus  MRN: [id number]  YOB: 1945  Encounter Date: 7/14/2025    Therapy Diagnosis:   Encounter Diagnosis   Name Primary?    Lumbar back pain with radiculopathy affecting right lower extremity Yes     Physician: Mateo Chen MD    Physician Orders: Eval and Treat  Medical Diagnosis: Lumbosacral radiculopathy  Surgical Diagnosis: Not applicable for this Episode   Surgical Date: Not applicable for this Episode  Days Since Last Surgery: Not applicable for this Episode    Visit # / Visits Authorized:  2 / 10  Insurance Authorization Period: 7/1/2025 to 9/7/2025  Date of Evaluation: 7/1/2025  Plan of Care Certification: 7/1/2025 to 8/29/2025      PT/PTA: PTA   Number of PTA visits since last PT visit:2  Time In: 0915   Time Out: 1000  Total Time (in minutes): 45   Total Billable Time (in minutes): 45    FOTO:  Intake Score: 50%  Survey Score 2: Not applicable for this Episode%  Survey Score 3: Not applicable for this Episode%    Precautions:         Subjective   Pt reports she tried to do some of her home exercises she was given at her last visit. Still having pain down the legs..  Pain reported as 5/10.      Objective            Treatment:  Therapeutic Exercise  TE 1: NuStep x 5 minutes  TE 2: Slantboard Stretch 4 x 15 second hold  TE 3: Hamstring Stretch 4 x 15 second hold each (B)  TE 4: Seated ball rollout stretch - fwd/left/right 5 x 5 second hold each way  TE 5: seated piriformis stretch 4 x 15 second hold (B)  Balance/Neuromuscular Re-Education  NMR 1: pelvic tilt x 10  NMR 2: bridges x 15 with green band around knees  NMR 3: pelvic tilt + march x 10 each (B) with green band around knees  NMR 4: pelvic tilt + knee extension x 10 (B)  NMR 5: LTR x 20  NMR 6: Bridging with Abduction 3 x 5 with green band around knees  NMR 7: Cybex Back Extension 2 plates 2 x 10  NMR 8: Cybex Rows 2 plates 2 x 10 close and wide     Time  Entry(in minutes):  Neuromuscular Re-Education Time Entry: 30  Therapeutic Exercise Time Entry: 15    Assessment & Plan   Assessment: Today is the patient's second  treatment since the Evaluation. Therapist continued with the Plan of Care and instructed patient on proper form for all exercises. Therapist initiated the Basic Back Mat and Stretching Home Exercise Program at her last visit. Pt reports she tried to do some of her home exercises she was given but needs some additional reinforcement. She is still having pain down the legs. Therapists reviewed each of these exercises with her today.  Verbal and tactile cues provided for proper form for all the exercises. She was able to return demonstration well. Added the cybex back extension and cybex row machine today. Patient reports that the exercises felt good, however she was fatigued but it did not increase her lumbar/lower extremity pain.    Evaluation/Treatment Tolerance: Patient limited by fatigue    The patient will continue to benefit from skilled outpatient physical therapy in order to address the deficits listed in the problem list on the initial evaluation, provide patient and family education, and maximize the patients level of independence in the home and community environments.     The patient's spiritual, cultural, and educational needs were considered, and the patient is agreeable to the plan of care and goals.           Plan: Will cont to progress core stabilization, LE and posterior chain strengthening as able.    Goals:   Active       Changing body position       Patient will demonstrate moving sit to stand Independently from standing dining room chair.       Start:  07/01/25    Expected End:  08/29/25               Functional outcome       Patient stated goal: I want to be able to stand up in my kitchen for 5 minutes to do dishes and cook without hurting so bad.        Start:  07/01/25    Expected End:  08/29/25            Patient will demonstrate  independence in home program for support of progression       Start:  07/01/25    Expected End:  08/08/25               Pain       Patient will report pain of 3/10 demonstrating a reduction of overall pain       Start:  07/01/25    Expected End:  08/29/25               Range of Motion       Patient will achieve spinal flexion to 50 degrees        Start:  07/01/25    Expected End:  08/29/25            Patient will achieve spinal extension to 10 degrees        Start:  07/01/25    Expected End:  08/29/25               Strength       Patient will demonstrate 4/5 abdominal strength       Start:  07/01/25    Expected End:  08/29/25            Patient will demonstrate 4/5 spinal strength       Start:  07/01/25    Expected End:  08/29/25            Patient will achieve bilateral hip strength of 4+/5       Start:  07/01/25    Expected End:  08/29/25                Cristino Staton PTA

## 2025-07-15 ENCOUNTER — OFFICE VISIT (OUTPATIENT)
Dept: PAIN MEDICINE | Facility: CLINIC | Age: 80
End: 2025-07-15
Payer: COMMERCIAL

## 2025-07-15 VITALS
HEIGHT: 68 IN | WEIGHT: 171 LBS | SYSTOLIC BLOOD PRESSURE: 149 MMHG | DIASTOLIC BLOOD PRESSURE: 74 MMHG | HEART RATE: 60 BPM | BODY MASS INDEX: 25.91 KG/M2 | RESPIRATION RATE: 18 BRPM

## 2025-07-15 DIAGNOSIS — M79.672 BILATERAL FOOT PAIN: Chronic | ICD-10-CM

## 2025-07-15 DIAGNOSIS — M79.671 BILATERAL FOOT PAIN: Chronic | ICD-10-CM

## 2025-07-15 DIAGNOSIS — E11.49 DIABETIC NEUROPATHY WITH NEUROLOGIC COMPLICATION: Primary | Chronic | ICD-10-CM

## 2025-07-15 DIAGNOSIS — E11.40 DIABETIC NEUROPATHY WITH NEUROLOGIC COMPLICATION: Primary | Chronic | ICD-10-CM

## 2025-07-15 DIAGNOSIS — M54.17 LUMBOSACRAL RADICULOPATHY: Chronic | ICD-10-CM

## 2025-07-15 PROCEDURE — 96372 THER/PROPH/DIAG INJ SC/IM: CPT | Mod: PBBFAC | Performed by: PHYSICIAN ASSISTANT

## 2025-07-15 PROCEDURE — 99214 OFFICE O/P EST MOD 30 MIN: CPT | Mod: PBBFAC | Performed by: PHYSICIAN ASSISTANT

## 2025-07-15 PROCEDURE — 3077F SYST BP >= 140 MM HG: CPT | Mod: CPTII,,, | Performed by: PHYSICIAN ASSISTANT

## 2025-07-15 PROCEDURE — 1125F AMNT PAIN NOTED PAIN PRSNT: CPT | Mod: CPTII,,, | Performed by: PHYSICIAN ASSISTANT

## 2025-07-15 PROCEDURE — 99999 PR PBB SHADOW E&M-EST. PATIENT-LVL IV: CPT | Mod: PBBFAC,,, | Performed by: PHYSICIAN ASSISTANT

## 2025-07-15 PROCEDURE — 1159F MED LIST DOCD IN RCRD: CPT | Mod: CPTII,,, | Performed by: PHYSICIAN ASSISTANT

## 2025-07-15 PROCEDURE — 99999PBSHW PR PBB SHADOW TECHNICAL ONLY FILED TO HB: Mod: PBBFAC,JZ,,

## 2025-07-15 PROCEDURE — 99214 OFFICE O/P EST MOD 30 MIN: CPT | Mod: S$PBB,25,, | Performed by: PHYSICIAN ASSISTANT

## 2025-07-15 PROCEDURE — 3288F FALL RISK ASSESSMENT DOCD: CPT | Mod: CPTII,,, | Performed by: PHYSICIAN ASSISTANT

## 2025-07-15 PROCEDURE — 3078F DIAST BP <80 MM HG: CPT | Mod: CPTII,,, | Performed by: PHYSICIAN ASSISTANT

## 2025-07-15 PROCEDURE — 1101F PT FALLS ASSESS-DOCD LE1/YR: CPT | Mod: CPTII,,, | Performed by: PHYSICIAN ASSISTANT

## 2025-07-15 RX ORDER — GABAPENTIN 400 MG/1
CAPSULE ORAL
Qty: 90 CAPSULE | Refills: 2 | Status: SHIPPED | OUTPATIENT
Start: 2025-07-15

## 2025-07-15 RX ORDER — HYDROCODONE BITARTRATE AND ACETAMINOPHEN 10; 325 MG/1; MG/1
1 TABLET ORAL EVERY 6 HOURS PRN
Qty: 120 TABLET | Refills: 0 | Status: SHIPPED | OUTPATIENT
Start: 2025-09-24 | End: 2025-10-24

## 2025-07-15 RX ORDER — HYDROCODONE BITARTRATE AND ACETAMINOPHEN 10; 325 MG/1; MG/1
1 TABLET ORAL EVERY 6 HOURS PRN
Qty: 120 TABLET | Refills: 0 | Status: SHIPPED | OUTPATIENT
Start: 2025-07-26 | End: 2025-08-25

## 2025-07-15 RX ORDER — HYDROCODONE BITARTRATE AND ACETAMINOPHEN 10; 325 MG/1; MG/1
1 TABLET ORAL EVERY 6 HOURS PRN
Qty: 120 TABLET | Refills: 0 | Status: SHIPPED | OUTPATIENT
Start: 2025-08-25 | End: 2025-09-24

## 2025-07-15 RX ORDER — KETOROLAC TROMETHAMINE 30 MG/ML
30 INJECTION, SOLUTION INTRAMUSCULAR; INTRAVENOUS
Status: COMPLETED | OUTPATIENT
Start: 2025-07-15 | End: 2025-07-15

## 2025-07-15 RX ADMIN — KETOROLAC TROMETHAMINE 30 MG: 30 INJECTION, SOLUTION INTRAMUSCULAR at 10:07

## 2025-07-15 NOTE — PROGRESS NOTES
Subjective:         Patient ID: Lorraine De Jesus is a 80 y.o. female.    Chief Complaint: Low-back Pain (Patient is having low back pain and bilateral foot pain.)      Pain  This is a chronic problem. The current episode started more than 1 year ago. The problem occurs daily. The problem has been waxing and waning. Associated symptoms include arthralgias and neck pain. Pertinent negatives include no anorexia, chest pain, chills, fever, sore throat, vertigo or vomiting.     Review of Systems   Constitutional:  Negative for activity change, appetite change, chills, fever and unexpected weight change.   HENT:  Negative for drooling, ear discharge, ear pain, facial swelling, nosebleeds, sore throat, trouble swallowing, voice change and goiter.    Eyes:  Negative for photophobia, pain, discharge, redness and visual disturbance.   Respiratory:  Negative for apnea, choking, chest tightness, shortness of breath, wheezing and stridor.    Cardiovascular:  Negative for chest pain, palpitations and leg swelling.   Gastrointestinal:  Negative for abdominal distention, anorexia, diarrhea, rectal pain, vomiting and fecal incontinence.   Endocrine: Negative for cold intolerance, heat intolerance, polydipsia, polyphagia and polyuria.   Genitourinary:  Negative for bladder incontinence, dysuria, flank pain, frequency and hot flashes.   Musculoskeletal:  Positive for arthralgias, back pain, leg pain and neck pain.   Integumentary:  Negative for color change and pallor.   Allergic/Immunologic: Negative for immunocompromised state.   Neurological:  Negative for dizziness, vertigo, seizures, syncope, facial asymmetry, speech difficulty, light-headedness, coordination difficulties and memory loss.   Hematological:  Negative for adenopathy. Does not bruise/bleed easily.   Psychiatric/Behavioral:  Negative for agitation, behavioral problems, confusion, decreased concentration, dysphoric mood, hallucinations, self-injury and suicidal ideas.  The patient is not nervous/anxious and is not hyperactive.            Past Medical History:   Diagnosis Date    Adenomatous polyp of ascending colon 2021    Adenomatous polyp of descending colon 2021    Age related osteoporosis 10/28/2020    Benign paroxysmal vertigo     Chronic pain syndrome     Chronic pelvic pain in female 2021    Ditropan XL 5mg    Colon, diverticulosis 2021    Depressive disorder 2019    External hemorrhoid 2021    Gastrointestinal hemorrhage associated with anorectal source 2021    GERD (gastroesophageal reflux disease) 2013    Hyperlipidemia 2012    Hypothyroidism 2019    Joint pain     Nonrheumatic tricuspid (valve) insufficiency 2018    Personal history UTI 2021    Controlled on Hiprex    Polyneuropathy 2018    PVD (peripheral vascular disease) 10/30/2018    Temporal arteritis     Type 2 diabetes mellitus 2014    Urethral meatal stenosis 2018    history of known urethral stenosis, and was taught to perform monthly self dilation at home.     Past Surgical History:   Procedure Laterality Date     left lumbar sympathetic nerve block Left 2020    X3  DR BROWN    CARDIAC CATHETERIZATION  10/14/2009     SECTION      x 2    COLONOSCOPY  2009    CYSTOSCOPY WITH HYDRODISTENSION OF BLADDER N/A 2021    Procedure: CYSTOSCOPY, WITH BLADDER HYDRODISTENSION;  Surgeon: Dequan Recio MD;  Location: [facility];  Service: Urology;  Laterality: N/A;    CYSTOSCOPY WITH URETHRAL DILATION  2021    Dr Recio    EPIDURAL STEROID INJECTION N/A 10/27/2022    Procedure: Injection, Steroid, Epidural, L5/S1;  Surgeon: Belkis Brown MD;  Location: [facility];  Service: Pain Management;  Laterality: N/A;    EPIDURAL STEROID INJECTION N/A 2023    Procedure: Injection, Steroid, Epidural, L5/S1;  Surgeon: Belkis Brown MD;  Location: [facility];  Service: Pain Management;  Laterality:  N/A;    HYSTERECTOMY  1980's    LUMBAR SYMPATHETIC NERVE BLOCK Left 10/05/2020    Left Sympathetic Nerve Block - Dr Brown - 10/5/20, 9/21/20, 1/20/20. 1/6/20, 10/9/19, 9/25/19 and 7/11/18    right lumbar sympathetic nerve block Right 2020    X4 DR BROWN    THYROIDECTOMY      1990's     Social History     Socioeconomic History    Marital status:    Tobacco Use    Smoking status: Never     Passive exposure: Never    Smokeless tobacco: Never   Substance and Sexual Activity    Alcohol use: Never    Drug use: Never    Sexual activity: Not Currently     Partners: Male     Social Drivers of Health     Financial Resource Strain: Low Risk  (6/2/2025)    Overall Financial Resource Strain (CARDIA)     Difficulty of Paying Living Expenses: Not hard at all   Food Insecurity: No Food Insecurity (6/2/2025)    Hunger Vital Sign     Worried About Running Out of Food in the Last Year: Never true     Ran Out of Food in the Last Year: Never true   Transportation Needs: No Transportation Needs (6/2/2025)    PRAPARE - Transportation     Lack of Transportation (Medical): No     Lack of Transportation (Non-Medical): No   Physical Activity: Sufficiently Active (9/23/2024)    Exercise Vital Sign     Days of Exercise per Week: 3 days     Minutes of Exercise per Session: 60 min   Stress: No Stress Concern Present (6/2/2025)    [employer] - Occupational Stress Questionnaire     Feeling of Stress : Not at all   Housing Stability: Low Risk  (6/2/2025)    Housing Stability Vital Sign     Unable to Pay for Housing in the Last Year: No     Homeless in the Last Year: No     Family History   Problem Relation Name Age of Onset    Cancer Mother      Hypertension Mother      Cancer Father      Cancer Sister      Diabetes Sister      Hyperlipidemia Sister      Hypertension Sister      Diabetes Sister      No Known Problems Sister      Cancer Brother      Cancer Brother      No Known Problems Brother      Cancer  "Brother       Review of patient's allergies indicates:   Allergen Reactions    Lipitor [atorvastatin] Other (See Comments)     Muscle aching    Pravachol [pravastatin] Other (See Comments)     Muscle aching        Objective:  Vitals:    07/15/25 1003 07/15/25 1004   BP: (!) 149/74    Pulse: 60    Resp: 18    Weight: 77.6 kg (171 lb)    Height: 5' 8" (1.727 m)    PainSc:   7   7   PainLoc: Back                    Physical Exam  Vitals and nursing note reviewed. Exam conducted with a chaperone present.   Constitutional:       General: She is awake. She is not in acute distress.     Appearance: Normal appearance. She is not ill-appearing or diaphoretic.   HENT:      Head: Normocephalic and atraumatic.      Nose: Nose normal.      Mouth/Throat:      Mouth: Mucous membranes are moist.      Pharynx: Oropharynx is clear.   Eyes:      Conjunctiva/sclera: Conjunctivae normal.      Pupils: Pupils are equal, round, and reactive to light.   Cardiovascular:      Rate and Rhythm: Normal rate.   Pulmonary:      Effort: Pulmonary effort is normal. No respiratory distress.   Abdominal:      Palpations: Abdomen is soft.   Musculoskeletal:      Cervical back: Normal range of motion and neck supple.      Thoracic back: Tenderness present.      Lumbar back: Tenderness present. Decreased range of motion.   Skin:     General: Skin is warm and dry.   Neurological:      General: No focal deficit present.      Mental Status: She is alert and oriented to person, place, and time. Mental status is at baseline.      Cranial Nerves: No cranial nerve deficit (II-XII).   Psychiatric:         Mood and Affect: Mood normal.         Behavior: Behavior normal. Behavior is cooperative.         Thought Content: Thought content normal.           X-Ray Knee 1 or 2 View Left  Narrative: EXAMINATION:  XR KNEE 1 OR 2 VIEW LEFT    CLINICAL HISTORY:  Unspecified fall, initial encounter    TECHNIQUE:  AP and lateral views of left knee    COMPARISON:  Left knee " radiograph 07/13/2022.    FINDINGS:  No acute fracture or dislocation.    No large joint effusion.    Mild joint space narrowing about the medial tibiofemoral joint space.    Soft tissues are unremarkable.  Scattered vascular calcification.  Impression: Please see above.    Electronically signed by: Farzad Christiansen  Date:    07/10/2025  Time:    10:03  X-Ray Cervical Spine 2 or 3 Views  Narrative: EXAMINATION:  XR CERVICAL SPINE 2 OR 3 VIEWS    CLINICAL HISTORY:  Unspecified fall, initial encounter    TECHNIQUE:  AP, lateral and open mouth views of the cervical spine were performed.    COMPARISON:  None.    FINDINGS:  There is no significant alignment abnormality.  The disc spaces are maintained.  No significant degree of osteophytic spurring is present along vertebral endplates.  The odontoid is intact.  Impression: As above    Electronically signed by: Jennifer Green MD  Date:    07/10/2025  Time:    09:59       Office Visit on 07/09/2025   Component Date Value Ref Range Status    Color, UA 07/09/2025 Light Yellow  Colorless, Straw, Yellow, Dark Yellow, Light Yellow Final    Clarity, UA 07/09/2025 Turbid  Clear Final    pH, UA 07/09/2025 6.5  5.0 to 8.0 pH Units Final    Leukocytes, UA 07/09/2025 Large (A)  Negative Final    Nitrites, UA 07/09/2025 Negative  Negative Final    Protein, UA 07/09/2025 Negative  Negative Final    Glucose, UA 07/09/2025 Normal  Normal mg/dL Final    Ketones, UA 07/09/2025 Negative  Negative mg/dL Final    Urobilinogen, UA 07/09/2025 Normal  0.2, 1.0, Normal mg/dL Final    Bilirubin, UA 07/09/2025 Negative  Negative Final    Blood, UA 07/09/2025 Negative  Negative Final    Specific Gravity, UA 07/09/2025 1.013  <=1.030 Final    WBC, UA 07/09/2025 158 (H)  <=5 /hpf Final    RBC, UA 07/09/2025 6 (H)  <=3 /hpf Final    Bacteria, UA 07/09/2025 Occasional (A)  None Seen /hpf Final    Squamous Epithelial Cells, UA 07/09/2025 Occasional (A)  None Seen /HPF Final    WBC Clumps 07/09/2025 Few (A)   None Seen /hpf Final    Hyaline Casts, UA 07/09/2025 0-2 (A)  None Seen /lpf Final    Mucous 07/09/2025 Occasional (A)  None Seen /LPF Final    Culture, Urine 07/09/2025 No Growth   Final   Office Visit on 06/27/2025   Component Date Value Ref Range Status    POC Glucose 06/27/2025 161 (A)  70 - 110 MG/DL Final   Office Visit on 06/16/2025   Component Date Value Ref Range Status    POC Amphetamines 06/16/2025 Negative  Negative, Inconclusive Final    POC Barbiturates 06/16/2025 Negative  Negative, Inconclusive Final    POC Benzodiazepines 06/16/2025 Negative  Negative, Inconclusive Final    POC Cocaine 06/16/2025 Negative  Negative, Inconclusive Final    POC THC 06/16/2025 Negative  Negative, Inconclusive Final    POC Methadone 06/16/2025 Negative  Negative, Inconclusive Final    POC Methamphetamine 06/16/2025 Negative  Negative, Inconclusive Final    POC Opiates 06/16/2025 Presumptive Positive (A)  Negative, Inconclusive Final    POC Oxycodone 06/16/2025 Negative  Negative, Inconclusive Final    POC Phencyclidine 06/16/2025 Negative  Negative, Inconclusive Final    POC Methylenedioxymethamphetamine * 06/16/2025 Negative  Negative, Inconclusive Final    POC Tricyclic Antidepressants 06/16/2025 Negative  Negative, Inconclusive Final    POC Buprenorphine 06/16/2025 Negative   Final     Acceptable 06/16/2025 Yes   Final    POC Temperature (Urine) 06/16/2025 94   Final   Office Visit on 06/16/2025   Component Date Value Ref Range Status    Sodium 06/16/2025 140  136 - 145 mmol/L Final    Potassium 06/16/2025 4.5  3.5 - 5.1 mmol/L Final    Chloride 06/16/2025 109 (H)  98 - 107 mmol/L Final    CO2 06/16/2025 25  23 - 31 mmol/L Final    Anion Gap 06/16/2025 11  7 - 16 mmol/L Final    Glucose 06/16/2025 180 (H)  82 - 115 mg/dL Final    BUN 06/16/2025 11  10 - 20 mg/dL Final    Creatinine 06/16/2025 0.83  0.55 - 1.02 mg/dL Final    BUN/Creatinine Ratio 06/16/2025 13  6 - 20 Final    Calcium 06/16/2025 8.8  8.4  - 10.2 mg/dL Final    Total Protein 06/16/2025 7.0  5.8 - 7.6 g/dL Final    Albumin 06/16/2025 3.3 (L)  3.4 - 4.8 g/dL Final    Globulin 06/16/2025 3.7  2.0 - 4.0 g/dL Final    A/G Ratio 06/16/2025 0.9   Final    Bilirubin, Total 06/16/2025 0.4  <=1.5 mg/dL Final    Alk Phos 06/16/2025 80  40 - 150 U/L Final    ALT 06/16/2025 11  <=55 U/L Final    AST 06/16/2025 18  11 - 45 U/L Final    eGFR 06/16/2025 72  >=60 mL/min/1.73m2 Final    Color, UA 06/16/2025 Light Yellow  Colorless, Straw, Yellow, Dark Yellow, Light Yellow Final    Clarity, UA 06/16/2025 Turbid  Clear Final    pH, UA 06/16/2025 7.0  5.0 to 8.0 pH Units Final    Leukocytes, UA 06/16/2025 Large (A)  Negative Final    Nitrites, UA 06/16/2025 Negative  Negative Final    Protein, UA 06/16/2025 Negative  Negative Final    Glucose, UA 06/16/2025 50 (A)  Normal mg/dL Final    Ketones, UA 06/16/2025 Negative  Negative mg/dL Final    Urobilinogen, UA 06/16/2025 Normal  0.2, 1.0, Normal mg/dL Final    Bilirubin, UA 06/16/2025 Negative  Negative Final    Blood, UA 06/16/2025 Negative  Negative Final    Specific Gravity, UA 06/16/2025 1.012  <=1.030 Final    Vitamin B12 06/16/2025 930 (H)  213 - 816 pg/mL Final    WBC 06/16/2025 5.89  4.50 - 11.00 K/uL Final    RBC 06/16/2025 3.76 (L)  4.20 - 5.40 M/uL Final    Hemoglobin 06/16/2025 10.3 (L)  12.0 - 16.0 g/dL Final    Hematocrit 06/16/2025 34.5 (L)  38.0 - 47.0 % Final    MCV 06/16/2025 91.8  80.0 - 96.0 fL Final    MCH 06/16/2025 27.4  27.0 - 31.0 pg Final    MCHC 06/16/2025 29.9 (L)  32.0 - 36.0 g/dL Final    RDW 06/16/2025 18.2 (H)  11.5 - 14.5 % Final    Platelet Count 06/16/2025 336  150 - 400 K/uL Final    MPV 06/16/2025 10.0  9.4 - 12.4 fL Final    Neutrophils % 06/16/2025 63.0  53.0 - 65.0 % Final    Lymphocytes % 06/16/2025 26.8 (L)  27.0 - 41.0 % Final    Monocytes % 06/16/2025 6.8 (H)  2.0 - 6.0 % Final    Eosinophils % 06/16/2025 2.7  1.0 - 4.0 % Final    Basophils % 06/16/2025 0.5  0.0 - 1.0 % Final     Immature Granulocytes % 06/16/2025 0.2  0.0 - 0.4 % Final    nRBC, Auto 06/16/2025 0.0  <=0.0 % Final    Neutrophils, Abs 06/16/2025 3.71  1.80 - 7.70 K/uL Final    Lymphocytes, Absolute 06/16/2025 1.58  1.00 - 4.80 K/uL Final    Monocytes, Absolute 06/16/2025 0.40  0.00 - 0.80 K/uL Final    Eosinophils, Absolute 06/16/2025 0.16  0.00 - 0.50 K/uL Final    Basophils, Absolute 06/16/2025 0.03  0.00 - 0.20 K/uL Final    Immature Granulocytes, Absolute 06/16/2025 0.01  0.00 - 0.04 K/uL Final    nRBC, Absolute 06/16/2025 0.00  <=0.00 x10e3/uL Final    Diff Type 06/16/2025 Auto   Final    WBC, UA 06/16/2025 92 (H)  <=5 /hpf Final    RBC, UA 06/16/2025 6 (H)  <=3 /hpf Final    Bacteria, UA 06/16/2025 Occasional (A)  None Seen /hpf Final    Squamous Epithelial Cells, UA 06/16/2025 Occasional (A)  None Seen /HPF Final    Mucous 06/16/2025 Occasional (A)  None Seen /LPF Final    Culture, Urine 06/16/2025 Mixed Skin/Urogenital Louise Isolated, no further workup.   Final   No results displayed because visit has over 200 results.      Office Visit on 04/08/2025   Component Date Value Ref Range Status    Triglycerides 04/08/2025 113  37 - 140 mg/dL Final    Cholesterol 04/08/2025 213 (H)  <=200 mg/dL Final    HDL Cholesterol 04/08/2025 45  35 - 60 mg/dL Final    Cholesterol/HDL Ratio (Risk Factor) 04/08/2025 4.7   Final    Non-HDL 04/08/2025 168  mg/dL Final    LDL Calculated 04/08/2025 145  mg/dL Final    LDL/HDL 04/08/2025 3.2   Final    VLDL 04/08/2025 23  mg/dL Final    Sodium 04/08/2025 140  136 - 145 mmol/L Final    Potassium 04/08/2025 3.8  3.5 - 5.1 mmol/L Final    Chloride 04/08/2025 106  98 - 107 mmol/L Final    CO2 04/08/2025 27  23 - 31 mmol/L Final    Anion Gap 04/08/2025 11  7 - 16 mmol/L Final    Glucose 04/08/2025 120 (H)  82 - 115 mg/dL Final    BUN 04/08/2025 12  10 - 20 mg/dL Final    Creatinine 04/08/2025 0.67  0.55 - 1.02 mg/dL Final    BUN/Creatinine Ratio 04/08/2025 18  6 - 20 Final    Calcium 04/08/2025 9.2   8.4 - 10.2 mg/dL Final    eGFR 04/08/2025 90  >=60 mL/min/1.73m2 Final    TSH 04/08/2025 2.167  0.350 - 4.940 uIU/mL Final    Creatinine, Urine 04/08/2025 52  15 - 325 mg/dL Final    Microalbumin 04/08/2025 1.8  <=3.0 mg/dL Final    Microalbumin/Creatinine Ratio 04/08/2025 34.6 (H)  0.0 - 30.0 mg/g Final   Office Visit on 03/07/2025   Component Date Value Ref Range Status    POC Amphetamines 03/07/2025 Negative  Negative, Inconclusive Final    POC Barbiturates 03/07/2025 Negative  Negative, Inconclusive Final    POC Benzodiazepines 03/07/2025 Negative  Negative, Inconclusive Final    POC Cocaine 03/07/2025 Negative  Negative, Inconclusive Final    POC THC 03/07/2025 Negative  Negative, Inconclusive Final    POC Methadone 03/07/2025 Negative  Negative, Inconclusive Final    POC Methamphetamine 03/07/2025 Negative  Negative, Inconclusive Final    POC Opiates 03/07/2025 Presumptive Positive (A)  Negative, Inconclusive Final    POC Oxycodone 03/07/2025 Negative  Negative, Inconclusive Final    POC Phencyclidine 03/07/2025 Negative  Negative, Inconclusive Final    POC Methylenedioxymethamphetamine * 03/07/2025 Negative  Negative, Inconclusive Final    POC Tricyclic Antidepressants 03/07/2025 Negative  Negative, Inconclusive Final    POC Buprenorphine 03/07/2025 Negative   Final     Acceptable 03/07/2025 Yes   Final    POC Temperature (Urine) 03/07/2025 90   Final   Hospital Outpatient Visit on 01/28/2025   Component Date Value Ref Range Status    POC Glucose 01/28/2025 136 (H)  70 - 105 mg/dL Final    Case Report 01/28/2025    Final                    Value:Surgical Pathology                                Case: [id number]                                   Authorizing Provider:  Francis Gonzalez MD     Collected:           01/28/2025 09:56 AM          Ordering Location:     [facility] -         Received:            01/28/2025 10:36 AM                                 Endoscopy                                                                     Pathologist:           Brad Kenney MD                                                           Specimen:    Large intestine, Sigmoid, a. sigmoid polyp                                                 Final Diagnosis 01/28/2025    Final                    Value:A. Sigmoid colon polyp, polypectomy:  Tubular adenoma      Gross Description 01/28/2025    Final                    Value:A. Large intestine, Sigmoid: a. sigmoid polyp  The specimen is received in formalin designated a. sigmoid polyp and consists of a single pink-tan tissue fragment that measures 0.3 cm in greatest dimension and is entirely submitted in cassette A1.    Grossing was completed by Wesley Mancia.      Microscopic Description 01/28/2025    Final                    Value:A microscopic examination was performed and the diagnosis reflects the findings.          Laboratory Notes 01/28/2025    Final                    Value:If this report includes immunohistochemical (IHC) test results, please note the following: IHC studies were interpreted in conjunction with appropriate positive and negative controls which demonstrate the expected positive and negative reactivity. This laboratory is regulated under CLIA as qualified to perform high-complexity testing. IHC tests are used for clinical purposes. They should not be regarded as investigational or research.             No orders of the defined types were placed in this encounter.        Requested Prescriptions     Signed Prescriptions Disp Refills    HYDROcodone-acetaminophen (NORCO)  mg per tablet 120 tablet 0     Sig: Take 1 tablet by mouth every 6 (six) hours as needed for Pain (pain).    gabapentin (NEURONTIN) 400 MG capsule 90 capsule 2     Sig: TAKE 1 CAPSULE(400 MG) BY MOUTH EVERY 8 HOURS    HYDROcodone-acetaminophen (NORCO)  mg per tablet 120 tablet 0     Sig: Take 1 tablet by mouth every 6 (six) hours as needed for Pain (pain).     HYDROcodone-acetaminophen (NORCO)  mg per tablet 120 tablet 0     Sig: Take 1 tablet by mouth every 6 (six) hours as needed for Pain (pain).       Assessment:     1. Diabetic neuropathy with neurologic complication    2. Lumbosacral radiculopathy    3. Bilateral foot pain                   A's of Opioid Risk Assessment  Activity:Patient can perform ADL.   Analgesia:Patients pain is partially controlled by current medication. Patient has tried OTC medications such as Tylenol and Ibuprofen with out relief.   Adverse Effects: Patient denies constipation or sedation.  Aberrant Behavior:  reviewed with no aberrant drug seeking/taking behavior.  Overdose reversal drug naloxone discussed    Drug screen reviewed          MRI lumbar spine [facility] March 25, 2021 spinal stenosis L5/S1 disc bulge facet joint arthropathy multiple level degenerative changes        Plan:    Narcan February 2023    Follows Neurology [facility] neuropathy symptoms lower extremities    She had lumbar L5/S1 TORIE # 1 September 12, 2023, she states she had 90% relief after procedure, she states procedure did help improve her level function    She has known scoliosis      Declined Qutenza bilateral feet July 15, 2025    Complaining of bilateral lower extremity burning numbness tingling sensation neuropathy symptoms keeping her awake at night     Complaint of back pain worse with flexion extension rotation lumbar spine ongoing for more than 3 months     Patient considering repeating lumbar procedure     She is currently enrolled in physical therapy    Declined increase gabapentin    Considering lumbar sympathetic nerve block    Continue home exercise program as directed     Continue current medication    Follow-up 3 months    Dr. Brown September 2025    Bring original prescription medication bottles/container/box with labels to each visit

## 2025-07-21 ENCOUNTER — CLINICAL SUPPORT (OUTPATIENT)
Dept: REHABILITATION | Facility: HOSPITAL | Age: 80
End: 2025-07-21
Payer: COMMERCIAL

## 2025-07-21 DIAGNOSIS — M54.16 LUMBAR BACK PAIN WITH RADICULOPATHY AFFECTING RIGHT LOWER EXTREMITY: Primary | ICD-10-CM

## 2025-07-21 PROCEDURE — 97112 NEUROMUSCULAR REEDUCATION: CPT | Mod: CQ

## 2025-07-21 PROCEDURE — 97110 THERAPEUTIC EXERCISES: CPT | Mod: CQ

## 2025-07-21 NOTE — PROGRESS NOTES
Outpatient Rehab    Physical Therapy Visit    Patient Name: Lorraine De Jesus  MRN: [id number]  YOB: 1945  Encounter Date: 7/21/2025    Therapy Diagnosis:   Encounter Diagnosis   Name Primary?    Lumbar back pain with radiculopathy affecting right lower extremity Yes     Physician: Mateo Chen MD    Physician Orders: Eval and Treat  Medical Diagnosis: Lumbosacral radiculopathy  Surgical Diagnosis: Not applicable for this Episode   Surgical Date: Not applicable for this Episode  Days Since Last Surgery: Not applicable for this Episode    Visit # / Visits Authorized:  3 / 10  Insurance Authorization Period: 7/1/2025 to 9/7/2025  Date of Evaluation: 7/1/2025  Plan of Care Certification: 7/1/2025 to 8/29/2025      PT/PTA: PTA   Number of PTA visits since last PT visit:3  Time In: 0914   Time Out: 0957  Total Time (in minutes): 43   Total Billable Time (in minutes): 43    FOTO:  Intake Score (%): 50  Survey Score 2 (%): Not applicable for this Episode  Survey Score 3 (%): Not applicable for this Episode    Precautions:         Subjective   Pt reports she was tired after her last therapy visit. She does think she has noticed an improvement in her pain level..  Pain reported as 5/10.      Objective            Treatment:  Therapeutic Exercise  TE 1: NuStep x 5 minutes  TE 2: Slantboard Stretch 4 x 15 second hold  TE 3: Hamstring Stretch 4 x 15 second hold each (B)  TE 4: Seated ball rollout stretch - fwd/left/right 5 x 5 second hold each way  TE 5: piriformis stretch 4 x 15 second hold (B)  TE 6: LTR Stretch 4 x 15 second hold  TE 9: Cybex Hamstring Curls 3 plates 2 x 10  Balance/Neuromuscular Re-Education  NMR 7: Cybex Back Extension 2 plates 2 x 10  NMR 8: Cybex Rows 2 plates 2 x 10 close and wide   NMR 9: Cybex Bilateral Leg Press with PPT 4 plates 3 x 10    Time Entry(in minutes):  Neuromuscular Re-Education Time Entry: 13  Therapeutic Exercise Time Entry: 30    Assessment & Plan   Assessment:  Today is the patient's third treatment since the Evaluation. Pt reports she was tired after her last therapy visit. She does think she has noticed an improvement in her pain level since starting therapy. Therapist continued with the Plan of Care and instructed patient on proper form for all exercises.  Progressed her with addition of the Cybex hamstring curl machine and the Cybex B leg press today with PPT. Verbal and tactile cues provided for proper form for all the exercises. Patient reports that the exercises felt good, however she was fatigued but it did not increase her lumbar/lower extremity pain.    Evaluation/Treatment Tolerance: Patient limited by fatigue    The patient will continue to benefit from skilled outpatient physical therapy in order to address the deficits listed in the problem list on the initial evaluation, provide patient and family education, and maximize the patients level of independence in the home and community environments.     The patient's spiritual, cultural, and educational needs were considered, and the patient is agreeable to the plan of care and goals.           Plan: Will cont to progress core stabilization, LE and posterior chain strengthening as able.    Goals:   Active       Changing body position       Patient will demonstrate moving sit to stand Independently from standing dining room chair.       Start:  07/01/25    Expected End:  08/29/25               Functional outcome       Patient stated goal: I want to be able to stand up in my kitchen for 5 minutes to do dishes and cook without hurting so bad.        Start:  07/01/25    Expected End:  08/29/25            Patient will demonstrate independence in home program for support of progression       Start:  07/01/25    Expected End:  08/08/25               Pain       Patient will report pain of 3/10 demonstrating a reduction of overall pain       Start:  07/01/25    Expected End:  08/29/25               Range of Motion        Patient will achieve spinal flexion to 50 degrees        Start:  07/01/25    Expected End:  08/29/25            Patient will achieve spinal extension to 10 degrees        Start:  07/01/25    Expected End:  08/29/25               Strength       Patient will demonstrate 4/5 abdominal strength       Start:  07/01/25    Expected End:  08/29/25            Patient will demonstrate 4/5 spinal strength       Start:  07/01/25    Expected End:  08/29/25            Patient will achieve bilateral hip strength of 4+/5       Start:  07/01/25    Expected End:  08/29/25                Cristino Staton PTA

## 2025-07-28 ENCOUNTER — CLINICAL SUPPORT (OUTPATIENT)
Dept: REHABILITATION | Facility: HOSPITAL | Age: 80
End: 2025-07-28
Payer: COMMERCIAL

## 2025-07-28 DIAGNOSIS — M54.16 LUMBAR BACK PAIN WITH RADICULOPATHY AFFECTING RIGHT LOWER EXTREMITY: Primary | ICD-10-CM

## 2025-07-28 PROCEDURE — 97112 NEUROMUSCULAR REEDUCATION: CPT

## 2025-07-28 PROCEDURE — 97110 THERAPEUTIC EXERCISES: CPT

## 2025-07-28 NOTE — PROGRESS NOTES
Outpatient Rehab    Physical Therapy Progress Note    Patient Name: Lorraine De Jesus  MRN: [id number]  YOB: 1945  Encounter Date: 7/28/2025    Therapy Diagnosis:   Encounter Diagnosis   Name Primary?    Lumbar back pain with radiculopathy affecting right lower extremity Yes     Physician: Mateo Chen MD    Physician Orders: Eval and Treat  Medical Diagnosis: Lumbosacral radiculopathy  Surgical Diagnosis: Not applicable for this Episode   Surgical Date: Not applicable for this Episode  Days Since Last Surgery: Not applicable for this Episode    Visit # / Visits Authorized:  4 / 10  Insurance Authorization Period: 7/1/2025 to 9/7/2025  Date of Evaluation: 7/1/2025  Plan of Care Certification: 7/1/2025 to 8/29/2025      PT/PTA: PT   Number of PTA visits since last PT visit:0  Time In: 0914   Time Out: 0958  Total Time (in minutes): 44   Total Billable Time (in minutes): 43    FOTO:  Intake Score (%): 50  Survey Score 2 (%): Not applicable for this Episode  Survey Score 3 (%): Not applicable for this Episode    Precautions:       Subjective   She reports muscle soreness in her legs but states her back is starting to feel better..  Pain reported as 3/10.      Objective            Treatment:  Therapeutic Exercise  TE 1: NuStep x 5 minutes  TE 2: Slantboard Stretch 4 x 15 second hold  TE 3: Hamstring Stretch 4 x 15 second hold each (B)  TE 4: Seated ball rollout stretch - fwd/left/right 5 x 5 second hold each way  TE 5: piriformis stretch 4 x 15 second hold (B)  TE 9: Cybex Hamstring Curls 3 plates 2 x 10  Balance/Neuromuscular Re-Education  NMR 7: Cybex Back Extension 2 plates 2 x 10  NMR 8: Cybex Rows 2 plates 2 x 10 close and wide   NMR 9: Cybex Bilateral Leg Press with PPT 4 plates 3 x 10    Time Entry(in minutes):  Neuromuscular Re-Education Time Entry: 13  Therapeutic Exercise Time Entry: 30    Assessment & Plan   Assessment: Patient reports increased soreness in her legs from all the  exercises. She is responding very well to the current Plan of Care and reports her back pain is actually getting better. Therapist is having patient perform most of her resisted work on the Cybex machines at this time. She tolerates this well but therapist was unable to increase the weight on the machines today. We plan to continue to progress her as tolerated.   Evaluation/Treatment Tolerance: Patient tolerated treatment well    The patient will continue to benefit from skilled outpatient physical therapy in order to address the deficits listed in the problem list on the initial evaluation, provide patient and family education, and maximize the patients level of independence in the home and community environments.     The patient's spiritual, cultural, and educational needs were considered, and the patient is agreeable to the plan of care and goals.           Plan: Will cont to progress core stabilization, LE and posterior chain strengthening as able. Sup Visit performed today with BOUBACAR Ogden, BOUBACAR Malin, and BOUBACAR Joseph.  All goals and treatment plan reviewed. Will work toward completion of all goals set.    Goals:   Active       Changing body position       Patient will demonstrate moving sit to stand Independently from standing dining room chair. (Progressing)       Start:  07/01/25    Expected End:  08/29/25               Functional outcome       Patient stated goal: I want to be able to stand up in my kitchen for 5 minutes to do dishes and cook without hurting so bad.  (Progressing)       Start:  07/01/25    Expected End:  08/29/25            Patient will demonstrate independence in home program for support of progression (Progressing)       Start:  07/01/25    Expected End:  08/08/25               Pain       Patient will report pain of 3/10 demonstrating a reduction of overall pain (Progressing)       Start:  07/01/25    Expected End:  08/29/25               Range of Motion        Patient will achieve spinal flexion to 50 degrees  (Progressing)       Start:  07/01/25    Expected End:  08/29/25            Patient will achieve spinal extension to 10 degrees  (Progressing)       Start:  07/01/25    Expected End:  08/29/25               Strength       Patient will demonstrate 4/5 abdominal strength (Progressing)       Start:  07/01/25    Expected End:  08/29/25            Patient will demonstrate 4/5 spinal strength (Progressing)       Start:  07/01/25    Expected End:  08/29/25            Patient will achieve bilateral hip strength of 4+/5 (Progressing)       Start:  07/01/25    Expected End:  08/29/25                JACKIE SHER, PT, DPT, SCS

## 2025-07-31 ENCOUNTER — EXTERNAL CHRONIC CARE MANAGEMENT (OUTPATIENT)
Dept: FAMILY MEDICINE | Facility: CLINIC | Age: 80
End: 2025-07-31
Payer: COMMERCIAL

## 2025-07-31 PROCEDURE — 99490 CHRNC CARE MGMT STAFF 1ST 20: CPT | Mod: ,,, | Performed by: INTERNAL MEDICINE

## 2025-08-04 ENCOUNTER — CLINICAL SUPPORT (OUTPATIENT)
Dept: REHABILITATION | Facility: HOSPITAL | Age: 80
End: 2025-08-04
Payer: COMMERCIAL

## 2025-08-04 DIAGNOSIS — M54.16 LUMBAR BACK PAIN WITH RADICULOPATHY AFFECTING RIGHT LOWER EXTREMITY: Primary | ICD-10-CM

## 2025-08-04 PROCEDURE — 97112 NEUROMUSCULAR REEDUCATION: CPT | Mod: CQ

## 2025-08-04 PROCEDURE — 97110 THERAPEUTIC EXERCISES: CPT | Mod: CQ

## 2025-08-04 NOTE — PROGRESS NOTES
Outpatient Rehab    Physical Therapy Visit    Patient Name: Lorraine De Jesus  MRN: [id number]  YOB: 1945  Encounter Date: 8/4/2025    Therapy Diagnosis:   Encounter Diagnosis   Name Primary?    Lumbar back pain with radiculopathy affecting right lower extremity Yes     Physician: Mateo Chen MD    Physician Orders: Eval and Treat  Medical Diagnosis: Lumbosacral radiculopathy  Surgical Diagnosis: Not applicable for this Episode   Surgical Date: Not applicable for this Episode  Days Since Last Surgery: Not applicable for this Episode    Visit # / Visits Authorized:  5 / 10  Insurance Authorization Period: 7/1/2025 to 9/7/2025  Date of Evaluation: 7/1/2025  Plan of Care Certification: 7/1/2025 to 8/29/2025      PT/PTA: PTA   Number of PTA visits since last PT visit:1  Time In: 0829   Time Out: 0910  Total Time (in minutes): 41   Total Billable Time (in minutes): 23    FOTO:  Intake Score (%): 50  Survey Score 2 (%): Not applicable for this Episode  Survey Score 3 (%): Not applicable for this Episode    Precautions:         Subjective   Pt reports she is still feeling better than she was..  Pain reported as 3/10.      Objective            Treatment:  Therapeutic Exercise  TE 1: NuStep x 5 minutes  TE 2: Slantboard Stretch 4 x 15 second hold  TE 3: Hamstring Stretch 4 x 15 second hold each (B)  TE 4: Seated ball rollout stretch - fwd/left/right 5 x 5 second hold each way  TE 5: piriformis stretch 4 x 15 second hold (B)  TE 9: Cybex Hamstring Curls 3 plates 2 x 10  Balance/Neuromuscular Re-Education  NMR 7: Cybex Back Extension 2 plates 2 x 10  NMR 8: Cybex Rows 2 plates 2 x 10 close and wide   NMR 9: Cybex Bilateral Leg Press with PPT 5 plates 2 x 10    Time Entry(in minutes):  Neuromuscular Re-Education Time Entry: 13  Therapeutic Exercise Time Entry: 10    Assessment & Plan   Assessment: Pt reports she is still feeling better than she was. She does still have complaints of 3/10 pain in the  lower back and lower extremity. She is responding very well to the current Plan of Care and reports her back pain is actually getting a little better. Therapist is having patient perform most of her resisted work on the Cybex machines at this time. She tolerates this well and therapist was able to increase the weight on the cybex bilateral leg press machine today. We plan to continue to progress her as tolerated. Time billed reflects time spent one on one with Patient today.   Evaluation/Treatment Tolerance: Patient tolerated treatment well    The patient will continue to benefit from skilled outpatient physical therapy in order to address the deficits listed in the problem list on the initial evaluation, provide patient and family education, and maximize the patients level of independence in the home and community environments.     The patient's spiritual, cultural, and educational needs were considered, and the patient is agreeable to the plan of care and goals.           Plan: Will cont to progress core stabilization, LE and posterior chain strengthening as able.    Goals:   Active       Changing body position       Patient will demonstrate moving sit to stand Independently from standing dining room chair. (Progressing)       Start:  07/01/25    Expected End:  08/29/25               Functional outcome       Patient stated goal: I want to be able to stand up in my kitchen for 5 minutes to do dishes and cook without hurting so bad.  (Progressing)       Start:  07/01/25    Expected End:  08/29/25            Patient will demonstrate independence in home program for support of progression (Progressing)       Start:  07/01/25    Expected End:  08/08/25               Pain       Patient will report pain of 3/10 demonstrating a reduction of overall pain (Progressing)       Start:  07/01/25    Expected End:  08/29/25               Range of Motion       Patient will achieve spinal flexion to 50 degrees  (Progressing)        Start:  07/01/25    Expected End:  08/29/25            Patient will achieve spinal extension to 10 degrees  (Progressing)       Start:  07/01/25    Expected End:  08/29/25               Strength       Patient will demonstrate 4/5 abdominal strength (Progressing)       Start:  07/01/25    Expected End:  08/29/25            Patient will demonstrate 4/5 spinal strength (Progressing)       Start:  07/01/25    Expected End:  08/29/25            Patient will achieve bilateral hip strength of 4+/5 (Progressing)       Start:  07/01/25    Expected End:  08/29/25                Cristino Staton PTA

## 2025-08-12 ENCOUNTER — CLINICAL SUPPORT (OUTPATIENT)
Dept: REHABILITATION | Facility: HOSPITAL | Age: 80
End: 2025-08-12
Payer: COMMERCIAL

## 2025-08-12 DIAGNOSIS — M54.16 LUMBAR BACK PAIN WITH RADICULOPATHY AFFECTING RIGHT LOWER EXTREMITY: Primary | ICD-10-CM

## 2025-08-12 PROCEDURE — 97110 THERAPEUTIC EXERCISES: CPT | Mod: CQ

## 2025-08-12 PROCEDURE — 97112 NEUROMUSCULAR REEDUCATION: CPT | Mod: CQ

## 2025-08-20 ENCOUNTER — OFFICE VISIT (OUTPATIENT)
Dept: FAMILY MEDICINE | Facility: CLINIC | Age: 80
End: 2025-08-20
Payer: COMMERCIAL

## 2025-08-20 VITALS
HEIGHT: 68 IN | BODY MASS INDEX: 25.31 KG/M2 | WEIGHT: 167 LBS | DIASTOLIC BLOOD PRESSURE: 80 MMHG | HEART RATE: 80 BPM | SYSTOLIC BLOOD PRESSURE: 130 MMHG | RESPIRATION RATE: 17 BRPM | OXYGEN SATURATION: 99 % | TEMPERATURE: 97 F

## 2025-08-20 DIAGNOSIS — N39.0 URINARY TRACT INFECTION WITHOUT HEMATURIA, SITE UNSPECIFIED: Primary | ICD-10-CM

## 2025-08-20 DIAGNOSIS — R41.3 MEMORY LOSS: ICD-10-CM

## 2025-08-20 DIAGNOSIS — R63.0 DECREASED APPETITE: ICD-10-CM

## 2025-08-20 PROCEDURE — 3075F SYST BP GE 130 - 139MM HG: CPT | Mod: ,,, | Performed by: INTERNAL MEDICINE

## 2025-08-20 PROCEDURE — 1159F MED LIST DOCD IN RCRD: CPT | Mod: ,,, | Performed by: INTERNAL MEDICINE

## 2025-08-20 PROCEDURE — 3079F DIAST BP 80-89 MM HG: CPT | Mod: ,,, | Performed by: INTERNAL MEDICINE

## 2025-08-20 PROCEDURE — 3288F FALL RISK ASSESSMENT DOCD: CPT | Mod: ,,, | Performed by: INTERNAL MEDICINE

## 2025-08-20 PROCEDURE — 1101F PT FALLS ASSESS-DOCD LE1/YR: CPT | Mod: ,,, | Performed by: INTERNAL MEDICINE

## 2025-08-20 PROCEDURE — 1125F AMNT PAIN NOTED PAIN PRSNT: CPT | Mod: ,,, | Performed by: INTERNAL MEDICINE

## 2025-08-20 PROCEDURE — 99214 OFFICE O/P EST MOD 30 MIN: CPT | Mod: ,,, | Performed by: INTERNAL MEDICINE

## 2025-08-20 RX ORDER — NITROFURANTOIN 25; 75 MG/1; MG/1
100 CAPSULE ORAL 2 TIMES DAILY
COMMUNITY
End: 2025-08-20 | Stop reason: CLARIF

## 2025-08-20 RX ORDER — CYPROHEPTADINE HYDROCHLORIDE 4 MG/1
4 TABLET ORAL
Qty: 180 TABLET | Refills: 1 | Status: SHIPPED | OUTPATIENT
Start: 2025-08-20

## 2025-08-20 RX ORDER — DONEPEZIL HYDROCHLORIDE 5 MG/1
5 TABLET, FILM COATED ORAL DAILY
COMMUNITY
End: 2025-08-20 | Stop reason: SDUPTHER

## 2025-08-20 RX ORDER — DONEPEZIL HYDROCHLORIDE 5 MG/1
5 TABLET, FILM COATED ORAL DAILY
Qty: 30 TABLET | Refills: 2 | Status: SHIPPED | OUTPATIENT
Start: 2025-08-20

## 2025-08-20 RX ORDER — CIPROFLOXACIN 500 MG/1
500 TABLET, FILM COATED ORAL 2 TIMES DAILY
Qty: 14 TABLET | Refills: 0 | Status: SHIPPED | OUTPATIENT
Start: 2025-08-20

## 2025-08-20 RX ORDER — CIPROFLOXACIN 500 MG/1
500 TABLET, FILM COATED ORAL 2 TIMES DAILY
COMMUNITY
End: 2025-08-20 | Stop reason: SDUPTHER

## 2025-08-20 RX ORDER — CYPROHEPTADINE HYDROCHLORIDE 4 MG/1
4 TABLET ORAL
COMMUNITY
End: 2025-08-20 | Stop reason: SDUPTHER

## 2025-08-21 ENCOUNTER — OFFICE VISIT (OUTPATIENT)
Dept: GASTROENTEROLOGY | Facility: CLINIC | Age: 80
End: 2025-08-21
Payer: COMMERCIAL

## 2025-08-21 VITALS
DIASTOLIC BLOOD PRESSURE: 80 MMHG | WEIGHT: 168 LBS | HEIGHT: 68 IN | OXYGEN SATURATION: 100 % | SYSTOLIC BLOOD PRESSURE: 164 MMHG | BODY MASS INDEX: 25.46 KG/M2 | HEART RATE: 62 BPM

## 2025-08-21 DIAGNOSIS — K59.04 CHRONIC IDIOPATHIC CONSTIPATION: ICD-10-CM

## 2025-08-21 DIAGNOSIS — D50.9 IRON DEFICIENCY ANEMIA, UNSPECIFIED IRON DEFICIENCY ANEMIA TYPE: Primary | ICD-10-CM

## 2025-08-21 PROCEDURE — 3077F SYST BP >= 140 MM HG: CPT | Mod: CPTII,,, | Performed by: NURSE PRACTITIONER

## 2025-08-21 PROCEDURE — 99999 PR PBB SHADOW E&M-EST. PATIENT-LVL IV: CPT | Mod: PBBFAC,,, | Performed by: NURSE PRACTITIONER

## 2025-08-21 PROCEDURE — 99214 OFFICE O/P EST MOD 30 MIN: CPT | Mod: S$PBB,,, | Performed by: NURSE PRACTITIONER

## 2025-08-21 PROCEDURE — 3079F DIAST BP 80-89 MM HG: CPT | Mod: CPTII,,, | Performed by: NURSE PRACTITIONER

## 2025-08-21 PROCEDURE — 99214 OFFICE O/P EST MOD 30 MIN: CPT | Mod: PBBFAC | Performed by: NURSE PRACTITIONER

## 2025-08-21 PROCEDURE — 1160F RVW MEDS BY RX/DR IN RCRD: CPT | Mod: CPTII,,, | Performed by: NURSE PRACTITIONER

## 2025-08-21 PROCEDURE — 1159F MED LIST DOCD IN RCRD: CPT | Mod: CPTII,,, | Performed by: NURSE PRACTITIONER

## 2025-08-22 ENCOUNTER — TELEPHONE (OUTPATIENT)
Dept: GASTROENTEROLOGY | Facility: CLINIC | Age: 80
End: 2025-08-22
Payer: COMMERCIAL

## 2025-09-03 ENCOUNTER — OFFICE VISIT (OUTPATIENT)
Dept: INTERNAL MEDICINE | Facility: CLINIC | Age: 80
End: 2025-09-03
Payer: COMMERCIAL

## 2025-09-03 ENCOUNTER — RESULTS FOLLOW-UP (OUTPATIENT)
Dept: INTERNAL MEDICINE | Facility: CLINIC | Age: 80
End: 2025-09-03
Payer: COMMERCIAL

## 2025-09-03 VITALS — HEART RATE: 59 BPM | DIASTOLIC BLOOD PRESSURE: 80 MMHG | OXYGEN SATURATION: 98 % | SYSTOLIC BLOOD PRESSURE: 136 MMHG

## 2025-09-03 DIAGNOSIS — M81.0 POSTMENOPAUSAL OSTEOPOROSIS: Primary | ICD-10-CM

## 2025-09-03 PROCEDURE — 99999 PR PBB SHADOW E&M-EST. PATIENT-LVL IV: CPT | Mod: PBBFAC,,, | Performed by: NURSE PRACTITIONER

## 2025-09-03 PROCEDURE — 3288F FALL RISK ASSESSMENT DOCD: CPT | Mod: CPTII,,, | Performed by: NURSE PRACTITIONER

## 2025-09-03 PROCEDURE — G2211 COMPLEX E/M VISIT ADD ON: HCPCS | Mod: ,,, | Performed by: NURSE PRACTITIONER

## 2025-09-03 PROCEDURE — 3075F SYST BP GE 130 - 139MM HG: CPT | Mod: CPTII,,, | Performed by: NURSE PRACTITIONER

## 2025-09-03 PROCEDURE — 3079F DIAST BP 80-89 MM HG: CPT | Mod: CPTII,,, | Performed by: NURSE PRACTITIONER

## 2025-09-03 PROCEDURE — 99214 OFFICE O/P EST MOD 30 MIN: CPT | Mod: PBBFAC | Performed by: NURSE PRACTITIONER

## 2025-09-03 PROCEDURE — 1160F RVW MEDS BY RX/DR IN RCRD: CPT | Mod: CPTII,,, | Performed by: NURSE PRACTITIONER

## 2025-09-03 PROCEDURE — 99213 OFFICE O/P EST LOW 20 MIN: CPT | Mod: S$PBB,,, | Performed by: NURSE PRACTITIONER

## 2025-09-03 PROCEDURE — 1101F PT FALLS ASSESS-DOCD LE1/YR: CPT | Mod: CPTII,,, | Performed by: NURSE PRACTITIONER

## 2025-09-03 PROCEDURE — 1159F MED LIST DOCD IN RCRD: CPT | Mod: CPTII,,, | Performed by: NURSE PRACTITIONER

## 2025-09-03 RX ORDER — GLIMEPIRIDE 1 MG/1
TABLET ORAL
COMMUNITY

## 2025-09-03 RX ORDER — AMLODIPINE BESYLATE 5 MG/1
5 TABLET ORAL
COMMUNITY
Start: 2025-08-12

## 2025-09-04 ENCOUNTER — OFFICE VISIT (OUTPATIENT)
Dept: DERMATOLOGY | Facility: CLINIC | Age: 80
End: 2025-09-04
Payer: COMMERCIAL

## 2025-09-04 DIAGNOSIS — L30.0 NUMMULAR ECZEMA: Primary | ICD-10-CM

## 2025-09-04 RX ORDER — TRIAMCINOLONE ACETONIDE 1 MG/G
CREAM TOPICAL
Qty: 80 G | Refills: 0 | Status: SHIPPED | OUTPATIENT
Start: 2025-09-04

## (undated) DEVICE — GLOVE SURGICAL PROTEXIS PI SIZE 7.5

## (undated) DEVICE — GLOVE PROTEXIS PI SYN SURG 6.5

## (undated) DEVICE — SOL CONTINU-FLO SET 2 LAV

## (undated) DEVICE — CATH IV 22G X 1 AUTOGUARD

## (undated) DEVICE — CDS CYSTO

## (undated) DEVICE — GLOVE SURGICAL PROTEXIS PI SIZE 7

## (undated) DEVICE — TRAY EPIDURAL SINGLE SHOT PMA

## (undated) DEVICE — APPLICATOR CHLORAPREP ORN 26ML

## (undated) DEVICE — KIT IV START RUSH

## (undated) DEVICE — SOL IRRIGATION SALINE 3000ML BAG

## (undated) DEVICE — CATHETER FOLEY 16FR SILVER 5CC

## (undated) DEVICE — Device

## (undated) DEVICE — SYRINGE 10-12CC LURE -LOK TIP

## (undated) DEVICE — SET IRRIG CYSTO/BLADDER 78IN